# Patient Record
Sex: MALE | Race: WHITE | NOT HISPANIC OR LATINO | Employment: OTHER | URBAN - METROPOLITAN AREA
[De-identification: names, ages, dates, MRNs, and addresses within clinical notes are randomized per-mention and may not be internally consistent; named-entity substitution may affect disease eponyms.]

---

## 2017-03-29 ENCOUNTER — OFFICE VISIT (OUTPATIENT)
Dept: ENDOCRINOLOGY | Facility: MEDICAL CENTER | Age: 52
End: 2017-03-29
Payer: COMMERCIAL

## 2017-03-29 VITALS
HEIGHT: 68 IN | OXYGEN SATURATION: 98 % | HEART RATE: 74 BPM | DIASTOLIC BLOOD PRESSURE: 70 MMHG | SYSTOLIC BLOOD PRESSURE: 110 MMHG | BODY MASS INDEX: 35.28 KG/M2 | WEIGHT: 232.8 LBS

## 2017-03-29 DIAGNOSIS — E66.9 OBESITY (BMI 30-39.9): ICD-10-CM

## 2017-03-29 DIAGNOSIS — E11.65 TYPE 2 DIABETES MELLITUS WITH HYPERGLYCEMIA, WITHOUT LONG-TERM CURRENT USE OF INSULIN (HCC): ICD-10-CM

## 2017-03-29 DIAGNOSIS — I10 ESSENTIAL HYPERTENSION: ICD-10-CM

## 2017-03-29 LAB
HBA1C MFR BLD: 7.9 % (ref ?–5.8)
INT CON NEG: NORMAL
INT CON POS: NORMAL

## 2017-03-29 PROCEDURE — 83036 HEMOGLOBIN GLYCOSYLATED A1C: CPT | Performed by: INTERNAL MEDICINE

## 2017-03-29 PROCEDURE — 99214 OFFICE O/P EST MOD 30 MIN: CPT | Mod: 25 | Performed by: INTERNAL MEDICINE

## 2017-03-29 NOTE — MR AVS SNAPSHOT
"        Casper Rowley   3/29/2017 9:00 AM   Office Visit   MRN: [id number]    Department:  Endocrinology Med [facility]   Dept Phone:  [phone]    Description:  Male : 1965   Provider:  Khoa Nichole M.D.; ENDOCRINOLOGY DIABETES RN           Reason for Visit     Diabetes Mellitus           Allergies as of 3/29/2017     No Known Allergies      You were diagnosed with     Type 2 diabetes mellitus with hyperglycemia, without long-term current use of insulin (CMS-HCC)   [5586623]       Essential hypertension   [6454114]       Obesity (BMI 30-39.9)   [053890]         Vital Signs     Blood Pressure Pulse Height Weight Body Mass Index Oxygen Saturation    110/70 mmHg 74 1.715 m (5' 7.5\") 105.597 kg (232 lb 12.8 oz) 35.90 kg/m2 98%    Smoking Status                   Never Smoker            Basic Information     Date Of Birth Sex Race Ethnicity Preferred Language    1965 Male White Non- English      Your appointments     2017  9:00 AM   Diabetes Care Visit with Khoa Nichole M.D., ENDOCRINOLOGY DIABETES RN   [facility]    [address], [address]  [place] [address] [phone]           You will be receiving a confirmation call a few days before your appointment from our automated call confirmation system.              Problem List              ICD-10-CM Priority Class Noted - Resolved    DM (diabetes mellitus) (CMS-HCC) E11.9   2009 - Present    HTN (hypertension), benign (Chronic) I10   2009 - Present    Allergic rhinitis    2009 - Present    Dyslipidemia E78.5   2012 - Present    NDE (obstructive sleep apnea) G47.33   10/23/2012 - Present    BMI 35.0-35.9,adult Z68.35   10/28/2014 - Present    HTN (hypertension) I10   2015 - Present    Obesity E66.9   2015 - Present      Health Maintenance        Date Due Completion Dates    IMM HEP B VACCINE (1 of 3 - Primary Series) 1965 ---    RETINAL SCREENING " 1/12/2016 1/12/2015, 10/11/2013 (Done), 7/23/2011 (Done)    Override on 10/11/2013: Done (Han swartz)    Override on 7/23/2011: Done    A1C SCREENING 1/14/2016 7/14/2015, 4/15/2015, 4/14/2015, 1/23/2015, 1/14/2015, 10/24/2014, 2/22/2014, 6/1/2013, 5/4/2012, 1/20/2012, 8/28/2010, 8/11/2009, 2/5/2009    FASTING LIPID PROFILE 4/15/2016 4/15/2015, 10/24/2014, 6/1/2013, 1/20/2012, 5/13/2011, 8/28/2010, 8/11/2009, 2/5/2009    URINE ACR / MICROALBUMIN 4/15/2016 4/15/2015, 10/24/2014, 6/1/2013, 1/20/2012, 5/13/2011, 8/28/2010, 8/11/2009    SERUM CREATININE 4/15/2016 4/15/2015, 1/23/2015, 10/24/2014, 6/1/2013, 1/20/2012, 5/13/2011, 8/28/2010, 2/6/2009, 2/4/2009    IMM INFLUENZA (1) 9/1/2016 9/27/2014, 10/12/2012, 10/14/2011, 10/1/2010    COLONOSCOPY 9/15/2016 ---    DIABETES MONOFILAMENT / LE EXAM 12/14/2017 12/14/2016, 2/28/2014, 10/23/2012 (Done), 5/20/2011 (Done)    Override on 10/23/2012: Done    Override on 5/20/2011: Done    IMM DTaP/Tdap/Td Vaccine (2 - Td) 10/23/2022 10/23/2012            Results     POCT Hemoglobin A1C      Component    Glycohemoglobin    7.9    Comment:     lot [id number]  10/18    Internal Control Negative    Internal Control Positive                        Current Immunizations     Influenza TIV (IM) 9/27/2014, 10/12/2012, 10/14/2011, 10/1/2010    Pneumococcal polysaccharide vaccine (PPSV-23) 10/18/2013  3:33 PM    Tdap Vaccine 10/23/2012      Below and/or attached are the medications your provider expects you to take. Review all of your home medications and newly ordered medications with your provider and/or pharmacist. Follow medication instructions as directed by your provider and/or pharmacist. Please keep your medication list with you and share with your provider. Update the information when medications are discontinued, doses are changed, or new medications (including over-the-counter products) are added; and carry medication information at all times in the event of emergency situations      Allergies:  No Known Allergies          Medications  Valid as of: March 29, 2017 -  9:09 AM    Generic Name Brand Name Tablet Size Instructions for use    Albiglutide (Pen-injector) Albiglutide 50 MG Inject  as instructed.        Aspirin   Take  by mouth.        Blood Glucose Monitoring Suppl (Misc) Blood Glucose Monitoring Suppl SUPPLIES Test strips order: Test strips for One Touch Ultra 2 meter. Sig: use tid and prn ssx high or low sugar #100 RF x 3        Cholecalciferol (Cap) Vitamin D 2000 UNITS Take 6,000 Units by mouth every day.        Fluticasone Propionate (Suspension) FLONASE 50 MCG/ACT Spray 1 Spray in nose every day. Each Nostril        Gemfibrozil (Tab) LOPID 600 MG TAKE ONE TABLET BY MOUTH TWICE DAILY        Glucose Blood (Strip) ONE TOUCH ULTRA TEST  USE TO TEST TWICE DAILY        Insulin Pen Needle (Misc) B-D UF III MINI PEN NEEDLES 31G X 5 MM USE ONE PEN NEEDLE PER DAYS WITH VICTOZA        Lisinopril (Tab) PRINIVIL, ZESTRIL 40 MG TAKE ONE TABLET BY MOUTH ONCE A DAY        MetFORMIN HCl (TABLET SR 24 HR) GLUMETZA 1000 MG Take 1 Tab by mouth 2 times daily, before breakfast and dinner.        Metoprolol Succinate (TABLET SR 24 HR) TOPROL XL 25 MG Take 1 Tab by mouth every day.        .                 Medicines prescribed today were sent to:     [id number] - [place], [place] - [address] [address]    Phone: [phone] Fax: [phone]    Open 24 Hours?: No    [id number] - [place], [place] - [address] [address]    Phone: [phone] Fax: [phone]    Open 24 Hours?: No      Medication refill instructions:       If your prescription bottle indicates you have medication refills left, it is not necessary to call your provider’s office. Please contact your pharmacy and they will refill your medication.    If your prescription bottle indicates you do not have any refills left, you may request refills at any time through one  of the following ways: The online [employer] system (except Urgent Care), by calling your provider’s office, or by asking your pharmacy to contact your provider’s office with a refill request. Medication refills are processed only during regular business hours and may not be available until the next business day. Your provider may request additional information or to have a follow-up visit with you prior to refilling your medication.   *Please Note: Medication refills are assigned a new Rx number when refilled electronically. Your pharmacy may indicate that no refills were authorized even though a new prescription for the same medication is available at the pharmacy. Please request the medicine by name with the pharmacy before contacting your provider for a refill.           [employer] Access Code: Activation code not generated  Current [employer] Status: Active

## 2017-03-29 NOTE — PROGRESS NOTES
Patient with existing type diabetes:  Type 2 diabetes here for follow up     Patient's health status since last visit: no change.   Issues with diabetes since last visit none.   Current Diabetes Medications: Tanzeum 50 mg and Metformin 1000 mg bid.     HbA1c: @hba1c@  Lab Results   Component Value Date/Time    GLYCOHEMOGLOBIN 7.9 03/29/2017 08:58 AM      Diet: eating 3 meals per day, states healthy most of the time.  States he eats late at night and then goes to bed, due to work schedule.   States he plans on trying to eat more during the day or have a later snack so he doesn't eat as much late at night.     FSBS  Testing: on average checking blood sugars 1-2 times per day (scanned to media)    Hypoglycemia: none.   Exercise: using treadclimber for about 1 mile per day    Retinal Exam:past due, needs to schedule.  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     Routine Dental Exams: current.   Flu vaccine: current.   Pneumonia vaccine current.

## 2017-03-29 NOTE — PROGRESS NOTES
Endocrinology Clinic Progress Note  PCP: Mjogan Garcia M.D.    CC: Diabetes    HPI:  Casper Rowley is a 51 y.o. old patient who comes in today for routine follow up. He is currently on Tanzeum 50 mg weekly0 and metformin 1000 mg daily. He reports compliance with medications. He checks blood sugars 2-3 times a day. Fasting blood sugar in the morning is mostly close to 150-160. Pre-meal blood sugar readings are mostly below 180. No hypoglycemia. He denies issues with numbness and tingling in feet. He is due for repeat eye exam, he denies history of diabetic retinopathy.    ROS:  Constitutional: No unintentional weight loss  Endo: Denies excessive thirst or frequent urination    Past Medical History:  Patient Active Problem List    Diagnosis Date Noted   • HTN (hypertension) 01/14/2015   • Obesity 01/14/2015   • BMI 35.0-35.9,adult 10/28/2014   • NED (obstructive sleep apnea) 10/23/2012   • Dyslipidemia 01/23/2012   • Allergic rhinitis 09/29/2009   • HTN (hypertension), benign 08/14/2009   • DM (diabetes mellitus) (CMS-[facility]) 05/27/2009       Medications:    Current outpatient prescriptions:   •  metformin ER modified (GLUMETZA) 1000 MG TABLET SR 24 HR, Take 1 Tab by mouth 2 times daily, before breakfast and dinner., Disp: , Rfl:   •  lisinopril (PRINIVIL, ZESTRIL) 40 MG tablet, TAKE ONE TABLET BY MOUTH ONCE A DAY, Disp: 30 Tab, Rfl: 6  •  metoprolol SR (TOPROL XL) 25 MG TB24, Take 1 Tab by mouth every day., Disp: 30 Tab, Rfl: 5  •  Cholecalciferol (VITAMIN D) 2000 UNITS CAPS, Take 6,000 Units by mouth every day., Disp: , Rfl:   •  gemfibrozil (LOPID) 600 MG TABS, TAKE ONE TABLET BY MOUTH TWICE DAILY, Disp: 60 Tab, Rfl: 5  •  ASPIR-81 PO, Take  by mouth., Disp: , Rfl:   •  Albiglutide (TANZEUM) 50 MG Pen-injector, Inject  as instructed., Disp: , Rfl:   •  Blood Glucose Monitoring Suppl SUPPLIES MISC, Test strips order: Test strips for One Touch Ultra 2 meter. Sig: use tid and prn ssx high or low sugar #100 RF x  "3, Disp: 300 Each, Rfl: 3  •  B-D UF III MINI PEN NEEDLES 31G X 5 MM MISC, USE ONE PEN NEEDLE PER DAYS WITH VICTOZA, Disp: 100 Each, Rfl: 2  •  fluticasone (FLONASE) 50 MCG/ACT nasal spray, Spray 1 Spray in nose every day. Each Nostril, Disp: 16 g, Rfl: 11  •  ONE TOUCH ULTRA TEST strip, USE TO TEST TWICE DAILY, Disp: 100 Tab, Rfl: 11    Labs:   Hemoglobin A1c today in the clinic is 7.9%    Physical Examination:  Vital signs: /70 mmHg  Pulse 74  Ht 1.715 m (5' 7.5\")  Wt 105.597 kg (232 lb 12.8 oz)  BMI 35.90 kg/m2  SpO2 98%  General: No apparent distress, cooperative  Eyes: No scleral icterus, no discharge, normal eyelids  Neck: No abnormal masses on inspection   Resp: Normal effort, clear to auscultation bilaterally  CVS: Regular rate and rhythm, S1 S2 normal, no murmur  Extremities: No lower extremity edema  Musculoskeletal: Normal digits and nails  Skin: No rash on visible skin  Psych: Alert and oriented, normal mood and affect    Assessment and Plan:    Type 2 diabetes mellitus with hyperglycemia, without long-term current use of insulin (HCC)  · Hemoglobin A1c today in the clinic is 7.9%  · Goal hemoglobin A1c less than 7%  · Continue Tanzeum and metformin  · We discussed about diet and lifestyle modification  · Repeat labs:  -     COMP METABOLIC PANEL; Future  -     LIPID PROFILE; Future  -     MICROALBUMIN CREAT RATIO URINE; Future    Essential hypertension  · Blood pressure is well controlled  · Continue lisinopril, in addition to other antihypertensive agents    Obesity  · BMI 35.9  · We discussed about importance of weight loss    This was a combined MD/RN-CDE visit. RN-CDE notes reviewed and discussed. The total face to face time spent seeing the patient in consultation, and formulating an action plan for this visit was 25 minutes. Greater than 50% of this time was spent in counseling, discussing problems documented above, coordinating care and answering questions by the physician and " RN-certified diabetes educator.     Return in about 4 months (around 7/29/2017).    Thank you for allowing me to participate in the care of this patient.    Khoa Nichole M.D.    CC:   Mojgan Garcia M.D.    This note was created using voice recognition software (Dragon). The accuracy of the dictation is limited by the abilities of the software. I have reviewed the note prior to signing, however some errors in grammar and context are still possible. If you have any questions related to this note please do not hesitate to contact our office.

## 2019-02-08 DIAGNOSIS — Z01.810 PRE-OPERATIVE CARDIOVASCULAR EXAMINATION: ICD-10-CM

## 2019-02-08 DIAGNOSIS — Z01.812 PRE-OPERATIVE LABORATORY EXAMINATION: ICD-10-CM

## 2019-02-08 LAB
ANION GAP SERPL CALC-SCNC: 6 MMOL/L (ref 0–11.9)
BUN SERPL-MCNC: 16 MG/DL (ref 8–22)
CALCIUM SERPL-MCNC: 9.9 MG/DL (ref 8.5–10.5)
CHLORIDE SERPL-SCNC: 105 MMOL/L (ref 96–112)
CO2 SERPL-SCNC: 23 MMOL/L (ref 20–33)
CREAT SERPL-MCNC: 0.78 MG/DL (ref 0.5–1.4)
EKG IMPRESSION: NORMAL
EST. AVERAGE GLUCOSE BLD GHB EST-MCNC: 177 MG/DL
GLUCOSE SERPL-MCNC: 129 MG/DL (ref 65–99)
HBA1C MFR BLD: 7.8 % (ref 0–5.6)
POTASSIUM SERPL-SCNC: 4.4 MMOL/L (ref 3.6–5.5)
SODIUM SERPL-SCNC: 134 MMOL/L (ref 135–145)

## 2019-02-08 PROCEDURE — 36415 COLL VENOUS BLD VENIPUNCTURE: CPT

## 2019-02-08 PROCEDURE — 80048 BASIC METABOLIC PNL TOTAL CA: CPT

## 2019-02-08 PROCEDURE — 93010 ELECTROCARDIOGRAM REPORT: CPT | Performed by: INTERNAL MEDICINE

## 2019-02-08 PROCEDURE — 83036 HEMOGLOBIN GLYCOSYLATED A1C: CPT

## 2019-02-08 PROCEDURE — 93005 ELECTROCARDIOGRAM TRACING: CPT

## 2019-02-08 NOTE — OR NURSING
Pre admit apt: Pt. Instructed to continue regularly prescribed medications through day before surgery.  Instructed to take the following medications, the day of surgery, with a sip of water per anesthesia protocol:metoprolol

## 2019-02-11 ENCOUNTER — HOSPITAL ENCOUNTER (OUTPATIENT)
Facility: MEDICAL CENTER | Age: 54
End: 2019-02-11
Attending: ORTHOPAEDIC SURGERY | Admitting: ORTHOPAEDIC SURGERY
Payer: COMMERCIAL

## 2019-02-11 VITALS
TEMPERATURE: 97.2 F | RESPIRATION RATE: 16 BRPM | BODY MASS INDEX: 35.33 KG/M2 | OXYGEN SATURATION: 93 % | SYSTOLIC BLOOD PRESSURE: 114 MMHG | WEIGHT: 225.09 LBS | DIASTOLIC BLOOD PRESSURE: 92 MMHG | HEIGHT: 67 IN | HEART RATE: 71 BPM

## 2019-02-11 LAB — GLUCOSE BLD-MCNC: 106 MG/DL (ref 65–99)

## 2019-02-11 PROCEDURE — 82962 GLUCOSE BLOOD TEST: CPT

## 2019-02-11 PROCEDURE — 160039 HCHG SURGERY MINUTES - EA ADDL 1 MIN LEVEL 3: Performed by: ORTHOPAEDIC SURGERY

## 2019-02-11 PROCEDURE — 160025 RECOVERY II MINUTES (STATS): Performed by: ORTHOPAEDIC SURGERY

## 2019-02-11 PROCEDURE — 160046 HCHG PACU - 1ST 60 MINS PHASE II: Performed by: ORTHOPAEDIC SURGERY

## 2019-02-11 PROCEDURE — 160048 HCHG OR STATISTICAL LEVEL 1-5: Performed by: ORTHOPAEDIC SURGERY

## 2019-02-11 PROCEDURE — 160028 HCHG SURGERY MINUTES - 1ST 30 MINS LEVEL 3: Performed by: ORTHOPAEDIC SURGERY

## 2019-02-11 PROCEDURE — 160036 HCHG PACU - EA ADDL 30 MINS PHASE I: Performed by: ORTHOPAEDIC SURGERY

## 2019-02-11 PROCEDURE — 160002 HCHG RECOVERY MINUTES (STAT): Performed by: ORTHOPAEDIC SURGERY

## 2019-02-11 PROCEDURE — A9270 NON-COVERED ITEM OR SERVICE: HCPCS | Performed by: ANESTHESIOLOGY

## 2019-02-11 PROCEDURE — 501838 HCHG SUTURE GENERAL: Performed by: ORTHOPAEDIC SURGERY

## 2019-02-11 PROCEDURE — 500881 HCHG PACK, EXTREMITY: Performed by: ORTHOPAEDIC SURGERY

## 2019-02-11 PROCEDURE — 500423 HCHG DRESSING, ABD COMBINE: Performed by: ORTHOPAEDIC SURGERY

## 2019-02-11 PROCEDURE — 160035 HCHG PACU - 1ST 60 MINS PHASE I: Performed by: ORTHOPAEDIC SURGERY

## 2019-02-11 PROCEDURE — 700102 HCHG RX REV CODE 250 W/ 637 OVERRIDE(OP): Performed by: ANESTHESIOLOGY

## 2019-02-11 PROCEDURE — 700111 HCHG RX REV CODE 636 W/ 250 OVERRIDE (IP)

## 2019-02-11 PROCEDURE — 160009 HCHG ANES TIME/MIN: Performed by: ORTHOPAEDIC SURGERY

## 2019-02-11 PROCEDURE — 700101 HCHG RX REV CODE 250

## 2019-02-11 RX ORDER — ACETAMINOPHEN 500 MG
1000 TABLET ORAL ONCE
Status: DISCONTINUED | OUTPATIENT
Start: 2019-02-11 | End: 2019-02-11 | Stop reason: HOSPADM

## 2019-02-11 RX ORDER — HYDRALAZINE HYDROCHLORIDE 20 MG/ML
5 INJECTION INTRAMUSCULAR; INTRAVENOUS
Status: DISCONTINUED | OUTPATIENT
Start: 2019-02-11 | End: 2019-02-11 | Stop reason: HOSPADM

## 2019-02-11 RX ORDER — BUPIVACAINE HYDROCHLORIDE 2.5 MG/ML
INJECTION, SOLUTION EPIDURAL; INFILTRATION; INTRACAUDAL
Status: DISCONTINUED | OUTPATIENT
Start: 2019-02-11 | End: 2019-02-11 | Stop reason: HOSPADM

## 2019-02-11 RX ORDER — OXYCODONE HCL 5 MG/5 ML
5 SOLUTION, ORAL ORAL
Status: COMPLETED | OUTPATIENT
Start: 2019-02-11 | End: 2019-02-11

## 2019-02-11 RX ORDER — METOPROLOL TARTRATE 1 MG/ML
1 INJECTION, SOLUTION INTRAVENOUS
Status: DISCONTINUED | OUTPATIENT
Start: 2019-02-11 | End: 2019-02-11 | Stop reason: HOSPADM

## 2019-02-11 RX ORDER — CEFAZOLIN SODIUM 1 G/3ML
INJECTION, POWDER, FOR SOLUTION INTRAMUSCULAR; INTRAVENOUS
Status: DISCONTINUED | OUTPATIENT
Start: 2019-02-11 | End: 2019-02-11 | Stop reason: HOSPADM

## 2019-02-11 RX ORDER — MEPERIDINE HYDROCHLORIDE 25 MG/ML
12.5 INJECTION INTRAMUSCULAR; INTRAVENOUS; SUBCUTANEOUS
Status: DISCONTINUED | OUTPATIENT
Start: 2019-02-11 | End: 2019-02-11 | Stop reason: HOSPADM

## 2019-02-11 RX ORDER — SODIUM CHLORIDE, SODIUM LACTATE, POTASSIUM CHLORIDE, CALCIUM CHLORIDE 600; 310; 30; 20 MG/100ML; MG/100ML; MG/100ML; MG/100ML
INJECTION, SOLUTION INTRAVENOUS ONCE
Status: COMPLETED | OUTPATIENT
Start: 2019-02-11 | End: 2019-02-11

## 2019-02-11 RX ORDER — OXYCODONE HCL 5 MG/5 ML
10 SOLUTION, ORAL ORAL
Status: COMPLETED | OUTPATIENT
Start: 2019-02-11 | End: 2019-02-11

## 2019-02-11 RX ORDER — HYDROMORPHONE HYDROCHLORIDE 1 MG/ML
0.2 INJECTION, SOLUTION INTRAMUSCULAR; INTRAVENOUS; SUBCUTANEOUS
Status: DISCONTINUED | OUTPATIENT
Start: 2019-02-11 | End: 2019-02-11 | Stop reason: HOSPADM

## 2019-02-11 RX ORDER — HYDROMORPHONE HYDROCHLORIDE 1 MG/ML
0.4 INJECTION, SOLUTION INTRAMUSCULAR; INTRAVENOUS; SUBCUTANEOUS
Status: DISCONTINUED | OUTPATIENT
Start: 2019-02-11 | End: 2019-02-11 | Stop reason: HOSPADM

## 2019-02-11 RX ORDER — HALOPERIDOL 5 MG/ML
1 INJECTION INTRAMUSCULAR
Status: DISCONTINUED | OUTPATIENT
Start: 2019-02-11 | End: 2019-02-11 | Stop reason: HOSPADM

## 2019-02-11 RX ORDER — HYDROMORPHONE HYDROCHLORIDE 1 MG/ML
0.6 INJECTION, SOLUTION INTRAMUSCULAR; INTRAVENOUS; SUBCUTANEOUS
Status: DISCONTINUED | OUTPATIENT
Start: 2019-02-11 | End: 2019-02-11 | Stop reason: HOSPADM

## 2019-02-11 RX ORDER — CELECOXIB 200 MG/1
200 CAPSULE ORAL ONCE
Status: DISCONTINUED | OUTPATIENT
Start: 2019-02-11 | End: 2019-02-11 | Stop reason: HOSPADM

## 2019-02-11 RX ORDER — ONDANSETRON 2 MG/ML
4 INJECTION INTRAMUSCULAR; INTRAVENOUS
Status: DISCONTINUED | OUTPATIENT
Start: 2019-02-11 | End: 2019-02-11 | Stop reason: HOSPADM

## 2019-02-11 RX ORDER — DIPHENHYDRAMINE HYDROCHLORIDE 50 MG/ML
12.5 INJECTION INTRAMUSCULAR; INTRAVENOUS
Status: DISCONTINUED | OUTPATIENT
Start: 2019-02-11 | End: 2019-02-11 | Stop reason: HOSPADM

## 2019-02-11 RX ADMIN — SODIUM CHLORIDE, SODIUM LACTATE, POTASSIUM CHLORIDE, CALCIUM CHLORIDE: 600; 310; 30; 20 INJECTION, SOLUTION INTRAVENOUS at 10:48

## 2019-02-11 RX ADMIN — OXYCODONE HYDROCHLORIDE 5 MG: 5 SOLUTION ORAL at 13:54

## 2019-02-11 NOTE — OP REPORT
DATE OF SERVICE:  02/11/2019    PREOPERATIVE DIAGNOSIS:  Left carpal tunnel syndrome.    POSTOPERATIVE DIAGNOSIS:  Left carpal tunnel syndrome.    PROCEDURE PERFORMED:  Left carpal tunnel release.    SURGEON:  Piter Otero MD    ANESTHESIA:  General.    COMPLICATIONS:  None.    BLOOD LOSS:  Minimal.    TOURNIQUET:  Left arm 90 minutes at 225 mmHg.    INDICATION:  The patient is a 53-year-old gentleman with a history of left   carpal tunnel syndrome, refractory to conservative management and confirmed on   electrodiagnostic tests.  He is indicated for carpal tunnel release.    DESCRIPTION OF PROCEDURE:  Following induction of general anesthesia, time-out   was performed confirming patient, site, and procedure.  Two grams of Ancef IV   were ordered and administered.  The left arm tourniquet was applied and left   upper extremity was positioned on an arm board.  The extremity was prepped and   draped in sterile fashion.  The upper extremity was exsanguinated and the   tourniquet was inflated.  A 2 cm longitudinal incision just ulnar to the   thenar crease extending distal from the wrist crease was made.  Blunt   dissection was carried down through the subcutaneous tissues.  The palmar   fascia was incised in line with the skin to expose the underlying transverse   carpal ligament.  This was incised longitudinally in line with the ring ray.    The release was carried distally taking care to protect the palmar arch.  It   was also carried proximally into the distal aspect of the antebrachial fascia   to completely release the carpal tunnel room.  The wound was irrigated.  The   tourniquet was deflated.  Hemostasis was achieved with bipolar cautery.  Skin   was closed with 4-0 nylon mattress sutures.  Sterile dressing was applied   followed by a bulky soft volar splint.  The patient was awakened and extubated   in the operating room and transferred to recovery room in stable condition.        ____________________________________     MD DORIAN Hagan    DD:  02/11/2019 13:18:35  DT:  02/11/2019 13:39:45    D#:  [id number]  Job#:  [id number]

## 2019-02-11 NOTE — OR NURSING
Pt allergies and NPO status verified, home meds reconcilled. Belongings secured. Pt verbalizes understanding of the pain scale, expected course of stay, and plan of care.  Surgical site verified with pt.  IV access established.  FSBG 106.

## 2019-02-11 NOTE — OR SURGEON
Immediate Post OP Note    PreOp Diagnosis: L CTS    PostOp Diagnosis: L CTS    Procedure(s):  CARPAL TUNNEL RELEASE - Wound Class: Clean    Surgeon(s):  Piter Otero M.D.    Anesthesiologist/Type of Anesthesia:  Anesthesiologist: Bao Gaxiola M.D./General    Surgical Staff:  Circulator: Roge Alvarez R.N.  Scrub Person: Isidro Walls    Specimens removed if any:  * No specimens in log *    Estimated Blood Loss: min    Findings: above    Complications: none        2/11/2019 1:20 PM Piter Otero M.D.

## 2019-02-11 NOTE — OR NURSING
1440 PT DRESSED, TRANSFERRED TO RECLINER.  PT DRESSED, TRANSFERRED TO RECLINER.  1459 PT REPORT L HAND PAIN 1/10 AND TOLERABLE. VSS 1524 PT MEETS CRITERIA FOR D/C TO HOME.

## 2019-02-11 NOTE — DISCHARGE INSTRUCTIONS
ACTIVITY: Rest and take it easy for the first 24 hours.  A responsible adult is recommended to remain with you during that time.  It is normal to feel sleepy.  We encourage you to not do anything that requires balance, judgment or coordination.    MILD FLU-LIKE SYMPTOMS ARE NORMAL. YOU MAY EXPERIENCE GENERALIZED MUSCLE ACHES, THROAT IRRITATION, HEADACHE AND/OR SOME NAUSEA.    FOR 24 HOURS DO NOT:  Drive, operate machinery or run household appliances.  Drink beer or alcoholic beverages.   Make important decisions or sign legal documents.    SPECIAL INSTRUCTIONS: *Keep elevated over the level of your heart, may ambulate as needed. Ice 20 mins on/20 mins off** NON WEIGHT BEARING TO LEFT HAND.    DIET: To avoid nausea, slowly advance diet as tolerated, avoiding spicy or greasy foods for the first day.  Add more substantial food to your diet according to your physician's instructions.  Babies can be fed formula or breast milk as soon as they are hungry.  INCREASE FLUIDS AND FIBER TO AVOID CONSTIPATION.    SURGICAL DRESSING/BATHING: *keep clean, dry and intact. May shower when authorized by surgeon. **    FOLLOW-UP APPOINTMENT:  A follow-up appointment should be arranged with your doctor; call to schedule.    You should CALL YOUR PHYSICIAN if you develop:  Fever greater than 101 degrees F.  Pain not relieved by medication, or persistent nausea or vomiting.  Excessive bleeding (blood soaking through dressing) or unexpected drainage from the wound.  Extreme redness or swelling around the incision site, drainage of pus or foul smelling drainage.  Inability to urinate or empty your bladder within 8 hours.  Problems with breathing or chest pain.    You should call 911 if you develop problems with breathing or chest pain.  If you are unable to contact your doctor or surgical center, you should go to the nearest emergency room or urgent care center.      Physician's telephone #: *[phone]**    If any questions arise, call  your doctor.  If your doctor is not available, please feel free to call the Surgical Center at [phone].  The Center is open Monday through Friday from 7AM to 7PM.  You can also call the HEALTH HOTLINE open 24 hours/day, 7 days/week and speak to a nurse at [phone], or toll free at [phone].    A registered nurse may call you a few days after your surgery to see how you are doing after your procedure.    MEDICATIONS: Resume taking daily medication.  Take prescribed pain medication with food.  If no medication is prescribed, you may take non-aspirin pain medication if needed.  PAIN MEDICATION CAN BE VERY CONSTIPATING.  Take a stool softener or laxative such as senokot, pericolace, or milk of magnesia if needed.    Prescription given and filled prior to surgery.  Last pain medication given at  2pm**.    If your physician has prescribed pain medication that includes Acetaminophen (Tylenol), do not take additional Acetaminophen (Tylenol) while taking the prescribed medication.    Depression / Suicide Risk    As you are discharged from this [facility] facility, it is important to learn how to keep safe from harming yourself.    Recognize the warning signs:  · Abrupt changes in personality, positive or negative- including increase in energy   · Giving away possessions  · Change in eating patterns- significant weight changes-  positive or negative  · Change in sleeping patterns- unable to sleep or sleeping all the time   · Unwillingness or inability to communicate  · Depression  · Unusual sadness, discouragement and loneliness  · Talk of wanting to die  · Neglect of personal appearance   · Rebelliousness- reckless behavior  · Withdrawal from people/activities they love  · Confusion- inability to concentrate     If you or a loved one observes any of these behaviors or has concerns about self-harm, here's what you can do:  · Talk about it- your feelings and reasons for harming yourself  · Remove any means  that you might use to hurt yourself (examples: pills, rope, extension cords, firearm)  · Get professional help from the community (Mental Health, Substance Abuse, psychological counseling)  · Do not be alone:Call your Safe Contact- someone whom you trust who will be there for you.  · Call your local CRISIS HOTLINE 291-8965 or 570-010-7300  · Call your local Children's Mobile Crisis Response Team [place] (390) 549-9500 or www.FieldEZ  · Call the toll free National Suicide Prevention Hotlines   · National Suicide Prevention Lifeline 355-993-IACD (8785)  · National Hope Line Network 800-SUICIDE (024-6384)

## 2019-02-11 NOTE — OR NURSING
1319 Sedated to recovery with LMA in place. Good spontaneous respiratory effort. Lungs dim t/o. Left arm elevated on pillow, hand pink, warm and dry. 1330 Remains sedated, no changes. LMA dc'd. 1335 Pt reoriented to surroundings. Awake with calm and pleasant affect. 1345 S/o pain 3-4/10, Rx given. 1400 VSS, no assessment changes. 1420 Pt meets criteria for transfer and discharge to stage 2.

## 2019-05-15 ENCOUNTER — APPOINTMENT (RX ONLY)
Dept: URBAN - METROPOLITAN AREA CLINIC 35 | Facility: CLINIC | Age: 54
Setting detail: DERMATOLOGY
End: 2019-05-15

## 2019-05-15 DIAGNOSIS — L82.1 OTHER SEBORRHEIC KERATOSIS: ICD-10-CM

## 2019-05-15 DIAGNOSIS — Z71.89 OTHER SPECIFIED COUNSELING: ICD-10-CM

## 2019-05-15 DIAGNOSIS — D22 MELANOCYTIC NEVI: ICD-10-CM

## 2019-05-15 DIAGNOSIS — L81.4 OTHER MELANIN HYPERPIGMENTATION: ICD-10-CM

## 2019-05-15 DIAGNOSIS — Z85.828 PERSONAL HISTORY OF OTHER MALIGNANT NEOPLASM OF SKIN: ICD-10-CM

## 2019-05-15 PROBLEM — I10 ESSENTIAL (PRIMARY) HYPERTENSION: Status: ACTIVE | Noted: 2019-05-15

## 2019-05-15 PROBLEM — D22.4 MELANOCYTIC NEVI OF SCALP AND NECK: Status: ACTIVE | Noted: 2019-05-15

## 2019-05-15 PROCEDURE — ? COUNSELING

## 2019-05-15 PROCEDURE — 99213 OFFICE O/P EST LOW 20 MIN: CPT

## 2019-05-15 ASSESSMENT — LOCATION DETAILED DESCRIPTION DERM
LOCATION DETAILED: RIGHT INFERIOR LATERAL NECK
LOCATION DETAILED: RIGHT MID PREAURICULAR CHEEK
LOCATION DETAILED: RIGHT LATERAL TRAPEZIAL NECK
LOCATION DETAILED: RIGHT POSTERIOR SHOULDER

## 2019-05-15 ASSESSMENT — LOCATION ZONE DERM
LOCATION ZONE: NECK
LOCATION ZONE: ARM
LOCATION ZONE: FACE

## 2019-05-15 ASSESSMENT — LOCATION SIMPLE DESCRIPTION DERM
LOCATION SIMPLE: POSTERIOR NECK
LOCATION SIMPLE: RIGHT CHEEK
LOCATION SIMPLE: RIGHT SHOULDER

## 2020-12-16 ENCOUNTER — APPOINTMENT (RX ONLY)
Dept: URBAN - METROPOLITAN AREA CLINIC 35 | Facility: CLINIC | Age: 55
Setting detail: DERMATOLOGY
End: 2020-12-16

## 2020-12-16 DIAGNOSIS — D22 MELANOCYTIC NEVI: ICD-10-CM

## 2020-12-16 DIAGNOSIS — L81.4 OTHER MELANIN HYPERPIGMENTATION: ICD-10-CM

## 2020-12-16 DIAGNOSIS — Z85.828 PERSONAL HISTORY OF OTHER MALIGNANT NEOPLASM OF SKIN: ICD-10-CM

## 2020-12-16 DIAGNOSIS — L82.1 OTHER SEBORRHEIC KERATOSIS: ICD-10-CM

## 2020-12-16 DIAGNOSIS — D485 NEOPLASM OF UNCERTAIN BEHAVIOR OF SKIN: ICD-10-CM

## 2020-12-16 DIAGNOSIS — Z71.89 OTHER SPECIFIED COUNSELING: ICD-10-CM

## 2020-12-16 PROBLEM — D22.4 MELANOCYTIC NEVI OF SCALP AND NECK: Status: ACTIVE | Noted: 2020-12-16

## 2020-12-16 PROBLEM — D48.5 NEOPLASM OF UNCERTAIN BEHAVIOR OF SKIN: Status: ACTIVE | Noted: 2020-12-16

## 2020-12-16 PROCEDURE — ? BIOPSY BY PUNCH METHOD

## 2020-12-16 PROCEDURE — ? COUNSELING

## 2020-12-16 PROCEDURE — 99214 OFFICE O/P EST MOD 30 MIN: CPT | Mod: 25

## 2020-12-16 PROCEDURE — 11104 PUNCH BX SKIN SINGLE LESION: CPT

## 2020-12-16 ASSESSMENT — LOCATION ZONE DERM
LOCATION ZONE: NECK
LOCATION ZONE: ARM
LOCATION ZONE: FACE

## 2020-12-16 ASSESSMENT — LOCATION SIMPLE DESCRIPTION DERM
LOCATION SIMPLE: RIGHT SHOULDER
LOCATION SIMPLE: POSTERIOR NECK
LOCATION SIMPLE: RIGHT CHEEK

## 2020-12-16 ASSESSMENT — LOCATION DETAILED DESCRIPTION DERM
LOCATION DETAILED: RIGHT INFERIOR LATERAL NECK
LOCATION DETAILED: RIGHT POSTERIOR SHOULDER
LOCATION DETAILED: RIGHT MID PREAURICULAR CHEEK
LOCATION DETAILED: RIGHT LATERAL TRAPEZIAL NECK
LOCATION DETAILED: RIGHT POSTERIOR NECK

## 2020-12-16 NOTE — PROCEDURE: BIOPSY BY PUNCH METHOD
Detail Level: Detailed
Was A Bandage Applied: Yes
Punch Size In Mm: 4
Biopsy Type: H and E
Anesthesia Type: 0.5% lidocaine with 1:200,000 epinephrine and a 1:10 solution of 8.4% sodium bicarbonate
Anesthesia Volume In Cc: 0.6
Additional Anesthesia Volume In Cc (Will Not Render If 0): 0
Hemostasis: None
Epidermal Sutures: 4-0 Nylon
Wound Care: Petrolatum
Dressing: bandage
Suture Removal: 14 days
Patient Will Remove Sutures At Home?: No
Lab: [id number]
Lab Facility: 
Path Notes (To The Dermatopathologist): GA vs sarcoid vs dermal hypersensitivity vs other
Consent: Written consent was obtained and risks were reviewed including but not limited to scarring, infection, bleeding, scabbing, incomplete removal, nerve damage and allergy to anesthesia.
Post-Care Instructions: I reviewed with the patient in detail post-care instructions. Patient is to keep the biopsy site dry overnight, and then change the bandage and apply petrolatum once daily until sutures are removed.  Use a clean q-tip to apply the petrolatum and avoid touching the biopsy site with your hands.  Avoid immersing in water such as bathtub or swimming pools. Any concerns about a wound infection come into the office for a walk in visit Monday through Friday 8:30 am to 12 pm or 1 pm to 4:30 pm Signs of infection include increasing pain, redness, and drainage from biopsy site.  If we are not in the office, please call through the answering service.
Home Suture Removal Text: Patient will remove their sutures at home.  If they have any questions or difficulties they will call the office.
Notification Instructions: Patient will be notified of biopsy results. However, patient instructed to call the office if not contacted within 2 weeks.
Billing Type: Third-Party Bill
Information: Selecting Yes will display possible errors in your note based on the variables you have selected. This validation is only offered as a suggestion for you. PLEASE NOTE THAT THE VALIDATION TEXT WILL BE REMOVED WHEN YOU FINALIZE YOUR NOTE. IF YOU WANT TO FAX A PRELIMINARY NOTE YOU WILL NEED TO TOGGLE THIS TO 'NO' IF YOU DO NOT WANT IT IN YOUR FAXED NOTE.

## 2020-12-29 ENCOUNTER — APPOINTMENT (RX ONLY)
Dept: URBAN - METROPOLITAN AREA CLINIC 35 | Facility: CLINIC | Age: 55
Setting detail: DERMATOLOGY
End: 2020-12-29

## 2020-12-29 DIAGNOSIS — Z48.02 ENCOUNTER FOR REMOVAL OF SUTURES: ICD-10-CM

## 2020-12-29 PROCEDURE — ? SUTURE REMOVAL (GLOBAL PERIOD)

## 2020-12-29 ASSESSMENT — LOCATION SIMPLE DESCRIPTION DERM: LOCATION SIMPLE: POSTERIOR NECK

## 2020-12-29 ASSESSMENT — LOCATION ZONE DERM: LOCATION ZONE: NECK

## 2020-12-29 ASSESSMENT — LOCATION DETAILED DESCRIPTION DERM: LOCATION DETAILED: RIGHT POSTERIOR NECK

## 2020-12-29 NOTE — PROCEDURE: SUTURE REMOVAL (GLOBAL PERIOD)
Detail Level: Detailed
Add 51398 Cpt? (Important Note: In 2017 The Use Of 31357 Is Being Tracked By Cms To Determine Future Global Period Reimbursement For Global Periods): no

## 2021-04-06 ENCOUNTER — HOSPITAL ENCOUNTER (OUTPATIENT)
Dept: HOSPITAL 8 - CFH | Age: 56
Discharge: HOME | End: 2021-04-06
Attending: INTERNAL MEDICINE
Payer: COMMERCIAL

## 2021-04-06 DIAGNOSIS — E78.5: ICD-10-CM

## 2021-04-06 DIAGNOSIS — E11.9: ICD-10-CM

## 2021-04-06 DIAGNOSIS — I35.8: Primary | ICD-10-CM

## 2021-04-06 DIAGNOSIS — I10: ICD-10-CM

## 2021-04-06 PROCEDURE — 93306 TTE W/DOPPLER COMPLETE: CPT

## 2021-05-22 ENCOUNTER — HOSPITAL ENCOUNTER (OUTPATIENT)
Dept: LAB | Facility: MEDICAL CENTER | Age: 56
End: 2021-05-22
Attending: ORTHOPAEDIC SURGERY
Payer: COMMERCIAL

## 2021-05-22 LAB
BASOPHILS # BLD AUTO: 0.5 % (ref 0–1.8)
BASOPHILS # BLD: 0.04 K/UL (ref 0–0.12)
CRP SERPL HS-MCNC: <0.3 MG/DL (ref 0–0.75)
EOSINOPHIL # BLD AUTO: 0.12 K/UL (ref 0–0.51)
EOSINOPHIL NFR BLD: 1.5 % (ref 0–6.9)
ERYTHROCYTE [DISTWIDTH] IN BLOOD BY AUTOMATED COUNT: 46 FL (ref 35.9–50)
ERYTHROCYTE [SEDIMENTATION RATE] IN BLOOD BY WESTERGREN METHOD: 7 MM/HOUR (ref 0–20)
HCT VFR BLD AUTO: 48.3 % (ref 42–52)
HGB BLD-MCNC: 15.4 G/DL (ref 14–18)
IMM GRANULOCYTES # BLD AUTO: 0.04 K/UL (ref 0–0.11)
IMM GRANULOCYTES NFR BLD AUTO: 0.5 % (ref 0–0.9)
LYMPHOCYTES # BLD AUTO: 1.31 K/UL (ref 1–4.8)
LYMPHOCYTES NFR BLD: 16.9 % (ref 22–41)
MCH RBC QN AUTO: 29.4 PG (ref 27–33)
MCHC RBC AUTO-ENTMCNC: 31.9 G/DL (ref 33.7–35.3)
MCV RBC AUTO: 92.4 FL (ref 81.4–97.8)
MONOCYTES # BLD AUTO: 0.63 K/UL (ref 0–0.85)
MONOCYTES NFR BLD AUTO: 8.1 % (ref 0–13.4)
NEUTROPHILS # BLD AUTO: 5.62 K/UL (ref 1.82–7.42)
NEUTROPHILS NFR BLD: 72.5 % (ref 44–72)
NRBC # BLD AUTO: 0 K/UL
NRBC BLD-RTO: 0 /100 WBC
PLATELET # BLD AUTO: 348 K/UL (ref 164–446)
PMV BLD AUTO: 9.6 FL (ref 9–12.9)
RBC # BLD AUTO: 5.23 M/UL (ref 4.7–6.1)
WBC # BLD AUTO: 7.8 K/UL (ref 4.8–10.8)

## 2021-05-22 PROCEDURE — 84155 ASSAY OF PROTEIN SERUM: CPT

## 2021-05-22 PROCEDURE — 36415 COLL VENOUS BLD VENIPUNCTURE: CPT

## 2021-05-22 PROCEDURE — 85652 RBC SED RATE AUTOMATED: CPT

## 2021-05-22 PROCEDURE — 84165 PROTEIN E-PHORESIS SERUM: CPT

## 2021-05-22 PROCEDURE — 86140 C-REACTIVE PROTEIN: CPT

## 2021-05-22 PROCEDURE — 85025 COMPLETE CBC W/AUTO DIFF WBC: CPT

## 2021-05-26 LAB
ALBUMIN 24H MFR UR ELPH: 98.2 %
ALBUMIN SERPL ELPH-MCNC: 3.39 G/DL (ref 3.75–5.01)
ALPHA1 GLOB 24H MFR UR ELPH: 1.8 %
ALPHA1 GLOB SERPL ELPH-MCNC: 0.46 G/DL (ref 0.19–0.46)
ALPHA2 GLOB 24H MFR UR ELPH: 0 %
ALPHA2 GLOB SERPL ELPH-MCNC: 1.03 G/DL (ref 0.48–1.05)
B-GLOBULIN 24H MFR UR ELPH: 0 %
B-GLOBULIN SERPL ELPH-MCNC: 1.19 G/DL (ref 0.48–1.1)
COLLECT DURATION TIME SPEC: NORMAL HRS
EER MONOCLONAL PROTEIN STUDY, 24 HOUR U Q5964: NORMAL
EER PROT ELECT SER Q1092: ABNORMAL
GAMMA GLOB 24H MFR UR ELPH: 0 %
GAMMA GLOB SERPL ELPH-MCNC: 1.04 G/DL (ref 0.62–1.51)
INTERPRETATION SERPL IFE-IMP: ABNORMAL
INTERPRETATION UR IFE-IMP: NORMAL
M PROTEIN 24H MFR UR ELPH: 0 %
M PROTEIN 24H UR ELPH-MRATE: NORMAL MG/24 HRS
PROT 24H UR-MRATE: NORMAL MG/D (ref 40–150)
PROT SERPL-MCNC: 7.1 G/DL (ref 6.3–8.2)
PROT UR-MCNC: 4 MG/DL
SPECIMEN VOL ?TM UR: NORMAL ML

## 2021-05-29 ENCOUNTER — HOSPITAL ENCOUNTER (OUTPATIENT)
Dept: LAB | Facility: MEDICAL CENTER | Age: 56
End: 2021-05-29
Attending: ORTHOPAEDIC SURGERY
Payer: COMMERCIAL

## 2021-05-29 ENCOUNTER — HOSPITAL ENCOUNTER (OUTPATIENT)
Dept: LAB | Facility: MEDICAL CENTER | Age: 56
End: 2021-05-29
Attending: FAMILY MEDICINE
Payer: COMMERCIAL

## 2021-05-29 PROCEDURE — 84165 PROTEIN E-PHORESIS SERUM: CPT

## 2021-05-29 PROCEDURE — 83520 IMMUNOASSAY QUANT NOS NONAB: CPT | Mod: 91

## 2021-05-29 PROCEDURE — 84155 ASSAY OF PROTEIN SERUM: CPT

## 2021-05-29 PROCEDURE — 84156 ASSAY OF PROTEIN URINE: CPT

## 2021-05-29 PROCEDURE — 36415 COLL VENOUS BLD VENIPUNCTURE: CPT

## 2021-05-29 PROCEDURE — 86335 IMMUNFIX E-PHORSIS/URINE/CSF: CPT

## 2021-06-01 ENCOUNTER — HOSPITAL ENCOUNTER (OUTPATIENT)
Facility: MEDICAL CENTER | Age: 56
End: 2021-06-01
Attending: FAMILY MEDICINE
Payer: COMMERCIAL

## 2021-06-02 LAB
ALBUMIN SERPL ELPH-MCNC: 3.18 G/DL (ref 3.75–5.01)
ALPHA1 GLOB SERPL ELPH-MCNC: 0.48 G/DL (ref 0.19–0.46)
ALPHA2 GLOB SERPL ELPH-MCNC: 1.05 G/DL (ref 0.48–1.05)
B-GLOBULIN SERPL ELPH-MCNC: 1.14 G/DL (ref 0.48–1.1)
COLLECT DURATION TIME SPEC: NORMAL HRS
GAMMA GLOB SERPL ELPH-MCNC: 0.96 G/DL (ref 0.62–1.51)
INTERPRETATION SERPL IFE-IMP: ABNORMAL
INTERPRETATION UR IFE-IMP: NORMAL
KAPPA LC FREE 24H UR-MRATE: NORMAL MG/D
KAPPA LC FREE UR-MCNC: 3.16 MG/L (ref 0–32.9)
LAMBDA LC FREE 24H UR-MRATE: NORMAL MG/D
LAMBDA LC FREE UR-MCNC: <0.74 MG/L (ref 0–3.79)
MONOCLON BAND OBS SERPL: ABNORMAL
PATHOLOGY STUDY: ABNORMAL
PROT 24H UR-MRATE: NORMAL MG/D
PROT SERPL-MCNC: 6.8 G/DL (ref 6.3–8.2)
SPECIMEN VOL ?TM UR: NORMAL ML

## 2021-06-05 LAB
ALBUMIN 24H MFR UR ELPH: 92.2 %
ALPHA1 GLOB 24H MFR UR ELPH: 6.4 %
ALPHA2 GLOB 24H MFR UR ELPH: 1.4 %
B-GLOBULIN 24H MFR UR ELPH: 0 %
COLLECT DURATION TIME SPEC: 24 HRS
EER MONOCLONAL PROTEIN STUDY, 24 HOUR U Q5964: NORMAL
GAMMA GLOB 24H MFR UR ELPH: 0 %
INTERPRETATION UR IFE-IMP: NORMAL
M PROTEIN 24H MFR UR ELPH: 0 %
M PROTEIN 24H UR ELPH-MRATE: 0 MG/24 HRS
PROT 24H UR-MRATE: 112 MG/D (ref 40–150)
PROT UR-MCNC: 4 MG/DL
SPECIMEN VOL ?TM UR: 2800 ML

## 2021-07-17 ENCOUNTER — HOSPITAL ENCOUNTER (OUTPATIENT)
Dept: LAB | Facility: MEDICAL CENTER | Age: 56
End: 2021-07-17
Attending: INTERNAL MEDICINE
Payer: COMMERCIAL

## 2021-07-17 LAB
CANCER AG19-9 SERPL-ACNC: 10.5 U/ML (ref 0–35)
CEA SERPL-MCNC: 1.7 NG/ML (ref 0–3)
PSA SERPL-MCNC: 20.7 NG/ML (ref 0–4)

## 2021-07-17 PROCEDURE — 36415 COLL VENOUS BLD VENIPUNCTURE: CPT

## 2021-07-17 PROCEDURE — 84153 ASSAY OF PSA TOTAL: CPT

## 2021-07-17 PROCEDURE — 86301 IMMUNOASSAY TUMOR CA 19-9: CPT

## 2021-07-17 PROCEDURE — 82378 CARCINOEMBRYONIC ANTIGEN: CPT

## 2021-09-04 ENCOUNTER — HOSPITAL ENCOUNTER (OUTPATIENT)
Dept: LAB | Facility: MEDICAL CENTER | Age: 56
End: 2021-09-04
Attending: UROLOGY
Payer: COMMERCIAL

## 2021-09-04 LAB
ALBUMIN SERPL BCP-MCNC: 4.4 G/DL (ref 3.2–4.9)
ALBUMIN/GLOB SERPL: 1.5 G/DL
ALP SERPL-CCNC: 681 U/L (ref 30–99)
ALT SERPL-CCNC: 12 U/L (ref 2–50)
ANION GAP SERPL CALC-SCNC: 13 MMOL/L (ref 7–16)
AST SERPL-CCNC: 26 U/L (ref 12–45)
BILIRUB SERPL-MCNC: 0.4 MG/DL (ref 0.1–1.5)
BUN SERPL-MCNC: 11 MG/DL (ref 8–22)
CALCIUM SERPL-MCNC: 9.6 MG/DL (ref 8.5–10.5)
CHLORIDE SERPL-SCNC: 100 MMOL/L (ref 96–112)
CO2 SERPL-SCNC: 23 MMOL/L (ref 20–33)
CREAT SERPL-MCNC: 0.64 MG/DL (ref 0.5–1.4)
GLOBULIN SER CALC-MCNC: 2.9 G/DL (ref 1.9–3.5)
GLUCOSE SERPL-MCNC: 127 MG/DL (ref 65–99)
POTASSIUM SERPL-SCNC: 4.7 MMOL/L (ref 3.6–5.5)
PROT SERPL-MCNC: 7.3 G/DL (ref 6–8.2)
SODIUM SERPL-SCNC: 136 MMOL/L (ref 135–145)
TESTOST SERPL-MCNC: <10 NG/DL (ref 175–781)

## 2021-09-04 PROCEDURE — 84153 ASSAY OF PSA TOTAL: CPT

## 2021-09-04 PROCEDURE — 84154 ASSAY OF PSA FREE: CPT

## 2021-09-04 PROCEDURE — 36415 COLL VENOUS BLD VENIPUNCTURE: CPT

## 2021-09-04 PROCEDURE — 84403 ASSAY OF TOTAL TESTOSTERONE: CPT

## 2021-09-04 PROCEDURE — 80053 COMPREHEN METABOLIC PANEL: CPT

## 2021-09-07 LAB
PSA FREE MFR SERPL: 29 %
PSA FREE SERPL-MCNC: 0.4 NG/ML
PSA SERPL-MCNC: 1.4 NG/ML (ref 0–4)

## 2021-10-07 ENCOUNTER — HOSPITAL ENCOUNTER (OUTPATIENT)
Dept: LAB | Facility: MEDICAL CENTER | Age: 56
End: 2021-10-07
Attending: UROLOGY
Payer: COMMERCIAL

## 2021-10-07 LAB
ALBUMIN SERPL BCP-MCNC: 4.7 G/DL (ref 3.2–4.9)
ALBUMIN/GLOB SERPL: 2 G/DL
ALP SERPL-CCNC: 438 U/L (ref 30–99)
ALT SERPL-CCNC: 6 U/L (ref 2–50)
ANION GAP SERPL CALC-SCNC: 12 MMOL/L (ref 7–16)
AST SERPL-CCNC: 9 U/L (ref 12–45)
BILIRUB SERPL-MCNC: 0.4 MG/DL (ref 0.1–1.5)
BUN SERPL-MCNC: 12 MG/DL (ref 8–22)
CALCIUM SERPL-MCNC: 9.7 MG/DL (ref 8.5–10.5)
CHLORIDE SERPL-SCNC: 100 MMOL/L (ref 96–112)
CO2 SERPL-SCNC: 24 MMOL/L (ref 20–33)
CREAT SERPL-MCNC: 0.55 MG/DL (ref 0.5–1.4)
GLOBULIN SER CALC-MCNC: 2.4 G/DL (ref 1.9–3.5)
GLUCOSE SERPL-MCNC: 145 MG/DL (ref 65–99)
POTASSIUM SERPL-SCNC: 4.1 MMOL/L (ref 3.6–5.5)
PROT SERPL-MCNC: 7.1 G/DL (ref 6–8.2)
PSA SERPL-MCNC: 1.08 NG/ML (ref 0–4)
SODIUM SERPL-SCNC: 136 MMOL/L (ref 135–145)
TESTOST SERPL-MCNC: 15 NG/DL (ref 175–781)

## 2021-10-07 PROCEDURE — 80053 COMPREHEN METABOLIC PANEL: CPT

## 2021-10-07 PROCEDURE — 84153 ASSAY OF PSA TOTAL: CPT

## 2021-10-07 PROCEDURE — 84403 ASSAY OF TOTAL TESTOSTERONE: CPT

## 2021-10-07 PROCEDURE — 36415 COLL VENOUS BLD VENIPUNCTURE: CPT

## 2021-10-19 ENCOUNTER — PRE-ADMISSION TESTING (OUTPATIENT)
Dept: ADMISSIONS | Facility: MEDICAL CENTER | Age: 56
End: 2021-10-19
Attending: ORTHOPAEDIC SURGERY
Payer: COMMERCIAL

## 2021-10-19 RX ORDER — TAMSULOSIN HYDROCHLORIDE 0.4 MG/1
CAPSULE ORAL
COMMUNITY
Start: 2021-09-13 | End: 2023-09-19

## 2021-10-19 RX ORDER — HYDROCODONE BITARTRATE AND ACETAMINOPHEN 7.5; 325 MG/1; MG/1
TABLET ORAL
COMMUNITY
Start: 2021-09-21 | End: 2023-09-19

## 2021-10-19 RX ORDER — ENZALUTAMIDE 40 MG/1
4 CAPSULE ORAL DAILY
COMMUNITY
Start: 2021-09-27 | End: 2023-09-19

## 2021-10-19 RX ORDER — CANAGLIFLOZIN 100 MG/1
1 TABLET, FILM COATED ORAL DAILY
COMMUNITY
End: 2023-09-19

## 2021-10-19 RX ORDER — ROSUVASTATIN CALCIUM 10 MG/1
TABLET, COATED ORAL
COMMUNITY
Start: 2021-08-12 | End: 2023-09-19

## 2021-10-19 RX ORDER — ZOLPIDEM TARTRATE 10 MG/1
TABLET ORAL
COMMUNITY
Start: 2021-09-23 | End: 2023-09-19

## 2021-10-19 RX ORDER — INSULIN GLARGINE 100 [IU]/ML
INJECTION, SOLUTION SUBCUTANEOUS
COMMUNITY
End: 2021-10-19

## 2021-10-19 RX ORDER — METFORMIN HYDROCHLORIDE 500 MG/1
TABLET, EXTENDED RELEASE ORAL
COMMUNITY
End: 2021-10-19

## 2021-10-19 RX ORDER — OXYCODONE AND ACETAMINOPHEN 10; 325 MG/1; MG/1
TABLET ORAL
COMMUNITY
End: 2021-10-19

## 2021-10-19 RX ORDER — CYCLOBENZAPRINE HCL 10 MG
TABLET ORAL
COMMUNITY
Start: 2021-09-22 | End: 2023-09-19

## 2021-10-19 RX ORDER — FLUTICASONE PROPIONATE 50 MCG
SPRAY, SUSPENSION (ML) NASAL
COMMUNITY
End: 2023-09-19

## 2021-10-19 RX ORDER — DENOSUMAB 120 MG/1.7ML
120 INJECTION SUBCUTANEOUS
COMMUNITY

## 2021-10-19 RX ORDER — DULAGLUTIDE 0.75 MG/.5ML
INJECTION, SOLUTION SUBCUTANEOUS
COMMUNITY
Start: 2021-10-14 | End: 2023-09-19

## 2021-10-19 NOTE — OR NURSING
"Preadmit appointment: \" Preparing for your Procedure information\" sheet given to patient with verbal and written instructions. Patient instructed to continue prescribed medications through the day before surgery, instructed to take the following medications the day of surgery per anesthesia protocol:   FLONASE, NORCO, TOPROL XL, FLOMAX.         Verbal and written, and pre-admit video website instructions provided on covid symptoms to watch for given to patient, pt advised to notify MD if any symptoms develop. Verbal instructions given to follow the [place] mask mandate and encouraged to avoid crowds. Also verbally instructed to wear a mask when with person known not to have had the Covid vaccine.    STOP/BANG protocol initiated. Verbal and written instructions given to bring CPAP DOS.        "

## 2021-10-19 NOTE — OR NURSING
Edwin Rashid Casper is taking XTANDI 120mg daily and XGEVA 120mg injections on Sundays; please tell me if the XTANDI can be taken the DOS, and any different instructions for the XGEVA injection?    Thank you for your help with this,   Samantha BAL RN   Anesthesia Summary Report           Patient Name: Casper Rowley MRN: [id number] Admission Date: Patient not admitted

## 2021-10-28 ENCOUNTER — APPOINTMENT (OUTPATIENT)
Dept: ADMISSIONS | Facility: MEDICAL CENTER | Age: 56
End: 2021-10-28
Attending: ORTHOPAEDIC SURGERY
Payer: COMMERCIAL

## 2021-10-28 DIAGNOSIS — Z01.810 PRE-OPERATIVE CARDIOVASCULAR EXAMINATION: ICD-10-CM

## 2021-10-28 DIAGNOSIS — Z01.812 PRE-OPERATIVE LABORATORY EXAMINATION: ICD-10-CM

## 2021-10-28 LAB
EKG IMPRESSION: NORMAL
EST. AVERAGE GLUCOSE BLD GHB EST-MCNC: 140 MG/DL
HBA1C MFR BLD: 6.5 % (ref 4–5.6)

## 2021-10-28 PROCEDURE — 36415 COLL VENOUS BLD VENIPUNCTURE: CPT

## 2021-10-28 PROCEDURE — C9803 HOPD COVID-19 SPEC COLLECT: HCPCS

## 2021-10-28 PROCEDURE — 83036 HEMOGLOBIN GLYCOSYLATED A1C: CPT

## 2021-10-28 PROCEDURE — U0005 INFEC AGEN DETEC AMPLI PROBE: HCPCS

## 2021-10-28 PROCEDURE — U0003 INFECTIOUS AGENT DETECTION BY NUCLEIC ACID (DNA OR RNA); SEVERE ACUTE RESPIRATORY SYNDROME CORONAVIRUS 2 (SARS-COV-2) (CORONAVIRUS DISEASE [COVID-19]), AMPLIFIED PROBE TECHNIQUE, MAKING USE OF HIGH THROUGHPUT TECHNOLOGIES AS DESCRIBED BY CMS-2020-01-R: HCPCS

## 2021-10-28 PROCEDURE — 93010 ELECTROCARDIOGRAM REPORT: CPT | Performed by: INTERNAL MEDICINE

## 2021-10-28 PROCEDURE — 93005 ELECTROCARDIOGRAM TRACING: CPT

## 2021-10-28 NOTE — OR NURSING
Both can be taken without much concern.  Thank you.   Vanessa Rashid M.D.  [facility]      On Oct 28, 2021, at 13:09, SMPreadmit [email or web address] wrote:  ?   Casper Ravi is taking XTANDI 120mg daily and XGEVA 120mg injections on Sundays; please instruct me if the XTANDI can be taken the DOS and if there are any instructions re: the XGEVA injection.  Call placed to patient and notified of anesthesia notes regarding medication can be taken

## 2021-10-28 NOTE — OR NURSING
Casper Ravi is taking XTANDI 120mg daily and XGEVA 120mg injections on Sundays; please instruct me if the XTANDI can be taken the DOS and if there are any instructions re: the XGEVA injection.    Thank you for your help with this,  Samantha BAL RN      Anesthesia Summary Report           Patient Name: Casper Rowley MRN: [id number] Admission Date: Patient not admitted   Allergies: No Known Allergies

## 2021-10-29 LAB
SARS-COV-2 RNA RESP QL NAA+PROBE: NOTDETECTED
SPECIMEN SOURCE: NORMAL

## 2021-11-01 ENCOUNTER — ANESTHESIA (OUTPATIENT)
Dept: SURGERY | Facility: MEDICAL CENTER | Age: 56
End: 2021-11-01
Payer: COMMERCIAL

## 2021-11-01 ENCOUNTER — ANESTHESIA EVENT (OUTPATIENT)
Dept: SURGERY | Facility: MEDICAL CENTER | Age: 56
End: 2021-11-01
Payer: COMMERCIAL

## 2021-11-01 ENCOUNTER — HOSPITAL ENCOUNTER (OUTPATIENT)
Facility: MEDICAL CENTER | Age: 56
End: 2021-11-01
Attending: ORTHOPAEDIC SURGERY | Admitting: ORTHOPAEDIC SURGERY
Payer: COMMERCIAL

## 2021-11-01 VITALS
WEIGHT: 184.08 LBS | BODY MASS INDEX: 28.89 KG/M2 | DIASTOLIC BLOOD PRESSURE: 70 MMHG | HEIGHT: 67 IN | OXYGEN SATURATION: 96 % | HEART RATE: 77 BPM | TEMPERATURE: 96.8 F | RESPIRATION RATE: 16 BRPM | SYSTOLIC BLOOD PRESSURE: 107 MMHG

## 2021-11-01 LAB
GLUCOSE BLD-MCNC: 103 MG/DL (ref 65–99)
GLUCOSE BLD-MCNC: 130 MG/DL (ref 65–99)

## 2021-11-01 PROCEDURE — 700111 HCHG RX REV CODE 636 W/ 250 OVERRIDE (IP): Performed by: ORTHOPAEDIC SURGERY

## 2021-11-01 PROCEDURE — 160002 HCHG RECOVERY MINUTES (STAT): Performed by: ORTHOPAEDIC SURGERY

## 2021-11-01 PROCEDURE — 700101 HCHG RX REV CODE 250: Performed by: ANESTHESIOLOGY

## 2021-11-01 PROCEDURE — 64415 NJX AA&/STRD BRCH PLXS IMG: CPT | Performed by: ORTHOPAEDIC SURGERY

## 2021-11-01 PROCEDURE — 160009 HCHG ANES TIME/MIN: Performed by: ORTHOPAEDIC SURGERY

## 2021-11-01 PROCEDURE — 160041 HCHG SURGERY MINUTES - EA ADDL 1 MIN LEVEL 4: Performed by: ORTHOPAEDIC SURGERY

## 2021-11-01 PROCEDURE — 502000 HCHG MISC OR IMPLANTS RC 0278: Performed by: ORTHOPAEDIC SURGERY

## 2021-11-01 PROCEDURE — 160029 HCHG SURGERY MINUTES - 1ST 30 MINS LEVEL 4: Performed by: ORTHOPAEDIC SURGERY

## 2021-11-01 PROCEDURE — 160035 HCHG PACU - 1ST 60 MINS PHASE I: Performed by: ORTHOPAEDIC SURGERY

## 2021-11-01 PROCEDURE — 82962 GLUCOSE BLOOD TEST: CPT

## 2021-11-01 PROCEDURE — 160036 HCHG PACU - EA ADDL 30 MINS PHASE I: Performed by: ORTHOPAEDIC SURGERY

## 2021-11-01 PROCEDURE — 160025 RECOVERY II MINUTES (STATS): Performed by: ORTHOPAEDIC SURGERY

## 2021-11-01 PROCEDURE — 501162 HCHG PROBE, VULCAN: Performed by: ORTHOPAEDIC SURGERY

## 2021-11-01 PROCEDURE — 700111 HCHG RX REV CODE 636 W/ 250 OVERRIDE (IP): Performed by: ANESTHESIOLOGY

## 2021-11-01 PROCEDURE — C1713 ANCHOR/SCREW BN/BN,TIS/BN: HCPCS | Performed by: ORTHOPAEDIC SURGERY

## 2021-11-01 PROCEDURE — 700111 HCHG RX REV CODE 636 W/ 250 OVERRIDE (IP)

## 2021-11-01 PROCEDURE — 501838 HCHG SUTURE GENERAL: Performed by: ORTHOPAEDIC SURGERY

## 2021-11-01 PROCEDURE — 502581 HCHG PACK, SHOULDER ARTHROSCOPY: Performed by: ORTHOPAEDIC SURGERY

## 2021-11-01 PROCEDURE — 160046 HCHG PACU - 1ST 60 MINS PHASE II: Performed by: ORTHOPAEDIC SURGERY

## 2021-11-01 PROCEDURE — 700105 HCHG RX REV CODE 258: Performed by: ORTHOPAEDIC SURGERY

## 2021-11-01 PROCEDURE — 160048 HCHG OR STATISTICAL LEVEL 1-5: Performed by: ORTHOPAEDIC SURGERY

## 2021-11-01 DEVICE — DEVICE SUTURING RIGHT CURVED SLINGSHOT (1EA): Type: IMPLANTABLE DEVICE | Site: SHOULDER | Status: FUNCTIONAL

## 2021-11-01 DEVICE — ANCHOR KNOTLESS REELX STT 4.5MM (5EA/BX): Type: IMPLANTABLE DEVICE | Site: SHOULDER | Status: FUNCTIONAL

## 2021-11-01 RX ORDER — EPINEPHRINE 1 MG/ML(1)
AMPUL (ML) INJECTION
Status: DISCONTINUED | OUTPATIENT
Start: 2021-11-01 | End: 2021-11-01 | Stop reason: HOSPADM

## 2021-11-01 RX ORDER — ONDANSETRON 2 MG/ML
4 INJECTION INTRAMUSCULAR; INTRAVENOUS
Status: DISCONTINUED | OUTPATIENT
Start: 2021-11-01 | End: 2021-11-01 | Stop reason: HOSPADM

## 2021-11-01 RX ORDER — ONDANSETRON 2 MG/ML
INJECTION INTRAMUSCULAR; INTRAVENOUS PRN
Status: DISCONTINUED | OUTPATIENT
Start: 2021-11-01 | End: 2021-11-01 | Stop reason: SURG

## 2021-11-01 RX ORDER — SODIUM CHLORIDE, SODIUM LACTATE, POTASSIUM CHLORIDE, CALCIUM CHLORIDE 600; 310; 30; 20 MG/100ML; MG/100ML; MG/100ML; MG/100ML
INJECTION, SOLUTION INTRAVENOUS CONTINUOUS
Status: DISCONTINUED | OUTPATIENT
Start: 2021-11-01 | End: 2021-11-01 | Stop reason: HOSPADM

## 2021-11-01 RX ORDER — HALOPERIDOL 5 MG/ML
1 INJECTION INTRAMUSCULAR
Status: DISCONTINUED | OUTPATIENT
Start: 2021-11-01 | End: 2021-11-01 | Stop reason: HOSPADM

## 2021-11-01 RX ORDER — MEPERIDINE HYDROCHLORIDE 25 MG/ML
12.5 INJECTION INTRAMUSCULAR; INTRAVENOUS; SUBCUTANEOUS
Status: DISCONTINUED | OUTPATIENT
Start: 2021-11-01 | End: 2021-11-01 | Stop reason: HOSPADM

## 2021-11-01 RX ORDER — HYDROMORPHONE HYDROCHLORIDE 1 MG/ML
0.1 INJECTION, SOLUTION INTRAMUSCULAR; INTRAVENOUS; SUBCUTANEOUS
Status: DISCONTINUED | OUTPATIENT
Start: 2021-11-01 | End: 2021-11-01 | Stop reason: HOSPADM

## 2021-11-01 RX ORDER — HYDROMORPHONE HYDROCHLORIDE 1 MG/ML
0.2 INJECTION, SOLUTION INTRAMUSCULAR; INTRAVENOUS; SUBCUTANEOUS
Status: DISCONTINUED | OUTPATIENT
Start: 2021-11-01 | End: 2021-11-01 | Stop reason: HOSPADM

## 2021-11-01 RX ORDER — BUPIVACAINE HYDROCHLORIDE 2.5 MG/ML
INJECTION, SOLUTION EPIDURAL; INFILTRATION; INTRACAUDAL
Status: COMPLETED | OUTPATIENT
Start: 2021-11-01 | End: 2021-11-01

## 2021-11-01 RX ORDER — LIDOCAINE HYDROCHLORIDE 10 MG/ML
INJECTION, SOLUTION EPIDURAL; INFILTRATION; INTRACAUDAL; PERINEURAL
Status: COMPLETED
Start: 2021-11-01 | End: 2021-11-01

## 2021-11-01 RX ORDER — KETOROLAC TROMETHAMINE 30 MG/ML
INJECTION, SOLUTION INTRAMUSCULAR; INTRAVENOUS PRN
Status: DISCONTINUED | OUTPATIENT
Start: 2021-11-01 | End: 2021-11-01 | Stop reason: SURG

## 2021-11-01 RX ORDER — DIPHENHYDRAMINE HYDROCHLORIDE 50 MG/ML
12.5 INJECTION INTRAMUSCULAR; INTRAVENOUS
Status: DISCONTINUED | OUTPATIENT
Start: 2021-11-01 | End: 2021-11-01 | Stop reason: HOSPADM

## 2021-11-01 RX ORDER — OXYCODONE HCL 5 MG/5 ML
5 SOLUTION, ORAL ORAL
Status: DISCONTINUED | OUTPATIENT
Start: 2021-11-01 | End: 2021-11-01 | Stop reason: HOSPADM

## 2021-11-01 RX ORDER — MIDAZOLAM HYDROCHLORIDE 1 MG/ML
INJECTION INTRAMUSCULAR; INTRAVENOUS PRN
Status: DISCONTINUED | OUTPATIENT
Start: 2021-11-01 | End: 2021-11-01 | Stop reason: SURG

## 2021-11-01 RX ORDER — CEFAZOLIN SODIUM 1 G/3ML
INJECTION, POWDER, FOR SOLUTION INTRAMUSCULAR; INTRAVENOUS PRN
Status: DISCONTINUED | OUTPATIENT
Start: 2021-11-01 | End: 2021-11-01 | Stop reason: SURG

## 2021-11-01 RX ORDER — HYDROMORPHONE HYDROCHLORIDE 1 MG/ML
0.4 INJECTION, SOLUTION INTRAMUSCULAR; INTRAVENOUS; SUBCUTANEOUS
Status: DISCONTINUED | OUTPATIENT
Start: 2021-11-01 | End: 2021-11-01 | Stop reason: HOSPADM

## 2021-11-01 RX ORDER — OXYCODONE HCL 5 MG/5 ML
10 SOLUTION, ORAL ORAL
Status: DISCONTINUED | OUTPATIENT
Start: 2021-11-01 | End: 2021-11-01 | Stop reason: HOSPADM

## 2021-11-01 RX ORDER — DEXAMETHASONE SODIUM PHOSPHATE 4 MG/ML
INJECTION, SOLUTION INTRA-ARTICULAR; INTRALESIONAL; INTRAMUSCULAR; INTRAVENOUS; SOFT TISSUE PRN
Status: DISCONTINUED | OUTPATIENT
Start: 2021-11-01 | End: 2021-11-01 | Stop reason: SURG

## 2021-11-01 RX ORDER — LIDOCAINE HYDROCHLORIDE 20 MG/ML
INJECTION, SOLUTION EPIDURAL; INFILTRATION; INTRACAUDAL; PERINEURAL PRN
Status: DISCONTINUED | OUTPATIENT
Start: 2021-11-01 | End: 2021-11-01 | Stop reason: SURG

## 2021-11-01 RX ADMIN — DEXAMETHASONE SODIUM PHOSPHATE 2 MG: 4 INJECTION, SOLUTION INTRAMUSCULAR; INTRAVENOUS at 07:36

## 2021-11-01 RX ADMIN — CEFAZOLIN 2 G: 330 INJECTION, POWDER, FOR SOLUTION INTRAMUSCULAR; INTRAVENOUS at 07:38

## 2021-11-01 RX ADMIN — ONDANSETRON 4 MG: 2 INJECTION INTRAMUSCULAR; INTRAVENOUS at 07:36

## 2021-11-01 RX ADMIN — SODIUM CHLORIDE, POTASSIUM CHLORIDE, SODIUM LACTATE AND CALCIUM CHLORIDE: 600; 310; 30; 20 INJECTION, SOLUTION INTRAVENOUS at 06:51

## 2021-11-01 RX ADMIN — FENTANYL CITRATE 100 MCG: 50 INJECTION, SOLUTION INTRAMUSCULAR; INTRAVENOUS at 07:07

## 2021-11-01 RX ADMIN — ROCURONIUM BROMIDE 75 MG: 10 INJECTION, SOLUTION INTRAVENOUS at 07:36

## 2021-11-01 RX ADMIN — SUGAMMADEX 200 MG: 100 INJECTION, SOLUTION INTRAVENOUS at 08:38

## 2021-11-01 RX ADMIN — LIDOCAINE HYDROCHLORIDE 2 ML: 10 INJECTION, SOLUTION EPIDURAL; INFILTRATION; INTRACAUDAL; PERINEURAL at 06:50

## 2021-11-01 RX ADMIN — PROPOFOL 150 MG: 10 INJECTION, EMULSION INTRAVENOUS at 07:36

## 2021-11-01 RX ADMIN — KETOROLAC TROMETHAMINE 30 MG: 30 INJECTION, SOLUTION INTRAMUSCULAR at 07:36

## 2021-11-01 RX ADMIN — BUPIVACAINE HYDROCHLORIDE 25 ML: 2.5 INJECTION, SOLUTION EPIDURAL; INFILTRATION; INTRACAUDAL; PERINEURAL at 06:43

## 2021-11-01 RX ADMIN — LIDOCAINE HYDROCHLORIDE 100 MG: 20 INJECTION, SOLUTION EPIDURAL; INFILTRATION; INTRACAUDAL; PERINEURAL at 07:36

## 2021-11-01 RX ADMIN — MIDAZOLAM HYDROCHLORIDE 2 MG: 1 INJECTION, SOLUTION INTRAMUSCULAR; INTRAVENOUS at 07:07

## 2021-11-01 ASSESSMENT — PAIN SCALES - GENERAL: PAIN_LEVEL: 2

## 2021-11-01 ASSESSMENT — FIBROSIS 4 INDEX: FIB4 SCORE: 0.58

## 2021-11-01 NOTE — ANESTHESIA PROCEDURE NOTES
Airway    Date/Time: 11/1/2021 7:36 AM  Performed by: Servando Parikh M.D.  Authorized by: Servando Parikh M.D.     Location:  OR  Urgency:  Elective  Indications for Airway Management:  Anesthesia      Spontaneous Ventilation: absent    Sedation Level:  Deep  Preoxygenated: Yes    Patient Position:  Sniffing  Final Airway Type:  Endotracheal airway  Final Endotracheal Airway:  ETT  Cuffed: Yes    Technique Used for Successful ETT Placement:  Direct laryngoscopy    Insertion Site:  Oral  Blade Type:  Del  Laryngoscope Blade/Videolaryngoscope Blade Size:  3  ETT Size (mm):  7.5  Measured from:  Teeth  ETT to Teeth (cm):  21  Placement Verified by: auscultation and capnometry    Cormack-Lehane Classification:  Grade I - full view of glottis  Number of Attempts at Approach:  1

## 2021-11-01 NOTE — OR NURSING
0906: Assumed care of patient. STOP Bang with CPAP use in progress till 0942 per report from Andria DIAMOND. Patient denies pain/nausea. Left shoulder dressing CDI - ice pack and immobilizer in place.     0915: Cont stable, no changes.     0930: Cont stable, no changes to surgical site. Denies SOB, pain, and nausea. Danny sips of clears. Transitioned patient from CPAP to RA for RA trial.     0945: End STOP Bang per protocol - patient stable on RA - Sats cont in 90s. Wale pain/nausea/SOB. Dressing/surgical site CDI. Meets criteria to transfer to Stage II.

## 2021-11-01 NOTE — OR NURSING
0842 PT RECEIVED IN PACU, REPORT RECEIVED.  ,VSS, RESP SPONT, EVEN, NON LABORED.     0852 DR. HERRERA AWARE OF BS. CPAP COMMENCED TO HOME SETTING WITHOUT O2. PT AWAKE. DR. MINAYA AT BEDSIDE     0857 VSS,     0906 REPORT GIVEN TO SHADY DIAMOND TO ASSUME CARE OF THIS PT..

## 2021-11-01 NOTE — ANESTHESIA TIME REPORT
Anesthesia Start and Stop Event Times     Date Time Event    11/1/2021 0704 Ready for Procedure     0730 Anesthesia Start     0850 Anesthesia Stop        Responsible Staff  11/01/21    Name Role Begin End    Servando Parikh M.D. Anesth 0730 0850        Preop Diagnosis (Free Text):  Pre-op Diagnosis     TRAUMATIC RUPTURE OF ROTATOR CUFF        Preop Diagnosis (Codes):    Premium Reason  Non-Premium    Comments: block w usd

## 2021-11-01 NOTE — OR NURSING
Pt allergies and NPO status verified, home meds reconcilled. Belongings secured. Pt verbalizes understanding of the pain scale, expected course of stay, and plan of care.  Surgical site verified with pt.  IV access established.  Sequentials/ ben hose placed on legs.  FSBG 103

## 2021-11-01 NOTE — ANESTHESIA POSTPROCEDURE EVALUATION
Patient: Casper Rowley    Procedure Summary     Date: 11/01/21 Room / Location:  OR  / SURGERY [facility]    Anesthesia Start: 0730 Anesthesia Stop: 0850    Procedures:       ARTHROSCOPY,SHOULDER,WITH EXTENSIVE DEBRIDEMENT (Left Shoulder)      ARTHROSCOPY, SHOULDER, WITH BICEPS TENOTOMY - WITH SUB SCAPULARIS REPAIR (Left Shoulder) Diagnosis: (TRAUMATIC RUPTURE OF ROTATOR CUFF)    Surgeons: Piter Otero M.D. Responsible Provider: Servando Parikh M.D.    Anesthesia Type: general, peripheral nerve block ASA Status: 3          Final Anesthesia Type: general, peripheral nerve block  Last vitals  BP   Blood Pressure: 114/76    Temp   36.1 °C (97 °F)    Pulse   77   Resp   12    SpO2   96 %      Anesthesia Post Evaluation    Patient location during evaluation: PACU  Patient participation: complete - patient participated  Level of consciousness: awake and alert  Pain score: 2    Airway patency: patent  Anesthetic complications: no  Cardiovascular status: hemodynamically stable  Respiratory status: acceptable  Hydration status: euvolemic    PONV: none          No complications documented.     Nurse Pain Score: 0 (NPRS)

## 2021-11-01 NOTE — ANESTHESIA PROCEDURE NOTES
Peripheral Block    Date/Time: 11/1/2021 6:43 AM  Performed by: Servando Parikh M.D.  Authorized by: Servando Parikh M.D.     Start Time:  11/1/2021 6:43 AM  Reason for Block: at surgeon's request and post-op pain management ONLY    patient identified, IV checked, site marked, risks and benefits discussed, surgical consent, monitors and equipment checked, pre-op evaluation and timeout performed    Patient Position:  Supine  Prep: ChloraPrep    Monitoring:  Heart rate, continuous pulse ox and cardiac monitor  Block Region:  Upper Extremity  Upper Extremity - Block Type:  BRACHIAL PLEXUS block, Interscalene approach      Laterality:  Left  Procedures: ultrasound guided  Image captured, interpreted and electronically stored.  Local Infiltration:  Lidocaine  Strength:  1 %  Dose:  3 ml  Block Type:  Single-shot  Needle Length:  100mm  Needle Gauge:  21 G  Needle Localization:  Ultrasound guidance  Injection Assessment:  Negative aspiration for heme, no paresthesia on injection, incremental injection and local visualized surrounding nerve on ultrasound

## 2021-11-01 NOTE — DISCHARGE INSTRUCTIONS
ACTIVITY: Rest and take it easy for the first 24 hours.  A responsible adult is recommended to remain with you during that time.  It is normal to feel sleepy.  We encourage you to not do anything that requires balance, judgment or coordination.    MILD FLU-LIKE SYMPTOMS ARE NORMAL. YOU MAY EXPERIENCE GENERALIZED MUSCLE ACHES, THROAT IRRITATION, HEADACHE AND/OR SOME NAUSEA.    FOR 24 HOURS DO NOT:  Drive, operate machinery or run household appliances.  Drink beer or alcoholic beverages.   Make important decisions or sign legal documents.    SPECIAL INSTRUCTIONS: Left Shoulder Scope with Debridement    Non Weight Bearing to Left Arm/Shoulder until otherwise instructed by Dr Otero    DIET: To avoid nausea, slowly advance diet as tolerated, avoiding spicy or greasy foods for the first day.  Add more substantial food to your diet according to your physician's instructions.    INCREASE FLUIDS AND FIBER TO AVOID CONSTIPATION.    SURGICAL DRESSING/BATHING: Keep Clean and Dry as instructed by Dr Otero    FOLLOW-UP APPOINTMENT:  A follow-up appointment should be arranged with your doctor; call [phone] to schedule/confirm.    You should CALL YOUR PHYSICIAN if you develop:  Fever greater than 101 degrees F.  Pain not relieved by medication, or persistent nausea or vomiting.  Excessive bleeding (blood soaking through dressing) or unexpected drainage from the wound.  Extreme redness or swelling around the incision site, drainage of pus or foul smelling drainage.  Inability to urinate or empty your bladder within 8 hours.  Problems with breathing or chest pain.    You should call [phone] if you develop problems with breathing or chest pain.  If you are unable to contact your doctor or surgical center, you should go to the nearest emergency room or urgent care center.      Dr Otero's telephone #: [phone]    If any questions arise, call your doctor.  If your doctor is not available, please feel free to call the Surgical Center  at [phone]. The Contact Center is open Monday through Friday 7AM to 5PM and may speak to a nurse at [phone], or toll free at [phone].     A registered nurse may call you a few days after your surgery to see how you are doing after your procedure.    MEDICATIONS: Resume taking daily medication.  Take prescribed pain medication with food.  If no medication is prescribed, you may take non-aspirin pain medication if needed.  PAIN MEDICATION CAN BE VERY CONSTIPATING.  Take a stool softener or laxative such as senokot, pericolace, or milk of magnesia if needed.    Prescription already picked up and at home per patient.  Last pain medication given during surgery.    If your physician has prescribed pain medication that includes Acetaminophen (Tylenol), do not take additional Acetaminophen (Tylenol) while taking the prescribed medication.    Peripheral Nerve Block Discharge Instructions from Same Day Surgery and Inpatient :    What to Expect - Upper Extremity  · You may experience numbness and weakness in shoulder, arm and hand  on the same side as your surgery  · This is normal. For some people, this may be an unpleasant sensation. Be very careful with your numb limb  · Ask for help when you need it  Shoulder Surgery Side Effects  · In addition to numbness and weakness you may experience other symptoms  · Other nerves that are close to those nerves injected can also be affected by local anesthesia  · You may experience a hoarseness in your voice  · Your breathing may feel different  · You may also notice drooping of your eyelid, pupil constriction, and decreased sweating, on the side of your surgery  · All of these side effects are normal and will resolve when the local anesthetic wears off   Prevent Injury  · Protect the limb like a baby  · Beware of exposing your limb to extreme heat or cold or trauma  · The limb may be injured without you noticing because it is numb  · Keep the limb elevated whenever  "possible  · Do not sleep on the limb  · Change the position of the limb regularly  · Avoid putting pressure on your surgical limb  Pain Control  · The initial block on the day of surgery will make your extremity feel \"numb\"  · Any consecutive injection including prior to discharge from the hospital will make your extremity feel \"numb\"  · You may feel an aching or burning when the local anesthesia starts to wear off  · Take pain pills as prescribed by your surgeon  · Call your surgeon or anesthesiologist if you do not have adequate pain control    Discharge Education for patients on NED (Obstructive Sleep Apnea)    Prior to receiving sedation or anesthesia, we screen all patients for Obstructive Sleep Apnea.  During your screening, you were identified as having suspected, but not confirmed Obstructive Sleep Apnea(NED).    What is Obstructive Sleep Apnea?  Sleep apnea (AP-ne-ah) is a common disorder which involves breathing pauses that occur during sleep.  These can last from 10 seconds to a minute or longer.  Normal breathing resumes often with a loud snort or choking sound.    Sleep apnea occurs in all age groups and both genders but is more common in men and people over 40 years of age.  It has been estimated that as many as 18 million Americans have sleep apnea.  Most people who have sleep apnea don’t know they have it because it only occurs during sleep.  A family member and/or bed partner may first notice the signs of sleep apnea.  Sleep apnea is a chronic (ongoing) condition that disrupts the quality and quantity of your sleep repeatedly throughout the night.  This often results in excessive daytime sleepiness or fatigue during the day.  It may also contribute to high blood pressure, heart problems, and complications following medications used for surgery and procedures.    To establish a definitive diagnosis, further testing from a specialist would be needed.  We recommend that you follow up with your primary " care physician.    We recommend that you should be with an adult observer for at least 24 hours after your sedation/anesthesia.  If you have a CPAP machine, you should wear it during any sleep period (day or night) for the week following your procedure.  We encourage you to sleep on your side or in a sitting position, even with napping.  Lying flat on your back increases the risk of apnea and airway obstruction during your post procedure recovery period.    It is important to prevent over-sedation that could increase your risk for apnea.  Please take all pain medication as directed by your physician.  If you are not getting pain relief, please contact your physician to discuss possible approaches to relieving pain while minimizing medications that can affect your breathing and oxygen levels.          Depression / Suicide Risk    As you are discharged from this [facility] facility, it is important to learn how to keep safe from harming yourself.    Recognize the warning signs:  · Abrupt changes in personality, positive or negative- including increase in energy   · Giving away possessions  · Change in eating patterns- significant weight changes-  positive or negative  · Change in sleeping patterns- unable to sleep or sleeping all the time   · Unwillingness or inability to communicate  · Depression  · Unusual sadness, discouragement and loneliness  · Talk of wanting to die  · Neglect of personal appearance   · Rebelliousness- reckless behavior  · Withdrawal from people/activities they love  · Confusion- inability to concentrate     If you or a loved one observes any of these behaviors or has concerns about self-harm, here's what you can do:  · Talk about it- your feelings and reasons for harming yourself  · Remove any means that you might use to hurt yourself (examples: pills, rope, extension cords, firearm)  · Get professional help from the community (Mental Health, Substance Abuse, psychological counseling)  · Do  not be alone:Call your Safe Contact- someone whom you trust who will be there for you.  · Call your local CRISIS HOTLINE 131-5913 or 432-979-5259  · Call your local Children's Mobile Crisis Response Team [place] (190) 578-3529 or www.Movetis  · Call the toll free National Suicide Prevention Hotlines   · National Suicide Prevention Lifeline 048-914-ZKDG (1150)  · National [employer] Line Network 800-SUICIDE (037-4202)

## 2021-11-01 NOTE — ANESTHESIA PREPROCEDURE EVALUATION
Relevant Problems   ANESTHESIA   (positive) NED (obstructive sleep apnea)      CARDIAC   (positive) HTN (hypertension)   (positive) HTN (hypertension), benign       Physical Exam    Airway   Mallampati: II  TM distance: >3 FB  Neck ROM: full       Cardiovascular - normal exam  Rhythm: regular  Rate: normal  (-) murmur     Dental - normal exam           Pulmonary - normal exam  Breath sounds clear to auscultation     Abdominal    Neurological - normal exam                 Anesthesia Plan    ASA 3       Plan - general and peripheral nerve block     Peripheral nerve block will be post-op pain control  Airway plan will be ETT          Induction: intravenous    Postoperative Plan: Postoperative administration of opioids is intended.    Pertinent diagnostic labs and testing reviewed    Informed Consent:    Anesthetic plan and risks discussed with patient.    Use of blood products discussed with: patient whom consented to blood products.

## 2021-11-01 NOTE — OP REPORT
DATE OF SERVICE:  11/01/2021     PREOPERATIVE DIAGNOSIS:  Left shoulder subscapularis tear with biceps   dislocation.     POSTOPERATIVE DIAGNOSES:  1.  Left shoulder subscapularis tear with biceps subluxation.  2.  Left shoulder partial thickness supraspinatus tear.     PROCEDURES:  1.  Left shoulder arthroscopy with extensive debridement of anterior synovium,   posterior and superior labrum, supraspinatus, and bursa.  2.  Left shoulder arthroscopic subscapularis repair.  3.  Left shoulder arthroscopic biceps tenotomy.     SURGEON:  Piter Otero MD     ASSISTANT:  PARI Chandler     ANESTHESIA:  General with regional block.     ESTIMATED BLOOD LOSS:  Minimal.     TOURNIQUET:  None.     INDICATIONS:  The patient is a 56-year-old gentleman who sustained an   industrial injury 5 months ago and has MRI evidence of subscapularis tear with   biceps dislocation.  He is indicated for arthroscopic treatment.     FINDINGS:  Exam under anesthesia revealed full range of motion with no   evidence of instability.  Arthroscopic examination of the glenohumeral joint   revealed the articular surfaces to be intact.  There was fraying of the   superior and posterior labrum.  There was a tear of the superior subscapularis   with retraction of the comma tissue approximately 15 mm.  There was medial   dislocation of the biceps tendon and mild partial tearing of the biceps.    There was a partial thickness shallow anterior supraspinatus tear.    Examination of the subacromial space revealed extensive thickening of the   bursa.  The coracoacromial ligament was intact without any fraying and there   was minimal fraying of the bursal surface of the rotator cuff.     PROCEDURE:  Following induction of general anesthesia, time-out was performed   confirming patient, site, and procedure.  Two grams of Ancef IV were ordered   and administered.  The patient was positioned in the lateral decubitus   position with the left shoulder up.   The left shoulder and upper extremity   were prepped and draped in the usual fashion and suspended from 8 pounds   longitudinal traction.  Standard posterior, anterior and lateral portals were   established and diagnostic arthroscopy was performed with findings as above.    The shaver was used to debride the superior and posterior labrum.  Anterior   synovitis was debrided with the shaver.  The partial thickness supraspinatus   tear was debrided with the shaver.  The biceps was transected and the   remaining stump was resected with the shaver as it did not retract.  The   shaver was then used to freshen the footprint on the superior lesser   tuberosity.  Care was taken to confirm adequate mobility of the subscapularis   tendon.  A SlingShot was used to place a tape through the superior   subscapularis just medial to the comma tissue.  This was repaired anatomically   to the superior lesser tuberosity with a ReelX anchor, completing a good   solid repair of the subscapularis.     Next, the subacromial space was entered.  Bursa was debrided with the shaver.    The arthroscope was withdrawn.  The portals were closed with nylon sutures.    Sterile dressing was applied followed by an UltraSling.  The patient was   awakened and extubated in the operating room and transferred to recovery room   in stable condition.        ______________________________  MD DORIAN Hagan/SUJIT    DD:  11/01/2021 08:49  DT:  11/01/2021 09:13    Job#:  [id number]

## 2021-11-01 NOTE — OR NURSING
0947: To Stage 2 from PACU.  Pt states pain is tolerable and denies nausea at this time. Surgical dressing CDI. Immobilizer in place. Capillary refill to the affected extremity brisk less than 3 seconds, able to move fingers.  1032: Discharge instructions and medications gone over with Pt and ride. All questions answered. Pt meets discharge criteria. PIV DC'd. Pt transported to [facility] via wheelchair in stable condition.   Discharge to care of family post uneventful stay in PACU 2.

## 2021-11-11 ENCOUNTER — HOSPITAL ENCOUNTER (OUTPATIENT)
Dept: LAB | Facility: MEDICAL CENTER | Age: 56
End: 2021-11-11
Attending: UROLOGY
Payer: COMMERCIAL

## 2021-11-11 LAB
ALBUMIN SERPL BCP-MCNC: 4.5 G/DL (ref 3.2–4.9)
ALBUMIN/GLOB SERPL: 1.5 G/DL
ALP SERPL-CCNC: 188 U/L (ref 30–99)
ALT SERPL-CCNC: 11 U/L (ref 2–50)
ANION GAP SERPL CALC-SCNC: 11 MMOL/L (ref 7–16)
AST SERPL-CCNC: 10 U/L (ref 12–45)
BILIRUB SERPL-MCNC: 0.4 MG/DL (ref 0.1–1.5)
BUN SERPL-MCNC: 13 MG/DL (ref 8–22)
CALCIUM SERPL-MCNC: 9.6 MG/DL (ref 8.5–10.5)
CHLORIDE SERPL-SCNC: 100 MMOL/L (ref 96–112)
CO2 SERPL-SCNC: 22 MMOL/L (ref 20–33)
CREAT SERPL-MCNC: 0.53 MG/DL (ref 0.5–1.4)
FASTING STATUS PATIENT QL REPORTED: NORMAL
GLOBULIN SER CALC-MCNC: 3 G/DL (ref 1.9–3.5)
GLUCOSE SERPL-MCNC: 128 MG/DL (ref 65–99)
POTASSIUM SERPL-SCNC: 4.5 MMOL/L (ref 3.6–5.5)
PROT SERPL-MCNC: 7.5 G/DL (ref 6–8.2)
PSA SERPL-MCNC: 1.05 NG/ML (ref 0–4)
SODIUM SERPL-SCNC: 133 MMOL/L (ref 135–145)
TESTOST SERPL-MCNC: 28 NG/DL (ref 175–781)

## 2021-11-11 PROCEDURE — 84403 ASSAY OF TOTAL TESTOSTERONE: CPT

## 2021-11-11 PROCEDURE — 80053 COMPREHEN METABOLIC PANEL: CPT

## 2021-11-11 PROCEDURE — 36415 COLL VENOUS BLD VENIPUNCTURE: CPT

## 2021-11-11 PROCEDURE — 84153 ASSAY OF PSA TOTAL: CPT

## 2021-12-16 ENCOUNTER — HOSPITAL ENCOUNTER (OUTPATIENT)
Dept: LAB | Facility: MEDICAL CENTER | Age: 56
End: 2021-12-16
Attending: UROLOGY
Payer: COMMERCIAL

## 2021-12-16 LAB
ALBUMIN SERPL BCP-MCNC: 5 G/DL (ref 3.2–4.9)
ALBUMIN/GLOB SERPL: 1.6 G/DL
ALP SERPL-CCNC: 234 U/L (ref 30–99)
ALT SERPL-CCNC: 9 U/L (ref 2–50)
ANION GAP SERPL CALC-SCNC: 12 MMOL/L (ref 7–16)
AST SERPL-CCNC: 15 U/L (ref 12–45)
BILIRUB SERPL-MCNC: 0.6 MG/DL (ref 0.1–1.5)
BUN SERPL-MCNC: 16 MG/DL (ref 8–22)
CALCIUM SERPL-MCNC: 9.4 MG/DL (ref 8.5–10.5)
CHLORIDE SERPL-SCNC: 101 MMOL/L (ref 96–112)
CO2 SERPL-SCNC: 23 MMOL/L (ref 20–33)
CREAT SERPL-MCNC: 0.6 MG/DL (ref 0.5–1.4)
GLOBULIN SER CALC-MCNC: 3.1 G/DL (ref 1.9–3.5)
GLUCOSE SERPL-MCNC: 127 MG/DL (ref 65–99)
POTASSIUM SERPL-SCNC: 4.6 MMOL/L (ref 3.6–5.5)
PROT SERPL-MCNC: 8.1 G/DL (ref 6–8.2)
PSA SERPL-MCNC: 1.48 NG/ML (ref 0–4)
SODIUM SERPL-SCNC: 136 MMOL/L (ref 135–145)
TESTOST SERPL-MCNC: <12 NG/DL (ref 175–781)

## 2021-12-16 PROCEDURE — 80053 COMPREHEN METABOLIC PANEL: CPT

## 2021-12-16 PROCEDURE — 84153 ASSAY OF PSA TOTAL: CPT

## 2021-12-16 PROCEDURE — 36415 COLL VENOUS BLD VENIPUNCTURE: CPT

## 2021-12-16 PROCEDURE — 84403 ASSAY OF TOTAL TESTOSTERONE: CPT

## 2022-01-22 ENCOUNTER — HOSPITAL ENCOUNTER (OUTPATIENT)
Dept: LAB | Facility: MEDICAL CENTER | Age: 57
End: 2022-01-22
Attending: PHYSICIAN ASSISTANT
Payer: COMMERCIAL

## 2022-01-22 LAB
ALBUMIN SERPL BCP-MCNC: 4.6 G/DL (ref 3.2–4.9)
ALBUMIN/GLOB SERPL: 1.5 G/DL
ALP SERPL-CCNC: 447 U/L (ref 30–99)
ALT SERPL-CCNC: 16 U/L (ref 2–50)
ANION GAP SERPL CALC-SCNC: 14 MMOL/L (ref 7–16)
AST SERPL-CCNC: 37 U/L (ref 12–45)
BILIRUB SERPL-MCNC: 0.8 MG/DL (ref 0.1–1.5)
BUN SERPL-MCNC: 11 MG/DL (ref 8–22)
CALCIUM SERPL-MCNC: 9.2 MG/DL (ref 8.5–10.5)
CHLORIDE SERPL-SCNC: 99 MMOL/L (ref 96–112)
CO2 SERPL-SCNC: 21 MMOL/L (ref 20–33)
CREAT SERPL-MCNC: 0.62 MG/DL (ref 0.5–1.4)
GLOBULIN SER CALC-MCNC: 3.1 G/DL (ref 1.9–3.5)
GLUCOSE SERPL-MCNC: 138 MG/DL (ref 65–99)
POTASSIUM SERPL-SCNC: 4.2 MMOL/L (ref 3.6–5.5)
PROT SERPL-MCNC: 7.7 G/DL (ref 6–8.2)
SODIUM SERPL-SCNC: 134 MMOL/L (ref 135–145)
TESTOST SERPL-MCNC: <12 NG/DL (ref 175–781)

## 2022-01-22 PROCEDURE — 80053 COMPREHEN METABOLIC PANEL: CPT

## 2022-01-22 PROCEDURE — 84153 ASSAY OF PSA TOTAL: CPT

## 2022-01-22 PROCEDURE — 36415 COLL VENOUS BLD VENIPUNCTURE: CPT

## 2022-01-22 PROCEDURE — 84403 ASSAY OF TOTAL TESTOSTERONE: CPT

## 2022-01-22 PROCEDURE — 84154 ASSAY OF PSA FREE: CPT

## 2022-01-24 LAB
PSA FREE MFR SERPL: 14 %
PSA FREE SERPL-MCNC: 0.3 NG/ML
PSA SERPL-MCNC: 2.1 NG/ML (ref 0–4)

## 2022-01-27 ENCOUNTER — APPOINTMENT (RX ONLY)
Dept: URBAN - METROPOLITAN AREA CLINIC 35 | Facility: CLINIC | Age: 57
Setting detail: DERMATOLOGY
End: 2022-01-27

## 2022-01-27 DIAGNOSIS — Z71.89 OTHER SPECIFIED COUNSELING: ICD-10-CM

## 2022-01-27 DIAGNOSIS — D22 MELANOCYTIC NEVI: ICD-10-CM

## 2022-01-27 DIAGNOSIS — L82.1 OTHER SEBORRHEIC KERATOSIS: ICD-10-CM

## 2022-01-27 DIAGNOSIS — L81.4 OTHER MELANIN HYPERPIGMENTATION: ICD-10-CM

## 2022-01-27 DIAGNOSIS — Z85.828 PERSONAL HISTORY OF OTHER MALIGNANT NEOPLASM OF SKIN: ICD-10-CM

## 2022-01-27 PROBLEM — D22.4 MELANOCYTIC NEVI OF SCALP AND NECK: Status: ACTIVE | Noted: 2022-01-27

## 2022-01-27 PROCEDURE — 99213 OFFICE O/P EST LOW 20 MIN: CPT

## 2022-01-27 PROCEDURE — ? COUNSELING

## 2022-01-27 ASSESSMENT — LOCATION SIMPLE DESCRIPTION DERM
LOCATION SIMPLE: RIGHT CHEEK
LOCATION SIMPLE: RIGHT SHOULDER
LOCATION SIMPLE: POSTERIOR NECK

## 2022-01-27 ASSESSMENT — LOCATION DETAILED DESCRIPTION DERM
LOCATION DETAILED: RIGHT POSTERIOR SHOULDER
LOCATION DETAILED: RIGHT INFERIOR LATERAL NECK
LOCATION DETAILED: RIGHT LATERAL TRAPEZIAL NECK
LOCATION DETAILED: RIGHT MID PREAURICULAR CHEEK

## 2022-01-27 ASSESSMENT — LOCATION ZONE DERM
LOCATION ZONE: NECK
LOCATION ZONE: ARM
LOCATION ZONE: FACE

## 2022-02-12 ENCOUNTER — HOSPITAL ENCOUNTER (OUTPATIENT)
Dept: LAB | Facility: MEDICAL CENTER | Age: 57
End: 2022-02-12
Attending: RADIOLOGY
Payer: COMMERCIAL

## 2022-02-12 LAB
BASOPHILS # BLD AUTO: 0.4 % (ref 0–1.8)
BASOPHILS # BLD: 0.03 K/UL (ref 0–0.12)
EOSINOPHIL # BLD AUTO: 0.14 K/UL (ref 0–0.51)
EOSINOPHIL NFR BLD: 2 % (ref 0–6.9)
ERYTHROCYTE [DISTWIDTH] IN BLOOD BY AUTOMATED COUNT: 50.8 FL (ref 35.9–50)
HCT VFR BLD AUTO: 42 % (ref 42–52)
HGB BLD-MCNC: 14 G/DL (ref 14–18)
IMM GRANULOCYTES # BLD AUTO: 0.04 K/UL (ref 0–0.11)
IMM GRANULOCYTES NFR BLD AUTO: 0.6 % (ref 0–0.9)
LYMPHOCYTES # BLD AUTO: 1.71 K/UL (ref 1–4.8)
LYMPHOCYTES NFR BLD: 25 % (ref 22–41)
MCH RBC QN AUTO: 31.3 PG (ref 27–33)
MCHC RBC AUTO-ENTMCNC: 33.3 G/DL (ref 33.7–35.3)
MCV RBC AUTO: 94 FL (ref 81.4–97.8)
MONOCYTES # BLD AUTO: 0.55 K/UL (ref 0–0.85)
MONOCYTES NFR BLD AUTO: 8 % (ref 0–13.4)
NEUTROPHILS # BLD AUTO: 4.38 K/UL (ref 1.82–7.42)
NEUTROPHILS NFR BLD: 64 % (ref 44–72)
NRBC # BLD AUTO: 0 K/UL
NRBC BLD-RTO: 0 /100 WBC
PLATELET # BLD AUTO: 302 K/UL (ref 164–446)
PMV BLD AUTO: 9.8 FL (ref 9–12.9)
RBC # BLD AUTO: 4.47 M/UL (ref 4.7–6.1)
WBC # BLD AUTO: 6.9 K/UL (ref 4.8–10.8)

## 2022-02-12 PROCEDURE — 36415 COLL VENOUS BLD VENIPUNCTURE: CPT

## 2022-02-12 PROCEDURE — 85025 COMPLETE CBC W/AUTO DIFF WBC: CPT

## 2022-02-26 ENCOUNTER — HOSPITAL ENCOUNTER (OUTPATIENT)
Dept: LAB | Facility: MEDICAL CENTER | Age: 57
End: 2022-02-26
Attending: UROLOGY
Payer: COMMERCIAL

## 2022-02-26 LAB
ALBUMIN SERPL BCP-MCNC: 4.3 G/DL (ref 3.2–4.9)
ALBUMIN/GLOB SERPL: 1.7 G/DL
ALP SERPL-CCNC: 385 U/L (ref 30–99)
ALT SERPL-CCNC: 10 U/L (ref 2–50)
ANION GAP SERPL CALC-SCNC: 11 MMOL/L (ref 7–16)
AST SERPL-CCNC: 24 U/L (ref 12–45)
BILIRUB SERPL-MCNC: 0.5 MG/DL (ref 0.1–1.5)
BUN SERPL-MCNC: 12 MG/DL (ref 8–22)
CALCIUM SERPL-MCNC: 8.7 MG/DL (ref 8.5–10.5)
CHLORIDE SERPL-SCNC: 108 MMOL/L (ref 96–112)
CO2 SERPL-SCNC: 22 MMOL/L (ref 20–33)
CREAT SERPL-MCNC: 0.49 MG/DL (ref 0.5–1.4)
GLOBULIN SER CALC-MCNC: 2.5 G/DL (ref 1.9–3.5)
GLUCOSE SERPL-MCNC: 120 MG/DL (ref 65–99)
POTASSIUM SERPL-SCNC: 4.8 MMOL/L (ref 3.6–5.5)
PROT SERPL-MCNC: 6.8 G/DL (ref 6–8.2)
SODIUM SERPL-SCNC: 141 MMOL/L (ref 135–145)
TESTOST SERPL-MCNC: 27 NG/DL (ref 175–781)

## 2022-02-26 PROCEDURE — 84153 ASSAY OF PSA TOTAL: CPT

## 2022-02-26 PROCEDURE — 80053 COMPREHEN METABOLIC PANEL: CPT

## 2022-02-26 PROCEDURE — 36415 COLL VENOUS BLD VENIPUNCTURE: CPT

## 2022-02-26 PROCEDURE — 84403 ASSAY OF TOTAL TESTOSTERONE: CPT

## 2022-02-26 PROCEDURE — 84154 ASSAY OF PSA FREE: CPT

## 2022-03-12 ENCOUNTER — HOSPITAL ENCOUNTER (OUTPATIENT)
Dept: LAB | Facility: MEDICAL CENTER | Age: 57
End: 2022-03-12
Attending: FAMILY MEDICINE
Payer: COMMERCIAL

## 2022-03-12 ENCOUNTER — HOSPITAL ENCOUNTER (OUTPATIENT)
Dept: LAB | Facility: MEDICAL CENTER | Age: 57
End: 2022-03-12
Attending: PHYSICIAN ASSISTANT
Payer: COMMERCIAL

## 2022-03-12 LAB
ALBUMIN SERPL BCP-MCNC: 4.9 G/DL (ref 3.2–4.9)
ALBUMIN/GLOB SERPL: 2 G/DL
ALP SERPL-CCNC: 346 U/L (ref 30–99)
ALT SERPL-CCNC: 8 U/L (ref 2–50)
ANION GAP SERPL CALC-SCNC: 15 MMOL/L (ref 7–16)
AST SERPL-CCNC: 21 U/L (ref 12–45)
BASOPHILS # BLD AUTO: 0.6 % (ref 0–1.8)
BASOPHILS # BLD: 0.03 K/UL (ref 0–0.12)
BILIRUB SERPL-MCNC: 0.6 MG/DL (ref 0.1–1.5)
BUN SERPL-MCNC: 10 MG/DL (ref 8–22)
CALCIUM SERPL-MCNC: 9.3 MG/DL (ref 8.5–10.5)
CHLORIDE SERPL-SCNC: 101 MMOL/L (ref 96–112)
CHOLEST SERPL-MCNC: 224 MG/DL (ref 100–199)
CO2 SERPL-SCNC: 21 MMOL/L (ref 20–33)
CREAT SERPL-MCNC: 0.56 MG/DL (ref 0.5–1.4)
CREAT UR-MCNC: 63.21 MG/DL
EOSINOPHIL # BLD AUTO: 0.04 K/UL (ref 0–0.51)
EOSINOPHIL NFR BLD: 0.8 % (ref 0–6.9)
ERYTHROCYTE [DISTWIDTH] IN BLOOD BY AUTOMATED COUNT: 49.9 FL (ref 35.9–50)
EST. AVERAGE GLUCOSE BLD GHB EST-MCNC: 146 MG/DL
GLOBULIN SER CALC-MCNC: 2.4 G/DL (ref 1.9–3.5)
GLUCOSE SERPL-MCNC: 134 MG/DL (ref 65–99)
HBA1C MFR BLD: 6.7 % (ref 4–5.6)
HCT VFR BLD AUTO: 43.8 % (ref 42–52)
HDLC SERPL-MCNC: 54 MG/DL
HGB BLD-MCNC: 14.2 G/DL (ref 14–18)
IMM GRANULOCYTES # BLD AUTO: 0.06 K/UL (ref 0–0.11)
IMM GRANULOCYTES NFR BLD AUTO: 1.2 % (ref 0–0.9)
LDLC SERPL CALC-MCNC: 133 MG/DL
LYMPHOCYTES # BLD AUTO: 1.06 K/UL (ref 1–4.8)
LYMPHOCYTES NFR BLD: 20.9 % (ref 22–41)
MCH RBC QN AUTO: 30.8 PG (ref 27–33)
MCHC RBC AUTO-ENTMCNC: 32.4 G/DL (ref 33.7–35.3)
MCV RBC AUTO: 95 FL (ref 81.4–97.8)
MICROALBUMIN UR-MCNC: <1.2 MG/DL
MICROALBUMIN/CREAT UR: NORMAL MG/G (ref 0–30)
MONOCYTES # BLD AUTO: 0.63 K/UL (ref 0–0.85)
MONOCYTES NFR BLD AUTO: 12.4 % (ref 0–13.4)
NEUTROPHILS # BLD AUTO: 3.25 K/UL (ref 1.82–7.42)
NEUTROPHILS NFR BLD: 64.1 % (ref 44–72)
NRBC # BLD AUTO: 0 K/UL
NRBC BLD-RTO: 0 /100 WBC
PLATELET # BLD AUTO: 311 K/UL (ref 164–446)
PMV BLD AUTO: 9.3 FL (ref 9–12.9)
POTASSIUM SERPL-SCNC: 5.1 MMOL/L (ref 3.6–5.5)
PROT SERPL-MCNC: 7.3 G/DL (ref 6–8.2)
RBC # BLD AUTO: 4.61 M/UL (ref 4.7–6.1)
SODIUM SERPL-SCNC: 137 MMOL/L (ref 135–145)
TRIGL SERPL-MCNC: 184 MG/DL (ref 0–149)
TSH SERPL DL<=0.005 MIU/L-ACNC: 2.41 UIU/ML (ref 0.38–5.33)
WBC # BLD AUTO: 5.1 K/UL (ref 4.8–10.8)

## 2022-03-12 PROCEDURE — 36415 COLL VENOUS BLD VENIPUNCTURE: CPT

## 2022-03-12 PROCEDURE — 83036 HEMOGLOBIN GLYCOSYLATED A1C: CPT

## 2022-03-12 PROCEDURE — 84153 ASSAY OF PSA TOTAL: CPT

## 2022-03-12 PROCEDURE — 82043 UR ALBUMIN QUANTITATIVE: CPT

## 2022-03-12 PROCEDURE — 82570 ASSAY OF URINE CREATININE: CPT

## 2022-03-12 PROCEDURE — 85025 COMPLETE CBC W/AUTO DIFF WBC: CPT

## 2022-03-12 PROCEDURE — 80053 COMPREHEN METABOLIC PANEL: CPT

## 2022-03-12 PROCEDURE — 84443 ASSAY THYROID STIM HORMONE: CPT

## 2022-03-12 PROCEDURE — 80061 LIPID PANEL: CPT

## 2022-03-12 PROCEDURE — 84154 ASSAY OF PSA FREE: CPT

## 2022-03-15 LAB
PSA FREE MFR SERPL: 22 %
PSA FREE SERPL-MCNC: 0.4 NG/ML
PSA SERPL-MCNC: 1.8 NG/ML (ref 0–4)

## 2022-03-16 ENCOUNTER — HOSPITAL ENCOUNTER (OUTPATIENT)
Dept: LAB | Facility: MEDICAL CENTER | Age: 57
End: 2022-03-16
Attending: RADIOLOGY
Payer: COMMERCIAL

## 2022-03-16 LAB
BASOPHILS # BLD AUTO: 0.5 % (ref 0–1.8)
BASOPHILS # BLD: 0.03 K/UL (ref 0–0.12)
EOSINOPHIL # BLD AUTO: 0.09 K/UL (ref 0–0.51)
EOSINOPHIL NFR BLD: 1.4 % (ref 0–6.9)
ERYTHROCYTE [DISTWIDTH] IN BLOOD BY AUTOMATED COUNT: 49.5 FL (ref 35.9–50)
HCT VFR BLD AUTO: 43.5 % (ref 42–52)
HGB BLD-MCNC: 14.2 G/DL (ref 14–18)
IMM GRANULOCYTES # BLD AUTO: 0.08 K/UL (ref 0–0.11)
IMM GRANULOCYTES NFR BLD AUTO: 1.2 % (ref 0–0.9)
LYMPHOCYTES # BLD AUTO: 1.36 K/UL (ref 1–4.8)
LYMPHOCYTES NFR BLD: 20.7 % (ref 22–41)
MCH RBC QN AUTO: 30.7 PG (ref 27–33)
MCHC RBC AUTO-ENTMCNC: 32.6 G/DL (ref 33.7–35.3)
MCV RBC AUTO: 94.2 FL (ref 81.4–97.8)
MONOCYTES # BLD AUTO: 0.73 K/UL (ref 0–0.85)
MONOCYTES NFR BLD AUTO: 11.1 % (ref 0–13.4)
NEUTROPHILS # BLD AUTO: 4.29 K/UL (ref 1.82–7.42)
NEUTROPHILS NFR BLD: 65.1 % (ref 44–72)
NRBC # BLD AUTO: 0 K/UL
NRBC BLD-RTO: 0 /100 WBC
PLATELET # BLD AUTO: 329 K/UL (ref 164–446)
PMV BLD AUTO: 9.4 FL (ref 9–12.9)
RBC # BLD AUTO: 4.62 M/UL (ref 4.7–6.1)
WBC # BLD AUTO: 6.6 K/UL (ref 4.8–10.8)

## 2022-03-16 PROCEDURE — 36415 COLL VENOUS BLD VENIPUNCTURE: CPT

## 2022-03-16 PROCEDURE — 85025 COMPLETE CBC W/AUTO DIFF WBC: CPT

## 2022-03-18 LAB
PSA FREE MFR SERPL: 15 %
PSA FREE SERPL-MCNC: 0.4 NG/ML
PSA SERPL-MCNC: 2.6 NG/ML (ref 0–4)

## 2022-04-02 ENCOUNTER — HOSPITAL ENCOUNTER (OUTPATIENT)
Dept: LAB | Facility: MEDICAL CENTER | Age: 57
End: 2022-04-02
Attending: PHYSICIAN ASSISTANT
Payer: COMMERCIAL

## 2022-04-02 LAB
ALBUMIN SERPL BCP-MCNC: 4.1 G/DL (ref 3.2–4.9)
ALBUMIN/GLOB SERPL: 1.5 G/DL
ALP SERPL-CCNC: 347 U/L (ref 30–99)
ALT SERPL-CCNC: 9 U/L (ref 2–50)
ANION GAP SERPL CALC-SCNC: 10 MMOL/L (ref 7–16)
AST SERPL-CCNC: 29 U/L (ref 12–45)
BILIRUB SERPL-MCNC: 0.3 MG/DL (ref 0.1–1.5)
BUN SERPL-MCNC: 9 MG/DL (ref 8–22)
CALCIUM SERPL-MCNC: 8.8 MG/DL (ref 8.5–10.5)
CHLORIDE SERPL-SCNC: 104 MMOL/L (ref 96–112)
CO2 SERPL-SCNC: 23 MMOL/L (ref 20–33)
CREAT SERPL-MCNC: 0.63 MG/DL (ref 0.5–1.4)
GFR SERPLBLD CREATININE-BSD FMLA CKD-EPI: 112 ML/MIN/1.73 M 2
GLOBULIN SER CALC-MCNC: 2.8 G/DL (ref 1.9–3.5)
GLUCOSE SERPL-MCNC: 110 MG/DL (ref 65–99)
POTASSIUM SERPL-SCNC: 4.5 MMOL/L (ref 3.6–5.5)
PROT SERPL-MCNC: 6.9 G/DL (ref 6–8.2)
PSA SERPL-MCNC: 2.29 NG/ML (ref 0–4)
SODIUM SERPL-SCNC: 137 MMOL/L (ref 135–145)
TESTOST SERPL-MCNC: <12 NG/DL (ref 175–781)

## 2022-04-02 PROCEDURE — 84153 ASSAY OF PSA TOTAL: CPT

## 2022-04-02 PROCEDURE — 84403 ASSAY OF TOTAL TESTOSTERONE: CPT

## 2022-04-02 PROCEDURE — 36415 COLL VENOUS BLD VENIPUNCTURE: CPT

## 2022-04-02 PROCEDURE — 80053 COMPREHEN METABOLIC PANEL: CPT

## 2022-04-14 ENCOUNTER — HOSPITAL ENCOUNTER (OUTPATIENT)
Dept: RADIOLOGY | Facility: MEDICAL CENTER | Age: 57
End: 2022-04-14
Attending: RADIOLOGY
Payer: COMMERCIAL

## 2022-04-14 DIAGNOSIS — C79.51 PROSTATE CANCER METASTATIC TO BONE (HCC): ICD-10-CM

## 2022-04-14 DIAGNOSIS — C61 PROSTATE CANCER METASTATIC TO BONE (HCC): ICD-10-CM

## 2022-04-14 PROCEDURE — 79101 NUCLEAR RX IV ADMIN: CPT

## 2022-04-14 NOTE — PROGRESS NOTES
[facility] Consultation      Re: Casper Rowley     MRN: [id number]   : 1965    Casper Rowley is a 56 y.o. 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 Patient has been seen by Dr Valadez at [facility] and has received 1 dose of Xofigo. The 1st dose was given on 2022. His care was transfer to me at [facility] for continuing his Xofigo treatment      History of Present Illness:   has a history of prostate cancer diagnosed in . He had TRUS biopsy on 7/15/21 showing prostatic adenocarcinoma , Felix 5+5=10 disease. Biopsy of lumbar spine also showed metastatic carcinoma.        He is seen today for review of labs and imaging studies and continue [place] 223 therapy.    He reports no side effect from last Xofigo treatment. No nausea or diarrhea. No increased bone pain.     He will also receive palliative radiation to the lesions in the lumbar spine soon.    Past Medical History:   Diagnosis Date   • Arthritis 2019    osteo-hollie knees   • Bronchitis    • Cancer (HCC)     Basal cell - top of head   • Cancer (HCC)     Prostate   • Cold 2019    Cold two weeks ago, denies productive cough, SOB   • Diabetes     type 2   • High cholesterol    • HTN (hypertension), benign 2009   • Hypertension    • Infectious disease     Mumps age 6 yrs and Chickenpox age 41 yrs   • Obstructive sleep apnea 2019    Uses cpap     Past Surgical History:   Procedure Laterality Date   • [place] SHLDR ARTHROSCOP EXTEN DEBRIDE 3+ Left 2021    Procedure: ARTHROSCOPY,SHOULDER,WITH EXTENSIVE DEBRIDEMENT;  Surgeon: Piter Otero M.D.;  Location: [facility] [place];  Service: Orthopedics   • PB ARTHROSCOPY SHOULDER SURGICAL BICEPS TENODES* Left 2021    Procedure: ARTHROSCOPY, SHOULDER, WITH BICEPS TENOTOMY - WITH SUB SCAPULARIS REPAIR;  Surgeon: Piter Otero M.D.;  Location: [facility] [place];  Service:  Orthopedics   • CARPAL TUNNEL RELEASE Left 2/11/2019    Procedure: CARPAL TUNNEL RELEASE;  Surgeon: Piter Otero M.D.;  Location: [facility];  Service: Orthopedics   • KNEE ARTHROSCOPY Right 05/2014    ACL         Family History   Problem Relation Age of Onset   • Genetic Disorder Neg Hx        ROS    A comprehensive 14-point review of systems was negative except as described above.     Labs:   All recent, pertinent laboratory studies in the EMR were reviewed.    From Laboratory dated 3/16/2022  WBC 6.6  HGB 14.2  HCT 43.5    ANC 4.29 K/uL  CREATININE 0.6  ALK PHOS 347    PSA 2.6        Current Outpatient Medications   Medication Sig Dispense Refill   • XTANDI 40 MG Cap 4 Capsules every day.     • fluticasone (FLONASE) 50 MCG/ACT nasal spray fluticasone propionate 50 mcg/actuation nasal spray,suspension     • HYDROcodone-acetaminophen (NORCO) 7.5-325 MG per tablet      • rosuvastatin (CRESTOR) 10 MG Tab      • tamsulosin (FLOMAX) 0.4 MG capsule      • zolpidem (AMBIEN) 10 MG Tab      • Canagliflozin (INVOKANA) 100 MG Tab 1 Tablet every day.     • cyclobenzaprine (FLEXERIL) 10 mg Tab      • XGEVA 120 MG/1.7ML injection      • TRULICITY 0.75 MG/0.5ML Solution Pen-injector      • metformin (GLUCOPHAGE) 1000 MG tablet Take 1,000 mg by mouth 2 times a day, with meals.     • Dulaglutide (TRULICITY) 1.5 MG/0.5ML Solution Pen-injector Inject 1.5 mg as instructed every Saturday.     • lisinopril (PRINIVIL, ZESTRIL) 40 MG tablet TAKE ONE TABLET BY MOUTH ONCE A DAY (Patient taking differently: Take 40 mg by mouth every day.) 30 Tab 6   • metoprolol SR (TOPROL XL) 25 MG TB24 Take 1 Tab by mouth every day. 30 Tab 5   • Cholecalciferol (VITAMIN D) 2000 UNITS CAPS Take 4,000 Units by mouth every day.       No current facility-administered medications for this encounter.       No Known Allergies    Physical Exam  Constitutional:       Appearance: Normal appearance.   HENT:      Head: Normocephalic.    Cardiovascular:      Rate and Rhythm: Normal rate.      Pulses: Normal pulses.   Pulmonary:      Effort: Pulmonary effort is normal.   Abdominal:      Palpations: Abdomen is soft.   Musculoskeletal:         General: Normal range of motion.   Neurological:      Mental Status: He is alert.         ECOG Performance Status 0    Impression:   1. Metastatic prostate cancer to the bone without visceral metastases.    Plan:   I have reviewed 's history and imaging studies, examined the patient, and discussed ontinued treatment. Mr. Rowley is getting Dose #2 radium 223 therapy today. We explained that this procedure is palliative and not curative. We discussed the possibility of tumor progression and development of future tumors. We additionally discussed the risks, including bone marrow suppression and associated symptoms and bone fracture. There is a risk the procedure will not be effective in treating his disease. We explained that we will monitor his labs prior to ordering the injection and we would delay his injection for any note of side effects of the treatment. Additionally, we will stop the therapy if he develops visceral metastatic disease. We discussed alternatives of the procedure including surveillance with no intervention and continuing with medical management. The patient verbalizes understanding of risks and alternatives and elects to proceed. All questions were answered. He understands he will need a monthly CBC prior to the injection.    Nuclear Medicine Oncology  [facility]   [id number]  [place] [address]  [phone]

## 2022-05-07 ENCOUNTER — HOSPITAL ENCOUNTER (OUTPATIENT)
Dept: LAB | Facility: MEDICAL CENTER | Age: 57
End: 2022-05-07
Attending: UROLOGY
Payer: COMMERCIAL

## 2022-05-07 LAB
ALBUMIN SERPL BCP-MCNC: 3.6 G/DL (ref 3.2–4.9)
ALBUMIN SERPL BCP-MCNC: 3.7 G/DL (ref 3.2–4.9)
ALBUMIN/GLOB SERPL: 1.2 G/DL
ALBUMIN/GLOB SERPL: 1.3 G/DL
ALP SERPL-CCNC: 268 U/L (ref 30–99)
ALP SERPL-CCNC: 273 U/L (ref 30–99)
ALT SERPL-CCNC: <5 U/L (ref 2–50)
ALT SERPL-CCNC: <5 U/L (ref 2–50)
ANION GAP SERPL CALC-SCNC: 12 MMOL/L (ref 7–16)
ANION GAP SERPL CALC-SCNC: 12 MMOL/L (ref 7–16)
AST SERPL-CCNC: 15 U/L (ref 12–45)
AST SERPL-CCNC: 16 U/L (ref 12–45)
BILIRUB SERPL-MCNC: 0.4 MG/DL (ref 0.1–1.5)
BILIRUB SERPL-MCNC: 0.4 MG/DL (ref 0.1–1.5)
BUN SERPL-MCNC: 10 MG/DL (ref 8–22)
BUN SERPL-MCNC: 10 MG/DL (ref 8–22)
CALCIUM SERPL-MCNC: 8.2 MG/DL (ref 8.5–10.5)
CALCIUM SERPL-MCNC: 8.3 MG/DL (ref 8.5–10.5)
CHLORIDE SERPL-SCNC: 99 MMOL/L (ref 96–112)
CHLORIDE SERPL-SCNC: 99 MMOL/L (ref 96–112)
CO2 SERPL-SCNC: 21 MMOL/L (ref 20–33)
CO2 SERPL-SCNC: 21 MMOL/L (ref 20–33)
CREAT SERPL-MCNC: 0.37 MG/DL (ref 0.5–1.4)
CREAT SERPL-MCNC: 0.38 MG/DL (ref 0.5–1.4)
GFR SERPLBLD CREATININE-BSD FMLA CKD-EPI: 130 ML/MIN/1.73 M 2
GFR SERPLBLD CREATININE-BSD FMLA CKD-EPI: 131 ML/MIN/1.73 M 2
GLOBULIN SER CALC-MCNC: 2.9 G/DL (ref 1.9–3.5)
GLOBULIN SER CALC-MCNC: 3 G/DL (ref 1.9–3.5)
GLUCOSE SERPL-MCNC: 126 MG/DL (ref 65–99)
GLUCOSE SERPL-MCNC: 127 MG/DL (ref 65–99)
POTASSIUM SERPL-SCNC: 4.2 MMOL/L (ref 3.6–5.5)
POTASSIUM SERPL-SCNC: 4.2 MMOL/L (ref 3.6–5.5)
PROT SERPL-MCNC: 6.6 G/DL (ref 6–8.2)
PROT SERPL-MCNC: 6.6 G/DL (ref 6–8.2)
PSA SERPL-MCNC: 3.61 NG/ML (ref 0–4)
SODIUM SERPL-SCNC: 132 MMOL/L (ref 135–145)
SODIUM SERPL-SCNC: 132 MMOL/L (ref 135–145)
TESTOST SERPL-MCNC: 13 NG/DL (ref 175–781)

## 2022-05-07 PROCEDURE — 84154 ASSAY OF PSA FREE: CPT

## 2022-05-07 PROCEDURE — 84153 ASSAY OF PSA TOTAL: CPT

## 2022-05-07 PROCEDURE — 36415 COLL VENOUS BLD VENIPUNCTURE: CPT

## 2022-05-07 PROCEDURE — 84403 ASSAY OF TOTAL TESTOSTERONE: CPT

## 2022-05-07 PROCEDURE — 80053 COMPREHEN METABOLIC PANEL: CPT

## 2022-05-07 PROCEDURE — 80053 COMPREHEN METABOLIC PANEL: CPT | Mod: 91

## 2022-05-10 LAB
PSA FREE MFR SERPL: 16 %
PSA FREE SERPL-MCNC: 0.6 NG/ML
PSA SERPL-MCNC: 3.7 NG/ML (ref 0–4)

## 2022-05-19 ENCOUNTER — HOSPITAL ENCOUNTER (OUTPATIENT)
Dept: LAB | Facility: MEDICAL CENTER | Age: 57
End: 2022-05-19
Attending: NURSE PRACTITIONER
Payer: COMMERCIAL

## 2022-05-19 DIAGNOSIS — C61 PROSTATE CANCER METASTATIC TO BONE (HCC): ICD-10-CM

## 2022-05-19 DIAGNOSIS — C79.51 PROSTATE CANCER METASTATIC TO BONE (HCC): ICD-10-CM

## 2022-05-19 LAB
BASOPHILS # BLD AUTO: 0 % (ref 0–1.8)
BASOPHILS # BLD: 0 K/UL (ref 0–0.12)
EOSINOPHIL # BLD AUTO: 0.11 K/UL (ref 0–0.51)
EOSINOPHIL NFR BLD: 1.8 % (ref 0–6.9)
ERYTHROCYTE [DISTWIDTH] IN BLOOD BY AUTOMATED COUNT: 48.6 FL (ref 35.9–50)
HCT VFR BLD AUTO: 35.7 % (ref 42–52)
HGB BLD-MCNC: 11.6 G/DL (ref 14–18)
LYMPHOCYTES # BLD AUTO: 0.38 K/UL (ref 1–4.8)
LYMPHOCYTES NFR BLD: 6.1 % (ref 22–41)
MANUAL DIFF BLD: NORMAL
MCH RBC QN AUTO: 30.2 PG (ref 27–33)
MCHC RBC AUTO-ENTMCNC: 32.5 G/DL (ref 33.7–35.3)
MCV RBC AUTO: 93 FL (ref 81.4–97.8)
MONOCYTES # BLD AUTO: 0.43 K/UL (ref 0–0.85)
MONOCYTES NFR BLD AUTO: 7 % (ref 0–13.4)
MORPHOLOGY BLD-IMP: NORMAL
MYELOCYTES NFR BLD MANUAL: 2.6 %
NEUTROPHILS # BLD AUTO: 5 K/UL (ref 1.82–7.42)
NEUTROPHILS NFR BLD: 80.7 % (ref 44–72)
NRBC # BLD AUTO: 0.02 K/UL
NRBC BLD-RTO: 0.3 /100 WBC
PLATELET # BLD AUTO: 317 K/UL (ref 164–446)
PLATELET BLD QL SMEAR: NORMAL
PMV BLD AUTO: 8.6 FL (ref 9–12.9)
PROMYELOCYTES NFR BLD MANUAL: 1.8 %
RBC # BLD AUTO: 3.84 M/UL (ref 4.7–6.1)
RBC BLD AUTO: NORMAL
WBC # BLD AUTO: 6.2 K/UL (ref 4.8–10.8)

## 2022-05-19 PROCEDURE — 36415 COLL VENOUS BLD VENIPUNCTURE: CPT

## 2022-05-19 PROCEDURE — 85025 COMPLETE CBC W/AUTO DIFF WBC: CPT

## 2022-05-19 PROCEDURE — 85007 BL SMEAR W/DIFF WBC COUNT: CPT

## 2022-05-25 ENCOUNTER — HOSPITAL ENCOUNTER (OUTPATIENT)
Dept: RADIOLOGY | Facility: MEDICAL CENTER | Age: 57
End: 2022-05-25
Attending: RADIOLOGY
Payer: COMMERCIAL

## 2022-05-25 DIAGNOSIS — C61 PROSTATE CANCER METASTATIC TO BONE (HCC): ICD-10-CM

## 2022-05-25 DIAGNOSIS — C79.51 PROSTATE CANCER METASTATIC TO BONE (HCC): ICD-10-CM

## 2022-05-25 PROCEDURE — 79101 NUCLEAR RX IV ADMIN: CPT

## 2022-06-10 ENCOUNTER — HOSPITAL ENCOUNTER (OUTPATIENT)
Dept: LAB | Facility: MEDICAL CENTER | Age: 57
End: 2022-06-10
Attending: FAMILY MEDICINE
Payer: COMMERCIAL

## 2022-06-10 ENCOUNTER — HOSPITAL ENCOUNTER (OUTPATIENT)
Dept: LAB | Facility: MEDICAL CENTER | Age: 57
End: 2022-06-10
Attending: UROLOGY
Payer: COMMERCIAL

## 2022-06-10 LAB
ALBUMIN SERPL BCP-MCNC: 3.6 G/DL (ref 3.2–4.9)
ALBUMIN SERPL BCP-MCNC: 3.9 G/DL (ref 3.2–4.9)
ALBUMIN/GLOB SERPL: 1 G/DL
ALBUMIN/GLOB SERPL: 1.2 G/DL
ALP SERPL-CCNC: 253 U/L (ref 30–99)
ALP SERPL-CCNC: 254 U/L (ref 30–99)
ALT SERPL-CCNC: 5 U/L (ref 2–50)
ALT SERPL-CCNC: 5 U/L (ref 2–50)
ANION GAP SERPL CALC-SCNC: 11 MMOL/L (ref 7–16)
ANION GAP SERPL CALC-SCNC: 12 MMOL/L (ref 7–16)
AST SERPL-CCNC: 16 U/L (ref 12–45)
AST SERPL-CCNC: 18 U/L (ref 12–45)
BASOPHILS # BLD AUTO: 0.3 % (ref 0–1.8)
BASOPHILS # BLD: 0.01 K/UL (ref 0–0.12)
BILIRUB SERPL-MCNC: 0.5 MG/DL (ref 0.1–1.5)
BILIRUB SERPL-MCNC: 0.5 MG/DL (ref 0.1–1.5)
BUN SERPL-MCNC: 9 MG/DL (ref 8–22)
BUN SERPL-MCNC: 9 MG/DL (ref 8–22)
CALCIUM SERPL-MCNC: 8.5 MG/DL (ref 8.5–10.5)
CALCIUM SERPL-MCNC: 8.6 MG/DL (ref 8.5–10.5)
CHLORIDE SERPL-SCNC: 95 MMOL/L (ref 96–112)
CHLORIDE SERPL-SCNC: 96 MMOL/L (ref 96–112)
CO2 SERPL-SCNC: 21 MMOL/L (ref 20–33)
CO2 SERPL-SCNC: 21 MMOL/L (ref 20–33)
CREAT SERPL-MCNC: 0.41 MG/DL (ref 0.5–1.4)
CREAT SERPL-MCNC: 0.42 MG/DL (ref 0.5–1.4)
EOSINOPHIL # BLD AUTO: 0.09 K/UL (ref 0–0.51)
EOSINOPHIL NFR BLD: 3 % (ref 0–6.9)
ERYTHROCYTE [DISTWIDTH] IN BLOOD BY AUTOMATED COUNT: 52.8 FL (ref 35.9–50)
EST. AVERAGE GLUCOSE BLD GHB EST-MCNC: 171 MG/DL
GFR SERPLBLD CREATININE-BSD FMLA CKD-EPI: 126 ML/MIN/1.73 M 2
GFR SERPLBLD CREATININE-BSD FMLA CKD-EPI: 127 ML/MIN/1.73 M 2
GLOBULIN SER CALC-MCNC: 3.2 G/DL (ref 1.9–3.5)
GLOBULIN SER CALC-MCNC: 3.5 G/DL (ref 1.9–3.5)
GLUCOSE SERPL-MCNC: 163 MG/DL (ref 65–99)
GLUCOSE SERPL-MCNC: 166 MG/DL (ref 65–99)
HBA1C MFR BLD: 7.6 % (ref 4–5.6)
HCT VFR BLD AUTO: 29.6 % (ref 42–52)
HGB BLD-MCNC: 9.5 G/DL (ref 14–18)
IMM GRANULOCYTES # BLD AUTO: 0.06 K/UL (ref 0–0.11)
IMM GRANULOCYTES NFR BLD AUTO: 2 % (ref 0–0.9)
LYMPHOCYTES # BLD AUTO: 0.3 K/UL (ref 1–4.8)
LYMPHOCYTES NFR BLD: 10.1 % (ref 22–41)
MCH RBC QN AUTO: 29.8 PG (ref 27–33)
MCHC RBC AUTO-ENTMCNC: 32.1 G/DL (ref 33.7–35.3)
MCV RBC AUTO: 92.8 FL (ref 81.4–97.8)
MONOCYTES # BLD AUTO: 0.38 K/UL (ref 0–0.85)
MONOCYTES NFR BLD AUTO: 12.8 % (ref 0–13.4)
NEUTROPHILS # BLD AUTO: 2.14 K/UL (ref 1.82–7.42)
NEUTROPHILS NFR BLD: 71.8 % (ref 44–72)
NRBC # BLD AUTO: 0 K/UL
NRBC BLD-RTO: 0 /100 WBC
PLATELET # BLD AUTO: 337 K/UL (ref 164–446)
PMV BLD AUTO: 8.9 FL (ref 9–12.9)
POTASSIUM SERPL-SCNC: 4.6 MMOL/L (ref 3.6–5.5)
POTASSIUM SERPL-SCNC: 4.6 MMOL/L (ref 3.6–5.5)
PROT SERPL-MCNC: 7.1 G/DL (ref 6–8.2)
PROT SERPL-MCNC: 7.1 G/DL (ref 6–8.2)
PSA SERPL-MCNC: 4.07 NG/ML (ref 0–4)
RBC # BLD AUTO: 3.19 M/UL (ref 4.7–6.1)
SODIUM SERPL-SCNC: 127 MMOL/L (ref 135–145)
SODIUM SERPL-SCNC: 129 MMOL/L (ref 135–145)
TESTOST SERPL-MCNC: 28 NG/DL (ref 175–781)
WBC # BLD AUTO: 3 K/UL (ref 4.8–10.8)

## 2022-06-10 PROCEDURE — 84403 ASSAY OF TOTAL TESTOSTERONE: CPT

## 2022-06-10 PROCEDURE — 36415 COLL VENOUS BLD VENIPUNCTURE: CPT

## 2022-06-10 PROCEDURE — 84153 ASSAY OF PSA TOTAL: CPT

## 2022-06-10 PROCEDURE — 80053 COMPREHEN METABOLIC PANEL: CPT | Mod: 91

## 2022-06-10 PROCEDURE — 85025 COMPLETE CBC W/AUTO DIFF WBC: CPT

## 2022-06-10 PROCEDURE — 83036 HEMOGLOBIN GLYCOSYLATED A1C: CPT

## 2022-06-10 PROCEDURE — 80053 COMPREHEN METABOLIC PANEL: CPT

## 2022-06-22 ENCOUNTER — HOSPITAL ENCOUNTER (OUTPATIENT)
Dept: LAB | Facility: MEDICAL CENTER | Age: 57
End: 2022-06-22
Attending: NURSE PRACTITIONER
Payer: COMMERCIAL

## 2022-06-22 DIAGNOSIS — C79.51 PROSTATE CANCER METASTATIC TO BONE (HCC): ICD-10-CM

## 2022-06-22 DIAGNOSIS — C61 PROSTATE CANCER METASTATIC TO BONE (HCC): ICD-10-CM

## 2022-06-22 LAB
BASOPHILS # BLD AUTO: 0.3 % (ref 0–1.8)
BASOPHILS # BLD: 0.01 K/UL (ref 0–0.12)
EOSINOPHIL # BLD AUTO: 0.11 K/UL (ref 0–0.51)
EOSINOPHIL NFR BLD: 3.5 % (ref 0–6.9)
ERYTHROCYTE [DISTWIDTH] IN BLOOD BY AUTOMATED COUNT: 55.8 FL (ref 35.9–50)
HCT VFR BLD AUTO: 30.5 % (ref 42–52)
HGB BLD-MCNC: 9.5 G/DL (ref 14–18)
IMM GRANULOCYTES # BLD AUTO: 0.12 K/UL (ref 0–0.11)
IMM GRANULOCYTES NFR BLD AUTO: 3.8 % (ref 0–0.9)
LYMPHOCYTES # BLD AUTO: 0.41 K/UL (ref 1–4.8)
LYMPHOCYTES NFR BLD: 13.1 % (ref 22–41)
MCH RBC QN AUTO: 29.3 PG (ref 27–33)
MCHC RBC AUTO-ENTMCNC: 31.1 G/DL (ref 33.7–35.3)
MCV RBC AUTO: 94.1 FL (ref 81.4–97.8)
MONOCYTES # BLD AUTO: 0.34 K/UL (ref 0–0.85)
MONOCYTES NFR BLD AUTO: 10.8 % (ref 0–13.4)
NEUTROPHILS # BLD AUTO: 2.15 K/UL (ref 1.82–7.42)
NEUTROPHILS NFR BLD: 68.5 % (ref 44–72)
NRBC # BLD AUTO: 0.03 K/UL
NRBC BLD-RTO: 1 /100 WBC
PLATELET # BLD AUTO: 295 K/UL (ref 164–446)
PMV BLD AUTO: 8.8 FL (ref 9–12.9)
RBC # BLD AUTO: 3.24 M/UL (ref 4.7–6.1)
WBC # BLD AUTO: 3.1 K/UL (ref 4.8–10.8)

## 2022-06-22 PROCEDURE — 85025 COMPLETE CBC W/AUTO DIFF WBC: CPT

## 2022-06-22 PROCEDURE — 36415 COLL VENOUS BLD VENIPUNCTURE: CPT

## 2022-06-24 ENCOUNTER — HOSPITAL ENCOUNTER (OUTPATIENT)
Dept: LAB | Facility: MEDICAL CENTER | Age: 57
End: 2022-06-24
Attending: FAMILY MEDICINE
Payer: COMMERCIAL

## 2022-06-24 LAB
ALBUMIN SERPL BCP-MCNC: 4.2 G/DL (ref 3.2–4.9)
ALBUMIN/GLOB SERPL: 1.4 G/DL
ALP SERPL-CCNC: 233 U/L (ref 30–99)
ALT SERPL-CCNC: 8 U/L (ref 2–50)
ANION GAP SERPL CALC-SCNC: 12 MMOL/L (ref 7–16)
AST SERPL-CCNC: 19 U/L (ref 12–45)
BASOPHILS # BLD AUTO: 0.5 % (ref 0–1.8)
BASOPHILS # BLD: 0.02 K/UL (ref 0–0.12)
BILIRUB SERPL-MCNC: 0.4 MG/DL (ref 0.1–1.5)
BUN SERPL-MCNC: 9 MG/DL (ref 8–22)
CALCIUM SERPL-MCNC: 8.7 MG/DL (ref 8.5–10.5)
CHLORIDE SERPL-SCNC: 100 MMOL/L (ref 96–112)
CO2 SERPL-SCNC: 21 MMOL/L (ref 20–33)
CREAT SERPL-MCNC: 0.42 MG/DL (ref 0.5–1.4)
EOSINOPHIL # BLD AUTO: 0.42 K/UL (ref 0–0.51)
EOSINOPHIL NFR BLD: 10.5 % (ref 0–6.9)
ERYTHROCYTE [DISTWIDTH] IN BLOOD BY AUTOMATED COUNT: 55.8 FL (ref 35.9–50)
FERRITIN SERPL-MCNC: 1621 NG/ML (ref 22–322)
GFR SERPLBLD CREATININE-BSD FMLA CKD-EPI: 126 ML/MIN/1.73 M 2
GLOBULIN SER CALC-MCNC: 3.1 G/DL (ref 1.9–3.5)
GLUCOSE SERPL-MCNC: 116 MG/DL (ref 65–99)
HCT VFR BLD AUTO: 29.3 % (ref 42–52)
HGB BLD-MCNC: 9.4 G/DL (ref 14–18)
IMM GRANULOCYTES # BLD AUTO: 0.15 K/UL (ref 0–0.11)
IMM GRANULOCYTES NFR BLD AUTO: 3.8 % (ref 0–0.9)
LYMPHOCYTES # BLD AUTO: 0.51 K/UL (ref 1–4.8)
LYMPHOCYTES NFR BLD: 12.8 % (ref 22–41)
MCH RBC QN AUTO: 30 PG (ref 27–33)
MCHC RBC AUTO-ENTMCNC: 32.1 G/DL (ref 33.7–35.3)
MCV RBC AUTO: 93.6 FL (ref 81.4–97.8)
MONOCYTES # BLD AUTO: 0.45 K/UL (ref 0–0.85)
MONOCYTES NFR BLD AUTO: 11.3 % (ref 0–13.4)
NEUTROPHILS # BLD AUTO: 2.45 K/UL (ref 1.82–7.42)
NEUTROPHILS NFR BLD: 61.1 % (ref 44–72)
NRBC # BLD AUTO: 0.04 K/UL
NRBC BLD-RTO: 1 /100 WBC
PLATELET # BLD AUTO: 298 K/UL (ref 164–446)
PMV BLD AUTO: 8.6 FL (ref 9–12.9)
POTASSIUM SERPL-SCNC: 4.8 MMOL/L (ref 3.6–5.5)
PROT SERPL-MCNC: 7.3 G/DL (ref 6–8.2)
RBC # BLD AUTO: 3.13 M/UL (ref 4.7–6.1)
SODIUM SERPL-SCNC: 133 MMOL/L (ref 135–145)
VIT B12 SERPL-MCNC: 370 PG/ML (ref 211–911)
WBC # BLD AUTO: 4 K/UL (ref 4.8–10.8)

## 2022-06-24 PROCEDURE — 82607 VITAMIN B-12: CPT

## 2022-06-24 PROCEDURE — 82728 ASSAY OF FERRITIN: CPT

## 2022-06-24 PROCEDURE — 80053 COMPREHEN METABOLIC PANEL: CPT

## 2022-06-24 PROCEDURE — 36415 COLL VENOUS BLD VENIPUNCTURE: CPT

## 2022-06-24 PROCEDURE — 85025 COMPLETE CBC W/AUTO DIFF WBC: CPT

## 2022-06-29 ENCOUNTER — APPOINTMENT (OUTPATIENT)
Dept: RADIOLOGY | Facility: MEDICAL CENTER | Age: 57
End: 2022-06-29
Attending: RADIOLOGY
Payer: COMMERCIAL

## 2022-07-16 ENCOUNTER — HOSPITAL ENCOUNTER (OUTPATIENT)
Dept: LAB | Facility: MEDICAL CENTER | Age: 57
End: 2022-07-16
Attending: PHYSICIAN ASSISTANT
Payer: COMMERCIAL

## 2022-07-16 LAB
ALBUMIN SERPL BCP-MCNC: 4.1 G/DL (ref 3.2–4.9)
ALBUMIN/GLOB SERPL: 1.2 G/DL
ALP SERPL-CCNC: 159 U/L (ref 30–99)
ALT SERPL-CCNC: 12 U/L (ref 2–50)
ANION GAP SERPL CALC-SCNC: 11 MMOL/L (ref 7–16)
AST SERPL-CCNC: 19 U/L (ref 12–45)
BILIRUB SERPL-MCNC: 0.4 MG/DL (ref 0.1–1.5)
BUN SERPL-MCNC: 10 MG/DL (ref 8–22)
CALCIUM SERPL-MCNC: 8.7 MG/DL (ref 8.5–10.5)
CHLORIDE SERPL-SCNC: 99 MMOL/L (ref 96–112)
CO2 SERPL-SCNC: 21 MMOL/L (ref 20–33)
CREAT SERPL-MCNC: 0.44 MG/DL (ref 0.5–1.4)
GFR SERPLBLD CREATININE-BSD FMLA CKD-EPI: 125 ML/MIN/1.73 M 2
GLOBULIN SER CALC-MCNC: 3.5 G/DL (ref 1.9–3.5)
GLUCOSE SERPL-MCNC: 112 MG/DL (ref 65–99)
POTASSIUM SERPL-SCNC: 4.5 MMOL/L (ref 3.6–5.5)
PROT SERPL-MCNC: 7.6 G/DL (ref 6–8.2)
PSA SERPL-MCNC: 2.97 NG/ML (ref 0–4)
SODIUM SERPL-SCNC: 131 MMOL/L (ref 135–145)
TESTOST SERPL-MCNC: <12 NG/DL (ref 175–781)

## 2022-07-16 PROCEDURE — 80053 COMPREHEN METABOLIC PANEL: CPT

## 2022-07-16 PROCEDURE — 36415 COLL VENOUS BLD VENIPUNCTURE: CPT

## 2022-07-16 PROCEDURE — 84153 ASSAY OF PSA TOTAL: CPT

## 2022-07-16 PROCEDURE — 84403 ASSAY OF TOTAL TESTOSTERONE: CPT

## 2022-08-01 ENCOUNTER — HOSPITAL ENCOUNTER (OUTPATIENT)
Dept: LAB | Facility: MEDICAL CENTER | Age: 57
End: 2022-08-01
Attending: NURSE PRACTITIONER
Payer: COMMERCIAL

## 2022-08-01 DIAGNOSIS — C79.51 PROSTATE CANCER METASTATIC TO BONE (HCC): ICD-10-CM

## 2022-08-01 DIAGNOSIS — C61 PROSTATE CANCER METASTATIC TO BONE (HCC): ICD-10-CM

## 2022-08-01 LAB
ANISOCYTOSIS BLD QL SMEAR: ABNORMAL
BASOPHILS # BLD AUTO: 0.9 % (ref 0–1.8)
BASOPHILS # BLD: 0.04 K/UL (ref 0–0.12)
BURR CELLS BLD QL SMEAR: NORMAL
DACRYOCYTES BLD QL SMEAR: NORMAL
EOSINOPHIL # BLD AUTO: 0.65 K/UL (ref 0–0.51)
EOSINOPHIL NFR BLD: 16.7 % (ref 0–6.9)
ERYTHROCYTE [DISTWIDTH] IN BLOOD BY AUTOMATED COUNT: 71.3 FL (ref 35.9–50)
HCT VFR BLD AUTO: 29.8 % (ref 42–52)
HGB BLD-MCNC: 9 G/DL (ref 14–18)
LYMPHOCYTES # BLD AUTO: 0.58 K/UL (ref 1–4.8)
LYMPHOCYTES NFR BLD: 14.9 % (ref 22–41)
MACROCYTES BLD QL SMEAR: ABNORMAL
MANUAL DIFF BLD: NORMAL
MCH RBC QN AUTO: 30.1 PG (ref 27–33)
MCHC RBC AUTO-ENTMCNC: 30.2 G/DL (ref 33.7–35.3)
MCV RBC AUTO: 99.7 FL (ref 81.4–97.8)
MONOCYTES # BLD AUTO: 0.44 K/UL (ref 0–0.85)
MONOCYTES NFR BLD AUTO: 11.4 % (ref 0–13.4)
MORPHOLOGY BLD-IMP: NORMAL
MYELOCYTES NFR BLD MANUAL: 1.7 %
NEUTROPHILS # BLD AUTO: 2.12 K/UL (ref 1.82–7.42)
NEUTROPHILS NFR BLD: 53.5 % (ref 44–72)
NEUTS BAND NFR BLD MANUAL: 0.9 % (ref 0–10)
NRBC # BLD AUTO: 0.04 K/UL
NRBC BLD-RTO: 1 /100 WBC
OVALOCYTES BLD QL SMEAR: NORMAL
PLATELET # BLD AUTO: 334 K/UL (ref 164–446)
PLATELET BLD QL SMEAR: NORMAL
PMV BLD AUTO: 9.4 FL (ref 9–12.9)
POIKILOCYTOSIS BLD QL SMEAR: NORMAL
POLYCHROMASIA BLD QL SMEAR: NORMAL
RBC # BLD AUTO: 2.99 M/UL (ref 4.7–6.1)
RBC BLD AUTO: PRESENT
WBC # BLD AUTO: 3.9 K/UL (ref 4.8–10.8)

## 2022-08-01 PROCEDURE — 85025 COMPLETE CBC W/AUTO DIFF WBC: CPT

## 2022-08-01 PROCEDURE — 36415 COLL VENOUS BLD VENIPUNCTURE: CPT

## 2022-08-01 PROCEDURE — 85007 BL SMEAR W/DIFF WBC COUNT: CPT

## 2022-08-02 ENCOUNTER — HOSPITAL ENCOUNTER (OUTPATIENT)
Dept: RADIOLOGY | Facility: MEDICAL CENTER | Age: 57
End: 2022-08-02
Attending: INTERNAL MEDICINE
Payer: COMMERCIAL

## 2022-08-02 NOTE — PROGRESS NOTES
Pt contacted regarding persistent anemia on pre-Xofigo injection lab study. Discussed results, and while he is within the parameters for Xofigo his trend is worsening anemia. Will defer Xofigo planned for this week and repeat CBC in 1 month. Of note, pt states he is at [facility] getting a PET CT right now.

## 2022-08-17 ENCOUNTER — HOSPITAL ENCOUNTER (OUTPATIENT)
Facility: MEDICAL CENTER | Age: 57
End: 2022-08-17
Attending: NURSE PRACTITIONER
Payer: COMMERCIAL

## 2022-08-17 PROCEDURE — 86706 HEP B SURFACE ANTIBODY: CPT

## 2022-08-17 PROCEDURE — 86704 HEP B CORE ANTIBODY TOTAL: CPT

## 2022-08-17 PROCEDURE — 87340 HEPATITIS B SURFACE AG IA: CPT

## 2022-08-17 PROCEDURE — 86803 HEPATITIS C AB TEST: CPT

## 2022-08-18 LAB
HBV CORE AB SERPL QL IA: NONREACTIVE
HBV SURFACE AB SERPL IA-ACNC: <3.5 MIU/ML (ref 0–10)
HBV SURFACE AG SER QL: NORMAL
HCV AB SER QL: NORMAL

## 2022-08-19 ENCOUNTER — HOSPITAL ENCOUNTER (OUTPATIENT)
Dept: LAB | Facility: MEDICAL CENTER | Age: 57
End: 2022-08-19
Attending: UROLOGY
Payer: COMMERCIAL

## 2022-08-19 LAB
ALBUMIN SERPL BCP-MCNC: 4.5 G/DL (ref 3.2–4.9)
ALBUMIN/GLOB SERPL: 1.3 G/DL
ALP SERPL-CCNC: 132 U/L (ref 30–99)
ALT SERPL-CCNC: 7 U/L (ref 2–50)
ANION GAP SERPL CALC-SCNC: 13 MMOL/L (ref 7–16)
ANISOCYTOSIS BLD QL SMEAR: ABNORMAL
AST SERPL-CCNC: 12 U/L (ref 12–45)
BASOPHILS # BLD AUTO: 0 % (ref 0–1.8)
BASOPHILS # BLD: 0 K/UL (ref 0–0.12)
BILIRUB SERPL-MCNC: 0.4 MG/DL (ref 0.1–1.5)
BUN SERPL-MCNC: 12 MG/DL (ref 8–22)
CALCIUM SERPL-MCNC: 9.2 MG/DL (ref 8.5–10.5)
CHLORIDE SERPL-SCNC: 99 MMOL/L (ref 96–112)
CO2 SERPL-SCNC: 20 MMOL/L (ref 20–33)
CREAT SERPL-MCNC: 0.56 MG/DL (ref 0.5–1.4)
DACRYOCYTES BLD QL SMEAR: NORMAL
EOSINOPHIL # BLD AUTO: 0.58 K/UL (ref 0–0.51)
EOSINOPHIL NFR BLD: 8.7 % (ref 0–6.9)
ERYTHROCYTE [DISTWIDTH] IN BLOOD BY AUTOMATED COUNT: 67.7 FL (ref 35.9–50)
FERRITIN SERPL-MCNC: 629 NG/ML (ref 22–322)
GFR SERPLBLD CREATININE-BSD FMLA CKD-EPI: 116 ML/MIN/1.73 M 2
GLOBULIN SER CALC-MCNC: 3.6 G/DL (ref 1.9–3.5)
GLUCOSE SERPL-MCNC: 144 MG/DL (ref 65–99)
HCT VFR BLD AUTO: 33.1 % (ref 42–52)
HGB BLD-MCNC: 10.1 G/DL (ref 14–18)
LYMPHOCYTES # BLD AUTO: 0.23 K/UL (ref 1–4.8)
LYMPHOCYTES NFR BLD: 3.5 % (ref 22–41)
MACROCYTES BLD QL SMEAR: ABNORMAL
MANUAL DIFF BLD: NORMAL
MCH RBC QN AUTO: 29.9 PG (ref 27–33)
MCHC RBC AUTO-ENTMCNC: 30.5 G/DL (ref 33.7–35.3)
MCV RBC AUTO: 97.9 FL (ref 81.4–97.8)
MICROCYTES BLD QL SMEAR: ABNORMAL
MONOCYTES # BLD AUTO: 0.29 K/UL (ref 0–0.85)
MONOCYTES NFR BLD AUTO: 4.3 % (ref 0–13.4)
MORPHOLOGY BLD-IMP: NORMAL
NEUTROPHILS # BLD AUTO: 5.59 K/UL (ref 1.82–7.42)
NEUTROPHILS NFR BLD: 83.5 % (ref 44–72)
NRBC # BLD AUTO: 0 K/UL
NRBC BLD-RTO: 0 /100 WBC
OVALOCYTES BLD QL SMEAR: NORMAL
PLATELET # BLD AUTO: 299 K/UL (ref 164–446)
PLATELET BLD QL SMEAR: NORMAL
PMV BLD AUTO: 9.4 FL (ref 9–12.9)
POIKILOCYTOSIS BLD QL SMEAR: NORMAL
POLYCHROMASIA BLD QL SMEAR: NORMAL
POTASSIUM SERPL-SCNC: 4.4 MMOL/L (ref 3.6–5.5)
PROT SERPL-MCNC: 8.1 G/DL (ref 6–8.2)
PSA SERPL-MCNC: 1.73 NG/ML (ref 0–4)
RBC # BLD AUTO: 3.38 M/UL (ref 4.7–6.1)
RBC BLD AUTO: PRESENT
SODIUM SERPL-SCNC: 132 MMOL/L (ref 135–145)
TESTOST SERPL-MCNC: 29 NG/DL (ref 175–781)
WBC # BLD AUTO: 6.7 K/UL (ref 4.8–10.8)

## 2022-08-19 PROCEDURE — 80053 COMPREHEN METABOLIC PANEL: CPT

## 2022-08-19 PROCEDURE — 36415 COLL VENOUS BLD VENIPUNCTURE: CPT

## 2022-08-19 PROCEDURE — 84403 ASSAY OF TOTAL TESTOSTERONE: CPT

## 2022-08-19 PROCEDURE — 85025 COMPLETE CBC W/AUTO DIFF WBC: CPT

## 2022-08-19 PROCEDURE — 84153 ASSAY OF PSA TOTAL: CPT

## 2022-08-19 PROCEDURE — 82728 ASSAY OF FERRITIN: CPT

## 2022-08-19 PROCEDURE — 85007 BL SMEAR W/DIFF WBC COUNT: CPT

## 2022-09-23 ENCOUNTER — HOSPITAL ENCOUNTER (OUTPATIENT)
Dept: LAB | Facility: MEDICAL CENTER | Age: 57
End: 2022-09-23
Attending: UROLOGY
Payer: COMMERCIAL

## 2022-09-23 LAB
ALBUMIN SERPL BCP-MCNC: 4 G/DL (ref 3.2–4.9)
ALBUMIN/GLOB SERPL: 1.5 G/DL
ALP SERPL-CCNC: 111 U/L (ref 30–99)
ALT SERPL-CCNC: 11 U/L (ref 2–50)
ANION GAP SERPL CALC-SCNC: 11 MMOL/L (ref 7–16)
AST SERPL-CCNC: 11 U/L (ref 12–45)
BILIRUB SERPL-MCNC: 0.6 MG/DL (ref 0.1–1.5)
BUN SERPL-MCNC: 11 MG/DL (ref 8–22)
CALCIUM SERPL-MCNC: 9.2 MG/DL (ref 8.5–10.5)
CHLORIDE SERPL-SCNC: 97 MMOL/L (ref 96–112)
CO2 SERPL-SCNC: 22 MMOL/L (ref 20–33)
CREAT SERPL-MCNC: 0.41 MG/DL (ref 0.5–1.4)
GFR SERPLBLD CREATININE-BSD FMLA CKD-EPI: 127 ML/MIN/1.73 M 2
GLOBULIN SER CALC-MCNC: 2.6 G/DL (ref 1.9–3.5)
GLUCOSE SERPL-MCNC: 122 MG/DL (ref 65–99)
POTASSIUM SERPL-SCNC: 4.4 MMOL/L (ref 3.6–5.5)
PROT SERPL-MCNC: 6.6 G/DL (ref 6–8.2)
PSA SERPL-MCNC: 0.29 NG/ML (ref 0–4)
SODIUM SERPL-SCNC: 130 MMOL/L (ref 135–145)
TESTOST SERPL-MCNC: <12 NG/DL (ref 175–781)

## 2022-09-23 PROCEDURE — 84153 ASSAY OF PSA TOTAL: CPT

## 2022-09-23 PROCEDURE — 84403 ASSAY OF TOTAL TESTOSTERONE: CPT

## 2022-09-23 PROCEDURE — 80053 COMPREHEN METABOLIC PANEL: CPT

## 2022-09-23 PROCEDURE — 36415 COLL VENOUS BLD VENIPUNCTURE: CPT

## 2022-10-06 ENCOUNTER — HOSPITAL ENCOUNTER (OUTPATIENT)
Dept: LAB | Facility: MEDICAL CENTER | Age: 57
End: 2022-10-06
Attending: FAMILY MEDICINE
Payer: COMMERCIAL

## 2022-10-06 LAB
ALBUMIN SERPL BCP-MCNC: 4.1 G/DL (ref 3.2–4.9)
ALBUMIN/GLOB SERPL: 1.6 G/DL
ALP SERPL-CCNC: 76 U/L (ref 30–99)
ALT SERPL-CCNC: 10 U/L (ref 2–50)
ANION GAP SERPL CALC-SCNC: 10 MMOL/L (ref 7–16)
ANION GAP SERPL CALC-SCNC: 12 MMOL/L (ref 7–16)
ANISOCYTOSIS BLD QL SMEAR: ABNORMAL
ANISOCYTOSIS BLD QL SMEAR: ABNORMAL
AST SERPL-CCNC: 12 U/L (ref 12–45)
BASOPHILS # BLD AUTO: 0 % (ref 0–1.8)
BASOPHILS # BLD AUTO: 1.7 % (ref 0–1.8)
BASOPHILS # BLD: 0 K/UL (ref 0–0.12)
BASOPHILS # BLD: 0.09 K/UL (ref 0–0.12)
BILIRUB SERPL-MCNC: 0.4 MG/DL (ref 0.1–1.5)
BUN SERPL-MCNC: 12 MG/DL (ref 8–22)
BUN SERPL-MCNC: 12 MG/DL (ref 8–22)
CALCIUM SERPL-MCNC: 9.1 MG/DL (ref 8.5–10.5)
CALCIUM SERPL-MCNC: 9.2 MG/DL (ref 8.5–10.5)
CHLORIDE SERPL-SCNC: 100 MMOL/L (ref 96–112)
CHLORIDE SERPL-SCNC: 100 MMOL/L (ref 96–112)
CO2 SERPL-SCNC: 22 MMOL/L (ref 20–33)
CO2 SERPL-SCNC: 23 MMOL/L (ref 20–33)
CREAT SERPL-MCNC: 0.51 MG/DL (ref 0.5–1.4)
CREAT SERPL-MCNC: 0.51 MG/DL (ref 0.5–1.4)
DACRYOCYTES BLD QL SMEAR: NORMAL
DACRYOCYTES BLD QL SMEAR: NORMAL
EOSINOPHIL # BLD AUTO: 0 K/UL (ref 0–0.51)
EOSINOPHIL # BLD AUTO: 0 K/UL (ref 0–0.51)
EOSINOPHIL NFR BLD: 0 % (ref 0–6.9)
EOSINOPHIL NFR BLD: 0 % (ref 0–6.9)
ERYTHROCYTE [DISTWIDTH] IN BLOOD BY AUTOMATED COUNT: 66.8 FL (ref 35.9–50)
ERYTHROCYTE [DISTWIDTH] IN BLOOD BY AUTOMATED COUNT: 66.9 FL (ref 35.9–50)
GFR SERPLBLD CREATININE-BSD FMLA CKD-EPI: 119 ML/MIN/1.73 M 2
GFR SERPLBLD CREATININE-BSD FMLA CKD-EPI: 119 ML/MIN/1.73 M 2
GLOBULIN SER CALC-MCNC: 2.6 G/DL (ref 1.9–3.5)
GLUCOSE SERPL-MCNC: 117 MG/DL (ref 65–99)
GLUCOSE SERPL-MCNC: 117 MG/DL (ref 65–99)
HCT VFR BLD AUTO: 33.2 % (ref 42–52)
HCT VFR BLD AUTO: 34 % (ref 42–52)
HGB BLD-MCNC: 10.4 G/DL (ref 14–18)
HGB BLD-MCNC: 10.5 G/DL (ref 14–18)
LYMPHOCYTES # BLD AUTO: 0.44 K/UL (ref 1–4.8)
LYMPHOCYTES # BLD AUTO: 0.5 K/UL (ref 1–4.8)
LYMPHOCYTES NFR BLD: 8.7 % (ref 22–41)
LYMPHOCYTES NFR BLD: 9.6 % (ref 22–41)
MACROCYTES BLD QL SMEAR: ABNORMAL
MACROCYTES BLD QL SMEAR: ABNORMAL
MANUAL DIFF BLD: NORMAL
MANUAL DIFF BLD: NORMAL
MCH RBC QN AUTO: 29.9 PG (ref 27–33)
MCH RBC QN AUTO: 30.2 PG (ref 27–33)
MCHC RBC AUTO-ENTMCNC: 30.9 G/DL (ref 33.7–35.3)
MCHC RBC AUTO-ENTMCNC: 31.3 G/DL (ref 33.7–35.3)
MCV RBC AUTO: 96.5 FL (ref 81.4–97.8)
MCV RBC AUTO: 96.9 FL (ref 81.4–97.8)
MICROCYTES BLD QL SMEAR: ABNORMAL
MICROCYTES BLD QL SMEAR: ABNORMAL
MONOCYTES # BLD AUTO: 0.36 K/UL (ref 0–0.85)
MONOCYTES # BLD AUTO: 0.54 K/UL (ref 0–0.85)
MONOCYTES NFR BLD AUTO: 10.4 % (ref 0–13.4)
MONOCYTES NFR BLD AUTO: 7 % (ref 0–13.4)
MORPHOLOGY BLD-IMP: NORMAL
MORPHOLOGY BLD-IMP: NORMAL
NEUTROPHILS # BLD AUTO: 4.16 K/UL (ref 1.82–7.42)
NEUTROPHILS # BLD AUTO: 4.21 K/UL (ref 1.82–7.42)
NEUTROPHILS NFR BLD: 80 % (ref 44–72)
NEUTROPHILS NFR BLD: 82.6 % (ref 44–72)
NRBC # BLD AUTO: 0.02 K/UL
NRBC # BLD AUTO: 0.04 K/UL
NRBC BLD-RTO: 0.4 /100 WBC
NRBC BLD-RTO: 0.8 /100 WBC
PLATELET # BLD AUTO: 236 K/UL (ref 164–446)
PLATELET # BLD AUTO: 238 K/UL (ref 164–446)
PLATELET BLD QL SMEAR: NORMAL
PLATELET BLD QL SMEAR: NORMAL
PMV BLD AUTO: 10 FL (ref 9–12.9)
PMV BLD AUTO: 9.4 FL (ref 9–12.9)
POIKILOCYTOSIS BLD QL SMEAR: NORMAL
POIKILOCYTOSIS BLD QL SMEAR: NORMAL
POLYCHROMASIA BLD QL SMEAR: NORMAL
POLYCHROMASIA BLD QL SMEAR: NORMAL
POTASSIUM SERPL-SCNC: 4.3 MMOL/L (ref 3.6–5.5)
POTASSIUM SERPL-SCNC: 4.3 MMOL/L (ref 3.6–5.5)
PROT SERPL-MCNC: 6.7 G/DL (ref 6–8.2)
RBC # BLD AUTO: 3.44 M/UL (ref 4.7–6.1)
RBC # BLD AUTO: 3.51 M/UL (ref 4.7–6.1)
RBC BLD AUTO: PRESENT
RBC BLD AUTO: PRESENT
SODIUM SERPL-SCNC: 133 MMOL/L (ref 135–145)
SODIUM SERPL-SCNC: 134 MMOL/L (ref 135–145)
WBC # BLD AUTO: 5.1 K/UL (ref 4.8–10.8)
WBC # BLD AUTO: 5.2 K/UL (ref 4.8–10.8)

## 2022-10-06 PROCEDURE — 85025 COMPLETE CBC W/AUTO DIFF WBC: CPT | Mod: 91

## 2022-10-06 PROCEDURE — 85025 COMPLETE CBC W/AUTO DIFF WBC: CPT

## 2022-10-06 PROCEDURE — 80053 COMPREHEN METABOLIC PANEL: CPT

## 2022-10-06 PROCEDURE — 80048 BASIC METABOLIC PNL TOTAL CA: CPT

## 2022-10-06 PROCEDURE — 85007 BL SMEAR W/DIFF WBC COUNT: CPT

## 2022-10-06 PROCEDURE — 85007 BL SMEAR W/DIFF WBC COUNT: CPT | Mod: 91

## 2022-10-06 PROCEDURE — 36415 COLL VENOUS BLD VENIPUNCTURE: CPT

## 2022-10-27 ENCOUNTER — HOSPITAL ENCOUNTER (OUTPATIENT)
Dept: LAB | Facility: MEDICAL CENTER | Age: 57
End: 2022-10-27
Attending: INTERNAL MEDICINE
Payer: COMMERCIAL

## 2022-10-27 ENCOUNTER — HOSPITAL ENCOUNTER (OUTPATIENT)
Dept: LAB | Facility: MEDICAL CENTER | Age: 57
End: 2022-10-27
Attending: PHYSICIAN ASSISTANT
Payer: COMMERCIAL

## 2022-10-27 LAB
ALBUMIN SERPL BCP-MCNC: 4.3 G/DL (ref 3.2–4.9)
ALBUMIN SERPL BCP-MCNC: 4.3 G/DL (ref 3.2–4.9)
ALBUMIN/GLOB SERPL: 1.6 G/DL
ALBUMIN/GLOB SERPL: 1.7 G/DL
ALP SERPL-CCNC: 70 U/L (ref 30–99)
ALP SERPL-CCNC: 71 U/L (ref 30–99)
ALT SERPL-CCNC: 10 U/L (ref 2–50)
ALT SERPL-CCNC: 11 U/L (ref 2–50)
ANION GAP SERPL CALC-SCNC: 10 MMOL/L (ref 7–16)
ANION GAP SERPL CALC-SCNC: 9 MMOL/L (ref 7–16)
ANISOCYTOSIS BLD QL SMEAR: ABNORMAL
AST SERPL-CCNC: 16 U/L (ref 12–45)
AST SERPL-CCNC: 17 U/L (ref 12–45)
BASOPHILS # BLD AUTO: 0 % (ref 0–1.8)
BASOPHILS # BLD: 0 K/UL (ref 0–0.12)
BILIRUB SERPL-MCNC: 0.5 MG/DL (ref 0.1–1.5)
BILIRUB SERPL-MCNC: 0.5 MG/DL (ref 0.1–1.5)
BUN SERPL-MCNC: 11 MG/DL (ref 8–22)
BUN SERPL-MCNC: 11 MG/DL (ref 8–22)
CALCIUM SERPL-MCNC: 9.5 MG/DL (ref 8.5–10.5)
CALCIUM SERPL-MCNC: 9.6 MG/DL (ref 8.5–10.5)
CHLORIDE SERPL-SCNC: 107 MMOL/L (ref 96–112)
CHLORIDE SERPL-SCNC: 107 MMOL/L (ref 96–112)
CO2 SERPL-SCNC: 22 MMOL/L (ref 20–33)
CO2 SERPL-SCNC: 23 MMOL/L (ref 20–33)
CREAT SERPL-MCNC: 0.48 MG/DL (ref 0.5–1.4)
CREAT SERPL-MCNC: 0.48 MG/DL (ref 0.5–1.4)
DACRYOCYTES BLD QL SMEAR: NORMAL
EOSINOPHIL # BLD AUTO: 0.06 K/UL (ref 0–0.51)
EOSINOPHIL NFR BLD: 0.8 % (ref 0–6.9)
ERYTHROCYTE [DISTWIDTH] IN BLOOD BY AUTOMATED COUNT: 74.4 FL (ref 35.9–50)
GFR SERPLBLD CREATININE-BSD FMLA CKD-EPI: 121 ML/MIN/1.73 M 2
GFR SERPLBLD CREATININE-BSD FMLA CKD-EPI: 121 ML/MIN/1.73 M 2
GLOBULIN SER CALC-MCNC: 2.6 G/DL (ref 1.9–3.5)
GLOBULIN SER CALC-MCNC: 2.7 G/DL (ref 1.9–3.5)
GLUCOSE SERPL-MCNC: 111 MG/DL (ref 65–99)
GLUCOSE SERPL-MCNC: 113 MG/DL (ref 65–99)
HCT VFR BLD AUTO: 36.4 % (ref 42–52)
HGB BLD-MCNC: 11.3 G/DL (ref 14–18)
LYMPHOCYTES # BLD AUTO: 0.42 K/UL (ref 1–4.8)
LYMPHOCYTES NFR BLD: 5.2 % (ref 22–41)
MACROCYTES BLD QL SMEAR: ABNORMAL
MANUAL DIFF BLD: NORMAL
MCH RBC QN AUTO: 31.2 PG (ref 27–33)
MCHC RBC AUTO-ENTMCNC: 31 G/DL (ref 33.7–35.3)
MCV RBC AUTO: 100.6 FL (ref 81.4–97.8)
MONOCYTES # BLD AUTO: 0.21 K/UL (ref 0–0.85)
MONOCYTES NFR BLD AUTO: 2.6 % (ref 0–13.4)
MORPHOLOGY BLD-IMP: NORMAL
NEUTROPHILS # BLD AUTO: 7.31 K/UL (ref 1.82–7.42)
NEUTROPHILS NFR BLD: 91.4 % (ref 44–72)
NRBC # BLD AUTO: 0 K/UL
NRBC BLD-RTO: 0 /100 WBC
PLATELET # BLD AUTO: 210 K/UL (ref 164–446)
PLATELET BLD QL SMEAR: NORMAL
PMV BLD AUTO: 10.4 FL (ref 9–12.9)
POIKILOCYTOSIS BLD QL SMEAR: NORMAL
POLYCHROMASIA BLD QL SMEAR: NORMAL
POTASSIUM SERPL-SCNC: 4.5 MMOL/L (ref 3.6–5.5)
POTASSIUM SERPL-SCNC: 4.5 MMOL/L (ref 3.6–5.5)
PROT SERPL-MCNC: 6.9 G/DL (ref 6–8.2)
PROT SERPL-MCNC: 7 G/DL (ref 6–8.2)
PSA SERPL-MCNC: 0.07 NG/ML (ref 0–4)
RBC # BLD AUTO: 3.62 M/UL (ref 4.7–6.1)
RBC BLD AUTO: PRESENT
SODIUM SERPL-SCNC: 139 MMOL/L (ref 135–145)
SODIUM SERPL-SCNC: 139 MMOL/L (ref 135–145)
TESTOST SERPL-MCNC: 13 NG/DL (ref 175–781)
WBC # BLD AUTO: 8 K/UL (ref 4.8–10.8)

## 2022-10-27 PROCEDURE — 84153 ASSAY OF PSA TOTAL: CPT

## 2022-10-27 PROCEDURE — 85007 BL SMEAR W/DIFF WBC COUNT: CPT

## 2022-10-27 PROCEDURE — 85025 COMPLETE CBC W/AUTO DIFF WBC: CPT

## 2022-10-27 PROCEDURE — 80053 COMPREHEN METABOLIC PANEL: CPT | Mod: 91

## 2022-10-27 PROCEDURE — 80053 COMPREHEN METABOLIC PANEL: CPT

## 2022-10-27 PROCEDURE — 84403 ASSAY OF TOTAL TESTOSTERONE: CPT

## 2022-10-27 PROCEDURE — 36415 COLL VENOUS BLD VENIPUNCTURE: CPT

## 2022-11-17 ENCOUNTER — HOSPITAL ENCOUNTER (OUTPATIENT)
Dept: LAB | Facility: MEDICAL CENTER | Age: 57
End: 2022-11-17
Attending: INTERNAL MEDICINE
Payer: COMMERCIAL

## 2022-11-17 LAB
ALBUMIN SERPL BCP-MCNC: 3.7 G/DL (ref 3.2–4.9)
ALBUMIN/GLOB SERPL: 1.6 G/DL
ALP SERPL-CCNC: 64 U/L (ref 30–99)
ALT SERPL-CCNC: 14 U/L (ref 2–50)
ANION GAP SERPL CALC-SCNC: 10 MMOL/L (ref 7–16)
ANISOCYTOSIS BLD QL SMEAR: ABNORMAL
AST SERPL-CCNC: 16 U/L (ref 12–45)
BASOPHILS # BLD AUTO: 0.7 % (ref 0–1.8)
BASOPHILS # BLD: 0.04 K/UL (ref 0–0.12)
BILIRUB SERPL-MCNC: 0.3 MG/DL (ref 0.1–1.5)
BUN SERPL-MCNC: 13 MG/DL (ref 8–22)
BURR CELLS BLD QL SMEAR: NORMAL
CALCIUM SERPL-MCNC: 8.7 MG/DL (ref 8.5–10.5)
CHLORIDE SERPL-SCNC: 103 MMOL/L (ref 96–112)
CO2 SERPL-SCNC: 21 MMOL/L (ref 20–33)
COMMENT 1642: NORMAL
CREAT SERPL-MCNC: 0.37 MG/DL (ref 0.5–1.4)
EOSINOPHIL # BLD AUTO: 0.03 K/UL (ref 0–0.51)
EOSINOPHIL NFR BLD: 0.5 % (ref 0–6.9)
ERYTHROCYTE [DISTWIDTH] IN BLOOD BY AUTOMATED COUNT: 72.7 FL (ref 35.9–50)
GFR SERPLBLD CREATININE-BSD FMLA CKD-EPI: 131 ML/MIN/1.73 M 2
GLOBULIN SER CALC-MCNC: 2.3 G/DL (ref 1.9–3.5)
GLUCOSE SERPL-MCNC: 138 MG/DL (ref 65–99)
HCT VFR BLD AUTO: 32.3 % (ref 42–52)
HGB BLD-MCNC: 10.1 G/DL (ref 14–18)
IMM GRANULOCYTES # BLD AUTO: 0.07 K/UL (ref 0–0.11)
IMM GRANULOCYTES NFR BLD AUTO: 1.2 % (ref 0–0.9)
LYMPHOCYTES # BLD AUTO: 0.46 K/UL (ref 1–4.8)
LYMPHOCYTES NFR BLD: 8.2 % (ref 22–41)
MACROCYTES BLD QL SMEAR: ABNORMAL
MCH RBC QN AUTO: 30.9 PG (ref 27–33)
MCHC RBC AUTO-ENTMCNC: 31.3 G/DL (ref 33.7–35.3)
MCV RBC AUTO: 98.8 FL (ref 81.4–97.8)
MONOCYTES # BLD AUTO: 0.5 K/UL (ref 0–0.85)
MONOCYTES NFR BLD AUTO: 8.9 % (ref 0–13.4)
MORPHOLOGY BLD-IMP: NORMAL
NEUTROPHILS # BLD AUTO: 4.53 K/UL (ref 1.82–7.42)
NEUTROPHILS NFR BLD: 80.5 % (ref 44–72)
NRBC # BLD AUTO: 0 K/UL
NRBC BLD-RTO: 0 /100 WBC
PLATELET # BLD AUTO: 249 K/UL (ref 164–446)
PLATELET BLD QL SMEAR: NORMAL
PMV BLD AUTO: 9.6 FL (ref 9–12.9)
POIKILOCYTOSIS BLD QL SMEAR: NORMAL
POLYCHROMASIA BLD QL SMEAR: NORMAL
POTASSIUM SERPL-SCNC: 4.6 MMOL/L (ref 3.6–5.5)
PROT SERPL-MCNC: 6 G/DL (ref 6–8.2)
PSA SERPL-MCNC: 0.06 NG/ML (ref 0–4)
RBC # BLD AUTO: 3.27 M/UL (ref 4.7–6.1)
RBC BLD AUTO: PRESENT
SODIUM SERPL-SCNC: 134 MMOL/L (ref 135–145)
TOXIC GRANULES BLD QL SMEAR: SLIGHT
WBC # BLD AUTO: 5.6 K/UL (ref 4.8–10.8)

## 2022-11-17 PROCEDURE — 80053 COMPREHEN METABOLIC PANEL: CPT

## 2022-11-17 PROCEDURE — 84153 ASSAY OF PSA TOTAL: CPT

## 2022-11-17 PROCEDURE — 85025 COMPLETE CBC W/AUTO DIFF WBC: CPT

## 2022-12-01 ENCOUNTER — HOSPITAL ENCOUNTER (OUTPATIENT)
Dept: LAB | Facility: MEDICAL CENTER | Age: 57
End: 2022-12-01
Attending: PHYSICIAN ASSISTANT
Payer: COMMERCIAL

## 2022-12-01 LAB
ALBUMIN SERPL BCP-MCNC: 3.5 G/DL (ref 3.2–4.9)
ALBUMIN/GLOB SERPL: 1.8 G/DL
ALP SERPL-CCNC: 92 U/L (ref 30–99)
ALT SERPL-CCNC: 11 U/L (ref 2–50)
ANION GAP SERPL CALC-SCNC: 9 MMOL/L (ref 7–16)
AST SERPL-CCNC: 17 U/L (ref 12–45)
BILIRUB SERPL-MCNC: 0.3 MG/DL (ref 0.1–1.5)
BUN SERPL-MCNC: 12 MG/DL (ref 8–22)
CALCIUM SERPL-MCNC: 7.9 MG/DL (ref 8.5–10.5)
CHLORIDE SERPL-SCNC: 108 MMOL/L (ref 96–112)
CO2 SERPL-SCNC: 23 MMOL/L (ref 20–33)
CREAT SERPL-MCNC: 0.65 MG/DL (ref 0.5–1.4)
GFR SERPLBLD CREATININE-BSD FMLA CKD-EPI: 110 ML/MIN/1.73 M 2
GLOBULIN SER CALC-MCNC: 2 G/DL (ref 1.9–3.5)
GLUCOSE SERPL-MCNC: 133 MG/DL (ref 65–99)
POTASSIUM SERPL-SCNC: 4.1 MMOL/L (ref 3.6–5.5)
PROT SERPL-MCNC: 5.5 G/DL (ref 6–8.2)
PSA SERPL-MCNC: 0.06 NG/ML (ref 0–4)
SODIUM SERPL-SCNC: 140 MMOL/L (ref 135–145)
TESTOST SERPL-MCNC: <12 NG/DL (ref 175–781)

## 2022-12-01 PROCEDURE — 84153 ASSAY OF PSA TOTAL: CPT

## 2022-12-01 PROCEDURE — 80053 COMPREHEN METABOLIC PANEL: CPT

## 2022-12-01 PROCEDURE — 84403 ASSAY OF TOTAL TESTOSTERONE: CPT

## 2022-12-01 PROCEDURE — 36415 COLL VENOUS BLD VENIPUNCTURE: CPT

## 2022-12-02 ENCOUNTER — HOSPITAL ENCOUNTER (OUTPATIENT)
Dept: RADIOLOGY | Facility: MEDICAL CENTER | Age: 57
End: 2022-12-02
Attending: INTERNAL MEDICINE
Payer: COMMERCIAL

## 2022-12-02 DIAGNOSIS — C61 MALIGNANT NEOPLASM OF PROSTATE (HCC): ICD-10-CM

## 2022-12-02 PROCEDURE — A9503 TC99M MEDRONATE: HCPCS

## 2022-12-08 ENCOUNTER — HOSPITAL ENCOUNTER (OUTPATIENT)
Dept: LAB | Facility: MEDICAL CENTER | Age: 57
End: 2022-12-08
Attending: INTERNAL MEDICINE
Payer: COMMERCIAL

## 2022-12-08 LAB
ALBUMIN SERPL BCP-MCNC: 3.8 G/DL (ref 3.2–4.9)
ALBUMIN/GLOB SERPL: 1.6 G/DL
ALP SERPL-CCNC: 70 U/L (ref 30–99)
ALT SERPL-CCNC: 8 U/L (ref 2–50)
ANION GAP SERPL CALC-SCNC: 12 MMOL/L (ref 7–16)
ANISOCYTOSIS BLD QL SMEAR: ABNORMAL
AST SERPL-CCNC: 16 U/L (ref 12–45)
BASOPHILS # BLD AUTO: 0.5 % (ref 0–1.8)
BASOPHILS # BLD: 0.04 K/UL (ref 0–0.12)
BILIRUB SERPL-MCNC: 0.5 MG/DL (ref 0.1–1.5)
BUN SERPL-MCNC: 13 MG/DL (ref 8–22)
CALCIUM SERPL-MCNC: 8.8 MG/DL (ref 8.5–10.5)
CHLORIDE SERPL-SCNC: 103 MMOL/L (ref 96–112)
CO2 SERPL-SCNC: 21 MMOL/L (ref 20–33)
COMMENT 1642: NORMAL
CREAT SERPL-MCNC: 0.51 MG/DL (ref 0.5–1.4)
EOSINOPHIL # BLD AUTO: 0.03 K/UL (ref 0–0.51)
EOSINOPHIL NFR BLD: 0.4 % (ref 0–6.9)
ERYTHROCYTE [DISTWIDTH] IN BLOOD BY AUTOMATED COUNT: 75.4 FL (ref 35.9–50)
GFR SERPLBLD CREATININE-BSD FMLA CKD-EPI: 119 ML/MIN/1.73 M 2
GLOBULIN SER CALC-MCNC: 2.4 G/DL (ref 1.9–3.5)
GLUCOSE SERPL-MCNC: 122 MG/DL (ref 65–99)
HCT VFR BLD AUTO: 36.5 % (ref 42–52)
HGB BLD-MCNC: 11.4 G/DL (ref 14–18)
IMM GRANULOCYTES # BLD AUTO: 0.06 K/UL (ref 0–0.11)
IMM GRANULOCYTES NFR BLD AUTO: 0.8 % (ref 0–0.9)
LYMPHOCYTES # BLD AUTO: 0.56 K/UL (ref 1–4.8)
LYMPHOCYTES NFR BLD: 7 % (ref 22–41)
MACROCYTES BLD QL SMEAR: ABNORMAL
MCH RBC QN AUTO: 31.3 PG (ref 27–33)
MCHC RBC AUTO-ENTMCNC: 31.2 G/DL (ref 33.7–35.3)
MCV RBC AUTO: 100.3 FL (ref 81.4–97.8)
MICROCYTES BLD QL SMEAR: ABNORMAL
MONOCYTES # BLD AUTO: 0.66 K/UL (ref 0–0.85)
MONOCYTES NFR BLD AUTO: 8.3 % (ref 0–13.4)
MORPHOLOGY BLD-IMP: NORMAL
NEUTROPHILS # BLD AUTO: 6.63 K/UL (ref 1.82–7.42)
NEUTROPHILS NFR BLD: 83 % (ref 44–72)
NRBC # BLD AUTO: 0 K/UL
NRBC BLD-RTO: 0 /100 WBC
OVALOCYTES BLD QL SMEAR: NORMAL
PLATELET # BLD AUTO: 232 K/UL (ref 164–446)
PLATELET BLD QL SMEAR: NORMAL
PMV BLD AUTO: 10.5 FL (ref 9–12.9)
POIKILOCYTOSIS BLD QL SMEAR: NORMAL
POTASSIUM SERPL-SCNC: 4.1 MMOL/L (ref 3.6–5.5)
PROT SERPL-MCNC: 6.2 G/DL (ref 6–8.2)
PSA SERPL-MCNC: 0.07 NG/ML (ref 0–4)
RBC # BLD AUTO: 3.64 M/UL (ref 4.7–6.1)
RBC BLD AUTO: PRESENT
SODIUM SERPL-SCNC: 136 MMOL/L (ref 135–145)
WBC # BLD AUTO: 8 K/UL (ref 4.8–10.8)

## 2022-12-08 PROCEDURE — 80053 COMPREHEN METABOLIC PANEL: CPT

## 2022-12-08 PROCEDURE — 85025 COMPLETE CBC W/AUTO DIFF WBC: CPT

## 2022-12-08 PROCEDURE — 84153 ASSAY OF PSA TOTAL: CPT

## 2022-12-08 PROCEDURE — 36415 COLL VENOUS BLD VENIPUNCTURE: CPT

## 2022-12-12 ENCOUNTER — HOSPITAL ENCOUNTER (OUTPATIENT)
Dept: RADIOLOGY | Facility: MEDICAL CENTER | Age: 57
End: 2022-12-12
Payer: COMMERCIAL

## 2022-12-30 ENCOUNTER — HOSPITAL ENCOUNTER (OUTPATIENT)
Dept: LAB | Facility: MEDICAL CENTER | Age: 57
End: 2022-12-30
Attending: NURSE PRACTITIONER
Payer: COMMERCIAL

## 2022-12-30 LAB
ALBUMIN SERPL BCP-MCNC: 3.6 G/DL (ref 3.2–4.9)
ALBUMIN/GLOB SERPL: 1.6 G/DL
ALP SERPL-CCNC: 56 U/L (ref 30–99)
ALT SERPL-CCNC: 10 U/L (ref 2–50)
ANION GAP SERPL CALC-SCNC: 10 MMOL/L (ref 7–16)
ANISOCYTOSIS BLD QL SMEAR: ABNORMAL
AST SERPL-CCNC: 19 U/L (ref 12–45)
BASOPHILS # BLD AUTO: 0 % (ref 0–1.8)
BASOPHILS # BLD: 0 K/UL (ref 0–0.12)
BILIRUB SERPL-MCNC: 0.4 MG/DL (ref 0.1–1.5)
BUN SERPL-MCNC: 9 MG/DL (ref 8–22)
CALCIUM ALBUM COR SERPL-MCNC: 9 MG/DL (ref 8.5–10.5)
CALCIUM SERPL-MCNC: 8.7 MG/DL (ref 8.5–10.5)
CHLORIDE SERPL-SCNC: 107 MMOL/L (ref 96–112)
CO2 SERPL-SCNC: 22 MMOL/L (ref 20–33)
CREAT SERPL-MCNC: 0.47 MG/DL (ref 0.5–1.4)
EOSINOPHIL # BLD AUTO: 0 K/UL (ref 0–0.51)
EOSINOPHIL NFR BLD: 0 % (ref 0–6.9)
ERYTHROCYTE [DISTWIDTH] IN BLOOD BY AUTOMATED COUNT: 73 FL (ref 35.9–50)
GFR SERPLBLD CREATININE-BSD FMLA CKD-EPI: 122 ML/MIN/1.73 M 2
GLOBULIN SER CALC-MCNC: 2.3 G/DL (ref 1.9–3.5)
GLUCOSE SERPL-MCNC: 133 MG/DL (ref 65–99)
HCT VFR BLD AUTO: 35.4 % (ref 42–52)
HGB BLD-MCNC: 11.3 G/DL (ref 14–18)
LYMPHOCYTES # BLD AUTO: 0.58 K/UL (ref 1–4.8)
LYMPHOCYTES NFR BLD: 10.9 % (ref 22–41)
MACROCYTES BLD QL SMEAR: ABNORMAL
MANUAL DIFF BLD: NORMAL
MCH RBC QN AUTO: 32.1 PG (ref 27–33)
MCHC RBC AUTO-ENTMCNC: 31.9 G/DL (ref 33.7–35.3)
MCV RBC AUTO: 100.6 FL (ref 81.4–97.8)
MONOCYTES # BLD AUTO: 0.66 K/UL (ref 0–0.85)
MONOCYTES NFR BLD AUTO: 12.5 % (ref 0–13.4)
MORPHOLOGY BLD-IMP: NORMAL
NEUTROPHILS # BLD AUTO: 4.06 K/UL (ref 1.82–7.42)
NEUTROPHILS NFR BLD: 76.6 % (ref 44–72)
NRBC # BLD AUTO: 0 K/UL
NRBC BLD-RTO: 0 /100 WBC
PLATELET # BLD AUTO: 264 K/UL (ref 164–446)
PLATELET BLD QL SMEAR: NORMAL
PMV BLD AUTO: 10.1 FL (ref 9–12.9)
POLYCHROMASIA BLD QL SMEAR: NORMAL
POTASSIUM SERPL-SCNC: 4.5 MMOL/L (ref 3.6–5.5)
PROT SERPL-MCNC: 5.9 G/DL (ref 6–8.2)
RBC # BLD AUTO: 3.52 M/UL (ref 4.7–6.1)
RBC BLD AUTO: PRESENT
SODIUM SERPL-SCNC: 139 MMOL/L (ref 135–145)
WBC # BLD AUTO: 5.3 K/UL (ref 4.8–10.8)

## 2022-12-30 PROCEDURE — 80053 COMPREHEN METABOLIC PANEL: CPT

## 2022-12-30 PROCEDURE — 36415 COLL VENOUS BLD VENIPUNCTURE: CPT

## 2022-12-30 PROCEDURE — 85007 BL SMEAR W/DIFF WBC COUNT: CPT

## 2022-12-30 PROCEDURE — 85025 COMPLETE CBC W/AUTO DIFF WBC: CPT

## 2023-01-05 ENCOUNTER — HOSPITAL ENCOUNTER (OUTPATIENT)
Dept: LAB | Facility: MEDICAL CENTER | Age: 58
End: 2023-01-05
Attending: NURSE PRACTITIONER
Payer: COMMERCIAL

## 2023-01-05 LAB
ALBUMIN SERPL BCP-MCNC: 4 G/DL (ref 3.2–4.9)
ALBUMIN/GLOB SERPL: 1.9 G/DL
ALP SERPL-CCNC: 48 U/L (ref 30–99)
ALT SERPL-CCNC: 13 U/L (ref 2–50)
ANION GAP SERPL CALC-SCNC: 11 MMOL/L (ref 7–16)
ANISOCYTOSIS BLD QL SMEAR: ABNORMAL
AST SERPL-CCNC: 21 U/L (ref 12–45)
BASOPHILS # BLD AUTO: 0 % (ref 0–1.8)
BASOPHILS # BLD: 0 K/UL (ref 0–0.12)
BILIRUB SERPL-MCNC: 0.4 MG/DL (ref 0.1–1.5)
BUN SERPL-MCNC: 8 MG/DL (ref 8–22)
CALCIUM ALBUM COR SERPL-MCNC: 9.1 MG/DL (ref 8.5–10.5)
CALCIUM SERPL-MCNC: 9.1 MG/DL (ref 8.5–10.5)
CHLORIDE SERPL-SCNC: 101 MMOL/L (ref 96–112)
CO2 SERPL-SCNC: 23 MMOL/L (ref 20–33)
CREAT SERPL-MCNC: 0.51 MG/DL (ref 0.5–1.4)
EOSINOPHIL # BLD AUTO: 0.05 K/UL (ref 0–0.51)
EOSINOPHIL NFR BLD: 0.9 % (ref 0–6.9)
ERYTHROCYTE [DISTWIDTH] IN BLOOD BY AUTOMATED COUNT: 75.2 FL (ref 35.9–50)
GFR SERPLBLD CREATININE-BSD FMLA CKD-EPI: 119 ML/MIN/1.73 M 2
GLOBULIN SER CALC-MCNC: 2.1 G/DL (ref 1.9–3.5)
GLUCOSE SERPL-MCNC: 110 MG/DL (ref 65–99)
HCT VFR BLD AUTO: 38.5 % (ref 42–52)
HGB BLD-MCNC: 12 G/DL (ref 14–18)
LYMPHOCYTES # BLD AUTO: 0.51 K/UL (ref 1–4.8)
LYMPHOCYTES NFR BLD: 9.5 % (ref 22–41)
MACROCYTES BLD QL SMEAR: ABNORMAL
MANUAL DIFF BLD: NORMAL
MCH RBC QN AUTO: 31.9 PG (ref 27–33)
MCHC RBC AUTO-ENTMCNC: 31.2 G/DL (ref 33.7–35.3)
MCV RBC AUTO: 102.4 FL (ref 81.4–97.8)
MONOCYTES # BLD AUTO: 0.46 K/UL (ref 0–0.85)
MONOCYTES NFR BLD AUTO: 8.6 % (ref 0–13.4)
MORPHOLOGY BLD-IMP: NORMAL
NEUTROPHILS # BLD AUTO: 4.37 K/UL (ref 1.82–7.42)
NEUTROPHILS NFR BLD: 81 % (ref 44–72)
NRBC # BLD AUTO: 0 K/UL
NRBC BLD-RTO: 0 /100 WBC
PLATELET # BLD AUTO: 268 K/UL (ref 164–446)
PLATELET BLD QL SMEAR: NORMAL
PMV BLD AUTO: 10 FL (ref 9–12.9)
POTASSIUM SERPL-SCNC: 4.6 MMOL/L (ref 3.6–5.5)
PROT SERPL-MCNC: 6.1 G/DL (ref 6–8.2)
RBC # BLD AUTO: 3.76 M/UL (ref 4.7–6.1)
RBC BLD AUTO: PRESENT
SODIUM SERPL-SCNC: 135 MMOL/L (ref 135–145)
WBC # BLD AUTO: 5.4 K/UL (ref 4.8–10.8)

## 2023-01-05 PROCEDURE — 80053 COMPREHEN METABOLIC PANEL: CPT

## 2023-01-05 PROCEDURE — 85025 COMPLETE CBC W/AUTO DIFF WBC: CPT

## 2023-01-05 PROCEDURE — 36415 COLL VENOUS BLD VENIPUNCTURE: CPT

## 2023-01-05 PROCEDURE — 85007 BL SMEAR W/DIFF WBC COUNT: CPT

## 2023-01-06 ENCOUNTER — HOSPITAL ENCOUNTER (OUTPATIENT)
Dept: RADIOLOGY | Facility: MEDICAL CENTER | Age: 58
End: 2023-01-06
Attending: INTERNAL MEDICINE
Payer: COMMERCIAL

## 2023-01-06 DIAGNOSIS — C61 MALIGNANT NEOPLASM OF PROSTATE (HCC): ICD-10-CM

## 2023-01-06 PROCEDURE — 93970 EXTREMITY STUDY: CPT

## 2023-01-26 ENCOUNTER — APPOINTMENT (RX ONLY)
Dept: URBAN - METROPOLITAN AREA CLINIC 35 | Facility: CLINIC | Age: 58
Setting detail: DERMATOLOGY
End: 2023-01-26

## 2023-01-26 ENCOUNTER — HOSPITAL ENCOUNTER (OUTPATIENT)
Dept: LAB | Facility: MEDICAL CENTER | Age: 58
End: 2023-01-26
Attending: PAIN MEDICINE
Payer: COMMERCIAL

## 2023-01-26 DIAGNOSIS — D22 MELANOCYTIC NEVI: ICD-10-CM

## 2023-01-26 DIAGNOSIS — Z85.828 PERSONAL HISTORY OF OTHER MALIGNANT NEOPLASM OF SKIN: ICD-10-CM

## 2023-01-26 DIAGNOSIS — L81.4 OTHER MELANIN HYPERPIGMENTATION: ICD-10-CM

## 2023-01-26 DIAGNOSIS — L82.1 OTHER SEBORRHEIC KERATOSIS: ICD-10-CM

## 2023-01-26 DIAGNOSIS — Z71.89 OTHER SPECIFIED COUNSELING: ICD-10-CM

## 2023-01-26 DIAGNOSIS — L85.3 XEROSIS CUTIS: ICD-10-CM

## 2023-01-26 DIAGNOSIS — H00.01 HORDEOLUM EXTERNUM: ICD-10-CM

## 2023-01-26 PROBLEM — H00.015 HORDEOLUM EXTERNUM LEFT LOWER EYELID: Status: ACTIVE | Noted: 2023-01-26

## 2023-01-26 PROBLEM — D22.4 MELANOCYTIC NEVI OF SCALP AND NECK: Status: ACTIVE | Noted: 2023-01-26

## 2023-01-26 LAB
ANION GAP SERPL CALC-SCNC: 10 MMOL/L (ref 7–16)
BUN SERPL-MCNC: 22 MG/DL (ref 8–22)
CALCIUM SERPL-MCNC: 9.1 MG/DL (ref 8.5–10.5)
CHLORIDE SERPL-SCNC: 104 MMOL/L (ref 96–112)
CO2 SERPL-SCNC: 22 MMOL/L (ref 20–33)
CREAT SERPL-MCNC: 0.59 MG/DL (ref 0.5–1.4)
GFR SERPLBLD CREATININE-BSD FMLA CKD-EPI: 114 ML/MIN/1.73 M 2
GLUCOSE SERPL-MCNC: 128 MG/DL (ref 65–99)
POTASSIUM SERPL-SCNC: 4.5 MMOL/L (ref 3.6–5.5)
SODIUM SERPL-SCNC: 136 MMOL/L (ref 135–145)

## 2023-01-26 PROCEDURE — 99213 OFFICE O/P EST LOW 20 MIN: CPT

## 2023-01-26 PROCEDURE — 80048 BASIC METABOLIC PNL TOTAL CA: CPT

## 2023-01-26 PROCEDURE — 36415 COLL VENOUS BLD VENIPUNCTURE: CPT

## 2023-01-26 PROCEDURE — ? COUNSELING

## 2023-01-26 ASSESSMENT — LOCATION SIMPLE DESCRIPTION DERM
LOCATION SIMPLE: UPPER BACK
LOCATION SIMPLE: RIGHT SHOULDER
LOCATION SIMPLE: LEFT INFERIOR EYELID
LOCATION SIMPLE: RIGHT CHEEK
LOCATION SIMPLE: POSTERIOR NECK

## 2023-01-26 ASSESSMENT — LOCATION ZONE DERM
LOCATION ZONE: TRUNK
LOCATION ZONE: EYELID
LOCATION ZONE: ARM
LOCATION ZONE: FACE
LOCATION ZONE: NECK

## 2023-01-26 ASSESSMENT — LOCATION DETAILED DESCRIPTION DERM
LOCATION DETAILED: INFERIOR THORACIC SPINE
LOCATION DETAILED: RIGHT MID PREAURICULAR CHEEK
LOCATION DETAILED: RIGHT LATERAL TRAPEZIAL NECK
LOCATION DETAILED: RIGHT INFERIOR LATERAL NECK
LOCATION DETAILED: RIGHT POSTERIOR SHOULDER
LOCATION DETAILED: LEFT MEDIAL INFERIOR EYELID

## 2023-01-26 NOTE — PROCEDURE: COUNSELING
Detail Level: Generalized
Detail Level: Zone
Detail Level: Detailed
Patient Specific Counseling (Will Not Stick From Patient To Patient): See PCP or opthalmologist if this isn't improving.

## 2023-01-27 ENCOUNTER — APPOINTMENT (OUTPATIENT)
Dept: RADIOLOGY | Facility: MEDICAL CENTER | Age: 58
End: 2023-01-27
Attending: PAIN MEDICINE
Payer: COMMERCIAL

## 2023-01-27 DIAGNOSIS — M21.371 FOOT DROP, RIGHT: ICD-10-CM

## 2023-01-27 DIAGNOSIS — C61 MALIGNANT NEOPLASM OF PROSTATE (HCC): ICD-10-CM

## 2023-01-27 DIAGNOSIS — M21.372 FOOT DROP, LEFT: ICD-10-CM

## 2023-01-27 DIAGNOSIS — M62.81 MUSCLE WEAKNESS (GENERALIZED): ICD-10-CM

## 2023-01-27 PROCEDURE — 72158 MRI LUMBAR SPINE W/O & W/DYE: CPT

## 2023-01-27 PROCEDURE — A9579 GAD-BASE MR CONTRAST NOS,1ML: HCPCS | Performed by: PAIN MEDICINE

## 2023-01-27 PROCEDURE — 700117 HCHG RX CONTRAST REV CODE 255: Performed by: PAIN MEDICINE

## 2023-01-27 RX ADMIN — GADOTERIDOL 20 ML: 279.3 INJECTION, SOLUTION INTRAVENOUS at 09:22

## 2023-02-01 ENCOUNTER — HOSPITAL ENCOUNTER (OUTPATIENT)
Dept: LAB | Facility: MEDICAL CENTER | Age: 58
End: 2023-02-01
Attending: FAMILY MEDICINE
Payer: COMMERCIAL

## 2023-02-01 ENCOUNTER — HOSPITAL ENCOUNTER (OUTPATIENT)
Dept: LAB | Facility: MEDICAL CENTER | Age: 58
End: 2023-02-01
Attending: UROLOGY
Payer: COMMERCIAL

## 2023-02-01 LAB
25(OH)D3 SERPL-MCNC: 16 NG/ML (ref 30–100)
ALBUMIN SERPL BCP-MCNC: 4.2 G/DL (ref 3.2–4.9)
ALBUMIN/GLOB SERPL: 2.1 G/DL
ALP SERPL-CCNC: 58 U/L (ref 30–99)
ALT SERPL-CCNC: 20 U/L (ref 2–50)
ANION GAP SERPL CALC-SCNC: 11 MMOL/L (ref 7–16)
AST SERPL-CCNC: 15 U/L (ref 12–45)
BILIRUB SERPL-MCNC: 0.5 MG/DL (ref 0.1–1.5)
BUN SERPL-MCNC: 20 MG/DL (ref 8–22)
CALCIUM ALBUM COR SERPL-MCNC: 8.9 MG/DL (ref 8.5–10.5)
CALCIUM SERPL-MCNC: 9.1 MG/DL (ref 8.5–10.5)
CHLORIDE SERPL-SCNC: 102 MMOL/L (ref 96–112)
CO2 SERPL-SCNC: 22 MMOL/L (ref 20–33)
CREAT SERPL-MCNC: 0.49 MG/DL (ref 0.5–1.4)
GFR SERPLBLD CREATININE-BSD FMLA CKD-EPI: 120 ML/MIN/1.73 M 2
GLOBULIN SER CALC-MCNC: 2 G/DL (ref 1.9–3.5)
GLUCOSE SERPL-MCNC: 216 MG/DL (ref 65–99)
POTASSIUM SERPL-SCNC: 4 MMOL/L (ref 3.6–5.5)
PROT SERPL-MCNC: 6.2 G/DL (ref 6–8.2)
SODIUM SERPL-SCNC: 135 MMOL/L (ref 135–145)
TESTOST SERPL-MCNC: <12 NG/DL (ref 175–781)

## 2023-02-01 PROCEDURE — 84403 ASSAY OF TOTAL TESTOSTERONE: CPT

## 2023-02-01 PROCEDURE — 36415 COLL VENOUS BLD VENIPUNCTURE: CPT

## 2023-02-01 PROCEDURE — 82306 VITAMIN D 25 HYDROXY: CPT

## 2023-02-01 PROCEDURE — 80053 COMPREHEN METABOLIC PANEL: CPT

## 2023-02-01 PROCEDURE — 84153 ASSAY OF PSA TOTAL: CPT

## 2023-02-02 LAB — PSA SERPL-MCNC: 0.1 NG/ML (ref 0–4)

## 2023-02-13 ENCOUNTER — HOSPITAL ENCOUNTER (OUTPATIENT)
Dept: RADIOLOGY | Facility: MEDICAL CENTER | Age: 58
End: 2023-02-13
Attending: INTERNAL MEDICINE
Payer: COMMERCIAL

## 2023-02-13 DIAGNOSIS — C79.51 SECONDARY MALIGNANT NEOPLASM OF BONE AND BONE MARROW (HCC): ICD-10-CM

## 2023-02-13 DIAGNOSIS — R77.1 HYPERGLOBULINEMIA: ICD-10-CM

## 2023-02-13 DIAGNOSIS — D72.829 LEUKOCYTOSIS, UNSPECIFIED TYPE: ICD-10-CM

## 2023-02-13 DIAGNOSIS — D70.1 AGRANULOCYTOSIS SECONDARY TO CANCER CHEMOTHERAPY (HCC): ICD-10-CM

## 2023-02-13 DIAGNOSIS — T45.1X5A AGRANULOCYTOSIS SECONDARY TO CANCER CHEMOTHERAPY (HCC): ICD-10-CM

## 2023-02-13 DIAGNOSIS — C79.52 SECONDARY MALIGNANT NEOPLASM OF BONE AND BONE MARROW (HCC): ICD-10-CM

## 2023-02-13 DIAGNOSIS — C61 MALIGNANT NEOPLASM OF PROSTATE (HCC): ICD-10-CM

## 2023-02-13 DIAGNOSIS — Z79.899 OTHER LONG TERM (CURRENT) DRUG THERAPY: ICD-10-CM

## 2023-02-13 DIAGNOSIS — Z01.89 ENCOUNTER FOR SPECIAL NEEDS ASSESSMENT: ICD-10-CM

## 2023-02-13 DIAGNOSIS — D64.9 ANEMIA, UNSPECIFIED TYPE: ICD-10-CM

## 2023-02-13 PROCEDURE — A9503 TC99M MEDRONATE: HCPCS

## 2023-02-16 ENCOUNTER — HOSPITAL ENCOUNTER (OUTPATIENT)
Dept: RADIOLOGY | Facility: MEDICAL CENTER | Age: 58
End: 2023-02-16
Payer: COMMERCIAL

## 2023-03-10 ENCOUNTER — HOSPITAL ENCOUNTER (OUTPATIENT)
Dept: LAB | Facility: MEDICAL CENTER | Age: 58
End: 2023-03-10
Attending: PHYSICIAN ASSISTANT
Payer: COMMERCIAL

## 2023-03-10 ENCOUNTER — HOSPITAL ENCOUNTER (OUTPATIENT)
Dept: LAB | Facility: MEDICAL CENTER | Age: 58
End: 2023-03-10
Attending: UROLOGY
Payer: COMMERCIAL

## 2023-03-10 LAB
ALBUMIN SERPL BCP-MCNC: 4.1 G/DL (ref 3.2–4.9)
ALBUMIN/GLOB SERPL: 1.6 G/DL
ALP SERPL-CCNC: 53 U/L (ref 30–99)
ALT SERPL-CCNC: 9 U/L (ref 2–50)
ANION GAP SERPL CALC-SCNC: 10 MMOL/L (ref 7–16)
AST SERPL-CCNC: 15 U/L (ref 12–45)
BILIRUB SERPL-MCNC: 0.6 MG/DL (ref 0.1–1.5)
BUN SERPL-MCNC: 10 MG/DL (ref 8–22)
CALCIUM ALBUM COR SERPL-MCNC: 9.2 MG/DL (ref 8.5–10.5)
CALCIUM SERPL-MCNC: 9.3 MG/DL (ref 8.5–10.5)
CHLORIDE SERPL-SCNC: 101 MMOL/L (ref 96–112)
CO2 SERPL-SCNC: 22 MMOL/L (ref 20–33)
CREAT SERPL-MCNC: 0.45 MG/DL (ref 0.5–1.4)
GFR SERPLBLD CREATININE-BSD FMLA CKD-EPI: 123 ML/MIN/1.73 M 2
GLOBULIN SER CALC-MCNC: 2.6 G/DL (ref 1.9–3.5)
GLUCOSE SERPL-MCNC: 111 MG/DL (ref 65–99)
HCT VFR BLD AUTO: 41.8 % (ref 42–52)
HGB BLD-MCNC: 13.2 G/DL (ref 14–18)
POTASSIUM SERPL-SCNC: 4.3 MMOL/L (ref 3.6–5.5)
PROT SERPL-MCNC: 6.7 G/DL (ref 6–8.2)
SODIUM SERPL-SCNC: 133 MMOL/L (ref 135–145)
TESTOST SERPL-MCNC: <12 NG/DL (ref 175–781)

## 2023-03-10 PROCEDURE — 85018 HEMOGLOBIN: CPT

## 2023-03-10 PROCEDURE — 36415 COLL VENOUS BLD VENIPUNCTURE: CPT

## 2023-03-10 PROCEDURE — 84153 ASSAY OF PSA TOTAL: CPT

## 2023-03-10 PROCEDURE — 80053 COMPREHEN METABOLIC PANEL: CPT

## 2023-03-10 PROCEDURE — 84403 ASSAY OF TOTAL TESTOSTERONE: CPT

## 2023-03-10 PROCEDURE — 85014 HEMATOCRIT: CPT

## 2023-03-11 LAB — PSA SERPL-MCNC: 0.26 NG/ML (ref 0–4)

## 2023-04-05 ENCOUNTER — HOSPITAL ENCOUNTER (OUTPATIENT)
Facility: MEDICAL CENTER | Age: 58
End: 2023-04-05
Attending: INTERNAL MEDICINE
Payer: COMMERCIAL

## 2023-04-05 LAB
ALBUMIN SERPL BCP-MCNC: 3.6 G/DL (ref 3.2–4.9)
ALBUMIN/GLOB SERPL: 1.4 G/DL
ALP SERPL-CCNC: 70 U/L (ref 30–99)
ALT SERPL-CCNC: 5 U/L (ref 2–50)
ANION GAP SERPL CALC-SCNC: 12 MMOL/L (ref 7–16)
AST SERPL-CCNC: 9 U/L (ref 12–45)
BILIRUB SERPL-MCNC: 0.4 MG/DL (ref 0.1–1.5)
BUN SERPL-MCNC: 7 MG/DL (ref 8–22)
CALCIUM ALBUM COR SERPL-MCNC: 8.8 MG/DL (ref 8.5–10.5)
CALCIUM SERPL-MCNC: 8.5 MG/DL (ref 8.5–10.5)
CHLORIDE SERPL-SCNC: 104 MMOL/L (ref 96–112)
CO2 SERPL-SCNC: 22 MMOL/L (ref 20–33)
CREAT SERPL-MCNC: 0.4 MG/DL (ref 0.5–1.4)
GFR SERPLBLD CREATININE-BSD FMLA CKD-EPI: 128 ML/MIN/1.73 M 2
GLOBULIN SER CALC-MCNC: 2.6 G/DL (ref 1.9–3.5)
GLUCOSE SERPL-MCNC: 117 MG/DL (ref 65–99)
POTASSIUM SERPL-SCNC: 4.5 MMOL/L (ref 3.6–5.5)
PROT SERPL-MCNC: 6.2 G/DL (ref 6–8.2)
PSA SERPL-MCNC: 0.29 NG/ML (ref 0–4)
SODIUM SERPL-SCNC: 138 MMOL/L (ref 135–145)

## 2023-04-05 PROCEDURE — 80053 COMPREHEN METABOLIC PANEL: CPT

## 2023-04-05 PROCEDURE — 84153 ASSAY OF PSA TOTAL: CPT

## 2023-04-12 ENCOUNTER — HOSPITAL ENCOUNTER (OUTPATIENT)
Dept: RADIOLOGY | Facility: MEDICAL CENTER | Age: 58
End: 2023-04-12
Attending: INTERNAL MEDICINE

## 2023-04-12 ENCOUNTER — HOSPITAL ENCOUNTER (OUTPATIENT)
Dept: LAB | Facility: MEDICAL CENTER | Age: 58
End: 2023-04-12
Attending: PHYSICIAN ASSISTANT
Payer: COMMERCIAL

## 2023-04-12 LAB
ALBUMIN SERPL BCP-MCNC: 3.8 G/DL (ref 3.2–4.9)
ALBUMIN/GLOB SERPL: 1.2 G/DL
ALP SERPL-CCNC: 66 U/L (ref 30–99)
ALT SERPL-CCNC: 6 U/L (ref 2–50)
ANION GAP SERPL CALC-SCNC: 14 MMOL/L (ref 7–16)
AST SERPL-CCNC: 12 U/L (ref 12–45)
BILIRUB SERPL-MCNC: 0.5 MG/DL (ref 0.1–1.5)
BUN SERPL-MCNC: 6 MG/DL (ref 8–22)
CALCIUM ALBUM COR SERPL-MCNC: 9.4 MG/DL (ref 8.5–10.5)
CALCIUM SERPL-MCNC: 9.2 MG/DL (ref 8.5–10.5)
CHLORIDE SERPL-SCNC: 109 MMOL/L (ref 96–112)
CO2 SERPL-SCNC: 22 MMOL/L (ref 20–33)
CREAT SERPL-MCNC: 0.41 MG/DL (ref 0.5–1.4)
GFR SERPLBLD CREATININE-BSD FMLA CKD-EPI: 127 ML/MIN/1.73 M 2
GLOBULIN SER CALC-MCNC: 3.3 G/DL (ref 1.9–3.5)
GLUCOSE SERPL-MCNC: 130 MG/DL (ref 65–99)
HCT VFR BLD AUTO: 38.6 % (ref 42–52)
HGB BLD-MCNC: 12.1 G/DL (ref 14–18)
POTASSIUM SERPL-SCNC: 4.5 MMOL/L (ref 3.6–5.5)
PROT SERPL-MCNC: 7.1 G/DL (ref 6–8.2)
SODIUM SERPL-SCNC: 145 MMOL/L (ref 135–145)
TESTOST SERPL-MCNC: 14 NG/DL (ref 175–781)

## 2023-04-12 PROCEDURE — 85014 HEMATOCRIT: CPT

## 2023-04-12 PROCEDURE — 85018 HEMOGLOBIN: CPT

## 2023-04-12 PROCEDURE — 84403 ASSAY OF TOTAL TESTOSTERONE: CPT

## 2023-04-12 PROCEDURE — 80053 COMPREHEN METABOLIC PANEL: CPT

## 2023-04-12 PROCEDURE — 84153 ASSAY OF PSA TOTAL: CPT

## 2023-04-12 PROCEDURE — 36415 COLL VENOUS BLD VENIPUNCTURE: CPT

## 2023-04-13 LAB — PSA SERPL-MCNC: 0.26 NG/ML (ref 0–4)

## 2023-05-17 ENCOUNTER — HOSPITAL ENCOUNTER (OUTPATIENT)
Facility: MEDICAL CENTER | Age: 58
End: 2023-05-17
Attending: INTERNAL MEDICINE
Payer: COMMERCIAL

## 2023-05-17 LAB
ALBUMIN SERPL BCP-MCNC: 4.2 G/DL (ref 3.2–4.9)
ALBUMIN/GLOB SERPL: 1.6 G/DL
ALP SERPL-CCNC: 74 U/L (ref 30–99)
ALT SERPL-CCNC: <5 U/L (ref 2–50)
ANION GAP SERPL CALC-SCNC: 13 MMOL/L (ref 7–16)
AST SERPL-CCNC: 6 U/L (ref 12–45)
BILIRUB SERPL-MCNC: 0.4 MG/DL (ref 0.1–1.5)
BUN SERPL-MCNC: 10 MG/DL (ref 8–22)
CALCIUM ALBUM COR SERPL-MCNC: 8.9 MG/DL (ref 8.5–10.5)
CALCIUM SERPL-MCNC: 9.1 MG/DL (ref 8.5–10.5)
CHLORIDE SERPL-SCNC: 103 MMOL/L (ref 96–112)
CO2 SERPL-SCNC: 21 MMOL/L (ref 20–33)
CREAT SERPL-MCNC: 0.43 MG/DL (ref 0.5–1.4)
GFR SERPLBLD CREATININE-BSD FMLA CKD-EPI: 125 ML/MIN/1.73 M 2
GLOBULIN SER CALC-MCNC: 2.7 G/DL (ref 1.9–3.5)
GLUCOSE SERPL-MCNC: 130 MG/DL (ref 65–99)
POTASSIUM SERPL-SCNC: 4.6 MMOL/L (ref 3.6–5.5)
PROT SERPL-MCNC: 6.9 G/DL (ref 6–8.2)
PSA SERPL-MCNC: 0.38 NG/ML (ref 0–4)
SODIUM SERPL-SCNC: 137 MMOL/L (ref 135–145)
TESTOST SERPL-MCNC: 19 NG/DL (ref 175–781)

## 2023-05-17 PROCEDURE — 80053 COMPREHEN METABOLIC PANEL: CPT

## 2023-05-17 PROCEDURE — 84403 ASSAY OF TOTAL TESTOSTERONE: CPT

## 2023-05-17 PROCEDURE — 84153 ASSAY OF PSA TOTAL: CPT

## 2023-05-18 ENCOUNTER — HOSPITAL ENCOUNTER (OUTPATIENT)
Dept: LAB | Facility: MEDICAL CENTER | Age: 58
End: 2023-05-18
Attending: FAMILY MEDICINE
Payer: COMMERCIAL

## 2023-05-18 ENCOUNTER — HOSPITAL ENCOUNTER (OUTPATIENT)
Dept: LAB | Facility: MEDICAL CENTER | Age: 58
End: 2023-05-18
Attending: PHYSICIAN ASSISTANT
Payer: COMMERCIAL

## 2023-05-18 LAB
ALBUMIN SERPL BCP-MCNC: 4 G/DL (ref 3.2–4.9)
ALBUMIN SERPL BCP-MCNC: 4 G/DL (ref 3.2–4.9)
ALBUMIN/GLOB SERPL: 1.1 G/DL
ALBUMIN/GLOB SERPL: 1.1 G/DL
ALP SERPL-CCNC: 69 U/L (ref 30–99)
ALP SERPL-CCNC: 70 U/L (ref 30–99)
ALT SERPL-CCNC: 5 U/L (ref 2–50)
ALT SERPL-CCNC: 5 U/L (ref 2–50)
ANION GAP SERPL CALC-SCNC: 14 MMOL/L (ref 7–16)
ANION GAP SERPL CALC-SCNC: 15 MMOL/L (ref 7–16)
AST SERPL-CCNC: 10 U/L (ref 12–45)
AST SERPL-CCNC: 13 U/L (ref 12–45)
BASOPHILS # BLD AUTO: 0.4 % (ref 0–1.8)
BASOPHILS # BLD: 0.02 K/UL (ref 0–0.12)
BILIRUB SERPL-MCNC: 0.4 MG/DL (ref 0.1–1.5)
BILIRUB SERPL-MCNC: 0.4 MG/DL (ref 0.1–1.5)
BUN SERPL-MCNC: 12 MG/DL (ref 8–22)
BUN SERPL-MCNC: 12 MG/DL (ref 8–22)
CALCIUM ALBUM COR SERPL-MCNC: 9.1 MG/DL (ref 8.5–10.5)
CALCIUM ALBUM COR SERPL-MCNC: 9.1 MG/DL (ref 8.5–10.5)
CALCIUM SERPL-MCNC: 9.1 MG/DL (ref 8.5–10.5)
CALCIUM SERPL-MCNC: 9.1 MG/DL (ref 8.5–10.5)
CHLORIDE SERPL-SCNC: 102 MMOL/L (ref 96–112)
CHLORIDE SERPL-SCNC: 102 MMOL/L (ref 96–112)
CO2 SERPL-SCNC: 21 MMOL/L (ref 20–33)
CO2 SERPL-SCNC: 21 MMOL/L (ref 20–33)
CREAT SERPL-MCNC: 0.45 MG/DL (ref 0.5–1.4)
CREAT SERPL-MCNC: 0.49 MG/DL (ref 0.5–1.4)
CREAT UR-MCNC: 51.37 MG/DL
EOSINOPHIL # BLD AUTO: 0.19 K/UL (ref 0–0.51)
EOSINOPHIL NFR BLD: 3.8 % (ref 0–6.9)
ERYTHROCYTE [DISTWIDTH] IN BLOOD BY AUTOMATED COUNT: 57.7 FL (ref 35.9–50)
EST. AVERAGE GLUCOSE BLD GHB EST-MCNC: 140 MG/DL
GFR SERPLBLD CREATININE-BSD FMLA CKD-EPI: 120 ML/MIN/1.73 M 2
GFR SERPLBLD CREATININE-BSD FMLA CKD-EPI: 123 ML/MIN/1.73 M 2
GLOBULIN SER CALC-MCNC: 3.5 G/DL (ref 1.9–3.5)
GLOBULIN SER CALC-MCNC: 3.5 G/DL (ref 1.9–3.5)
GLUCOSE SERPL-MCNC: 126 MG/DL (ref 65–99)
GLUCOSE SERPL-MCNC: 126 MG/DL (ref 65–99)
HBA1C MFR BLD: 6.5 % (ref 4–5.6)
HCT VFR BLD AUTO: 42.2 % (ref 42–52)
HGB BLD-MCNC: 13 G/DL (ref 14–18)
IMM GRANULOCYTES # BLD AUTO: 0.02 K/UL (ref 0–0.11)
IMM GRANULOCYTES NFR BLD AUTO: 0.4 % (ref 0–0.9)
LYMPHOCYTES # BLD AUTO: 0.61 K/UL (ref 1–4.8)
LYMPHOCYTES NFR BLD: 12.1 % (ref 22–41)
MCH RBC QN AUTO: 28.8 PG (ref 27–33)
MCHC RBC AUTO-ENTMCNC: 30.8 G/DL (ref 33.7–35.3)
MCV RBC AUTO: 93.4 FL (ref 81.4–97.8)
MICROALBUMIN UR-MCNC: <1.2 MG/DL
MICROALBUMIN/CREAT UR: NORMAL MG/G (ref 0–30)
MONOCYTES # BLD AUTO: 0.35 K/UL (ref 0–0.85)
MONOCYTES NFR BLD AUTO: 6.9 % (ref 0–13.4)
NEUTROPHILS # BLD AUTO: 3.87 K/UL (ref 1.82–7.42)
NEUTROPHILS NFR BLD: 76.4 % (ref 44–72)
NRBC # BLD AUTO: 0 K/UL
NRBC BLD-RTO: 0 /100 WBC
PLATELET # BLD AUTO: 318 K/UL (ref 164–446)
PMV BLD AUTO: 9.7 FL (ref 9–12.9)
POTASSIUM SERPL-SCNC: 4.6 MMOL/L (ref 3.6–5.5)
POTASSIUM SERPL-SCNC: 4.7 MMOL/L (ref 3.6–5.5)
PROT SERPL-MCNC: 7.5 G/DL (ref 6–8.2)
PROT SERPL-MCNC: 7.5 G/DL (ref 6–8.2)
PSA SERPL-MCNC: 0.38 NG/ML (ref 0–4)
PSA SERPL-MCNC: 0.4 NG/ML (ref 0–4)
RBC # BLD AUTO: 4.52 M/UL (ref 4.7–6.1)
SODIUM SERPL-SCNC: 137 MMOL/L (ref 135–145)
SODIUM SERPL-SCNC: 138 MMOL/L (ref 135–145)
TESTOST SERPL-MCNC: 19 NG/DL (ref 175–781)
TSH SERPL DL<=0.005 MIU/L-ACNC: 2.14 UIU/ML (ref 0.38–5.33)
WBC # BLD AUTO: 5.1 K/UL (ref 4.8–10.8)

## 2023-05-18 PROCEDURE — 80053 COMPREHEN METABOLIC PANEL: CPT | Mod: 91

## 2023-05-18 PROCEDURE — 83036 HEMOGLOBIN GLYCOSYLATED A1C: CPT

## 2023-05-18 PROCEDURE — 80053 COMPREHEN METABOLIC PANEL: CPT

## 2023-05-18 PROCEDURE — 84443 ASSAY THYROID STIM HORMONE: CPT

## 2023-05-18 PROCEDURE — 84403 ASSAY OF TOTAL TESTOSTERONE: CPT

## 2023-05-18 PROCEDURE — 82043 UR ALBUMIN QUANTITATIVE: CPT

## 2023-05-18 PROCEDURE — 82570 ASSAY OF URINE CREATININE: CPT

## 2023-05-18 PROCEDURE — 36415 COLL VENOUS BLD VENIPUNCTURE: CPT

## 2023-05-18 PROCEDURE — 84153 ASSAY OF PSA TOTAL: CPT

## 2023-05-18 PROCEDURE — 84153 ASSAY OF PSA TOTAL: CPT | Mod: 91

## 2023-05-18 PROCEDURE — 85025 COMPLETE CBC W/AUTO DIFF WBC: CPT

## 2023-06-12 ENCOUNTER — HOSPITAL ENCOUNTER (OUTPATIENT)
Dept: RADIOLOGY | Facility: MEDICAL CENTER | Age: 58
End: 2023-06-12
Attending: INTERNAL MEDICINE
Payer: COMMERCIAL

## 2023-06-12 DIAGNOSIS — Z01.89 ENCOUNTER FOR OTHER SPECIFIED SPECIAL EXAMINATIONS: ICD-10-CM

## 2023-06-12 DIAGNOSIS — C61 MALIGNANT NEOPLASM OF PROSTATE (HCC): ICD-10-CM

## 2023-06-12 DIAGNOSIS — D64.9 ANEMIA, UNSPECIFIED TYPE: ICD-10-CM

## 2023-06-12 DIAGNOSIS — C79.51 SECONDARY MALIGNANT NEOPLASM OF BONE (HCC): ICD-10-CM

## 2023-06-12 DIAGNOSIS — Z11.59 ENCOUNTER FOR SCREENING FOR OTHER VIRAL DISEASES: ICD-10-CM

## 2023-06-12 DIAGNOSIS — D70.1 AGRANULOCYTOSIS SECONDARY TO CANCER CHEMOTHERAPY (HCC): ICD-10-CM

## 2023-06-12 DIAGNOSIS — R77.1 ABNORMALITY OF GLOBULIN: ICD-10-CM

## 2023-06-12 DIAGNOSIS — D72.829 LEUKOCYTOSIS, UNSPECIFIED TYPE: ICD-10-CM

## 2023-06-12 DIAGNOSIS — Z79.899 OTHER LONG TERM (CURRENT) DRUG THERAPY: ICD-10-CM

## 2023-06-12 DIAGNOSIS — T45.1X5A AGRANULOCYTOSIS SECONDARY TO CANCER CHEMOTHERAPY (HCC): ICD-10-CM

## 2023-06-12 PROCEDURE — A9503 TC99M MEDRONATE: HCPCS

## 2023-06-14 ENCOUNTER — HOSPITAL ENCOUNTER (OUTPATIENT)
Dept: LAB | Facility: MEDICAL CENTER | Age: 58
End: 2023-06-14
Attending: INTERNAL MEDICINE
Payer: COMMERCIAL

## 2023-06-14 LAB
ALBUMIN SERPL BCP-MCNC: 3.8 G/DL (ref 3.2–4.9)
ALBUMIN/GLOB SERPL: 1.5 G/DL
ALP SERPL-CCNC: 63 U/L (ref 30–99)
ALT SERPL-CCNC: 6 U/L (ref 2–50)
ANION GAP SERPL CALC-SCNC: 12 MMOL/L (ref 7–16)
AST SERPL-CCNC: 8 U/L (ref 12–45)
BASOPHILS # BLD AUTO: 0.8 % (ref 0–1.8)
BASOPHILS # BLD: 0.04 K/UL (ref 0–0.12)
BILIRUB SERPL-MCNC: 0.4 MG/DL (ref 0.1–1.5)
BUN SERPL-MCNC: 8 MG/DL (ref 8–22)
CALCIUM ALBUM COR SERPL-MCNC: 8.9 MG/DL (ref 8.5–10.5)
CALCIUM SERPL-MCNC: 8.7 MG/DL (ref 8.5–10.5)
CHLORIDE SERPL-SCNC: 105 MMOL/L (ref 96–112)
CO2 SERPL-SCNC: 23 MMOL/L (ref 20–33)
CREAT SERPL-MCNC: 0.39 MG/DL (ref 0.5–1.4)
EOSINOPHIL # BLD AUTO: 0.01 K/UL (ref 0–0.51)
EOSINOPHIL NFR BLD: 0.2 % (ref 0–6.9)
ERYTHROCYTE [DISTWIDTH] IN BLOOD BY AUTOMATED COUNT: 64.1 FL (ref 35.9–50)
GFR SERPLBLD CREATININE-BSD FMLA CKD-EPI: 128 ML/MIN/1.73 M 2
GLOBULIN SER CALC-MCNC: 2.5 G/DL (ref 1.9–3.5)
GLUCOSE SERPL-MCNC: 121 MG/DL (ref 65–99)
HCT VFR BLD AUTO: 36.8 % (ref 42–52)
HGB BLD-MCNC: 11.1 G/DL (ref 14–18)
IMM GRANULOCYTES # BLD AUTO: 0.04 K/UL (ref 0–0.11)
IMM GRANULOCYTES NFR BLD AUTO: 0.8 % (ref 0–0.9)
LYMPHOCYTES # BLD AUTO: 0.54 K/UL (ref 1–4.8)
LYMPHOCYTES NFR BLD: 11.1 % (ref 22–41)
MCH RBC QN AUTO: 28.4 PG (ref 27–33)
MCHC RBC AUTO-ENTMCNC: 30.2 G/DL (ref 32.3–36.5)
MCV RBC AUTO: 94.1 FL (ref 81.4–97.8)
MONOCYTES # BLD AUTO: 0.45 K/UL (ref 0–0.85)
MONOCYTES NFR BLD AUTO: 9.2 % (ref 0–13.4)
NEUTROPHILS # BLD AUTO: 3.8 K/UL (ref 1.82–7.42)
NEUTROPHILS NFR BLD: 77.9 % (ref 44–72)
NRBC # BLD AUTO: 0 K/UL
NRBC BLD-RTO: 0 /100 WBC (ref 0–0.2)
PLATELET # BLD AUTO: 239 K/UL (ref 164–446)
PMV BLD AUTO: 10.1 FL (ref 9–12.9)
POTASSIUM SERPL-SCNC: 4.3 MMOL/L (ref 3.6–5.5)
PROT SERPL-MCNC: 6.3 G/DL (ref 6–8.2)
PSA SERPL-MCNC: 0.41 NG/ML (ref 0–4)
RBC # BLD AUTO: 3.91 M/UL (ref 4.7–6.1)
SODIUM SERPL-SCNC: 140 MMOL/L (ref 135–145)
WBC # BLD AUTO: 4.9 K/UL (ref 4.8–10.8)

## 2023-06-14 PROCEDURE — 36415 COLL VENOUS BLD VENIPUNCTURE: CPT

## 2023-06-14 PROCEDURE — 80053 COMPREHEN METABOLIC PANEL: CPT

## 2023-06-14 PROCEDURE — 85025 COMPLETE CBC W/AUTO DIFF WBC: CPT

## 2023-06-14 PROCEDURE — 84153 ASSAY OF PSA TOTAL: CPT

## 2023-06-21 ENCOUNTER — HOSPITAL ENCOUNTER (OUTPATIENT)
Dept: LAB | Facility: MEDICAL CENTER | Age: 58
End: 2023-06-21
Attending: PHYSICIAN ASSISTANT
Payer: COMMERCIAL

## 2023-06-21 LAB
ALBUMIN SERPL BCP-MCNC: 4.5 G/DL (ref 3.2–4.9)
ALBUMIN/GLOB SERPL: 1.7 G/DL
ALP SERPL-CCNC: 63 U/L (ref 30–99)
ALT SERPL-CCNC: 6 U/L (ref 2–50)
ANION GAP SERPL CALC-SCNC: 11 MMOL/L (ref 7–16)
AST SERPL-CCNC: 14 U/L (ref 12–45)
BILIRUB SERPL-MCNC: 0.7 MG/DL (ref 0.1–1.5)
BUN SERPL-MCNC: 16 MG/DL (ref 8–22)
CALCIUM ALBUM COR SERPL-MCNC: 8.9 MG/DL (ref 8.5–10.5)
CALCIUM SERPL-MCNC: 9.3 MG/DL (ref 8.5–10.5)
CHLORIDE SERPL-SCNC: 103 MMOL/L (ref 96–112)
CO2 SERPL-SCNC: 24 MMOL/L (ref 20–33)
CREAT SERPL-MCNC: 0.53 MG/DL (ref 0.5–1.4)
GFR SERPLBLD CREATININE-BSD FMLA CKD-EPI: 117 ML/MIN/1.73 M 2
GLOBULIN SER CALC-MCNC: 2.6 G/DL (ref 1.9–3.5)
GLUCOSE SERPL-MCNC: 111 MG/DL (ref 65–99)
POTASSIUM SERPL-SCNC: 3.8 MMOL/L (ref 3.6–5.5)
PROT SERPL-MCNC: 7.1 G/DL (ref 6–8.2)
PSA SERPL-MCNC: 0.55 NG/ML (ref 0–4)
SODIUM SERPL-SCNC: 138 MMOL/L (ref 135–145)
TESTOST SERPL-MCNC: <12 NG/DL (ref 175–781)

## 2023-06-21 PROCEDURE — 80053 COMPREHEN METABOLIC PANEL: CPT

## 2023-06-21 PROCEDURE — 84153 ASSAY OF PSA TOTAL: CPT

## 2023-06-21 PROCEDURE — 36415 COLL VENOUS BLD VENIPUNCTURE: CPT

## 2023-06-21 PROCEDURE — 84403 ASSAY OF TOTAL TESTOSTERONE: CPT

## 2023-07-06 ENCOUNTER — APPOINTMENT (RX ONLY)
Dept: URBAN - METROPOLITAN AREA CLINIC 35 | Facility: CLINIC | Age: 58
Setting detail: DERMATOLOGY
End: 2023-07-06

## 2023-07-06 DIAGNOSIS — L30.9 DERMATITIS, UNSPECIFIED: ICD-10-CM

## 2023-07-06 DIAGNOSIS — L11.1 TRANSIENT ACANTHOLYTIC DERMATOSIS [GROVER]: ICD-10-CM

## 2023-07-06 PROCEDURE — ? COUNSELING

## 2023-07-06 PROCEDURE — ? ADDITIONAL NOTES

## 2023-07-06 PROCEDURE — ? TREATMENT REGIMEN

## 2023-07-06 PROCEDURE — 99213 OFFICE O/P EST LOW 20 MIN: CPT

## 2023-07-06 ASSESSMENT — LOCATION DETAILED DESCRIPTION DERM
LOCATION DETAILED: EPIGASTRIC SKIN
LOCATION DETAILED: LEFT DISTAL DORSAL FOREARM
LOCATION DETAILED: RIGHT MID-UPPER BACK

## 2023-07-06 ASSESSMENT — LOCATION SIMPLE DESCRIPTION DERM
LOCATION SIMPLE: LEFT FOREARM
LOCATION SIMPLE: RIGHT UPPER BACK
LOCATION SIMPLE: ABDOMEN

## 2023-07-06 ASSESSMENT — LOCATION ZONE DERM
LOCATION ZONE: ARM
LOCATION ZONE: TRUNK

## 2023-07-06 NOTE — PROCEDURE: ADDITIONAL NOTES
Additional Notes: On site of docetaxel chemotherapy Oncologist not concerned.  This occurred about a week after infusion and is consistent with fixed erythrodysaesthesia from the docetaxel.
Render Risk Assessment In Note?: no
Detail Level: Simple

## 2023-07-10 ENCOUNTER — RX ONLY (OUTPATIENT)
Age: 58
Setting detail: RX ONLY
End: 2023-07-10

## 2023-07-10 RX ORDER — TRIAMCINOLONE ACETONIDE 1 MG/G
THIN LAYER CREAM TOPICAL BID
Qty: 80 | Refills: 0 | Status: ERX | COMMUNITY
Start: 2023-07-10

## 2023-07-18 ENCOUNTER — HOSPITAL ENCOUNTER (OUTPATIENT)
Dept: LAB | Facility: MEDICAL CENTER | Age: 58
End: 2023-07-18
Attending: UROLOGY
Payer: COMMERCIAL

## 2023-07-18 LAB
ALBUMIN SERPL BCP-MCNC: 4.4 G/DL (ref 3.2–4.9)
ALBUMIN/GLOB SERPL: 1.6 G/DL
ALP SERPL-CCNC: 68 U/L (ref 30–99)
ALT SERPL-CCNC: 13 U/L (ref 2–50)
ANION GAP SERPL CALC-SCNC: 14 MMOL/L (ref 7–16)
AST SERPL-CCNC: 17 U/L (ref 12–45)
BILIRUB SERPL-MCNC: 0.4 MG/DL (ref 0.1–1.5)
BUN SERPL-MCNC: 16 MG/DL (ref 8–22)
CALCIUM ALBUM COR SERPL-MCNC: 9.1 MG/DL (ref 8.5–10.5)
CALCIUM SERPL-MCNC: 9.4 MG/DL (ref 8.5–10.5)
CHLORIDE SERPL-SCNC: 101 MMOL/L (ref 96–112)
CO2 SERPL-SCNC: 21 MMOL/L (ref 20–33)
CREAT SERPL-MCNC: 0.5 MG/DL (ref 0.5–1.4)
GFR SERPLBLD CREATININE-BSD FMLA CKD-EPI: 119 ML/MIN/1.73 M 2
GLOBULIN SER CALC-MCNC: 2.7 G/DL (ref 1.9–3.5)
GLUCOSE SERPL-MCNC: 166 MG/DL (ref 65–99)
POTASSIUM SERPL-SCNC: 4.3 MMOL/L (ref 3.6–5.5)
PROT SERPL-MCNC: 7.1 G/DL (ref 6–8.2)
PSA SERPL-MCNC: 0.7 NG/ML (ref 0–4)
SODIUM SERPL-SCNC: 136 MMOL/L (ref 135–145)
TESTOST SERPL-MCNC: <12 NG/DL (ref 175–781)

## 2023-07-18 PROCEDURE — 80053 COMPREHEN METABOLIC PANEL: CPT

## 2023-07-18 PROCEDURE — 84153 ASSAY OF PSA TOTAL: CPT

## 2023-07-18 PROCEDURE — 36415 COLL VENOUS BLD VENIPUNCTURE: CPT

## 2023-07-18 PROCEDURE — 84403 ASSAY OF TOTAL TESTOSTERONE: CPT

## 2023-08-23 ENCOUNTER — HOSPITAL ENCOUNTER (OUTPATIENT)
Dept: LAB | Facility: MEDICAL CENTER | Age: 58
End: 2023-08-23
Attending: UROLOGY
Payer: COMMERCIAL

## 2023-08-23 LAB
ALBUMIN SERPL BCP-MCNC: 3.9 G/DL (ref 3.2–4.9)
ALBUMIN/GLOB SERPL: 1.7 G/DL
ALP SERPL-CCNC: 102 U/L (ref 30–99)
ALT SERPL-CCNC: 10 U/L (ref 2–50)
ANION GAP SERPL CALC-SCNC: 12 MMOL/L (ref 7–16)
AST SERPL-CCNC: 15 U/L (ref 12–45)
BILIRUB SERPL-MCNC: 0.3 MG/DL (ref 0.1–1.5)
BUN SERPL-MCNC: 7 MG/DL (ref 8–22)
CALCIUM ALBUM COR SERPL-MCNC: 8.8 MG/DL (ref 8.5–10.5)
CALCIUM SERPL-MCNC: 8.7 MG/DL (ref 8.5–10.5)
CHLORIDE SERPL-SCNC: 106 MMOL/L (ref 96–112)
CO2 SERPL-SCNC: 23 MMOL/L (ref 20–33)
CREAT SERPL-MCNC: 0.46 MG/DL (ref 0.5–1.4)
FASTING STATUS PATIENT QL REPORTED: NORMAL
GFR SERPLBLD CREATININE-BSD FMLA CKD-EPI: 122 ML/MIN/1.73 M 2
GLOBULIN SER CALC-MCNC: 2.3 G/DL (ref 1.9–3.5)
GLUCOSE SERPL-MCNC: 133 MG/DL (ref 65–99)
POTASSIUM SERPL-SCNC: 4.4 MMOL/L (ref 3.6–5.5)
PROT SERPL-MCNC: 6.2 G/DL (ref 6–8.2)
PSA SERPL-MCNC: 0.73 NG/ML (ref 0–4)
SODIUM SERPL-SCNC: 141 MMOL/L (ref 135–145)
TESTOST SERPL-MCNC: 7 NG/DL (ref 175–781)

## 2023-08-23 PROCEDURE — 36415 COLL VENOUS BLD VENIPUNCTURE: CPT

## 2023-08-23 PROCEDURE — 84153 ASSAY OF PSA TOTAL: CPT

## 2023-08-23 PROCEDURE — 80053 COMPREHEN METABOLIC PANEL: CPT

## 2023-08-23 PROCEDURE — 84403 ASSAY OF TOTAL TESTOSTERONE: CPT

## 2023-09-19 ENCOUNTER — APPOINTMENT (OUTPATIENT)
Dept: RADIOLOGY | Facility: MEDICAL CENTER | Age: 58
DRG: 025 | End: 2023-09-19
Attending: EMERGENCY MEDICINE
Payer: COMMERCIAL

## 2023-09-19 ENCOUNTER — APPOINTMENT (OUTPATIENT)
Dept: RADIOLOGY | Facility: MEDICAL CENTER | Age: 58
DRG: 025 | End: 2023-09-19
Attending: NEUROLOGICAL SURGERY
Payer: COMMERCIAL

## 2023-09-19 ENCOUNTER — HOSPITAL ENCOUNTER (INPATIENT)
Facility: MEDICAL CENTER | Age: 58
LOS: 10 days | DRG: 025 | End: 2023-09-29
Attending: EMERGENCY MEDICINE | Admitting: INTERNAL MEDICINE
Payer: COMMERCIAL

## 2023-09-19 DIAGNOSIS — I10 PRIMARY HYPERTENSION: ICD-10-CM

## 2023-09-19 DIAGNOSIS — G93.89 BRAIN MASS: ICD-10-CM

## 2023-09-19 DIAGNOSIS — R33.9 URINARY RETENTION: ICD-10-CM

## 2023-09-19 DIAGNOSIS — C79.31 METASTASIS TO BRAIN (HCC): ICD-10-CM

## 2023-09-19 DIAGNOSIS — G93.89 CEREBELLAR MASS: ICD-10-CM

## 2023-09-19 DIAGNOSIS — C61 PROSTATE CANCER (HCC): ICD-10-CM

## 2023-09-19 DIAGNOSIS — R11.2 NAUSEA AND VOMITING, UNSPECIFIED VOMITING TYPE: ICD-10-CM

## 2023-09-19 LAB
ALBUMIN SERPL BCP-MCNC: 3.8 G/DL (ref 3.2–4.9)
ALBUMIN/GLOB SERPL: 1.5 G/DL
ALP SERPL-CCNC: 110 U/L (ref 30–99)
ALT SERPL-CCNC: <5 U/L (ref 2–50)
ANION GAP SERPL CALC-SCNC: 16 MMOL/L (ref 7–16)
APPEARANCE UR: CLEAR
AST SERPL-CCNC: 21 U/L (ref 12–45)
BACTERIA #/AREA URNS HPF: NEGATIVE /HPF
BASOPHILS # BLD AUTO: 0.1 % (ref 0–1.8)
BASOPHILS # BLD: 0.01 K/UL (ref 0–0.12)
BILIRUB SERPL-MCNC: 0.6 MG/DL (ref 0.1–1.5)
BILIRUB UR QL STRIP.AUTO: NEGATIVE
BUN SERPL-MCNC: 15 MG/DL (ref 8–22)
CALCIUM ALBUM COR SERPL-MCNC: 7.9 MG/DL (ref 8.5–10.5)
CALCIUM SERPL-MCNC: 7.7 MG/DL (ref 8.5–10.5)
CHLORIDE SERPL-SCNC: 101 MMOL/L (ref 96–112)
CO2 SERPL-SCNC: 14 MMOL/L (ref 20–33)
COLOR UR: YELLOW
CREAT SERPL-MCNC: 0.38 MG/DL (ref 0.5–1.4)
EKG IMPRESSION: NORMAL
EOSINOPHIL # BLD AUTO: 0 K/UL (ref 0–0.51)
EOSINOPHIL NFR BLD: 0 % (ref 0–6.9)
EPI CELLS #/AREA URNS HPF: ABNORMAL /HPF
ERYTHROCYTE [DISTWIDTH] IN BLOOD BY AUTOMATED COUNT: 58.7 FL (ref 35.9–50)
GFR SERPLBLD CREATININE-BSD FMLA CKD-EPI: 129 ML/MIN/1.73 M 2
GLOBULIN SER CALC-MCNC: 2.5 G/DL (ref 1.9–3.5)
GLUCOSE SERPL-MCNC: 190 MG/DL (ref 65–99)
GLUCOSE UR STRIP.AUTO-MCNC: >=1000 MG/DL
GRAN CASTS #/AREA URNS LPF: ABNORMAL /LPF
HCT VFR BLD AUTO: 36.2 % (ref 42–52)
HGB BLD-MCNC: 11.8 G/DL (ref 14–18)
HYALINE CASTS #/AREA URNS LPF: ABNORMAL /LPF
IMM GRANULOCYTES # BLD AUTO: 0.05 K/UL (ref 0–0.11)
IMM GRANULOCYTES NFR BLD AUTO: 0.5 % (ref 0–0.9)
KETONES UR STRIP.AUTO-MCNC: >=160 MG/DL
LACTATE SERPL-SCNC: 1.9 MMOL/L (ref 0.5–2)
LEUKOCYTE ESTERASE UR QL STRIP.AUTO: NEGATIVE
LYMPHOCYTES # BLD AUTO: 0.41 K/UL (ref 1–4.8)
LYMPHOCYTES NFR BLD: 4.1 % (ref 22–41)
MCH RBC QN AUTO: 30 PG (ref 27–33)
MCHC RBC AUTO-ENTMCNC: 32.6 G/DL (ref 32.3–36.5)
MCV RBC AUTO: 92.1 FL (ref 81.4–97.8)
MICRO URNS: ABNORMAL
MONOCYTES # BLD AUTO: 0.6 K/UL (ref 0–0.85)
MONOCYTES NFR BLD AUTO: 6 % (ref 0–13.4)
MUCOUS THREADS #/AREA URNS HPF: ABNORMAL /HPF
NEUTROPHILS # BLD AUTO: 9.01 K/UL (ref 1.82–7.42)
NEUTROPHILS NFR BLD: 89.3 % (ref 44–72)
NITRITE UR QL STRIP.AUTO: NEGATIVE
NRBC # BLD AUTO: 0 K/UL
NRBC BLD-RTO: 0 /100 WBC (ref 0–0.2)
PH UR STRIP.AUTO: 6 [PH] (ref 5–8)
PLATELET # BLD AUTO: 252 K/UL (ref 164–446)
PMV BLD AUTO: 9.3 FL (ref 9–12.9)
POTASSIUM SERPL-SCNC: 4 MMOL/L (ref 3.6–5.5)
PROT SERPL-MCNC: 6.3 G/DL (ref 6–8.2)
PROT UR QL STRIP: 30 MG/DL
RBC # BLD AUTO: 3.93 M/UL (ref 4.7–6.1)
RBC # URNS HPF: ABNORMAL /HPF
RBC UR QL AUTO: NEGATIVE
RENAL EPI CELLS #/AREA URNS HPF: ABNORMAL /HPF
SODIUM SERPL-SCNC: 131 MMOL/L (ref 135–145)
SP GR UR STRIP.AUTO: 1.04
UROBILINOGEN UR STRIP.AUTO-MCNC: 1 MG/DL
WBC # BLD AUTO: 10.1 K/UL (ref 4.8–10.8)
WBC #/AREA URNS HPF: ABNORMAL /HPF

## 2023-09-19 PROCEDURE — 93005 ELECTROCARDIOGRAM TRACING: CPT | Performed by: EMERGENCY MEDICINE

## 2023-09-19 PROCEDURE — 83605 ASSAY OF LACTIC ACID: CPT

## 2023-09-19 PROCEDURE — A9579 GAD-BASE MR CONTRAST NOS,1ML: HCPCS | Performed by: NEUROLOGICAL SURGERY

## 2023-09-19 PROCEDURE — 51702 INSERT TEMP BLADDER CATH: CPT

## 2023-09-19 PROCEDURE — 71045 X-RAY EXAM CHEST 1 VIEW: CPT

## 2023-09-19 PROCEDURE — 99292 CRITICAL CARE ADDL 30 MIN: CPT | Performed by: INTERNAL MEDICINE

## 2023-09-19 PROCEDURE — 99291 CRITICAL CARE FIRST HOUR: CPT

## 2023-09-19 PROCEDURE — 80053 COMPREHEN METABOLIC PANEL: CPT

## 2023-09-19 PROCEDURE — 700111 HCHG RX REV CODE 636 W/ 250 OVERRIDE (IP): Mod: JZ | Performed by: INTERNAL MEDICINE

## 2023-09-19 PROCEDURE — 81001 URINALYSIS AUTO W/SCOPE: CPT

## 2023-09-19 PROCEDURE — 303105 HCHG CATHETER EXTRA

## 2023-09-19 PROCEDURE — 85025 COMPLETE CBC W/AUTO DIFF WBC: CPT

## 2023-09-19 PROCEDURE — 36415 COLL VENOUS BLD VENIPUNCTURE: CPT

## 2023-09-19 PROCEDURE — 99291 CRITICAL CARE FIRST HOUR: CPT | Performed by: INTERNAL MEDICINE

## 2023-09-19 PROCEDURE — 700105 HCHG RX REV CODE 258: Performed by: INTERNAL MEDICINE

## 2023-09-19 PROCEDURE — 87086 URINE CULTURE/COLONY COUNT: CPT

## 2023-09-19 PROCEDURE — 70553 MRI BRAIN STEM W/O & W/DYE: CPT

## 2023-09-19 PROCEDURE — 70450 CT HEAD/BRAIN W/O DYE: CPT

## 2023-09-19 PROCEDURE — 700117 HCHG RX CONTRAST REV CODE 255: Performed by: EMERGENCY MEDICINE

## 2023-09-19 PROCEDURE — 770022 HCHG ROOM/CARE - ICU (200)

## 2023-09-19 PROCEDURE — 96365 THER/PROPH/DIAG IV INF INIT: CPT

## 2023-09-19 PROCEDURE — 87040 BLOOD CULTURE FOR BACTERIA: CPT

## 2023-09-19 PROCEDURE — 74177 CT ABD & PELVIS W/CONTRAST: CPT

## 2023-09-19 PROCEDURE — 700117 HCHG RX CONTRAST REV CODE 255: Performed by: NEUROLOGICAL SURGERY

## 2023-09-19 PROCEDURE — 96375 TX/PRO/DX INJ NEW DRUG ADDON: CPT

## 2023-09-19 PROCEDURE — 700111 HCHG RX REV CODE 636 W/ 250 OVERRIDE (IP): Mod: JZ | Performed by: EMERGENCY MEDICINE

## 2023-09-19 RX ORDER — LABETALOL HYDROCHLORIDE 5 MG/ML
10 INJECTION, SOLUTION INTRAVENOUS EVERY 4 HOURS PRN
Status: DISCONTINUED | OUTPATIENT
Start: 2023-09-19 | End: 2023-09-23

## 2023-09-19 RX ORDER — ONDANSETRON 2 MG/ML
4 INJECTION INTRAMUSCULAR; INTRAVENOUS ONCE
Status: COMPLETED | OUTPATIENT
Start: 2023-09-19 | End: 2023-09-19

## 2023-09-19 RX ORDER — DEXAMETHASONE SODIUM PHOSPHATE 4 MG/ML
4 INJECTION, SOLUTION INTRA-ARTICULAR; INTRALESIONAL; INTRAMUSCULAR; INTRAVENOUS; SOFT TISSUE ONCE
Status: SHIPPED | OUTPATIENT
Start: 2023-09-19 | End: 2023-09-22

## 2023-09-19 RX ORDER — POLYETHYLENE GLYCOL 3350 17 G/17G
1 POWDER, FOR SOLUTION ORAL
Status: DISCONTINUED | OUTPATIENT
Start: 2023-09-19 | End: 2023-09-20

## 2023-09-19 RX ORDER — AMOXICILLIN 250 MG
2 CAPSULE ORAL 2 TIMES DAILY
Status: DISCONTINUED | OUTPATIENT
Start: 2023-09-19 | End: 2023-09-20

## 2023-09-19 RX ORDER — 3% SODIUM CHLORIDE 3 G/100ML
INJECTION, SOLUTION INTRAVENOUS CONTINUOUS
Status: DISCONTINUED | OUTPATIENT
Start: 2023-09-19 | End: 2023-09-20

## 2023-09-19 RX ORDER — LISINOPRIL 40 MG/1
40 TABLET ORAL DAILY
Status: ON HOLD | COMMUNITY
End: 2023-09-29

## 2023-09-19 RX ORDER — CYCLOBENZAPRINE HCL 10 MG
10 TABLET ORAL
Status: ON HOLD | COMMUNITY
End: 2023-10-10

## 2023-09-19 RX ORDER — SODIUM CHLORIDE 9 MG/ML
INJECTION, SOLUTION INTRAVENOUS CONTINUOUS
Status: DISCONTINUED | OUTPATIENT
Start: 2023-09-19 | End: 2023-09-20

## 2023-09-19 RX ORDER — METOPROLOL SUCCINATE 25 MG/1
25 TABLET, EXTENDED RELEASE ORAL DAILY
Status: ON HOLD | COMMUNITY
End: 2023-10-10 | Stop reason: SDUPTHER

## 2023-09-19 RX ORDER — DEXAMETHASONE SODIUM PHOSPHATE 4 MG/ML
4 INJECTION, SOLUTION INTRA-ARTICULAR; INTRALESIONAL; INTRAMUSCULAR; INTRAVENOUS; SOFT TISSUE EVERY 6 HOURS
Status: DISCONTINUED | OUTPATIENT
Start: 2023-09-19 | End: 2023-09-25

## 2023-09-19 RX ORDER — CANAGLIFLOZIN 100 MG/1
100 TABLET, FILM COATED ORAL DAILY
COMMUNITY

## 2023-09-19 RX ORDER — BISACODYL 10 MG
10 SUPPOSITORY, RECTAL RECTAL
Status: DISCONTINUED | OUTPATIENT
Start: 2023-09-19 | End: 2023-09-20

## 2023-09-19 RX ORDER — ZOLPIDEM TARTRATE 10 MG/1
10 TABLET ORAL NIGHTLY PRN
Status: ON HOLD | COMMUNITY
End: 2023-10-10 | Stop reason: SDUPTHER

## 2023-09-19 RX ADMIN — SODIUM CHLORIDE: 3 INJECTION, SOLUTION INTRAVENOUS at 19:13

## 2023-09-19 RX ADMIN — IOHEXOL 100 ML: 350 INJECTION, SOLUTION INTRAVENOUS at 16:24

## 2023-09-19 RX ADMIN — SODIUM CHLORIDE: 9 INJECTION, SOLUTION INTRAVENOUS at 19:10

## 2023-09-19 RX ADMIN — GADOTERIDOL 15 ML: 279.3 INJECTION, SOLUTION INTRAVENOUS at 21:19

## 2023-09-19 RX ADMIN — ONDANSETRON 4 MG: 2 INJECTION INTRAMUSCULAR; INTRAVENOUS at 17:03

## 2023-09-19 RX ADMIN — DEXAMETHASONE SODIUM PHOSPHATE 4 MG: 4 INJECTION INTRA-ARTICULAR; INTRALESIONAL; INTRAMUSCULAR; INTRAVENOUS; SOFT TISSUE at 19:09

## 2023-09-19 RX ADMIN — DEXAMETHASONE SODIUM PHOSPHATE 4 MG: 4 INJECTION INTRA-ARTICULAR; INTRALESIONAL; INTRAMUSCULAR; INTRAVENOUS; SOFT TISSUE at 23:35

## 2023-09-19 ASSESSMENT — PATIENT HEALTH QUESTIONNAIRE - PHQ9
1. LITTLE INTEREST OR PLEASURE IN DOING THINGS: NOT AT ALL
2. FEELING DOWN, DEPRESSED, IRRITABLE, OR HOPELESS: NOT AT ALL
SUM OF ALL RESPONSES TO PHQ9 QUESTIONS 1 AND 2: 0

## 2023-09-19 ASSESSMENT — ENCOUNTER SYMPTOMS
FOCAL WEAKNESS: 1
BLURRED VISION: 0
MUSCULOSKELETAL NEGATIVE: 1
DOUBLE VISION: 0
DIZZINESS: 1
SHORTNESS OF BREATH: 0
NAUSEA: 1
COUGH: 0

## 2023-09-19 ASSESSMENT — FIBROSIS 4 INDEX
FIB4 SCORE: 1.13
FIB4 SCORE: 2.239171473757400494

## 2023-09-19 NOTE — ED TRIAGE NOTES
Chief Complaint   Patient presents with    Vomiting    Weakness     BIBA for above complaint. PER ems patient has been vomiting for the last 2 weeks. Patient was given 1600 cc of NS and 12.5 IM Phenergan by EMS.  BS was 281 by EMS  Patient report that he is still nauseous but is feeling better.   Patient has a history of Cancer that has metastasized.  Patient is not receiving treatment at this time.

## 2023-09-19 NOTE — ED PROVIDER NOTES
ED Provider Note    CHIEF COMPLAINT  Chief Complaint   Patient presents with    Vomiting    Weakness       EXTERNAL RECORDS REVIEWED  Reviewed based on labs, and previous imaging for comparison.    HPI/ROS  LIMITATION TO HISTORY   Select: : None  OUTSIDE HISTORIAN(S):  None    Casper Rowley is a 58 y.o. male who presents to the emergency department complaining of nausea and vomiting and weakness.  The patient has a history of metastatic prostate cancer, diabetes, and high blood pressure.  Patient states he been vomiting for the last 2 weeks.  Is been unable to tolerate food or fluids.  He now feels weak.  Denies any specific abdominal pain no diarrhea no constipation.  He denies any headache.  No focal numbness tingling or weakness.  Denies any other aggravating alleviating factors or associated complaints.    The patient was significantly tachycardic with ambulation earlier today for EMS he received a small mount of fluids and is brought in for further evaluation.    PAST MEDICAL HISTORY   has a past medical history of Arthritis (02/2019), Bronchitis (2019), Cancer (HCC), Cancer (HCC), Cold (02/08/2019), Diabetes, High cholesterol, HTN (hypertension), benign (8/14/2009), Hypertension, Infectious disease, and Obstructive sleep apnea (02/2019).    SURGICAL HISTORY   has a past surgical history that includes knee arthroscopy (Right, 05/2014); carpal tunnel release (Left, 2/11/2019); shldr arthroscop exten debride 3+ (Left, 11/1/2021); and arthroscopy shoulder surgical biceps tenodes* (Left, 11/1/2021).    FAMILY HISTORY  Family History   Problem Relation Age of Onset    Genetic Disorder Neg Hx        SOCIAL HISTORY  Social History     Tobacco Use    Smoking status: Never    Smokeless tobacco: Never   Substance and Sexual Activity    Alcohol use: Yes     Comment: 2 per week    Drug use: No    Sexual activity: Not on file       CURRENT MEDICATIONS  Home Medications    **Home medications have not yet been  "reviewed for this encounter**         ALLERGIES  No Known Allergies    PHYSICAL EXAM  VITAL SIGNS: BP (!) 142/68   Pulse 97   Temp 36.1 °C (97 °F) (Temporal)   Resp 18   Ht 1.702 m (5' 7\")   Wt 77.1 kg (170 lb)   SpO2 97%   BMI 26.63 kg/m²    Constitutional: Awake alert nontoxic no acute distress.  HENT: Normocephalic, Atraumatic  Eyes: PERRL, EOMI, Conjunctiva normal, No discharge.   Neck: Normal range of motion,  Cardiovascular: Normal heart rate, Normal rhythm, No murmurs, No rubs, No gallops.   Thorax & Lungs: Normal breath sounds, No respiratory distress,  Abdomen: Bowel sounds normal, Soft, No tenderness  Skin: Warm, Dry, No erythema, No rash.   Musculoskeletal: Good range of motion in all major joints.  No asymmetric edema.  Neurologic: Alert, No focal deficits noted.   Psychiatric: Affect normal      DIAGNOSTIC STUDIES / PROCEDURES  Results for orders placed or performed during the hospital encounter of 09/19/23   LACTIC ACID   Result Value Ref Range    Lactic Acid 1.9 0.5 - 2.0 mmol/L   CBC WITH DIFFERENTIAL   Result Value Ref Range    WBC 10.1 4.8 - 10.8 K/uL    RBC 3.93 (L) 4.70 - 6.10 M/uL    Hemoglobin 11.8 (L) 14.0 - 18.0 g/dL    Hematocrit 36.2 (L) 42.0 - 52.0 %    MCV 92.1 81.4 - 97.8 fL    MCH 30.0 27.0 - 33.0 pg    MCHC 32.6 32.3 - 36.5 g/dL    RDW 58.7 (H) 35.9 - 50.0 fL    Platelet Count 252 164 - 446 K/uL    MPV 9.3 9.0 - 12.9 fL    Neutrophils-Polys 89.30 (H) 44.00 - 72.00 %    Lymphocytes 4.10 (L) 22.00 - 41.00 %    Monocytes 6.00 0.00 - 13.40 %    Eosinophils 0.00 0.00 - 6.90 %    Basophils 0.10 0.00 - 1.80 %    Immature Granulocytes 0.50 0.00 - 0.90 %    Nucleated RBC 0.00 0.00 - 0.20 /100 WBC    Neutrophils (Absolute) 9.01 (H) 1.82 - 7.42 K/uL    Lymphs (Absolute) 0.41 (L) 1.00 - 4.80 K/uL    Monos (Absolute) 0.60 0.00 - 0.85 K/uL    Eos (Absolute) 0.00 0.00 - 0.51 K/uL    Baso (Absolute) 0.01 0.00 - 0.12 K/uL    Immature Granulocytes (abs) 0.05 0.00 - 0.11 K/uL    NRBC (Absolute) " 0.00 K/uL   COMP METABOLIC PANEL   Result Value Ref Range    Sodium 131 (L) 135 - 145 mmol/L    Potassium 4.0 3.6 - 5.5 mmol/L    Chloride 101 96 - 112 mmol/L    Co2 14 (L) 20 - 33 mmol/L    Anion Gap 16.0 7.0 - 16.0    Glucose 190 (H) 65 - 99 mg/dL    Bun 15 8 - 22 mg/dL    Creatinine 0.38 (L) 0.50 - 1.40 mg/dL    Calcium 7.7 (L) 8.5 - 10.5 mg/dL    Correct Calcium 7.9 (L) 8.5 - 10.5 mg/dL    AST(SGOT) 21 12 - 45 U/L    ALT(SGPT) <5 2 - 50 U/L    Alkaline Phosphatase 110 (H) 30 - 99 U/L    Total Bilirubin 0.6 0.1 - 1.5 mg/dL    Albumin 3.8 3.2 - 4.9 g/dL    Total Protein 6.3 6.0 - 8.2 g/dL    Globulin 2.5 1.9 - 3.5 g/dL    A-G Ratio 1.5 g/dL   URINALYSIS    Specimen: Urine   Result Value Ref Range    Color Yellow     Character Clear     Specific Gravity 1.038 <1.035    Ph 6.0 5.0 - 8.0    Glucose >=1000 (A) Negative mg/dL    Ketones >=160 Negative mg/dL    Protein 30 (A) Negative mg/dL    Bilirubin Negative Negative    Urobilinogen, Urine 1.0 Negative    Nitrite Negative Negative    Leukocyte Esterase Negative Negative    Occult Blood Negative Negative    Micro Urine Req Microscopic    ESTIMATED GFR   Result Value Ref Range    GFR (CKD-EPI) 129 >60 mL/min/1.73 m 2   URINE MICROSCOPIC (W/UA)   Result Value Ref Range    WBC 2-5 (A) /hpf    RBC 2-5 (A) /hpf    Bacteria Negative None /hpf    Epithelial Cells Few /hpf    Epithelial Cells Renal Few /hpf    Mucous Threads Moderate /hpf    Hyaline Cast 6-10 (A) /lpf    Granular Casts 3-5 (A) /lpf   EKG (NOW)   Result Value Ref Range    Report       [facility] Emergency Dept.    Test Date:  2023  Pt Name:    BRYAN BARRIENTOS               Department: ER  MRN:        [id number]                      Room:        15  Gender:     Male                         Technician: [id number]  :        1965                   Requested By:CURT Antoine #:    [id number]                    Reading MD: CURT FOSTER. AMD    Measurements  Intervals                                 Axis  Rate:       94                           P:          14  TX:         138                          QRS:        -26  QRSD:       93                           T:          5  QT:         375  QTc:        469    Interpretive Statements  Sinus rhythm  Borderline left axis deviation  Low voltage, precordial leads  Consider anterior infarct  Compared to ECG 10/28/2021 13:36:07  Low QRS voltage now present  Myocardial infarct finding now present  Electronically Signed On 09- 14:08:20 PDT by CURT FOSTER. AMD          RADIOLOGY  I have independently interpreted the diagnostic imaging associated with this visit and am waiting the final reading from the radiologist.   My preliminary interpretation is as follows: I reviewed the head CT and I see the mass identified by radiology and associated hydrocephalus  Radiologist interpretation:     CT-ABDOMEN-PELVIS WITH   Final Result      1.  Diffuse osseous metastatic disease. This vertebral augmentation of the L1 and L2 vertebral bodies      2. Bilateral low-attenuation adrenal masses which may represent lipophilic adenomas, however metastatic disease cannot be excluded.      3. 3.8 cm benign right renal cyst.         4. Marked bladder distention.   2.      CT-HEAD W/O   Final Result      1.  Vasogenic edema noted medially and inferiorly in the right hemicerebellum extending into the cerebellar vermis causing moderate effacement of the fourth ventricle and mild to moderate obstructive hydrocephalus. 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 MRI scan of the brain with gadolinium enhancement is recommended for further evaluation. Additionally, neurosurgical consultation is recommended.         DX-CHEST-PORTABLE (1 VIEW)   Final Result      1.  No acute cardiac or pulmonary abnormalities are identified.      2.  Diffuse bony metastatic disease.      MR-BRAIN-WITH & W/O    (Results Pending)         COURSE & MEDICAL  DECISION MAKING    ED Observation Status?  The patient does not meet observation criteria.      INITIAL ASSESSMENT, COURSE AND PLAN  Care Narrative:     The patient presents to the emergency department for nausea and vomiting and weakness.  The patient seen and examined his chart was reviewed.    The patient has a history of metastatic prostate cancer differential diagnosis includes but is not limited to gastroenteritis, dehydration, bowel obstruction, metastatic disease causing obstruction or renal failure or obstructive uropathy, will also consider brain mass causing increased intracranial pressure.    The patient's work-up as above to be diagnosed with labs and imaging.  Initially start with some basic labs which had some subtle abnormalities but nothing to explain his protracted nausea and vomiting.    His abdominal exam is benign without any tenderness or peritonitis.  Concerned about metastatic etiology or brain mass.    CTs were obtained that showed a cerebellar mass with effacement of the fourth ventricle and possible hydrocephalus.  I spoke with Dr. Be or on-call for neurosurgery.    She has reviewed the images recommends Decadron 4 every 6.  Recommends IMCU admission with neuro checks every 2 hours.  She will see the patient in consultation.  She also recommends a CT of the chest to complete his cancer staging.    The patient require hospitalization for further work-up and treatment.  I have a page out to the ICU physicians.            ADDITIONAL PROBLEM LIST  Metastatic prostate cancer  Urinary retention    DISPOSITION AND DISCUSSIONS  I have discussed management of the patient with the following physicians and PRESTON's: Neurosurgeon as above, ICU doctor as above, hospitalist.        FINAL DIAGNOSIS  1. Brain mass    2. Urinary retention    3. Nausea and vomiting, unspecified vomiting type    4.  Obstructive hydrocephalus       Electronically signed by: Edward Cesar M.D., 9/19/2023 2:05 PM

## 2023-09-20 ENCOUNTER — APPOINTMENT (OUTPATIENT)
Dept: RADIOLOGY | Facility: MEDICAL CENTER | Age: 58
DRG: 025 | End: 2023-09-20
Attending: INTERNAL MEDICINE
Payer: COMMERCIAL

## 2023-09-20 DIAGNOSIS — C79.31 METASTASIS TO BRAIN (HCC): ICD-10-CM

## 2023-09-20 PROBLEM — C61 PROSTATE CANCER (HCC): Status: ACTIVE | Noted: 2023-09-20

## 2023-09-20 LAB
ANION GAP SERPL CALC-SCNC: 14 MMOL/L (ref 7–16)
ANION GAP SERPL CALC-SCNC: 16 MMOL/L (ref 7–16)
ANION GAP SERPL CALC-SCNC: 16 MMOL/L (ref 7–16)
BASOPHILS # BLD AUTO: 0.1 % (ref 0–1.8)
BASOPHILS # BLD: 0.01 K/UL (ref 0–0.12)
BUN SERPL-MCNC: 13 MG/DL (ref 8–22)
BUN SERPL-MCNC: 14 MG/DL (ref 8–22)
BUN SERPL-MCNC: 17 MG/DL (ref 8–22)
CALCIUM SERPL-MCNC: 7.8 MG/DL (ref 8.5–10.5)
CALCIUM SERPL-MCNC: 7.9 MG/DL (ref 8.5–10.5)
CALCIUM SERPL-MCNC: 8 MG/DL (ref 8.5–10.5)
CHLORIDE SERPL-SCNC: 107 MMOL/L (ref 96–112)
CHLORIDE SERPL-SCNC: 108 MMOL/L (ref 96–112)
CHLORIDE SERPL-SCNC: 110 MMOL/L (ref 96–112)
CO2 SERPL-SCNC: 13 MMOL/L (ref 20–33)
CO2 SERPL-SCNC: 14 MMOL/L (ref 20–33)
CO2 SERPL-SCNC: 14 MMOL/L (ref 20–33)
CREAT SERPL-MCNC: 0.4 MG/DL (ref 0.5–1.4)
CREAT SERPL-MCNC: 0.4 MG/DL (ref 0.5–1.4)
CREAT SERPL-MCNC: 0.42 MG/DL (ref 0.5–1.4)
EOSINOPHIL # BLD AUTO: 0 K/UL (ref 0–0.51)
EOSINOPHIL NFR BLD: 0 % (ref 0–6.9)
ERYTHROCYTE [DISTWIDTH] IN BLOOD BY AUTOMATED COUNT: 58.4 FL (ref 35.9–50)
GFR SERPLBLD CREATININE-BSD FMLA CKD-EPI: 125 ML/MIN/1.73 M 2
GFR SERPLBLD CREATININE-BSD FMLA CKD-EPI: 127 ML/MIN/1.73 M 2
GFR SERPLBLD CREATININE-BSD FMLA CKD-EPI: 127 ML/MIN/1.73 M 2
GLUCOSE BLD STRIP.AUTO-MCNC: 165 MG/DL (ref 65–99)
GLUCOSE BLD STRIP.AUTO-MCNC: 166 MG/DL (ref 65–99)
GLUCOSE BLD STRIP.AUTO-MCNC: 233 MG/DL (ref 65–99)
GLUCOSE SERPL-MCNC: 188 MG/DL (ref 65–99)
GLUCOSE SERPL-MCNC: 195 MG/DL (ref 65–99)
GLUCOSE SERPL-MCNC: 243 MG/DL (ref 65–99)
HCT VFR BLD AUTO: 35.7 % (ref 42–52)
HGB BLD-MCNC: 11.6 G/DL (ref 14–18)
IMM GRANULOCYTES # BLD AUTO: 0.04 K/UL (ref 0–0.11)
IMM GRANULOCYTES NFR BLD AUTO: 0.5 % (ref 0–0.9)
LYMPHOCYTES # BLD AUTO: 0.25 K/UL (ref 1–4.8)
LYMPHOCYTES NFR BLD: 3.1 % (ref 22–41)
MAGNESIUM SERPL-MCNC: 2.2 MG/DL (ref 1.5–2.5)
MCH RBC QN AUTO: 29.6 PG (ref 27–33)
MCHC RBC AUTO-ENTMCNC: 32.5 G/DL (ref 32.3–36.5)
MCV RBC AUTO: 91.1 FL (ref 81.4–97.8)
MONOCYTES # BLD AUTO: 0.08 K/UL (ref 0–0.85)
MONOCYTES NFR BLD AUTO: 1 % (ref 0–13.4)
NEUTROPHILS # BLD AUTO: 7.62 K/UL (ref 1.82–7.42)
NEUTROPHILS NFR BLD: 95.3 % (ref 44–72)
NRBC # BLD AUTO: 0 K/UL
NRBC BLD-RTO: 0 /100 WBC (ref 0–0.2)
PLATELET # BLD AUTO: 250 K/UL (ref 164–446)
PMV BLD AUTO: 9.5 FL (ref 9–12.9)
POTASSIUM SERPL-SCNC: 3.4 MMOL/L (ref 3.6–5.5)
POTASSIUM SERPL-SCNC: 3.8 MMOL/L (ref 3.6–5.5)
POTASSIUM SERPL-SCNC: 3.9 MMOL/L (ref 3.6–5.5)
RBC # BLD AUTO: 3.92 M/UL (ref 4.7–6.1)
SODIUM SERPL-SCNC: 136 MMOL/L (ref 135–145)
SODIUM SERPL-SCNC: 138 MMOL/L (ref 135–145)
SODIUM SERPL-SCNC: 138 MMOL/L (ref 135–145)
WBC # BLD AUTO: 8 K/UL (ref 4.8–10.8)

## 2023-09-20 PROCEDURE — 72157 MRI CHEST SPINE W/O & W/DYE: CPT

## 2023-09-20 PROCEDURE — 700102 HCHG RX REV CODE 250 W/ 637 OVERRIDE(OP): Performed by: INTERNAL MEDICINE

## 2023-09-20 PROCEDURE — 72156 MRI NECK SPINE W/O & W/DYE: CPT

## 2023-09-20 PROCEDURE — A9579 GAD-BASE MR CONTRAST NOS,1ML: HCPCS | Performed by: INTERNAL MEDICINE

## 2023-09-20 PROCEDURE — 83735 ASSAY OF MAGNESIUM: CPT

## 2023-09-20 PROCEDURE — 80048 BASIC METABOLIC PNL TOTAL CA: CPT

## 2023-09-20 PROCEDURE — C1751 CATH, INF, PER/CENT/MIDLINE: HCPCS

## 2023-09-20 PROCEDURE — 700111 HCHG RX REV CODE 636 W/ 250 OVERRIDE (IP): Mod: JZ | Performed by: INTERNAL MEDICINE

## 2023-09-20 PROCEDURE — 72158 MRI LUMBAR SPINE W/O & W/DYE: CPT

## 2023-09-20 PROCEDURE — 82962 GLUCOSE BLOOD TEST: CPT

## 2023-09-20 PROCEDURE — 700105 HCHG RX REV CODE 258: Performed by: INTERNAL MEDICINE

## 2023-09-20 PROCEDURE — 85025 COMPLETE CBC W/AUTO DIFF WBC: CPT

## 2023-09-20 PROCEDURE — 700117 HCHG RX CONTRAST REV CODE 255: Performed by: INTERNAL MEDICINE

## 2023-09-20 PROCEDURE — 36556 INSERT NON-TUNNEL CV CATH: CPT

## 2023-09-20 PROCEDURE — 71045 X-RAY EXAM CHEST 1 VIEW: CPT

## 2023-09-20 PROCEDURE — 770022 HCHG ROOM/CARE - ICU (200)

## 2023-09-20 PROCEDURE — 99223 1ST HOSP IP/OBS HIGH 75: CPT | Performed by: RADIOLOGY

## 2023-09-20 PROCEDURE — 99291 CRITICAL CARE FIRST HOUR: CPT | Mod: 25 | Performed by: INTERNAL MEDICINE

## 2023-09-20 PROCEDURE — 36556 INSERT NON-TUNNEL CV CATH: CPT | Mod: RT | Performed by: INTERNAL MEDICINE

## 2023-09-20 PROCEDURE — 02HV33Z INSERTION OF INFUSION DEVICE INTO SUPERIOR VENA CAVA, PERCUTANEOUS APPROACH: ICD-10-PCS | Performed by: INTERNAL MEDICINE

## 2023-09-20 RX ORDER — PROMETHAZINE HYDROCHLORIDE 25 MG/1
12.5-25 TABLET ORAL EVERY 4 HOURS PRN
Status: DISCONTINUED | OUTPATIENT
Start: 2023-09-20 | End: 2023-09-29 | Stop reason: HOSPADM

## 2023-09-20 RX ORDER — SODIUM CHLORIDE 9 MG/ML
500 INJECTION, SOLUTION INTRAVENOUS ONCE
Status: COMPLETED | OUTPATIENT
Start: 2023-09-20 | End: 2023-09-20

## 2023-09-20 RX ORDER — POLYETHYLENE GLYCOL 3350 17 G/17G
1 POWDER, FOR SOLUTION ORAL
Status: DISCONTINUED | OUTPATIENT
Start: 2023-09-20 | End: 2023-09-29 | Stop reason: HOSPADM

## 2023-09-20 RX ORDER — ONDANSETRON 2 MG/ML
4 INJECTION INTRAMUSCULAR; INTRAVENOUS EVERY 4 HOURS PRN
Status: DISCONTINUED | OUTPATIENT
Start: 2023-09-20 | End: 2023-09-29 | Stop reason: HOSPADM

## 2023-09-20 RX ORDER — 3% SODIUM CHLORIDE 3 G/100ML
0-80 INJECTION, SOLUTION INTRAVENOUS CONTINUOUS
Status: DISCONTINUED | OUTPATIENT
Start: 2023-09-20 | End: 2023-09-21

## 2023-09-20 RX ORDER — BISACODYL 10 MG
10 SUPPOSITORY, RECTAL RECTAL
Status: DISCONTINUED | OUTPATIENT
Start: 2023-09-20 | End: 2023-09-29 | Stop reason: HOSPADM

## 2023-09-20 RX ORDER — AMOXICILLIN 250 MG
2 CAPSULE ORAL 2 TIMES DAILY
Status: DISCONTINUED | OUTPATIENT
Start: 2023-09-20 | End: 2023-09-29 | Stop reason: HOSPADM

## 2023-09-20 RX ORDER — PROMETHAZINE HYDROCHLORIDE 12.5 MG/1
12.5-25 SUPPOSITORY RECTAL EVERY 4 HOURS PRN
Status: DISCONTINUED | OUTPATIENT
Start: 2023-09-20 | End: 2023-09-29 | Stop reason: HOSPADM

## 2023-09-20 RX ORDER — PROCHLORPERAZINE EDISYLATE 5 MG/ML
5-10 INJECTION INTRAMUSCULAR; INTRAVENOUS EVERY 4 HOURS PRN
Status: DISCONTINUED | OUTPATIENT
Start: 2023-09-20 | End: 2023-09-29 | Stop reason: HOSPADM

## 2023-09-20 RX ORDER — ENOXAPARIN SODIUM 100 MG/ML
40 INJECTION SUBCUTANEOUS DAILY
Status: DISCONTINUED | OUTPATIENT
Start: 2023-09-20 | End: 2023-09-20

## 2023-09-20 RX ORDER — DEXTROSE MONOHYDRATE 25 G/50ML
25 INJECTION, SOLUTION INTRAVENOUS
Status: DISCONTINUED | OUTPATIENT
Start: 2023-09-20 | End: 2023-09-21

## 2023-09-20 RX ORDER — ONDANSETRON 4 MG/1
4 TABLET, ORALLY DISINTEGRATING ORAL EVERY 4 HOURS PRN
Status: DISCONTINUED | OUTPATIENT
Start: 2023-09-20 | End: 2023-09-29 | Stop reason: HOSPADM

## 2023-09-20 RX ADMIN — INSULIN HUMAN 3 UNITS: 100 INJECTION, SOLUTION PARENTERAL at 22:33

## 2023-09-20 RX ADMIN — DEXAMETHASONE SODIUM PHOSPHATE 4 MG: 4 INJECTION INTRA-ARTICULAR; INTRALESIONAL; INTRAMUSCULAR; INTRAVENOUS; SOFT TISSUE at 05:31

## 2023-09-20 RX ADMIN — SODIUM CHLORIDE 50 ML/HR: 3 INJECTION, SOLUTION INTRAVENOUS at 19:18

## 2023-09-20 RX ADMIN — GADOTERIDOL 15 ML: 279.3 INJECTION, SOLUTION INTRAVENOUS at 21:55

## 2023-09-20 RX ADMIN — SODIUM CHLORIDE 40 ML/HR: 3 INJECTION, SOLUTION INTRAVENOUS at 12:00

## 2023-09-20 RX ADMIN — INSULIN HUMAN 2 UNITS: 100 INJECTION, SOLUTION PARENTERAL at 17:57

## 2023-09-20 RX ADMIN — DEXAMETHASONE SODIUM PHOSPHATE 4 MG: 4 INJECTION INTRA-ARTICULAR; INTRALESIONAL; INTRAMUSCULAR; INTRAVENOUS; SOFT TISSUE at 11:38

## 2023-09-20 RX ADMIN — SODIUM CHLORIDE 500 ML: 9 INJECTION, SOLUTION INTRAVENOUS at 16:15

## 2023-09-20 RX ADMIN — INSULIN HUMAN 2 UNITS: 100 INJECTION, SOLUTION PARENTERAL at 11:55

## 2023-09-20 RX ADMIN — SODIUM CHLORIDE: 3 INJECTION, SOLUTION INTRAVENOUS at 07:01

## 2023-09-20 RX ADMIN — DEXAMETHASONE SODIUM PHOSPHATE 4 MG: 4 INJECTION INTRA-ARTICULAR; INTRALESIONAL; INTRAMUSCULAR; INTRAVENOUS; SOFT TISSUE at 17:52

## 2023-09-20 ASSESSMENT — ENCOUNTER SYMPTOMS
NERVOUS/ANXIOUS: 0
WEAKNESS: 0
ABDOMINAL PAIN: 0
SHORTNESS OF BREATH: 0
VOMITING: 0
HEADACHES: 0
NAUSEA: 0
BACK PAIN: 0
DIARRHEA: 0
FEVER: 0

## 2023-09-20 NOTE — ASSESSMENT & PLAN NOTE
Diagnosed about 2 years ago, seen by our local heme/onc.  On chemo/radiation therapy, last chemo 4 weeks ago  Recently seen by [facility] 4 weeks ago and was told to stop chemotherapy  Pt was planned to start on lutetium-177 therapy   He also c/o lower extremity weaknesses bilaterally in the past 2 weeks.  9/21 MRI cervical, thoracic and lumbar spine with extensive bone metastasis.   MedOnc and RadOnc are following

## 2023-09-20 NOTE — ASSESSMENT & PLAN NOTE
Cerebellar mass with mass effect, DDx concerning metastatic prostate CA vs others.   9/21 s/p retrosigmoid crani with cerebellar mass resection   9/21 Hypertonic saline was discontinued post op. No need for further osmotic therapy after surgery    Continue dexamethasone 4 mg IV every 6H post op  Neuro check Q4H  EVD is removed today   Follow up path   Radiation oncology and medOnc following  Aggressive mobility level 3-4. PT/OT following

## 2023-09-20 NOTE — CONSULTS
Neurosurgery Consult Note    Patient: Casper Rowley MRN: [id number]    Date of Consultation: 9/19/2023    Reason for Consultation: brain tumor    Referring Physician: Edward Cesar MD Emergency Medicine    Chief Complaint: nausea    History of Present Illness:  The patient is a 58 y.o. male presenting with 2 to 3 days of severe nausea and vomiting that started 2 weeks ago.  He presented to [facility] for evaluation, and was found to have severe urinary retention.  Neuroimaging revealed a new cerebellar mass.  Neurosurgery was consulted.    He has a known history of castrate resistant metastatic prostate cancer, diabetes, high blood pressure.  His PSA was never extremely elevated.  He had had next generation sequencing with no targetable mutations he progressed on Xtandi and Xofigo.  He more recently has been on docetaxel but has progressed.  [facility] He is under the care of Dr. Butts. His last Xgeva injection was on 8/30/2023.    He has diffuse osseous metastatic disease.  He had a lumbar metastasis status post decompression and fusion with Dr. Ferguson in March 2023. He says that before the operation, he was trying to have the leg weakness bilaterally, and postoperatively his right leg was strong, but his left leg was weak.  Over the last several months, he feels like his leg strength has been worsening despite working with physical therapy.  He says that he can barely walk on his own now.He does not have much in the way of back pain.  He does have numbness from his knees down.      Medications:  Current Facility-Administered Medications   Medication Dose Route Frequency Provider Last Rate Last Admin    dexamethasone (Decadron) injection 4 mg  4 mg Intravenous Once Edward Cesar M.D.         Current Outpatient Medications   Medication Sig Dispense Refill    XTANDI 40 MG Cap 4 Capsules every day.      fluticasone (FLONASE) 50 MCG/ACT nasal  spray fluticasone propionate 50 mcg/actuation nasal spray,suspension      HYDROcodone-acetaminophen (NORCO) 7.5-325 MG per tablet       rosuvastatin (CRESTOR) 10 MG Tab       tamsulosin (FLOMAX) 0.4 MG capsule       zolpidem (AMBIEN) 10 MG Tab       Canagliflozin (INVOKANA) 100 MG Tab 1 Tablet every day.      cyclobenzaprine (FLEXERIL) 10 mg Tab       XGEVA 120 MG/1.7ML injection       TRULICITY 0.75 MG/0.5ML Solution Pen-injector       metformin (GLUCOPHAGE) 1000 MG tablet Take 1,000 mg by mouth 2 times a day, with meals.      Dulaglutide (TRULICITY) 1.5 MG/0.5ML Solution Pen-injector Inject 1.5 mg as instructed every Saturday.      lisinopril (PRINIVIL, ZESTRIL) 40 MG tablet TAKE ONE TABLET BY MOUTH ONCE A DAY (Patient taking differently: Take 40 mg by mouth every day.) 30 Tab 6    metoprolol SR (TOPROL XL) 25 MG TB24 Take 1 Tab by mouth every day. 30 Tab 5    Cholecalciferol (VITAMIN D) 2000 UNITS CAPS Take 4,000 Units by mouth every day.         Allergies:  No Known Allergies    Past Medical History:  Past Medical History:   Diagnosis Date    Arthritis 02/2019    osteo-hollie knees    Bronchitis 2019    Cancer (HCC)     Basal cell - top of head    Cancer (HCC)     Prostate    Cold 02/08/2019    Cold two weeks ago, denies productive cough, SOB    Diabetes     type 2    High cholesterol     HTN (hypertension), benign 8/14/2009    Hypertension     Infectious disease     Mumps age 6 yrs and Chickenpox age 41 yrs    Obstructive sleep apnea 02/2019    Uses cpap       Past Surgical History:  Past Surgical History:   Procedure Laterality Date    SC SHLDR ARTHROSCOP EXTEN DEBRIDE 3+ Left 11/1/2021    Procedure: ARTHROSCOPY,SHOULDER,WITH EXTENSIVE DEBRIDEMENT;  Surgeon: Piter Otero M.D.;  Location: SURGERY [facility];  Service: Orthopedics    PB ARTHROSCOPY SHOULDER SURGICAL BICEPS TENODES* Left 11/1/2021    Procedure: ARTHROSCOPY, SHOULDER, WITH BICEPS TENOTOMY - WITH SUB SCAPULARIS REPAIR;  Surgeon: Piter Otero  M.D.;  Location: SURGERY [facility];  Service: Orthopedics    CARPAL TUNNEL RELEASE Left 2019    Procedure: CARPAL TUNNEL RELEASE;  Surgeon: Piter Otero M.D.;  Location: SURGERY [facility];  Service: Orthopedics    KNEE ARTHROSCOPY Right 2014    ACL       Family History:  Family History   Problem Relation Age of Onset    Genetic Disorder Neg Hx        Social History:  Social History     Socioeconomic History    Marital status: Single     Spouse name: Not on file    Number of children: Not on file    Years of education: Not on file    Highest education level: Not on file   Occupational History    Not on file   Tobacco Use    Smoking status: Never    Smokeless tobacco: Never   Substance and Sexual Activity    Alcohol use: Yes     Comment: 2 per week    Drug use: No    Sexual activity: Not on file   Other Topics Concern    Not on file   Social History Narrative    Not on file     Social Determinants of Health     Financial Resource Strain: Not on file   Food Insecurity: Not on file   Transportation Needs: Not on file   Physical Activity: Not on file   Stress: Not on file   Social Connections: Not on file   Intimate Partner Violence: Not on file   Housing Stability: Not on file       Physical Examination:  Vitals:    23 1603   BP: 134/76   Pulse: 95   Resp: 17   Temp:    SpO2:      Laying in bed, no acute distress    Neurological  Eye Openin Eyes open spontaneously Motor Response: 6 Follows commands Verbal Response: 5 Oriented to person, place, and time Total: 15  Awake, alert, oriented to person, place and time.  Pupils equally round and reactive to light, 4 to 3mm.  Extraocular movements full.  Facial sensation intact to light touch.  Face symmetric, HB 1.  Palate rises symmetrically.  Tongue is midline.  Shoulder shrug 5/5.  No pronator drift.  Finger-to-nose dysmetric on Right arm.  Sensation intact to light touch in all 4 extremities.  Nystagmus with leftward gaze  Intact fast finger  movements in both arms   Bilateral upper extremity 5/5 in all muscle groups tested    Bilateral lower extremities very weak.    Right IP 1-2/5, quadriceps 4 -/5, dorsiflexion 0/5, plantarflexion 4+/5, hamstrings 4/5  Left IP 3/5, quadriceps 4 -/5, dorsiflexion 0/5, plantarflexion 4+/5, hamstrings 4/5    Labs:  Recent Labs     09/19/23  1300   WBC 10.1   RBC 3.93*   HEMOGLOBIN 11.8*   HEMATOCRIT 36.2*   MCV 92.1   MCH 30.0   MCHC 32.6   RDW 58.7*   PLATELETCT 252   MPV 9.3     Recent Labs     09/19/23  1300   SODIUM 131*   POTASSIUM 4.0   CHLORIDE 101   CO2 14*   GLUCOSE 190*   BUN 15   CREATININE 0.38*   CALCIUM 7.7*               Intake/Output       None            Imaging:    CT head without contrast dated 9/19/2023 was independently reviewed in detail, and my interpretation is as follows. Vasogenic edema noted medially and inferiorly in the right hemicerebellum extending into the cerebellar vermis causing moderate effacement of the fourth ventricle and mild to moderate obstructive hydrocephalus. This is likely due to a metastatic deposit in the right hemicerebellum .    MRI brain with and without contrast dated 9/19/2023 was independently reviewed in detail,  and my interpretation is as follows. An enhancing right cerebellar mass likely represents metastasis, 31 x 36 x 36 mm. There is mass effect as evidenced by partial effacement of the fourth ventricle, low-lying cerebellar tonsils and moderate hydrocephalus, no transependymal flow. Multifocal osseous metastasis.    Assessment and Plan:    Casper Rowley is a 58 y.o. male presenting with known prostate cancer and multiple bony metastases, now found to have a new right cerebellar metastasis causing vasogenic edema, compression of brain, displacement of brain, moderate nonacute obstructive hydrocephalus.  This likely had some contribution to his symptoms of imbalance and nausea; however, after treating his urinary retention, his symptoms have significantly  subsided.  I am also worried about his progressive lower extremity weakness and flaccid tone that has progressed over the last several months despite operative decompression and fusion in March.  I recommend the following:  - Plan for right retrosigmoid craniotomy for tumor resection tomorrow. I had a long conversation with him about his neuroimaging findings, his symptoms, his recommended plan of care.  I discussed the steps of the craniotomy, the nature and location of the incision, the 1-2 night ICU stay, and the 2 to 5-day hospital stay, and the high likelihood of need for rehabilitation postoperatively. The indications, benefits, alternatives and risks to the procedure were discussed with the patient, which included but were not limited to bleeding, infection, stroke, injury to nearby nerves and vessels, cerebrospinal fluid leak, new temporary or permanent motor weakness or vision changes, facial weakness, hearing loss, difficulty swallowing, hoarseness, worsened imbalance or discoordination, cerebellar mutism, coma and rarely, even death.  The patient was in agreement and wished to proceed.  - Recommend cervical, thoracic, lumbar spine with and without contrast to evaluate burden of metastatic disease  - Recommend CT chest with and without contrast to evaluate for pulmonary metastatic disease  - Decadron 4 mg every 6 hours  - No need for Keppra at this time  - N.p.o. at midnight  - I consulted medical oncology and radiation oncology    Discussed with Dr. Matos and Dr. Alvarez and Dr. Butts and Dr. Baker.    Thank you for this consult. Please call with questions.    Archana Be M.D.  [facility]  [address] [place] [id number]  [phone]    A total of 75 minutes were spent in the evaluation, examination, coordination of care, review of labs and imaging of this patient. I spent >50% of time face-to-face on patient counseling.

## 2023-09-20 NOTE — PROGRESS NOTES
4 Eyes Skin Assessment Completed by ricardo, RN and angel RN.    Head WDL  Ears WDL  Nose WDL  Mouth WDL  Neck WDL  Breast/Chest WDL  Shoulder Blades WDL  Spine WDL  (R) Arm/Elbow/Hand WDL  (L) Arm/Elbow/Hand WDL  Abdomen WDL  Groin WDL  Scrotum/Coccyx/Buttocks Redness and Blanching  (R) Leg Scab  (L) Leg Scab  (R) Heel/Foot/Toe Redness and Blanching  (L) Heel/Foot/Toe Redness and Blanching          Devices In Places ECG, Blood Pressure Cuff, Pulse Ox, Mistry, and SCD's      Interventions In Place Sacral Mepilex, Pillows, Q2 Turns, and Low Air Loss Mattress    Possible Skin Injury No    Pictures Uploaded Into Epic N/A  Wound Consult Placed N/A  RN Wound Prevention Protocol Ordered No

## 2023-09-20 NOTE — ED NOTES
Bedside report from: DARA Padron    Pt on O2: []  Line traced: []  Minimum Fall Precautions in place: [x]  Fall sign: [x]  Bed alarm: []  Cardiac monitoring [x]    Side rails up, call bell within reach.  Hypertonic saline verified.

## 2023-09-20 NOTE — DISCHARGE PLANNING
Case Management Discharge Planning    Admission Date: 9/19/2023  GMLOS: 4.9  ALOS: 1    6-Clicks ADL Score:    6-Clicks Mobility Score:    Therapy orders not indicated at this time    Anticipated Discharge Dispo: Discharge Disposition: Discharged to home/self care (01)    Per chart review pt resides in [place]    Family support:  Basilia Meeks Significant other [phone]     Current Insurance on file: [facility] [employer]    DME Needed: pending hospital course    Action(s) Taken: chart reviewed    Escalations Completed: None    Medically Clear: No    Next Steps: f/u with pt and medical team to discuss dc needs and barriers.  Assessment to be completed to assess for HCM needs.    Barriers to Discharge: Medical clearance /Specialty Clearance    Is the patient up for discharge tomorrow: No    Care Transition Team Assessment    Information Source  Orientation Level: Oriented X4    Readmission Evaluation  Is this a readmission?: No    Elopement Risk  Legal Hold: No  Ambulatory or Self Mobile in Wheelchair: Yes  Disoriented: No  Psychiatric Symptoms: None  History of Wandering: No  Elopement this Admit: No  Vocalizing Wanting to Leave: No  Displays Behaviors, Body Language Wanting to Leave: No-Not at Risk for Elopement  Elopement Risk: Not at Risk for Elopement    Discharge Preparedness  What is your plan after discharge?: Uncertain - pending medical team collaboration  What are your discharge supports?: Spouse  Prior Functional Level: Independent with Medication Management, Independent with Activities of Daily Living    Functional Assesment  Prior Functional Level: Independent with Medication Management, Independent with Activities of Daily Living    Advance Directive  Advance Directive?: None    Domestic Abuse  Have you ever been the victim of abuse or violence?: No    Psychological Assessment  History of Substance Abuse: None  History of Psychiatric Problems: No    Anticipated Discharge  Information  Discharge Disposition: Discharged to home/self care (01)

## 2023-09-20 NOTE — CONSULTS
Critical Care Consultation    Date of consult: 9/19/2023    Referring Physician  Franki Alvarez M.D.    Reason for Consultation  Brain mass    History of Presenting Illness  58 y.o. male with hx of metastatic prostate CA , HTN, and DM who presented 9/19/2023 with 2 weeks of nausea, vomiting, and weakness. He was transferred to [facility] by EMS. In the ED a CT head revealed significant vasogenic edema throughout the right hemicerebellum causing effacement of the fourth ventricle and moderate obstructive hydrocephalus.  Dr. Cobb, neurosurgery was consulted.  She has recommended MRI brain for surgical planning.  Neuro critical care consultation was requested for further evaluation and management    Code Status  Full Code    Review of Systems  Review of Systems   Eyes:  Negative for blurred vision and double vision.   Respiratory:  Negative for cough and shortness of breath.    Cardiovascular:  Negative for chest pain.   Gastrointestinal:  Positive for nausea.   Genitourinary:  Positive for urgency.   Musculoskeletal: Negative.    Neurological:  Positive for dizziness and focal weakness.   All other systems reviewed and are negative.      Past Medical History   has a past medical history of Arthritis (02/2019), Bronchitis (2019), Cancer (HCC), Cancer (HCC), Cold (02/08/2019), Diabetes, High cholesterol, HTN (hypertension), benign (8/14/2009), Hypertension, Infectious disease, and Obstructive sleep apnea (02/2019).    Surgical History   has a past surgical history that includes knee arthroscopy (Right, 05/2014); carpal tunnel release (Left, 2/11/2019); pr shldr arthroscop exten debride 3+ (Left, 11/1/2021); and pr arthroscopy shoulder surgical biceps tenodes* (Left, 11/1/2021).    Family History  family history is not on file.    Social History   reports that he has never smoked. He has never used smokeless tobacco. He reports current alcohol use. He reports that he does not use drugs.    Medications  Home Medications        Reviewed by Evelina Mcgovern PhT (Pharmacy Tech) on 09/19/23 at 1747  Med List Status: Complete     Medication Last Dose Status   Canagliflozin (INVOKANA) 100 MG Tab 9/18/2023 Active   Cholecalciferol (VITAMIN D) 2000 UNITS CAPS 9/18/2023 Active   cyclobenzaprine (FLEXERIL) 10 mg Tab 9/18/2023 Active   Dulaglutide (TRULICITY) 1.5 MG/0.5ML Solution Pen-injector 9/9/2023 Active   lisinopril (PRINIVIL) 40 MG tablet 9/18/2023 Active   metoprolol SR (TOPROL XL) 25 MG TABLET SR 24 HR 9/18/2023 Active   XGEVA 120 MG/1.7ML injection 8/30/2023 Active   zolpidem (AMBIEN) 10 MG Tab 9/18/2023 Active                  Current Facility-Administered Medications   Medication Dose Route Frequency Provider Last Rate Last Admin    senna-docusate (Pericolace Or Senokot S) 8.6-50 MG per tablet 2 Tablet  2 Tablet Oral BID Franki Alvarez M.D.        And    polyethylene glycol/lytes (Miralax) PACKET 1 Packet  1 Packet Oral QDAY PRN Franki Alvarez M.D.        And    magnesium hydroxide (Milk Of Magnesia) suspension 30 mL  30 mL Oral QDAY PRN Franki Alvarez M.D.        And    bisacodyl (Dulcolax) suppository 10 mg  10 mg Rectal QDAY PRN Franki Alvarez M.D.        NS infusion   Intravenous Continuous Franki Alvarez M.D. 40 mL/hr at 09/19/23 1910 New Bag at 09/19/23 1910    labetalol (Normodyne/Trandate) injection 10 mg  10 mg Intravenous Q4HRS PRN Franki Alvarez M.D.        dexamethasone (Decadron) injection 4 mg  4 mg Intravenous Q6HRS Franki Alvarez M.D.   4 mg at 09/19/23 1909    3% sodium chloride (Hypertonic Saline) 500mL infusion   Intravenous Continuous Franki Alvarez M.D. 40 mL/hr at 09/19/23 1913 New Bag at 09/19/23 1913       Allergies  No Known Allergies    Vital Signs last 24 hours  Temp:  [36.1 °C (97 °F)-36.6 °C (97.8 °F)] 36.6 °C (97.8 °F)  Pulse:  [] 95  Resp:  [14-22] 15  BP: (121-146)/(65-97) 132/75  SpO2:  [96 %-98 %] 98 %      Physical Exam  Constitutional:       General: He is not in acute distress.  HENT:       Mouth/Throat:      Mouth: Mucous membranes are moist.   Eyes:      Extraocular Movements: Extraocular movements intact.      Pupils: Pupils are equal, round, and reactive to light.   Cardiovascular:      Rate and Rhythm: Normal rate and regular rhythm.      Heart sounds: No murmur heard.     No friction rub. No gallop.   Pulmonary:      Effort: Pulmonary effort is normal.      Breath sounds: Normal breath sounds.   Abdominal:      General: There is no distension.      Palpations: Abdomen is soft. There is no mass.      Tenderness: There is no abdominal tenderness.   Musculoskeletal:      Cervical back: Neck supple.      Right lower leg: No edema.      Left lower leg: No edema.   Skin:     General: Skin is warm and dry.   Neurological:      General: No focal deficit present.      Mental Status: He is alert and oriented to person, place, and time.         Fluids    Intake/Output Summary (Last 24 hours) at 9/19/2023 2305  Last data filed at 9/19/2023 2200  Gross per 24 hour   Intake 171.18 ml   Output 1600 ml   Net -1428.82 ml       Laboratory  Recent Results (from the past 48 hour(s))   CBC WITH DIFFERENTIAL    Collection Time: 09/19/23  1:00 PM   Result Value Ref Range    WBC 10.1 4.8 - 10.8 K/uL    RBC 3.93 (L) 4.70 - 6.10 M/uL    Hemoglobin 11.8 (L) 14.0 - 18.0 g/dL    Hematocrit 36.2 (L) 42.0 - 52.0 %    MCV 92.1 81.4 - 97.8 fL    MCH 30.0 27.0 - 33.0 pg    MCHC 32.6 32.3 - 36.5 g/dL    RDW 58.7 (H) 35.9 - 50.0 fL    Platelet Count 252 164 - 446 K/uL    MPV 9.3 9.0 - 12.9 fL    Neutrophils-Polys 89.30 (H) 44.00 - 72.00 %    Lymphocytes 4.10 (L) 22.00 - 41.00 %    Monocytes 6.00 0.00 - 13.40 %    Eosinophils 0.00 0.00 - 6.90 %    Basophils 0.10 0.00 - 1.80 %    Immature Granulocytes 0.50 0.00 - 0.90 %    Nucleated RBC 0.00 0.00 - 0.20 /100 WBC    Neutrophils (Absolute) 9.01 (H) 1.82 - 7.42 K/uL    Lymphs (Absolute) 0.41 (L) 1.00 - 4.80 K/uL    Monos (Absolute) 0.60 0.00 - 0.85 K/uL    Eos (Absolute) 0.00 0.00  - 0.51 K/uL    Baso (Absolute) 0.01 0.00 - 0.12 K/uL    Immature Granulocytes (abs) 0.05 0.00 - 0.11 K/uL    NRBC (Absolute) 0.00 K/uL   COMP METABOLIC PANEL    Collection Time: 23  1:00 PM   Result Value Ref Range    Sodium 131 (L) 135 - 145 mmol/L    Potassium 4.0 3.6 - 5.5 mmol/L    Chloride 101 96 - 112 mmol/L    Co2 14 (L) 20 - 33 mmol/L    Anion Gap 16.0 7.0 - 16.0    Glucose 190 (H) 65 - 99 mg/dL    Bun 15 8 - 22 mg/dL    Creatinine 0.38 (L) 0.50 - 1.40 mg/dL    Calcium 7.7 (L) 8.5 - 10.5 mg/dL    Correct Calcium 7.9 (L) 8.5 - 10.5 mg/dL    AST(SGOT) 21 12 - 45 U/L    ALT(SGPT) <5 2 - 50 U/L    Alkaline Phosphatase 110 (H) 30 - 99 U/L    Total Bilirubin 0.6 0.1 - 1.5 mg/dL    Albumin 3.8 3.2 - 4.9 g/dL    Total Protein 6.3 6.0 - 8.2 g/dL    Globulin 2.5 1.9 - 3.5 g/dL    A-G Ratio 1.5 g/dL   ESTIMATED GFR    Collection Time: 23  1:00 PM   Result Value Ref Range    GFR (CKD-EPI) 129 >60 mL/min/1.73 m 2   LACTIC ACID    Collection Time: 23  1:32 PM   Result Value Ref Range    Lactic Acid 1.9 0.5 - 2.0 mmol/L   EKG (NOW)    Collection Time: 23  1:55 PM   Result Value Ref Range    Report       [facility] Emergency Dept.    Test Date:  2023  Pt Name:    BRYAN BARRIENTOS               Department: ER  MRN:        [id number]                      Room:        15  Gender:     Male                         Technician: [id number]  :        1965                   Requested By:CURT FOSTER  Order #:    [id number]                    Reading MD: CURT FOSTER. AMD    Measurements  Intervals                                Axis  Rate:       94                           P:          14  MO:         138                          QRS:        -26  QRSD:       93                           T:          5  QT:         375  QTc:        469    Interpretive Statements  Sinus rhythm  Borderline left axis deviation  Low voltage, precordial leads  Consider anterior infarct  Compared to ECG  10/28/2021 13:36:07  Low QRS voltage now present  Myocardial infarct finding now present  Electronically Signed On 09- 14:08:20 PDT by CURT FOSTER. AMD     URINALYSIS    Collection Time: 09/19/23  2:14 PM    Specimen: Urine   Result Value Ref Range    Color Yellow     Character Clear     Specific Gravity 1.038 <1.035    Ph 6.0 5.0 - 8.0    Glucose >=1000 (A) Negative mg/dL    Ketones >=160 Negative mg/dL    Protein 30 (A) Negative mg/dL    Bilirubin Negative Negative    Urobilinogen, Urine 1.0 Negative    Nitrite Negative Negative    Leukocyte Esterase Negative Negative    Occult Blood Negative Negative    Micro Urine Req Microscopic    URINE MICROSCOPIC (W/UA)    Collection Time: 09/19/23  2:14 PM   Result Value Ref Range    WBC 2-5 (A) /hpf    RBC 2-5 (A) /hpf    Bacteria Negative None /hpf    Epithelial Cells Few /hpf    Epithelial Cells Renal Few /hpf    Mucous Threads Moderate /hpf    Hyaline Cast 6-10 (A) /lpf    Granular Casts 3-5 (A) /lpf       Imaging  CT-ABDOMEN-PELVIS WITH   Final Result      1.  Diffuse osseous metastatic disease. This vertebral augmentation of the L1 and L2 vertebral bodies      2. Bilateral low-attenuation adrenal masses which may represent lipophilic adenomas, however metastatic disease cannot be excluded.      3. 3.8 cm benign right renal cyst.         4. Marked bladder distention.   2.      CT-HEAD W/O   Final Result      1.  Vasogenic edema noted medially and inferiorly in the right hemicerebellum extending into the cerebellar vermis causing moderate effacement of the fourth ventricle and mild to moderate obstructive hydrocephalus. 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 MRI scan of the brain with gadolinium enhancement is recommended for further evaluation. Additionally, neurosurgical consultation is recommended.         DX-CHEST-PORTABLE (1 VIEW)   Final Result      1.  No acute cardiac or pulmonary abnormalities are identified.       2.  Diffuse bony metastatic disease.      MR-BRAIN-WITH & W/O    (Results Pending)       Assessment/Plan  * Brain mass- (present on admission)  Assessment & Plan  Brain mass, likely metastatic prostate CA  Osmotic therapy, target serum sodium 145-155  Dexamethasone 4 mg IV every 6 for vasogenic edema  MRI brain with/without/Stealth  Serial neurologic exams  May require emergent EVD for acute neurologic decline  Dr. Be consulting    NED (obstructive sleep apnea)- (present on admission)  Assessment & Plan  CPAP while sleeping    HTN (hypertension), benign- (present on admission)  Assessment & Plan  Allow permissive hypertension up to 180 mmHg for cerebral perfusion        Discussed patient condition and risk of morbidity and/or mortality with RN, Patient, neurosurgery, and ERP .      This patient remains at high risk for worsening central nervous system dysfunction, requiring frequent neurological assessment and strict blood pressure control.  I have assessed and reassessed the neurologic exam, blood pressure, and hemodynamics     Critical care time = 80 minutes in directly providing and coordinating critical care and extensive data review.  No time overlap and excludes procedures.

## 2023-09-20 NOTE — CONSULTS
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  Known to have extensive bone metastasis.  NexGen sequencing with no targetable mutations, he did progressed on Xtandi, on Xofigo.      History of presenting illness:     58-year-old gentleman who is following with my partner Dr. Butts for castrate resistant prostate cancer, stage IV. NexGen sequencing with no targetable mutations, he did progressed on Xtandi, on Xofigo.  And lately he did progress on docetaxel.  He has been following at [facility], consideration to start Pluvicto.  Most recent PSA 0.8.  He also had palliative radiation therapy with Dr. Juventino Valadez.    He is being admitted with 2 weeks of worsening nausea, vomiting and weakness, he was transferred by EMS, he did underwent for a CT head revealing significant vasogenic edema throughout the right hemisphere a balloon causing effacement of the fourth ventricle and moderate obstructive hydrocephalus.  Dr. Cobb from neurosurgery was consulted, MRI brain showing a 3.1 x 3.6 x 3.6 cm right cerebellar lesion, chronic hemorrhagic products noticed within the lesion, diffuse white matter edema, there is also mass effect and partial effacement of the fourth ventricle there are no other lesions in the brain, multifocal osseous metastasis and mild chronic microvascular ischemic changes.     he is scheduled to have surgery tomorrow, he is on high-dose steroids, nausea, vomiting control, there is no other significant neurologic deficits.        Past Medical History:    Past Medical History:   Diagnosis Date    Arthritis 02/2019    osteo-hollie knees    Bronchitis 2019    Cancer (HCC)     Basal cell - top of head    Cancer (HCC)     Prostate    Cold 02/08/2019    Cold two weeks ago, denies productive cough, SOB    Diabetes     type 2    High  cholesterol     HTN (hypertension), benign 8/14/2009    Hypertension     Infectious disease     Mumps age 6 yrs and Chickenpox age 41 yrs    Obstructive sleep apnea 02/2019    Uses cpap       Past surgical history:    Past Surgical History:   Procedure Laterality Date    HI SHLDR ARTHROSCOP EXTEN DEBRIDE 3+ Left 11/1/2021    Procedure: ARTHROSCOPY,SHOULDER,WITH EXTENSIVE DEBRIDEMENT;  Surgeon: Piter Otero M.D.;  Location: SURGERY [facility];  Service: Orthopedics    PB ARTHROSCOPY SHOULDER SURGICAL BICEPS TENODES* Left 11/1/2021    Procedure: ARTHROSCOPY, SHOULDER, WITH BICEPS TENOTOMY - WITH SUB SCAPULARIS REPAIR;  Surgeon: Piter Otero M.D.;  Location: SURGERY [facility];  Service: Orthopedics    CARPAL TUNNEL RELEASE Left 2/11/2019    Procedure: CARPAL TUNNEL RELEASE;  Surgeon: Piter Otero M.D.;  Location: [facility];  Service: Orthopedics    KNEE ARTHROSCOPY Right 05/2014    ACL       Allergies:  Patient has no known allergies.    Medications:    Current Facility-Administered Medications   Medication Dose Route Frequency Provider Last Rate Last Admin    senna-docusate (Pericolace Or Senokot S) 8.6-50 MG per tablet 2 Tablet  2 Tablet Oral BID LIBAN SánchezO.        And    polyethylene glycol/lytes (Miralax) PACKET 1 Packet  1 Packet Oral QDAY PRN MARCY Sánchez.O.        And    magnesium hydroxide (Milk Of Magnesia) suspension 30 mL  30 mL Oral QDAY PRN MARCY Sánchez.O.        And    bisacodyl (Dulcolax) suppository 10 mg  10 mg Rectal QDAY PRN MARCY Sánchez.O.        enoxaparin (Lovenox) inj 40 mg  40 mg Subcutaneous DAILY AT 1800 LIBAN SánchezO.        ondansetron (Zofran) syringe/vial injection 4 mg  4 mg Intravenous Q4HRS PRN MARCY Sánchez.O.        ondansetron (Zofran ODT) dispertab 4 mg  4 mg Oral Q4HRS PRN MARCY Sánchez.O.        promethazine (Phenergan) tablet 12.5-25 mg  12.5-25 mg Oral Q4HRS PRN MARCY Sánchez.O.        promethazine (Phenergan) suppository 12.5-25 mg   12.5-25 mg Rectal Q4HRS PRN Chuck Matos D.O.        prochlorperazine (Compazine) injection 5-10 mg  5-10 mg Intravenous Q4HRS PRN Chuck Matos D.O.        NS infusion   Intravenous Continuous Franki Alvarez M.D. 40 mL/hr at 09/19/23 1910 New Bag at 09/19/23 1910    labetalol (Normodyne/Trandate) injection 10 mg  10 mg Intravenous Q4HRS PRN Franki Alvarez M.D.        dexamethasone (Decadron) injection 4 mg  4 mg Intravenous Q6HRS Franki Alvarez M.D.   4 mg at 09/20/23 0531    3% sodium chloride (Hypertonic Saline) 500mL infusion   Intravenous Continuous Franki Alvarez M.D. 40 mL/hr at 09/20/23 0701 New Bag at 09/20/23 0701       Social History:     Social History     Socioeconomic History    Marital status: Single     Spouse name: Not on file    Number of children: Not on file    Years of education: Not on file    Highest education level: Not on file   Occupational History    Not on file   Tobacco Use    Smoking status: Never    Smokeless tobacco: Never   Substance and Sexual Activity    Alcohol use: Yes     Comment: 2 per week    Drug use: No    Sexual activity: Not on file   Other Topics Concern    Not on file   Social History Narrative    Not on file     Social Determinants of Health     Financial Resource Strain: Not on file   Food Insecurity: Not on file   Transportation Needs: Not on file   Physical Activity: Not on file   Stress: Not on file   Social Connections: Not on file   Intimate Partner Violence: Not on file   Housing Stability: Not on file       Family History:     Family History   Problem Relation Age of Onset    Genetic Disorder Neg Hx        Review of Systems:   All other review of systems are negative except what was mentioned above in the HPI.    Constitutional: No fever, chills, weight loss. He does have malaise/fatigue.    HEENT: No new auditory or visual complaints. No sore throat and neck pain.     Respiratory:No new cough, sputum production, shortness of breath and wheezing.   "  Cardiovascular: No new chest pain, palpitations, orthopnea and leg swelling.    Gastrointestinal: Resolution of nausea and vomiting genitourinary: Negative for dysuria, hematuria    Musculoskeletal: No new arthralgias or myalgias   Skin: Negative for rash and itching.    Neurological: General weakness and dizzines  Endo/Heme/Allergies: No abnormal bleed/bruise.    Psychiatric/Behavioral: No new depression/anxiety.    Physical Exam:   Vitals:   BP (!) 140/74   Pulse 94   Temp 36.4 °C (97.6 °F)   Resp 13   Ht 1.702 m (5' 7\")   Wt 73 kg (160 lb 15 oz)   SpO2 95%   BMI 25.21 kg/m²     General: Not in acute distress, alert and oriented x 3  HEENT: No pallor, icterus. Oropharynx clear.   Neck: Supple, no palpable masses.  CVS: regular rate and rhythm, no rubs or gallops  RESP: Clear to auscultate bilaterally, no wheezing or crackles.   ABD: Soft, non tender, non distended  EXT: No edema or cyanosis  CNS: Alert and oriented x3, No focal deficits.  Skin- No rash      Labs:   Recent Labs     09/19/23  1300 09/20/23  0530   RBC 3.93* 3.92*   HEMOGLOBIN 11.8* 11.6*   HEMATOCRIT 36.2* 35.7*   PLATELETCT 252 250     Lab Results   Component Value Date/Time    SODIUM 136 09/20/2023 05:30 AM    POTASSIUM 3.9 09/20/2023 05:30 AM    CHLORIDE 107 09/20/2023 05:30 AM    CO2 13 (L) 09/20/2023 05:30 AM    GLUCOSE 195 (H) 09/20/2023 05:30 AM    BUN 13 09/20/2023 05:30 AM    CREATININE 0.40 (L) 09/20/2023 05:30 AM    CREATININE 0.89 08/28/2010 10:05 AM    BUNCREATRAT 17 08/28/2010 10:05 AM    GLOMRATE >59 08/28/2010 10:05 AM        Assessment and Plan:  1.  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    2.  Right cerebellar mass    Plan  -New right cerebellar mass very concerning for disease transformation to small cell carcinoma, he is scheduled for surgery tomorrow, will await for biopsy results, if positive for small cell he will benefit from " chemotherapy with platinum based regimen.  -Continue on dexamethasone 4 mg IV every 6 hours  -Dr. Be consulting  -Osmotic therapy per ICU team  -We will continue to follow      He agreed and verbalized his agreement and understanding with the current plan.  I answered all questions and concerns he has at this time.              Thank you for allowing me to participate in his care.    Please note that this dictation was created using voice recognition software. I have made every reasonable attempt to correct obvious errors, but I expect that there are errors of grammar and possibly content that I did not discover before finalizing the note.      SIGNATURES:  Bill Falcon III, M.D.    CC:  Mojgan Garcia M.D.

## 2023-09-20 NOTE — CARE PLAN
Problem: Knowledge Deficit - Standard  Goal: Patient and family/care givers will demonstrate understanding of plan of care, disease process/condition, diagnostic tests and medications  Outcome: Progressing   The patient is Watcher - Medium risk of patient condition declining or worsening         Progress made toward(s) clinical / shift goals:  stable neuro, stable vs, no fevers, prog mob, no fevers    Patient is not progressing towards the following goals: n/a

## 2023-09-20 NOTE — PROCEDURES
Central Line Insertion    Date/Time: 9/20/2023 4:28 PM    Performed by: Chuck Matos D.O.  Authorized by: Chuck Matos D.O.    Consent:     Consent obtained:  Written    Consent given by:  Patient    Risks discussed:  Arterial puncture, incorrect placement, infection, bleeding, pneumothorax and nerve damage    Alternatives discussed:  Alternative treatment  Pre-procedure details:     Hand hygiene: Hand hygiene performed prior to insertion      Sterile barrier technique: All elements of maximal sterile technique followed      Skin preparation:  2% chlorhexidine    Skin preparation agent: Skin preparation agent completely dried prior to procedure    Sedation:     Sedation type:  None  Anesthesia:     Anesthesia method:  Local infiltration    Local anesthetic:  Lidocaine 1% w/o epi  Procedure details:     Location:  R internal jugular    Patient position:  Flat    Procedural supplies:  Triple lumen    Catheter size:  7 Fr    Landmarks identified: yes      Ultrasound guidance: yes      Sterile ultrasound techniques: Sterile gel and sterile probe covers were used      Number of attempts:  1    Successful placement: yes    Post-procedure details:     Post-procedure:  Dressing applied and line sutured    Guidewire: guidewire removal confirmed      Assessment:  Blood return through all ports and free fluid flow    Patient tolerance of procedure:  Tolerated well, no immediate complications

## 2023-09-20 NOTE — CT SIMULATION
PATIENT NAME Casper Rowley   PRIMARY PHYSICIAN Mojgan Garcia [id number]   REFERRING PHYSICIAN No ref. provider found 1966     No matching staging information was found for the patient.       Treatment Planning CT Simulation        Order Questions       Question Answer    Is this for a new course of treatment? Yes    Is this an Addendum? No    Implanted Device/Pacemaker No    Simulation Status Initial    Planned Start Date 10/23/2023    Treatment Site Brain    Laterality Right    Treatment Technique SRS    Treatment Pattern/Frequency Daily    Number of fractions: 3-5    Concurrent Chemotherapy No    CT Technique 3D    Slice Thickness 1mm    Scan Extent Brain    Bowel Preparation No    Treatment Device(s) SRS Mask    Patient Position Supine    Patient Orientation Head First    Arm Position Down    Chin Position Neutral    Treatment Machine TB1 - STx    Treatment Image Guidance CBCT    Image Guidance Match Bone    Treatment Planning Image Fusion CT/MR    Special Physics Consult Stereotactic    Other Orders Special Tx Procedure     Weekly Physics Check    Release to patient Immediate

## 2023-09-20 NOTE — PROGRESS NOTES
"Critical Care Progress Note    Date of admission  9/19/2023    Chief Complaint  \"58 y.o. male with hx of metastatic prostate CA , HTN, and DM who presented 9/19/2023 with 2 weeks of nausea, vomiting, and weakness. He was transferred to [facility] by EMS. In the ED a CT head revealed significant vasogenic edema throughout the right hemicerebellum causing effacement of the fourth ventricle and moderate obstructive hydrocephalus.  Dr. Cobb, neurosurgery was consulted.  She has recommended MRI brain for surgical planning.  Neuro critical care consultation was requested for further evaluation and management\" - Dr. Alvarez.     Hospital Course  9/19 admitted to ICU    Interval Problem Update  Reviewed last 24 hour events:  No acute changes overnight   On 3% hypertonic saline at 40cc/hr. Goal sodium   On NS at 40cc/hr      Review of Systems  Review of Systems   Constitutional:  Negative for fever.   Respiratory:  Negative for shortness of breath.    Cardiovascular:  Negative for chest pain and leg swelling.   Gastrointestinal:  Negative for abdominal pain, diarrhea, nausea and vomiting.   Musculoskeletal:  Negative for back pain.   Neurological:  Negative for weakness and headaches.   Psychiatric/Behavioral:  The patient is not nervous/anxious.    All other systems reviewed and are negative.       Vital Signs for last 24 hours   Temp:  [36.1 °C (97 °F)-36.6 °C (97.8 °F)] 36.4 °C (97.6 °F)  Pulse:  [] 94  Resp:  [12-22] 13  BP: (121-146)/(65-97) 140/74  SpO2:  [95 %-98 %] 95 %    Hemodynamic parameters for last 24 hours       Respiratory Information for the last 24 hours       Physical Exam   Physical Exam  Vitals and nursing note reviewed.   Constitutional:       General: He is not in acute distress.     Appearance: He is not ill-appearing, toxic-appearing or diaphoretic.   HENT:      Head: Normocephalic.      Mouth/Throat:      Mouth: Mucous membranes are moist.   Cardiovascular:      Rate and Rhythm: Normal rate and " regular rhythm.      Pulses: Normal pulses.      Heart sounds: Normal heart sounds. No murmur heard.  Pulmonary:      Effort: Pulmonary effort is normal. No respiratory distress.      Breath sounds: Normal breath sounds. No wheezing, rhonchi or rales.   Abdominal:      General: Bowel sounds are normal. There is no distension.      Palpations: Abdomen is soft.      Tenderness: There is no abdominal tenderness. There is no guarding.   Musculoskeletal:      Right lower leg: No edema.      Left lower leg: No edema.   Skin:     General: Skin is warm and dry.      Capillary Refill: Capillary refill takes less than 2 seconds.      Coloration: Skin is not jaundiced.   Neurological:      General: No focal deficit present.      Mental Status: He is alert and oriented to person, place, and time.      Cranial Nerves: No cranial nerve deficit.   Psychiatric:         Mood and Affect: Mood normal.         Behavior: Behavior normal.         Medications  Current Facility-Administered Medications   Medication Dose Route Frequency Provider Last Rate Last Admin    senna-docusate (Pericolace Or Senokot S) 8.6-50 MG per tablet 2 Tablet  2 Tablet Oral BID MARCY Sánchez.O.        And    polyethylene glycol/lytes (Miralax) PACKET 1 Packet  1 Packet Oral QDAY PRN MARCY Sánchez.O.        And    magnesium hydroxide (Milk Of Magnesia) suspension 30 mL  30 mL Oral QDAY PRN MARCY Sánchez.O.        And    bisacodyl (Dulcolax) suppository 10 mg  10 mg Rectal QDAY PRN MARCY Sánchez.O.        enoxaparin (Lovenox) inj 40 mg  40 mg Subcutaneous DAILY AT 1800 Chuck Matos D.O.        ondansetron (Zofran) syringe/vial injection 4 mg  4 mg Intravenous Q4HRS PRN MARCY Sánchez.O.        ondansetron (Zofran ODT) dispertab 4 mg  4 mg Oral Q4HRS PRN MARCY Sánchez.O.        promethazine (Phenergan) tablet 12.5-25 mg  12.5-25 mg Oral Q4HRS PRN MARCY Sánchez.O.        promethazine (Phenergan) suppository 12.5-25 mg  12.5-25 mg Rectal Q4HRS PRN MARCY Sánchez.O.         prochlorperazine (Compazine) injection 5-10 mg  5-10 mg Intravenous Q4HRS PRN Chuck Matos D.O.        senna-docusate (Pericolace Or Senokot S) 8.6-50 MG per tablet 2 Tablet  2 Tablet Oral BID Franki Alvarez M.D.        And    polyethylene glycol/lytes (Miralax) PACKET 1 Packet  1 Packet Oral QDAY PRN Franki Alvarez M.D.        And    magnesium hydroxide (Milk Of Magnesia) suspension 30 mL  30 mL Oral QDAY PRN Franki Alvarez M.D.        And    bisacodyl (Dulcolax) suppository 10 mg  10 mg Rectal QDAY PRN Franki Alvarez M.D.        NS infusion   Intravenous Continuous Franki Alvarez M.D. 40 mL/hr at 09/19/23 1910 New Bag at 09/19/23 1910    labetalol (Normodyne/Trandate) injection 10 mg  10 mg Intravenous Q4HRS PRN Franki Alvarez M.D.        dexamethasone (Decadron) injection 4 mg  4 mg Intravenous Q6HRS Franki Alvarez M.D.   4 mg at 09/20/23 0531    3% sodium chloride (Hypertonic Saline) 500mL infusion   Intravenous Continuous Franki Alvarez M.D. 40 mL/hr at 09/20/23 0701 New Bag at 09/20/23 0701       Fluids    Intake/Output Summary (Last 24 hours) at 9/20/2023 0723  Last data filed at 9/20/2023 0600  Gross per 24 hour   Intake 811.17 ml   Output 2350 ml   Net -1538.83 ml       Laboratory          Recent Labs     09/19/23  1300 09/20/23  0530   SODIUM 131* 136   POTASSIUM 4.0 3.9   CHLORIDE 101 107   CO2 14* 13*   BUN 15 13   CREATININE 0.38* 0.40*   CALCIUM 7.7* 8.0*     Recent Labs     09/19/23  1300 09/20/23  0530   ALTSGPT <5  --    ASTSGOT 21  --    ALKPHOSPHAT 110*  --    TBILIRUBIN 0.6  --    GLUCOSE 190* 195*     Recent Labs     09/19/23  1300 09/20/23  0530   WBC 10.1 8.0   NEUTSPOLYS 89.30* 95.30*   LYMPHOCYTES 4.10* 3.10*   MONOCYTES 6.00 1.00   EOSINOPHILS 0.00 0.00   BASOPHILS 0.10 0.10   ASTSGOT 21  --    ALTSGPT <5  --    ALKPHOSPHAT 110*  --    TBILIRUBIN 0.6  --      Recent Labs     09/19/23  1300 09/20/23  0530   RBC 3.93* 3.92*   HEMOGLOBIN 11.8* 11.6*   HEMATOCRIT 36.2* 35.7*    PLATELETCT 252 250       Imaging  X-Ray:  I have personally reviewed the images and compared with prior images.  CT:    Reviewed    Assessment/Plan  * Brain mass- (present on admission)  Assessment & Plan  Brain mass, likely metastatic prostate CA  Osmotic therapy, target serum sodium 145-155  3% hypertonic saline to achieve the sodium target  BMP Q6H  Dexamethasone 4 mg IV every 6 for vasogenic edema  Serial neurologic exams, neuro Q2H  If mental status changes/neurological decline, CTH non con stat, may need 23% or emergent EVD  Plan for OR tomorrow per PARISH Villegas      Prostate cancer (HCC)  Assessment & Plan  Diagnosed about 2 years ago, seen by our local heme/onc.  On chemo/radiation therapy, last chemo 4 weeks ago  Recently seen by [facility] 4 weeks ago and pt was planned to start on lutetium-177 therapy   He also c/o lower extremity weaknesses bilaterally in the past 2 weeks.  MRI cervical, thoracic and lumbar spine STAT before surgery tomorrow    NED (obstructive sleep apnea)- (present on admission)  Assessment & Plan  CPAP while sleeping    HTN (hypertension), benign- (present on admission)  Assessment & Plan  Allow permissive hypertension up to 180 mmHg for cerebral perfusion         VTE:  Lovenox Will hold for tonight before OR tomorrow  Ulcer: H2 Antagonist  Lines: None    I have performed a physical exam and reviewed and updated ROS and Plan today (9/20/2023). In review of yesterday's note (9/19/2023), there are no changes except as documented above.     Discussed patient condition and risk of morbidity and/or mortality with RN, RT, Pharmacy, Charge nurse / hot rounds, and Patient  The patient remains critically ill.  Critical care time = 60 minutes in directly providing and coordinating critical care and extensive data review.  No time overlap and excludes procedures.    D/w PARISH Johnson

## 2023-09-20 NOTE — ED NOTES
Med rec updated and complete . Allergies reviewed. Confirmed name and date of birth.   Pt denies antibiotic use in last  30 days.  Pt denies  anticoagulant and antiplatelet medication.  Pt denies infusions.    Last Xgeva injection 08/30/23.      [place]  [facility] [phone]

## 2023-09-20 NOTE — CONSULTS
RADIATION ONCOLOGY CONSULT    DATE OF SERVICE: 9/20/2023    IDENTIFICATION: A 58 y.o. male with metastatic prostate cancer now with a presumed metastatic lesion in the cerebellum.  He is here at the kind request of Dr. Archana Be.      HISTORY OF PRESENT ILLNESS:  patient's history dates back to approximately 2 years ago when he was diagnosed with widely metastatic castrate resistant prostate cancer.  He is PSA was never extremely elevated.  He had had next generation sequencing with no targetable mutations he progressed on Xtandi and Xofigo.  He more recently has been on docetaxel but has progressed.  He has been seen by doctors at [facility], [place] and may start Pluvicto.  Over the last 2 weeks he has had worsening nausea vomiting and weakness.    CT scan of the head yesterday showed vasogenic edema noted medially and inferiorly in the right hemicerebellum extending into the cerebellar vermis causing moderate effacement of the fourth ventricle and mild to moderate obstructive hydrocephalus likely due to metastatic deposit.  MRI scan was also done yesterday.   This showed a 31 x 36 x 36 mm enhancing intra-axial lesion involving the right cerebellar hemisphere.  There are chronic hemorrhagic products noted within the lesion.  There is diffuse white matter edema surrounding the lesion.  There is mass effect as evidenced by partial effacement of the fourth ventricle.  There is mild low-lying cerebellar tonsils.  There is moderate dilatation of the ventricles.  No other lesions were seen.  Patient has started on steroids and his symptoms have dramatically improved.  He has been seen by Dr. Cobb and the plan is to resect the lesion tomorrow.  He is seen in consultation today about  stereotactic radiotherapy to the tumor bed following resection.    PAST MEDICAL HISTORY:   Past Medical History:   Diagnosis Date    Arthritis 02/2019    osteo-hollie knees    Bronchitis 2019    Cancer (HCC)     Basal cell - top of head    Cancer  (HCC)     Prostate    Cold 02/08/2019    Cold two weeks ago, denies productive cough, SOB    Diabetes     type 2    High cholesterol     HTN (hypertension), benign 8/14/2009    Hypertension     Infectious disease     Mumps age 6 yrs and Chickenpox age 41 yrs    Obstructive sleep apnea 02/2019    Uses cpap       PAST SURGICAL HISTORY:  Past Surgical History:   Procedure Laterality Date    WA SHLDR ARTHROSCOP EXTEN DEBRIDE 3+ Left 11/1/2021    Procedure: ARTHROSCOPY,SHOULDER,WITH EXTENSIVE DEBRIDEMENT;  Surgeon: Piter Otero M.D.;  Location: SURGERY [facility];  Service: Orthopedics    PB ARTHROSCOPY SHOULDER SURGICAL BICEPS TENODES* Left 11/1/2021    Procedure: ARTHROSCOPY, SHOULDER, WITH BICEPS TENOTOMY - WITH SUB SCAPULARIS REPAIR;  Surgeon: Piter Otero M.D.;  Location: SURGERY [facility];  Service: Orthopedics    CARPAL TUNNEL RELEASE Left 2/11/2019    Procedure: CARPAL TUNNEL RELEASE;  Surgeon: Piter Otero M.D.;  Location[facility];  Service: Orthopedics    KNEE ARTHROSCOPY Right 05/2014    ACL         CURRENT MEDICATIONS:  Current Facility-Administered Medications   Medication Dose Route Frequency Provider Last Rate Last Admin    senna-docusate (Pericolace Or Senokot S) 8.6-50 MG per tablet 2 Tablet  2 Tablet Oral BID MARCY Sánchez.O.        And    polyethylene glycol/lytes (Miralax) PACKET 1 Packet  1 Packet Oral QDAY PRN MARCY Sánchez.O.        And    magnesium hydroxide (Milk Of Magnesia) suspension 30 mL  30 mL Oral QDAY PRN MARCY Sánchez.O.        And    bisacodyl (Dulcolax) suppository 10 mg  10 mg Rectal QDAY PRN MARCY Sánchez.O.        ondansetron (Zofran) syringe/vial injection 4 mg  4 mg Intravenous Q4HRS PRN MARCY Sánchez.O.        ondansetron (Zofran ODT) dispertab 4 mg  4 mg Oral Q4HRS PRN MARCY Sánchez.O.        promethazine (Phenergan) tablet 12.5-25 mg  12.5-25 mg Oral Q4HRS PRN MARCY Sánchez.O.        promethazine (Phenergan) suppository 12.5-25 mg  12.5-25 mg  Rectal Q4HRS PRN MARCY Sánchez.O.        prochlorperazine (Compazine) injection 5-10 mg  5-10 mg Intravenous Q4HRS PRN MARCY Sánchez.O.        insulin regular (HumuLIN R,NovoLIN R) injection  2-9 Units Subcutaneous 4X/DAY ACHS MARCY Sánchez.O.   2 Units at 09/20/23 1155    And    dextrose 50% (D50W) injection 25 g  25 g Intravenous Q15 MIN PRN Chuck Matos D.O.        3% sodium chloride (Hypertonic Saline) 500mL infusion  0-60 mL/hr Intravenous Continuous MARCY Sánchez.O. 50 mL/hr at 09/20/23 1316 50 mL/hr at 09/20/23 1316    labetalol (Normodyne/Trandate) injection 10 mg  10 mg Intravenous Q4HRS PRN Franki Alvarez M.D.        dexamethasone (Decadron) injection 4 mg  4 mg Intravenous Q6HRS Franki Alvarez M.D.   4 mg at 09/20/23 1138       ALLERGIES:    Patient has no known allergies.    FAMILY HISTORY:    Family History   Problem Relation Age of Onset    Genetic Disorder Neg Hx    [unfilled]        SOCIAL HISTORY:     reports that he has never smoked. He has never used smokeless tobacco. He reports current alcohol use. He reports that he does not use drugs.    He is a retired  for [employer]    Review of Systems:   Constitutional:  denies fever chills or sweats but may have had some weight loss because he had nausea and vomiting due to the brain tumor.  HENT: Denies neck pain, headache or dizziness, hearing loss  Eyes: Denies  vision changes  Respiratory: Denies  cough, congestion, SOB, hemoptysis  Cardiovascular: Denies  palpations, chest pain or easy fatiguability   Gastrointestinal:    Had recent nausea and vomiting but denies diarrhea abdominal pain or constipation or blood in stool.  Genitourinal: Denies   hematuria, dysuria, incontinence  Musculoskeletal: Denies   back pain or joint pain   Skin: Denies   itching or rash, or new lesions  Neurological:   Denies seizures but did have some dizziness but denies any numbness or tingling  Endocrine: Denies   thyroid problems or diabetes  Psyche: Denies    depression, anxiety, mood changes       PHYSICAL EXAM:    3 = Capable of only limited self care, confined to bed or chair more than 50% of waking hours  Vitals:    09/20/23 1100 09/20/23 1200 09/20/23 1300 09/20/23 1400   BP: 132/71 139/72 133/74 137/76   Pulse: 98 90 91 92   Resp: 18 16 18 (!) 21   Temp:       TempSrc:       SpO2: 98% 97% 98% 98%   Weight:       Height:                GENERAL:  alert and oriented x3 in no apparent distress lying comfortably in his bed  HEENT:  Pupils are equal, round, and reactive to light.  Extraocular muscles   are intact. Sclerae nonicteric.  Conjunctivae pink.  Oral cavity, tongue   protrudes midline.   NECK:   No peripheral adenopathy of the neck, supraclavicular fossa or axillae   bilaterally.  LUNGS:  Clear to ascultation   HEART:  Regular rate and rhythm.  No murmur appreciated  ABDOMEN:  Soft. No evidence of hepatosplenomegaly.    EXTREMITIES:  Without Edema.  NEUROLOGIC:  Cranial nerves II through XII were intact. Motor and sensory appear grossly within normal limits.        IMPRESSION:    A 58 y.o. with   metastatic prostate cancer now with a presumed metastatic lesion in the cerebellum.  Surgery scheduled for tomorrow.      RECOMMENDATIONS:     I had a long discussion with the patient, his significant other, and another friend.  I explained to him that this is most likely metastatic prostate cancer due to the aggressive nature of his type of cancer.  It is possible it could be some other type of cancer.  We will not know this until it is resected and the pathology is finalized.  I explained to him that most likely it is metastatic and after surgery we deliver 3-5 fractions of radiation therapy to the tumor bed to decrease the chance of local regional recurrence.  I've described the details of radiation along with the side effects both acute and chronic, including but not exclusive to fatigue, skin reaction, local soreness,  possible headache, hair loss within the  treatment field and damage to the brain within the region of radiation therapy. Ample time was allowed for questions, and patient understands.      He is tentatively scheduled for simulation in 3 weeks.    Thank you for the opportunity to participate in his care.  If any questions or comments, please do not hesitate in calling.    Please note that this dictation was created using voice recognition software. I have made every reasonable attempt to correct obvious errors, but I expect that there are errors of grammar and possibly content that I did not discover before finalizing the note.

## 2023-09-20 NOTE — PROGRESS NOTES
1030 MRI informed Maikol RN that patient is ineligible to get MRI until 2100 due to receiving contrast for MRI of head at 2118 last night.     1100 Confirmed with MRI that patient could not get MRI this morning. Dr Matos and Dr Be notified that STAT MRI could not be completed until tonight.     1440 Maikol RN spoke with Kasi who confirmed contrast time last night was 2118, patient will be scheduled for tonight at 2100 for MRI.

## 2023-09-20 NOTE — ED NOTES
Mistry placed and 1600 cc output. Patient updated on POC. Patient is feeling less nausea. Patient on monitor and in gown.

## 2023-09-21 ENCOUNTER — APPOINTMENT (OUTPATIENT)
Dept: RADIOLOGY | Facility: MEDICAL CENTER | Age: 58
DRG: 025 | End: 2023-09-21
Attending: NEUROLOGICAL SURGERY
Payer: COMMERCIAL

## 2023-09-21 ENCOUNTER — ANESTHESIA EVENT (OUTPATIENT)
Dept: SURGERY | Facility: MEDICAL CENTER | Age: 58
DRG: 025 | End: 2023-09-21
Payer: COMMERCIAL

## 2023-09-21 ENCOUNTER — ANESTHESIA (OUTPATIENT)
Dept: SURGERY | Facility: MEDICAL CENTER | Age: 58
DRG: 025 | End: 2023-09-21
Payer: COMMERCIAL

## 2023-09-21 LAB
ANION GAP SERPL CALC-SCNC: 10 MMOL/L (ref 7–16)
ANION GAP SERPL CALC-SCNC: 11 MMOL/L (ref 7–16)
ANION GAP SERPL CALC-SCNC: 13 MMOL/L (ref 7–16)
BACTERIA UR CULT: NORMAL
BASOPHILS # BLD AUTO: 0 % (ref 0–1.8)
BASOPHILS # BLD: 0 K/UL (ref 0–0.12)
BUN SERPL-MCNC: 14 MG/DL (ref 8–22)
BUN SERPL-MCNC: 15 MG/DL (ref 8–22)
BUN SERPL-MCNC: 15 MG/DL (ref 8–22)
CALCIUM SERPL-MCNC: 7.3 MG/DL (ref 8.5–10.5)
CALCIUM SERPL-MCNC: 7.4 MG/DL (ref 8.5–10.5)
CALCIUM SERPL-MCNC: 7.8 MG/DL (ref 8.5–10.5)
CFT BLD TEG: 6.4 MIN (ref 4.6–9.1)
CFT P HPASE BLD TEG: 5.7 MIN (ref 4.3–8.3)
CHLORIDE SERPL-SCNC: 109 MMOL/L (ref 96–112)
CHLORIDE SERPL-SCNC: 111 MMOL/L (ref 96–112)
CHLORIDE SERPL-SCNC: 116 MMOL/L (ref 96–112)
CLOT ANGLE BLD TEG: 77.5 DEGREES (ref 63–78)
CO2 SERPL-SCNC: 16 MMOL/L (ref 20–33)
CO2 SERPL-SCNC: 16 MMOL/L (ref 20–33)
CO2 SERPL-SCNC: 18 MMOL/L (ref 20–33)
CREAT SERPL-MCNC: 0.3 MG/DL (ref 0.5–1.4)
CREAT SERPL-MCNC: 0.31 MG/DL (ref 0.5–1.4)
CREAT SERPL-MCNC: 0.33 MG/DL (ref 0.5–1.4)
CT.EXTRINSIC BLD ROTEM: 0.9 MIN (ref 0.8–2.1)
EOSINOPHIL # BLD AUTO: 0 K/UL (ref 0–0.51)
EOSINOPHIL NFR BLD: 0 % (ref 0–6.9)
ERYTHROCYTE [DISTWIDTH] IN BLOOD BY AUTOMATED COUNT: 58.2 FL (ref 35.9–50)
GFR SERPLBLD CREATININE-BSD FMLA CKD-EPI: 135 ML/MIN/1.73 M 2
GFR SERPLBLD CREATININE-BSD FMLA CKD-EPI: 137 ML/MIN/1.73 M 2
GFR SERPLBLD CREATININE-BSD FMLA CKD-EPI: 139 ML/MIN/1.73 M 2
GLUCOSE BLD STRIP.AUTO-MCNC: 183 MG/DL (ref 65–99)
GLUCOSE BLD STRIP.AUTO-MCNC: 208 MG/DL (ref 65–99)
GLUCOSE SERPL-MCNC: 206 MG/DL (ref 65–99)
GLUCOSE SERPL-MCNC: 234 MG/DL (ref 65–99)
GLUCOSE SERPL-MCNC: 249 MG/DL (ref 65–99)
HCT VFR BLD AUTO: 29.8 % (ref 42–52)
HGB BLD-MCNC: 9.9 G/DL (ref 14–18)
IMM GRANULOCYTES # BLD AUTO: 0.05 K/UL (ref 0–0.11)
IMM GRANULOCYTES NFR BLD AUTO: 1.1 % (ref 0–0.9)
INR PPP: 1.48 (ref 0.87–1.13)
LYMPHOCYTES # BLD AUTO: 0.25 K/UL (ref 1–4.8)
LYMPHOCYTES NFR BLD: 5.4 % (ref 22–41)
MCF BLD TEG: 64.3 MM (ref 52–69)
MCF.PLATELET INHIB BLD ROTEM: 24.9 MM (ref 15–32)
MCH RBC QN AUTO: 30.6 PG (ref 27–33)
MCHC RBC AUTO-ENTMCNC: 33.2 G/DL (ref 32.3–36.5)
MCV RBC AUTO: 92 FL (ref 81.4–97.8)
MONOCYTES # BLD AUTO: 0.18 K/UL (ref 0–0.85)
MONOCYTES NFR BLD AUTO: 3.9 % (ref 0–13.4)
NEUTROPHILS # BLD AUTO: 4.19 K/UL (ref 1.82–7.42)
NEUTROPHILS NFR BLD: 89.6 % (ref 44–72)
NRBC # BLD AUTO: 0 K/UL
NRBC BLD-RTO: 0 /100 WBC (ref 0–0.2)
PA AA BLD-ACNC: 3.7 % (ref 0–11)
PA ADP BLD-ACNC: 0.7 % (ref 0–17)
PLATELET # BLD AUTO: 183 K/UL (ref 164–446)
PMV BLD AUTO: 9.7 FL (ref 9–12.9)
POTASSIUM SERPL-SCNC: 3.2 MMOL/L (ref 3.6–5.5)
POTASSIUM SERPL-SCNC: 3.3 MMOL/L (ref 3.6–5.5)
POTASSIUM SERPL-SCNC: 3.7 MMOL/L (ref 3.6–5.5)
PROTHROMBIN TIME: 18.1 SEC (ref 12–14.6)
RBC # BLD AUTO: 3.24 M/UL (ref 4.7–6.1)
SIGNIFICANT IND 70042: NORMAL
SITE SITE: NORMAL
SODIUM SERPL-SCNC: 136 MMOL/L (ref 135–145)
SODIUM SERPL-SCNC: 140 MMOL/L (ref 135–145)
SODIUM SERPL-SCNC: 144 MMOL/L (ref 135–145)
SOURCE SOURCE: NORMAL
TEG ALGORITHM TGALG: NORMAL
WBC # BLD AUTO: 4.7 K/UL (ref 4.8–10.8)

## 2023-09-21 PROCEDURE — 85610 PROTHROMBIN TIME: CPT

## 2023-09-21 PROCEDURE — 770022 HCHG ROOM/CARE - ICU (200)

## 2023-09-21 PROCEDURE — 700111 HCHG RX REV CODE 636 W/ 250 OVERRIDE (IP): Mod: JZ | Performed by: INTERNAL MEDICINE

## 2023-09-21 PROCEDURE — 700102 HCHG RX REV CODE 250 W/ 637 OVERRIDE(OP): Performed by: NEUROLOGICAL SURGERY

## 2023-09-21 PROCEDURE — 700111 HCHG RX REV CODE 636 W/ 250 OVERRIDE (IP): Performed by: INTERNAL MEDICINE

## 2023-09-21 PROCEDURE — 110454 HCHG SHELL REV 250: Performed by: NEUROLOGICAL SURGERY

## 2023-09-21 PROCEDURE — 70450 CT HEAD/BRAIN W/O DYE: CPT

## 2023-09-21 PROCEDURE — 700101 HCHG RX REV CODE 250: Performed by: STUDENT IN AN ORGANIZED HEALTH CARE EDUCATION/TRAINING PROGRAM

## 2023-09-21 PROCEDURE — 160042 HCHG SURGERY MINUTES - EA ADDL 1 MIN LEVEL 5: Performed by: NEUROLOGICAL SURGERY

## 2023-09-21 PROCEDURE — 700105 HCHG RX REV CODE 258: Performed by: STUDENT IN AN ORGANIZED HEALTH CARE EDUCATION/TRAINING PROGRAM

## 2023-09-21 PROCEDURE — 88307 TISSUE EXAM BY PATHOLOGIST: CPT

## 2023-09-21 PROCEDURE — 88342 IMHCHEM/IMCYTCHM 1ST ANTB: CPT

## 2023-09-21 PROCEDURE — A9270 NON-COVERED ITEM OR SERVICE: HCPCS | Performed by: NEUROLOGICAL SURGERY

## 2023-09-21 PROCEDURE — 110371 HCHG SHELL REV 272: Performed by: NEUROLOGICAL SURGERY

## 2023-09-21 PROCEDURE — 80048 BASIC METABOLIC PNL TOTAL CA: CPT | Mod: 91

## 2023-09-21 PROCEDURE — 009600Z DRAINAGE OF CEREBRAL VENTRICLE WITH DRAINAGE DEVICE, OPEN APPROACH: ICD-10-PCS | Performed by: NEUROLOGICAL SURGERY

## 2023-09-21 PROCEDURE — 88341 IMHCHEM/IMCYTCHM EA ADD ANTB: CPT

## 2023-09-21 PROCEDURE — 99291 CRITICAL CARE FIRST HOUR: CPT | Performed by: INTERNAL MEDICINE

## 2023-09-21 PROCEDURE — 00BC0ZZ EXCISION OF CEREBELLUM, OPEN APPROACH: ICD-10-PCS | Performed by: NEUROLOGICAL SURGERY

## 2023-09-21 PROCEDURE — 700101 HCHG RX REV CODE 250: Performed by: NEUROLOGICAL SURGERY

## 2023-09-21 PROCEDURE — 700111 HCHG RX REV CODE 636 W/ 250 OVERRIDE (IP): Performed by: STUDENT IN AN ORGANIZED HEALTH CARE EDUCATION/TRAINING PROGRAM

## 2023-09-21 PROCEDURE — 160009 HCHG ANES TIME/MIN: Performed by: NEUROLOGICAL SURGERY

## 2023-09-21 PROCEDURE — 85025 COMPLETE CBC W/AUTO DIFF WBC: CPT

## 2023-09-21 PROCEDURE — 85347 COAGULATION TIME ACTIVATED: CPT

## 2023-09-21 PROCEDURE — 85018 HEMOGLOBIN: CPT

## 2023-09-21 PROCEDURE — 82962 GLUCOSE BLOOD TEST: CPT

## 2023-09-21 PROCEDURE — 85384 FIBRINOGEN ACTIVITY: CPT

## 2023-09-21 PROCEDURE — 160031 HCHG SURGERY MINUTES - 1ST 30 MINS LEVEL 5: Performed by: NEUROLOGICAL SURGERY

## 2023-09-21 PROCEDURE — 160002 HCHG RECOVERY MINUTES (STAT): Performed by: NEUROLOGICAL SURGERY

## 2023-09-21 PROCEDURE — 700111 HCHG RX REV CODE 636 W/ 250 OVERRIDE (IP): Mod: JZ | Performed by: NEUROLOGICAL SURGERY

## 2023-09-21 PROCEDURE — 160035 HCHG PACU - 1ST 60 MINS PHASE I: Performed by: NEUROLOGICAL SURGERY

## 2023-09-21 PROCEDURE — C1768 GRAFT, VASCULAR: HCPCS | Performed by: NEUROLOGICAL SURGERY

## 2023-09-21 PROCEDURE — C1729 CATH, DRAINAGE: HCPCS | Performed by: NEUROLOGICAL SURGERY

## 2023-09-21 PROCEDURE — 85576 BLOOD PLATELET AGGREGATION: CPT | Mod: 91

## 2023-09-21 PROCEDURE — 160048 HCHG OR STATISTICAL LEVEL 1-5: Performed by: NEUROLOGICAL SURGERY

## 2023-09-21 PROCEDURE — 700105 HCHG RX REV CODE 258: Performed by: INTERNAL MEDICINE

## 2023-09-21 PROCEDURE — C1713 ANCHOR/SCREW BN/BN,TIS/BN: HCPCS | Performed by: NEUROLOGICAL SURGERY

## 2023-09-21 DEVICE — SEALANT DURAL AUTOSPRAY ADHERUS (5EA/PK): Type: IMPLANTABLE DEVICE | Site: CRANIAL | Status: FUNCTIONAL

## 2023-09-21 DEVICE — PLATE NC BURR HOLE COVER FOR SHUNT 20MM (6NCX4=24): Type: IMPLANTABLE DEVICE | Site: CRANIAL | Status: FUNCTIONAL

## 2023-09-21 DEVICE — SCREW STRYK NC 1.5X4MM (6NCX40=240) CONSIGNED QTY 240 PRE-LOAD 80/PK: Type: IMPLANTABLE DEVICE | Site: CRANIAL | Status: FUNCTIONAL

## 2023-09-21 DEVICE — PLATE NC DOGBONE 2-HOLE W/O TAB (6NCX4=24): Type: IMPLANTABLE DEVICE | Site: CRANIAL | Status: FUNCTIONAL

## 2023-09-21 RX ORDER — BUPIVACAINE HYDROCHLORIDE AND EPINEPHRINE 5; 5 MG/ML; UG/ML
INJECTION, SOLUTION EPIDURAL; INTRACAUDAL; PERINEURAL
Status: DISCONTINUED | OUTPATIENT
Start: 2023-09-21 | End: 2023-09-21 | Stop reason: HOSPADM

## 2023-09-21 RX ORDER — ESMOLOL HYDROCHLORIDE 10 MG/ML
INJECTION INTRAVENOUS PRN
Status: DISCONTINUED | OUTPATIENT
Start: 2023-09-21 | End: 2023-09-21 | Stop reason: SURG

## 2023-09-21 RX ORDER — MIDAZOLAM HYDROCHLORIDE 1 MG/ML
INJECTION INTRAMUSCULAR; INTRAVENOUS PRN
Status: DISCONTINUED | OUTPATIENT
Start: 2023-09-21 | End: 2023-09-21 | Stop reason: SURG

## 2023-09-21 RX ORDER — POTASSIUM CHLORIDE 7.45 MG/ML
10 INJECTION INTRAVENOUS
Status: COMPLETED | OUTPATIENT
Start: 2023-09-21 | End: 2023-09-21

## 2023-09-21 RX ORDER — BACITRACIN ZINC 500 [USP'U]/G
OINTMENT TOPICAL
Status: DISCONTINUED | OUTPATIENT
Start: 2023-09-21 | End: 2023-09-21 | Stop reason: HOSPADM

## 2023-09-21 RX ORDER — LIDOCAINE HYDROCHLORIDE 20 MG/ML
INJECTION, SOLUTION EPIDURAL; INFILTRATION; INTRACAUDAL; PERINEURAL PRN
Status: DISCONTINUED | OUTPATIENT
Start: 2023-09-21 | End: 2023-09-21 | Stop reason: SURG

## 2023-09-21 RX ORDER — EPHEDRINE SULFATE 50 MG/ML
5 INJECTION, SOLUTION INTRAVENOUS
Status: DISCONTINUED | OUTPATIENT
Start: 2023-09-21 | End: 2023-09-21 | Stop reason: HOSPADM

## 2023-09-21 RX ORDER — ONDANSETRON 2 MG/ML
4 INJECTION INTRAMUSCULAR; INTRAVENOUS
Status: DISCONTINUED | OUTPATIENT
Start: 2023-09-21 | End: 2023-09-21 | Stop reason: HOSPADM

## 2023-09-21 RX ORDER — OXYCODONE HCL 5 MG/5 ML
5 SOLUTION, ORAL ORAL
Status: DISCONTINUED | OUTPATIENT
Start: 2023-09-21 | End: 2023-09-21 | Stop reason: HOSPADM

## 2023-09-21 RX ORDER — ROCURONIUM BROMIDE 10 MG/ML
INJECTION, SOLUTION INTRAVENOUS PRN
Status: DISCONTINUED | OUTPATIENT
Start: 2023-09-21 | End: 2023-09-21 | Stop reason: SURG

## 2023-09-21 RX ORDER — SODIUM CHLORIDE, SODIUM LACTATE, POTASSIUM CHLORIDE, CALCIUM CHLORIDE 600; 310; 30; 20 MG/100ML; MG/100ML; MG/100ML; MG/100ML
INJECTION, SOLUTION INTRAVENOUS CONTINUOUS
Status: DISCONTINUED | OUTPATIENT
Start: 2023-09-21 | End: 2023-09-21 | Stop reason: HOSPADM

## 2023-09-21 RX ORDER — HYDROMORPHONE HYDROCHLORIDE 1 MG/ML
0.1 INJECTION, SOLUTION INTRAMUSCULAR; INTRAVENOUS; SUBCUTANEOUS
Status: DISCONTINUED | OUTPATIENT
Start: 2023-09-21 | End: 2023-09-21 | Stop reason: HOSPADM

## 2023-09-21 RX ORDER — HYDRALAZINE HYDROCHLORIDE 20 MG/ML
5 INJECTION INTRAMUSCULAR; INTRAVENOUS
Status: DISCONTINUED | OUTPATIENT
Start: 2023-09-21 | End: 2023-09-21 | Stop reason: HOSPADM

## 2023-09-21 RX ORDER — DEXAMETHASONE SODIUM PHOSPHATE 4 MG/ML
INJECTION, SOLUTION INTRA-ARTICULAR; INTRALESIONAL; INTRAMUSCULAR; INTRAVENOUS; SOFT TISSUE PRN
Status: DISCONTINUED | OUTPATIENT
Start: 2023-09-21 | End: 2023-09-21 | Stop reason: SURG

## 2023-09-21 RX ORDER — HALOPERIDOL 5 MG/ML
1 INJECTION INTRAMUSCULAR
Status: DISCONTINUED | OUTPATIENT
Start: 2023-09-21 | End: 2023-09-21 | Stop reason: HOSPADM

## 2023-09-21 RX ORDER — MEPERIDINE HYDROCHLORIDE 25 MG/ML
6.25 INJECTION INTRAMUSCULAR; INTRAVENOUS; SUBCUTANEOUS
Status: DISCONTINUED | OUTPATIENT
Start: 2023-09-21 | End: 2023-09-21 | Stop reason: HOSPADM

## 2023-09-21 RX ORDER — DIPHENHYDRAMINE HYDROCHLORIDE 50 MG/ML
12.5 INJECTION INTRAMUSCULAR; INTRAVENOUS
Status: DISCONTINUED | OUTPATIENT
Start: 2023-09-21 | End: 2023-09-21 | Stop reason: HOSPADM

## 2023-09-21 RX ORDER — CEFAZOLIN SODIUM 1 G/3ML
INJECTION, POWDER, FOR SOLUTION INTRAMUSCULAR; INTRAVENOUS
Status: DISCONTINUED | OUTPATIENT
Start: 2023-09-21 | End: 2023-09-21 | Stop reason: HOSPADM

## 2023-09-21 RX ORDER — OXYCODONE HCL 5 MG/5 ML
10 SOLUTION, ORAL ORAL
Status: DISCONTINUED | OUTPATIENT
Start: 2023-09-21 | End: 2023-09-21 | Stop reason: HOSPADM

## 2023-09-21 RX ORDER — CEFAZOLIN SODIUM 1 G/3ML
INJECTION, POWDER, FOR SOLUTION INTRAMUSCULAR; INTRAVENOUS PRN
Status: DISCONTINUED | OUTPATIENT
Start: 2023-09-21 | End: 2023-09-21 | Stop reason: SURG

## 2023-09-21 RX ORDER — ONDANSETRON 2 MG/ML
INJECTION INTRAMUSCULAR; INTRAVENOUS PRN
Status: DISCONTINUED | OUTPATIENT
Start: 2023-09-21 | End: 2023-09-21 | Stop reason: SURG

## 2023-09-21 RX ORDER — HYDROMORPHONE HYDROCHLORIDE 1 MG/ML
0.5 INJECTION, SOLUTION INTRAMUSCULAR; INTRAVENOUS; SUBCUTANEOUS
Status: DISCONTINUED | OUTPATIENT
Start: 2023-09-21 | End: 2023-09-21 | Stop reason: HOSPADM

## 2023-09-21 RX ORDER — DEXTROSE MONOHYDRATE 25 G/50ML
25 INJECTION, SOLUTION INTRAVENOUS
Status: DISCONTINUED | OUTPATIENT
Start: 2023-09-21 | End: 2023-09-28

## 2023-09-21 RX ORDER — LEVETIRACETAM 500 MG/5ML
INJECTION, SOLUTION, CONCENTRATE INTRAVENOUS PRN
Status: DISCONTINUED | OUTPATIENT
Start: 2023-09-21 | End: 2023-09-21 | Stop reason: SURG

## 2023-09-21 RX ORDER — HYDROMORPHONE HYDROCHLORIDE 2 MG/ML
INJECTION, SOLUTION INTRAMUSCULAR; INTRAVENOUS; SUBCUTANEOUS PRN
Status: DISCONTINUED | OUTPATIENT
Start: 2023-09-21 | End: 2023-09-21 | Stop reason: SURG

## 2023-09-21 RX ORDER — PHENYLEPHRINE HYDROCHLORIDE 10 MG/ML
INJECTION, SOLUTION INTRAMUSCULAR; INTRAVENOUS; SUBCUTANEOUS PRN
Status: DISCONTINUED | OUTPATIENT
Start: 2023-09-21 | End: 2023-09-21 | Stop reason: SURG

## 2023-09-21 RX ORDER — HYDROMORPHONE HYDROCHLORIDE 1 MG/ML
0.2 INJECTION, SOLUTION INTRAMUSCULAR; INTRAVENOUS; SUBCUTANEOUS
Status: DISCONTINUED | OUTPATIENT
Start: 2023-09-21 | End: 2023-09-21 | Stop reason: HOSPADM

## 2023-09-21 RX ORDER — SODIUM CHLORIDE, SODIUM LACTATE, POTASSIUM CHLORIDE, CALCIUM CHLORIDE 600; 310; 30; 20 MG/100ML; MG/100ML; MG/100ML; MG/100ML
INJECTION, SOLUTION INTRAVENOUS
Status: DISCONTINUED | OUTPATIENT
Start: 2023-09-21 | End: 2023-09-21 | Stop reason: SURG

## 2023-09-21 RX ADMIN — DEXAMETHASONE SODIUM PHOSPHATE 8 MG: 4 INJECTION INTRA-ARTICULAR; INTRALESIONAL; INTRAMUSCULAR; INTRAVENOUS; SOFT TISSUE at 19:21

## 2023-09-21 RX ADMIN — LIDOCAINE HYDROCHLORIDE 100 MG: 20 INJECTION, SOLUTION EPIDURAL; INFILTRATION; INTRACAUDAL at 18:58

## 2023-09-21 RX ADMIN — POTASSIUM CHLORIDE 10 MEQ: 7.46 INJECTION, SOLUTION INTRAVENOUS at 11:10

## 2023-09-21 RX ADMIN — PHENYLEPHRINE HYDROCHLORIDE 100 MCG: 10 INJECTION INTRAVENOUS at 21:14

## 2023-09-21 RX ADMIN — HYDROMORPHONE HYDROCHLORIDE 0.5 MG: 2 INJECTION INTRAMUSCULAR; INTRAVENOUS; SUBCUTANEOUS at 20:01

## 2023-09-21 RX ADMIN — PROPOFOL 150 MG: 10 INJECTION, EMULSION INTRAVENOUS at 18:58

## 2023-09-21 RX ADMIN — DEXAMETHASONE SODIUM PHOSPHATE 4 MG: 4 INJECTION INTRA-ARTICULAR; INTRALESIONAL; INTRAMUSCULAR; INTRAVENOUS; SOFT TISSUE at 12:20

## 2023-09-21 RX ADMIN — ROCURONIUM BROMIDE 50 MG: 50 INJECTION, SOLUTION INTRAVENOUS at 18:58

## 2023-09-21 RX ADMIN — POTASSIUM CHLORIDE 10 MEQ: 7.46 INJECTION, SOLUTION INTRAVENOUS at 10:08

## 2023-09-21 RX ADMIN — SODIUM CHLORIDE 60 ML/HR: 3 INJECTION, SOLUTION INTRAVENOUS at 06:08

## 2023-09-21 RX ADMIN — MIDAZOLAM 2 MG: 1 INJECTION, SOLUTION INTRAMUSCULAR; INTRAVENOUS at 18:51

## 2023-09-21 RX ADMIN — ESMOLOL HYDROCHLORIDE 50 MG: 100 INJECTION, SOLUTION INTRAVENOUS at 19:11

## 2023-09-21 RX ADMIN — POTASSIUM CHLORIDE 10 MEQ: 7.46 INJECTION, SOLUTION INTRAVENOUS at 13:18

## 2023-09-21 RX ADMIN — SUGAMMADEX 200 MG: 100 INJECTION, SOLUTION INTRAVENOUS at 22:11

## 2023-09-21 RX ADMIN — HYDROMORPHONE HYDROCHLORIDE 1 MG: 2 INJECTION INTRAMUSCULAR; INTRAVENOUS; SUBCUTANEOUS at 18:51

## 2023-09-21 RX ADMIN — HYDROMORPHONE HYDROCHLORIDE 0.5 MG: 2 INJECTION INTRAMUSCULAR; INTRAVENOUS; SUBCUTANEOUS at 19:48

## 2023-09-21 RX ADMIN — PHENYLEPHRINE HYDROCHLORIDE 200 MCG: 10 INJECTION INTRAVENOUS at 21:43

## 2023-09-21 RX ADMIN — PHENYLEPHRINE HYDROCHLORIDE 100 MCG: 10 INJECTION INTRAVENOUS at 21:31

## 2023-09-21 RX ADMIN — CEFAZOLIN 2 G: 1 INJECTION, POWDER, FOR SOLUTION INTRAMUSCULAR; INTRAVENOUS at 18:58

## 2023-09-21 RX ADMIN — INSULIN HUMAN 3 UNITS: 100 INJECTION, SOLUTION PARENTERAL at 06:49

## 2023-09-21 RX ADMIN — INSULIN HUMAN 3 UNITS: 100 INJECTION, SOLUTION PARENTERAL at 12:22

## 2023-09-21 RX ADMIN — POTASSIUM CHLORIDE 10 MEQ: 7.46 INJECTION, SOLUTION INTRAVENOUS at 12:12

## 2023-09-21 RX ADMIN — DEXAMETHASONE SODIUM PHOSPHATE 4 MG: 4 INJECTION INTRA-ARTICULAR; INTRALESIONAL; INTRAMUSCULAR; INTRAVENOUS; SOFT TISSUE at 00:34

## 2023-09-21 RX ADMIN — SODIUM CHLORIDE 70 ML/HR: 3 INJECTION, SOLUTION INTRAVENOUS at 10:41

## 2023-09-21 RX ADMIN — DEXAMETHASONE SODIUM PHOSPHATE 4 MG: 4 INJECTION INTRA-ARTICULAR; INTRALESIONAL; INTRAMUSCULAR; INTRAVENOUS; SOFT TISSUE at 06:10

## 2023-09-21 RX ADMIN — PROPOFOL 50 MG: 10 INJECTION, EMULSION INTRAVENOUS at 19:11

## 2023-09-21 RX ADMIN — PHENYLEPHRINE HYDROCHLORIDE 100 MCG: 10 INJECTION INTRAVENOUS at 20:59

## 2023-09-21 RX ADMIN — LEVETIRACETAM 500 MG: 100 INJECTION, SOLUTION, CONCENTRATE INTRAVENOUS at 19:21

## 2023-09-21 RX ADMIN — SODIUM CHLORIDE, POTASSIUM CHLORIDE, SODIUM LACTATE AND CALCIUM CHLORIDE: 600; 310; 30; 20 INJECTION, SOLUTION INTRAVENOUS at 18:50

## 2023-09-21 RX ADMIN — ONDANSETRON 4 MG: 2 INJECTION INTRAMUSCULAR; INTRAVENOUS at 22:11

## 2023-09-21 ASSESSMENT — COGNITIVE AND FUNCTIONAL STATUS - GENERAL
DRESSING REGULAR LOWER BODY CLOTHING: A LOT
TURNING FROM BACK TO SIDE WHILE IN FLAT BAD: A LITTLE
HELP NEEDED FOR BATHING: A LITTLE
MOBILITY SCORE: 14
TOILETING: A LITTLE
DAILY ACTIVITIY SCORE: 18
SUGGESTED CMS G CODE MODIFIER MOBILITY: CL
STANDING UP FROM CHAIR USING ARMS: A LOT
MOVING TO AND FROM BED TO CHAIR: A LITTLE
SUGGESTED CMS G CODE MODIFIER DAILY ACTIVITY: CK
PERSONAL GROOMING: A LITTLE
CLIMB 3 TO 5 STEPS WITH RAILING: A LOT
WALKING IN HOSPITAL ROOM: A LOT
DRESSING REGULAR UPPER BODY CLOTHING: A LITTLE
MOVING FROM LYING ON BACK TO SITTING ON SIDE OF FLAT BED: A LOT

## 2023-09-21 ASSESSMENT — ENCOUNTER SYMPTOMS
DIARRHEA: 0
WEAKNESS: 1
HEADACHES: 0
VOMITING: 0
SHORTNESS OF BREATH: 0
NAUSEA: 0
FEVER: 0
ABDOMINAL PAIN: 0
BACK PAIN: 0

## 2023-09-21 ASSESSMENT — LIFESTYLE VARIABLES
AVERAGE NUMBER OF DAYS PER WEEK YOU HAVE A DRINK CONTAINING ALCOHOL: 0
HAVE YOU EVER FELT YOU SHOULD CUT DOWN ON YOUR DRINKING: NO
TOTAL SCORE: 0
ON A TYPICAL DAY WHEN YOU DRINK ALCOHOL HOW MANY DRINKS DO YOU HAVE: 0
ALCOHOL_USE: NO
EVER HAD A DRINK FIRST THING IN THE MORNING TO STEADY YOUR NERVES TO GET RID OF A HANGOVER: NO
TOTAL SCORE: 0
HOW MANY TIMES IN THE PAST YEAR HAVE YOU HAD 5 OR MORE DRINKS IN A DAY: 0
HAVE PEOPLE ANNOYED YOU BY CRITICIZING YOUR DRINKING: NO
CONSUMPTION TOTAL: NEGATIVE
EVER FELT BAD OR GUILTY ABOUT YOUR DRINKING: NO
TOTAL SCORE: 0

## 2023-09-21 ASSESSMENT — PAIN SCALES - GENERAL: PAIN_LEVEL: 0

## 2023-09-21 ASSESSMENT — FIBROSIS 4 INDEX: FIB4 SCORE: 3.08

## 2023-09-21 NOTE — PROGRESS NOTES
Received in bed aaox4, denies any discomfort, BLE weakness and numbness, seen by Dr. Matos with orders to increase 3% form 60ml to 70 ml, POC discussed, NPO, surgery at 1600 as planned,betts to fawad with adeq UO, needs attended.

## 2023-09-21 NOTE — PROGRESS NOTES
Neurosurgery Progress Note    Subjective:  Doing well this am.  Discussed upcoming surgery with Dr. Be and family at bedside.    Exam:  A&O x4  PERRL  BUE good strength  BLE weakness unchanged    BP  Min: 104/70  Max: 145/82  Pulse  Av.9  Min: 61  Max: 105  Resp  Av.8  Min: 13  Max: 24  Temp  Av.2 °C (97.1 °F)  Min: 35.8 °C (96.5 °F)  Max: 36.4 °C (97.6 °F)  SpO2  Av.6 %  Min: 94 %  Max: 98 %    No data recorded    Recent Labs     23  1300 23  0530 23  0505   WBC 10.1 8.0 4.7*   RBC 3.93* 3.92* 3.24*   HEMOGLOBIN 11.8* 11.6* 9.9*   HEMATOCRIT 36.2* 35.7* 29.8*   MCV 92.1 91.1 92.0   MCH 30.0 29.6 30.6   MCHC 32.6 32.5 33.2   RDW 58.7* 58.4* 58.2*   PLATELETCT 252 250 183   MPV 9.3 9.5 9.7     Recent Labs     23  1840 23  0030 23  0505   SODIUM 138 136 140   POTASSIUM 3.4* 3.2* 3.3*   CHLORIDE 110 109 111   CO2 14* 16* 16*   GLUCOSE 243* 249* 234*   BUN 17 15 14   CREATININE 0.40* 0.31* 0.33*   CALCIUM 7.9* 7.8* 7.3*     Recent Labs     23  0505   INR 1.48*     Recent Labs     23  0815   REACTMIN 6.4   CLOTKINET 0.9   CLOTANGL 77.5   MAXCLOTS 64.3   PRCINADP 0.7   PRCINAA 3.7       Intake/Output                         23 07 - 23 0659 23 - 23 0659      Total  Total                 Intake    P.O.  240  240 480  --  -- --    P.O. 240 240 480 -- -- --    I.V.  1206.2  640.8 1847  --  -- --    Volume (mL) (NS infusion) 240 -- 240 -- -- --    Volume (mL) (3% sodium chloride (Hypertonic Saline) 500mL infusion) 179.3 -- 179.3 -- -- --    Volume (mL) (3% sodium chloride (Hypertonic Saline) 500mL infusion) 286.8 640.8 927.7 -- -- --    Volume (mL) (NS (Bolus) infusion 500 mL) 500 -- 500 -- -- --    Total Intake 1446.2 880.8 2327 -- -- --       Output    Urine  1100  850 1950  --  -- --    Output (mL) (Urethral Catheter 16 Fr.) 1946 216 6378 -- -- --    Stool  --  -- --  --  -- --     Number of Times Stooled -- 0 x 0 x -- -- --    Total Output 5303 982 5527 -- -- --       Net I/O     346.2 30.8 377 -- -- --              Intake/Output Summary (Last 24 hours) at 9/21/2023 0937  Last data filed at 9/21/2023 0600  Gross per 24 hour   Intake 2167.57 ml   Output 1700 ml   Net 467.57 ml             MD Alert...Adult ICU Electrolyte Replacement per Pharmacy   PHARMACY TO DOSE    senna-docusate  2 Tablet BID    And    polyethylene glycol/lytes  1 Packet QDAY PRN    And    magnesium hydroxide  30 mL QDAY PRN    And    bisacodyl  10 mg QDAY PRN    ondansetron  4 mg Q4HRS PRN    ondansetron  4 mg Q4HRS PRN    promethazine  12.5-25 mg Q4HRS PRN    promethazine  12.5-25 mg Q4HRS PRN    prochlorperazine  5-10 mg Q4HRS PRN    3% sodium chloride  0-80 mL/hr Continuous    labetalol  10 mg Q4HRS PRN    dexamethasone  4 mg Q6HRS       Assessment and Plan:  Hospital day #2  POD #n/a  Prophylactic anticoagulation: no         Start date/time: TBD - pending surgery    - To OR today for right retrosigmoid craniotomy for tumor resection, pt and family updated at bedside by Dr. Be.  - Keep NPO until surgery.  - Continue Decadron 4 mg every 6 hours    Please call with questions    SATNAM Law    My total time spent caring for the patient on the day of the encounter was 30 minutes.   This does not include time spent on separately billable procedures/tests.

## 2023-09-21 NOTE — CARE PLAN
Problem: Knowledge Deficit - Standard  Goal: Patient and family/care givers will demonstrate understanding of plan of care, disease process/condition, diagnostic tests and medications  Outcome: Progressing  Note: NPO, surgery at 1600, q6 NA level draws, neuro checks q2 hours     Problem: Hemodynamics  Goal: Patient's hemodynamics, fluid balance and neurologic status will be stable or improve  Outcome: Progressing  Note: 3% drip   The patient is Watcher - Medium risk of patient condition declining or worsening    Shift Goals  Clinical Goals: sbP<180, stable neuro  Patient Goals: rest  Family Goals: rosa    Progress made toward(s) clinical / shift goals:  stable neuro status and Na level    Patient is not progressing towards the following goals:

## 2023-09-21 NOTE — THERAPY
Speech Language Therapy Contact Note    Patient Name: Casper Rowley  Age:  58 y.o., Sex:  male  Medical Record #: [id number]  Today's Date: 9/21/2023 09/21/23 0917   Treatment Variance   Reason For Missed Therapy Medical - Other (Please Comment)   Initial Contact Note    Initial Contact Note  Order Received and Verified, Speech Therapy Evaluation in Progress with Full Report to Follow.   Interdisciplinary Plan of Care Collaboration   IDT Collaboration with  Nursing   Collaboration Comments Pt to have a resection and crani later today. SLP to hold cognitive-linguiatic evaluation until after procedure when medically appropriate.

## 2023-09-21 NOTE — CARE PLAN
The patient is Watcher - Medium risk of patient condition declining or worsening    Shift Goals  Clinical Goals: sbP<180, stable neuro  Patient Goals: rest  Family Goals: rosa    Progress made toward(s) clinical / shift goals:    Problem: Knowledge Deficit - Standard  Goal: Patient and family/care givers will demonstrate understanding of plan of care, disease process/condition, diagnostic tests and medications  Outcome: Progressing     Problem: Fall Risk  Goal: Patient will remain free from falls  Outcome: Progressing     Problem: Hemodynamics  Goal: Patient's hemodynamics, fluid balance and neurologic status will be stable or improve  Outcome: Progressing       Patient is not progressing towards the following goals:

## 2023-09-21 NOTE — PROGRESS NOTES
"Critical Care Progress Note    Date of admission  9/19/2023    Chief Complaint  \"58 y.o. male with hx of metastatic prostate CA , HTN, and DM who presented 9/19/2023 with 2 weeks of nausea, vomiting, and weakness. He was transferred to [facility] by EMS. In the ED a CT head revealed significant vasogenic edema throughout the right hemicerebellum causing effacement of the fourth ventricle and moderate obstructive hydrocephalus.  Dr. Cobb, neurosurgery was consulted.  She has recommended MRI brain for surgical planning.  Neuro critical care consultation was requested for further evaluation and management\" - Dr. Alvarez.     Hospital Course  9/19 admitted to ICU    Interval Problem Update  Reviewed last 24 hour events:  No acute changes overnight   On 3% hypertonic saline at 60cc/hr.   Sodium 140. Goal sodium 145-155  Pt alert, oriented. No neurological change overnight.   HR in 70-90s  SBP 120s-130s   Creatinine 0.33, HCO3 16. Good UOP  To OR today, scheduled at 4pm   Pt is NPO      Review of Systems  Review of Systems   Constitutional:  Negative for fever.   Respiratory:  Negative for shortness of breath.    Cardiovascular:  Negative for chest pain and leg swelling.   Gastrointestinal:  Negative for abdominal pain, diarrhea, nausea and vomiting.   Musculoskeletal:  Negative for back pain.        Weakness in lower extremitiy   Neurological:  Positive for weakness. Negative for headaches.   All other systems reviewed and are negative.       Vital Signs for last 24 hours   Temp:  [35.8 °C (96.5 °F)-36.4 °C (97.6 °F)] 35.8 °C (96.5 °F)  Pulse:  [] 61  Resp:  [13-24] 17  BP: (104-145)/(60-84) 126/60  SpO2:  [94 %-98 %] 94 %    Hemodynamic parameters for last 24 hours       Respiratory Information for the last 24 hours       Physical Exam   Physical Exam  Vitals and nursing note reviewed.   Constitutional:       General: He is not in acute distress.     Appearance: He is ill-appearing. He is not toxic-appearing or " diaphoretic.   HENT:      Head: Normocephalic.      Mouth/Throat:      Mouth: Mucous membranes are moist.   Cardiovascular:      Rate and Rhythm: Normal rate and regular rhythm.      Pulses: Normal pulses.      Heart sounds: Normal heart sounds. No murmur heard.  Pulmonary:      Effort: Pulmonary effort is normal. No respiratory distress.      Breath sounds: Normal breath sounds. No wheezing, rhonchi or rales.   Abdominal:      General: Bowel sounds are normal. There is no distension.      Palpations: Abdomen is soft.      Tenderness: There is no abdominal tenderness. There is no guarding.   Musculoskeletal:      Right lower leg: No edema.      Left lower leg: No edema.      Comments: Lower extremity weakness bilaterally   Motor strength 3/5  Sensation decreased bilaterally   Upper extremity 5/5    Skin:     General: Skin is warm and dry.      Capillary Refill: Capillary refill takes less than 2 seconds.      Coloration: Skin is not jaundiced.   Neurological:      General: No focal deficit present.      Mental Status: He is alert and oriented to person, place, and time.      Cranial Nerves: No cranial nerve deficit.   Psychiatric:         Mood and Affect: Mood normal.         Medications  Current Facility-Administered Medications   Medication Dose Route Frequency Provider Last Rate Last Admin    senna-docusate (Pericolace Or Senokot S) 8.6-50 MG per tablet 2 Tablet  2 Tablet Oral BID LIBAN SánchezO.        And    polyethylene glycol/lytes (Miralax) PACKET 1 Packet  1 Packet Oral QDAY PRN MARCY Sánchez.O.        And    magnesium hydroxide (Milk Of Magnesia) suspension 30 mL  30 mL Oral QDAY PRN MARCY Sánchez.O.        And    bisacodyl (Dulcolax) suppository 10 mg  10 mg Rectal QDAY PRN MARCY Sánchez.O.        ondansetron (Zofran) syringe/vial injection 4 mg  4 mg Intravenous Q4HRS PRN MARCY Sánchez.O.        ondansetron (Zofran ODT) dispertab 4 mg  4 mg Oral Q4HRS PRN MARCY Sánchez.O.        promethazine (Phenergan)  tablet 12.5-25 mg  12.5-25 mg Oral Q4HRS PRN MARCY Sánchez.O.        promethazine (Phenergan) suppository 12.5-25 mg  12.5-25 mg Rectal Q4HRS PRN MARCY Sánchez.O.        prochlorperazine (Compazine) injection 5-10 mg  5-10 mg Intravenous Q4HRS PRN LIBAN SánchezO.        insulin regular (HumuLIN R,NovoLIN R) injection  2-9 Units Subcutaneous 4X/DAY ACHS MARCY Sánchez.O.   3 Units at 09/20/23 2233    And    dextrose 50% (D50W) injection 25 g  25 g Intravenous Q15 MIN PRN Chuck Matos D.O.        3% sodium chloride (Hypertonic Saline) 500mL infusion  0-60 mL/hr Intravenous Continuous Chuck Matos D.O. 60 mL/hr at 09/21/23 0700 60 mL/hr at 09/21/23 0700    labetalol (Normodyne/Trandate) injection 10 mg  10 mg Intravenous Q4HRS PRN Franki Alvarez M.D.        dexamethasone (Decadron) injection 4 mg  4 mg Intravenous Q6HRS Franki Alvarez M.D.   4 mg at 09/21/23 0610       Fluids    Intake/Output Summary (Last 24 hours) at 9/21/2023 0644  Last data filed at 9/21/2023 0600  Gross per 24 hour   Intake 2326.99 ml   Output 1950 ml   Net 376.99 ml         Laboratory          Recent Labs     09/20/23  0530 09/20/23  1141 09/20/23  1840 09/21/23  0030 09/21/23  0505   SODIUM 136   < > 138 136 140   POTASSIUM 3.9   < > 3.4* 3.2* 3.3*   CHLORIDE 107   < > 110 109 111   CO2 13*   < > 14* 16* 16*   BUN 13   < > 17 15 14   CREATININE 0.40*   < > 0.40* 0.31* 0.33*   MAGNESIUM 2.2  --   --   --   --    CALCIUM 8.0*   < > 7.9* 7.8* 7.3*    < > = values in this interval not displayed.       Recent Labs     09/19/23  1300 09/20/23  0530 09/20/23  1840 09/21/23  0030 09/21/23  0505   ALTSGPT <5  --   --   --   --    ASTSGOT 21  --   --   --   --    ALKPHOSPHAT 110*  --   --   --   --    TBILIRUBIN 0.6  --   --   --   --    GLUCOSE 190*   < > 243* 249* 234*    < > = values in this interval not displayed.       Recent Labs     09/19/23  1300 09/20/23  0530 09/21/23  0505   WBC 10.1 8.0 4.7*   NEUTSPOLYS 89.30* 95.30* 89.60*   LYMPHOCYTES  4.10* 3.10* 5.40*   MONOCYTES 6.00 1.00 3.90   EOSINOPHILS 0.00 0.00 0.00   BASOPHILS 0.10 0.10 0.00   ASTSGOT 21  --   --    ALTSGPT <5  --   --    ALKPHOSPHAT 110*  --   --    TBILIRUBIN 0.6  --   --        Recent Labs     09/19/23  1300 09/20/23  0530 09/21/23  0505   RBC 3.93* 3.92* 3.24*   HEMOGLOBIN 11.8* 11.6* 9.9*   HEMATOCRIT 36.2* 35.7* 29.8*   PLATELETCT 252 250 183   PROTHROMBTM  --   --  18.1*   INR  --   --  1.48*         Imaging  X-Ray:  I have personally reviewed the images and compared with prior images.  CT:    Reviewed    Assessment/Plan  * Brain mass- (present on admission)  Assessment & Plan  Brain mass, likely metastatic prostate CA  Osmotic therapy, target serum sodium 145-155  3% hypertonic saline to achieve the sodium target. Will increase to max 80cc/hr. Central line in place  BMP Q6H  Dexamethasone 4 mg IV every 6 for vasogenic edema  Serial neurologic exams, neuro Q2H  If mental status changes/neurological decline, CTH non con stat, may need 23% or emergent EVD  Plan for OR tomorrow per Dr. Be, NSGY      Prostate cancer (HCC)  Assessment & Plan  Diagnosed about 2 years ago, seen by our local heme/onc.  On chemo/radiation therapy, last chemo 4 weeks ago  Recently seen by [facility] 4 weeks ago and pt was planned to start on lutetium-177 therapy   He also c/o lower extremity weaknesses bilaterally in the past 2 weeks.  MRI cervical, thoracic and lumbar spine with extensive bone metastasis.       NED (obstructive sleep apnea)- (present on admission)  Assessment & Plan  CPAP while sleeping    HTN (hypertension), benign- (present on admission)  Assessment & Plan  Allow permissive hypertension up to 180 mmHg for cerebral perfusion         VTE:  Lovenox Will hold for tonight before OR tomorrow  Ulcer: H2 Antagonist  Lines: None    I have performed a physical exam and reviewed and updated ROS and Plan today (9/21/2023). In review of yesterday's note (9/20/2023), there are no changes except as  documented above.     Discussed patient condition and risk of morbidity and/or mortality with RN, RT, Pharmacy, Charge nurse / hot rounds, and Patient  The patient remains critically ill.  Critical care time = 50 minutes in directly providing and coordinating critical care and extensive data review.  No time overlap and excludes procedures.    D/w , NSGY

## 2023-09-21 NOTE — CARE PLAN
Problem: Knowledge Deficit - Standard  Goal: Patient and family/care givers will demonstrate understanding of plan of care, disease process/condition, diagnostic tests and medications  Outcome: Progressing     Problem: Fall Risk  Goal: Patient will remain free from falls  Outcome: Progressing     Problem: Psychosocial  Goal: Patient's level of anxiety will decrease  Outcome: Progressing  Goal: Patient's ability to verbalize feelings about condition will improve  Outcome: Progressing  Goal: Patient and family will demonstrate ability to cope with life altering diagnosis and/or procedure  Outcome: Progressing     Problem: Hemodynamics  Goal: Patient's hemodynamics, fluid balance and neurologic status will be stable or improve  Outcome: Progressing     Problem: Respiratory  Goal: Patient will achieve/maintain optimum respiratory ventilation and gas exchange  Outcome: Progressing     Problem: Venous Thromboembolism (VTE) Prevention  Goal: The patient will remain free from venous thromboembolism (VTE)  Outcome: Progressing   The patient is Stable - Low risk of patient condition declining or worsening    Shift Goals  Clinical Goals: Stable neuro, BP <180  Patient Goals: Rest  Family Goals: LY    Progress made toward(s) clinical / shift goals:  Patient is now on the 3% protocol. 3% was titrated up to 50 around 1100. Na is still outside of goal. Central line was placed during this shift, pending CXR to verify placement.

## 2023-09-22 ENCOUNTER — APPOINTMENT (OUTPATIENT)
Dept: RADIOLOGY | Facility: MEDICAL CENTER | Age: 58
DRG: 025 | End: 2023-09-22
Attending: INTERNAL MEDICINE
Payer: COMMERCIAL

## 2023-09-22 LAB
ANION GAP SERPL CALC-SCNC: 10 MMOL/L (ref 7–16)
ANION GAP SERPL CALC-SCNC: 10 MMOL/L (ref 7–16)
BASOPHILS # BLD AUTO: 0.1 % (ref 0–1.8)
BASOPHILS # BLD: 0.01 K/UL (ref 0–0.12)
BUN SERPL-MCNC: 11 MG/DL (ref 8–22)
BUN SERPL-MCNC: 12 MG/DL (ref 8–22)
CA-I SERPL-SCNC: 1 MMOL/L (ref 1.1–1.3)
CALCIUM SERPL-MCNC: 6.8 MG/DL (ref 8.5–10.5)
CALCIUM SERPL-MCNC: 7.4 MG/DL (ref 8.5–10.5)
CHLORIDE SERPL-SCNC: 111 MMOL/L (ref 96–112)
CHLORIDE SERPL-SCNC: 114 MMOL/L (ref 96–112)
CO2 SERPL-SCNC: 18 MMOL/L (ref 20–33)
CO2 SERPL-SCNC: 19 MMOL/L (ref 20–33)
CREAT SERPL-MCNC: 0.23 MG/DL (ref 0.5–1.4)
CREAT SERPL-MCNC: 0.27 MG/DL (ref 0.5–1.4)
EOSINOPHIL # BLD AUTO: 0 K/UL (ref 0–0.51)
EOSINOPHIL NFR BLD: 0 % (ref 0–6.9)
ERYTHROCYTE [DISTWIDTH] IN BLOOD BY AUTOMATED COUNT: 58.1 FL (ref 35.9–50)
GFR SERPLBLD CREATININE-BSD FMLA CKD-EPI: 143 ML/MIN/1.73 M 2
GFR SERPLBLD CREATININE-BSD FMLA CKD-EPI: 150 ML/MIN/1.73 M 2
GLUCOSE BLD STRIP.AUTO-MCNC: 189 MG/DL (ref 65–99)
GLUCOSE BLD STRIP.AUTO-MCNC: 222 MG/DL (ref 65–99)
GLUCOSE BLD STRIP.AUTO-MCNC: 223 MG/DL (ref 65–99)
GLUCOSE BLD STRIP.AUTO-MCNC: 249 MG/DL (ref 65–99)
GLUCOSE SERPL-MCNC: 207 MG/DL (ref 65–99)
GLUCOSE SERPL-MCNC: 258 MG/DL (ref 65–99)
HCT VFR BLD AUTO: 29.4 % (ref 42–52)
HGB BLD-MCNC: 10 G/DL (ref 14–18)
HGB BLD-MCNC: 9.7 G/DL (ref 14–18)
IMM GRANULOCYTES # BLD AUTO: 0.35 K/UL (ref 0–0.11)
IMM GRANULOCYTES NFR BLD AUTO: 4 % (ref 0–0.9)
INR PPP: 1.33 (ref 0.87–1.13)
LYMPHOCYTES # BLD AUTO: 0.19 K/UL (ref 1–4.8)
LYMPHOCYTES NFR BLD: 2.2 % (ref 22–41)
MAGNESIUM SERPL-MCNC: 2.1 MG/DL (ref 1.5–2.5)
MAGNESIUM SERPL-MCNC: 2.1 MG/DL (ref 1.5–2.5)
MCH RBC QN AUTO: 30.4 PG (ref 27–33)
MCHC RBC AUTO-ENTMCNC: 33 G/DL (ref 32.3–36.5)
MCV RBC AUTO: 92.2 FL (ref 81.4–97.8)
MONOCYTES # BLD AUTO: 0.34 K/UL (ref 0–0.85)
MONOCYTES NFR BLD AUTO: 3.9 % (ref 0–13.4)
NEUTROPHILS # BLD AUTO: 7.91 K/UL (ref 1.82–7.42)
NEUTROPHILS NFR BLD: 89.8 % (ref 44–72)
NRBC # BLD AUTO: 0 K/UL
NRBC BLD-RTO: 0 /100 WBC (ref 0–0.2)
PATHOLOGY CONSULT NOTE: NORMAL
PHOSPHATE SERPL-MCNC: 1.5 MG/DL (ref 2.5–4.5)
PHOSPHATE SERPL-MCNC: 1.5 MG/DL (ref 2.5–4.5)
PLATELET # BLD AUTO: 169 K/UL (ref 164–446)
PMV BLD AUTO: 10.1 FL (ref 9–12.9)
POTASSIUM SERPL-SCNC: 3.4 MMOL/L (ref 3.6–5.5)
POTASSIUM SERPL-SCNC: 3.6 MMOL/L (ref 3.6–5.5)
PROTHROMBIN TIME: 16.7 SEC (ref 12–14.6)
RBC # BLD AUTO: 3.19 M/UL (ref 4.7–6.1)
SODIUM SERPL-SCNC: 140 MMOL/L (ref 135–145)
SODIUM SERPL-SCNC: 142 MMOL/L (ref 135–145)
WBC # BLD AUTO: 8.8 K/UL (ref 4.8–10.8)

## 2023-09-22 PROCEDURE — 82962 GLUCOSE BLOOD TEST: CPT

## 2023-09-22 PROCEDURE — 700111 HCHG RX REV CODE 636 W/ 250 OVERRIDE (IP): Mod: JZ | Performed by: INTERNAL MEDICINE

## 2023-09-22 PROCEDURE — 97167 OT EVAL HIGH COMPLEX 60 MIN: CPT

## 2023-09-22 PROCEDURE — 97163 PT EVAL HIGH COMPLEX 45 MIN: CPT

## 2023-09-22 PROCEDURE — 700111 HCHG RX REV CODE 636 W/ 250 OVERRIDE (IP): Performed by: INTERNAL MEDICINE

## 2023-09-22 PROCEDURE — 99291 CRITICAL CARE FIRST HOUR: CPT | Performed by: INTERNAL MEDICINE

## 2023-09-22 PROCEDURE — 85610 PROTHROMBIN TIME: CPT

## 2023-09-22 PROCEDURE — 82330 ASSAY OF CALCIUM: CPT

## 2023-09-22 PROCEDURE — 85025 COMPLETE CBC W/AUTO DIFF WBC: CPT

## 2023-09-22 PROCEDURE — 70553 MRI BRAIN STEM W/O & W/DYE: CPT

## 2023-09-22 PROCEDURE — 700111 HCHG RX REV CODE 636 W/ 250 OVERRIDE (IP): Mod: JZ | Performed by: NEUROLOGICAL SURGERY

## 2023-09-22 PROCEDURE — 83735 ASSAY OF MAGNESIUM: CPT

## 2023-09-22 PROCEDURE — 700105 HCHG RX REV CODE 258: Performed by: NEUROLOGICAL SURGERY

## 2023-09-22 PROCEDURE — 97535 SELF CARE MNGMENT TRAINING: CPT

## 2023-09-22 PROCEDURE — 700105 HCHG RX REV CODE 258: Performed by: INTERNAL MEDICINE

## 2023-09-22 PROCEDURE — 84100 ASSAY OF PHOSPHORUS: CPT

## 2023-09-22 PROCEDURE — 80048 BASIC METABOLIC PNL TOTAL CA: CPT

## 2023-09-22 PROCEDURE — 700117 HCHG RX CONTRAST REV CODE 255: Performed by: INTERNAL MEDICINE

## 2023-09-22 PROCEDURE — A9579 GAD-BASE MR CONTRAST NOS,1ML: HCPCS | Performed by: INTERNAL MEDICINE

## 2023-09-22 PROCEDURE — 700101 HCHG RX REV CODE 250: Performed by: INTERNAL MEDICINE

## 2023-09-22 PROCEDURE — 770022 HCHG ROOM/CARE - ICU (200)

## 2023-09-22 RX ORDER — CALCIUM GLUCONATE 20 MG/ML
2 INJECTION, SOLUTION INTRAVENOUS ONCE
Status: COMPLETED | OUTPATIENT
Start: 2023-09-22 | End: 2023-09-22

## 2023-09-22 RX ORDER — ACETAMINOPHEN 325 MG/1
650 TABLET ORAL EVERY 4 HOURS PRN
Status: DISCONTINUED | OUTPATIENT
Start: 2023-09-22 | End: 2023-09-29 | Stop reason: HOSPADM

## 2023-09-22 RX ADMIN — DEXAMETHASONE SODIUM PHOSPHATE 4 MG: 4 INJECTION INTRA-ARTICULAR; INTRALESIONAL; INTRAMUSCULAR; INTRAVENOUS; SOFT TISSUE at 12:37

## 2023-09-22 RX ADMIN — INSULIN HUMAN 3 UNITS: 100 INJECTION, SOLUTION PARENTERAL at 00:31

## 2023-09-22 RX ADMIN — SODIUM PHOSPHATE, MONOBASIC, MONOHYDRATE AND SODIUM PHOSPHATE, DIBASIC, ANHYDROUS 15 MMOL: 142; 276 INJECTION, SOLUTION INTRAVENOUS at 16:07

## 2023-09-22 RX ADMIN — INSULIN HUMAN 4 UNITS: 100 INJECTION, SOLUTION PARENTERAL at 12:42

## 2023-09-22 RX ADMIN — POTASSIUM PHOSPHATE, MONOBASIC AND POTASSIUM PHOSPHATE, DIBASIC 30 MMOL: 224; 236 INJECTION, SOLUTION, CONCENTRATE INTRAVENOUS at 10:35

## 2023-09-22 RX ADMIN — INSULIN HUMAN 4 UNITS: 100 INJECTION, SOLUTION PARENTERAL at 06:00

## 2023-09-22 RX ADMIN — GADOTERIDOL 15 ML: 279.3 INJECTION, SOLUTION INTRAVENOUS at 09:57

## 2023-09-22 RX ADMIN — CEFAZOLIN 1 G: 1 INJECTION, POWDER, FOR SOLUTION INTRAMUSCULAR; INTRAVENOUS at 18:13

## 2023-09-22 RX ADMIN — CALCIUM GLUCONATE 2 G: 20 INJECTION, SOLUTION INTRAVENOUS at 07:54

## 2023-09-22 RX ADMIN — DEXAMETHASONE SODIUM PHOSPHATE 4 MG: 4 INJECTION INTRA-ARTICULAR; INTRALESIONAL; INTRAMUSCULAR; INTRAVENOUS; SOFT TISSUE at 00:00

## 2023-09-22 RX ADMIN — DEXAMETHASONE SODIUM PHOSPHATE 4 MG: 4 INJECTION INTRA-ARTICULAR; INTRALESIONAL; INTRAMUSCULAR; INTRAVENOUS; SOFT TISSUE at 17:23

## 2023-09-22 RX ADMIN — INSULIN HUMAN 4 UNITS: 100 INJECTION, SOLUTION PARENTERAL at 17:45

## 2023-09-22 RX ADMIN — CEFAZOLIN 1 G: 1 INJECTION, POWDER, FOR SOLUTION INTRAMUSCULAR; INTRAVENOUS at 11:06

## 2023-09-22 RX ADMIN — DEXAMETHASONE SODIUM PHOSPHATE 4 MG: 4 INJECTION INTRA-ARTICULAR; INTRALESIONAL; INTRAMUSCULAR; INTRAVENOUS; SOFT TISSUE at 05:41

## 2023-09-22 RX ADMIN — CEFAZOLIN 1 G: 1 INJECTION, POWDER, FOR SOLUTION INTRAMUSCULAR; INTRAVENOUS at 04:03

## 2023-09-22 ASSESSMENT — COGNITIVE AND FUNCTIONAL STATUS - GENERAL
SUGGESTED CMS G CODE MODIFIER DAILY ACTIVITY: CK
DAILY ACTIVITIY SCORE: 16
WALKING IN HOSPITAL ROOM: TOTAL
TOILETING: A LOT
PERSONAL GROOMING: A LITTLE
MOBILITY SCORE: 8
DRESSING REGULAR LOWER BODY CLOTHING: A LOT
SUGGESTED CMS G CODE MODIFIER MOBILITY: CM
MOVING TO AND FROM BED TO CHAIR: UNABLE
CLIMB 3 TO 5 STEPS WITH RAILING: TOTAL
DRESSING REGULAR UPPER BODY CLOTHING: A LITTLE
MOVING FROM LYING ON BACK TO SITTING ON SIDE OF FLAT BED: UNABLE
STANDING UP FROM CHAIR USING ARMS: A LOT
TURNING FROM BACK TO SIDE WHILE IN FLAT BAD: A LOT
HELP NEEDED FOR BATHING: A LOT

## 2023-09-22 ASSESSMENT — PAIN DESCRIPTION - PAIN TYPE
TYPE: CHRONIC PAIN;ACUTE PAIN
TYPE: ACUTE PAIN;CHRONIC PAIN

## 2023-09-22 ASSESSMENT — ENCOUNTER SYMPTOMS
DIARRHEA: 0
ABDOMINAL PAIN: 0
BACK PAIN: 0
HEADACHES: 0
FEVER: 0
WEAKNESS: 1
VOMITING: 0
SHORTNESS OF BREATH: 0
NAUSEA: 0

## 2023-09-22 ASSESSMENT — GAIT ASSESSMENTS: GAIT LEVEL OF ASSIST: UNABLE TO PARTICIPATE

## 2023-09-22 ASSESSMENT — ACTIVITIES OF DAILY LIVING (ADL): TOILETING: INDEPENDENT

## 2023-09-22 ASSESSMENT — FIBROSIS 4 INDEX: FIB4 SCORE: 3.34

## 2023-09-22 NOTE — THERAPY
Physical Therapy   Initial Evaluation     Patient Name: Casper Rowley  Age:  58 y.o., Sex:  male  Medical Record #: [id number]  Today's Date: 9/22/2023     Precautions  Precautions: Fall Risk  Comments: EVD    Assessment  Patient is a 58 y.o. male with hx of metastatic prostate CA, HTN, and DM admitted with R cerebellar lesion, vasogenic edema, and obstructive hydrocephalus. Now s/p retrosigmoid craniotomy for tumor resection, EVD placement 9/21. PT eval complete, pt currently presents below his functional baseline due to impaired strength, balance, activity tolerance, ROM coordination, and mobility. Pt reports being independent at baseline, however he had a fall 7 weeks ago with increasing debility since. Pt is now at Mod A with limited standing tolerance due to poor balance and significant BLE weakness as described below. Pt with poor coordination as well, however he is motivated to return to his PLOF and has good support at home. Pt will benefit from post acute placement at this time, recommend post acute placement. Will follow while admitted for skilled PT intervention.     Plan    Physical Therapy Initial Treatment Plan   Treatment Plan : Bed Mobility, Gait Training, Neuro Re-Education / Balance, Self Care / Home Evaluation, Therapeutic Activities, Stair Training, Therapeutic Exercise  Treatment Frequency: 4 Times per Week  Duration: Until Therapy Goals Met    DC Equipment Recommendations: Unable to determine at this time  Discharge Recommendations: Recommend post-acute placement for additional physical therapy services prior to discharge home (IPR)         Vitals   O2 (LPM) 0   O2 Delivery Device None - Room Air   Vitals Comments BP within parameters   Pain 0 - 10 Group   Therapist Pain Assessment   (no c/o pain)   Prior Living Situation   Prior Services None   Housing / Facility 1 Story House   Equipment Owned Front-Wheel Walker;4-Wheel Walker   Lives with - Patient's Self Care Capacity Significant Other    Comments SO present and very supportive, reports she can take time off of work to provide 24/7 assist if needed.   Prior Level of Functional Mobility   Bed Mobility Independent   Transfer Status Independent   Ambulation Independent   Ambulation Distance community distances   Assistive Devices Used None   Stairs Independent   Comments reports increased debility since fall 7 wks ago, now using SPC and slowly needing increased assist for mobility   History of Falls   History of Falls Yes   Date of Last Fall   (7 wks ago. pt began having increased debility since)   Cognition    Cognition / Consciousness WDL   Level of Consciousness Alert   Comments very pleasant and participatory, receptive to education   Active ROM Lower Body    Active ROM Lower Body  X   Comments no AROM with B DF, no movment with seated hip flexion but able to complete slightly in standing   Strength Lower Body   Lower Body Strength  X   Rt Hip Flexion Strength 2- (P-)   Rt Knee Extension Strength 5 (N)   Rt Ankle Dorsiflexion Strength 1 (T)   Rt Ankle Plantar Flexion Strength 5 (N)   Lt Hip Flexion Strength 2- (P-)   Lt Knee Extension Strength 4 (G)   Lt Ankle Dorsiflexion Strength 1 (T)   Lt Ankle Plantar Flexion Strength 5 (N)   Sensation Lower Body   Lower Extremity Sensation   X   Comments reports decreased sensation in BLE below his kneeds   Coordination Lower Body    Coordination Lower Body  X   Comments very delayed   Balance Assessment   Sitting Balance (Static) Good   Sitting Balance (Dynamic) Fair +   Standing Balance (Static) Poor   Standing Balance (Dynamic) Poor -   Weight Shift Sitting Fair   Weight Shift Standing Poor   Comments HHA x2   Bed Mobility    Supine to Sit Minimal Assist   Scooting Minimal Assist   Gait Analysis   Gait Level Of Assist Unable to Participate   Level of Assist with Stairs Unable to Participate   Functional Mobility   Sit to Stand Moderate Assist   Bed, Chair, Wheelchair Transfer Moderate Assist   Transfer  Method Stand Pivot   Mobility eob>chair   Comments pt able to lift each foot up slightly, but not sufficiently for ambulation. has an easier time taking small side steps to chair   ICU Target Mobility Level   ICU Mobility - Targeted Level Level 3B   How much difficulty does the patient currently have...   Turning over in bed (including adjusting bedclothes, sheets and blankets)? 2   Sitting down on and standing up from a chair with arms (e.g., wheelchair, bedside commode, etc.) 1   Moving from lying on back to sitting on the side of the bed? 1   How much help from another person does the patient currently need...   Moving to and from a bed to a chair (including a wheelchair)? 2   Need to walk in a hospital room? 1   Climbing 3-5 steps with a railing? 1   6 clicks Mobility Score 8   Activity Tolerance   Sitting in Chair 10 min/post session   Sitting Edge of Bed 10 min   Standing 2 min   Comments limited by weakness and balance   Short Term Goals    Short Term Goal # 1 pt will move supine<>eob with spv in 6 tx for bed mobility.   Short Term Goal # 2 pt will complete spt with fww and spv in 6 tx for functional mobility.   Short Term Goal # 3 pt will ambulate 150 ft with fww and spv in 6 tx for household distances.   Short Term Goal # 4 pt will negotiate 1 stair with sba in 6 tx for home access.   Education Group   Education Provided Role of Physical Therapist   Role of Physical Therapist Patient Response Patient;Acceptance;Explanation;Verbal Demonstration   Additional Comments increased education on PT POC, safe home mobility strategies, discharge planning   Physical Therapy Initial Treatment Plan    Treatment Plan  Bed Mobility;Gait Training;Neuro Re-Education / Balance;Self Care / Home Evaluation;Therapeutic Activities;Stair Training;Therapeutic Exercise   Treatment Frequency 4 Times per Week   Duration Until Therapy Goals Met   Problem List    Problems Impaired Bed Mobility;Impaired Transfers;Impaired  Ambulation;Functional ROM Deficit;Functional Strength Deficit;Impaired Balance;Decreased Activity Tolerance;Impaired Coordination   Anticipated Discharge Equipment and Recommendations   DC Equipment Recommendations Unable to determine at this time   Discharge Recommendations Recommend post-acute placement for additional physical therapy services prior to discharge home  (IPR)   Interdisciplinary Plan of Care Collaboration   IDT Collaboration with  Family / Caregiver;Nursing;Occupational Therapist   Patient Position at End of Therapy Seated;Call Light within Reach;Tray Table within Reach;Phone within Reach   Collaboration Comments RN updated   Session Information   Date / Session Number  9/22- 1 (1/4, 9/28)

## 2023-09-22 NOTE — PROGRESS NOTES
[place] HAY rounding. Orders to resume 3% at 60ml/hr when patient arrives back from surgery. Post procedure labs ordered to be completed when patient arrives. No further orders.

## 2023-09-22 NOTE — ANESTHESIA PROCEDURE NOTES
Airway    Date/Time: 9/21/2023 7:00 PM    Performed by: Bhargav Montero M.D.  Authorized by: Bhargav Montero M.D.    Location:  OR  Urgency:  Elective  Indications for Airway Management:  Anesthesia      Spontaneous Ventilation: absent    Sedation Level:  Deep  Preoxygenated: Yes    Patient Position:  Sniffing  Mask Difficulty Assessment:  0 - not attempted  Final Airway Type:  Endotracheal airway  Final Endotracheal Airway:  ETT  Cuffed: Yes    Technique Used for Successful ETT Placement:  Direct laryngoscopy    Insertion Site:  Oral  Blade Type:  Del  Laryngoscope Blade/Videolaryngoscope Blade Size:  3  ETT Size (mm):  8.0  Measured from:  Teeth  ETT to Teeth (cm):  20  Placement Verified by: auscultation and capnometry    Cormack-Lehane Classification:  Grade IIa - partial view of glottis  Number of Attempts at Approach:  1

## 2023-09-22 NOTE — PROGRESS NOTES
4 Eyes Skin Assessment Completed by Carine, RN and Mark, RN.    Head Incision - puncture to left forehead from holding device, incision sites from crani and EVD  Ears WDL  Nose WDL  Mouth WDL  Neck WDL  Breast/Chest WDL  Shoulder Blades WDL  Spine WDL  (R) Arm/Elbow/Hand WDL  (L) Arm/Elbow/Hand WDL  Abdomen WDL  Groin WDL  Scrotum/Coccyx/Buttocks WDL  (R) Leg WDL  (L) Leg WDL  (R) Heel/Foot/Toe WDL  (L) Heel/Foot/Toe WDL          Devices In Places ECG, Blood Pressure Cuff, Pulse Ox, Mistry, Arterial Line, SCD's, and Nasal Cannula      Interventions In Place Gray Ear Foams, Heel Mepilex, Sacral Mepilex, TAP System, Pillows, Q2 Turns, Low Air Loss Mattress, and Heels Loaded W/Pillows    Possible Skin Injury Yes    Pictures Uploaded Into Epic Yes  Wound Consult Placed N/A  RN Wound Prevention Protocol Ordered Yes

## 2023-09-22 NOTE — PROGRESS NOTES
"Critical Care Progress Note    Date of admission  9/19/2023    Chief Complaint  \"58 y.o. male with hx of metastatic prostate CA , HTN, and DM who presented 9/19/2023 with 2 weeks of nausea, vomiting, and weakness. He was transferred to [facility] by EMS. In the ED a CT head revealed significant vasogenic edema throughout the right hemicerebellum causing effacement of the fourth ventricle and moderate obstructive hydrocephalus.  Dr. Cobb, neurosurgery was consulted.  She has recommended MRI brain for surgical planning.  Neuro critical care consultation was requested for further evaluation and management\" - Dr. Alvarez.     Hospital Course  9/19 admitted to ICU  9/20 s/p right retrosigmoid craniotomy for resection of cerebellar mass by Dr. Be     Interval Problem Update  Reviewed last 24 hour events:  S/p retrosigmoid crani for cerebellar mass resection   No acute neurological changes overnight   EVD is in place, currently clamped.   Pt alert, oriented.  HR in 70-90s  SBP 120s-130s. Goal -140  Hgb 9.7, plt 169  Creatinine 0.23, HCO3 19.   Repleting electrolytes   Adequate UOP  Good UOP 1.1L output.   INR 1.3  MRI brain today   -240s, on high sliding scale  Mobility: goal level 4    Review of Systems  Review of Systems   Constitutional:  Negative for fever.   Respiratory:  Negative for shortness of breath.    Cardiovascular:  Negative for chest pain and leg swelling.   Gastrointestinal:  Negative for abdominal pain, diarrhea, nausea and vomiting.   Musculoskeletal:  Negative for back pain.        Weakness in lower extremitiy   Neurological:  Positive for weakness. Negative for headaches.   All other systems reviewed and are negative.       Vital Signs for last 24 hours   Temp:  [35.8 °C (96.5 °F)-36.4 °C (97.5 °F)] 35.8 °C (96.5 °F)  Pulse:  [58-93] 81  Resp:  [9-17] 17  BP: (110-149)/(62-81) 110/63  SpO2:  [95 %-100 %] 95 %    Hemodynamic parameters for last 24 hours       Respiratory Information for the " last 24 hours       Physical Exam   Physical Exam  Vitals and nursing note reviewed.   Constitutional:       General: He is not in acute distress.     Appearance: He is ill-appearing. He is not toxic-appearing or diaphoretic.   HENT:      Head: Normocephalic.      Mouth/Throat:      Mouth: Mucous membranes are moist.   Cardiovascular:      Rate and Rhythm: Normal rate and regular rhythm.      Pulses: Normal pulses.      Heart sounds: Normal heart sounds. No murmur heard.  Pulmonary:      Effort: Pulmonary effort is normal. No respiratory distress.      Breath sounds: Normal breath sounds. No wheezing, rhonchi or rales.   Abdominal:      General: Bowel sounds are normal. There is no distension.      Palpations: Abdomen is soft.      Tenderness: There is no abdominal tenderness. There is no guarding.   Musculoskeletal:      Right lower leg: No edema.      Left lower leg: No edema.      Comments: Lower extremity weakness bilaterally   Motor strength 3/5  Sensation decreased bilaterally   Upper extremity 5/5    Skin:     General: Skin is warm and dry.      Capillary Refill: Capillary refill takes less than 2 seconds.      Coloration: Skin is not jaundiced.   Neurological:      General: No focal deficit present.      Mental Status: He is alert and oriented to person, place, and time.      Cranial Nerves: No cranial nerve deficit.   Psychiatric:         Mood and Affect: Mood normal.       Medications  Current Facility-Administered Medications   Medication Dose Route Frequency Provider Last Rate Last Admin    MD Alert...ICU Electrolyte Replacement per Pharmacy   Other PHARMACY TO DOSE Chuck Matos D.O.        insulin regular (HumuLIN R,NovoLIN R) injection  3-14 Units Subcutaneous Q6HRS LIBAN SánchezO.   4 Units at 09/22/23 0600    And    dextrose 50% (D50W) injection 25 g  25 g Intravenous Q15 MIN PRN Chuck Matos D.O.        ceFAZolin (Ancef) 1 g in  mL IVPB  1 g Intravenous Q8HRS Archana Be M.D.   Stopped at  09/22/23 0433    senna-docusate (Pericolace Or Senokot S) 8.6-50 MG per tablet 2 Tablet  2 Tablet Oral BID Chuck Matos D.O.        And    polyethylene glycol/lytes (Miralax) PACKET 1 Packet  1 Packet Oral QDAY PRN Chuck Matos D.O.        And    magnesium hydroxide (Milk Of Magnesia) suspension 30 mL  30 mL Oral QDAY PRN Chuck Matos D.O.        And    bisacodyl (Dulcolax) suppository 10 mg  10 mg Rectal QDAY PRN Chuck Matos D.O.        ondansetron (Zofran) syringe/vial injection 4 mg  4 mg Intravenous Q4HRS PRN MARCY Sánchez.O.        ondansetron (Zofran ODT) dispertab 4 mg  4 mg Oral Q4HRS PRN Chuck Matos D.O.        promethazine (Phenergan) tablet 12.5-25 mg  12.5-25 mg Oral Q4HRS PRN MARCY Sánchez.O.        promethazine (Phenergan) suppository 12.5-25 mg  12.5-25 mg Rectal Q4HRS PRN Chuck Matos D.O.        prochlorperazine (Compazine) injection 5-10 mg  5-10 mg Intravenous Q4HRS PRN Chuck Matos D.O.        labetalol (Normodyne/Trandate) injection 10 mg  10 mg Intravenous Q4HRS PRN Franki Alvarez M.D.        dexamethasone (Decadron) injection 4 mg  4 mg Intravenous Q6HRS Franki Alvarez M.D.   4 mg at 09/22/23 0541       Fluids    Intake/Output Summary (Last 24 hours) at 9/22/2023 0654  Last data filed at 9/22/2023 0400  Gross per 24 hour   Intake 2241.1 ml   Output 1771 ml   Net 470.1 ml         Laboratory          Recent Labs     09/20/23  0530 09/20/23  1141 09/21/23  0505 09/21/23  1220 09/22/23  0000   SODIUM 136   < > 140 144 142   POTASSIUM 3.9   < > 3.3* 3.7 3.6   CHLORIDE 107   < > 111 116* 114*   CO2 13*   < > 16* 18* 18*   BUN 13   < > 14 15 12   CREATININE 0.40*   < > 0.33* 0.30* 0.27*   MAGNESIUM 2.2  --   --   --  2.1   PHOSPHORUS  --   --   --   --  1.5*   CALCIUM 8.0*   < > 7.3* 7.4* 7.4*    < > = values in this interval not displayed.       Recent Labs     09/19/23  1300 09/20/23  0530 09/21/23  0505 09/21/23  1220 09/22/23  0000   ALTSGPT <5  --   --   --   --    ASTSGOT 21  --   --   --   --     ALKPHOSPHAT 110*  --   --   --   --    TBILIRUBIN 0.6  --   --   --   --    GLUCOSE 190*   < > 234* 206* 207*    < > = values in this interval not displayed.       Recent Labs     09/19/23  1300 09/20/23  0530 09/21/23  0505 09/22/23  0549   WBC 10.1 8.0 4.7* 8.8   NEUTSPOLYS 89.30* 95.30* 89.60* 89.80*   LYMPHOCYTES 4.10* 3.10* 5.40* 2.20*   MONOCYTES 6.00 1.00 3.90 3.90   EOSINOPHILS 0.00 0.00 0.00 0.00   BASOPHILS 0.10 0.10 0.00 0.10   ASTSGOT 21  --   --   --    ALTSGPT <5  --   --   --    ALKPHOSPHAT 110*  --   --   --    TBILIRUBIN 0.6  --   --   --        Recent Labs     09/20/23  0530 09/21/23  0000 09/21/23  0505 09/22/23  0549   RBC 3.92*  --  3.24* 3.19*   HEMOGLOBIN 11.6* 10.0* 9.9* 9.7*   HEMATOCRIT 35.7*  --  29.8* 29.4*   PLATELETCT 250  --  183 169   PROTHROMBTM  --   --  18.1*  --    INR  --   --  1.48*  --          Imaging  X-Ray:  I have personally reviewed the images and compared with prior images.  CT:    Reviewed    Assessment/Plan  * Brain mass- (present on admission)  Assessment & Plan  Cerebellar mass with mass effect, DDx concerning metastatic prostate CA vs others.   9/21 s/p retrosigmoid crani with cerebellar mass resection     Hypertonic saline was discontinued. No need for further osmotic therapy after surgery  Continue dexamethasone 4 mg IV every 6H post op  Serial neurologic exams, neuro Q2H  MRI brain wwo contrast today   Okay for mobility per NSGY    Prostate cancer (HCC)  Assessment & Plan  Diagnosed about 2 years ago, seen by our local heme/onc.  On chemo/radiation therapy, last chemo 4 weeks ago  Recently seen by [facility] 4 weeks ago and was told to stop chemotherapy  Pt was planned to start on lutetium-177 therapy   He also c/o lower extremity weaknesses bilaterally in the past 2 weeks.  9/21 MRI cervical, thoracic and lumbar spine with extensive bone metastasis.   MedOnc and RadOnc are following       NED (obstructive sleep apnea)- (present on admission)  Assessment & Plan  CPAP  while sleeping    HTN (hypertension), benign- (present on admission)  Assessment & Plan  Goal normotensive, 100-140         VTE:  Lovenox Will hold for tonight before OR tomorrow  Ulcer: H2 Antagonist  Lines: None    I have performed a physical exam and reviewed and updated ROS and Plan today (9/22/2023). In review of yesterday's note (9/21/2023), there are no changes except as documented above.     Discussed patient condition and risk of morbidity and/or mortality with RN, RT, Pharmacy, Charge nurse / hot rounds, and Patient  The patient remains critically ill.  Critical care time = 45 minutes in directly providing and coordinating critical care and extensive data review.  No time overlap and excludes procedures.  D/w , NSGY

## 2023-09-22 NOTE — THERAPY
"Occupational Therapy   Initial Evaluation     Patient Name: Casper Rowley  Age:  58 y.o., Sex:  male  Medical Record #: [id number]  Today's Date: 9/22/2023     Precautions  Precautions: Fall Risk  Comments: EVD    Assessment  Patient is 58 y.o. male who presents to acute for nausea, vomiting and weakness found to have cerebellar mask now s/p right craniotomy for resection of cerebellar lesion, right parietal sameera hole w/ EVD drain. RN present, drain clamped. Pt appears to primarily  have B LE deficits (see PT note for further details). Pt demo'd LB dressing w/ max A and SPT txf w/ mod A (x2 people for safety)Pt demo'd impaired balance, functional mobility, and activity tolerance impacting functional independence. Will continue to follow while in house.    Plan    Occupational Therapy Initial Treatment Plan   Treatment Interventions: (P) Self Care / Activities of Daily Living, Neuro Re-Education / Balance, Therapeutic Exercises, Therapeutic Activity, Adaptive Equipment  Treatment Frequency: (P) 3 Times per Week  Duration: (P) Until Therapy Goals Met    DC Equipment Recommendations: (P) Unable to determine at this time  Discharge Recommendations: (P) Recommend post-acute placement for additional occupational therapy services prior to discharge home (PM&R consult)     Subjective    \"I did all of the training the PT gave prior to my back surgery\"     Objective     09/22/23 9713   Charge Group   OT Evaluation OT Evaluation High   Total Time Spent   OT Evaluation (Minutes) 30   OT Total Time Spent (Calculated) 30   Initial Contact Note    Initial Contact Note Order Received and Verified, Occupational Therapy Evaluation in Progress with Full Report to Follow.   Prior Living Situation   Prior Services None   Housing / Facility 1 Story House   Bathroom Set up Bathtub / Shower Combination;Tub Transfer Bench   Equipment Owned Front-Wheel Walker;4-Wheel Walker;Tub Transfer Bench   Lives with - Patient's Self Care Capacity " Significant Other   Comments SO present and very supportive, reports she can take time off of work to provide 24/7 assist if needed.   Prior Level of ADL Function   Self Feeding Independent   Grooming / Hygiene Independent   Bathing Independent   Dressing Independent   Toileting Independent   Prior Level of IADL Function   Medication Management Independent   Finances Independent   Home Management Independent   Shopping Independent   Prior Level Of Mobility Independent With Device in Home;Independent With Device in Community   Precautions   Precautions Fall Risk   Comments EVD   Vitals   O2 (LPM) 2   O2 Delivery Device None - Room Air   Pain 0 - 10 Group   Therapist Pain Assessment Post Activity Pain Same as Prior to Activity;Nurse Notified  (no c/o pain during session)   Cognition    Cognition / Consciousness WDL   Level of Consciousness Alert   Comments very pleasent, cooperative, and motivated   Active ROM Upper Body   Active ROM Upper Body  WDL   Strength Upper Body   Upper Body Strength  WDL   Coordination Upper Body   Coordination WDL   Balance Assessment   Sitting Balance (Static) Good   Sitting Balance (Dynamic) Fair +   Standing Balance (Static) Poor   Standing Balance (Dynamic) Poor -   Weight Shift Sitting Fair   Weight Shift Standing Poor   Comments w/ B UE support   Bed Mobility    Supine to Sit Minimal Assist   Sit to Supine   (NT in chair post)   Scooting Minimal Assist   ADL Assessment   Grooming Supervision;Seated   Upper Body Dressing Minimal Assist   Lower Body Dressing Maximal Assist   Toileting   (betts in place)   How much help from another person does the patient currently need...   Putting on and taking off regular lower body clothing? 2   Bathing (including washing, rinsing, and drying)? 2   Toileting, which includes using a toilet, bedpan, or urinal? 2   Putting on and taking off regular upper body clothing? 3   Taking care of personal grooming such as brushing teeth? 3   Eating meals? 4   6  Clicks Daily Activity Score 16   Functional Mobility   Sit to Stand Moderate Assist   Bed, Chair, Wheelchair Transfer Moderate Assist   Transfer Method Stand Pivot   Mobility SPT from EOB > Chair   Comments RN present, EVD clamped prior to mobility   Activity Tolerance   Sitting in Chair 10+ min (up post)   Sitting Edge of Bed 10 min   Standing 2 min total   Patient / Family Goals   Patient / Family Goal #1 To go home   Short Term Goals   Short Term Goal # 1 Pt will demo standing grooming w/ min A   Short Term Goal # 2 Pt will demo ADL txf w/ min A   Short Term Goal # 3 Pt will demo LB dressing w/ min A   Education Group   Role of Occupational Therapist Patient Response Patient;Acceptance;Explanation;Demonstration;Verbal Demonstration;Action Demonstration   Occupational Therapy Initial Treatment Plan    Treatment Interventions Self Care / Activities of Daily Living;Neuro Re-Education / Balance;Therapeutic Exercises;Therapeutic Activity;Adaptive Equipment   Treatment Frequency 3 Times per Week   Duration Until Therapy Goals Met   Problem List   Problem List Decreased Active Daily Living Skills;Decreased Homemaking Skills;Decreased Functional Mobility;Decreased Activity Tolerance;Safety Awareness Deficits / Cognition;Impaired Postural Control / Balance   Anticipated Discharge Equipment and Recommendations   DC Equipment Recommendations Unable to determine at this time   Discharge Recommendations Recommend post-acute placement for additional occupational therapy services prior to discharge home  (PM&R consult)   Interdisciplinary Plan of Care Collaboration   IDT Collaboration with  Nursing;Physical Therapist   Patient Position at End of Therapy Call Light within Reach;Seated;Tray Table within Reach;Phone within Reach;Family / Friend in Room   Collaboration Comments report given   Session Information   Date / Session Number  9/22, 1 (1/4, 9/28)

## 2023-09-22 NOTE — ANESTHESIA TIME REPORT
Anesthesia Start and Stop Event Times     Date Time Event    9/21/2023 1838 Ready for Procedure     1850 Anesthesia Start     2226 Anesthesia Stop        Responsible Staff  09/21/23    Name Role Begin End    Bhargav Montero M.D. Anesth 1850 2226        Overtime Reason:  no overtime (within assigned shift)    Comments:

## 2023-09-22 NOTE — DISCHARGE PLANNING
Care Transition Team Discharge Planning    Anticipated Discharge Information  Discharge Disposition: Discharged to home/self care (01)    Pt discussed during ICU rounds    PT/OT/SLP evaluations pending  MRI pending

## 2023-09-22 NOTE — OP REPORT
Neurosurgery Operative Note    Patient Name: Casper Rowley MRN: [id number] YOB: 1965  Date of Surgery: 9/21/2023     SURGEON: Archana Be M.D.    ASSISTANT: SATNAM Lee    PRE-OPERATIVE DIAGNOSIS:    Right cerebellar lesion  Vasogenic edema, compression of brain  Obstructive hydrocephalus  History of metastatic prostate cancer           POST-OPERATIVE DIAGNOSIS:    Right cerebellar lesion  Vasogenic edema, compression of brain  Obstructive hydrocephalus  History of metastatic prostate cancer           PROCEDURE:   right retrosigmoid craniotomy for resection of cerebellar lesion.  Right parietal bur hole craniotomy for placement of external ventricular drain  Use of intraoperative intradural stereotactic neuronavigation  Use of intraoperative microscope           ANESTHESIA: GETA           ESTIMATED BLOOD LOSS: 200 cc           DRAINS: none    FINDINGS: Right cerebellar mass           SPECIMENS: none          IMPLANTS: [place] titanium mini plates and screws           COMPLICATIONS: None apparent.    Operative Indications: The patient is a 58-year-old man with a known history of metastatic prostate cancer who presented to the hospital with intractable nausea and vomiting.  He was found to have severe urinary retention and also a new right cerebellar mass concerning for metastatic lesion.  It was associated with vasogenic edema, compression of brain, obstructive hydrocephalus.  It was a solitary lesion.  Due to the symptomatic nature of this lesion, the size of greater than 3 cm, and progressive disease, operative resection was indicated.  The indications, benefits, alternatives and risks to the procedure were discussed with the patient, which included but were not limited to bleeding, infection, stroke, injury to nearby nerves and vessels, cerebrospinal fluid leak, new weakness or sensory changes, incomplete resection pain recurrence, new or worse imbalance, dizziness, difficulty  swallowing, hoarseness, coma and rarely, even death.  The patient was in agreement and wished to proceed.     Operative details:  The patient was identified in the pre-operative holding area by perioperative staff by two forms of identification. The patient was brought to the operating room, induced under general anesthesia and intubated without complication. Antibiotics, Keppra, Decadron were administered. IV access and arterial line were placed by the Anesthesia team and nursing staff. The patient was positioned in the left lateral decubitus to position on a beanbag on the operating room table with the right arm draped over an armboard. All pressure points were padded. SCDs were on and functioning.  The head was secured in a [place] headholder to 60lb of pressure, turned to the left with the right side up, and secured to the bed attachment with the asterion at the highest point in the surgical field. A C-shaped incision was planned approximately 6 cm posterior to the pinna.  The [employer] neuronavigation was used to coregistered the patient's craniofacial features with his preoperative MRI.  The transverse sigmoid junction was identified with [employer] neuro navigation, and a right parietal entry point for the planned external ventricular drain was marked.  The right scalp was clipped of hair, prepped with isopropyl alcohol 4x4 sponges and Chloroprep. The patient was draped in the usual sterile fashion. A time-out indicated the correct patient, procedure and side.      Marcaine 0.5% with epinephrine 1:200,000 was injected subcutaneously along the incision.  First, the right parietal external ventricular drain incision was made with a 10 blade, and monopolar cautery was used to dissect in the subperiosteal plane.  Self-retaining retractor was used to hold open the skin edges.  High-speed drill with an acorn bit was used to perform a bur hole Craney ostomy.  The dura was coagulated with bipolar cautery, opened in a  cruciate fashion with a 15 blade, and the leaflets were cauterized.  The magaly was opened with bipolar cautery.  The [employer] neuronavigation was used to create the trajectory to the ventricle, and the neuro navigation probe was used to align the external ventricular drain with stylette in the correct preplanned trajectory.  The ventriculostomy was passed through the cerebral cortex into the ventricle, which was entered at about 5 cm.  There is spontaneous flow of CSF.  The ventricular drain was soft passed to 9 cm at the outer table, tunneled subcutaneously, and secured to the scalp with a 3-0 nylon suture.  Attention relief loop was made with staples.  Spontaneous flow CSF was again confirmed, and about 30 cc of CSF was allowed to egress freely.  The incision was copiously irrigated with Ancef irrigation, and closed with interrupted inverted 2-0 Vicryl suture and staples.      Next attention was turned to the right retroauricular area.  Incision was made with a 10-blade. Monopolar cautery was used to dissect through the subcutaneous tissue in the suprafascial plane to raise a cutaneous flap laterally over the ear.  The ear was padded with Ray-Donald's, and a wet Ray-Donald was placed over the scalp flap and retracted with fishhooks.  The suboccipital musculature was divided superior along the nuchal line, posteriorly along the edge of the incision, and laterally along the mastoid groove, then retracted inferiorly and dissected in subperiosteal plane.  This was secured with fishhooks.  The [employer] neuronavigation was again used to identify the transverse sigmoid junction, and to saurabh the lateral limit of the tumor within the cerebellum.  A bur hole was created just inferior and posterior to this junction using a high-speed drill with an acorn drill bit.  The epidural plane was then dissected with a curette the bur hole was expanded until the edges of the transverse and sigmoid sinuses were reached.  A B1 with footplate was  used to turn a craniotomy.  The cranial flap was removed and soaked in saline on the back table.  The dura was opened sharply in a C-shaped fashion with the base parallel to the sigmoid sinus using ted knives.  The dura was retracted medially, and tacked up with 4-0 Nurolon suture.  The underlying cerebellum appeared full and edematous, and a cottonoid jacqueline was used to dissect along the subdural space to the cerebellar medullary cistern to allow egress of CSF.  At this point, the cerebellum was much more relaxed.  A soft, purple and red appearing lesion within the more ventral aspect of the cerebellum was identified.  This was biopsied for specimen.  A very thin layer of cerebellum was coagulated with bipolar cautery, and suctioned away to reveal underlying tumor.  It was noted to have poor planes in several areas adjacent to brain, and competent capsule and others.  He had a soft and hemorrhagic consistency.  The tumor was then systematically dissected in a circumferential fashion using a combination of bipolar cautery, suction to identify the plane, fibrillar spheres to maintain the plane, and cottonoid patties to assist with dissection.  This was dissected in a circumferential fashion superiorly, laterally, inferiorly.  Once the more medial tumor was encountered, the microscope was then draped and brought into the field.  Under bright magnification, using microsurgical technique tumor dissection continued medially to establish the plane with the cerebellum.  Once the tumor was dissected circumferentially, it was amputated and delivered en bloc.  The field was copiously irrigated with Ancef irrigation, and further inspected to ensure that there were no additional areas of tumor left behind.  Hemostasis was achieved with bipolar cautery and thrombin-soaked Gelfoam.  The cavity was lined with fibrillar.    The dura was then reapproximated with a running 5-0 Prolene suture in a watertight fashion.  Adherus glue  was applied to the epidural space.  Dry Gelfoam was placed as an onlay.  The bone flap was secured to the skull with titanium mini-plates and screws.      The incision was closed in layers, interrupted 2-0 Vicryl for muscle and fascia, interrupted inverted 2-0 Vicryl for galea and staples for the skin. The skin was cleansed and dressed with bacitracin.  The EVD exit site was dressed with a Biopatch and Tegaderm.  The [place] headholder was removed and the patient was extubated, and taken to the recovery room in a stable condition.     All counts were correct x2.      JAZLYN Aguilar was present for all parts of the surgery, including the incision, exposure, critical portions of the procedure and closure.    A first assistant was required for assistance with exposure, tissue retraction, suctioning and irrigating the field during tumor resection and closure.

## 2023-09-22 NOTE — CARE PLAN
Problem: Knowledge Deficit - Standard  Goal: Patient and family/care givers will demonstrate understanding of plan of care, disease process/condition, diagnostic tests and medications  Outcome: Progressing     Problem: Fall Risk  Goal: Patient will remain free from falls  Outcome: Progressing     Problem: Psychosocial  Goal: Patient's level of anxiety will decrease  Outcome: Progressing  Goal: Patient's ability to verbalize feelings about condition will improve  Outcome: Progressing  Goal: Patient's ability to re-evaluate and adapt role responsibilities will improve  Outcome: Progressing  Goal: Patient and family will demonstrate ability to cope with life altering diagnosis and/or procedure  Outcome: Progressing  Goal: Spiritual and cultural needs incorporated into hospitalization  Outcome: Progressing     Problem: Hemodynamics  Goal: Patient's hemodynamics, fluid balance and neurologic status will be stable or improve  Outcome: Progressing     Problem: Respiratory  Goal: Patient will achieve/maintain optimum respiratory ventilation and gas exchange  Outcome: Progressing     Problem: Mechanical Ventilation  Goal: Safe management of artificial airway and ventilation  Outcome: Progressing  Goal: Successful weaning off mechanical ventilator, spontaneously maintains adequate gas exchange  Outcome: Progressing  Goal: Patient will be able to express needs and understand communication  Outcome: Progressing     Problem: Risk for Aspiration  Goal: Patient's risk for aspiration will be absent or decrease  Outcome: Progressing     Problem: Urinary - Renal Perfusion  Goal: Ability to achieve and maintain adequate renal perfusion and functioning will improve  Outcome: Progressing     Problem: Venous Thromboembolism (VTE) Prevention  Goal: The patient will remain free from venous thromboembolism (VTE)  Outcome: Progressing     Problem: Nutrition  Goal: Patient's nutritional and fluid intake will be adequate or improve  Outcome:  Progressing     Problem: Urinary Elimination  Goal: Establish and maintain regular urinary output  Outcome: Progressing     Problem: Bowel Elimination  Goal: Establish and maintain regular bowel function  Outcome: Progressing     Problem: Craniotomy Surgery  Goal: Post-Operative Craniotomy Surgery: Patient will achieve optimal post-surgical outcomes  Outcome: Progressing     Problem: Neuro Status  Goal: Neuro status will remain stable or improve  Outcome: Progressing     Problem: Knowledge Deficit - Neuro Surgical  Goal: Patient's understanding of spinal precautions, appropriate body mechanics and incision care will improve  Outcome: Progressing     Problem: Neurovascular Monitoring  Goal: Patient's neurovascular status will be maintained or improve  Outcome: Progressing     Problem: Skin Integrity  Goal: Skin integrity is maintained or improved  Outcome: Progressing   The patient is Watcher - Medium risk of patient condition declining or worsening    Shift Goals  Clinical Goals: Stable neuro and ICP level  Patient Goals: Rest  Family Goals: LY    Progress made toward(s) clinical / shift goals:      Patient is not progressing towards the following goals:

## 2023-09-22 NOTE — CARE PLAN
The patient is Stable - Low risk of patient condition declining or worsening    Shift Goals  Clinical Goals: Stable Neuro exams, MRI, mobilize  Patient Goals: Rest  Family Goals: LY    Progress made toward(s) clinical / shift goals:  MRI done, Q2 neuro exams stable throughout shift, pt mobilized to chair and back to bed.     Problem: Knowledge Deficit - Standard  Goal: Patient and family/care givers will demonstrate understanding of plan of care, disease process/condition, diagnostic tests and medications  Outcome: Progressing       Problem: Craniotomy Surgery  Goal: Post-Operative Craniotomy Surgery: Patient will achieve optimal post-surgical outcomes  Outcome: Progressing

## 2023-09-22 NOTE — ANESTHESIA POSTPROCEDURE EVALUATION
Patient: Casper Rowley    Procedure Summary     Date: 09/21/23 Room / Location: [facility] 05 / SURGERY [facility]    Anesthesia Start: 1850 Anesthesia Stop: 2226    Procedure: CRANIOTOMY, USING FRAMELESS STEREOTAXY - RIGHT RETROSIGMOID (Right: Skull) Diagnosis: (Right Cerebellar Tumor)    Surgeons: Archana Be M.D. Responsible Provider: Bhargav Montero M.D.    Anesthesia Type: general ASA Status: 2          Final Anesthesia Type: general  Last vitals  BP   144/81   Temp   36.4 °C (97.5 °F)    Pulse   93   Resp   16    SpO2   100     Anesthesia Post Evaluation    Patient location during evaluation: PACU  Patient participation: complete - patient participated  Level of consciousness: awake and alert  Pain score: 0    Airway patency: patent  Anesthetic complications: no  Cardiovascular status: hemodynamically stable  Respiratory status: acceptable  Hydration status: euvolemic    PONV: none          No notable events documented.     Nurse Pain Score: 0 (NPRS)

## 2023-09-22 NOTE — ANESTHESIA PREPROCEDURE EVALUATION
Case: [id number] Date/Time: 09/21/23 1545    Procedure: CRANIOTOMY, USING FRAMELESS STEREOTAXY - RIGHT RETROSIGMOID (Right: Skull)    Anesthesia type: General    Location: [facility] OR  / SURGERY [facility]    Surgeons: Archana Be M.D.      Past history of NED, but has lost significant amount of weight since then, and no longer is affected.  pmh of DM2    Relevant Problems   ANESTHESIA   (positive) NED (obstructive sleep apnea)      CARDIAC   (positive) HTN (hypertension)   (positive) HTN (hypertension), benign       Physical Exam    Airway   Mallampati: II  TM distance: >3 FB  Neck ROM: full       Cardiovascular - normal exam  Rhythm: regular  Rate: normal  (-) murmur     Dental - normal exam           Pulmonary - normal exam  Breath sounds clear to auscultation     Abdominal    Neurological - normal exam                 Anesthesia Plan    ASA 2       Plan - general       Airway plan will be ETT          Induction: intravenous    Postoperative Plan: Postoperative administration of opioids is intended.    Pertinent diagnostic labs and testing reviewed    Informed Consent:    Anesthetic plan and risks discussed with patient.    Use of blood products discussed with: patient whom consented to blood products.

## 2023-09-22 NOTE — ANESTHESIA PROCEDURE NOTES
Arterial Line    Performed by: Bhargav Montero M.D.  Authorized by: Bhargav Montero M.D.    Start Time:  9/21/2023 7:14 PM  End Time:  9/21/2023 7:16 PM  Localization: surface landmarks    Patient Location:  OR  Indication: continuous blood pressure monitoring        Catheter Size:  20 G  Seldinger Technique?: Yes    Laterality:  Left  Site:  Radial artery  Line Secured:  Antimicrobial disc, tape and transparent dressing  Events: patient tolerated procedure well with no complications

## 2023-09-23 LAB
ANION GAP SERPL CALC-SCNC: 10 MMOL/L (ref 7–16)
BASOPHILS # BLD AUTO: 0.1 % (ref 0–1.8)
BASOPHILS # BLD: 0.01 K/UL (ref 0–0.12)
BUN SERPL-MCNC: 11 MG/DL (ref 8–22)
CA-I SERPL-SCNC: 1 MMOL/L (ref 1.1–1.3)
CALCIUM SERPL-MCNC: 7.2 MG/DL (ref 8.5–10.5)
CHLORIDE SERPL-SCNC: 104 MMOL/L (ref 96–112)
CO2 SERPL-SCNC: 23 MMOL/L (ref 20–33)
CREAT SERPL-MCNC: 0.3 MG/DL (ref 0.5–1.4)
EOSINOPHIL # BLD AUTO: 0 K/UL (ref 0–0.51)
EOSINOPHIL NFR BLD: 0 % (ref 0–6.9)
ERYTHROCYTE [DISTWIDTH] IN BLOOD BY AUTOMATED COUNT: 56.6 FL (ref 35.9–50)
GFR SERPLBLD CREATININE-BSD FMLA CKD-EPI: 139 ML/MIN/1.73 M 2
GLUCOSE BLD STRIP.AUTO-MCNC: 231 MG/DL (ref 65–99)
GLUCOSE BLD STRIP.AUTO-MCNC: 239 MG/DL (ref 65–99)
GLUCOSE BLD STRIP.AUTO-MCNC: 241 MG/DL (ref 65–99)
GLUCOSE BLD STRIP.AUTO-MCNC: 253 MG/DL (ref 65–99)
GLUCOSE BLD STRIP.AUTO-MCNC: 254 MG/DL (ref 65–99)
GLUCOSE SERPL-MCNC: 245 MG/DL (ref 65–99)
HCT VFR BLD AUTO: 29.5 % (ref 42–52)
HGB BLD-MCNC: 9.7 G/DL (ref 14–18)
IMM GRANULOCYTES # BLD AUTO: 0.12 K/UL (ref 0–0.11)
IMM GRANULOCYTES NFR BLD AUTO: 1.8 % (ref 0–0.9)
INR PPP: 1.28 (ref 0.87–1.13)
LYMPHOCYTES # BLD AUTO: 0.26 K/UL (ref 1–4.8)
LYMPHOCYTES NFR BLD: 3.8 % (ref 22–41)
MCH RBC QN AUTO: 29.8 PG (ref 27–33)
MCHC RBC AUTO-ENTMCNC: 32.9 G/DL (ref 32.3–36.5)
MCV RBC AUTO: 90.8 FL (ref 81.4–97.8)
MONOCYTES # BLD AUTO: 0.33 K/UL (ref 0–0.85)
MONOCYTES NFR BLD AUTO: 4.9 % (ref 0–13.4)
NEUTROPHILS # BLD AUTO: 6.04 K/UL (ref 1.82–7.42)
NEUTROPHILS NFR BLD: 89.4 % (ref 44–72)
NRBC # BLD AUTO: 0 K/UL
NRBC BLD-RTO: 0 /100 WBC (ref 0–0.2)
PHOSPHATE SERPL-MCNC: 2.4 MG/DL (ref 2.5–4.5)
PLATELET # BLD AUTO: 154 K/UL (ref 164–446)
PMV BLD AUTO: 10 FL (ref 9–12.9)
POTASSIUM SERPL-SCNC: 3.6 MMOL/L (ref 3.6–5.5)
PROTHROMBIN TIME: 16.2 SEC (ref 12–14.6)
RBC # BLD AUTO: 3.25 M/UL (ref 4.7–6.1)
SODIUM SERPL-SCNC: 137 MMOL/L (ref 135–145)
WBC # BLD AUTO: 6.8 K/UL (ref 4.8–10.8)

## 2023-09-23 PROCEDURE — 700101 HCHG RX REV CODE 250: Performed by: NURSE PRACTITIONER

## 2023-09-23 PROCEDURE — 770022 HCHG ROOM/CARE - ICU (200)

## 2023-09-23 PROCEDURE — 700111 HCHG RX REV CODE 636 W/ 250 OVERRIDE (IP): Mod: JZ | Performed by: INTERNAL MEDICINE

## 2023-09-23 PROCEDURE — 84100 ASSAY OF PHOSPHORUS: CPT

## 2023-09-23 PROCEDURE — 80048 BASIC METABOLIC PNL TOTAL CA: CPT

## 2023-09-23 PROCEDURE — 85610 PROTHROMBIN TIME: CPT

## 2023-09-23 PROCEDURE — 99233 SBSQ HOSP IP/OBS HIGH 50: CPT | Performed by: INTERNAL MEDICINE

## 2023-09-23 PROCEDURE — 85025 COMPLETE CBC W/AUTO DIFF WBC: CPT

## 2023-09-23 PROCEDURE — 700102 HCHG RX REV CODE 250 W/ 637 OVERRIDE(OP): Performed by: INTERNAL MEDICINE

## 2023-09-23 PROCEDURE — 82330 ASSAY OF CALCIUM: CPT

## 2023-09-23 PROCEDURE — 700102 HCHG RX REV CODE 250 W/ 637 OVERRIDE(OP): Performed by: NURSE PRACTITIONER

## 2023-09-23 PROCEDURE — 700105 HCHG RX REV CODE 258: Performed by: NURSE PRACTITIONER

## 2023-09-23 PROCEDURE — 700111 HCHG RX REV CODE 636 W/ 250 OVERRIDE (IP): Mod: JZ | Performed by: NURSE PRACTITIONER

## 2023-09-23 PROCEDURE — 82962 GLUCOSE BLOOD TEST: CPT | Mod: 91

## 2023-09-23 PROCEDURE — A9270 NON-COVERED ITEM OR SERVICE: HCPCS | Performed by: INTERNAL MEDICINE

## 2023-09-23 RX ORDER — LABETALOL HYDROCHLORIDE 5 MG/ML
10 INJECTION, SOLUTION INTRAVENOUS EVERY 4 HOURS PRN
Status: DISCONTINUED | OUTPATIENT
Start: 2023-09-23 | End: 2023-09-29 | Stop reason: HOSPADM

## 2023-09-23 RX ORDER — MAGNESIUM SULFATE 1 G/100ML
1 INJECTION INTRAVENOUS ONCE
Status: DISCONTINUED | OUTPATIENT
Start: 2023-09-23 | End: 2023-09-23

## 2023-09-23 RX ADMIN — DEXAMETHASONE SODIUM PHOSPHATE 4 MG: 4 INJECTION INTRA-ARTICULAR; INTRALESIONAL; INTRAMUSCULAR; INTRAVENOUS; SOFT TISSUE at 17:05

## 2023-09-23 RX ADMIN — INSULIN HUMAN 7 UNITS: 100 INJECTION, SOLUTION PARENTERAL at 17:11

## 2023-09-23 RX ADMIN — CALCIUM CHLORIDE 1000 MG: 100 INJECTION, SOLUTION INTRAVENOUS at 06:06

## 2023-09-23 RX ADMIN — INSULIN GLARGINE-YFGN 10 UNITS: 100 INJECTION, SOLUTION SUBCUTANEOUS at 22:30

## 2023-09-23 RX ADMIN — POTASSIUM PHOSPHATE, MONOBASIC AND POTASSIUM PHOSPHATE, DIBASIC 15 MMOL: 224; 236 INJECTION, SOLUTION, CONCENTRATE INTRAVENOUS at 06:06

## 2023-09-23 RX ADMIN — INSULIN HUMAN 4 UNITS: 100 INJECTION, SOLUTION PARENTERAL at 06:11

## 2023-09-23 RX ADMIN — SENNOSIDES AND DOCUSATE SODIUM 2 TABLET: 50; 8.6 TABLET ORAL at 06:07

## 2023-09-23 RX ADMIN — ACETAMINOPHEN 650 MG: 325 TABLET, FILM COATED ORAL at 02:13

## 2023-09-23 RX ADMIN — INSULIN HUMAN 4 UNITS: 100 INJECTION, SOLUTION PARENTERAL at 00:04

## 2023-09-23 RX ADMIN — DEXAMETHASONE SODIUM PHOSPHATE 4 MG: 4 INJECTION INTRA-ARTICULAR; INTRALESIONAL; INTRAMUSCULAR; INTRAVENOUS; SOFT TISSUE at 06:06

## 2023-09-23 RX ADMIN — INSULIN HUMAN 4 UNITS: 100 INJECTION, SOLUTION PARENTERAL at 12:12

## 2023-09-23 RX ADMIN — DEXAMETHASONE SODIUM PHOSPHATE 4 MG: 4 INJECTION INTRA-ARTICULAR; INTRALESIONAL; INTRAMUSCULAR; INTRAVENOUS; SOFT TISSUE at 12:12

## 2023-09-23 RX ADMIN — DEXAMETHASONE SODIUM PHOSPHATE 4 MG: 4 INJECTION INTRA-ARTICULAR; INTRALESIONAL; INTRAMUSCULAR; INTRAVENOUS; SOFT TISSUE at 00:00

## 2023-09-23 ASSESSMENT — PAIN DESCRIPTION - PAIN TYPE
TYPE: ACUTE PAIN;CHRONIC PAIN
TYPE: ACUTE PAIN;SURGICAL PAIN
TYPE: ACUTE PAIN
TYPE: ACUTE PAIN;SURGICAL PAIN
TYPE: ACUTE PAIN;SURGICAL PAIN

## 2023-09-23 ASSESSMENT — ENCOUNTER SYMPTOMS
VOMITING: 0
SHORTNESS OF BREATH: 0
DIARRHEA: 0
NAUSEA: 0
FEVER: 0
HEADACHES: 0
WEAKNESS: 1
ABDOMINAL PAIN: 0
BACK PAIN: 0

## 2023-09-23 NOTE — PROGRESS NOTES
Neurosurgery Progress Note    Subjective:  Doing well this am. A bit disoriented, he admits, but getting more clear.  No headache, some incision pain.    Exam:  A&O x4  PERRL  BUE good strength  BLE weakness unchanged    Cranial incision c/d/i   EVD at 65zyY4O, low output, low ICPs    BP  Min: 100/58  Max: 149/65  Pulse  Av  Min: 63  Max: 103  Resp  Av.1  Min: 9  Max: 18  Temp  Av °C (96.8 °F)  Min: 35.8 °C (96.5 °F)  Max: 36.2 °C (97.1 °F)  SpO2  Av.6 %  Min: 94 %  Max: 100 %    ICP  Av.9 MM HG  Min: 0 MM HG  Max: 6 MM HG    Recent Labs     23  0530 23  0000 23  0505 23  0549   WBC 8.0  --  4.7* 8.8   RBC 3.92*  --  3.24* 3.19*   HEMOGLOBIN 11.6* 10.0* 9.9* 9.7*   HEMATOCRIT 35.7*  --  29.8* 29.4*   MCV 91.1  --  92.0 92.2   MCH 29.6  --  30.6 30.4   MCHC 32.5  --  33.2 33.0   RDW 58.4*  --  58.2* 58.1*   PLATELETCT 250  --  183 169   MPV 9.5  --  9.7 10.1       Recent Labs     23  1220 23  0000 23  0549   SODIUM 144 142 140   POTASSIUM 3.7 3.6 3.4*   CHLORIDE 116* 114* 111   CO2 18* 18* 19*   GLUCOSE 206* 207* 258*   BUN 15 12 11   CREATININE 0.30* 0.27* 0.23*   CALCIUM 7.4* 7.4* 6.8*       Recent Labs     23  0505 23  0549   INR 1.48* 1.33*       Recent Labs     23  0815   REACTMIN 6.4   CLOTKINET 0.9   CLOTANGL 77.5   MAXCLOTS 64.3   PRCINADP 0.7   PRCINAA 3.7         Intake/Output                         09700 - 23 - 23 Total  Total                 Intake    P.O.  --  100 100  400  -- 400    P.O. -- 100 100 400 -- 400    I.V.  --  1804.6 1804.6  0  -- 0    Volume (mL) (3% sodium chloride (Hypertonic Saline) 500mL infusion) -- 804.6 804.6 0 -- 0    Volume (mL) (Lactated Ringers) -- 1000 1000 -- -- --    Volume (mL) (lactated ringers infusion) -- -- -- 0 -- 0    IV Piggyback  --  336.5 336.5  913.5  -- 913.5    Volume (mL) (potassium chloride  (Kcl) ivpb 10 mEq) -- 336.5 336.5 0 -- 0    Volume (mL) (ceFAZolin (Ancef) 1 g in  mL IVPB) -- -- -- 198.5 -- 198.5    Volume (mL) (calcium GLUConate 2 g in NaCl IVPB premix) -- -- -- 98.7 -- 98.7    Volume (mL) (potassium phosphate 30 mmol in  mL ivpb) -- -- -- 499.1 -- 499.1    Volume (mL) (sodium phosphate 15 mmol in  mL ivpb) -- -- -- 117.2 -- 117.2    Total Intake -- 2241.1 2241.1 1313.5 -- 1313.5       Output    Urine  600  1120 1720  705  -- 705    Urine -- 860 860 -- -- --    Output (mL) (Urethral Catheter 16 Fr.) 600 260 860 705 -- 705    Drains  --  4 4  0  -- 0    Output (mL) (ICP/Ventriculostomy Right Parietal region) -- 4 4 0 -- 0    Stool  --  -- --  --  -- --    Number of Times Stooled -- 0 x 0 x 0 x -- 0 x    Blood  --  50 50  --  -- --    Est. Blood Loss -- 50 50 -- -- --    Total Output 600 1174 1774 705 -- 705       Net I/O     -600 1067.1 467.1 608.5 -- 608.5              Intake/Output Summary (Last 24 hours) at 9/22/2023 1814  Last data filed at 9/22/2023 1800  Gross per 24 hour   Intake 3554.61 ml   Output 1879 ml   Net 1675.61 ml               sodium phosphate 15 mmol in  mL ivpb  15 mmol Once    acetaminophen  650 mg Q4HRS PRN    MD Alert...Adult ICU Electrolyte Replacement per Pharmacy   PHARMACY TO DOSE    insulin regular  3-14 Units Q6HRS    And    dextrose bolus  25 g Q15 MIN PRN    ceFAZolin  1 g Q8HRS    senna-docusate  2 Tablet BID    And    polyethylene glycol/lytes  1 Packet QDAY PRN    And    magnesium hydroxide  30 mL QDAY PRN    And    bisacodyl  10 mg QDAY PRN    ondansetron  4 mg Q4HRS PRN    ondansetron  4 mg Q4HRS PRN    promethazine  12.5-25 mg Q4HRS PRN    promethazine  12.5-25 mg Q4HRS PRN    prochlorperazine  5-10 mg Q4HRS PRN    labetalol  10 mg Q4HRS PRN    dexamethasone  4 mg Q6HRS       Assessment and Plan:  Hospital day #3  POD #1 s/p right retrosigmoid craniotomy for resection of cerebellar tumor and placement of EVD    Prophylactic  anticoagulation: no         Start date/time: TBD - pending drain removal    - Continue Decadron 4 mg every 6 hours  - EVD at [id number]. If output low and ICPs low, will clamp tomorrow morning.  - MRI brain today  - OK to mobilize w/ PT/OT, OK to clamp EVD for 30min intervals     Please call with questions    Archana Be M.D.

## 2023-09-23 NOTE — CARE PLAN
The patient is Stable - Low risk of patient condition declining or worsening    Shift Goals  Clinical Goals: stable neuro checks, -140, Q1hr ICPs stable  Patient Goals: rest  Family Goals: updates PRN    Progress made toward(s) clinical / shift goals:      Patient updated regarding plan of care, and his condition. Q2 neuro checks implemented into plan of care, patient's neuro status remains stable. Pain monitored Q2/PRN, patient pain remains managed. Patient's SBP maintained within parameters without intervention.     Problem: Knowledge Deficit - Standard  Goal: Patient and family/care givers will demonstrate understanding of plan of care, disease process/condition, diagnostic tests and medications  Outcome: Progressing     Problem: Hemodynamics  Goal: Patient's hemodynamics, fluid balance and neurologic status will be stable or improve  Outcome: Progressing     Problem: Neuro Status  Goal: Neuro status will remain stable or improve  Outcome: Progressing     Problem: Pain - Standard  Goal: Alleviation of pain or a reduction in pain to the patient’s comfort goal  Outcome: Progressing

## 2023-09-23 NOTE — PROGRESS NOTES
Neurosurgery Progress Note    Subjective:  Doing well this am.   Exam:  A&O x4  PERRL  BUE good strength  BLE weakness unchanged    Cranial incision c/d/i   EVD at 86ypU9M, low output, low ICPs    BP  Min: 102/67  Max: 151/74  Pulse  Av.3  Min: 61  Max: 103  Resp  Avg: 15.8  Min: 12  Max: 49  Temp  Av.9 °C (96.7 °F)  Min: 35.7 °C (96.3 °F)  Max: 36.2 °C (97.1 °F)  SpO2  Av.8 %  Min: 91 %  Max: 97 %    ICP  Avg: 3.1 MM HG  Min: 0 MM HG  Max: 7 MM HG    Recent Labs     23  0505 23  0549 23  0205   WBC 4.7* 8.8 6.8   RBC 3.24* 3.19* 3.25*   HEMOGLOBIN 9.9* 9.7* 9.7*   HEMATOCRIT 29.8* 29.4* 29.5*   MCV 92.0 92.2 90.8   MCH 30.6 30.4 29.8   MCHC 33.2 33.0 32.9   RDW 58.2* 58.1* 56.6*   PLATELETCT 183 169 154*   MPV 9.7 10.1 10.0       Recent Labs     23  0000 23  0549 23  0205   SODIUM 142 140 137   POTASSIUM 3.6 3.4* 3.6   CHLORIDE 114* 111 104   CO2 18* 19* 23   GLUCOSE 207* 258* 245*   BUN 12 11 11   CREATININE 0.27* 0.23* 0.30*   CALCIUM 7.4* 6.8* 7.2*       Recent Labs     23  0505 23  0549 23  0205   INR 1.48* 1.33* 1.28*       Recent Labs     23  0815   REACTMIN 6.4   CLOTKINET 0.9   CLOTANGL 77.5   MAXCLOTS 64.3   PRCINADP 0.7   PRCINAA 3.7         Intake/Output                         23 0700 - 23 0659 23 0700 - 23 0659     1629-7608 1946-0051 Total [id number] Total                 Intake    P.O.  400  350 750  --  -- --    P.O. 400 350 750 -- -- --    I.V.  0  -- 0  --  -- --    Volume (mL) (3% sodium chloride (Hypertonic Saline) 500mL infusion) 0 -- 0 -- -- --    Volume (mL) (lactated ringers infusion) 0 -- 0 -- -- --    Other  --  100 100  --  -- --    Medications (PO/Enteral Liquids) -- 100 100 -- -- --    IV Piggyback  913.5  232.3 1145.8  334.3  -- 334.3    Volume (mL) (potassium chloride (Kcl) ivpb 10 mEq) 0 -- 0 -- -- --    Volume (mL) (ceFAZolin (Ancef) 1 g in  mL IVPB) 198.5 100 298.5 --  -- --    Volume (mL) (calcium GLUConate 2 g in NaCl IVPB premix) 98.7 -- 98.7 -- -- --    Volume (mL) (potassium phosphate 30 mmol in  mL ivpb) 499.1 -- 499.1 -- -- --    Volume (mL) (sodium phosphate 15 mmol in  mL ivpb) 117.2 132.3 249.5 -- -- --    Volume (mL) (calcium CHLORIDE 10 % 1,000 mg in dextrose 5% 100 mL IVPB) -- -- -- 97 -- 97    Volume (mL) (potassium phosphate 15 mmol in  mL ivpb) -- -- -- 237.3 -- 237.3    Total Intake 1313.5 682.3 1995.8 334.3 -- 334.3       Output    Urine  705  1305 2010  140  -- 140    Output (mL) (Urethral Catheter 16 Fr.) 705 1305 2010 140 -- 140    Drains  0  0 0  0  -- 0    Output (mL) (ICP/Ventriculostomy Right Parietal region) 0 0 0 0 -- 0    Stool  --  -- --  --  -- --    Number of Times Stooled 0 x 0 x 0 x -- -- --    Total Output 705 1305 2010 140 -- 140       Net I/O     608.5 -622.7 -14.2 194.3 -- 194.3              Intake/Output Summary (Last 24 hours) at 9/23/2023 1031  Last data filed at 9/23/2023 0800  Gross per 24 hour   Intake 1732.32 ml   Output 1975 ml   Net -242.68 ml               labetalol  10 mg Q4HRS PRN    acetaminophen  650 mg Q4HRS PRN    MD Alert...Adult ICU Electrolyte Replacement per Pharmacy   PHARMACY TO DOSE    insulin regular  3-14 Units Q6HRS    And    dextrose bolus  25 g Q15 MIN PRN    senna-docusate  2 Tablet BID    And    polyethylene glycol/lytes  1 Packet QDAY PRN    And    magnesium hydroxide  30 mL QDAY PRN    And    bisacodyl  10 mg QDAY PRN    ondansetron  4 mg Q4HRS PRN    ondansetron  4 mg Q4HRS PRN    promethazine  12.5-25 mg Q4HRS PRN    promethazine  12.5-25 mg Q4HRS PRN    prochlorperazine  5-10 mg Q4HRS PRN    dexamethasone  4 mg Q6HRS       Assessment and Plan:  Hospital day #4  POD #2 s/p right retrosigmoid craniotomy for resection of cerebellar tumor and placement of EVD    Prophylactic anticoagulation: no         Start date/time: TBD - pending drain removal    - Continue Decadron 4 mg every 6 hours  - EVD  clamped Friday will remove Sunday per signout   - MRI brain no issues   - OK to mobilize w/ PT/OT, OK to clamp EVD for 30min intervals     30 min in care for EVD  Please call with questions    Roger Ellis M.D.

## 2023-09-23 NOTE — PROGRESS NOTES
Assumed care of patient, report received from DARA Polo.  Patient is A&Ox4, states pain is 3/10 which is comfortable for him.  Verified lines and drips, EVD leveled and zeroed. Safety precautions in place, no needs at this time.

## 2023-09-23 NOTE — PROGRESS NOTES
"Critical Care Progress Note    Date of admission  9/19/2023    Chief Complaint  \"58 y.o. male with hx of metastatic prostate CA , HTN, and DM who presented 9/19/2023 with 2 weeks of nausea, vomiting, and weakness. He was transferred to [facility] by EMS. In the ED a CT head revealed significant vasogenic edema throughout the right hemicerebellum causing effacement of the fourth ventricle and moderate obstructive hydrocephalus.  Dr. Cobb, neurosurgery was consulted.  She has recommended MRI brain for surgical planning.  Neuro critical care consultation was requested for further evaluation and management\" - Dr. Alvarez.     Hospital Course  9/19 admitted to ICU  9/21 s/p right retrosigmoid craniotomy for resection of cerebellar mass by Dr. Be     Interval Problem Update  Reviewed last 24 hour events:  No acute neurological changes overnight. RASS 0  Moving all extremities, weak LE bilat.   Hemodynamically stable.   Sitting in chair this am  EVD is in place, currently clamped. ICP 1-7  Pt alert, oriented.   Tolerating oral intake  HR in 70-90s NSR  SBP 110s-130s. Goal -140  Hgb 9.7, plt 169  Creatinine 0.3, HCO3 19.   Adequate UOP. 700cc overnight.   -240s, on high sliding scale    On dexamethasone 4 Q6H    Mobility: goal level 4    Review of Systems  Review of Systems   Constitutional:  Negative for fever.   Respiratory:  Negative for shortness of breath.    Cardiovascular:  Negative for chest pain and leg swelling.   Gastrointestinal:  Negative for abdominal pain, diarrhea, nausea and vomiting.   Musculoskeletal:  Negative for back pain.        Weakness in lower extremitiy   Neurological:  Positive for weakness. Negative for headaches.   All other systems reviewed and are negative.       Vital Signs for last 24 hours   Temp:  [35.7 °C (96.3 °F)-36.2 °C (97.1 °F)] 36.1 °C (96.9 °F)  Pulse:  [] 65  Resp:  [12-18] 14  BP: (102-133)/(55-76) 123/66  SpO2:  [91 %-97 %] 95 %    Hemodynamic parameters for " last 24 hours       Respiratory Information for the last 24 hours       Physical Exam   Physical Exam  Vitals and nursing note reviewed.   Constitutional:       General: He is not in acute distress.     Appearance: He is ill-appearing. He is not toxic-appearing or diaphoretic.   HENT:      Head: Normocephalic.      Mouth/Throat:      Mouth: Mucous membranes are moist.   Cardiovascular:      Rate and Rhythm: Normal rate and regular rhythm.      Pulses: Normal pulses.      Heart sounds: Normal heart sounds. No murmur heard.  Pulmonary:      Effort: Pulmonary effort is normal. No respiratory distress.      Breath sounds: Normal breath sounds. No wheezing, rhonchi or rales.   Abdominal:      General: Bowel sounds are normal. There is no distension.      Palpations: Abdomen is soft.      Tenderness: There is no abdominal tenderness. There is no guarding.   Musculoskeletal:      Right lower leg: No edema.      Left lower leg: No edema.      Comments: Lower extremity weakness bilaterally   Motor strength 3/5  Sensation decreased bilaterally   Upper extremity 5/5    Skin:     General: Skin is warm and dry.      Capillary Refill: Capillary refill takes less than 2 seconds.      Coloration: Skin is not jaundiced.   Neurological:      General: No focal deficit present.      Mental Status: He is alert and oriented to person, place, and time.      Cranial Nerves: No cranial nerve deficit.   Psychiatric:         Mood and Affect: Mood normal.         Medications  Current Facility-Administered Medications   Medication Dose Route Frequency Provider Last Rate Last Admin    potassium phosphate 15 mmol in  mL ivpb  15 mmol Intravenous Once [place] THAO Latona 125 mL/hr at 09/23/23 0606 15 mmol at 09/23/23 0606    acetaminophen (Tylenol) tablet 650 mg  650 mg Oral Q4HRS PRN Chuck Matos D.O.   650 mg at 09/23/23 0213    MD Alert...ICU Electrolyte Replacement per Pharmacy   Other PHARMACY TO DOSE Chuck Matos D.O.        insulin regular  (HumuLIN R,NovoLIN R) injection  3-14 Units Subcutaneous Q6HRS MARCY Sánchez.O.   4 Units at 09/23/23 0611    And    dextrose 50% (D50W) injection 25 g  25 g Intravenous Q15 MIN PRN LIBAN SánchezO.        senna-docusate (Pericolace Or Senokot S) 8.6-50 MG per tablet 2 Tablet  2 Tablet Oral BID LIBAN SánchezO.   2 Tablet at 09/23/23 0607    And    polyethylene glycol/lytes (Miralax) PACKET 1 Packet  1 Packet Oral QDAY PRN LIBAN SánchezO.        And    magnesium hydroxide (Milk Of Magnesia) suspension 30 mL  30 mL Oral QDAY PRN LIBAN SánchezO.        And    bisacodyl (Dulcolax) suppository 10 mg  10 mg Rectal QDAY PRN LIBAN SánchezO.        ondansetron (Zofran) syringe/vial injection 4 mg  4 mg Intravenous Q4HRS PRN LIBAN SánchezO.        ondansetron (Zofran ODT) dispertab 4 mg  4 mg Oral Q4HRS PRN LIBAN SánchezO.        promethazine (Phenergan) tablet 12.5-25 mg  12.5-25 mg Oral Q4HRS PRN MRACY Sánchez.O.        promethazine (Phenergan) suppository 12.5-25 mg  12.5-25 mg Rectal Q4HRS PRN LIBAN SánchezO.        prochlorperazine (Compazine) injection 5-10 mg  5-10 mg Intravenous Q4HRS PRN MARCY Sánchez.O.        labetalol (Normodyne/Trandate) injection 10 mg  10 mg Intravenous Q4HRS PRN Franki Alvarez M.D.        dexamethasone (Decadron) injection 4 mg  4 mg Intravenous Q6HRS Franki Alvarez M.D.   4 mg at 09/23/23 0606       Fluids    Intake/Output Summary (Last 24 hours) at 9/23/2023 0644  Last data filed at 9/23/2023 0600  Gross per 24 hour   Intake 1995.82 ml   Output 2010 ml   Net -14.18 ml         Laboratory          Recent Labs     09/22/23  0000 09/22/23  0549 09/23/23  0205   SODIUM 142 140 137   POTASSIUM 3.6 3.4* 3.6   CHLORIDE 114* 111 104   CO2 18* 19* 23   BUN 12 11 11   CREATININE 0.27* 0.23* 0.30*   MAGNESIUM 2.1 2.1  --    PHOSPHORUS 1.5* 1.5* 2.4*   CALCIUM 7.4* 6.8* 7.2*       Recent Labs     09/22/23  0000 09/22/23  0549 09/23/23  0205   GLUCOSE 207* 258* 245*       Recent Labs      09/21/23  0505 09/22/23  0549 09/23/23  0205   WBC 4.7* 8.8 6.8   NEUTSPOLYS 89.60* 89.80* 89.40*   LYMPHOCYTES 5.40* 2.20* 3.80*   MONOCYTES 3.90 3.90 4.90   EOSINOPHILS 0.00 0.00 0.00   BASOPHILS 0.00 0.10 0.10       Recent Labs     09/21/23  0505 09/22/23  0549 09/23/23  0205   RBC 3.24* 3.19* 3.25*   HEMOGLOBIN 9.9* 9.7* 9.7*   HEMATOCRIT 29.8* 29.4* 29.5*   PLATELETCT 183 169 154*   PROTHROMBTM 18.1* 16.7* 16.2*   INR 1.48* 1.33* 1.28*         Imaging  X-Ray:  I have personally reviewed the images and compared with prior images.  CT:    Reviewed    Assessment/Plan  * Brain mass- (present on admission)  Assessment & Plan  Cerebellar mass with mass effect, DDx concerning metastatic prostate CA vs others.   9/21 s/p retrosigmoid crani with cerebellar mass resection   9/21 Hypertonic saline was discontinued post op. No need for further osmotic therapy after surgery    Continue dexamethasone 4 mg IV every 6H post op  Serial neurologic exams, change to neuro Q4H  NSGY plans to keep EVD today. Keep clamped  Mobililty. PT/OT  Follow up path   Radiation oncology and medOnc following    Prostate cancer (HCC)  Assessment & Plan  Diagnosed about 2 years ago, seen by our local heme/onc.  On chemo/radiation therapy, last chemo 4 weeks ago  Recently seen by [facility] 4 weeks ago and was told to stop chemotherapy  Pt was planned to start on lutetium-177 therapy   He also c/o lower extremity weaknesses bilaterally in the past 2 weeks.  9/21 MRI cervical, thoracic and lumbar spine with extensive bone metastasis.   MedOnc and RadOnc are following       NED (obstructive sleep apnea)- (present on admission)  Assessment & Plan  CPAP while sleeping    HTN (hypertension), benign- (present on admission)  Assessment & Plan  Goal normotensive, 100-140         VTE:  Lovenox Will hold for tonight before OR tomorrow  Ulcer: H2 Antagonist  Lines: None    I have performed a physical exam and reviewed and updated ROS and Plan today (9/23/2023). In  review of yesterday's note (9/22/2023), there are no changes except as documented above.     Discussed patient condition and risk of morbidity and/or mortality with RN, RT, Pharmacy, Charge nurse / hot rounds, and Patient    Keep pt in ICU due to EVD

## 2023-09-24 PROBLEM — G93.89 BRAIN MASS: Status: RESOLVED | Noted: 2023-09-19 | Resolved: 2023-09-24

## 2023-09-24 PROBLEM — G93.89 CEREBELLAR MASS: Status: ACTIVE | Noted: 2023-09-24

## 2023-09-24 LAB
ANION GAP SERPL CALC-SCNC: 13 MMOL/L (ref 7–16)
BACTERIA BLD CULT: NORMAL
BACTERIA BLD CULT: NORMAL
BASOPHILS # BLD AUTO: 0 % (ref 0–1.8)
BASOPHILS # BLD: 0 K/UL (ref 0–0.12)
BUN SERPL-MCNC: 11 MG/DL (ref 8–22)
CALCIUM SERPL-MCNC: 8.1 MG/DL (ref 8.5–10.5)
CHLORIDE SERPL-SCNC: 101 MMOL/L (ref 96–112)
CO2 SERPL-SCNC: 20 MMOL/L (ref 20–33)
CREAT SERPL-MCNC: 0.32 MG/DL (ref 0.5–1.4)
EOSINOPHIL # BLD AUTO: 0 K/UL (ref 0–0.51)
EOSINOPHIL NFR BLD: 0 % (ref 0–6.9)
ERYTHROCYTE [DISTWIDTH] IN BLOOD BY AUTOMATED COUNT: 55.7 FL (ref 35.9–50)
GFR SERPLBLD CREATININE-BSD FMLA CKD-EPI: 136 ML/MIN/1.73 M 2
GLUCOSE BLD STRIP.AUTO-MCNC: 224 MG/DL (ref 65–99)
GLUCOSE BLD STRIP.AUTO-MCNC: 232 MG/DL (ref 65–99)
GLUCOSE BLD STRIP.AUTO-MCNC: 259 MG/DL (ref 65–99)
GLUCOSE BLD STRIP.AUTO-MCNC: 261 MG/DL (ref 65–99)
GLUCOSE SERPL-MCNC: 285 MG/DL (ref 65–99)
HCT VFR BLD AUTO: 31.4 % (ref 42–52)
HGB BLD-MCNC: 10.5 G/DL (ref 14–18)
IMM GRANULOCYTES # BLD AUTO: 0.09 K/UL (ref 0–0.11)
IMM GRANULOCYTES NFR BLD AUTO: 1.5 % (ref 0–0.9)
INR PPP: 1.22 (ref 0.87–1.13)
LYMPHOCYTES # BLD AUTO: 0.35 K/UL (ref 1–4.8)
LYMPHOCYTES NFR BLD: 5.7 % (ref 22–41)
MCH RBC QN AUTO: 30.6 PG (ref 27–33)
MCHC RBC AUTO-ENTMCNC: 33.4 G/DL (ref 32.3–36.5)
MCV RBC AUTO: 91.5 FL (ref 81.4–97.8)
MONOCYTES # BLD AUTO: 0.28 K/UL (ref 0–0.85)
MONOCYTES NFR BLD AUTO: 4.6 % (ref 0–13.4)
NEUTROPHILS # BLD AUTO: 5.41 K/UL (ref 1.82–7.42)
NEUTROPHILS NFR BLD: 88.2 % (ref 44–72)
NRBC # BLD AUTO: 0 K/UL
NRBC BLD-RTO: 0 /100 WBC (ref 0–0.2)
PHOSPHATE SERPL-MCNC: 2.2 MG/DL (ref 2.5–4.5)
PLATELET # BLD AUTO: 144 K/UL (ref 164–446)
PMV BLD AUTO: 10.6 FL (ref 9–12.9)
POTASSIUM SERPL-SCNC: 4 MMOL/L (ref 3.6–5.5)
PROTHROMBIN TIME: 15.5 SEC (ref 12–14.6)
RBC # BLD AUTO: 3.43 M/UL (ref 4.7–6.1)
SIGNIFICANT IND 70042: NORMAL
SIGNIFICANT IND 70042: NORMAL
SITE SITE: NORMAL
SITE SITE: NORMAL
SODIUM SERPL-SCNC: 134 MMOL/L (ref 135–145)
SOURCE SOURCE: NORMAL
SOURCE SOURCE: NORMAL
WBC # BLD AUTO: 6.1 K/UL (ref 4.8–10.8)

## 2023-09-24 PROCEDURE — 700111 HCHG RX REV CODE 636 W/ 250 OVERRIDE (IP): Mod: JZ | Performed by: STUDENT IN AN ORGANIZED HEALTH CARE EDUCATION/TRAINING PROGRAM

## 2023-09-24 PROCEDURE — A9270 NON-COVERED ITEM OR SERVICE: HCPCS | Performed by: INTERNAL MEDICINE

## 2023-09-24 PROCEDURE — 85610 PROTHROMBIN TIME: CPT

## 2023-09-24 PROCEDURE — 80048 BASIC METABOLIC PNL TOTAL CA: CPT

## 2023-09-24 PROCEDURE — 700101 HCHG RX REV CODE 250: Performed by: NURSE PRACTITIONER

## 2023-09-24 PROCEDURE — 85025 COMPLETE CBC W/AUTO DIFF WBC: CPT

## 2023-09-24 PROCEDURE — 82962 GLUCOSE BLOOD TEST: CPT

## 2023-09-24 PROCEDURE — 99222 1ST HOSP IP/OBS MODERATE 55: CPT | Performed by: STUDENT IN AN ORGANIZED HEALTH CARE EDUCATION/TRAINING PROGRAM

## 2023-09-24 PROCEDURE — 700105 HCHG RX REV CODE 258: Performed by: NURSE PRACTITIONER

## 2023-09-24 PROCEDURE — 84100 ASSAY OF PHOSPHORUS: CPT

## 2023-09-24 PROCEDURE — 700102 HCHG RX REV CODE 250 W/ 637 OVERRIDE(OP): Performed by: STUDENT IN AN ORGANIZED HEALTH CARE EDUCATION/TRAINING PROGRAM

## 2023-09-24 PROCEDURE — 99233 SBSQ HOSP IP/OBS HIGH 50: CPT | Performed by: INTERNAL MEDICINE

## 2023-09-24 PROCEDURE — 770001 HCHG ROOM/CARE - MED/SURG/GYN PRIV*

## 2023-09-24 PROCEDURE — 700102 HCHG RX REV CODE 250 W/ 637 OVERRIDE(OP): Performed by: INTERNAL MEDICINE

## 2023-09-24 PROCEDURE — A9270 NON-COVERED ITEM OR SERVICE: HCPCS | Performed by: STUDENT IN AN ORGANIZED HEALTH CARE EDUCATION/TRAINING PROGRAM

## 2023-09-24 PROCEDURE — 700111 HCHG RX REV CODE 636 W/ 250 OVERRIDE (IP): Mod: JZ | Performed by: INTERNAL MEDICINE

## 2023-09-24 RX ORDER — ENOXAPARIN SODIUM 100 MG/ML
40 INJECTION SUBCUTANEOUS DAILY
Status: DISCONTINUED | OUTPATIENT
Start: 2023-09-24 | End: 2023-09-29 | Stop reason: HOSPADM

## 2023-09-24 RX ORDER — LISINOPRIL 20 MG/1
20 TABLET ORAL DAILY
Status: DISCONTINUED | OUTPATIENT
Start: 2023-09-24 | End: 2023-09-29 | Stop reason: HOSPADM

## 2023-09-24 RX ADMIN — INSULIN HUMAN 7 UNITS: 100 INJECTION, SOLUTION PARENTERAL at 05:14

## 2023-09-24 RX ADMIN — ACETAMINOPHEN 650 MG: 325 TABLET, FILM COATED ORAL at 23:39

## 2023-09-24 RX ADMIN — DEXAMETHASONE SODIUM PHOSPHATE 4 MG: 4 INJECTION INTRA-ARTICULAR; INTRALESIONAL; INTRAMUSCULAR; INTRAVENOUS; SOFT TISSUE at 23:40

## 2023-09-24 RX ADMIN — INSULIN HUMAN 4 UNITS: 100 INJECTION, SOLUTION PARENTERAL at 17:28

## 2023-09-24 RX ADMIN — ACETAMINOPHEN 650 MG: 325 TABLET, FILM COATED ORAL at 00:20

## 2023-09-24 RX ADMIN — INSULIN HUMAN 4 UNITS: 100 INJECTION, SOLUTION PARENTERAL at 00:09

## 2023-09-24 RX ADMIN — DEXAMETHASONE SODIUM PHOSPHATE 4 MG: 4 INJECTION INTRA-ARTICULAR; INTRALESIONAL; INTRAMUSCULAR; INTRAVENOUS; SOFT TISSUE at 05:10

## 2023-09-24 RX ADMIN — ENOXAPARIN SODIUM 40 MG: 100 INJECTION SUBCUTANEOUS at 17:18

## 2023-09-24 RX ADMIN — LISINOPRIL 20 MG: 20 TABLET ORAL at 17:18

## 2023-09-24 RX ADMIN — INSULIN HUMAN 7 UNITS: 100 INJECTION, SOLUTION PARENTERAL at 23:52

## 2023-09-24 RX ADMIN — SODIUM PHOSPHATE, MONOBASIC, MONOHYDRATE AND SODIUM PHOSPHATE, DIBASIC, ANHYDROUS 15 MMOL: 276; 142 INJECTION, SOLUTION INTRAVENOUS at 06:21

## 2023-09-24 RX ADMIN — INSULIN HUMAN 7 UNITS: 100 INJECTION, SOLUTION PARENTERAL at 12:19

## 2023-09-24 RX ADMIN — DEXAMETHASONE SODIUM PHOSPHATE 4 MG: 4 INJECTION INTRA-ARTICULAR; INTRALESIONAL; INTRAMUSCULAR; INTRAVENOUS; SOFT TISSUE at 00:09

## 2023-09-24 RX ADMIN — DEXAMETHASONE SODIUM PHOSPHATE 4 MG: 4 INJECTION INTRA-ARTICULAR; INTRALESIONAL; INTRAMUSCULAR; INTRAVENOUS; SOFT TISSUE at 12:22

## 2023-09-24 RX ADMIN — DEXAMETHASONE SODIUM PHOSPHATE 4 MG: 4 INJECTION INTRA-ARTICULAR; INTRALESIONAL; INTRAMUSCULAR; INTRAVENOUS; SOFT TISSUE at 17:18

## 2023-09-24 ASSESSMENT — PAIN DESCRIPTION - PAIN TYPE
TYPE: ACUTE PAIN
TYPE: ACUTE PAIN;SURGICAL PAIN
TYPE: ACUTE PAIN

## 2023-09-24 ASSESSMENT — ENCOUNTER SYMPTOMS
SHORTNESS OF BREATH: 0
RESPIRATORY NEGATIVE: 1
BACK PAIN: 0
GASTROINTESTINAL NEGATIVE: 1
WEAKNESS: 1
SPEECH CHANGE: 0
TINGLING: 0
TREMORS: 0
HEADACHES: 0
FEVER: 0
BLURRED VISION: 0
VOMITING: 0
CHILLS: 0
MYALGIAS: 0
CARDIOVASCULAR NEGATIVE: 1
MUSCULOSKELETAL NEGATIVE: 1
DIZZINESS: 0
NAUSEA: 0
SENSORY CHANGE: 0
CONSTIPATION: 0
FOCAL WEAKNESS: 1
EYES NEGATIVE: 1
DIARRHEA: 0
ABDOMINAL PAIN: 0

## 2023-09-24 NOTE — ASSESSMENT & PLAN NOTE
Dr. Butts. His last Xgeva injection was on 8/30/2023, has had progression on Xtandi, Xofigo and docetaxel  -Await bx results to determine further treatment  -Heme/Onc following, will appreciate recs

## 2023-09-24 NOTE — PROGRESS NOTES
Late Entry     At approximately 0100 another RN notified this RN that the patient informed her that he had ordered cookies to the hospital. Education was provided by other RN to the patient regarding the management of his DM and blood sugar. However, patient continued to complete the order. APRN notified. Patient's order arrived to the unit at approximately 0145. Education regarding management of the patient's DM, and importance of blood sugar management to his plan of care reinforced by this RN. Patient shows understanding of information provided, but patient continues to want a cookie. Patient ultimately agreed to limit his intake to a single cookie at this time.

## 2023-09-24 NOTE — PROGRESS NOTES
Oncology/Hematology Progress Note               Author: Bill Falcon III, M.D. 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  Otherwise in stable clinical condition, remains in ICU.    PMHx, PSurgHx, SocHx, FAMHx: Reviewed and unchanged    Review of Systems:  Review of Systems   Constitutional:  Positive for malaise/fatigue.   Eyes: Negative.    Respiratory: Negative.     Cardiovascular: Negative.    Gastrointestinal: Negative.    Genitourinary: Negative.    Musculoskeletal: Negative.    Skin: Negative.    Neurological:  Negative for dizziness, tingling, tremors, speech change and headaches.       Physical Exam:  Physical Exam  Constitutional:       Appearance: He is ill-appearing.   HENT:      Head: Atraumatic.      Nose: Nose normal.   Eyes:      Extraocular Movements: Extraocular movements intact.      Pupils: Pupils are equal, round, and reactive to light.   Neurological:      General: No focal deficit present.      Mental Status: He is alert and oriented to person, place, and time.         Labs:          Recent Labs     09/22/23  0000 09/22/23  0549 09/23/23  0205 09/24/23  0340   SODIUM 142 140 137 134*   POTASSIUM 3.6 3.4* 3.6 4.0   CHLORIDE 114* 111 104 101   CO2 18* 19* 23 20   BUN 12 11 11 11   CREATININE 0.27* 0.23* 0.30* 0.32*   MAGNESIUM 2.1 2.1  --   --    PHOSPHORUS 1.5* 1.5* 2.4* 2.2*   CALCIUM 7.4* 6.8* 7.2* 8.1*     Recent Labs     09/22/23  0549 09/23/23  0205 09/24/23  0340   GLUCOSE 258* 245* 285*     Recent Labs     09/22/23  0549 09/23/23  0205 09/24/23  0340   RBC 3.19* 3.25* 3.43*   HEMOGLOBIN 9.7* 9.7* 10.5*   HEMATOCRIT 29.4* 29.5* 31.4*   PLATELETCT 169 154* 144*   PROTHROMBTM 16.7* 16.2* 15.5*   INR 1.33* 1.28* 1.22*     Recent Labs     09/22/23  0549 09/23/23  0205 09/24/23  0340   WBC 8.8  6.8 6.1   NEUTSPOLYS 89.80* 89.40* 88.20*   LYMPHOCYTES 2.20* 3.80* 5.70*   MONOCYTES 3.90 4.90 4.60   EOSINOPHILS 0.00 0.00 0.00   BASOPHILS 0.10 0.10 0.00     Recent Labs     23  0549 23  0205 23  0340   SODIUM 140 137 134*   POTASSIUM 3.4* 3.6 4.0   CHLORIDE 111 104 101   CO2 19* 23 20   GLUCOSE 258* 245* 285*   BUN 11 11 11   CREATININE 0.23* 0.30* 0.32*   CALCIUM 6.8* 7.2* 8.1*     Hemodynamics:  Temp (24hrs), Av.1 °C (97 °F), Min:35.7 °C (96.3 °F), Max:36.4 °C (97.5 °F)  Temperature: (!) 35.7 °C (96.3 °F)  Pulse  Av.2  Min: 57  Max: 105   Blood Pressure: 136/85     Respiratory:    Respiration: (!) 21, Pulse Oximetry: 96 %           Fluids:    Intake/Output Summary (Last 24 hours) at 2023 0912  Last data filed at 2023 0600  Gross per 24 hour   Intake 1064.05 ml   Output 1690 ml   Net -625.95 ml        GI/Nutrition:  Orders Placed This Encounter   Procedures    Diet Order Diet: Consistent CHO (Diabetic)     Standing Status:   Standing     Number of Occurrences:   1     Order Specific Question:   Diet:     Answer:   Consistent CHO (Diabetic) [4]     Medical Decision Making, by Problem:  Active Hospital Problems    Diagnosis     *Brain mass [G93.89]     Prostate cancer (HCC) [C61]     NED (obstructive sleep apnea) [G47.33]     HTN (hypertension), benign [I10]        Plan:  1.  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     2.  Right cerebellar mass  - underwent for craniotomy and resection of cerebellar lesion with Dr Be  - follow-up MRI brain with vasogenic edema remaining and postoperative changes in the right cerebellar region    Plan  -Awaiting for recent biopsy results from craniotomy.  He will benefit from radiation once staples are removed, subsequently he will follow with Dr. Butts for palliative systemic therapy options, if confusion small cell carcinoma transformation  he will benefit from platinum based therapy.    High complexity    I will sign off, please call back if questions or concerns        Quality-Core Measures

## 2023-09-24 NOTE — PROGRESS NOTES
Neurosurgery Progress Note    Subjective:  Doing well this am.   Exam:  A&O x4  PERRL  BUE good strength  BLE weakness unchanged    Cranial incision c/d/i   EVD at 65nyO3O, low output, low ICPs    BP  Min: 109/69  Max: 154/77  Pulse  Av.2  Min: 54  Max: 80  Resp  Av.1  Min: 12  Max: 31  Temp  Av.1 °C (97 °F)  Min: 35.7 °C (96.3 °F)  Max: 36.4 °C (97.5 °F)  SpO2  Av.6 %  Min: 94 %  Max: 97 %    ICP  Av.2 MM HG  Min: 1 MM HG  Max: 8 MM HG  CPP   Av.4  Min: 86  Max: 102    Recent Labs     23  0549 23  0205 23  0340   WBC 8.8 6.8 6.1   RBC 3.19* 3.25* 3.43*   HEMOGLOBIN 9.7* 9.7* 10.5*   HEMATOCRIT 29.4* 29.5* 31.4*   MCV 92.2 90.8 91.5   MCH 30.4 29.8 30.6   MCHC 33.0 32.9 33.4   RDW 58.1* 56.6* 55.7*   PLATELETCT 169 154* 144*   MPV 10.1 10.0 10.6       Recent Labs     23  0549 23  0205 23  0340   SODIUM 140 137 134*   POTASSIUM 3.4* 3.6 4.0   CHLORIDE 111 104 101   CO2 19* 23 20   GLUCOSE 258* 245* 285*   BUN 11 11 11   CREATININE 0.23* 0.30* 0.32*   CALCIUM 6.8* 7.2* 8.1*       Recent Labs     23  0549 23  0205 23  0340   INR 1.33* 1.28* 1.22*               Intake/Output                         23 07 - 23 0659 23 07 - 23 0659     [id number] Total  Total                 Intake    P.O.  360  350 710  --  -- --    P.O. 360 350 710 -- -- --    Other  --  50 50  --  -- --    Medications (PO/Enteral Liquids) -- 50 50 -- -- --    IV Piggyback  638.4  -- 638.4  456.3  -- 456.3    Volume (mL) (calcium CHLORIDE 10 % 1,000 mg in dextrose 5% 100 mL IVPB) 97 -- 97 -- -- --    Volume (mL) (potassium phosphate 15 mmol in  mL ivpb) 541.4 -- 541.4 -- -- --    Volume (mL) (sodium phosphate 15 mmol in  mL ivpb) -- -- -- 456.3 -- 456.3    Total Intake 998.4 400 1398.4 456.3 -- 456.3       Output    Urine  430  1400 1830  850  -- 850    Number of Times Voided -- 2 x 2 x 1 x -- 1 x     Urine Void (mL) -- 1400 1400 850 -- 850    Output (mL) ([REMOVED] Urethral Catheter 16 Fr. 09/23/23 1300) 430 -- 430 -- -- --    Drains  0  0 0  0  -- 0    Output (mL) ([REMOVED] ICP/Ventriculostomy Right Parietal region 09/24/23 1325) 0 0 0 0 -- 0    Stool  --  -- --  --  -- --    Number of Times Stooled -- 0 x 0 x 1 x -- 1 x    Total Output 430 1400 1830 850 -- 850       Net I/O     568.4 -1000 -431.6 -393.8 -- -393.8              Intake/Output Summary (Last 24 hours) at 9/24/2023 1337  Last data filed at 9/24/2023 1100  Gross per 24 hour   Intake 856.25 ml   Output 2250 ml   Net -1393.75 ml               labetalol  10 mg Q4HRS PRN    insulin GLARGINE  10 Units Q EVENING    acetaminophen  650 mg Q4HRS PRN    MD Alert...Adult ICU Electrolyte Replacement per Pharmacy   PHARMACY TO DOSE    insulin regular  3-14 Units Q6HRS    And    dextrose bolus  25 g Q15 MIN PRN    senna-docusate  2 Tablet BID    And    polyethylene glycol/lytes  1 Packet QDAY PRN    And    magnesium hydroxide  30 mL QDAY PRN    And    bisacodyl  10 mg QDAY PRN    ondansetron  4 mg Q4HRS PRN    ondansetron  4 mg Q4HRS PRN    promethazine  12.5-25 mg Q4HRS PRN    promethazine  12.5-25 mg Q4HRS PRN    prochlorperazine  5-10 mg Q4HRS PRN    dexamethasone  4 mg Q6HRS       Assessment and Plan:  Hospital day #5  POD #3 s/p right retrosigmoid craniotomy for resection of cerebellar tumor and placement of EVD    Prophylactic anticoagulation: yes         Start date/time: night 9/24    - Continue Decadron 4 mg every 6 hours  - EVD removed 9/24  - MRI brain no issues   - OK to mobilize w/ PT/OT, OK to clamp EVD for 30min intervals   Ok for q4 and TTF    30 min in care for EVD  Please call with questions    Roger Ellis M.D.

## 2023-09-24 NOTE — CONSULTS
Hospital Medicine Consultation    Date of Service  9/24/2023    Referring Physician  Monico Duarte M.D.    Consulting Physician  Juan Moreno D.O.    Reason for Consultation  Transfer out of ICU    History of Presenting Illness  The patient is a 58 y.o. male w/ PMH of prostate CA w/ mets to spine s/p decompression/fusion in March, HTN, NIDDM2 that presented with severe nausea and vomiting for 2 weeks. CT head revealed a new cerebellar mass with vasogenic edema which was confirmed on MRI. Bladder scan revealed urinary retention s/p Mistry. Decadron was started as was hypertonic saline. Neurosurgery was consulted and patient underwent craniotomy on 9/21 with EVD placement. Repeat MRI post-op showed improvement. Patient improved symptomatically and EVD was pulled on 9/24. He continues to have leg weakness, this began early this year and was partly the reason for his spinal surgery but has continued to progress, PT/OT recommend IPR. Due to his improvements patient was transferred out of the ICU for which hospital medicine was consulted.     Review of Systems  Review of Systems   Constitutional:  Negative for chills and fever.   HENT:  Negative for congestion.    Eyes:  Negative for blurred vision.   Respiratory:  Negative for shortness of breath.    Cardiovascular:  Negative for chest pain and leg swelling.   Gastrointestinal:  Negative for abdominal pain, constipation, diarrhea, nausea and vomiting.   Genitourinary:  Negative for dysuria.   Musculoskeletal:  Negative for myalgias.   Skin:  Negative for rash.   Neurological:  Positive for focal weakness and weakness. Negative for sensory change.       Past Medical History   has a past medical history of Arthritis (02/2019), Bronchitis (2019), Cancer (HCC), Cancer (HCC), Cold (02/08/2019), Diabetes, High cholesterol, HTN (hypertension), benign (8/14/2009), Hypertension, Infectious disease, and Obstructive sleep apnea (02/2019).    Surgical History   has a past  surgical history that includes knee arthroscopy (Right, 2014); carpal tunnel release (Left, 2019); pr shldr arthroscop exten debride 3+ (Left, 2021); pr arthroscopy shoulder surgical biceps tenodes* (Left, 2021); and craniotomy stealth (Right, 2023).    Family History  family history is not on file.    Social History   reports that he has never smoked. He has never used smokeless tobacco. He reports current alcohol use. He reports that he does not use drugs.    Medications  Prior to Admission Medications   Prescriptions Last Dose Informant Patient Reported? Taking?   Canagliflozin (INVOKANA) 100 MG Tab 2023 at 0900 Patient Yes Yes   Sig: Take 100 mg by mouth every day.   Cholecalciferol (VITAMIN D) 2000 UNITS CAPS 2023 at 0900 Patient Yes No   Sig: Take 6,000 Units by mouth every day. 2000 units x 3 tablets = 6000 mcg   Dulaglutide (TRULICITY) 1.5 MG/0.5ML Solution Pen-injector 2023 at 1000 Patient Yes No   Sig: Inject 1.5 mg as instructed every Saturday.   XGEVA 120 MG/1.7ML injection 2023 at 1000 Patient Yes No   Si mg Q30 DAYS.   cyclobenzaprine (FLEXERIL) 10 mg Tab 2023 at 2200 Patient Yes Yes   Sig: Take 10 mg by mouth at bedtime.   lisinopril (PRINIVIL) 40 MG tablet 2023 at 0900 Patient Yes Yes   Sig: Take 40 mg by mouth every day.   metoprolol SR (TOPROL XL) 25 MG TABLET SR 24 HR 2023 at 0900 Patient Yes Yes   Sig: Take 25 mg by mouth every day.   zolpidem (AMBIEN) 10 MG Tab 2023 at 2200 Patient Yes Yes   Sig: Take 10 mg by mouth at bedtime as needed for Sleep.      Facility-Administered Medications: None       Allergies  No Known Allergies    Physical Exam  Temp:  [35.7 °C (96.3 °F)-36.4 °C (97.6 °F)] 35.9 °C (96.6 °F)  Pulse:  [54-76] 71  Resp:  [12-31] 19  BP: (109-157)/(69-86) 157/80  SpO2:  [94 %-97 %] 96 %    Physical Exam  Constitutional:       Appearance: Normal appearance.   HENT:      Head: Normocephalic and atraumatic.      Nose:  Nose normal.      Mouth/Throat:      Mouth: Mucous membranes are moist.      Pharynx: Oropharynx is clear.   Eyes:      Conjunctiva/sclera: Conjunctivae normal.      Pupils: Pupils are equal, round, and reactive to light.   Cardiovascular:      Rate and Rhythm: Normal rate and regular rhythm.      Heart sounds: No murmur heard.  Pulmonary:      Effort: Pulmonary effort is normal.      Breath sounds: No wheezing, rhonchi or rales.   Abdominal:      General: There is no distension.      Palpations: Abdomen is soft.      Tenderness: There is no abdominal tenderness. There is no guarding or rebound.   Musculoskeletal:         General: No swelling or tenderness.      Cervical back: Normal range of motion and neck supple.   Skin:     General: Skin is warm and dry.   Neurological:      Mental Status: He is alert and oriented to person, place, and time.      GCS: GCS eye subscore is 4. GCS verbal subscore is 5. GCS motor subscore is 6.      Cranial Nerves: No facial asymmetry.      Motor: Weakness present.      Comments: Strength 3/5 bilateral LE   Psychiatric:         Mood and Affect: Mood normal.         Behavior: Behavior normal.         Fluids  Date 09/24/23 0700 - 09/25/23 0659   Shift 3693-7099 6315-3916 2036-3908 24 Hour Total   INTAKE   P.O. 360   360   IV Piggyback 500   500   Shift Total 860   860   OUTPUT   Urine 850   850   Drains 0   0   Shift Total 850   850   Weight (kg) 75.3 75.3 75.3 75.3       Laboratory  Recent Labs     09/22/23  0549 09/23/23  0205 09/24/23  0340   WBC 8.8 6.8 6.1   RBC 3.19* 3.25* 3.43*   HEMOGLOBIN 9.7* 9.7* 10.5*   HEMATOCRIT 29.4* 29.5* 31.4*   MCV 92.2 90.8 91.5   MCH 30.4 29.8 30.6   MCHC 33.0 32.9 33.4   RDW 58.1* 56.6* 55.7*   PLATELETCT 169 154* 144*   MPV 10.1 10.0 10.6     Recent Labs     09/22/23  0549 09/23/23  0205 09/24/23  0340   SODIUM 140 137 134*   POTASSIUM 3.4* 3.6 4.0   CHLORIDE 111 104 101   CO2 19* 23 20   GLUCOSE 258* 245* 285*   BUN 11 11 11   CREATININE 0.23*  0.30* 0.32*   CALCIUM 6.8* 7.2* 8.1*     Recent Labs     09/22/23  0549 09/23/23  0205 09/24/23  0340   INR 1.33* 1.28* 1.22*                 Imaging  MR-BRAIN-WITH & W/O   Final Result         Postoperative changes are noted in the right cerebellum with resection of the previously seen right cerebellar metastasis.      Small fluid collection in the occipital soft tissues overlying the craniotomy site, probably a seroma.      Vasogenic edema in the right cerebellum with mass effect upon the fourth ventricle and mild lateral ventricular hydrocephalus remains stable. EVD catheter is stable in position.      No definite abnormal enhancement is identified at the surgical site, however evaluation is limited by T1 shortening from the presence of subacute blood products. Recommend follow-up examination.      CT-HEAD W/O   Final Result      1.  Interval resection of RIGHT cerebellar mass with pneumocephalus and some blood in the surgical resection bed but overall decreased mass effect. Residual tumor not excluded by this exam.   2.  Interval placement of RIGHT transparietal ventriculostomy catheter with slightly decreased caliber of lateral and third ventricles   3.  LEFT temporal metastasis without significant mass effect   4.  Atrophy   5.  White matter changes            MR-LUMBAR SPINE-WITH & W/O   Final Result      *  Diffuse osseous metastasis.   *  Mild vertebral height loss at T12, L1, L2 and L3   *  There is no epidural tumor spread in the lumbar spine.   *  Postsurgical and degenerative changes as described above.      MR-THORACIC SPINE-WITH & W/O   Final Result      1.  Diffuse osseous metastasis.   2.  Epidural tumor spread at the levels of T9, T10 and T11 causing effacement of the thecal sac. There is moderate central canal stenosis at T10 and T10-11.   3.  There is no spinal cord lesion.   4.  Bilateral adrenal masses.   5.  Mild thoracic intervertebral joint degeneration.      MR-CERVICAL SPINE-WITH & W/O    Final Result      *  Diffuse osseous metastasis without epidural spread.   *  Right cerebellar enhancing mass concerning for metastasis.   *  Degenerative changes as described above.   *  Moderate central canal stenosis at C6-7.      DX-CHEST-PORTABLE (1 VIEW)   Final Result      1.  No acute cardiopulmonary abnormality.   2.  Multiple sclerotic osseous metastases.         MR-BRAIN-WITH & W/O   Final Result      1.  An enhancing right cerebellar mass likely represents metastasis.   2.  There is mass effect as evidenced by partial effacement of the fourth ventricle, low-lying cerebellar tonsils and moderate hydrocephalus.   3.  Multifocal osseous metastasis.   4.  Mild chronic microvascular ischemic disease.      CT-ABDOMEN-PELVIS WITH   Final Result      1.  Diffuse osseous metastatic disease. This vertebral augmentation of the L1 and L2 vertebral bodies      2. Bilateral low-attenuation adrenal masses which may represent lipophilic adenomas, however metastatic disease cannot be excluded.      3. 3.8 cm benign right renal cyst.         4. Marked bladder distention.   2.      CT-HEAD W/O   Final Result      1.  Vasogenic edema noted medially and inferiorly in the right hemicerebellum extending into the cerebellar vermis causing moderate effacement of the fourth ventricle and mild to moderate obstructive hydrocephalus. 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 MRI scan of the brain with gadolinium enhancement is recommended for further evaluation. Additionally, neurosurgical consultation is recommended.         DX-CHEST-PORTABLE (1 VIEW)   Final Result      1.  No acute cardiac or pulmonary abnormalities are identified.      2.  Diffuse bony metastatic disease.          Assessment/Plan  * Cerebellar mass- (present on admission)  Assessment & Plan  S/p surgical excision on 9/21 with EVD pulled on 9/24, s/p hypertonic saline, possible prostate CA metastasis vs primary  malignancy  -Await bx results  -Decadron  -Q4H Neurochecks  -PT/OT  -Starting DVT ppx based on NS recs  -Neurosurg following, will appreciate recs      Prostate cancer (HCC)  Assessment & Plan  Dr. Butts. His last Xgeva injection was on 8/30/2023, has had progression on Xtandi, Xofigo and docetaxel  -Await bx results to determine further treatment  -Heme/Onc following, will appreciate recs    NED (obstructive sleep apnea)- (present on admission)  Assessment & Plan  Nocturnal CPAP    HTN (hypertension), benign- (present on admission)  Assessment & Plan  Restart home lisinopril at reduced dose  Holding home metoprolol     DM (diabetes mellitus) (HCC)- (present on admission)  Assessment & Plan  Controlled as outpt last HA1c 6.5, currently hyperglycemic from decadron  -Increase Lantus, continue SSI  -Holding home oral agents  -Diabetic diet, hypoglycemia protocol, scheduled POC BG checks

## 2023-09-24 NOTE — PROGRESS NOTES
"Critical Care Progress Note    Date of admission  9/19/2023    Chief Complaint  \"58 y.o. male with hx of metastatic prostate CA , HTN, and DM who presented 9/19/2023 with 2 weeks of nausea, vomiting, and weakness. He was transferred to [facility] by EMS. In the ED a CT head revealed significant vasogenic edema throughout the right hemicerebellum causing effacement of the fourth ventricle and moderate obstructive hydrocephalus.  Dr. Cobb, neurosurgery was consulted.  She has recommended MRI brain for surgical planning.  Neuro critical care consultation was requested for further evaluation and management\" - Dr. Alvarez.     Hospital Course  9/19 admitted to ICU  9/21 s/p right retrosigmoid craniotomy for resection of cerebellar mass by Dr. Be     Interval Problem Update  Reviewed last 24 hour events:  No acute neurological changes overnight. RASS 0  Moving all extremities, weak LE bilat.   Hemodynamically stable.   Sitting in chair this am  EVD is in place, currently clamped. ICP 1-7  Pt alert, oriented.   Tolerating oral intake  HR in 70-90s NSR  SBP 110s-130s. Goal -140  Hgb 9.7, plt 169  Creatinine 0.3, HCO3 19.   Adequate UOP. 700cc overnight.   -240s, on high sliding scale    On dexamethasone 4 Q6H    Mobility: goal level 4    Review of Systems  Review of Systems   Constitutional:  Negative for fever.   Respiratory:  Negative for shortness of breath.    Cardiovascular:  Negative for chest pain and leg swelling.   Gastrointestinal:  Negative for abdominal pain, diarrhea, nausea and vomiting.   Musculoskeletal:  Negative for back pain.        Weakness in lower extremitiy   Neurological:  Positive for weakness. Negative for headaches.   All other systems reviewed and are negative.       Vital Signs for last 24 hours   Temp:  [35.7 °C (96.3 °F)-36.4 °C (97.5 °F)] 35.7 °C (96.3 °F)  Pulse:  [] 71  Resp:  [12-27] 21  BP: (109-136)/(69-86) 136/85  SpO2:  [94 %-98 %] 96 %    Hemodynamic parameters for " last 24 hours       Respiratory Information for the last 24 hours       Physical Exam   Physical Exam  Vitals and nursing note reviewed.   Constitutional:       General: He is not in acute distress.     Appearance: He is ill-appearing. He is not toxic-appearing or diaphoretic.   HENT:      Head: Normocephalic.      Mouth/Throat:      Mouth: Mucous membranes are moist.   Cardiovascular:      Rate and Rhythm: Normal rate and regular rhythm.      Pulses: Normal pulses.      Heart sounds: Normal heart sounds. No murmur heard.  Pulmonary:      Effort: Pulmonary effort is normal. No respiratory distress.      Breath sounds: Normal breath sounds. No wheezing, rhonchi or rales.   Abdominal:      General: Bowel sounds are normal. There is no distension.      Palpations: Abdomen is soft.      Tenderness: There is no abdominal tenderness. There is no guarding.   Musculoskeletal:      Right lower leg: No edema.      Left lower leg: No edema.      Comments: Lower extremity weakness bilaterally   Motor strength 3/5  Sensation decreased bilaterally   Upper extremity 5/5    Skin:     General: Skin is warm and dry.      Capillary Refill: Capillary refill takes less than 2 seconds.      Coloration: Skin is not jaundiced.   Neurological:      General: No focal deficit present.      Mental Status: He is alert and oriented to person, place, and time.      Cranial Nerves: No cranial nerve deficit.   Psychiatric:         Mood and Affect: Mood normal.         Medications  Current Facility-Administered Medications   Medication Dose Route Frequency Provider Last Rate Last Admin    sodium phosphate 15 mmol in  mL ivpb  15 mmol Intravenous Once India L. Latona   Stopped at 09/24/23 1021    labetalol (Normodyne/Trandate) injection 10 mg  10 mg Intravenous Q4HRS PRN Chuck Matos D.O.        insulin GLARGINE (Lantus,Semglee) injection  10 Units Subcutaneous Q EVENING India L. Latona   10 Units at 09/23/23 2230    acetaminophen (Tylenol)  tablet 650 mg  650 mg Oral Q4HRS PRN LIBAN SánchezO.   650 mg at 09/24/23 0020    MD Alert...ICU Electrolyte Replacement per Pharmacy   Other PHARMACY TO DOSE Chuck Matos D.O.        insulin regular (HumuLIN R,NovoLIN R) injection  3-14 Units Subcutaneous Q6HRS LIBAN SánchezO.   7 Units at 09/24/23 0514    And    dextrose 50% (D50W) injection 25 g  25 g Intravenous Q15 MIN PRN LIBAN SánchezO.        senna-docusate (Pericolace Or Senokot S) 8.6-50 MG per tablet 2 Tablet  2 Tablet Oral BID LIBAN SánchezO.   2 Tablet at 09/23/23 0607    And    polyethylene glycol/lytes (Miralax) PACKET 1 Packet  1 Packet Oral QDAY PRN Chuck Matos D.O.        And    magnesium hydroxide (Milk Of Magnesia) suspension 30 mL  30 mL Oral QDAY PRN Chuck Matos D.O.        And    bisacodyl (Dulcolax) suppository 10 mg  10 mg Rectal QDAY PRN LIBAN SánchezO.        ondansetron (Zofran) syringe/vial injection 4 mg  4 mg Intravenous Q4HRS PRN Chuck Matos D.O.        ondansetron (Zofran ODT) dispertab 4 mg  4 mg Oral Q4HRS PRN LIBAN SánchezO.        promethazine (Phenergan) tablet 12.5-25 mg  12.5-25 mg Oral Q4HRS PRN LIBAN SánchezO.        promethazine (Phenergan) suppository 12.5-25 mg  12.5-25 mg Rectal Q4HRS PRN LIBAN SánchezO.        prochlorperazine (Compazine) injection 5-10 mg  5-10 mg Intravenous Q4HRS PRN LIBAN SánchezO.        dexamethasone (Decadron) injection 4 mg  4 mg Intravenous Q6HRS Franki Alvarez M.D.   4 mg at 09/24/23 0510       Fluids    Intake/Output Summary (Last 24 hours) at 9/24/2023 0929  Last data filed at 9/24/2023 0600  Gross per 24 hour   Intake 1064.05 ml   Output 1690 ml   Net -625.95 ml         Laboratory          Recent Labs     09/22/23  0000 09/22/23  0549 09/23/23  0205 09/24/23  0340   SODIUM 142 140 137 134*   POTASSIUM 3.6 3.4* 3.6 4.0   CHLORIDE 114* 111 104 101   CO2 18* 19* 23 20   BUN 12 11 11 11   CREATININE 0.27* 0.23* 0.30* 0.32*   MAGNESIUM 2.1 2.1  --   --    PHOSPHORUS 1.5* 1.5* 2.4* 2.2*    CALCIUM 7.4* 6.8* 7.2* 8.1*       Recent Labs     09/22/23  0549 09/23/23  0205 09/24/23  0340   GLUCOSE 258* 245* 285*       Recent Labs     09/22/23  0549 09/23/23  0205 09/24/23  0340   WBC 8.8 6.8 6.1   NEUTSPOLYS 89.80* 89.40* 88.20*   LYMPHOCYTES 2.20* 3.80* 5.70*   MONOCYTES 3.90 4.90 4.60   EOSINOPHILS 0.00 0.00 0.00   BASOPHILS 0.10 0.10 0.00       Recent Labs     09/22/23  0549 09/23/23  0205 09/24/23  0340   RBC 3.19* 3.25* 3.43*   HEMOGLOBIN 9.7* 9.7* 10.5*   HEMATOCRIT 29.4* 29.5* 31.4*   PLATELETCT 169 154* 144*   PROTHROMBTM 16.7* 16.2* 15.5*   INR 1.33* 1.28* 1.22*         Imaging  X-Ray:  I have personally reviewed the images and compared with prior images.  CT:    Reviewed    Assessment/Plan  * Brain mass- (present on admission)  Assessment & Plan  Cerebellar mass with mass effect, DDx concerning metastatic prostate CA vs others.   9/21 s/p retrosigmoid crani with cerebellar mass resection   9/21 Hypertonic saline was discontinued post op. No need for further osmotic therapy after surgery    Continue dexamethasone 4 mg IV every 6H post op  Serial neurologic exams, change to neuro Q4H  NSGY plans to keep EVD today. Keep clamped  Mobililty. PT/OT  Follow up path   Radiation oncology and medOnc following    Prostate cancer (HCC)  Assessment & Plan  Diagnosed about 2 years ago, seen by our local heme/onc.  On chemo/radiation therapy, last chemo 4 weeks ago  Recently seen by [facility] 4 weeks ago and was told to stop chemotherapy  Pt was planned to start on lutetium-177 therapy   He also c/o lower extremity weaknesses bilaterally in the past 2 weeks.  9/21 MRI cervical, thoracic and lumbar spine with extensive bone metastasis.   MedOnc and RadOnc are following       NED (obstructive sleep apnea)- (present on admission)  Assessment & Plan  CPAP while sleeping    HTN (hypertension), benign- (present on admission)  Assessment & Plan  Goal normotensive, 100-140         VTE:  Lovenox Will hold for tonight  before OR tomorrow  Ulcer: H2 Antagonist  Lines: None    I have performed a physical exam and reviewed and updated ROS and Plan today (9/24/2023). In review of yesterday's note (9/23/2023), there are no changes except as documented above.     Discussed patient condition and risk of morbidity and/or mortality with RN, RT, Pharmacy, Charge nurse / hot rounds, and Patient    Can be transferred to neurosurgery floor  D/w Hospital Medicine  Will sign off, please call with questions

## 2023-09-24 NOTE — ASSESSMENT & PLAN NOTE
Pt states that she didn't receive her medication and she really needs it. Pt would like for it to be E-scripted, but if it has to be paper copy she would noé someone else to pick it up for her   Name: Malika Winters  Please leave a message on pt VM, pt states she is at work and can't answer the phone   S/p surgical excision on 9/21 with EVD pulled on 9/24,   s/p hypertonic saline  possible prostate CA metastasis vs new primary malignancy: path pending  Decadron taper  Q4H Neurochecks  PT/OT: DC to home vs acute Rehab  Physiatry consulted

## 2023-09-24 NOTE — ASSESSMENT & PLAN NOTE
A1c 6.5  As outpt on dulaglutide and canagliflozin  Running higher as on decadron: mid 200s  Continue glargine, will need to taper it down as he comes off of decadron  -Holding home oral agents  -Diabetic diet, hypoglycemia protocol, scheduled POC BG checks

## 2023-09-24 NOTE — CARE PLAN
The patient is Stable - Low risk of patient condition declining or worsening    Shift Goals  Clinical Goals: stable neuro checks, stable ICPs  Patient Goals: rest  Family Goals: LY    Progress made toward(s) clinical / shift goals:      Patient updated regarding his plan of care, and his condition. Updates provided to significant other at the bedside. Patient's neuro status remains stable at this time. SBP maintained within parameters without intervention at this time. Patient's pain monitored Q2/PRN, and patient's pain remains managed with PRN pain medication per MAR and re-positioning. Patient able to void post-removal of catheter without intervention. Patient tolerated well. Patient's ICPs remain stable at this time.    Problem: Knowledge Deficit - Standard  Goal: Patient and family/care givers will demonstrate understanding of plan of care, disease process/condition, diagnostic tests and medications  Outcome: Progressing     Problem: Hemodynamics  Goal: Patient's hemodynamics, fluid balance and neurologic status will be stable or improve  Outcome: Progressing     Problem: Urinary Elimination  Goal: Establish and maintain regular urinary output  Outcome: Progressing     Problem: Neuro Status  Goal: Neuro status will remain stable or improve  Outcome: Progressing     Problem: Pain - Standard  Goal: Alleviation of pain or a reduction in pain to the patient’s comfort goal  Outcome: Progressing

## 2023-09-25 LAB
ANION GAP SERPL CALC-SCNC: 12 MMOL/L (ref 7–16)
BASOPHILS # BLD AUTO: 0.2 % (ref 0–1.8)
BASOPHILS # BLD: 0.01 K/UL (ref 0–0.12)
BUN SERPL-MCNC: 11 MG/DL (ref 8–22)
CALCIUM SERPL-MCNC: 8.3 MG/DL (ref 8.5–10.5)
CHLORIDE SERPL-SCNC: 100 MMOL/L (ref 96–112)
CO2 SERPL-SCNC: 22 MMOL/L (ref 20–33)
CREAT SERPL-MCNC: 0.29 MG/DL (ref 0.5–1.4)
EOSINOPHIL # BLD AUTO: 0 K/UL (ref 0–0.51)
EOSINOPHIL NFR BLD: 0 % (ref 0–6.9)
ERYTHROCYTE [DISTWIDTH] IN BLOOD BY AUTOMATED COUNT: 56 FL (ref 35.9–50)
GFR SERPLBLD CREATININE-BSD FMLA CKD-EPI: 140 ML/MIN/1.73 M 2
GLUCOSE BLD STRIP.AUTO-MCNC: 255 MG/DL (ref 65–99)
GLUCOSE BLD STRIP.AUTO-MCNC: 256 MG/DL (ref 65–99)
GLUCOSE BLD STRIP.AUTO-MCNC: 283 MG/DL (ref 65–99)
GLUCOSE BLD STRIP.AUTO-MCNC: 298 MG/DL (ref 65–99)
GLUCOSE SERPL-MCNC: 279 MG/DL (ref 65–99)
HCT VFR BLD AUTO: 30.9 % (ref 42–52)
HGB BLD-MCNC: 10.3 G/DL (ref 14–18)
IMM GRANULOCYTES # BLD AUTO: 0.07 K/UL (ref 0–0.11)
IMM GRANULOCYTES NFR BLD AUTO: 1.3 % (ref 0–0.9)
LYMPHOCYTES # BLD AUTO: 0.28 K/UL (ref 1–4.8)
LYMPHOCYTES NFR BLD: 5.1 % (ref 22–41)
MCH RBC QN AUTO: 30.6 PG (ref 27–33)
MCHC RBC AUTO-ENTMCNC: 33.3 G/DL (ref 32.3–36.5)
MCV RBC AUTO: 91.7 FL (ref 81.4–97.8)
MONOCYTES # BLD AUTO: 0.24 K/UL (ref 0–0.85)
MONOCYTES NFR BLD AUTO: 4.3 % (ref 0–13.4)
NEUTROPHILS # BLD AUTO: 4.92 K/UL (ref 1.82–7.42)
NEUTROPHILS NFR BLD: 89.1 % (ref 44–72)
NRBC # BLD AUTO: 0 K/UL
NRBC BLD-RTO: 0 /100 WBC (ref 0–0.2)
PLATELET # BLD AUTO: 142 K/UL (ref 164–446)
PMV BLD AUTO: 10 FL (ref 9–12.9)
POTASSIUM SERPL-SCNC: 4 MMOL/L (ref 3.6–5.5)
RBC # BLD AUTO: 3.37 M/UL (ref 4.7–6.1)
SODIUM SERPL-SCNC: 134 MMOL/L (ref 135–145)
WBC # BLD AUTO: 5.5 K/UL (ref 4.8–10.8)

## 2023-09-25 PROCEDURE — A9270 NON-COVERED ITEM OR SERVICE: HCPCS | Performed by: INTERNAL MEDICINE

## 2023-09-25 PROCEDURE — A9270 NON-COVERED ITEM OR SERVICE: HCPCS | Performed by: STUDENT IN AN ORGANIZED HEALTH CARE EDUCATION/TRAINING PROGRAM

## 2023-09-25 PROCEDURE — 700111 HCHG RX REV CODE 636 W/ 250 OVERRIDE (IP): Mod: JZ | Performed by: STUDENT IN AN ORGANIZED HEALTH CARE EDUCATION/TRAINING PROGRAM

## 2023-09-25 PROCEDURE — 99223 1ST HOSP IP/OBS HIGH 75: CPT | Performed by: PHYSICAL MEDICINE & REHABILITATION

## 2023-09-25 PROCEDURE — A9270 NON-COVERED ITEM OR SERVICE: HCPCS | Performed by: HOSPITALIST

## 2023-09-25 PROCEDURE — 99232 SBSQ HOSP IP/OBS MODERATE 35: CPT | Performed by: HOSPITALIST

## 2023-09-25 PROCEDURE — 770001 HCHG ROOM/CARE - MED/SURG/GYN PRIV*

## 2023-09-25 PROCEDURE — 85025 COMPLETE CBC W/AUTO DIFF WBC: CPT

## 2023-09-25 PROCEDURE — 82962 GLUCOSE BLOOD TEST: CPT

## 2023-09-25 PROCEDURE — 700102 HCHG RX REV CODE 250 W/ 637 OVERRIDE(OP): Performed by: HOSPITALIST

## 2023-09-25 PROCEDURE — 80048 BASIC METABOLIC PNL TOTAL CA: CPT

## 2023-09-25 PROCEDURE — 92523 SPEECH SOUND LANG COMPREHEN: CPT

## 2023-09-25 PROCEDURE — 700102 HCHG RX REV CODE 250 W/ 637 OVERRIDE(OP): Performed by: STUDENT IN AN ORGANIZED HEALTH CARE EDUCATION/TRAINING PROGRAM

## 2023-09-25 PROCEDURE — 700111 HCHG RX REV CODE 636 W/ 250 OVERRIDE (IP): Mod: JZ | Performed by: INTERNAL MEDICINE

## 2023-09-25 PROCEDURE — 700102 HCHG RX REV CODE 250 W/ 637 OVERRIDE(OP): Performed by: INTERNAL MEDICINE

## 2023-09-25 RX ORDER — DEXAMETHASONE SODIUM PHOSPHATE 4 MG/ML
4 INJECTION, SOLUTION INTRA-ARTICULAR; INTRALESIONAL; INTRAMUSCULAR; INTRAVENOUS; SOFT TISSUE EVERY 12 HOURS
Status: DISCONTINUED | OUTPATIENT
Start: 2023-09-26 | End: 2023-09-25

## 2023-09-25 RX ORDER — DEXAMETHASONE 4 MG/1
4 TABLET ORAL EVERY 12 HOURS
Status: DISCONTINUED | OUTPATIENT
Start: 2023-09-25 | End: 2023-09-27

## 2023-09-25 RX ADMIN — DEXAMETHASONE SODIUM PHOSPHATE 4 MG: 4 INJECTION INTRA-ARTICULAR; INTRALESIONAL; INTRAMUSCULAR; INTRAVENOUS; SOFT TISSUE at 12:32

## 2023-09-25 RX ADMIN — SENNOSIDES AND DOCUSATE SODIUM 2 TABLET: 50; 8.6 TABLET ORAL at 17:27

## 2023-09-25 RX ADMIN — ENOXAPARIN SODIUM 40 MG: 100 INJECTION SUBCUTANEOUS at 17:26

## 2023-09-25 RX ADMIN — INSULIN HUMAN 7 UNITS: 100 INJECTION, SOLUTION PARENTERAL at 05:17

## 2023-09-25 RX ADMIN — DEXAMETHASONE 4 MG: 4 TABLET ORAL at 17:26

## 2023-09-25 RX ADMIN — DEXAMETHASONE SODIUM PHOSPHATE 4 MG: 4 INJECTION INTRA-ARTICULAR; INTRALESIONAL; INTRAMUSCULAR; INTRAVENOUS; SOFT TISSUE at 05:11

## 2023-09-25 RX ADMIN — INSULIN GLARGINE-YFGN 25 UNITS: 100 INJECTION, SOLUTION SUBCUTANEOUS at 17:21

## 2023-09-25 RX ADMIN — INSULIN HUMAN 7 UNITS: 100 INJECTION, SOLUTION PARENTERAL at 17:21

## 2023-09-25 RX ADMIN — LISINOPRIL 20 MG: 20 TABLET ORAL at 17:27

## 2023-09-25 RX ADMIN — INSULIN HUMAN 7 UNITS: 100 INJECTION, SOLUTION PARENTERAL at 12:32

## 2023-09-25 ASSESSMENT — PAIN DESCRIPTION - PAIN TYPE
TYPE: ACUTE PAIN
TYPE: ACUTE PAIN
TYPE: ACUTE PAIN;SURGICAL PAIN
TYPE: ACUTE PAIN
TYPE: ACUTE PAIN;SURGICAL PAIN

## 2023-09-25 ASSESSMENT — ENCOUNTER SYMPTOMS
CHILLS: 0
COUGH: 0
FEVER: 0
SORE THROAT: 0
VOMITING: 0
NAUSEA: 0
LOSS OF CONSCIOUSNESS: 0
DOUBLE VISION: 0
SHORTNESS OF BREATH: 0
DIARRHEA: 0
HEADACHES: 0
DIZZINESS: 1
ABDOMINAL PAIN: 0
BLURRED VISION: 0
PALPITATIONS: 0
BACK PAIN: 0
WEAKNESS: 1

## 2023-09-25 NOTE — THERAPY
"Speech Language Pathology   Communication Evaluation     Patient Name: Casper Rowley  AGE:  58 y.o., SEX:  male  Medical Record #: [id number]  Date of Service: 9/25/2023      History of Present Illness    57 y/o admitted on 9/19 for N&V.     MRI of brain on 9/19: \" An enhancing right cerebellar mass likely represents metastasis. There is mass effect as evidenced by partial effacement of the fourth ventricle, low-lying cerebellar tonsils and moderate hydrocephalus. Multifocal osseous metastasis.\"      PMHx:  metastic prostate CA, diabetes, high blood pressure    General Information  Vitals  O2 (LPM): 0  O2 Delivery Device: None - Room Air  Level of Consciousness: Alert  Orientation: Oriented x 4  Follows Directives: Yes      Prior Living Situation & Level of Function  Lives with - Patient's Self Care Capacity: Significant Other  Comments: SO present and very supportive, reports she can take time off of work to provide 24/7 assist if needed.  Communication: WFL  Swallowing: WFL       Laryngeal Function Exam  Secretion Management: Adequate  Voice Quality: WFL          Assessment    Portions of the COGNISTAT (Neurobehavioral Cognitive Status Assessment) were administered in conjunction with non-standardized assessments.     Patient scored the following on the Cognistat:  Orientation: WNL  Attention: WNL  Repetition (Language):WNL  Memory:Mild  Calculations:WNL  Similarities (Reasoning):WNL  Judgment (Reasoning):WNL    Non-standardized measures were used to assess other cognitive domains and following 2 and 3-step directives, answering moderate and complex yes/no questions, and medication management were found to be WFL. Pt with good recall of medications he takes at home and events which occurred during course of stay. Pt's significant other at bedside able to help as needed. Pt and SO endorse that pt is at cognitive baseline.      Of note, cognitive-linguistic evaluations assess performance on various " cognitive-linguistic domains such as expressive and receptive language, attention, memory, executive function, problem solving, etc. It is not within the scope of Speech Language Pathologists to determine capacity, please defer to medical team or psych for capacity evaluation. Thank you.         Clinical Impressions  Mild deficit found in memory, otherwise, all other domains found to be WFL. Pt and SO endorsed that pt is at cognitive baseline therefore no further acute SLP needs at this time. Please reconsult with any change in cognition.      Recommendations  Supervision Needs Upons Discharge: None  IADLs: N/A         SLP Treatment Plan  Treatment Plan: None Indicated  SLP Frequency: N/A - Evaluation Only  Estimated Duration: N/A - Evaluation Only      Anticipated Discharge Needs  Discharge Recommendations: Anticipate that the patient will have no further speech therapy needs after discharge from the hospital  Therapy Recommendations Upon DC: Not Indicated                Anabelle Mcdonald, SLP

## 2023-09-25 NOTE — CONSULTS
Physical Medicine and Rehabilitation Consultation          Date of initial consultation: 9/25/2023  Consulting provider: Juan Moreno D.O.  Reason for consultation: assess for acute inpatient rehab appropriateness  LOS: 6 Day(s)    Chief complaint: Weakness    HPI: The patient is a 58 y.o. right hand dominant male with a past medical history of prostate cancer with mets to spine status post decompression/fusion in March, type 2 diabetes, hyperlipidemia, hypertension;  who presented on 9/19/2023 12:50 PM with intractable nausea vomiting.  Work-up with CT head found new cerebellar mass with vasogenic edema which was confirmed on MRI.  Patient also had urinary retention requiring Mistry placement.  Patient was started on Decadron, hypertonic saline, and seen by neurosurgery who performed craniotomy on 9/21 with EVD placement by Dr. Archana Be MD.  EVD removed 9/24.  Surgical pathology remains pending, but it is suspected to be metastatic prostate cancer.  Patient is set up for radiation mapping in mid October.  Patient was seen by PT and OT found to have deficits with mobility and ADLs, therefore PMR was consulted to assess for rehab appropriateness.    The patient currently reports overall doing well, he essentially does not have headaches but does report intermittent headache related to his recent surgery.  Patient reports his nausea has resolved.  He endorses ongoing lower extremity weakness.  Spouse is at bedside and able to provide support on discharge.    ROS  Pertinent positives are mentioned in the HPI, all others reviewed and are negative.    Social Hx:  1 SH  0 DALY  With: Significant other    THERAPY:  Restrictions: Fall risk  PT: Functional mobility   9/22: Unable to participate in gait, mod assist sit to stand and transfers    OT: ADLs  9/22: Max assist lower body dressing    SLP:   None    IMAGING:  MR brain 9/19/2023    1.  An enhancing right cerebellar mass likely represents metastasis.  2.   There is mass effect as evidenced by partial effacement of the fourth ventricle, low-lying cerebellar tonsils and moderate hydrocephalus.  3.  Multifocal osseous metastasis.  4.  Mild chronic microvascular ischemic disease.    PROCEDURES:  Archana Be MD 9/21/2023  right retrosigmoid craniotomy for resection of cerebellar lesion.  Right parietal bur hole craniotomy for placement of external ventricular drain  Use of intraoperative intradural stereotactic neuronavigation  Use of intraoperative microscope    PMH:  Past Medical History:   Diagnosis Date    Arthritis 02/2019    osteo-hollie knees    Bronchitis 2019    Cancer (HCC)     Basal cell - top of head    Cancer (HCC)     Prostate    Cold 02/08/2019    Cold two weeks ago, denies productive cough, SOB    Diabetes     type 2    High cholesterol     HTN (hypertension), benign 8/14/2009    Hypertension     Infectious disease     Mumps age 6 yrs and Chickenpox age 41 yrs    Obstructive sleep apnea 02/2019    Uses cpap       PSH:  Past Surgical History:   Procedure Laterality Date    CRANIOTOMY STEALTH Right 9/21/2023    Procedure: CRANIOTOMY, USING FRAMELESS STEREOTAXY - RIGHT RETROSIGMOID FOR RESECTION OF CEREBELLAR MASS, USE OF INTRAOPERATIVE NEURONAVIGATION, EXTERNAL VENTRICULAR DRAIN;  Surgeon: Archana Be M.D.;  Location: SURGERY [facility];  Service: Neurosurgery    MI SHLDR ARTHROSCOP EXTEN DEBRIDE 3+ Left 11/1/2021    Procedure: ARTHROSCOPY,SHOULDER,WITH EXTENSIVE DEBRIDEMENT;  Surgeon: Piter Otero M.D.;  Location: [facility];  Service: Orthopedics    PB ARTHROSCOPY SHOULDER SURGICAL BICEPS TENODES* Left 11/1/2021    Procedure: ARTHROSCOPY, SHOULDER, WITH BICEPS TENOTOMY - WITH SUB SCAPULARIS REPAIR;  Surgeon: Piter Otero M.D.;  Location: [facility];  Service: Orthopedics    CARPAL TUNNEL RELEASE Left 2/11/2019    Procedure: CARPAL TUNNEL RELEASE;  Surgeon: Piter Otero M.D.;  Location: [facility];  Service:  "Orthopedics    KNEE ARTHROSCOPY Right 05/2014    ACL       FHX:  Family History   Problem Relation Age of Onset    Genetic Disorder Neg Hx        Medications:  Current Facility-Administered Medications   Medication Dose    lisinopril (Prinivil) tablet 20 mg  20 mg    insulin GLARGINE (Lantus,Semglee) injection  15 Units    enoxaparin (Lovenox) inj 40 mg  40 mg    labetalol (Normodyne/Trandate) injection 10 mg  10 mg    acetaminophen (Tylenol) tablet 650 mg  650 mg    MD Alert...ICU Electrolyte Replacement per Pharmacy      insulin regular (HumuLIN R,NovoLIN R) injection  3-14 Units    And    dextrose 50% (D50W) injection 25 g  25 g    senna-docusate (Pericolace Or Senokot S) 8.6-50 MG per tablet 2 Tablet  2 Tablet    And    polyethylene glycol/lytes (Miralax) PACKET 1 Packet  1 Packet    And    magnesium hydroxide (Milk Of Magnesia) suspension 30 mL  30 mL    And    bisacodyl (Dulcolax) suppository 10 mg  10 mg    ondansetron (Zofran) syringe/vial injection 4 mg  4 mg    ondansetron (Zofran ODT) dispertab 4 mg  4 mg    promethazine (Phenergan) tablet 12.5-25 mg  12.5-25 mg    promethazine (Phenergan) suppository 12.5-25 mg  12.5-25 mg    prochlorperazine (Compazine) injection 5-10 mg  5-10 mg    dexamethasone (Decadron) injection 4 mg  4 mg       Allergies:  No Known Allergies      Physical Exam:  Vitals: BP (!) 151/80   Pulse 65   Temp 36.1 °C (97 °F) (Temporal)   Resp 15   Ht 1.702 m (5' 7\")   Wt 75.3 kg (166 lb 0.1 oz)   SpO2 96%   Gen: NAD  Head: Surgical incision right posterior head closed with staples, open to air, nondraining  Eyes/ Nose/ Mouth: PERRLA, moist mucous membranes  Cardio: RRR, good distal perfusion, warm extremities  Pulm: normal respiratory effort, no cyanosis   Abd: Soft NTND, negative borborygmi   Ext: No peripheral edema. No calf tenderness. No clubbing.    Mental status: answers questions appropriately follows commands  Speech: fluent, no aphasia or dysarthria    Motor:      Upper " Extremity  Myotome R L   Shoulder flexion C5 5 5   Elbow flexion C5 5 5   Wrist extension C6 5 5   Elbow extension C7 5 5   Finger flexion C8 5 5   Finger abduction T1 5 5     Lower Extremity Myotome R L   Hip flexion L2 1/5 1/5   Knee extension L3 4/5 4/5   Ankle dorsiflexion L4 0/5 0/5   Toe extension L5 0/5 0/5   Ankle plantarflexion S1 4/5 4/5     Sensory:   intact to light touch through out    Coordination:   intact finger mega bilaterally      Labs: Reviewed and significant for   Recent Labs     09/23/23 0205 09/24/23 0340 09/25/23  0320   RBC 3.25* 3.43* 3.37*   HEMOGLOBIN 9.7* 10.5* 10.3*   HEMATOCRIT 29.5* 31.4* 30.9*   PLATELETCT 154* 144* 142*   PROTHROMBTM 16.2* 15.5*  --    INR 1.28* 1.22*  --      Recent Labs     09/23/23 0205 09/24/23 0340 09/25/23  0320   SODIUM 137 134* 134*   POTASSIUM 3.6 4.0 4.0   CHLORIDE 104 101 100   CO2 23 20 22   GLUCOSE 245* 285* 279*   BUN 11 11 11   CREATININE 0.30* 0.32* 0.29*   CALCIUM 7.2* 8.1* 8.3*     Recent Results (from the past 24 hour(s))   POCT glucose device results    Collection Time: 09/24/23 12:17 PM   Result Value Ref Range    POC Glucose, Blood 261 (H) 65 - 99 mg/dL   POCT glucose device results    Collection Time: 09/24/23  5:16 PM   Result Value Ref Range    POC Glucose, Blood 224 (H) 65 - 99 mg/dL   POCT glucose device results    Collection Time: 09/24/23 11:51 PM   Result Value Ref Range    POC Glucose, Blood 298 (H) 65 - 99 mg/dL   Basic Metabolic Panel (BMP)    Collection Time: 09/25/23  3:20 AM   Result Value Ref Range    Sodium 134 (L) 135 - 145 mmol/L    Potassium 4.0 3.6 - 5.5 mmol/L    Chloride 100 96 - 112 mmol/L    Co2 22 20 - 33 mmol/L    Glucose 279 (H) 65 - 99 mg/dL    Bun 11 8 - 22 mg/dL    Creatinine 0.29 (L) 0.50 - 1.40 mg/dL    Calcium 8.3 (L) 8.5 - 10.5 mg/dL    Anion Gap 12.0 7.0 - 16.0   CBC with Differential    Collection Time: 09/25/23  3:20 AM   Result Value Ref Range    WBC 5.5 4.8 - 10.8 K/uL    RBC 3.37 (L) 4.70 - 6.10  M/uL    Hemoglobin 10.3 (L) 14.0 - 18.0 g/dL    Hematocrit 30.9 (L) 42.0 - 52.0 %    MCV 91.7 81.4 - 97.8 fL    MCH 30.6 27.0 - 33.0 pg    MCHC 33.3 32.3 - 36.5 g/dL    RDW 56.0 (H) 35.9 - 50.0 fL    Platelet Count 142 (L) 164 - 446 K/uL    MPV 10.0 9.0 - 12.9 fL    Neutrophils-Polys 89.10 (H) 44.00 - 72.00 %    Lymphocytes 5.10 (L) 22.00 - 41.00 %    Monocytes 4.30 0.00 - 13.40 %    Eosinophils 0.00 0.00 - 6.90 %    Basophils 0.20 0.00 - 1.80 %    Immature Granulocytes 1.30 (H) 0.00 - 0.90 %    Nucleated RBC 0.00 0.00 - 0.20 /100 WBC    Neutrophils (Absolute) 4.92 1.82 - 7.42 K/uL    Lymphs (Absolute) 0.28 (L) 1.00 - 4.80 K/uL    Monos (Absolute) 0.24 0.00 - 0.85 K/uL    Eos (Absolute) 0.00 0.00 - 0.51 K/uL    Baso (Absolute) 0.01 0.00 - 0.12 K/uL    Immature Granulocytes (abs) 0.07 0.00 - 0.11 K/uL    NRBC (Absolute) 0.00 K/uL   ESTIMATED GFR    Collection Time: 09/25/23  3:20 AM   Result Value Ref Range    GFR (CKD-EPI) 140 >60 mL/min/1.73 m 2   POCT glucose device results    Collection Time: 09/25/23  5:16 AM   Result Value Ref Range    POC Glucose, Blood 283 (H) 65 - 99 mg/dL         ASSESSMENT:  Patient is a 58 y.o. male admitted with right cerebellar mass now s/p resection    [facility] Code / Diagnosis to Support: 0002.1 - Brain Dysfunction: Non-Traumatic    Rehabilitation: Impaired ADLs and mobility  Patient is a good candidate for inpatient rehab based on needs for PT, OT.  Patient will also benefit from family training.  Patient has a good discharge situation which will be home with spouse.     Barriers to transfer include: Insurance authorization, TCCs to verify disposition, medical clearance and bed availability     All cases are subject to administrative review and recommendations may change    Disposition recommendations:  -Good candidate for IPR.  Patient has deficits with mobility and ADLs secondary to his brain tumor, status post removal.  Patient has good family support from spouse and a stable discharge  location which is his single-story home with no stairs.  -Continue PT OT while in-house  -TCC to submit to [facility] insurance for authorization  -PMR to follow in the periphery for rehab appropriateness, please reach out with questions or request for medical management    Medical Complexity:    Right cerebellar mass  -Status post resection by Dr. Archana Be MD on 9/21/2023  -Surprisingly, patient has no coordination deficits.  Patient does have bilateral lower extremity weakness which does appear neurologic in origin  -Repeat PT and OT to assess for progress with therapies  -Patient need to follow-up with neurosurgery as an outpatient  -Pathology returned metastatic prostatic adenocarcinoma  -Patient is being followed by radiation oncologist Dr. Baker, radiation simulation scheduled 2 and half weeks  -Patient is being followed by Dr. Falcon with heme-onc, awaiting update on plan  -Decadron 4 mg p.o. twice daily    Prostate cancer  -Patient follows with Dr. MIORA Darnell  -His last Xgeva injection was on 8/30/2023, has had progression on Xtandi, Xofigo and docetaxel    Diabetes mellitus  -A1c 6.5%  - On dulaglutide and canagliflozin as an outpatient  -Currently on glargine 25 units nightly, sliding scale insulin 3 to 14 units    Acute anemia  -Hemoglobin 10.3  -Monitoring with serial labs    Hypertension  -Lisinopril 20 mg daily    DVT PPX: Lovenox      Thank you for allowing us to participate in the care of this patient.     Patient was seen for >80 minutes on unit/floor of which > 50% of time was spent on counseling and coordination of care regarding the above, including prognosis, risk reduction, benefits of treatment, and options for next stage of care.    Vidal Wills, DO   Physical Medicine and Rehabilitation     Please note that this dictation was created using voice recognition software. I have made every reasonable attempt to correct obvious errors, but there may be errors of grammar and possibly content that I  did not discover before finalizing the note.

## 2023-09-25 NOTE — PROGRESS NOTES
Hospital Medicine Daily Progress Note    Date of Service  9/25/2023    Chief Complaint  Casper Rowley is a 58 y.o. male admitted 9/19/2023 with dizziness    Hospital Course  59yo admitted 9/19 with Hx of metastatic prostate CA, HTN, DM presenting with N/V.  Head CT showing R cerebellar mass and edema with efacement of 4th ventricle.  Went to the OR on 9/21 with Dr Be for craniotomy and resection with placement of EVD which was then removed on 9/24    Interval Problem Update  Pt c/o mild HA; tooks some APAP and pain is now gone  Still dizzy when he tries to stand.  Legs remain weak but unchanged from admission  No other complaints    Long DW pt's wife at bedside RE current treatment issues, DC plans, F/U etc    I have discussed this patient's plan of care and discharge plan at IDT rounds today with Case Management, Nursing, Nursing leadership, and other members of the IDT team.    Consultants/Specialty  neurosurgery    Code Status  Full Code    Disposition  The patient is not medically cleared for discharge to home or a post-acute facility.      I have placed the appropriate orders for post-discharge needs.    Review of Systems  Review of Systems   Constitutional:  Negative for chills and fever.   HENT:  Negative for nosebleeds and sore throat.    Eyes:  Negative for blurred vision and double vision.   Respiratory:  Negative for cough and shortness of breath.    Cardiovascular:  Negative for chest pain, palpitations and leg swelling.   Gastrointestinal:  Negative for abdominal pain, diarrhea, nausea and vomiting.   Genitourinary:  Negative for dysuria and urgency.   Musculoskeletal:  Negative for back pain.   Skin:  Negative for rash.   Neurological:  Positive for dizziness and weakness. Negative for loss of consciousness and headaches.        Physical Exam  Temp:  [35.8 °C (96.4 °F)-36.4 °C (97.6 °F)] 35.8 °C (96.4 °F)  Pulse:  [52-82] 52  Resp:  [12-31] 12  BP: (115-157)/(70-85) 135/80  SpO2:  [93 %-97  %] 93 %    Physical Exam  Constitutional:       General: He is not in acute distress.     Appearance: He is well-developed. He is not diaphoretic.   HENT:      Head: Normocephalic and atraumatic.   Eyes:      Conjunctiva/sclera: Conjunctivae normal.   Neck:      Vascular: No JVD.   Cardiovascular:      Rate and Rhythm: Normal rate.      Heart sounds: No murmur heard.     No gallop.   Pulmonary:      Effort: Pulmonary effort is normal. No respiratory distress.      Breath sounds: No stridor. No wheezing or rales.   Abdominal:      Palpations: Abdomen is soft.      Tenderness: There is no abdominal tenderness. There is no guarding or rebound.   Musculoskeletal:         General: No tenderness.      Right lower leg: No edema.      Left lower leg: No edema.   Skin:     General: Skin is warm and dry.      Findings: No rash.   Neurological:      Mental Status: He is alert and oriented to person, place, and time.      Comments: Strength 5/5  FTN delayed but able  Face symmetric  Tongue midline  EOMI  Speech clear   Psychiatric:         Thought Content: Thought content normal.         Fluids    Intake/Output Summary (Last 24 hours) at 9/25/2023 0739  Last data filed at 9/25/2023 0600  Gross per 24 hour   Intake 1240 ml   Output 3950 ml   Net -2710 ml       Laboratory  Recent Labs     09/23/23 0205 09/24/23  0340 09/25/23  0320   WBC 6.8 6.1 5.5   RBC 3.25* 3.43* 3.37*   HEMOGLOBIN 9.7* 10.5* 10.3*   HEMATOCRIT 29.5* 31.4* 30.9*   MCV 90.8 91.5 91.7   MCH 29.8 30.6 30.6   MCHC 32.9 33.4 33.3   RDW 56.6* 55.7* 56.0*   PLATELETCT 154* 144* 142*   MPV 10.0 10.6 10.0     Recent Labs     09/23/23  0205 09/24/23  0340 09/25/23  0320   SODIUM 137 134* 134*   POTASSIUM 3.6 4.0 4.0   CHLORIDE 104 101 100   CO2 23 20 22   GLUCOSE 245* 285* 279*   BUN 11 11 11   CREATININE 0.30* 0.32* 0.29*   CALCIUM 7.2* 8.1* 8.3*     Recent Labs     09/23/23  0205 09/24/23  0340   INR 1.28* 1.22*               Imaging  MR-BRAIN-WITH & W/O   Final  Result         Postoperative changes are noted in the right cerebellum with resection of the previously seen right cerebellar metastasis.      Small fluid collection in the occipital soft tissues overlying the craniotomy site, probably a seroma.      Vasogenic edema in the right cerebellum with mass effect upon the fourth ventricle and mild lateral ventricular hydrocephalus remains stable. EVD catheter is stable in position.      No definite abnormal enhancement is identified at the surgical site, however evaluation is limited by T1 shortening from the presence of subacute blood products. Recommend follow-up examination.      CT-HEAD W/O   Final Result      1.  Interval resection of RIGHT cerebellar mass with pneumocephalus and some blood in the surgical resection bed but overall decreased mass effect. Residual tumor not excluded by this exam.   2.  Interval placement of RIGHT transparietal ventriculostomy catheter with slightly decreased caliber of lateral and third ventricles   3.  LEFT temporal metastasis without significant mass effect   4.  Atrophy   5.  White matter changes            MR-LUMBAR SPINE-WITH & W/O   Final Result      *  Diffuse osseous metastasis.   *  Mild vertebral height loss at T12, L1, L2 and L3   *  There is no epidural tumor spread in the lumbar spine.   *  Postsurgical and degenerative changes as described above.      MR-THORACIC SPINE-WITH & W/O   Final Result      1.  Diffuse osseous metastasis.   2.  Epidural tumor spread at the levels of T9, T10 and T11 causing effacement of the thecal sac. There is moderate central canal stenosis at T10 and T10-11.   3.  There is no spinal cord lesion.   4.  Bilateral adrenal masses.   5.  Mild thoracic intervertebral joint degeneration.      MR-CERVICAL SPINE-WITH & W/O   Final Result      *  Diffuse osseous metastasis without epidural spread.   *  Right cerebellar enhancing mass concerning for metastasis.   *  Degenerative changes as described  above.   *  Moderate central canal stenosis at C6-7.      DX-CHEST-PORTABLE (1 VIEW)   Final Result      1.  No acute cardiopulmonary abnormality.   2.  Multiple sclerotic osseous metastases.         MR-BRAIN-WITH & W/O   Final Result      1.  An enhancing right cerebellar mass likely represents metastasis.   2.  There is mass effect as evidenced by partial effacement of the fourth ventricle, low-lying cerebellar tonsils and moderate hydrocephalus.   3.  Multifocal osseous metastasis.   4.  Mild chronic microvascular ischemic disease.      CT-ABDOMEN-PELVIS WITH   Final Result      1.  Diffuse osseous metastatic disease. This vertebral augmentation of the L1 and L2 vertebral bodies      2. Bilateral low-attenuation adrenal masses which may represent lipophilic adenomas, however metastatic disease cannot be excluded.      3. 3.8 cm benign right renal cyst.         4. Marked bladder distention.   2.      CT-HEAD W/O   Final Result      1.  Vasogenic edema noted medially and inferiorly in the right hemicerebellum extending into the cerebellar vermis causing moderate effacement of the fourth ventricle and mild to moderate obstructive hydrocephalus. 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 MRI scan of the brain with gadolinium enhancement is recommended for further evaluation. Additionally, neurosurgical consultation is recommended.         DX-CHEST-PORTABLE (1 VIEW)   Final Result      1.  No acute cardiac or pulmonary abnormalities are identified.      2.  Diffuse bony metastatic disease.           Assessment/Plan  * Cerebellar mass- (present on admission)  Assessment & Plan  S/p surgical excision on 9/21 with EVD pulled on 9/24,   s/p hypertonic saline  possible prostate CA metastasis vs new primary malignancy: path pending  Decadron  Q4H Neurochecks  PT/OT: DC to home vs acute Rehab  Physiatry consulted        Prostate cancer (HCC)  Assessment & Plan  Dr. Butts. His last Xgeva  injection was on 8/30/2023, has had progression on Xtandi, Xofigo and docetaxel  -Await bx results to determine further treatment  -Heme/Onc following, will appreciate recs    NED (obstructive sleep apnea)- (present on admission)  Assessment & Plan  Nocturnal CPAP    HTN (hypertension), benign- (present on admission)  Assessment & Plan  Restart home lisinopril at reduced dose  Holding home metoprolol     DM (diabetes mellitus) (HCC)- (present on admission)  Assessment & Plan  A1c 6.5  As outpt on dulaglutide and canagliflozin  Running higher as on decadron: mid 200s  Increase glargine to 25 units; will need to taper it down as he comes off of decadron  -Holding home oral agents  -Diabetic diet, hypoglycemia protocol, scheduled POC BG checks             VTE prophylaxis:   SCDs/TEDs      I have performed a physical exam and reviewed and updated ROS and Plan today (9/25/2023). In review of yesterday's note (9/24/2023), there are no changes except as documented above.

## 2023-09-25 NOTE — DISCHARGE PLANNING
[facility] Transitional Care Coordination    Referral from: Dr. Moreno    Insurance Provider on Facesheet: [facility]    Potential Rehab Diagnosis: [facility]    Chart review indicates patient may have on going medical management and may have therapy needs to possibly meet inpatient rehab facility criteria with the goal of returning to community.    D/C support will need to be verified: S.O.    Physiatry consultation forwarded per protocol.      Thank you for the referral.

## 2023-09-25 NOTE — CARE PLAN
The patient is Stable - Low risk of patient condition declining or worsening      Shift Goals  Clinical Goals: stable neuro checks, transfer to next level of care when bed ready  Patient Goals: get some sleep  Family Goals: updates PRN      Problem: Knowledge Deficit - Standard  Goal: Patient and family/care givers will demonstrate understanding of plan of care, disease process/condition, diagnostic tests and medications  Outcome: Progressing     Problem: Fall Risk  Goal: Patient will remain free from falls  Outcome: Progressing     Problem: Psychosocial  Goal: Patient's level of anxiety will decrease  Outcome: Progressing     Problem: Hemodynamics  Goal: Patient's hemodynamics, fluid balance and neurologic status will be stable or improve  Outcome: Progressing     Problem: Craniotomy Surgery  Goal: Post-Operative Craniotomy Surgery: Patient will achieve optimal post-surgical outcomes  Outcome: Progressing     Problem: Neuro Status  Goal: Neuro status will remain stable or improve  Outcome: Progressing     Problem: Knowledge Deficit - Neuro Surgical  Goal: Patient's understanding of spinal precautions, appropriate body mechanics and incision care will improve  Outcome: Progressing     Problem: Pain - Standard  Goal: Alleviation of pain or a reduction in pain to the patient’s comfort goal  Outcome: Progressing

## 2023-09-25 NOTE — PROGRESS NOTES
Neurosurgery Progress Note    Subjective:  Doing well this am.  Didn't get out of bed yesterday as the nursing staff were busy.  Some soreness, no headache.    Exam:  A&O x4  PERRL  BUE good strength  BLE weakness unchanged    Cranial incision c/d/I, EVD removed    BP  Min: 115/75  Max: 157/80  Pulse  Av.2  Min: 52  Max: 82  Resp  Av  Min: 12  Max: 20  Temp  Av.1 °C (96.9 °F)  Min: 35.8 °C (96.4 °F)  Max: 36.3 °C (97.4 °F)  SpO2  Av.7 %  Min: 93 %  Max: 97 %    ICP  Av MM HG  Min: 7 MM HG  Max: 7 MM HG  CPP   Av  Min: 101  Max: 101    Recent Labs     23  02023  0340 23  0320   WBC 6.8 6.1 5.5   RBC 3.25* 3.43* 3.37*   HEMOGLOBIN 9.7* 10.5* 10.3*   HEMATOCRIT 29.5* 31.4* 30.9*   MCV 90.8 91.5 91.7   MCH 29.8 30.6 30.6   MCHC 32.9 33.4 33.3   RDW 56.6* 55.7* 56.0*   PLATELETCT 154* 144* 142*   MPV 10.0 10.6 10.0       Recent Labs     23  02023  0340 23  0320   SODIUM 137 134* 134*   POTASSIUM 3.6 4.0 4.0   CHLORIDE 104 101 100   CO2 23 20 22   GLUCOSE 245* 285* 279*   BUN 11 11 11   CREATININE 0.30* 0.32* 0.29*   CALCIUM 7.2* 8.1* 8.3*       Recent Labs     23  0205 23  0340   INR 1.28* 1.22*               Intake/Output                         23 - 23 0659 23 07 - 23 0659     [id number] 1846-0132 Total  Total                 Intake    P.O.  480  200 680  360  -- 360    P.O. 480 200 680 360 -- 360    Other  60  0 60  --  -- --    Medications (PO/Enteral Liquids) 60 0 60 -- -- --    IV Piggyback  500  -- 500  --  -- --    Volume (mL) (sodium phosphate 15 mmol in  mL ivpb) 500 -- 500 -- -- --    Total Intake 1801 410 1555 360 -- 360       Output    Urine  1600  2350 3950  --  -- --    Number of Times Voided 1 x 3 x 4 x -- -- --    Urine Void (mL) 1600 2350 3950 -- -- --    Drains  0  -- 0  --  -- --    Output (mL) ([REMOVED] ICP/Ventriculostomy Right Parietal region 23 1325) 0 --  0 -- -- --    Stool  --  -- --  --  -- --    Number of Times Stooled 1 x 0 x 1 x -- -- --    Total Output 1600 2350 3950 -- -- --       Net I/O     -560 -2270 -8495 360 -- 360              Intake/Output Summary (Last 24 hours) at 9/25/2023 1235  Last data filed at 9/25/2023 0900  Gross per 24 hour   Intake 740 ml   Output 3100 ml   Net -2360 ml               lisinopril  20 mg DAILY    insulin GLARGINE  15 Units Q EVENING    enoxaparin (LOVENOX) injection  40 mg DAILY AT 1800    labetalol  10 mg Q4HRS PRN    acetaminophen  650 mg Q4HRS PRN    MD Alert...Adult ICU Electrolyte Replacement per Pharmacy   PHARMACY TO DOSE    insulin regular  3-14 Units Q6HRS    And    dextrose bolus  25 g Q15 MIN PRN    senna-docusate  2 Tablet BID    And    polyethylene glycol/lytes  1 Packet QDAY PRN    And    magnesium hydroxide  30 mL QDAY PRN    And    bisacodyl  10 mg QDAY PRN    ondansetron  4 mg Q4HRS PRN    ondansetron  4 mg Q4HRS PRN    promethazine  12.5-25 mg Q4HRS PRN    promethazine  12.5-25 mg Q4HRS PRN    prochlorperazine  5-10 mg Q4HRS PRN    dexamethasone  4 mg Q6HRS       Assessment and Plan:  Hospital day #6  POD #4 s/p right retrosigmoid craniotomy for resection of cerebellar tumor and placement of EVD    Prophylactic anticoagulation: yes         Start date/time: night 9/24    - Continue Decadron 4 mg every 6 hours, can start to wean to OFF over 1 week.  - EVD removed 9/24  - MRI brain no issues, good resection of cerebellar mass  - OK to mobilize w/ PT/OT  - Ok for q4 and TTF  - Will arrange outpatient follow-up in 2 weeks for staple removal  - The incision is closed with staples. Wash incision with soap and water at least every other day. Pat to dry. Leave open to air. Apply Bacitracin ointment daily only for 1 week after surgery.      Please call with questions    Archana Be M.D.

## 2023-09-25 NOTE — CARE PLAN
The patient is Stable - Low risk of patient condition declining or worsening    Shift Goals  Clinical Goals: stable neuro checks, transfer to next level of care when bed ready  Patient Goals: get some sleep  Family Goals: updates PRN    Progress made toward(s) clinical / shift goals:      Patient updated regarding his plan of care, and his current condition. Patient updated in regards to his pending transfer, and that process. Significant other updated regarding patient's transfer at the bedside. Patient's neuro status remains stable at this time. Patient's SBP maintained within parameters without intervention. Patient's pain monitored, and patient's pain managed with PRN pain medication per MAR.     Problem: Knowledge Deficit - Standard  Goal: Patient and family/care givers will demonstrate understanding of plan of care, disease process/condition, diagnostic tests and medications  Outcome: Progressing    Problem: Hemodynamics  Goal: Patient's hemodynamics, fluid balance and neurologic status will be stable or improve  Outcome: Progressing     Problem: Neuro Status  Goal: Neuro status will remain stable or improve  Outcome: Progressing     Problem: Pain - Standard  Goal: Alleviation of pain or a reduction in pain to the patient’s comfort goal  Outcome: Progressing

## 2023-09-25 NOTE — DISCHARGE PLANNING
Care Transition Team Discharge Planning    Anticipated Discharge Information  Discharge Disposition: Disch to IP rehab facility or distinct part unit (62)    Pt discussed during ICU rounds    Pt has floor orders for today

## 2023-09-26 LAB
GLUCOSE BLD STRIP.AUTO-MCNC: 189 MG/DL (ref 65–99)
GLUCOSE BLD STRIP.AUTO-MCNC: 255 MG/DL (ref 65–99)
GLUCOSE BLD STRIP.AUTO-MCNC: 266 MG/DL (ref 65–99)
GLUCOSE BLD STRIP.AUTO-MCNC: 275 MG/DL (ref 65–99)
GLUCOSE BLD STRIP.AUTO-MCNC: 313 MG/DL (ref 65–99)

## 2023-09-26 PROCEDURE — A9270 NON-COVERED ITEM OR SERVICE: HCPCS | Performed by: HOSPITALIST

## 2023-09-26 PROCEDURE — 770001 HCHG ROOM/CARE - MED/SURG/GYN PRIV*

## 2023-09-26 PROCEDURE — 700102 HCHG RX REV CODE 250 W/ 637 OVERRIDE(OP): Performed by: INTERNAL MEDICINE

## 2023-09-26 PROCEDURE — 700111 HCHG RX REV CODE 636 W/ 250 OVERRIDE (IP): Mod: JZ | Performed by: STUDENT IN AN ORGANIZED HEALTH CARE EDUCATION/TRAINING PROGRAM

## 2023-09-26 PROCEDURE — 99232 SBSQ HOSP IP/OBS MODERATE 35: CPT | Performed by: STUDENT IN AN ORGANIZED HEALTH CARE EDUCATION/TRAINING PROGRAM

## 2023-09-26 PROCEDURE — 700102 HCHG RX REV CODE 250 W/ 637 OVERRIDE(OP): Performed by: STUDENT IN AN ORGANIZED HEALTH CARE EDUCATION/TRAINING PROGRAM

## 2023-09-26 PROCEDURE — 700102 HCHG RX REV CODE 250 W/ 637 OVERRIDE(OP)

## 2023-09-26 PROCEDURE — A9270 NON-COVERED ITEM OR SERVICE: HCPCS

## 2023-09-26 PROCEDURE — 700102 HCHG RX REV CODE 250 W/ 637 OVERRIDE(OP): Performed by: HOSPITALIST

## 2023-09-26 PROCEDURE — A9270 NON-COVERED ITEM OR SERVICE: HCPCS | Performed by: INTERNAL MEDICINE

## 2023-09-26 PROCEDURE — A9270 NON-COVERED ITEM OR SERVICE: HCPCS | Performed by: STUDENT IN AN ORGANIZED HEALTH CARE EDUCATION/TRAINING PROGRAM

## 2023-09-26 PROCEDURE — 82962 GLUCOSE BLOOD TEST: CPT | Mod: 91

## 2023-09-26 RX ORDER — DIPHENHYDRAMINE HCL 25 MG
25 TABLET ORAL ONCE
Status: COMPLETED | OUTPATIENT
Start: 2023-09-26 | End: 2023-09-26

## 2023-09-26 RX ADMIN — INSULIN HUMAN 7 UNITS: 100 INJECTION, SOLUTION PARENTERAL at 17:10

## 2023-09-26 RX ADMIN — DIPHENHYDRAMINE HYDROCHLORIDE 25 MG: 25 TABLET ORAL at 23:25

## 2023-09-26 RX ADMIN — INSULIN HUMAN 3 UNITS: 100 INJECTION, SOLUTION PARENTERAL at 07:45

## 2023-09-26 RX ADMIN — INSULIN HUMAN 10 UNITS: 100 INJECTION, SOLUTION PARENTERAL at 23:29

## 2023-09-26 RX ADMIN — LISINOPRIL 20 MG: 20 TABLET ORAL at 17:17

## 2023-09-26 RX ADMIN — INSULIN HUMAN 7 UNITS: 100 INJECTION, SOLUTION PARENTERAL at 13:13

## 2023-09-26 RX ADMIN — DEXAMETHASONE 4 MG: 4 TABLET ORAL at 07:45

## 2023-09-26 RX ADMIN — ENOXAPARIN SODIUM 40 MG: 100 INJECTION SUBCUTANEOUS at 17:17

## 2023-09-26 RX ADMIN — ACETAMINOPHEN 650 MG: 325 TABLET, FILM COATED ORAL at 13:15

## 2023-09-26 RX ADMIN — INSULIN GLARGINE-YFGN 25 UNITS: 100 INJECTION, SOLUTION SUBCUTANEOUS at 17:09

## 2023-09-26 RX ADMIN — INSULIN HUMAN 7 UNITS: 100 INJECTION, SOLUTION PARENTERAL at 00:23

## 2023-09-26 RX ADMIN — DEXAMETHASONE 4 MG: 4 TABLET ORAL at 17:17

## 2023-09-26 ASSESSMENT — ENCOUNTER SYMPTOMS
CHILLS: 0
DIZZINESS: 1
SHORTNESS OF BREATH: 0
FEVER: 0
COUGH: 0
ABDOMINAL PAIN: 0
DIARRHEA: 0
BLURRED VISION: 0
DOUBLE VISION: 0
SORE THROAT: 0
HEADACHES: 0
NAUSEA: 0
WEAKNESS: 1
LOSS OF CONSCIOUSNESS: 0
VOMITING: 0
PALPITATIONS: 0
BACK PAIN: 0

## 2023-09-26 ASSESSMENT — PAIN DESCRIPTION - PAIN TYPE: TYPE: ACUTE PAIN

## 2023-09-26 NOTE — PROGRESS NOTES
Patient arrive to the unit accompanied by the transportation team. Patient displays no signs of distress or discomfort.     Patient assisted to bed and assessments continued. VSS. Patient oriented to new room.

## 2023-09-26 NOTE — PROGRESS NOTES
Report called to Dallin DIAMOND, all questions answered. Patient Aox4, taken w/transport and all belongings to [facility].

## 2023-09-26 NOTE — DISCHARGE PLANNING
Would appreciate updated [place] evals once appropriate.     1431-Spoke with Casper regarding [facility] & D/C resources/support.  He is agreeable with an admission.  He will return to his [address] home with no steps to enter.  S.O. has time off of work to provide 24/7.  Anticipate submitting to insurance once updated [place] evals are present.

## 2023-09-26 NOTE — PROGRESS NOTES
4 Eyes Skin Assessment Completed by DARA Swan and DARA Damian.    Head Swelling, Redness, and Incision  Ears WDL  Nose WDL  Mouth WDL  Neck Redness and Blanching  Breast/Chest WDL  Shoulder Blades WDL  Spine WDL  (R) Arm/Elbow/Hand WDL  (L) Arm/Elbow/Hand WDL  Abdomen WDL  Groin WDL  Scrotum/Coccyx/Buttocks WDL  (R) Leg Redness, Blanching, Scar, and Scab  (L) Leg Redness, Blanching, Scar, and Scab  (R) Heel/Foot/Toe WDL  (L) Heel/Foot/Toe WDL          Devices In Places Blood Pressure Cuff, Pulse Ox, and SCD's      Interventions In Place Heels Loaded W/Pillows and Pressure Redistribution Mattress    Possible Skin Injury Yes    Pictures Uploaded Into Epic No, needs to be completed  Wound Consult Placed Yes  RN Wound Prevention Protocol Ordered Yes

## 2023-09-27 LAB
GLUCOSE BLD STRIP.AUTO-MCNC: 220 MG/DL (ref 65–99)
GLUCOSE BLD STRIP.AUTO-MCNC: 229 MG/DL (ref 65–99)
GLUCOSE BLD STRIP.AUTO-MCNC: 306 MG/DL (ref 65–99)
GLUCOSE BLD STRIP.AUTO-MCNC: 424 MG/DL (ref 65–99)

## 2023-09-27 PROCEDURE — 700102 HCHG RX REV CODE 250 W/ 637 OVERRIDE(OP): Performed by: INTERNAL MEDICINE

## 2023-09-27 PROCEDURE — A9270 NON-COVERED ITEM OR SERVICE: HCPCS | Performed by: HOSPITALIST

## 2023-09-27 PROCEDURE — 770001 HCHG ROOM/CARE - MED/SURG/GYN PRIV*

## 2023-09-27 PROCEDURE — 700111 HCHG RX REV CODE 636 W/ 250 OVERRIDE (IP): Mod: JZ | Performed by: STUDENT IN AN ORGANIZED HEALTH CARE EDUCATION/TRAINING PROGRAM

## 2023-09-27 PROCEDURE — 700102 HCHG RX REV CODE 250 W/ 637 OVERRIDE(OP)

## 2023-09-27 PROCEDURE — 82962 GLUCOSE BLOOD TEST: CPT | Mod: 91

## 2023-09-27 PROCEDURE — A9270 NON-COVERED ITEM OR SERVICE: HCPCS | Performed by: INTERNAL MEDICINE

## 2023-09-27 PROCEDURE — 700102 HCHG RX REV CODE 250 W/ 637 OVERRIDE(OP): Performed by: STUDENT IN AN ORGANIZED HEALTH CARE EDUCATION/TRAINING PROGRAM

## 2023-09-27 PROCEDURE — A9270 NON-COVERED ITEM OR SERVICE: HCPCS

## 2023-09-27 PROCEDURE — 700102 HCHG RX REV CODE 250 W/ 637 OVERRIDE(OP): Performed by: HOSPITALIST

## 2023-09-27 PROCEDURE — 99231 SBSQ HOSP IP/OBS SF/LOW 25: CPT | Performed by: STUDENT IN AN ORGANIZED HEALTH CARE EDUCATION/TRAINING PROGRAM

## 2023-09-27 PROCEDURE — A9270 NON-COVERED ITEM OR SERVICE: HCPCS | Performed by: STUDENT IN AN ORGANIZED HEALTH CARE EDUCATION/TRAINING PROGRAM

## 2023-09-27 PROCEDURE — 97530 THERAPEUTIC ACTIVITIES: CPT

## 2023-09-27 PROCEDURE — 97116 GAIT TRAINING THERAPY: CPT

## 2023-09-27 PROCEDURE — 97535 SELF CARE MNGMENT TRAINING: CPT

## 2023-09-27 RX ORDER — DEXAMETHASONE 4 MG/1
2 TABLET ORAL DAILY
Status: DISCONTINUED | OUTPATIENT
Start: 2023-09-30 | End: 2023-09-29 | Stop reason: HOSPADM

## 2023-09-27 RX ORDER — DIPHENHYDRAMINE HCL 25 MG
25 TABLET ORAL ONCE
Status: COMPLETED | OUTPATIENT
Start: 2023-09-27 | End: 2023-09-27

## 2023-09-27 RX ORDER — DEXAMETHASONE 4 MG/1
2 TABLET ORAL EVERY 12 HOURS
Status: DISCONTINUED | OUTPATIENT
Start: 2023-09-27 | End: 2023-09-29 | Stop reason: HOSPADM

## 2023-09-27 RX ADMIN — INSULIN HUMAN 4 UNITS: 100 INJECTION, SOLUTION PARENTERAL at 12:02

## 2023-09-27 RX ADMIN — DEXAMETHASONE 2 MG: 4 TABLET ORAL at 17:37

## 2023-09-27 RX ADMIN — ENOXAPARIN SODIUM 40 MG: 100 INJECTION SUBCUTANEOUS at 17:38

## 2023-09-27 RX ADMIN — ACETAMINOPHEN 650 MG: 325 TABLET, FILM COATED ORAL at 17:47

## 2023-09-27 RX ADMIN — SENNOSIDES AND DOCUSATE SODIUM 2 TABLET: 50; 8.6 TABLET ORAL at 17:37

## 2023-09-27 RX ADMIN — INSULIN HUMAN 10 UNITS: 100 INJECTION, SOLUTION PARENTERAL at 17:34

## 2023-09-27 RX ADMIN — DIPHENHYDRAMINE HYDROCHLORIDE 25 MG: 25 TABLET ORAL at 23:22

## 2023-09-27 RX ADMIN — INSULIN HUMAN 14 UNITS: 100 INJECTION, SOLUTION PARENTERAL at 23:27

## 2023-09-27 RX ADMIN — INSULIN GLARGINE-YFGN 25 UNITS: 100 INJECTION, SOLUTION SUBCUTANEOUS at 17:35

## 2023-09-27 RX ADMIN — ACETAMINOPHEN 650 MG: 325 TABLET, FILM COATED ORAL at 08:58

## 2023-09-27 RX ADMIN — INSULIN HUMAN 4 UNITS: 100 INJECTION, SOLUTION PARENTERAL at 06:42

## 2023-09-27 RX ADMIN — LISINOPRIL 20 MG: 20 TABLET ORAL at 17:37

## 2023-09-27 RX ADMIN — DEXAMETHASONE 4 MG: 4 TABLET ORAL at 06:25

## 2023-09-27 ASSESSMENT — ENCOUNTER SYMPTOMS
LOSS OF CONSCIOUSNESS: 0
DIZZINESS: 1
BACK PAIN: 0
SHORTNESS OF BREATH: 0
CHILLS: 0
HEADACHES: 0
WEAKNESS: 1
PALPITATIONS: 0
SORE THROAT: 0
FEVER: 0
VOMITING: 0
NAUSEA: 0
ABDOMINAL PAIN: 0
BLURRED VISION: 0
DIARRHEA: 0
COUGH: 0
DOUBLE VISION: 0

## 2023-09-27 ASSESSMENT — COGNITIVE AND FUNCTIONAL STATUS - GENERAL
STANDING UP FROM CHAIR USING ARMS: A LITTLE
DRESSING REGULAR LOWER BODY CLOTHING: A LOT
HELP NEEDED FOR BATHING: A LITTLE
TURNING FROM BACK TO SIDE WHILE IN FLAT BAD: A LITTLE
MOVING TO AND FROM BED TO CHAIR: A LITTLE
MOBILITY SCORE: 16
WALKING IN HOSPITAL ROOM: A LITTLE
DRESSING REGULAR UPPER BODY CLOTHING: A LITTLE
PERSONAL GROOMING: A LITTLE
SUGGESTED CMS G CODE MODIFIER DAILY ACTIVITY: CK
CLIMB 3 TO 5 STEPS WITH RAILING: A LOT
TOILETING: A LITTLE
DAILY ACTIVITIY SCORE: 18
MOVING FROM LYING ON BACK TO SITTING ON SIDE OF FLAT BED: A LOT
SUGGESTED CMS G CODE MODIFIER MOBILITY: CK

## 2023-09-27 ASSESSMENT — GAIT ASSESSMENTS
DEVIATION: STEP TO;DECREASED BASE OF SUPPORT;BRADYKINETIC;OTHER (COMMENT)
ASSISTIVE DEVICE: FRONT WHEEL WALKER
GAIT LEVEL OF ASSIST: CONTACT GUARD ASSIST
DISTANCE (FEET): 30

## 2023-09-27 NOTE — CARE PLAN
The patient is Stable - Low risk of patient condition declining or worsening    Shift Goals  Clinical Goals: stable neuro checks, transfer to next level of care when bed ready  Patient Goals: get some sleep  Family Goals: updates PRN    Progress made toward(s) clinical / shift goals:  Patient remains alert and oriented X 4. Neuro checks continued with no new complications observed. Patient assisted with ambulation for ADLs. No signs of distress.     Problem: Knowledge Deficit - Standard  Goal: Patient and family/care givers will demonstrate understanding of plan of care, disease process/condition, diagnostic tests and medications  9/27/2023 0800 by Dallin Velasquez R.N.  Outcome: Progressing  9/27/2023 0800 by Dallin Velasquez R.N.  Outcome: Progressing  9/27/2023 0750 by CODY BullardN.  Outcome: Progressing     Problem: Fall Risk  Goal: Patient will remain free from falls  9/27/2023 0800 by Dallin Velasquez R.N.  Outcome: Progressing  9/27/2023 0800 by Dallin Velasquez R.N.  Outcome: Progressing  9/27/2023 0750 by CODY BullardN.  Outcome: Progressing     Problem: Psychosocial  Goal: Patient's ability to verbalize feelings about condition will improve  9/27/2023 0800 by Dallin Velasquez R.N.  Outcome: Progressing  9/27/2023 0800 by CODY BullardN.  Outcome: Progressing  9/27/2023 0750 by FERN Bullard.N.  Outcome: Progressing       Problem: Hemodynamics  Goal: Patient's hemodynamics, fluid balance and neurologic status will be stable or improve  9/27/2023 0800 by CODY BullardN.  Outcome: Progressing  9/27/2023 0800 by Dallin Velasquez R.N.  Outcome: Progressing  9/27/2023 0750 by FERN Bullard.N.  Outcome: Progressing     Problem: Respiratory  Goal: Patient will achieve/maintain optimum respiratory ventilation and gas exchange  9/27/2023 0800 by Dallin Velasquez R.N.  Outcome: Progressing  9/27/2023 0800 by Dallin Velasquez R.N.  Outcome:  Progressing  9/27/2023 0750 by Dallin Velasquez R.N.  Outcome: Progressing     Problem: Risk for Aspiration  Goal: Patient's risk for aspiration will be absent or decrease  9/27/2023 0800 by Dallin Velasquez R.N.  Outcome: Progressing  9/27/2023 0800 by CODY BullardN.  Outcome: Progressing  9/27/2023 0750 by Dallin Velasquez R.N.  Outcome: Progressing     Problem: Urinary - Renal Perfusion  Goal: Ability to achieve and maintain adequate renal perfusion and functioning will improve  9/27/2023 0800 by Dallin Velasquez R.N.  Outcome: Progressing  9/27/2023 0800 by Dallin Velasquez R.N.  Outcome: Progressing  9/27/2023 0750 by Dallin Velasquez R.N.  Outcome: Progressing     Problem: Venous Thromboembolism (VTE) Prevention  Goal: The patient will remain free from venous thromboembolism (VTE)  9/27/2023 0800 by Dallin Velasquez R.N.  Outcome: Progressing  9/27/2023 0800 by Dallin Velasquez R.N.  Outcome: Progressing  9/27/2023 0750 by Dallin Velasquez R.N.  Outcome: Progressing     Problem: Nutrition  Goal: Patient's nutritional and fluid intake will be adequate or improve  9/27/2023 0800 by Dallin Velasquez R.N.  Outcome: Progressing  9/27/2023 0800 by Dallin Velasquez R.N.  Outcome: Progressing  9/27/2023 0750 by CODY BullardN.  Outcome: Progressing  Goal: Enteral nutrition will be maintained or improve  9/27/2023 0800 by CODY BullardN.  Outcome: Progressing  9/27/2023 0800 by CODY BullardN.  Outcome: Progressing  9/27/2023 0750 by CODY BullardN.  Outcome: Progressing  Goal: Enteral nutrition will be maintained or improve  9/27/2023 0800 by CODY BullardN.  Outcome: Progressing  9/27/2023 0800 by Dallin Velasquez R.N.  Outcome: Progressing  9/27/2023 0750 by Dallin Velasquez R.N.  Outcome: Progressing     Problem: Urinary Elimination  Goal: Establish and maintain regular urinary output  9/27/2023 0800 by Dallin Velasquez,  CODYN.  Outcome: Progressing  9/27/2023 0800 by Dallin Velasquez R.N.  Outcome: Progressing  9/27/2023 0750 by Dallin Velasquez R.N.  Outcome: Progressing     Problem: Bowel Elimination  Goal: Establish and maintain regular bowel function  9/27/2023 0800 by Dallin Velasquez R.N.  Outcome: Progressing  9/27/2023 0800 by Dallin Velasquez R.N.  Outcome: Progressing  9/27/2023 0750 by Dallin Velasquez R.N.  Outcome: Progressing     Problem: Knowledge Deficit - Neuro Surgical  Goal: Patient's understanding of spinal precautions, appropriate body mechanics and incision care will improve  9/27/2023 0800 by Dallin Velasquez R.N.  Outcome: Progressing  9/27/2023 0800 by Dallin Velasquez R.N.  Outcome: Progressing  9/27/2023 0750 by Dallin Velasquez R.N.  Outcome: Progressing     Problem: Neuro Status  Goal: Neuro status will remain stable or improve  9/27/2023 0800 by Dallin Velasquez R.N.  Outcome: Progressing  9/27/2023 0800 by Dallin Velasquez R.N.  Outcome: Progressing  9/27/2023 0750 by CODY BullardN.  Outcome: Progressing     Problem: Skin Integrity  Goal: Skin integrity is maintained or improved  9/27/2023 0800 by Dallin Velasquez R.N.  Outcome: Progressing  9/27/2023 0800 by Dallin Velasquez R.N.  Outcome: Progressing  9/27/2023 0750 by Dallin Velasquez R.N.  Outcome: Progressing     Problem: Pain - Standard  Goal: Alleviation of pain or a reduction in pain to the patient’s comfort goal  9/27/2023 0800 by Dallin Velasquez R.N.  Outcome: Progressing  9/27/2023 0800 by Dallin Velasquez R.N.  Outcome: Progressing  9/27/2023 0750 by Dallin Velasquez R.N.  Outcome: Progressing

## 2023-09-27 NOTE — PROGRESS NOTES
Hospital Medicine Daily Progress Note    Date of Service  9/26/2023    Chief Complaint  Casper Rowley is a 58 y.o. male admitted 9/19/2023 with dizziness    Hospital Course  57yo admitted 9/19 with Hx of metastatic prostate CA, HTN, DM presenting with N/V.  Head CT showing R cerebellar mass and edema with efacement of 4th ventricle.  Went to the OR on 9/21 with Dr Be for craniotomy and resection with placement of EVD which was then removed on 9/24    Interval Problem Update  Feels well, still with leg weakness, plan to DC to [facility]PR when approved      I have discussed this patient's plan of care and discharge plan at IDT rounds today with Case Management, Nursing, Nursing leadership, and other members of the IDT team.    Consultants/Specialty  neurosurgery    Code Status  Full Code    Disposition  The patient is medically cleared for discharge to home or a post-acute facility.  Anticipate discharge to: an inpatient rehabilitation hospital    I have placed the appropriate orders for post-discharge needs.    Review of Systems  Review of Systems   Constitutional:  Negative for chills and fever.   HENT:  Negative for nosebleeds and sore throat.    Eyes:  Negative for blurred vision and double vision.   Respiratory:  Negative for cough and shortness of breath.    Cardiovascular:  Negative for chest pain, palpitations and leg swelling.   Gastrointestinal:  Negative for abdominal pain, diarrhea, nausea and vomiting.   Genitourinary:  Negative for dysuria and urgency.   Musculoskeletal:  Negative for back pain.   Skin:  Negative for rash.   Neurological:  Positive for dizziness and weakness. Negative for loss of consciousness and headaches.        Physical Exam  Temp:  [36.3 °C (97.3 °F)-36.9 °C (98.4 °F)] 36.6 °C (97.9 °F)  Pulse:  [66-93] 93  Resp:  [14-20] 18  BP: (121-136)/(70-87) 136/87  SpO2:  [94 %-97 %] 95 %    Physical Exam  Constitutional:       Appearance: Normal appearance.   HENT:      Head:  Normocephalic and atraumatic.      Nose: Nose normal.      Mouth/Throat:      Mouth: Mucous membranes are moist.      Pharynx: Oropharynx is clear.   Eyes:      Conjunctiva/sclera: Conjunctivae normal.      Pupils: Pupils are equal, round, and reactive to light.   Cardiovascular:      Rate and Rhythm: Normal rate and regular rhythm.      Heart sounds: No murmur heard.  Pulmonary:      Effort: Pulmonary effort is normal.      Breath sounds: No wheezing, rhonchi or rales.   Abdominal:      General: There is no distension.      Palpations: Abdomen is soft.      Tenderness: There is no abdominal tenderness. There is no guarding or rebound.   Musculoskeletal:         General: No swelling or tenderness.      Cervical back: Normal range of motion and neck supple.   Skin:     General: Skin is warm and dry.   Neurological:      Mental Status: He is alert and oriented to person, place, and time.      GCS: GCS eye subscore is 4. GCS verbal subscore is 5. GCS motor subscore is 6.      Cranial Nerves: No facial asymmetry.      Motor: Weakness present.      Comments: Legs weak b/l   Psychiatric:         Mood and Affect: Mood normal.         Behavior: Behavior normal.         Fluids    Intake/Output Summary (Last 24 hours) at 9/26/2023 1817  Last data filed at 9/26/2023 1230  Gross per 24 hour   Intake 478 ml   Output 0 ml   Net 478 ml         Laboratory  Recent Labs     09/24/23  0340 09/25/23  0320   WBC 6.1 5.5   RBC 3.43* 3.37*   HEMOGLOBIN 10.5* 10.3*   HEMATOCRIT 31.4* 30.9*   MCV 91.5 91.7   MCH 30.6 30.6   MCHC 33.4 33.3   RDW 55.7* 56.0*   PLATELETCT 144* 142*   MPV 10.6 10.0       Recent Labs     09/24/23  0340 09/25/23  0320   SODIUM 134* 134*   POTASSIUM 4.0 4.0   CHLORIDE 101 100   CO2 20 22   GLUCOSE 285* 279*   BUN 11 11   CREATININE 0.32* 0.29*   CALCIUM 8.1* 8.3*       Recent Labs     09/24/23  0340   INR 1.22*                 Imaging  MR-BRAIN-WITH & W/O   Final Result         Postoperative changes are noted in  the right cerebellum with resection of the previously seen right cerebellar metastasis.      Small fluid collection in the occipital soft tissues overlying the craniotomy site, probably a seroma.      Vasogenic edema in the right cerebellum with mass effect upon the fourth ventricle and mild lateral ventricular hydrocephalus remains stable. EVD catheter is stable in position.      No definite abnormal enhancement is identified at the surgical site, however evaluation is limited by T1 shortening from the presence of subacute blood products. Recommend follow-up examination.      CT-HEAD W/O   Final Result      1.  Interval resection of RIGHT cerebellar mass with pneumocephalus and some blood in the surgical resection bed but overall decreased mass effect. Residual tumor not excluded by this exam.   2.  Interval placement of RIGHT transparietal ventriculostomy catheter with slightly decreased caliber of lateral and third ventricles   3.  LEFT temporal metastasis without significant mass effect   4.  Atrophy   5.  White matter changes            MR-LUMBAR SPINE-WITH & W/O   Final Result      *  Diffuse osseous metastasis.   *  Mild vertebral height loss at T12, L1, L2 and L3   *  There is no epidural tumor spread in the lumbar spine.   *  Postsurgical and degenerative changes as described above.      MR-THORACIC SPINE-WITH & W/O   Final Result      1.  Diffuse osseous metastasis.   2.  Epidural tumor spread at the levels of T9, T10 and T11 causing effacement of the thecal sac. There is moderate central canal stenosis at T10 and T10-11.   3.  There is no spinal cord lesion.   4.  Bilateral adrenal masses.   5.  Mild thoracic intervertebral joint degeneration.      MR-CERVICAL SPINE-WITH & W/O   Final Result      *  Diffuse osseous metastasis without epidural spread.   *  Right cerebellar enhancing mass concerning for metastasis.   *  Degenerative changes as described above.   *  Moderate central canal stenosis at C6-7.       DX-CHEST-PORTABLE (1 VIEW)   Final Result      1.  No acute cardiopulmonary abnormality.   2.  Multiple sclerotic osseous metastases.         MR-BRAIN-WITH & W/O   Final Result      1.  An enhancing right cerebellar mass likely represents metastasis.   2.  There is mass effect as evidenced by partial effacement of the fourth ventricle, low-lying cerebellar tonsils and moderate hydrocephalus.   3.  Multifocal osseous metastasis.   4.  Mild chronic microvascular ischemic disease.      CT-ABDOMEN-PELVIS WITH   Final Result      1.  Diffuse osseous metastatic disease. This vertebral augmentation of the L1 and L2 vertebral bodies      2. Bilateral low-attenuation adrenal masses which may represent lipophilic adenomas, however metastatic disease cannot be excluded.      3. 3.8 cm benign right renal cyst.         4. Marked bladder distention.   2.      CT-HEAD W/O   Final Result      1.  Vasogenic edema noted medially and inferiorly in the right hemicerebellum extending into the cerebellar vermis causing moderate effacement of the fourth ventricle and mild to moderate obstructive hydrocephalus. 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 MRI scan of the brain with gadolinium enhancement is recommended for further evaluation. Additionally, neurosurgical consultation is recommended.         DX-CHEST-PORTABLE (1 VIEW)   Final Result      1.  No acute cardiac or pulmonary abnormalities are identified.      2.  Diffuse bony metastatic disease.             Assessment/Plan  * Cerebellar mass- (present on admission)  Assessment & Plan  S/p surgical excision on 9/21 with EVD pulled on 9/24,   s/p hypertonic saline  possible prostate CA metastasis vs new primary malignancy: path pending  Decadron  Q4H Neurochecks  PT/OT: DC to home vs acute Rehab  Physiatry consulted        Prostate cancer (HCC)  Assessment & Plan  Dr. Butts. His last Xgeva injection was on 8/30/2023, has had progression on  Xtandi, Xofigo and docetaxel  -Await bx results to determine further treatment  -Heme/Onc following, will appreciate recs    NED (obstructive sleep apnea)- (present on admission)  Assessment & Plan  Nocturnal CPAP    HTN (hypertension), benign- (present on admission)  Assessment & Plan  Restart home lisinopril at reduced dose  Holding home metoprolol     DM (diabetes mellitus) (HCC)- (present on admission)  Assessment & Plan  A1c 6.5  As outpt on dulaglutide and canagliflozin  Running higher as on decadron: mid 200s  Increase glargine to 25 units; will need to taper it down as he comes off of decadron  -Holding home oral agents  -Diabetic diet, hypoglycemia protocol, scheduled POC BG checks             VTE prophylaxis:    enoxaparin ppx      I have performed a physical exam and reviewed and updated ROS and Plan today (9/26/2023). In review of yesterday's note (9/25/2023), there are no changes except as documented above.

## 2023-09-27 NOTE — PREADMISSION SCREENING NOTE
Updated Pre-Screen Assessment     Name: Casper Rowley  MRN: [id number]  : 1965    Medical Status/ Changes:     Dr. Moreno progress note:  Date of Service  2023     Chief Complaint  Casper Rowley is a 58 y.o. male admitted 2023 with dizziness     Hospital Course  57yo admitted  with Hx of metastatic prostate CA, HTN, DM presenting with N/V.  Head CT showing R cerebellar mass and edema with efacement of 4th ventricle.  Went to the OR on  with Dr Be for craniotomy and resection with placement of EVD which was then removed on      Interval Problem Update  Leg weakness improving per pt, tapering decadron, plan to DC to IPR when approved, medically cleared     I have discussed this patient's plan of care and discharge plan at IDT rounds today with Case Management, Nursing, Nursing leadership, and other members of the IDT team.     Consultants/Specialty  neurosurgery     Code Status  Full Code     Disposition  The patient is medically cleared for discharge to home or a post-acute facility.  Anticipate discharge to: an inpatient rehabilitation hospital     I have placed the appropriate orders for post-discharge needs.     Review of Systems  Review of Systems   Constitutional:  Negative for chills and fever.   HENT:  Negative for nosebleeds and sore throat.    Eyes:  Negative for blurred vision and double vision.   Respiratory:  Negative for cough and shortness of breath.    Cardiovascular:  Negative for chest pain, palpitations and leg swelling.   Gastrointestinal:  Negative for abdominal pain, diarrhea, nausea and vomiting.   Genitourinary:  Negative for dysuria and urgency.   Musculoskeletal:  Negative for back pain.   Skin:  Negative for rash.   Neurological:  Positive for weakness. Negative for dizziness, loss of consciousness and headaches.         Physical Exam  Temp:  [36.4 °C (97.5 °F)-36.7 °C (98.1 °F)] 36.5 °C (97.7 °F)  Pulse:  [90-98] 98  Resp:  [18] 18  BP:  (121-125)/(78-96) 121/96  SpO2:  [96 %-99 %] 99 %     Physical Exam  Constitutional:       Appearance: Normal appearance.   HENT:      Head: Normocephalic and atraumatic.      Nose: Nose normal.      Mouth/Throat:      Mouth: Mucous membranes are moist.      Pharynx: Oropharynx is clear.   Eyes:      Conjunctiva/sclera: Conjunctivae normal.      Pupils: Pupils are equal, round, and reactive to light.   Cardiovascular:      Rate and Rhythm: Normal rate and regular rhythm.      Heart sounds: No murmur heard.  Pulmonary:      Effort: Pulmonary effort is normal.      Breath sounds: No wheezing, rhonchi or rales.   Abdominal:      General: There is no distension.      Palpations: Abdomen is soft.      Tenderness: There is no abdominal tenderness. There is no guarding or rebound.   Musculoskeletal:         General: No swelling or tenderness.      Cervical back: Normal range of motion and neck supple.   Skin:     General: Skin is warm and dry.   Neurological:      Mental Status: He is alert and oriented to person, place, and time.      GCS: GCS eye subscore is 4. GCS verbal subscore is 5. GCS motor subscore is 6.      Cranial Nerves: No facial asymmetry.      Motor: Weakness present.      Comments: Legs weak b/l   Psychiatric:         Mood and Affect: Mood normal.         Behavior: Behavior normal.      Fluids     Intake/Output Summary (Last 24 hours) at 9/28/2023 1655  Last data filed at 9/28/2023 0400  Gross per 24 hour  Intake 300 ml  Output --  Net 300 ml     Laboratory      Imaging  MR-BRAIN-WITH & W/O  Final Result     Postoperative changes are noted in the right cerebellum with resection of the previously seen right cerebellar metastasis.     Small fluid collection in the occipital soft tissues overlying the craniotomy site, probably a seroma.     Vasogenic edema in the right cerebellum with mass effect upon the fourth ventricle and mild lateral ventricular hydrocephalus remains stable. EVD catheter is stable in  position.     No definite abnormal enhancement is identified at the surgical site, however evaluation is limited by T1 shortening from the presence of subacute blood products. Recommend follow-up examination.     CT-HEAD W/O  Final Result     1.  Interval resection of RIGHT cerebellar mass with pneumocephalus and some blood in the surgical resection bed but overall decreased mass effect. Residual tumor not excluded by this exam.  2.  Interval placement of RIGHT transparietal ventriculostomy catheter with slightly decreased caliber of lateral and third ventricles  3.  LEFT temporal metastasis without significant mass effect  4.  Atrophy  5.  White matter changes     MR-LUMBAR SPINE-WITH & W/O  Final Result     *  Diffuse osseous metastasis.  *  Mild vertebral height loss at T12, L1, L2 and L3  *  There is no epidural tumor spread in the lumbar spine.  *  Postsurgical and degenerative changes as described above.     MR-THORACIC SPINE-WITH & W/O  Final Result     1.  Diffuse osseous metastasis.  2.  Epidural tumor spread at the levels of T9, T10 and T11 causing effacement of the thecal sac. There is moderate central canal stenosis at T10 and T10-11.  3.  There is no spinal cord lesion.  4.  Bilateral adrenal masses.  5.  Mild thoracic intervertebral joint degeneration.     MR-CERVICAL SPINE-WITH & W/O  Final Result     *  Diffuse osseous metastasis without epidural spread.  *  Right cerebellar enhancing mass concerning for metastasis.  *  Degenerative changes as described above.  *  Moderate central canal stenosis at C6-7.     DX-CHEST-PORTABLE (1 VIEW)  Final Result     1.  No acute cardiopulmonary abnormality.  2.  Multiple sclerotic osseous metastases.     MR-BRAIN-WITH & W/O  Final Result     1.  An enhancing right cerebellar mass likely represents metastasis.  2.  There is mass effect as evidenced by partial effacement of the fourth ventricle, low-lying cerebellar tonsils and moderate hydrocephalus.  3.   Multifocal osseous metastasis.  4.  Mild chronic microvascular ischemic disease.     CT-ABDOMEN-PELVIS WITH  Final Result     1.  Diffuse osseous metastatic disease. This vertebral augmentation of the L1 and L2 vertebral bodies     2. Bilateral low-attenuation adrenal masses which may represent lipophilic adenomas, however metastatic disease cannot be excluded.     3. 3.8 cm benign right renal cyst.        4. Marked bladder distention.  2.     CT-HEAD W/O  Final Result     1.  Vasogenic edema noted medially and inferiorly in the right hemicerebellum extending into the cerebellar vermis causing moderate effacement of the fourth ventricle and mild to moderate obstructive hydrocephalus. This is likely due to a metastatic   deposit in the right hemicerebellum . MRI scan of the brain with gadolinium enhancement is recommended for further evaluation. Additionally, neurosurgical consultation is recommended.     DX-CHEST-PORTABLE (1 VIEW)  Final Result     1.  No acute cardiac or pulmonary abnormalities are identified.     2.  Diffuse bony metastatic disease.     Assessment/Plan  * Cerebellar mass- (present on admission)  Assessment & Plan  S/p surgical excision on 9/21 with EVD pulled on 9/24,   s/p hypertonic saline  possible prostate CA metastasis vs new primary malignancy: path pending  Decadron taper  Q4H Neurochecks  PT/OT: DC to home vs acute Rehab  Physiatry consulted     Prostate cancer (HCC)  Assessment & Plan  Dr. Butts. His last Xgeva injection was on 8/30/2023, has had progression on Xtandi, Xofigo and docetaxel  -Await bx results to determine further treatment  -Heme/Onc following, will appreciate recs     NED (obstructive sleep apnea)- (present on admission)  Assessment & Plan  Nocturnal CPAP     HTN (hypertension), benign- (present on admission)  Assessment & Plan  Restart home lisinopril at reduced dose and home metoprolol     DM (diabetes mellitus) (HCC)- (present on admission)  Assessment & Plan  A1c  "6.5  As outpt on dulaglutide and canagliflozin  Running higher as on decadron: mid 200s  Continue glargine, will need to taper it down as he comes off of decadron  -Holding home oral agents  -Diabetic diet, hypoglycemia protocol, scheduled POC BG checks     VTE prophylaxis:    enoxaparin ppx  Functional Status/ Changes:     Assessment     Pt seen for OT treatment. Pt stood to wash hands/brush teeth with CGA, performed toilet hygiene w/ min A, and performed toilet transfer w/ CGA-min A. Pt initially had raiser at full height over toilet. Therapist lowered seat ~1 inch and pt required min A for stand. Pt current functional performance limited by impaired activity tolerance, impaired balance, and weakness. Pt will continue to benefit from skilled OT while admitted to acute care.      Plan     Treatment Plan Status: Continue Current Treatment Plan  Type of Treatment: Self Care / Activities of Daily Living, Neuro Re-Education / Balance, Therapeutic Exercises, Therapeutic Activity, Adaptive Equipment  Treatment Frequency: 3 Times per Week  Treatment Duration: Until Therapy Goals Met     DC Equipment Recommendations: Unable to determine at this time  Discharge Recommendations: Recommend post-acute placement for additional occupational therapy services prior to discharge home     Subjective     \"I am worried about low toilets.\"     Objective       09/27/23 1033  Pain  Pain Scales 0 to 10 Scale   Pain 0 - 10 Group  Therapist Pain Assessment Post Activity Pain Same as Prior to Activity;Nurse Notified  (not rated, agreeable to eval)  Non Verbal Descriptors  Non Verbal Scale  Calm  Cognition   Cognition / Consciousness WDL  Level of Consciousness Alert  Comments Pleasant, cooperative, receptive to education, highly verbose  Balance  Sitting Balance (Static) Good  Sitting Balance (Dynamic) Fair +  Standing Balance (Static) Fair  Standing Balance (Dynamic) Fair -  Weight Shift Sitting Fair  Weight Shift Standing Poor  Skilled " Intervention Verbal Cuing  Comments w/ FWW  Bed Mobility   Comments up to chair pre/post  Activities of Daily Living  Grooming Contact Guard Assist;Standing  (washed hands and brushed teeth standing at sink)  Toileting Minimal Assist  Skilled Intervention Verbal Cuing;Facilitation  Functional Mobility  Sit to Stand Contact Guard Assist  Bed, Chair, Wheelchair Transfer Contact Guard Assist  Toilet Transfers Minimal Assist  (initially had raiser at full height over toilet and pt completed it w/ CGA, reported his toilet at home is lower, so raiser lowered ~1 inch and pt required min A)  Transfer Method Stand Step  Mobility chair>bathroom>sink>chair  Comments w/ FWW  Activity Tolerance  Sitting in Chair up pre/post  Sitting Edge of Bed NT  Standing ~10 min  Comments limited by weakness  Patient / Family Goals  Patient / Family Goal #1 To go home  Goal #1 Outcome Progressing as expected  Short Term Goals  Short Term Goal # 1 Pt will demo standing grooming w/ min A  Goal Outcome # 1 Goal met, new goal added  Short Term Goal # 1 B  Pt will complete standing g/h w/ supv  Short Term Goal # 2 Pt will demo ADL txf w/ min A  Goal Outcome # 2 Progressing as expected  Short Term Goal # 3 Pt will demo LB dressing w/ min A  Goal Outcome # 3 Goal not met  Education Group  Education Provided Role of Occupational Therapist;Activities of Daily Living  Role of Occupational Therapist Patient Response Patient;Acceptance;Explanation;Verbal Demonstration  ADL Patient Response Patient;Acceptance;Explanation;Demonstration;Verbal Demonstration;Action Demonstration  Occupational Therapy Treatment Plan   O.T. Treatment Plan Continue Current Treatment Plan  Treatment Interventions Self Care / Activities of Daily Living;Neuro Re-Education / Balance;Therapeutic Exercises;Therapeutic Activity;Adaptive Equipment  Treatment Frequency 3 Times per Week  Duration Until Therapy Goals Met     Assessment     Patient seen for PT treatment. Patient agreeable  to participate. Patient continues to have weakness in his LE, impaired balance & mobility as detailed below. Patient does appear to have greatly improved as compared to the previous session. He was able to ambulate in the room and little into the hallway with FWW & CGA. Patient will continue to benefit from PT services and recommend post acute placement to help return to prior level of function. Patient is very motivated.      Plan     Treatment Plan Status: Continue Current Treatment Plan  Type of Treatment: Bed Mobility, Gait Training, Neuro Re-Education / Balance, Stair Training, Therapeutic Activities, Therapeutic Exercise  Treatment Frequency: 4 Times per Week  Treatment Duration: Until Therapy Goals Met     DC Equipment Recommendations: Unable to determine at this time  Discharge Recommendations: Recommend post-acute placement for additional physical therapy services prior to discharge home     Objective       09/27/23 0918  Charge Group  Charges  Yes  PT Gait Training (Units) 1  PT Therapeutic Activities (Units) 1  Total Time Spent  PT Total Time Yes  PT Gait Training Time Spent (Mins) 13  PT Therapeutic Activities Time Spent (Mins) 13  PT Total Time Spent (Calculated) 26  Precautions  Precautions Fall Risk;Swallow Precautions;Other (See Comments)  Comments Seizure precautions  Vitals  Pulse 92  Patient BP Position Sitting  (Post activity)  Blood Pressure (!) 143/85  Pulse Oximetry 98 %  O2 Delivery Device None - Room Air  Pain  Pain Scales 0 to 10 Scale   Intervention Medication (see MAR);Rest;Repositioned  Pain 0 - 10 Group  Location Back;Hip  Location Orientation Posterior;Right;Left  Therapist Pain Assessment Prior to Activity;During Activity;Post Activity  (Patient reporting chronic pain, RN pre-medicated at beginning of the session)  Cognition   Level of Consciousness Alert  Comments Pleasant, co-operative  Passive ROM Lower Body  Passive ROM Lower Body WDL  Active ROM Lower Body   Active ROM Lower Body   X  Comments B/L ankle DF-absent; B/L hip flexion while seated-absent; mild deficit with L knee flexion/extension  Strength Lower Body  Comments Grossly L hip Fx 2-/5, L knee Fx/Ex 3/5, L ankle PF 4/5, R ankle DF 1/5; Grossly R hip Fx 2-/5, R knee Fx/Ex 4/5, R ankle PF 4/5, R ankle DF 1/5  Lower Body Muscle Tone  Lower Body Muscle Tone  WDL  Sitting Lower Body Exercises  Sitting Lower Body Exercises Yes  Ankle Pumps 1 set of 10;Bilateral  Hip Flexion 1 set of 10;Bilateral  Long Arc Quad 1 set of 10;Bilateral  Sit to Stand Other Resistance (See Comments)  (6 reps from the chair, use of UE on hand rails)  Comments Patient was assisted with Hip Flexion and ankle DF; Patient was educated to use a sheet to perform active assisted hip flexion and ankle DF in sitting; educated to perform the above ex's 10 reps each, 3-4x/day  Standing Lower Body Exercises  Standing Lower Body Exercises Yes  Other Treatments  Other Treatments Provided Patient was assisted to the chair & breakfast tray set up in the front  Balance  Sitting Balance (Static) Good  Sitting Balance (Dynamic) Fair +  Standing Balance (Static) Fair  Standing Balance (Dynamic) Fair -  Skilled Intervention Verbal Cuing  Comments With FWW in standing  Bed Mobility   Supine to Sit Contact Guard Assist  (HOB raised)  Scooting Standby Assist  Rolling Standby Assist  (Roll to L)  Skilled Intervention Verbal Cuing;Sequencing  Comments Patient demonstrated the use of his cane to help get his LE OOB  Gait Analysis  Gait Level Of Assist Contact Guard Assist  Assistive Device Front Wheel Walker  Distance (Feet) 30  # of Times Distance was Traveled 2  Deviation Step To;Decreased Base Of Support;Bradykinetic;Other (Comment)  (Patient performing high steppage gait to clear his B/L feet off the floor, asymmetrica step & stride length)  Skilled Intervention Postural Facilitation;Sequencing;Verbal Cuing  Comments Cues for appropriate use of AD, directions, sequencing  Functional  Mobility  Sit to Stand Contact Guard Assist  Bed, Chair, Wheelchair Transfer Contact Guard Assist  Transfer Method Stand Step  Mobility Bed-chair  Comments With FWW; cues for hand placement, LE placement, controlled descent to the chair  How much difficulty does the patient currently have...  Turning over in bed (including adjusting bedclothes, sheets and blankets)? 3  Sitting down on and standing up from a chair with arms (e.g., wheelchair, bedside commode, etc.) 2  Moving from lying on back to sitting on the side of the bed? 3  How much help from another person does the patient currently need...  Moving to and from a bed to a chair (including a wheelchair)? 3  Need to walk in a hospital room? 3  Climbing 3-5 steps with a railing? 2  6 clicks Mobility Score 16  Activity Tolerance  Sitting in Chair Post session  Patient / Family Goals   Patient / Family Goal #1 To be able to return back to prior level of function  Short Term Goals   Short Term Goal # 1 pt will move supine<>eob with spv in 6 tx for bed mobility.  Goal Outcome # 1 Progressing as expected  Short Term Goal # 2 pt will complete spt with fww and spv in 6 tx for functional mobility.  Goal Outcome # 2 Goal met, new goal added  Short Term Goal # 2 B  Patient will perform sit-stand and chair transfer with FWW with supervision in 6 visits  Goal Outcome # 2 B Progressing as expected  Short Term Goal # 3 pt will ambulate 150 ft with fww and spv in 6 tx for household distances.  Goal Outcome # 3 Progressing as expected  Short Term Goal # 4 pt will negotiate 1 stair with sba in 6 tx for home access.  Goal Outcome # 4 Goal not met  Physical Therapy Treatment Plan  Physical Therapy Treatment Plan Continue Current Treatment Plan  Treatment Plan  Bed Mobility;Gait Training;Neuro Re-Education / Balance;Stair Training;Therapeutic Activities;Therapeutic Exercise  Treatment Frequency 4 Times per Week  Duration Until Therapy Goals Met  Anticipated Discharge Equipment and  Recommendations  DC Equipment Recommendations Unable to determine at this time  Discharge Recommendations Recommend post-acute placement for additional physical therapy services prior to discharge home  Interdisciplinary Plan of Care Collaboration  IDT Collaboration with  Nursing;  Patient Position at End of Therapy Seated;Chair Alarm On;Call Light within Reach;Tray Table within Reach;Phone within Reach  Session Information  Date / Session Number  -2(, )    Reviewer: Enmanuel Ulrich L.P.N.  Date: 2023  Time: 9:36 AM  Pre-Admission Screening Form    Patient Information:   Name: Casper Rowley     MRN: [id number]       : 1965      Age: 58 y.o.   Gender: male      Race: White [7]       Marital Status: Single [1]  Family Contact: Basilia Meeks Alexandra Perkins, Har[place]        Relationship: Significant other [13]  Other [10]  Father [3]  Home Phone:                Cell Phone: [phone]  Advanced Directives: None  Code Status:  FULL  Current Attending Provider: Juan Moreno D.O.  Referring Physician: Dr. Moreno  Physiatrist Consult: Dr. Wills   Referral Date: 23  Primary Payor Source:  [facility]  Secondary Payor Source:      Medical Information:   Date of Admission to Acute Care [place]2023  Room Number: S194/02  Rehabilitation Diagnosis: 0002.1 - Brain Dysfunction: Non-Traumatic  Immunization History   Administered Date(s) Administered    INFLUENZA TIV (IM) 10/01/2010, 10/14/2011, 10/12/2012, 2014    MODERNA SARS-COV-2 VACCINE (12+) 2021, 2021, 2022    Pneumococcal polysaccharide vaccine (PPSV-23) 10/18/2013    Tdap Vaccine 10/23/2012     No Known Allergies  Past Medical History:   Diagnosis Date    Arthritis 2019    osteo-hollie knees    Bronchitis 2019    Cancer (HCC)     Basal cell - top of head    Cancer (HCC)     Prostate    Cold 2019    Cold two weeks ago, denies productive  cough, SOB    Diabetes     type 2    High cholesterol     HTN (hypertension), benign 8/14/2009    Hypertension     Infectious disease     Mumps age 6 yrs and Chickenpox age 41 yrs    Obstructive sleep apnea 02/2019    Uses cpap     Past Surgical History:   Procedure Laterality Date    CRANIOTOMY STEALTH Right 9/21/2023    Procedure: CRANIOTOMY, USING FRAMELESS STEREOTAXY - RIGHT RETROSIGMOID FOR RESECTION OF CEREBELLAR MASS, USE OF INTRAOPERATIVE NEURONAVIGATION, EXTERNAL VENTRICULAR DRAIN;  Surgeon: Archana Be M.D.;  Location: SURGERY [facility];  Service: Neurosurgery    NV SHLDR ARTHROSCOP EXTEN DEBRIDE 3+ Left 11/1/2021    Procedure: ARTHROSCOPY,SHOULDER,WITH EXTENSIVE DEBRIDEMENT;  Surgeon: Piter Otero M.D.;  Location: SURGERY [facility];  Service: Orthopedics    PB ARTHROSCOPY SHOULDER SURGICAL BICEPS TENODES* Left 11/1/2021    Procedure: ARTHROSCOPY, SHOULDER, WITH BICEPS TENOTOMY - WITH SUB SCAPULARIS REPAIR;  Surgeon: Piter Otero M.D.;  Location: SURGERY [facility];  Service: Orthopedics    CARPAL TUNNEL RELEASE Left 2/11/2019    Procedure: CARPAL TUNNEL RELEASE;  Surgeon: Piter Otero M.D.;  Location: SURGERY [facility];  Service: Orthopedics    KNEE ARTHROSCOPY Right 05/2014    ACL       History Leading to Admission, Conditions that Caused the Need for Rehab (CMS):     Dr. Alvarez H&P:  Reason for Consultation  Brain mass     History of Presenting Illness  58 y.o. male with hx of metastatic prostate CA , HTN, and DM who presented 9/19/2023 with 2 weeks of nausea, vomiting, and weakness. He was transferred to [facility] by EMS. In the ED a CT head revealed significant vasogenic edema throughout the right hemicerebellum causing effacement of the fourth ventricle and moderate obstructive hydrocephalus.  Dr. Cobb, neurosurgery was consulted.  She has recommended MRI brain for surgical planning.  Neuro critical care consultation was requested for further evaluation and  management    Assessment/Plan  * Brain mass- (present on admission)  Assessment & Plan  Brain mass, likely metastatic prostate CA  Osmotic therapy, target serum sodium 145-155  Dexamethasone 4 mg IV every 6 for vasogenic edema  MRI brain with/without/Stealth  Serial neurologic exams  May require emergent EVD for acute neurologic decline  Dr. Be consulting     NED (obstructive sleep apnea)- (present on admission)  Assessment & Plan  CPAP while sleeping     HTN (hypertension), benign- (present on admission)  Assessment & Plan  Allow permissive hypertension up to 180 mmHg for cerebral perfusion    Dr. Be (Surgery Neurosurgery) recommendations:  Assessment and Plan:     Casper Rowley is a 58 y.o. male presenting with known prostate cancer and multiple bony metastases, now found to have a new right cerebellar metastasis causing vasogenic edema, compression of brain, displacement of brain, moderate nonacute obstructive hydrocephalus.  This likely had some contribution to his symptoms of imbalance and nausea; however, after treating his urinary retention, his symptoms have significantly subsided.  I am also worried about his progressive lower extremity weakness and flaccid tone that has progressed over the last several months despite operative decompression and fusion in March.  I recommend the following:  - Plan for right retrosigmoid craniotomy for tumor resection tomorrow. I had a long conversation with him about his neuroimaging findings, his symptoms, his recommended plan of care.  I discussed the steps of the craniotomy, the nature and location of the incision, the 1-2 night ICU stay, and the 2 to 5-day hospital stay, and the high likelihood of need for rehabilitation postoperatively. The indications, benefits, alternatives and risks to the procedure were discussed with the patient, which included but were not limited to bleeding, infection, stroke, injury to nearby nerves and vessels, cerebrospinal  fluid leak, new temporary or permanent motor weakness or vision changes, facial weakness, hearing loss, difficulty swallowing, hoarseness, worsened imbalance or discoordination, cerebellar mutism, coma and rarely, even death.  The patient was in agreement and wished to proceed.  - Recommend cervical, thoracic, lumbar spine with and without contrast to evaluate burden of metastatic disease  - Recommend CT chest with and without contrast to evaluate for pulmonary metastatic disease  - Decadron 4 mg every 6 hours  - No need for Keppra at this time  - N.p.o. at midnight  - I consulted medical oncology and radiation oncology    Dr. Falcon III (Hematology & Oncology) recommendations:  Assessment and Plan:  1.  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     2.  Right cerebellar mass     Plan  -New right cerebellar mass very concerning for disease transformation to small cell carcinoma, he is scheduled for surgery tomorrow, will await for biopsy results, if positive for small cell he will benefit from chemotherapy with platinum based regimen.  -Continue on dexamethasone 4 mg IV every 6 hours  -Dr. Be consulting  -Osmotic therapy per ICU team  -We will continue to follow    Dr. Baker (Radiation & Oncology) recommendations:  IMPRESSION:    A 58 y.o. with   metastatic prostate cancer now with a presumed metastatic lesion in the cerebellum.  Surgery scheduled for tomorrow.        RECOMMENDATIONS:     I had a long discussion with the patient, his significant other, and another friend.  I explained to him that this is most likely metastatic prostate cancer due to the aggressive nature of his type of cancer.  It is possible it could be some other type of cancer.  We will not know this until it is resected and the pathology is finalized.  I explained to him that most likely it is metastatic and after surgery we deliver 3-5 fractions of  radiation therapy to the tumor bed to decrease the chance of local regional recurrence.  I've described the details of radiation along with the side effects both acute and chronic, including but not exclusive to fatigue, skin reaction, local soreness,  possible headache, hair loss within the treatment field and damage to the brain within the region of radiation therapy. Ample time was allowed for questions, and patient understands.       He is tentatively scheduled for simulation in 3 weeks    Dr. Wills (Physiatry) recommendations:  ASSESSMENT:  Patient is a 58 y.o. male admitted with right cerebellar mass now s/p resection     [facility] Code / Diagnosis to Support: 0002.1 - Brain Dysfunction: Non-Traumatic     Rehabilitation: Impaired ADLs and mobility  Patient is a good candidate for inpatient rehab based on needs for PT, OT.  Patient will also benefit from family training.  Patient has a good discharge situation which will be home with spouse.      Barriers to transfer include: Insurance authorization, TCCs to verify disposition, medical clearance and bed availability      All cases are subject to administrative review and recommendations may change     Disposition recommendations:  -Good candidate for IPR.  Patient has deficits with mobility and ADLs secondary to his brain tumor, status post removal.  Patient has good family support from spouse and a stable discharge location which is his single-story home with no stairs.  -Continue PT OT while in-house  -TCC to submit to [facility] insurance for authorization  -PMR to follow in the periphery for rehab appropriateness, please reach out with questions or request for medical management     Medical Complexity:     Right cerebellar mass  -Status post resection by Dr. Archana Be MD on 9/21/2023  -Surprisingly, patient has no coordination deficits.  Patient does have bilateral lower extremity weakness which does appear neurologic in origin  -Repeat PT and OT to assess for  progress with therapies  -Patient need to follow-up with neurosurgery as an outpatient  -Pathology returned metastatic prostatic adenocarcinoma  -Patient is being followed by radiation oncologist Dr. Baker, radiation simulation scheduled 2 and half weeks  -Patient is being followed by Dr. Falcon with heme-onc, awaiting update on plan  -Decadron 4 mg p.o. twice daily     Prostate cancer  -Patient follows with Dr. MOIRA Darnell  -His last Xgeva injection was on 8/30/2023, has had progression on Xtandi, Xofigo and docetaxel     Diabetes mellitus  -A1c 6.5%  - On dulaglutide and canagliflozin as an outpatient  -Currently on glargine 25 units nightly, sliding scale insulin 3 to 14 units     Acute anemia  -Hemoglobin 10.3  -Monitoring with serial labs     Hypertension  -Lisinopril 20 mg daily     DVT PPX: Lovenox    Date of Surgery: 9/21/2023      SURGEON: Archana Be M.D.     ASSISTANT: SATNAM Lee     PRE-OPERATIVE DIAGNOSIS:   1.  Right cerebellar lesion  2. Vasogenic edema, compression of brain  3. Obstructive hydrocephalus  4. History of metastatic prostate cancer           POST-OPERATIVE DIAGNOSIS:   5.  Right cerebellar lesion  6. Vasogenic edema, compression of brain  7. Obstructive hydrocephalus  8. History of metastatic prostate cancer           PROCEDURE:   1. right retrosigmoid craniotomy for resection of cerebellar lesion.  2. Right parietal bur hole craniotomy for placement of external ventricular drain  3. Use of intraoperative intradural stereotactic neuronavigation  4. Use of intraoperative microscope  5.          ANESTHESIA: GETA           ESTIMATED BLOOD LOSS: 200 cc           DRAINS: none     FINDINGS: Right cerebellar mass           SPECIMENS: none          IMPLANTS: Aubrey titanium mini plates and screws           COMPLICATIONS: None apparent.     Operative Indications: The patient is a 58-year-old man with a known history of metastatic prostate cancer who presented to the hospital with  intractable nausea and vomiting.  He was found to have severe urinary retention and also a new right cerebellar mass concerning for metastatic lesion.  It was associated with vasogenic edema, compression of brain, obstructive hydrocephalus.  It was a solitary lesion.  Due to the symptomatic nature of this lesion, the size of greater than 3 cm, and progressive disease, operative resection was indicated.  The indications, benefits, alternatives and risks to the procedure were discussed with the patient, which included but were not limited to bleeding, infection, stroke, injury to nearby nerves and vessels, cerebrospinal fluid leak, new weakness or sensory changes, incomplete resection pain recurrence, new or worse imbalance, dizziness, difficulty swallowing, hoarseness, coma and rarely, even death.  The patient was in agreement and wished to proceed.     Brain MRI 09-19-23:  IMPRESSION:     1.  An enhancing right cerebellar mass likely represents metastasis.  2.  There is mass effect as evidenced by partial effacement of the fourth ventricle, low-lying cerebellar tonsils and moderate hydrocephalus.  3.  Multifocal osseous metastasis.  4.  Mild chronic microvascular ischemic disease.    Co-morbidities:  See PMH  Potential Risk - Complications: Aphasia, Cognitive Impairment, Contractures, Deep Vein Thrombosis, Dysphagia, Incontinence, Malnutrition, Pain, Perceptual Impairment, Pneumonia, Pressure Ulcer, Seizures, and Urinary Tract Infection  Level of Risk: High    Ongoing Medical Management Needed (Medical/Nursing Needs):   Patient Active Problem List    Diagnosis Date Noted    Cerebellar mass 09/24/2023    Metastasis to brain (HCC) 09/20/2023    Prostate cancer (HCC) 09/20/2023    HTN (hypertension) 01/14/2015    Obesity 01/14/2015    BMI 35.0-35.9,adult 10/28/2014    NED (obstructive sleep apnea) 10/23/2012    Dyslipidemia 01/23/2012    Allergic rhinitis 09/29/2009    HTN (hypertension), benign 08/14/2009    DM  "(diabetes mellitus) ([facility]) 2009     A & O    Current Vital Signs:   Temperature: 36.2 °C (97.2 °F) Pulse: 85 Respiration: 18 Blood Pressure: 136/84  Weight: 75.3 kg (166 lb 0.1 oz) Height: 170.2 cm (5' 7\")  Pulse Oximetry: 98 % O2 (LPM): 0      Completed Laboratory Reports:  Recent Labs     23  0320   WBC 5.5   HEMOGLOBIN 10.3*   HEMATOCRIT 30.9*   PLATELETCT 142*   SODIUM 134*   POTASSIUM 4.0   BUN 11   CREATININE 0.29*   GLUCOSE 279*     Additional Labs: Not Applicable    Prior Living Situation:   Housing / Facility: 1 [facility] with - Patient's Self Care Capacity: Significant Other  Equipment Owned: Front-Wheel Walker, 4-Wheel Walker, Tub Transfer Bench    Prior Level of Function / Living Situation:   Physical Therapy: Prior Services: None  Housing / Facility: 1 [facility]  Bathroom Set up: Bathtub / Shower Combination, Tub Transfer Bench  Equipment Owned: Front-Wheel Walker, 4-Wheel Walker, Tub Transfer Bench  Lives with - Patient's Self Care Capacity: Significant Other  Bed Mobility: Independent  Transfer Status: Independent  Ambulation: Independent  Assistive Devices Used: None  Stairs: Independent  Current Level of Function:   Gait Level Of Assist: Unable to Participate  Level of Assist with Stairs: Unable to Participate  Supine to Sit: Minimal Assist  Sit to Supine:  (NT in chair post)  Scooting: Minimal Assist  Sit to Stand: Moderate Assist  Bed, Chair, Wheelchair Transfer: Moderate Assist  Transfer Method: Stand Pivot  Sitting in Chair: 10+ min (up post)  Sitting Edge of Bed: 10 min  Standin min total  Occupational Therapy:   Self Feeding: Independent  Grooming / Hygiene: Independent  Bathing: Independent  Dressing: Independent  Toileting: Independent  Medication Management: Independent  Finances: Independent  Home Management: Independent  Shopping: Independent  Prior Level Of Mobility: Independent With Device in Home, Independent With Device in Community  Prior Services: " None  Housing / Facility: 1 [facility]  Current Level of Function:   Upper Body Dressing: Minimal Assist  Lower Body Dressing: Maximal Assist  Toileting:  (betts in place)  Speech Language Pathology:      Rehabilitation Prognosis/Potential: Good  Estimated Length of Stay: 10-12 days    Nursing:      Incontinent    Scope/Intensity of Services Recommended:  Physical Therapy: 1.5 hr / day  5 days / week. Therapeutic Interventions Required: Maximize Endurance, Mobility, Strength, and Safety  Occupational Therapy: 1.5 hr / day 5 days / week. Therapeutic Interventions Required: Maximize Self Care, ADLs, IADLs, and Energy Conservation  Rehabilitation Nursin/7. Therapeutic Interventions Required: Monitor Pain, Skin, Wound(s), Vital Signs, Intake and Output, Labs, Safety, and Family Training  Rehabilitation Physician: 3 - 5 days / week. Therapeutic Interventions Required: Medical Management    He requires 24-hour rehabilitation nursing to manage bowel and bladder function, skin care, surgical incision, nutrition and fluid intake, pain control, safety, medication management, and patient/family goals. In addition, rehabilitation nursing will reiterate and reinforce therapy skills and equipment use, including ADLs, as well as provide education to the patient and family. Casper Rowley is willing to participate in and is able to tolerate the proposed plan of care.    Rehabilitation Goals and Plan (Expected frequency & duration of treatment in the IRF):   Return to the Community, Modified Independent Level of Care, and Family Able to Provide 24/7 Assistance  Anticipated Date of Rehabilitation Admission: 23  Patient/Family oriented IRF level of care/facility/plan: Yes  Patient/Family willing to participate in IRF care/facility/plan: Yes  Patient able to tolerate IRF level of care proposed: Yes  Patient has potential to benefit IRF level of care proposed: Yes  Comments: Not Applicable    Special Needs or  Precautions - Medical Necessity:  Safety Concerns/Precautions:  Fall Risk / High Risk for Falls, Balance, and Bed / Chair Alarm  Complex Wound Care: Surgical  Precautions: Fall Risk  Pain Management  IV Site: Peripheral  Current Medications:    Current Facility-Administered Medications Ordered in Epic   Medication Dose Route Frequency Provider Last Rate Last Admin    insulin GLARGINE (Lantus,Semglee) injection  25 Units Subcutaneous Q EVENING MARCY Low.OTeddy   25 Units at 09/26/23 1709    dexamethasone (Decadron) tablet 4 mg  4 mg Oral Q12HRS MARCY Low.O.   4 mg at 09/27/23 0625    lisinopril (Prinivil) tablet 20 mg  20 mg Oral DAILY LIBAN VasquesOTeddy   20 mg at 09/26/23 1717    enoxaparin (Lovenox) inj 40 mg  40 mg Subcutaneous DAILY AT 1800 LIBAN VasquesOTeddy   40 mg at 09/26/23 1717    labetalol (Normodyne/Trandate) injection 10 mg  10 mg Intravenous Q4HRS PRN Chuck Matos D.O.        acetaminophen (Tylenol) tablet 650 mg  650 mg Oral Q4HRS PRN LIBAN SánchezOTeddy   650 mg at 09/26/23 1315    insulin regular (HumuLIN R,NovoLIN R) injection  3-14 Units Subcutaneous Q6HRS MARCY Sánchez.O.   4 Units at 09/27/23 0642    And    dextrose 50% (D50W) injection 25 g  25 g Intravenous Q15 MIN PRN Chuck Matos D.O.        senna-docusate (Pericolace Or Senokot S) 8.6-50 MG per tablet 2 Tablet  2 Tablet Oral BID LIBAN SánchezO.   2 Tablet at 09/25/23 1727    And    polyethylene glycol/lytes (Miralax) PACKET 1 Packet  1 Packet Oral QDAY PRN Chuck Matos D.O.        And    magnesium hydroxide (Milk Of Magnesia) suspension 30 mL  30 mL Oral QDAY PRN Chuck Matos D.O.        And    bisacodyl (Dulcolax) suppository 10 mg  10 mg Rectal QDAY PRN LIBAN SánchezOTeddy        ondansetron (Zofran) syringe/vial injection 4 mg  4 mg Intravenous Q4HRS PRN LIBAN SánchezO.        ondansetron (Zofran ODT) dispertab 4 mg  4 mg Oral Q4HRS PRN LIBAN SánchezO.        promethazine (Phenergan) tablet 12.5-25 mg   12.5-25 mg Oral Q4HRS PRN MARCY Sánchez.O.        promethazine (Phenergan) suppository 12.5-25 mg  12.5-25 mg Rectal Q4HRS PRN MARCY Sánchez.O.        prochlorperazine (Compazine) injection 5-10 mg  5-10 mg Intravenous Q4HRS PRN Chuck Matos D.O.         No current [facility]-ordered outpatient medications on file.     Diet:   DIET ORDERS (From admission to next 24h)       Start     Ordered    09/22/23 0743  Diet Order Diet: Consistent CHO (Diabetic)  ALL MEALS        Question:  Diet:  Answer:  Consistent CHO (Diabetic)    09/22/23 0743                    Anticipated Discharge Destination / Patient/Family Goal:  Destination: Home with Assistance Support System:  S.O.  Anticipated home health services: OT and PT  Previously used  service/ provider: Not Applicable  Anticipated DME Needs: Wheelchair and [employer]  Outpatient Services: OT and PT  Alternative resources to address additional identified needs:   Future Appointments   Date Time Provider Department Center   10/11/2023  9:00 AM JAZLYN Culp None      Pre-Screen Completed: 9/27/2023 8:02 AM Enmanuel Ulrich L.P.N.

## 2023-09-27 NOTE — PROGRESS NOTES
Patient verbalizes complaints of difficulty sleeping. Provider on call notified and new medication orders have been received. Will continue to monitor.

## 2023-09-27 NOTE — CARE PLAN
The patient is Stable - Low risk of patient condition declining or worsening    Shift Goals  Clinical Goals: stable neuro checks, transfer to next level of care when bed ready  Patient Goals: get some sleep  Family Goals: updates PRN    Progress made toward(s) clinical / shift goals:  Patient remains alert and oriented X 4. Neuro checks continued with no new complications observed. Patient assisted with ambulation for ADLs. No signs of distress.       Patient is not progressing towards the following goals:

## 2023-09-27 NOTE — DISCHARGE PLANNING
Would appreciate updated [place] evals once appropriate.  Submitted to insurance.  Msg placed to Dr. Baker to to see if the simulation can be scheduled after an approx 10-14 day stay @ Rehab.    0840-Simulation is scheduled for 10-11.  Will review this case with Administration this am.

## 2023-09-27 NOTE — THERAPY
Physical Therapy   Daily Treatment     Patient Name: Casper Rowley  Age:  58 y.o., Sex:  male  Medical Record #: [id number]  Today's Date: 9/27/2023     Precautions  Precautions: Fall Risk;Swallow Precautions;Other (See Comments)  Comments: Seizure precautions    Assessment    Patient seen for PT treatment. Patient agreeable to participate. Patient continues to have weakness in his LE, impaired balance & mobility as detailed below. Patient does appear to have greatly improved as compared to the previous session. He was able to ambulate in the room and little into the hallway with FWW & CGA. Patient will continue to benefit from PT services and recommend post acute placement to help return to prior level of function. Patient is very motivated.     Plan    Treatment Plan Status: Continue Current Treatment Plan  Type of Treatment: Bed Mobility, Gait Training, Neuro Re-Education / Balance, Stair Training, Therapeutic Activities, Therapeutic Exercise  Treatment Frequency: 4 Times per Week  Treatment Duration: Until Therapy Goals Met    DC Equipment Recommendations: Unable to determine at this time  Discharge Recommendations: Recommend post-acute placement for additional physical therapy services prior to discharge home     Objective       09/27/23 0918   Charge Group   Charges  Yes   PT Gait Training (Units) 1   PT Therapeutic Activities (Units) 1   Total Time Spent   PT Total Time Yes   PT Gait Training Time Spent (Mins) 13   PT Therapeutic Activities Time Spent (Mins) 13   PT Total Time Spent (Calculated) 26   Precautions   Precautions Fall Risk;Swallow Precautions;Other (See Comments)   Comments Seizure precautions   Vitals   Pulse 92   Patient BP Position Sitting  (Post activity)   Blood Pressure (!) 143/85   Pulse Oximetry 98 %   O2 Delivery Device None - Room Air   Pain   Pain Scales 0 to 10 Scale    Intervention Medication (see MAR);Rest;Repositioned   Pain 0 - 10 Group   Location Back;Hip   Location  Orientation Posterior;Right;Left   Therapist Pain Assessment Prior to Activity;During Activity;Post Activity  (Patient reporting chronic pain, RN pre-medicated at beginning of the session)   Cognition    Level of Consciousness Alert   Comments Pleasant, co-operative   Passive ROM Lower Body   Passive ROM Lower Body WDL   Active ROM Lower Body    Active ROM Lower Body  X   Comments B/L ankle DF-absent; B/L hip flexion while seated-absent; mild deficit with L knee flexion/extension   Strength Lower Body   Comments Grossly L hip Fx 2-/5, L knee Fx/Ex 3/5, L ankle PF 4/5, R ankle DF 1/5; Grossly R hip Fx 2-/5, R knee Fx/Ex 4/5, R ankle PF 4/5, R ankle DF 1/5   Lower Body Muscle Tone   Lower Body Muscle Tone  WDL   Sitting Lower Body Exercises   Sitting Lower Body Exercises Yes   Ankle Pumps 1 set of 10;Bilateral   Hip Flexion 1 set of 10;Bilateral   Long Arc Quad 1 set of 10;Bilateral   Sit to Stand Other Resistance (See Comments)  (6 reps from the chair, use of UE on hand rails)   Comments Patient was assisted with Hip Flexion and ankle DF; Patient was educated to use a sheet to perform active assisted hip flexion and ankle DF in sitting; educated to perform the above ex's 10 reps each, 3-4x/day   Standing Lower Body Exercises   Standing Lower Body Exercises Yes   Other Treatments   Other Treatments Provided Patient was assisted to the chair & breakfast tray set up in the front   Balance   Sitting Balance (Static) Good   Sitting Balance (Dynamic) Fair +   Standing Balance (Static) Fair   Standing Balance (Dynamic) Fair -   Skilled Intervention Verbal Cuing   Comments With FWW in standing   Bed Mobility    Supine to Sit Contact Guard Assist  (HOB raised)   Scooting Standby Assist   Rolling Standby Assist  (Roll to L)   Skilled Intervention Verbal Cuing;Sequencing   Comments Patient demonstrated the use of his cane to help get his LE OOB   Gait Analysis   Gait Level Of Assist Contact Guard Assist   Assistive Device Front  Wheel Walker   Distance (Feet) 30   # of Times Distance was Traveled 2   Deviation Step To;Decreased Base Of Support;Bradykinetic;Other (Comment)  (Patient performing high steppage gait to clear his B/L feet off the floor, asymmetrica step & stride length)   Skilled Intervention Postural Facilitation;Sequencing;Verbal Cuing   Comments Cues for appropriate use of AD, directions, sequencing   Functional Mobility   Sit to Stand Contact Guard Assist   Bed, Chair, Wheelchair Transfer Contact Guard Assist   Transfer Method Stand Step   Mobility Bed-chair   Comments With FWW; cues for hand placement, LE placement, controlled descent to the chair   How much difficulty does the patient currently have...   Turning over in bed (including adjusting bedclothes, sheets and blankets)? 3   Sitting down on and standing up from a chair with arms (e.g., wheelchair, bedside commode, etc.) 2   Moving from lying on back to sitting on the side of the bed? 3   How much help from another person does the patient currently need...   Moving to and from a bed to a chair (including a wheelchair)? 3   Need to walk in a hospital room? 3   Climbing 3-5 steps with a railing? 2   6 clicks Mobility Score 16   Activity Tolerance   Sitting in Chair Post session   Patient / Family Goals    Patient / Family Goal #1 To be able to return back to prior level of function   Short Term Goals    Short Term Goal # 1 pt will move supine<>eob with spv in 6 tx for bed mobility.   Goal Outcome # 1 Progressing as expected   Short Term Goal # 2 pt will complete spt with fww and spv in 6 tx for functional mobility.   Goal Outcome # 2 Goal met, new goal added   Short Term Goal # 2 B  Patient will perform sit-stand and chair transfer with FWW with supervision in 6 visits   Goal Outcome # 2 B Progressing as expected   Short Term Goal # 3 pt will ambulate 150 ft with fww and spv in 6 tx for household distances.   Goal Outcome # 3 Progressing as expected   Short Term Goal #  4 pt will negotiate 1 stair with sba in 6 tx for home access.   Goal Outcome # 4 Goal not met   Physical Therapy Treatment Plan   Physical Therapy Treatment Plan Continue Current Treatment Plan   Treatment Plan  Bed Mobility;Gait Training;Neuro Re-Education / Balance;Stair Training;Therapeutic Activities;Therapeutic Exercise   Treatment Frequency 4 Times per Week   Duration Until Therapy Goals Met   Anticipated Discharge Equipment and Recommendations   DC Equipment Recommendations Unable to determine at this time   Discharge Recommendations Recommend post-acute placement for additional physical therapy services prior to discharge home   Interdisciplinary Plan of Care Collaboration   IDT Collaboration with  Nursing;   Patient Position at End of Therapy Seated;Chair Alarm On;Call Light within Reach;Tray Table within Reach;Phone within Reach   Session Information   Date / Session Number  9/27-2(2/4, 9/28)

## 2023-09-27 NOTE — THERAPY
"Occupational Therapy  Daily Treatment     Patient Name: Casper Rowley  Age:  58 y.o., Sex:  male  Medical Record #: [id number]  Today's Date: 9/27/2023     Precautions  Precautions: Fall Risk, Swallow Precautions  Comments: Seizure precautions    Assessment    Pt seen for OT treatment. Pt stood to wash hands/brush teeth with CGA, performed toilet hygiene w/ min A, and performed toilet transfer w/ CGA-min A. Pt initially had raiser at full height over toilet. Therapist lowered seat ~1 inch and pt required min A for stand. Pt current functional performance limited by impaired activity tolerance, impaired balance, and weakness. Pt will continue to benefit from skilled OT while admitted to acute care.     Plan    Treatment Plan Status: Continue Current Treatment Plan  Type of Treatment: Self Care / Activities of Daily Living, Neuro Re-Education / Balance, Therapeutic Exercises, Therapeutic Activity, Adaptive Equipment  Treatment Frequency: 3 Times per Week  Treatment Duration: Until Therapy Goals Met    DC Equipment Recommendations: Unable to determine at this time  Discharge Recommendations: Recommend post-acute placement for additional occupational therapy services prior to discharge home    Subjective    \"I am worried about low toilets.\"     Objective     09/27/23 1033   Pain   Pain Scales 0 to 10 Scale    Pain 0 - 10 Group   Therapist Pain Assessment Post Activity Pain Same as Prior to Activity;Nurse Notified  (not rated, agreeable to eval)   Non Verbal Descriptors   Non Verbal Scale  Calm   Cognition    Cognition / Consciousness WDL   Level of Consciousness Alert   Comments Pleasant, cooperative, receptive to education, highly verbose   Balance   Sitting Balance (Static) Good   Sitting Balance (Dynamic) Fair +   Standing Balance (Static) Fair   Standing Balance (Dynamic) Fair -   Weight Shift Sitting Fair   Weight Shift Standing Poor   Skilled Intervention Verbal Cuing   Comments w/ FWW   Bed Mobility  "   Comments up to chair pre/post   Activities of Daily Living   Grooming Contact Guard Assist;Standing  (washed hands and brushed teeth standing at sink)   Toileting Minimal Assist   Skilled Intervention Verbal Cuing;Facilitation   Functional Mobility   Sit to Stand Contact Guard Assist   Bed, Chair, Wheelchair Transfer Contact Guard Assist   Toilet Transfers Minimal Assist  (initially had raiser at full height over toilet and pt completed it w/ CGA, reported his toilet at home is lower, so raiser lowered ~1 inch and pt required min A)   Transfer Method Stand Step   Mobility chair>bathroom>sink>chair   Comments w/ FWW   Activity Tolerance   Sitting in Chair up pre/post   Sitting Edge of Bed NT   Standing ~10 min   Comments limited by weakness   Patient / Family Goals   Patient / Family Goal #1 To go home   Goal #1 Outcome Progressing as expected   Short Term Goals   Short Term Goal # 1 Pt will demo standing grooming w/ min A   Goal Outcome # 1 Goal met, new goal added   Short Term Goal # 1 B  Pt will complete standing g/h w/ supv   Short Term Goal # 2 Pt will demo ADL txf w/ min A   Goal Outcome # 2 Progressing as expected   Short Term Goal # 3 Pt will demo LB dressing w/ min A   Goal Outcome # 3 Goal not met   Education Group   Education Provided Role of Occupational Therapist;Activities of Daily Living   Role of Occupational Therapist Patient Response Patient;Acceptance;Explanation;Verbal Demonstration   ADL Patient Response Patient;Acceptance;Explanation;Demonstration;Verbal Demonstration;Action Demonstration   Occupational Therapy Treatment Plan    O.T. Treatment Plan Continue Current Treatment Plan   Treatment Interventions Self Care / Activities of Daily Living;Neuro Re-Education / Balance;Therapeutic Exercises;Therapeutic Activity;Adaptive Equipment   Treatment Frequency 3 Times per Week   Duration Until Therapy Goals Met

## 2023-09-28 LAB
GLUCOSE BLD STRIP.AUTO-MCNC: 261 MG/DL (ref 65–99)
GLUCOSE BLD STRIP.AUTO-MCNC: 279 MG/DL (ref 65–99)
GLUCOSE BLD STRIP.AUTO-MCNC: 322 MG/DL (ref 65–99)
GLUCOSE BLD STRIP.AUTO-MCNC: 354 MG/DL (ref 65–99)

## 2023-09-28 PROCEDURE — A9270 NON-COVERED ITEM OR SERVICE: HCPCS | Performed by: STUDENT IN AN ORGANIZED HEALTH CARE EDUCATION/TRAINING PROGRAM

## 2023-09-28 PROCEDURE — 700102 HCHG RX REV CODE 250 W/ 637 OVERRIDE(OP): Performed by: INTERNAL MEDICINE

## 2023-09-28 PROCEDURE — 99231 SBSQ HOSP IP/OBS SF/LOW 25: CPT | Performed by: STUDENT IN AN ORGANIZED HEALTH CARE EDUCATION/TRAINING PROGRAM

## 2023-09-28 PROCEDURE — A9270 NON-COVERED ITEM OR SERVICE: HCPCS | Performed by: INTERNAL MEDICINE

## 2023-09-28 PROCEDURE — 700102 HCHG RX REV CODE 250 W/ 637 OVERRIDE(OP): Performed by: STUDENT IN AN ORGANIZED HEALTH CARE EDUCATION/TRAINING PROGRAM

## 2023-09-28 PROCEDURE — 700111 HCHG RX REV CODE 636 W/ 250 OVERRIDE (IP): Mod: JZ | Performed by: STUDENT IN AN ORGANIZED HEALTH CARE EDUCATION/TRAINING PROGRAM

## 2023-09-28 PROCEDURE — 770001 HCHG ROOM/CARE - MED/SURG/GYN PRIV*

## 2023-09-28 PROCEDURE — 82962 GLUCOSE BLOOD TEST: CPT

## 2023-09-28 RX ORDER — DEXTROSE MONOHYDRATE 25 G/50ML
25 INJECTION, SOLUTION INTRAVENOUS
Status: DISCONTINUED | OUTPATIENT
Start: 2023-09-28 | End: 2023-09-29 | Stop reason: HOSPADM

## 2023-09-28 RX ORDER — METOPROLOL SUCCINATE 25 MG/1
25 TABLET, EXTENDED RELEASE ORAL DAILY
Status: DISCONTINUED | OUTPATIENT
Start: 2023-09-28 | End: 2023-09-29 | Stop reason: HOSPADM

## 2023-09-28 RX ADMIN — LISINOPRIL 20 MG: 20 TABLET ORAL at 17:21

## 2023-09-28 RX ADMIN — INSULIN HUMAN 7 UNITS: 100 INJECTION, SOLUTION PARENTERAL at 11:41

## 2023-09-28 RX ADMIN — DEXAMETHASONE 2 MG: 4 TABLET ORAL at 05:52

## 2023-09-28 RX ADMIN — INSULIN HUMAN 7 UNITS: 100 INJECTION, SOLUTION PARENTERAL at 17:17

## 2023-09-28 RX ADMIN — METOPROLOL SUCCINATE 25 MG: 25 TABLET, EXTENDED RELEASE ORAL at 17:20

## 2023-09-28 RX ADMIN — DEXAMETHASONE 2 MG: 4 TABLET ORAL at 17:21

## 2023-09-28 RX ADMIN — SENNOSIDES AND DOCUSATE SODIUM 2 TABLET: 50; 8.6 TABLET ORAL at 17:20

## 2023-09-28 RX ADMIN — ACETAMINOPHEN 650 MG: 325 TABLET, FILM COATED ORAL at 08:46

## 2023-09-28 RX ADMIN — ENOXAPARIN SODIUM 40 MG: 100 INJECTION SUBCUTANEOUS at 17:21

## 2023-09-28 RX ADMIN — INSULIN HUMAN 10 UNITS: 100 INJECTION, SOLUTION PARENTERAL at 06:00

## 2023-09-28 ASSESSMENT — ENCOUNTER SYMPTOMS
COUGH: 0
DIARRHEA: 0
SHORTNESS OF BREATH: 0
BACK PAIN: 0
HEADACHES: 0
WEAKNESS: 1
LOSS OF CONSCIOUSNESS: 0
ABDOMINAL PAIN: 0
DOUBLE VISION: 0
PALPITATIONS: 0
VOMITING: 0
NAUSEA: 0
DIZZINESS: 0
BLURRED VISION: 0
CHILLS: 0
SORE THROAT: 0
FEVER: 0

## 2023-09-28 NOTE — DISCHARGE PLANNING
Insurance remains pending.  Msg placed to Dr. Moreno-verifying medical clearance.     0800-Dr. Moreno has medically cleared.

## 2023-09-28 NOTE — PROGRESS NOTES
Hospital Medicine Daily Progress Note    Date of Service  9/27/2023    Chief Complaint  Casper Rowley is a 58 y.o. male admitted 9/19/2023 with dizziness    Hospital Course  57yo admitted 9/19 with Hx of metastatic prostate CA, HTN, DM presenting with N/V.  Head CT showing R cerebellar mass and edema with efacement of 4th ventricle.  Went to the OR on 9/21 with Dr Be for craniotomy and resection with placement of EVD which was then removed on 9/24    Interval Problem Update  Leg weakness stable denies other complaints, plan to DC to IPR when approved       I have discussed this patient's plan of care and discharge plan at IDT rounds today with Case Management, Nursing, Nursing leadership, and other members of the IDT team.    Consultants/Specialty  neurosurgery    Code Status  Full Code    Disposition  The patient is medically cleared for discharge to home or a post-acute facility.  Anticipate discharge to: an inpatient rehabilitation hospital    I have placed the appropriate orders for post-discharge needs.    Review of Systems  Review of Systems   Constitutional:  Negative for chills and fever.   HENT:  Negative for nosebleeds and sore throat.    Eyes:  Negative for blurred vision and double vision.   Respiratory:  Negative for cough and shortness of breath.    Cardiovascular:  Negative for chest pain, palpitations and leg swelling.   Gastrointestinal:  Negative for abdominal pain, diarrhea, nausea and vomiting.   Genitourinary:  Negative for dysuria and urgency.   Musculoskeletal:  Negative for back pain.   Skin:  Negative for rash.   Neurological:  Positive for dizziness and weakness. Negative for loss of consciousness and headaches.        Physical Exam  Temp:  [36.2 °C (97.2 °F)-36.6 °C (97.9 °F)] 36.6 °C (97.9 °F)  Pulse:  [81-92] 81  Resp:  [17-18] 17  BP: (102-143)/(80-85) 102/80  SpO2:  [96 %-98 %] 98 %    Physical Exam  Constitutional:       Appearance: Normal appearance.   HENT:      Head:  Normocephalic and atraumatic.      Nose: Nose normal.      Mouth/Throat:      Mouth: Mucous membranes are moist.      Pharynx: Oropharynx is clear.   Eyes:      Conjunctiva/sclera: Conjunctivae normal.      Pupils: Pupils are equal, round, and reactive to light.   Cardiovascular:      Rate and Rhythm: Normal rate and regular rhythm.      Heart sounds: No murmur heard.  Pulmonary:      Effort: Pulmonary effort is normal.      Breath sounds: No wheezing, rhonchi or rales.   Abdominal:      General: There is no distension.      Palpations: Abdomen is soft.      Tenderness: There is no abdominal tenderness. There is no guarding or rebound.   Musculoskeletal:         General: No swelling or tenderness.      Cervical back: Normal range of motion and neck supple.   Skin:     General: Skin is warm and dry.   Neurological:      Mental Status: He is alert and oriented to person, place, and time.      GCS: GCS eye subscore is 4. GCS verbal subscore is 5. GCS motor subscore is 6.      Cranial Nerves: No facial asymmetry.      Motor: Weakness present.      Comments: Legs weak b/l   Psychiatric:         Mood and Affect: Mood normal.         Behavior: Behavior normal.         Fluids    Intake/Output Summary (Last 24 hours) at 9/27/2023 1740  Last data filed at 9/27/2023 1034  Gross per 24 hour   Intake 360 ml   Output --   Net 360 ml         Laboratory  Recent Labs     09/25/23  0320   WBC 5.5   RBC 3.37*   HEMOGLOBIN 10.3*   HEMATOCRIT 30.9*   MCV 91.7   MCH 30.6   MCHC 33.3   RDW 56.0*   PLATELETCT 142*   MPV 10.0       Recent Labs     09/25/23  0320   SODIUM 134*   POTASSIUM 4.0   CHLORIDE 100   CO2 22   GLUCOSE 279*   BUN 11   CREATININE 0.29*   CALCIUM 8.3*                       Imaging  MR-BRAIN-WITH & W/O   Final Result         Postoperative changes are noted in the right cerebellum with resection of the previously seen right cerebellar metastasis.      Small fluid collection in the occipital soft tissues overlying the  craniotomy site, probably a seroma.      Vasogenic edema in the right cerebellum with mass effect upon the fourth ventricle and mild lateral ventricular hydrocephalus remains stable. EVD catheter is stable in position.      No definite abnormal enhancement is identified at the surgical site, however evaluation is limited by T1 shortening from the presence of subacute blood products. Recommend follow-up examination.      CT-HEAD W/O   Final Result      1.  Interval resection of RIGHT cerebellar mass with pneumocephalus and some blood in the surgical resection bed but overall decreased mass effect. Residual tumor not excluded by this exam.   2.  Interval placement of RIGHT transparietal ventriculostomy catheter with slightly decreased caliber of lateral and third ventricles   3.  LEFT temporal metastasis without significant mass effect   4.  Atrophy   5.  White matter changes            MR-LUMBAR SPINE-WITH & W/O   Final Result      *  Diffuse osseous metastasis.   *  Mild vertebral height loss at T12, L1, L2 and L3   *  There is no epidural tumor spread in the lumbar spine.   *  Postsurgical and degenerative changes as described above.      MR-THORACIC SPINE-WITH & W/O   Final Result      1.  Diffuse osseous metastasis.   2.  Epidural tumor spread at the levels of T9, T10 and T11 causing effacement of the thecal sac. There is moderate central canal stenosis at T10 and T10-11.   3.  There is no spinal cord lesion.   4.  Bilateral adrenal masses.   5.  Mild thoracic intervertebral joint degeneration.      MR-CERVICAL SPINE-WITH & W/O   Final Result      *  Diffuse osseous metastasis without epidural spread.   *  Right cerebellar enhancing mass concerning for metastasis.   *  Degenerative changes as described above.   *  Moderate central canal stenosis at C6-7.      DX-CHEST-PORTABLE (1 VIEW)   Final Result      1.  No acute cardiopulmonary abnormality.   2.  Multiple sclerotic osseous metastases.         MR-BRAIN-WITH  & W/O   Final Result      1.  An enhancing right cerebellar mass likely represents metastasis.   2.  There is mass effect as evidenced by partial effacement of the fourth ventricle, low-lying cerebellar tonsils and moderate hydrocephalus.   3.  Multifocal osseous metastasis.   4.  Mild chronic microvascular ischemic disease.      CT-ABDOMEN-PELVIS WITH   Final Result      1.  Diffuse osseous metastatic disease. This vertebral augmentation of the L1 and L2 vertebral bodies      2. Bilateral low-attenuation adrenal masses which may represent lipophilic adenomas, however metastatic disease cannot be excluded.      3. 3.8 cm benign right renal cyst.         4. Marked bladder distention.   2.      CT-HEAD W/O   Final Result      1.  Vasogenic edema noted medially and inferiorly in the right hemicerebellum extending into the cerebellar vermis causing moderate effacement of the fourth ventricle and mild to moderate obstructive hydrocephalus. 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 MRI scan of the brain with gadolinium enhancement is recommended for further evaluation. Additionally, neurosurgical consultation is recommended.         DX-CHEST-PORTABLE (1 VIEW)   Final Result      1.  No acute cardiac or pulmonary abnormalities are identified.      2.  Diffuse bony metastatic disease.             Assessment/Plan  * Cerebellar mass- (present on admission)  Assessment & Plan  S/p surgical excision on 9/21 with EVD pulled on 9/24,   s/p hypertonic saline  possible prostate CA metastasis vs new primary malignancy: path pending  Decadron taper  Q4H Neurochecks  PT/OT: DC to home vs acute Rehab  Physiatry consulted        Prostate cancer (HCC)  Assessment & Plan  Dr. Butts. His last Xgeva injection was on 8/30/2023, has had progression on Xtandi, Xofigo and docetaxel  -Await bx results to determine further treatment  -Heme/Onc following, will appreciate recs    NED (obstructive sleep apnea)-  (present on admission)  Assessment & Plan  Nocturnal CPAP    HTN (hypertension), benign- (present on admission)  Assessment & Plan  Restart home lisinopril at reduced dose  Holding home metoprolol     DM (diabetes mellitus) (HCC)- (present on admission)  Assessment & Plan  A1c 6.5  As outpt on dulaglutide and canagliflozin  Running higher as on decadron: mid 200s  Increase glargine to 25 units; will need to taper it down as he comes off of decadron  -Holding home oral agents  -Diabetic diet, hypoglycemia protocol, scheduled POC BG checks             VTE prophylaxis:    enoxaparin ppx      I have performed a physical exam and reviewed and updated ROS and Plan today (9/27/2023). In review of yesterday's note (9/26/2023), there are no changes except as documented above.

## 2023-09-28 NOTE — PROGRESS NOTES
Hospital Medicine Daily Progress Note    Date of Service  9/28/2023    Chief Complaint  Casper Rowley is a 58 y.o. male admitted 9/19/2023 with dizziness    Hospital Course  59yo admitted 9/19 with Hx of metastatic prostate CA, HTN, DM presenting with N/V.  Head CT showing R cerebellar mass and edema with efacement of 4th ventricle.  Went to the OR on 9/21 with Dr Be for craniotomy and resection with placement of EVD which was then removed on 9/24    Interval Problem Update  Leg weakness improving per pt, tapering decadron, plan to DC to IPR when approved, medically cleared      I have discussed this patient's plan of care and discharge plan at IDT rounds today with Case Management, Nursing, Nursing leadership, and other members of the IDT team.    Consultants/Specialty  neurosurgery    Code Status  Full Code    Disposition  The patient is medically cleared for discharge to home or a post-acute facility.  Anticipate discharge to: an inpatient rehabilitation hospital    I have placed the appropriate orders for post-discharge needs.    Review of Systems  Review of Systems   Constitutional:  Negative for chills and fever.   HENT:  Negative for nosebleeds and sore throat.    Eyes:  Negative for blurred vision and double vision.   Respiratory:  Negative for cough and shortness of breath.    Cardiovascular:  Negative for chest pain, palpitations and leg swelling.   Gastrointestinal:  Negative for abdominal pain, diarrhea, nausea and vomiting.   Genitourinary:  Negative for dysuria and urgency.   Musculoskeletal:  Negative for back pain.   Skin:  Negative for rash.   Neurological:  Positive for weakness. Negative for dizziness, loss of consciousness and headaches.        Physical Exam  Temp:  [36.4 °C (97.5 °F)-36.7 °C (98.1 °F)] 36.5 °C (97.7 °F)  Pulse:  [90-98] 98  Resp:  [18] 18  BP: (121-125)/(78-96) 121/96  SpO2:  [96 %-99 %] 99 %    Physical Exam  Constitutional:       Appearance: Normal appearance.    HENT:      Head: Normocephalic and atraumatic.      Nose: Nose normal.      Mouth/Throat:      Mouth: Mucous membranes are moist.      Pharynx: Oropharynx is clear.   Eyes:      Conjunctiva/sclera: Conjunctivae normal.      Pupils: Pupils are equal, round, and reactive to light.   Cardiovascular:      Rate and Rhythm: Normal rate and regular rhythm.      Heart sounds: No murmur heard.  Pulmonary:      Effort: Pulmonary effort is normal.      Breath sounds: No wheezing, rhonchi or rales.   Abdominal:      General: There is no distension.      Palpations: Abdomen is soft.      Tenderness: There is no abdominal tenderness. There is no guarding or rebound.   Musculoskeletal:         General: No swelling or tenderness.      Cervical back: Normal range of motion and neck supple.   Skin:     General: Skin is warm and dry.   Neurological:      Mental Status: He is alert and oriented to person, place, and time.      GCS: GCS eye subscore is 4. GCS verbal subscore is 5. GCS motor subscore is 6.      Cranial Nerves: No facial asymmetry.      Motor: Weakness present.      Comments: Legs weak b/l   Psychiatric:         Mood and Affect: Mood normal.         Behavior: Behavior normal.         Fluids    Intake/Output Summary (Last 24 hours) at 9/28/2023 1655  Last data filed at 9/28/2023 0400  Gross per 24 hour   Intake 300 ml   Output --   Net 300 ml         Laboratory                              Imaging  MR-BRAIN-WITH & W/O   Final Result         Postoperative changes are noted in the right cerebellum with resection of the previously seen right cerebellar metastasis.      Small fluid collection in the occipital soft tissues overlying the craniotomy site, probably a seroma.      Vasogenic edema in the right cerebellum with mass effect upon the fourth ventricle and mild lateral ventricular hydrocephalus remains stable. EVD catheter is stable in position.      No definite abnormal enhancement is identified at the surgical site,  however evaluation is limited by T1 shortening from the presence of subacute blood products. Recommend follow-up examination.      CT-HEAD W/O   Final Result      1.  Interval resection of RIGHT cerebellar mass with pneumocephalus and some blood in the surgical resection bed but overall decreased mass effect. Residual tumor not excluded by this exam.   2.  Interval placement of RIGHT transparietal ventriculostomy catheter with slightly decreased caliber of lateral and third ventricles   3.  LEFT temporal metastasis without significant mass effect   4.  Atrophy   5.  White matter changes            MR-LUMBAR SPINE-WITH & W/O   Final Result      *  Diffuse osseous metastasis.   *  Mild vertebral height loss at T12, L1, L2 and L3   *  There is no epidural tumor spread in the lumbar spine.   *  Postsurgical and degenerative changes as described above.      MR-THORACIC SPINE-WITH & W/O   Final Result      1.  Diffuse osseous metastasis.   2.  Epidural tumor spread at the levels of T9, T10 and T11 causing effacement of the thecal sac. There is moderate central canal stenosis at T10 and T10-11.   3.  There is no spinal cord lesion.   4.  Bilateral adrenal masses.   5.  Mild thoracic intervertebral joint degeneration.      MR-CERVICAL SPINE-WITH & W/O   Final Result      *  Diffuse osseous metastasis without epidural spread.   *  Right cerebellar enhancing mass concerning for metastasis.   *  Degenerative changes as described above.   *  Moderate central canal stenosis at C6-7.      DX-CHEST-PORTABLE (1 VIEW)   Final Result      1.  No acute cardiopulmonary abnormality.   2.  Multiple sclerotic osseous metastases.         MR-BRAIN-WITH & W/O   Final Result      1.  An enhancing right cerebellar mass likely represents metastasis.   2.  There is mass effect as evidenced by partial effacement of the fourth ventricle, low-lying cerebellar tonsils and moderate hydrocephalus.   3.  Multifocal osseous metastasis.   4.  Mild  chronic microvascular ischemic disease.      CT-ABDOMEN-PELVIS WITH   Final Result      1.  Diffuse osseous metastatic disease. This vertebral augmentation of the L1 and L2 vertebral bodies      2. Bilateral low-attenuation adrenal masses which may represent lipophilic adenomas, however metastatic disease cannot be excluded.      3. 3.8 cm benign right renal cyst.         4. Marked bladder distention.   2.      CT-HEAD W/O   Final Result      1.  Vasogenic edema noted medially and inferiorly in the right hemicerebellum extending into the cerebellar vermis causing moderate effacement of the fourth ventricle and mild to moderate obstructive hydrocephalus. 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 MRI scan of the brain with gadolinium enhancement is recommended for further evaluation. Additionally, neurosurgical consultation is recommended.         DX-CHEST-PORTABLE (1 VIEW)   Final Result      1.  No acute cardiac or pulmonary abnormalities are identified.      2.  Diffuse bony metastatic disease.             Assessment/Plan  * Cerebellar mass- (present on admission)  Assessment & Plan  S/p surgical excision on 9/21 with EVD pulled on 9/24,   s/p hypertonic saline  possible prostate CA metastasis vs new primary malignancy: path pending  Decadron taper  Q4H Neurochecks  PT/OT: DC to home vs acute Rehab  Physiatry consulted        Prostate cancer (HCC)  Assessment & Plan  Dr. Butts. His last Xgeva injection was on 8/30/2023, has had progression on Xtandi, Xofigo and docetaxel  -Await bx results to determine further treatment  -Heme/Onc following, will appreciate recs    NED (obstructive sleep apnea)- (present on admission)  Assessment & Plan  Nocturnal CPAP    HTN (hypertension), benign- (present on admission)  Assessment & Plan  Restart home lisinopril at reduced dose and home metoprolol      DM (diabetes mellitus) (HCC)- (present on admission)  Assessment & Plan  A1c 6.5  As outpt on  dulaglutide and canagliflozin  Running higher as on decadron: mid 200s  Continue glargine, will need to taper it down as he comes off of decadron  -Holding home oral agents  -Diabetic diet, hypoglycemia protocol, scheduled POC BG checks             VTE prophylaxis:    enoxaparin ppx      I have performed a physical exam and reviewed and updated ROS and Plan today (9/28/2023). In review of yesterday's note (9/27/2023), there are no changes except as documented above.

## 2023-09-28 NOTE — CARE PLAN
The patient is Stable - Low risk of patient condition declining or worsening    Shift Goals  Clinical Goals: No complictions related to neuro status; Safety/mobility;  Patient Goals: Continue exercises; increased mobility  Family Goals: updates PRN    Progress made toward(s) clinical / shift goals:  Patient remains alert and oriented X 4. Neuro checks continued with no new complications observed. Patient continues to actively participate in own care. Patient ambulating with assistance from staff and assistive device. Blood glucose monitored and maintained WDL with insulin, as ordered.     Patient is not progressing towards the following goals:      Problem: Psychosocial  Goal: Patient's level of anxiety will decrease  9/28/2023 0249 by Dallin Velasquez RTeddyN.  Outcome: Not Met  9/28/2023 0246 by Dallin Velasquez R.N.  Outcome: Progressing  Goal: Patient's ability to verbalize feelings about condition will improve  9/28/2023 0249 by Dallin Velasquez R.N.  Outcome: Not Met  9/28/2023 0246 by Dallin Velasquez R.N.  Outcome: Progressing  Goal: Patient's ability to re-evaluate and adapt role responsibilities will improve  9/28/2023 0249 by Dallin Velasquez R.N.  Outcome: Not Met  9/28/2023 0246 by Dallin Velasquez R.N.  Outcome: Progressing  Goal: Patient and family will demonstrate ability to cope with life altering diagnosis and/or procedure  9/28/2023 0249 by Dallin Velasquez R.N.  Outcome: Not Met  9/28/2023 0246 by Dallin Velasquez R.N.  Outcome: Progressing  Goal: Spiritual and cultural needs incorporated into hospitalization  9/28/2023 0249 by Dallin Velasquez R.N.  Outcome: Not Met  9/28/2023 0246 by Dallin Velasquez R.N.  Outcome: Progressing     Problem: Hemodynamics  Goal: Patient's hemodynamics, fluid balance and neurologic status will be stable or improve  9/28/2023 0249 by Dallin Velasquez R.N.  Outcome: Not Met  9/28/2023 0246 by Dallin Velasquez R.N.  Outcome:  Progressing     Problem: Respiratory  Goal: Patient will achieve/maintain optimum respiratory ventilation and gas exchange  9/28/2023 0249 by Dallin Velasquez R.N.  Outcome: Not Met  9/28/2023 0246 by Dallin Velasquez R.N.  Outcome: Progressing     Problem: Mechanical Ventilation  Goal: Safe management of artificial airway and ventilation  9/28/2023 0249 by Dallin Velasquez R.N.  Outcome: Not Met  9/28/2023 0246 by Dallin Velasquez R.N.  Outcome: Progressing  Goal: Successful weaning off mechanical ventilator, spontaneously maintains adequate gas exchange  9/28/2023 0249 by Dallin Velasquez R.N.  Outcome: Not Met  9/28/2023 0246 by Dallin Velasquez R.N.  Outcome: Progressing  Goal: Patient will be able to express needs and understand communication  9/28/2023 0249 by Dallin Velasquez R.N.  Outcome: Not Met  9/28/2023 0246 by Dallin Velasquez R.N.  Outcome: Progressing     Problem: Risk for Aspiration  Goal: Patient's risk for aspiration will be absent or decrease  9/28/2023 0249 by Dallin Velasquez R.N.  Outcome: Not Met  9/28/2023 0246 by Dallin Velasquez, R.N.  Outcome: Progressing     Problem: Urinary - Renal Perfusion  Goal: Ability to achieve and maintain adequate renal perfusion and functioning will improve  9/28/2023 0249 by Dallin Velasquez R.N.  Outcome: Not Met  9/28/2023 0246 by Dallin Velasquez, R.N.  Outcome: Progressing     Problem: Venous Thromboembolism (VTE) Prevention  Goal: The patient will remain free from venous thromboembolism (VTE)  9/28/2023 0249 by Dallin Velasquez R.N.  Outcome: Not Met  9/28/2023 0246 by Dallin Velasquez, R.N.  Outcome: Progressing     Problem: Nutrition  Goal: Patient's nutritional and fluid intake will be adequate or improve  9/28/2023 0249 by Dallin Velasquez R.N.  Outcome: Not Met  9/28/2023 0246 by Dallin Velasquez R.N.  Outcome: Progressing  Goal: Enteral nutrition will be maintained or improve  9/28/2023 0249 by Dallin ANTHONY  Ron R.N.  Outcome: Not Met  9/28/2023 0246 by Dallin Velasquez R.N.  Outcome: Progressing  Goal: Enteral nutrition will be maintained or improve  9/28/2023 0249 by Dallin Velasquez R.N.  Outcome: Not Met  9/28/2023 0246 by Dallin Velasquez R.N.  Outcome: Progressing     Problem: Urinary Elimination  Goal: Establish and maintain regular urinary output  9/28/2023 0249 by Dallin Velasquez R.N.  Outcome: Not Met  9/28/2023 0246 by Dallin Velasquez R.N.  Outcome: Progressing     Problem: Bowel Elimination  Goal: Establish and maintain regular bowel function  9/28/2023 0249 by Dallin Velasquez R.N.  Outcome: Not Met  9/28/2023 0246 by Dallin Velasquez R.N.  Outcome: Progressing     Problem: Craniotomy Surgery  Goal: Post-Operative Craniotomy Surgery: Patient will achieve optimal post-surgical outcomes  9/28/2023 0249 by Dallin Velasquez R.N.  Outcome: Not Met  9/28/2023 0246 by Dallin Velasquez R.N.  Outcome: Progressing     Problem: Neuro Status  Goal: Neuro status will remain stable or improve  9/28/2023 0249 by Dallin Velasquez R.N.  Outcome: Not Met  9/28/2023 0246 by Dallin Velasquez R.N.  Outcome: Progressing     Problem: Knowledge Deficit - Neuro Surgical  Goal: Patient's understanding of spinal precautions, appropriate body mechanics and incision care will improve  9/28/2023 0249 by Dallin Velasquez R.N.  Outcome: Not Met  9/28/2023 0246 by Dallin Velasquez R.N.  Outcome: Progressing     Problem: Neurovascular Monitoring  Goal: Patient's neurovascular status will be maintained or improve  9/28/2023 0249 by Dallin Velasquez R.N.  Outcome: Not Met  9/28/2023 0246 by Dallin Velasquez R.N.  Outcome: Progressing     Problem: Skin Integrity  Goal: Skin integrity is maintained or improved  9/28/2023 0249 by CODY BullardN.  Outcome: Not Met  9/28/2023 0246 by Dallin Velasquez R.N.  Outcome: Progressing     Problem: Pain - Standard  Goal: Alleviation of pain  or a reduction in pain to the patient’s comfort goal  9/28/2023 0249 by Dallin Velasquez R.N.  Outcome: Not Met  9/28/2023 0246 by Dallin Velasquez R.N.  Outcome: Progressing

## 2023-09-29 ENCOUNTER — HOSPITAL ENCOUNTER (INPATIENT)
Facility: REHABILITATION | Age: 58
LOS: 12 days | DRG: 949 | End: 2023-10-11
Attending: PHYSICAL MEDICINE & REHABILITATION | Admitting: PHYSICAL MEDICINE & REHABILITATION
Payer: COMMERCIAL

## 2023-09-29 ENCOUNTER — PATIENT OUTREACH (OUTPATIENT)
Dept: SCHEDULING | Facility: IMAGING CENTER | Age: 58
End: 2023-09-29
Payer: COMMERCIAL

## 2023-09-29 VITALS
WEIGHT: 166.01 LBS | SYSTOLIC BLOOD PRESSURE: 125 MMHG | TEMPERATURE: 97.7 F | RESPIRATION RATE: 18 BRPM | DIASTOLIC BLOOD PRESSURE: 75 MMHG | BODY MASS INDEX: 26.06 KG/M2 | HEIGHT: 67 IN | OXYGEN SATURATION: 98 % | HEART RATE: 78 BPM

## 2023-09-29 DIAGNOSIS — G93.89 CEREBELLAR MASS: ICD-10-CM

## 2023-09-29 DIAGNOSIS — I10 PRIMARY HYPERTENSION: ICD-10-CM

## 2023-09-29 DIAGNOSIS — F51.01 PRIMARY INSOMNIA: ICD-10-CM

## 2023-09-29 LAB
GLUCOSE BLD STRIP.AUTO-MCNC: 195 MG/DL (ref 65–99)
GLUCOSE BLD STRIP.AUTO-MCNC: 202 MG/DL (ref 65–99)
GLUCOSE BLD STRIP.AUTO-MCNC: 237 MG/DL (ref 65–99)
GLUCOSE BLD STRIP.AUTO-MCNC: 323 MG/DL (ref 65–99)

## 2023-09-29 PROCEDURE — 700102 HCHG RX REV CODE 250 W/ 637 OVERRIDE(OP): Performed by: STUDENT IN AN ORGANIZED HEALTH CARE EDUCATION/TRAINING PROGRAM

## 2023-09-29 PROCEDURE — 700111 HCHG RX REV CODE 636 W/ 250 OVERRIDE (IP): Mod: JZ | Performed by: PHYSICAL MEDICINE & REHABILITATION

## 2023-09-29 PROCEDURE — 99223 1ST HOSP IP/OBS HIGH 75: CPT | Performed by: PHYSICAL MEDICINE & REHABILITATION

## 2023-09-29 PROCEDURE — 700102 HCHG RX REV CODE 250 W/ 637 OVERRIDE(OP): Performed by: PHYSICAL MEDICINE & REHABILITATION

## 2023-09-29 PROCEDURE — 770010 HCHG ROOM/CARE - REHAB SEMI PRIVAT*

## 2023-09-29 PROCEDURE — A9270 NON-COVERED ITEM OR SERVICE: HCPCS | Performed by: PHYSICAL MEDICINE & REHABILITATION

## 2023-09-29 PROCEDURE — 99239 HOSP IP/OBS DSCHRG MGMT >30: CPT | Performed by: STUDENT IN AN ORGANIZED HEALTH CARE EDUCATION/TRAINING PROGRAM

## 2023-09-29 PROCEDURE — 82962 GLUCOSE BLOOD TEST: CPT

## 2023-09-29 PROCEDURE — A9270 NON-COVERED ITEM OR SERVICE: HCPCS | Performed by: STUDENT IN AN ORGANIZED HEALTH CARE EDUCATION/TRAINING PROGRAM

## 2023-09-29 PROCEDURE — 94760 N-INVAS EAR/PLS OXIMETRY 1: CPT

## 2023-09-29 PROCEDURE — 82962 GLUCOSE BLOOD TEST: CPT | Mod: 91

## 2023-09-29 RX ORDER — TRAZODONE HYDROCHLORIDE 50 MG/1
50 TABLET ORAL
Status: DISCONTINUED | OUTPATIENT
Start: 2023-09-29 | End: 2023-10-11 | Stop reason: HOSPADM

## 2023-09-29 RX ORDER — MECLIZINE HYDROCHLORIDE 25 MG/1
25 TABLET ORAL 3 TIMES DAILY PRN
Status: DISCONTINUED | OUTPATIENT
Start: 2023-09-29 | End: 2023-10-11 | Stop reason: HOSPADM

## 2023-09-29 RX ORDER — METOPROLOL SUCCINATE 25 MG/1
25 TABLET, EXTENDED RELEASE ORAL DAILY
Status: DISCONTINUED | OUTPATIENT
Start: 2023-09-30 | End: 2023-10-06

## 2023-09-29 RX ORDER — OMEPRAZOLE 20 MG/1
20 CAPSULE, DELAYED RELEASE ORAL DAILY
Status: DISCONTINUED | OUTPATIENT
Start: 2023-09-29 | End: 2023-10-11 | Stop reason: HOSPADM

## 2023-09-29 RX ORDER — ENOXAPARIN SODIUM 100 MG/ML
40 INJECTION SUBCUTANEOUS DAILY
Status: DISCONTINUED | OUTPATIENT
Start: 2023-09-29 | End: 2023-10-10

## 2023-09-29 RX ORDER — DEXAMETHASONE 2 MG/1
2 TABLET ORAL DAILY
Qty: 2 TABLET | Refills: 0 | Status: ON HOLD
Start: 2023-09-30 | End: 2023-10-10

## 2023-09-29 RX ORDER — DEXAMETHASONE 1 MG
2 TABLET ORAL DAILY
Status: COMPLETED | OUTPATIENT
Start: 2023-09-30 | End: 2023-10-01

## 2023-09-29 RX ORDER — PROMETHAZINE HYDROCHLORIDE 25 MG/1
25 TABLET ORAL EVERY 6 HOURS PRN
Status: DISCONTINUED | OUTPATIENT
Start: 2023-09-29 | End: 2023-10-11 | Stop reason: HOSPADM

## 2023-09-29 RX ORDER — ECHINACEA PURPUREA EXTRACT 125 MG
2 TABLET ORAL PRN
Status: DISCONTINUED | OUTPATIENT
Start: 2023-09-29 | End: 2023-10-11 | Stop reason: HOSPADM

## 2023-09-29 RX ORDER — ALUMINA, MAGNESIA, AND SIMETHICONE 2400; 2400; 240 MG/30ML; MG/30ML; MG/30ML
20 SUSPENSION ORAL
Status: DISCONTINUED | OUTPATIENT
Start: 2023-09-29 | End: 2023-10-11 | Stop reason: HOSPADM

## 2023-09-29 RX ORDER — DEXAMETHASONE 1 MG
2 TABLET ORAL EVERY 12 HOURS
Status: COMPLETED | OUTPATIENT
Start: 2023-09-29 | End: 2023-09-29

## 2023-09-29 RX ORDER — ZOLPIDEM TARTRATE 5 MG/1
10 TABLET ORAL
Status: DISCONTINUED | OUTPATIENT
Start: 2023-09-29 | End: 2023-10-11 | Stop reason: HOSPADM

## 2023-09-29 RX ORDER — AMOXICILLIN 250 MG
2 CAPSULE ORAL 2 TIMES DAILY
Status: DISCONTINUED | OUTPATIENT
Start: 2023-09-29 | End: 2023-10-11 | Stop reason: HOSPADM

## 2023-09-29 RX ORDER — HYDRALAZINE HYDROCHLORIDE 25 MG/1
25 TABLET, FILM COATED ORAL EVERY 8 HOURS PRN
Status: DISCONTINUED | OUTPATIENT
Start: 2023-09-29 | End: 2023-10-11 | Stop reason: HOSPADM

## 2023-09-29 RX ORDER — POLYETHYLENE GLYCOL 3350 17 G/17G
1 POWDER, FOR SOLUTION ORAL
Status: DISCONTINUED | OUTPATIENT
Start: 2023-09-29 | End: 2023-10-11 | Stop reason: HOSPADM

## 2023-09-29 RX ORDER — LISINOPRIL 20 MG/1
20 TABLET ORAL DAILY
Status: CANCELLED | OUTPATIENT
Start: 2023-09-29

## 2023-09-29 RX ORDER — BISACODYL 10 MG
10 SUPPOSITORY, RECTAL RECTAL
Status: DISCONTINUED | OUTPATIENT
Start: 2023-09-29 | End: 2023-10-11 | Stop reason: HOSPADM

## 2023-09-29 RX ORDER — OXYCODONE HYDROCHLORIDE 10 MG/1
10 TABLET ORAL
Status: DISCONTINUED | OUTPATIENT
Start: 2023-09-29 | End: 2023-10-11 | Stop reason: HOSPADM

## 2023-09-29 RX ORDER — DEXTROSE MONOHYDRATE 25 G/50ML
25 INJECTION, SOLUTION INTRAVENOUS
Status: CANCELLED | OUTPATIENT
Start: 2023-09-29

## 2023-09-29 RX ORDER — DEXAMETHASONE 4 MG/1
2 TABLET ORAL EVERY 12 HOURS
Status: CANCELLED | OUTPATIENT
Start: 2023-09-29 | End: 2023-09-30

## 2023-09-29 RX ORDER — ONDANSETRON 2 MG/ML
4 INJECTION INTRAMUSCULAR; INTRAVENOUS 4 TIMES DAILY PRN
Status: DISCONTINUED | OUTPATIENT
Start: 2023-09-29 | End: 2023-10-11 | Stop reason: HOSPADM

## 2023-09-29 RX ORDER — METOPROLOL SUCCINATE 25 MG/1
25 TABLET, EXTENDED RELEASE ORAL DAILY
Status: CANCELLED | OUTPATIENT
Start: 2023-09-30

## 2023-09-29 RX ORDER — LISINOPRIL 20 MG/1
20 TABLET ORAL DAILY
Status: DISCONTINUED | OUTPATIENT
Start: 2023-09-29 | End: 2023-10-05

## 2023-09-29 RX ORDER — LISINOPRIL 20 MG/1
20 TABLET ORAL DAILY
Qty: 30 TABLET | Refills: 0 | Status: ON HOLD | OUTPATIENT
Start: 2023-09-29 | End: 2023-10-10 | Stop reason: SDUPTHER

## 2023-09-29 RX ORDER — DEXTROSE MONOHYDRATE 25 G/50ML
25 INJECTION, SOLUTION INTRAVENOUS
Status: DISCONTINUED | OUTPATIENT
Start: 2023-09-29 | End: 2023-10-11 | Stop reason: HOSPADM

## 2023-09-29 RX ORDER — OXYCODONE HYDROCHLORIDE 5 MG/1
5 TABLET ORAL
Status: DISCONTINUED | OUTPATIENT
Start: 2023-09-29 | End: 2023-10-11 | Stop reason: HOSPADM

## 2023-09-29 RX ORDER — DEXAMETHASONE 4 MG/1
2 TABLET ORAL DAILY
Status: CANCELLED | OUTPATIENT
Start: 2023-09-30 | End: 2023-10-02

## 2023-09-29 RX ORDER — MIDAZOLAM HYDROCHLORIDE 5 MG/ML
5 INJECTION INTRAMUSCULAR; INTRAVENOUS PRN
Status: DISCONTINUED | OUTPATIENT
Start: 2023-09-29 | End: 2023-10-11 | Stop reason: HOSPADM

## 2023-09-29 RX ORDER — ACETAMINOPHEN 325 MG/1
650 TABLET ORAL EVERY 4 HOURS PRN
Status: DISCONTINUED | OUTPATIENT
Start: 2023-09-29 | End: 2023-10-11 | Stop reason: HOSPADM

## 2023-09-29 RX ORDER — ENOXAPARIN SODIUM 100 MG/ML
40 INJECTION SUBCUTANEOUS DAILY
Status: CANCELLED | OUTPATIENT
Start: 2023-09-29

## 2023-09-29 RX ORDER — ONDANSETRON 4 MG/1
4 TABLET, ORALLY DISINTEGRATING ORAL 4 TIMES DAILY PRN
Status: DISCONTINUED | OUTPATIENT
Start: 2023-09-29 | End: 2023-10-11 | Stop reason: HOSPADM

## 2023-09-29 RX ADMIN — METOPROLOL SUCCINATE 25 MG: 25 TABLET, EXTENDED RELEASE ORAL at 04:32

## 2023-09-29 RX ADMIN — INSULIN HUMAN 3 UNITS: 100 INJECTION, SOLUTION PARENTERAL at 12:11

## 2023-09-29 RX ADMIN — ENOXAPARIN SODIUM 40 MG: 100 INJECTION SUBCUTANEOUS at 18:48

## 2023-09-29 RX ADMIN — ACETAMINOPHEN 650 MG: 325 TABLET ORAL at 21:30

## 2023-09-29 RX ADMIN — LISINOPRIL 20 MG: 20 TABLET ORAL at 18:49

## 2023-09-29 RX ADMIN — INSULIN HUMAN 10 UNITS: 100 INJECTION, SOLUTION PARENTERAL at 21:31

## 2023-09-29 RX ADMIN — INSULIN GLARGINE-YFGN 35 UNITS: 100 INJECTION, SOLUTION SUBCUTANEOUS at 21:31

## 2023-09-29 RX ADMIN — ZOLPIDEM TARTRATE 10 MG: 5 TABLET ORAL at 21:30

## 2023-09-29 RX ADMIN — OMEPRAZOLE 20 MG: 20 CAPSULE, DELAYED RELEASE ORAL at 18:48

## 2023-09-29 RX ADMIN — DEXAMETHASONE 2 MG: 4 TABLET ORAL at 04:30

## 2023-09-29 RX ADMIN — DEXAMETHASONE 2 MG: 1 TABLET ORAL at 12:13

## 2023-09-29 RX ADMIN — INSULIN HUMAN 4 UNITS: 100 INJECTION, SOLUTION PARENTERAL at 17:29

## 2023-09-29 ASSESSMENT — LIFESTYLE VARIABLES
HAVE PEOPLE ANNOYED YOU BY CRITICIZING YOUR DRINKING: NO
AVERAGE NUMBER OF DAYS PER WEEK YOU HAVE A DRINK CONTAINING ALCOHOL: 1
ALCOHOL_USE: YES
TOTAL SCORE: 0
HAVE YOU EVER FELT YOU SHOULD CUT DOWN ON YOUR DRINKING: NO
TOTAL SCORE: 0
CONSUMPTION TOTAL: NEGATIVE
EVER_SMOKED: NEVER
TOTAL SCORE: 0
ON A TYPICAL DAY WHEN YOU DRINK ALCOHOL HOW MANY DRINKS DO YOU HAVE: 2
EVER HAD A DRINK FIRST THING IN THE MORNING TO STEADY YOUR NERVES TO GET RID OF A HANGOVER: NO
HOW MANY TIMES IN THE PAST YEAR HAVE YOU HAD 5 OR MORE DRINKS IN A DAY: 0
EVER FELT BAD OR GUILTY ABOUT YOUR DRINKING: NO

## 2023-09-29 ASSESSMENT — PAIN DESCRIPTION - PAIN TYPE: TYPE: ACUTE PAIN

## 2023-09-29 ASSESSMENT — PATIENT HEALTH QUESTIONNAIRE - PHQ9
2. FEELING DOWN, DEPRESSED, IRRITABLE, OR HOPELESS: NOT AT ALL
SUM OF ALL RESPONSES TO PHQ9 QUESTIONS 1 AND 2: 0
1. LITTLE INTEREST OR PLEASURE IN DOING THINGS: NOT AT ALL

## 2023-09-29 ASSESSMENT — FIBROSIS 4 INDEX: FIB4 SCORE: 3.97

## 2023-09-29 NOTE — DISCHARGE SUMMARY
Discharge Summary    CHIEF COMPLAINT ON ADMISSION  Chief Complaint   Patient presents with    Vomiting    Weakness       Reason for Admission  EMS     Admission Date  9/19/2023    CODE STATUS  Full Code    HPI & HOSPITAL COURSE  The patient is a 58 y.o. male w/ PMH of prostate CA w/ mets to spine s/p decompression/fusion in March, HTN, NIDDM2 that presented with severe nausea and vomiting for 2 weeks. CT head revealed a new cerebellar mass with vasogenic edema which was confirmed on MRI. Bladder scan revealed urinary retention s/p Mistry. Decadron was started as was hypertonic saline. Neurosurgery was consulted and patient underwent craniotomy on 9/21 with EVD placement. Repeat MRI post-op showed improvement. Patient improved symptomatically and EVD was pulled on 9/24. He continues to have leg weakness, this began early this year and was partly the reason for his spinal surgery but has continued to progress, PT/OT recommend IPR. Due to his improvements patient was transferred out of the ICU, his decadron dose was progressively tapered for which he still has 2 doses. Pathology results from his brain mass resulted showing metastatic prostatic adenocarcinoma. His home anti-hypertensives were restarted but his home lisinopril dose was halved to 20 mg. His sugars were elevated due to decadron, this was managed with insulin that was progressively increased to address this, his home oral agents were held with plan to restart on discharge given his last HA1c outpatient was at goal. He improved symptomatically and inpatient rehab was recommended.     Therefore, he is discharged in fair and stable condition to an inpatient rehabilitation hospital.    The patient met 2-midnight criteria for an inpatient stay at the time of discharge.    Discharge Date  09/29/23    FOLLOW UP ITEMS POST DISCHARGE  Follow up with oncology as outpatient for further management of his prostate cancer and planned treatment for biopsy  results    DISCHARGE DIAGNOSES  Principal Problem (Resolved):    Cerebellar mass (POA: Yes)  Active Problems:    DM (diabetes mellitus) (HCC) (POA: Yes)    HTN (hypertension), benign (Chronic) (POA: Yes)    NED (obstructive sleep apnea) (POA: Yes)    Prostate cancer (HCC) (POA: Unknown)  Resolved Problems:    Brain mass (POA: Yes)      FOLLOW UP  Future Appointments   Date Time Provider Department Center   10/2/2023 10:15 AM RADIATION THERAPY [facility] None   10/11/2023  9:00 AM Sri Baker M.D. [facility] None     No follow-up provider specified.    MEDICATIONS ON DISCHARGE     Medication List        START taking these medications        Instructions   dexamethasone 2 MG tablet  Start taking on: September 30, 2023  Commonly known as: Decadron   Take 1 Tablet by mouth every day.  Dose: 2 mg            CHANGE how you take these medications        Instructions   lisinopril 20 MG Tabs  What changed:   medication strength  how much to take  Commonly known as: Prinivil   Take 1 Tablet by mouth every day.  Dose: 20 mg            CONTINUE taking these medications        Instructions   Ambien 10 MG Tabs  Generic drug: zolpidem   Take 10 mg by mouth at bedtime as needed for Sleep.  Dose: 10 mg     cyclobenzaprine 10 mg Tabs  Commonly known as: Flexeril   Take 10 mg by mouth at bedtime.  Dose: 10 mg     Invokana 100 MG Tabs  Generic drug: Canagliflozin   Take 100 mg by mouth every day.  Dose: 100 mg     metoprolol SR 25 MG Tb24  Commonly known as: Toprol XL   Take 25 mg by mouth every day.  Dose: 25 mg     Trulicity 1.5 MG/0.5ML Sopn  Generic drug: Dulaglutide   Inject 1.5 mg as instructed every Saturday.  Dose: 1.5 mg     Vitamin D 50 MCG (2000 UT) Caps   Take 6,000 Units by mouth every day. 2000 units x 3 tablets = 6000 mcg  Dose: 6,000 Units     Xgeva 120 MG/1.7ML injection  Generic drug: denosumab   120 mg Q30 DAYS.  Dose: 120 mg              Allergies  No Known Allergies    DIET  Orders Placed This Encounter   Procedures     Diet Order Diet: Consistent CHO (Diabetic)     Standing Status:   Standing     Number of Occurrences:   1     Order Specific Question:   Diet:     Answer:   Consistent CHO (Diabetic) [4]       ACTIVITY  As tolerated and directed by rehab.  Weight bearing as tolerated    CONSULTATIONS  Neuro surgery  Heme/Onc  Radiation Onc  Critical Care  Physiatry     PROCEDURES  Craniotomy with cerebellar mass extraction on 9/21    LABORATORY  Lab Results   Component Value Date    SODIUM 134 (L) 09/25/2023    POTASSIUM 4.0 09/25/2023    CHLORIDE 100 09/25/2023    CO2 22 09/25/2023    GLUCOSE 279 (H) 09/25/2023    BUN 11 09/25/2023    CREATININE 0.29 (L) 09/25/2023    CREATININE 0.89 08/28/2010    GLOMRATE >59 08/28/2010        Lab Results   Component Value Date    WBC 5.5 09/25/2023    HEMOGLOBIN 10.3 (L) 09/25/2023    HEMATOCRIT 30.9 (L) 09/25/2023    PLATELETCT 142 (L) 09/25/2023        Total time of the discharge process exceeds 40 minutes.

## 2023-09-29 NOTE — H&P
Physical Medicine & Rehabilitation  History and Physical (H&P)  &     Post Admission Physician Evaluation (ELMER)       Date of Admission: 9/29/2023  Date of Service: 9/29/2023   Casper Rowley  [id number]    [facility] Code to Support Admission: 0002.1 - Brain Dysfunction: Non-Traumatic  Etiologic Diagnosis: Cerebellar mass    _______________________________________________    Chief Complaint: decreased balance, weakness    History of Present Illness:  Adapted from the PM&R Consult by Dr. Wills:   The patient is a 58 y.o. right hand dominant male with a past medical history of prostate cancer with mets to spine status post decompression/fusion in March, type 2 diabetes, hyperlipidemia, hypertension;  who presented on 9/19/2023 12:50 PM with intractable nausea vomiting.  Work-up with CT head found new cerebellar mass with vasogenic edema which was confirmed on MRI.  Patient also had urinary retention requiring Mistry placement.  Patient was started on Decadron, hypertonic saline, and seen by neurosurgery who performed craniotomy on 9/21 with EVD placement by Dr. Archana Be MD.  EVD removed 9/24.  Surgical pathology remains pending, but it is suspected to be metastatic prostate cancer.  Patient is set up for radiation mapping in mid October.  Patient was seen by PT and OT found to have deficits with mobility and ADLs, therefore PMR was consulted to assess for rehab appropriateness.     Patient tolerated transfer to [facility]. He reports his symptoms have been improving. He reports they have been working on his weakness for months so he has been in PT for months at this point. He reports not vertigo or nausea at this time but there was prior to the surgery. He reports for his tumor burden in his back he has minimal pain after having lumbar surgery. He reports baseline leg weakness with legs buckling if he bends at all. He denies SOB. Denies vision changes.     Review of Systems:     Comprehensive 14 point ROS was reviewed  and all were negative except as noted elsewhere in this document.     Past Medical History:  Past Medical History:   Diagnosis Date    Arthritis 02/2019    osteo-hollie knees    Bronchitis 2019    Cancer (HCC)     Basal cell - top of head    Cancer (HCC)     Prostate    Cold 02/08/2019    Cold two weeks ago, denies productive cough, SOB    Diabetes     type 2    High cholesterol     HTN (hypertension), benign 8/14/2009    Hypertension     Infectious disease     Mumps age 6 yrs and Chickenpox age 41 yrs    Obstructive sleep apnea 02/2019    Uses cpap       Past Surgical History:  Past Surgical History:   Procedure Laterality Date    CRANIOTOMY STEALTH Right 9/21/2023    Procedure: CRANIOTOMY, USING FRAMELESS STEREOTAXY - RIGHT RETROSIGMOID FOR RESECTION OF CEREBELLAR MASS, USE OF INTRAOPERATIVE NEURONAVIGATION, EXTERNAL VENTRICULAR DRAIN;  Surgeon: Archana Be M.D.;  Location: SURGERY [facility];  Service: Neurosurgery    IA SHLDR ARTHROSCOP EXTEN DEBRIDE 3+ Left 11/1/2021    Procedure: ARTHROSCOPY,SHOULDER,WITH EXTENSIVE DEBRIDEMENT;  Surgeon: Piter Otero M.D.;  Location: SURGERY [facility];  Service: Orthopedics    PB ARTHROSCOPY SHOULDER SURGICAL BICEPS TENODES* Left 11/1/2021    Procedure: ARTHROSCOPY, SHOULDER, WITH BICEPS TENOTOMY - WITH SUB SCAPULARIS REPAIR;  Surgeon: Piter Otero M.D.;  Location: [facility];  Service: Orthopedics    CARPAL TUNNEL RELEASE Left 2/11/2019    Procedure: CARPAL TUNNEL RELEASE;  Surgeon: Piter Otero M.D.;  Location: [facility];  Service: Orthopedics    KNEE ARTHROSCOPY Right 05/2014    ACL       Family History:  Family History   Problem Relation Age of Onset    Genetic Disorder Neg Hx        Medications:  Current Facility-Administered Medications   Medication Dose    Respiratory Therapy Consult      Pharmacy Consult Request ...Pain Management Review 1 Each  1 Each    hydrALAZINE (Apresoline) tablet 25 mg  25 mg    acetaminophen (Tylenol)  tablet 650 mg  650 mg    senna-docusate (Pericolace Or Senokot S) 8.6-50 MG per tablet 2 Tablet  2 Tablet    And    polyethylene glycol/lytes (Miralax) PACKET 1 Packet  1 Packet    And    magnesium hydroxide (Milk Of Magnesia) suspension 30 mL  30 mL    And    bisacodyl (Dulcolax) suppository 10 mg  10 mg    omeprazole (PriLOSEC) capsule 20 mg  20 mg    mag hydrox-al hydrox-simeth (Maalox Plus Es Or Mylanta Ds) suspension 20 mL  20 mL    ondansetron (Zofran ODT) dispertab 4 mg  4 mg    Or    ondansetron (Zofran) syringe/vial injection 4 mg  4 mg    traZODone (Desyrel) tablet 50 mg  50 mg    sodium chloride (Ocean) 0.65 % nasal spray 2 Spray  2 Spray    oxyCODONE immediate-release (Roxicodone) tablet 5 mg  5 mg    Or    oxyCODONE immediate release (Roxicodone) tablet 10 mg  10 mg    midazolam (VERSED) 5 mg/mL (1 mL vial)  5 mg    [START ON 9/30/2023] dexamethasone (Decadron) tablet 2 mg  2 mg    enoxaparin (Lovenox) inj 40 mg  40 mg    insulin GLARGINE (Lantus,Semglee) injection  35 Units    insulin regular (HumuLIN R,NovoLIN R) injection  3-14 Units    And    dextrose 50% (D50W) injection 25 g  25 g    lisinopril (Prinivil) tablet 20 mg  20 mg    [START ON 9/30/2023] metoprolol SR (Toprol XL) tablet 25 mg  25 mg       Allergies:  Patient has no known allergies.    Psychosocial History:  Prior Living Situation:   Housing / Facility: [address] [facility]  Lives with - Patient's Self Care Capacity: Significant Other  Equipment Owned: Front-Wheel Walker, 4-Wheel Walker, Tub Transfer Bench     Prior Level of Function / Living Situation:   Physical Therapy: Prior Services: None  Housing / Facility: 1 [facility]  Bathroom Set up: Bathtub / Shower Combination, Tub Transfer Bench  Equipment Owned: Front-Wheel Walker, 4-Wheel Walker, Tub Transfer Bench  Lives with - Patient's Self Care Capacity: Significant Other  Bed Mobility: Independent  Transfer Status: Independent  Ambulation: Independent  Assistive Devices Used: None  Stairs:  "Independent  Current Level of Function:   Gait Level Of Assist: Unable to Participate  Level of Assist with Stairs: Unable to Participate  Supine to Sit: Minimal Assist  Sit to Supine:  (NT in chair post)  Scooting: Minimal Assist  Sit to Stand: Moderate Assist  Bed, Chair, Wheelchair Transfer: Moderate Assist  Transfer Method: Stand Pivot  Sitting in Chair: 10+ min (up post)  Sitting Edge of Bed: 10 min  Standin min total  Occupational Therapy:   Self Feeding: Independent  Grooming / Hygiene: Independent  Bathing: Independent  Dressing: Independent  Toileting: Independent  Medication Management: Independent  Finances: Independent  Home Management: Independent  Shopping: Independent  Prior Level Of Mobility: Independent With Device in Home, Independent With Device in Community  Prior Services: None  Housing / Facility: 1 [facility]  Current Level of Function:   Upper Body Dressing: Minimal Assist  Lower Body Dressing: Maximal Assist  Toileting:  (betts in place)    CURRENT LEVEL OF FUNCTION:   Same as level of function prior to admission to [facility]    Physical Examination:     VITAL SIGNS:   height is 1.702 m (5' 7\") and weight is 80.5 kg (177 lb 8 oz). His oral temperature is 36.3 °C (97.4 °F). His blood pressure is 123/79 and his pulse is 75. His respiration is 17 and oxygen saturation is 98%.   GENERAL: No apparent distress  HEENT: Normocephalic/atraumatic and EOMI; posterior incision  CARDIAC: Regular rate and rhythm, normal S1, S2   LUNGS: Clear to auscultation   ABDOMINAL: bowel sounds present, soft, and nontender    EXTREMITIES: no contractures, spasticity. 1+ Edema BLE  NEURO:  Mental status: answers questions appropriately follows commands  Speech: fluent, no aphasia or dysarthria  CRANIAL NERVES: No facial droop, no nystagmus   Motor:    5/5 BUE  2/5 B HF  4/5 B KE  0/5 B ADF  3/5 B APF  Sensory: Decreased to bilateral feet    Radiology:    Results for orders placed during the " hospital encounter of 09/19/23    MR-BRAIN-WITH & W/O    Impression  Postoperative changes are noted in the right cerebellum with resection of the previously seen right cerebellar metastasis.    Small fluid collection in the occipital soft tissues overlying the craniotomy site, probably a seroma.    Vasogenic edema in the right cerebellum with mass effect upon the fourth ventricle and mild lateral ventricular hydrocephalus remains stable. EVD catheter is stable in position.    No definite abnormal enhancement is identified at the surgical site, however evaluation is limited by T1 shortening from the presence of subacute blood products. Recommend follow-up examination.                 Results for orders placed during the hospital encounter of 09/19/23    MR-CERVICAL SPINE-WITH & W/O    Impression  *  Diffuse osseous metastasis without epidural spread.  *  Right cerebellar enhancing mass concerning for metastasis.  *  Degenerative changes as described above.  *  Moderate central canal stenosis at C6-7.      Results for orders placed during the hospital encounter of 09/19/23    MR-LUMBAR SPINE-WITH & W/O    Impression  *  Diffuse osseous metastasis.  *  Mild vertebral height loss at T12, L1, L2 and L3  *  There is no epidural tumor spread in the lumbar spine.  *  Postsurgical and degenerative changes as described above.       Results for orders placed during the hospital encounter of 09/19/23    MR-THORACIC SPINE-WITH & W/O    Impression  1.  Diffuse osseous metastasis.  2.  Epidural tumor spread at the levels of T9, T10 and T11 causing effacement of the thecal sac. There is moderate central canal stenosis at T10 and T10-11.  3.  There is no spinal cord lesion.  4.  Bilateral adrenal masses.  5.  Mild thoracic intervertebral joint degeneration.                                    Laboratory Values:         Pend  Ing admission labs        Primary Rehabilitation Diagnosis:    This patient is a 58 y.o. male admitted for  acute inpatient rehabilitation with Cerebellar mass.    Impairments:   Cognitive  ADLs/IADLs  Mobility    Secondary Diagnosis/Medical Co-morbidities Affecting Function  Metastatic prostate cancer  HTN  DM2 with hyperglycemia  NED    Relevant Changes Since Preadmission Evaluation:    Status unchanged    The patient's rehabilitation potential is Good  The patient's medical prognosis is good    Rehabilitation Plan:   Discussion and Recommendations:   1. The patient requires an acute inpatient rehabilitation program with a coordinated program of care at an intensity and frequency not available at a lower level of care. This recommendation is substantiated by the patient's medical physicians who recommend that the patient's intervention and assessment of medical issues needs to be done at an acute level of care for patient's safety and maximum outcome.   2. A coordinated program of care will be supplied by an interdisciplinary team of physical therapy, occupational therapy, rehab physician, rehab nursing, and, if needed, speech therapy and rehab psychology. Rehab team presents a patient-specific rehabilitation and education program concentrating on prevention of future problems related to accessibility, mobility, skin, bowel, bladder, sexuality, and psychosocial and medical/surgical problems.   3. Need for Rehabilitation Physician: The rehab physician will be evaluating the patient on a multi-weekly basis to help coordinate the program of care. The rehab physician communicates between medical physicians, therapists, and nurses to maximize the patient's potential outcome. Specific areas in which the rehab physician will be providing daily assessment include the following:   A. Assessing the patient's heart rate and blood pressure response (vitals monitoring) to activity and making adjustments in medications or conservative measures as needed.   B. The rehab physician will be assessing the frequency at which the program  can be increased to allow the patient to reach optimal functional outcome.   C. The rehab physician will also provide assessments in daily skin care, especially in light of patient's impairments in mobility.   D. The rehab physician will provide special expertise in understanding how to work with functional impairment and recommend appropriate interventions, compensatory techniques, and education that will facilitate the patient's outcome.   4. Rehab R.N.   The rehab RN will be working with patient to carry over in room mobility and activities of daily living when the patient is not in 3 hours of skilled therapy. Rehab nursing will be working in conjunction with rehab physician to address all the medical issues above and continue to assess laboratory work and discuss abnormalities with the treating physicians, assess vitals, and response to activity, and discuss and report abnormalities with the rehab physician. Rehab RN will also continue daily skin care, supervise bladder/bowel program, instruct in medication administration, and ensure patient safety.   5. Rehab Therapy: Therapies to treat at intensity and frequency of (may change after completion of evaluation by all therapeutic disciplines):       PT:  Physical therapy to address mobility, transfer, gait training and evaluation for adaptive equipment needs 1 hour/day at least 5 days/week for the duration of the ELOS (see below)       OT:  Occupational therapy to address ADLs, self-care, home management training, functional mobility/transfers and assistive device evaluation, and community re-integration 1  hour/day at least 5 days/week for the duration of the ELOS (see below).        ST/Dysphagia:  Speech therapy to address speech, language, and cognitive deficits as well as swallowing difficulties with retraining/dysphagia management and community re-integration with comprehension, expression, cognitive training 1  hour/day at least 5 days/week for the duration  of the ELOS (see below).     Medical management / Rehabilitation Issues/ Adverse Potential as part of rehabilitation plan     Rehabilitation Issues/Adverse Potential  1.  Cerebellar mass - Patient with 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 Be. Patient demonstrates functional deficits in strength, balance, coordination, and ADL's. Patient is admitted to [facility] for comprehensive rehabilitation therapy as described below.   Rehabilitation nursing monitors bowel and bladder control, educates on medication administration, co-morbidities and monitors patient safety    2.  Neurostimulants: None at this time but continue to assess daily for need to initiate should status change.    3.  DVT prophylaxis:  Patient is on Lovenox for anticoagulation upon transfer. Encourage OOB. Monitor daily for signs and symptoms of DVT including but not limited to swelling and pain to prevent the development of DVT that may interfere with therapies.    4.  GI prophylaxis:  On prilosec to prevent gastritis/dyspepsia which may interfere with therapies.    5.  Pain: No issues with pain currently / Controlled with APAP/Oxycodone    6.  Nutrition/Dysphagia: Dietician monitors nutrient intake, recommend supplements prn and provide nutrition education to pt/family to promote optimal nutrition for wound healing/recovery.     7.  Bladder/bowel:  Start bowel and bladder program as described below, to prevent constipation, urinary retention (which may lead to UTI), and urinary incontinence (which will impact upon pt's functional independence).   - Post void bladder scans, I&O cath for PVRs >400  - up to commode after meal     8.  Skin/dermal ulcer prophylaxis: Monitor for new skin conditions with q.2 h. turns as required to prevent the development of skin breakdown.     9.  Cognition/Behavior: As needed psychologist provides  adjustment counseling to illness and psychosocial barriers that may be potential barriers to rehabilitation.     10. Respiratory therapy: RT performs O2 management prn, breathing retraining, pulmonary hygiene and bronchospasm management prn to optimize participation in therapies.     Medical Co-Morbidities/Adverse Potential Affecting Function:   Cerebellar mass - Patient with 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 Be.  -PT and OT for mobility and ADLs. Per guidelines, 15 hours per week between PT, OT and/or SLP.  -Follow-up NSG    Metastatic prostate cancer - Patient with multiple lesions s/p radiation previously. On Decadron 2 mg daily. Follow-up Oncology    HTN - Patient on Lisinopril 20 mg and Metoprolol 25 mg daily    DM2 with hyperglycemia - Patient on Lantus and SSI on transfer.      NED - RT to consult    Pain - Patient on PRN Oxycodone and Tylenol    Skin - Patient at risk for skin breakdown due to debility in areas including sacrum, achilles, elbows and head in addition to other sites. Nursing to assess skin daily.     GI Ppx - Patient on Prilosec for GERD prophylaxis. Patient on Senna-docusate for constipation prophylaxis.   Last BM: 09/29/23    DVT Ppx - Patient Lovenox on transfer.    I personally performed a complete drug regimen review and no potential clinically significant medication issues were identified.     Goals/Expected Level of Function Based on Current Medical and Functional Status:  (may change based on patient's medical status and rate of impairment recovery)  Transfers:   Modified Independent  Mobility/Gait:   Modified Independent  ADL's:   Minimal Assistance  Cognition:  ind    DISPOSITION: Discharge to pre-morbid independent living setting with the supportive care of patient's family.    ELOS: 14-17 days  ____________________________________    T. Erick Lin MD/PhD  [facility] -  Physical Medicine & Rehabilitation   [facility] - Brain Injury Medicine   ____________________________________    Pt was seen today for 75 min, and entire time spent in face-to-face contact was >50% in counseling and coordination of care as detailed in A/P above.

## 2023-09-29 NOTE — PROGRESS NOTES
4 Eyes Skin Assessment Completed by DARA Mcdonald and DARA Bond.    Head Incision staples  Ears WDL  Nose WDL  Mouth WDL  Neck Bruising  Breast/Chest WDL  Shoulder Blades WDL  Spine WDL  (R) Arm/Elbow/Hand WDL  (L) Arm/Elbow/Hand WDL  Abdomen WDL  Groin WDL  Scrotum/Coccyx/Buttocks Redness and Blanching  (R) Leg Scab  (L) Leg Scab  (R) Heel/Foot/Toe Scab to toe  (L) Heel/Foot/Toe WDL          Devices In Places none      Interventions In Place Waffle Overlay and Pillows        Pictures Uploaded Into Epic Yes  Wound Consult Placed N/A  RN Wound Prevention Protocol Ordered Yes

## 2023-09-29 NOTE — DISCHARGE PLANNING
Patient medically cleared for DC and [facility] with [facility] transport scheduled for 7874-3251 Hrs .  CM updated patient & Significant Other/Basilia on DCP; They are both agreeable to transfer.      IRF choice form faxed to [facility] & transfer form handed to bedside nurse.

## 2023-09-29 NOTE — PROGRESS NOTES
Patient admitted to facility at 1120 via [facility]; accompanied by hospital transport.  Patient assisted to room and positioned in bed for comfort and safety; call light within reach.  Patient assisted with stowing belongings and oriented to room and facility.  Admission assessment performed and documented in computer.  Admission paperwork completed; signed copies placed in chart.  Will continue to monitor.

## 2023-09-29 NOTE — DISCHARGE PLANNING
Insurance remains pending.  Msg placed to Dr. Moreno-verifying medical clearance.    0737-Dr. Moreno has medically cleared.     0932-Insurance has authorized.      0938-Case is under review by Dr. Lin.     0955-Dr. Lin has accepted.  Transport has been arranged via [facility] between 1456-2662.  Dr. Moreno is aware.  Msg placed to Bianca CABRALES.

## 2023-09-29 NOTE — FLOWSHEET NOTE
09/29/23 1132   Events/Summary/Plan   Events/Summary/Plan RT Consult   Vital Signs   Pulse 78   Respiration 18   Pulse Oximetry 98 %   $ Pulse Oximetry (Spot Check) Yes   Respiratory Assessment   Level of Consciousness Alert   Chest Exam   Work Of Breathing / Effort Within Normal Limits   Breath Sounds   RUL Breath Sounds Clear   RML Breath Sounds Clear   RLL Breath Sounds Diminished   BRIAN Breath Sounds Clear   LLL Breath Sounds Diminished   Oxygen   O2 Delivery Device Room air w/o2 available   Smoking History   Have you ever smoked Never

## 2023-09-29 NOTE — CARE PLAN
The patient is Stable - Low risk of patient condition declining or worsening    Shift Goals  Clinical Goals: fall/seizure/aspiration precautions; monitor for neuro changes; assist with mobility needs  Patient Goals: rehab  Family Goals: LY    Progress made toward(s) clinical / shift goals:    Problem: Knowledge Deficit - Standard  Goal: Patient and family/care givers will demonstrate understanding of plan of care, disease process/condition, diagnostic tests and medications  Description: Target End Date:  1-3 days or as soon as patient condition allows    Document in Patient Education    1.  Patient and family/caregiver oriented to unit, equipment, visitation policy and means for communicating concern  2.  Complete/review Learning Assessment  3.  Assess knowledge level of disease process/condition, treatment plan, diagnostic tests and medications  4.  Explain disease process/condition, treatment plan, diagnostic tests and medications  Outcome: Progressing       Patient is not progressing towards the following goals:

## 2023-09-29 NOTE — PROGRESS NOTES
Texted Scarlett Bui to see if blood sugar checks can be changed from Q 6 hours to AC/HS since pt is no longer NPO. Order placed for AC/HS sugar checks.

## 2023-09-29 NOTE — CARE PLAN
"  Problem: Knowledge Deficit - Standard  Goal: Patient and family/care givers will demonstrate understanding of plan of care, disease process/condition, diagnostic tests and medications  Outcome: Progressing   Patient verbalized understanding admit safety education.           Problem: Fall Risk - Rehab  Goal: Patient will remain free from falls  Outcome: Progressing   Lizzy Mar Fall risk Assessment Score: 14      Moderate fall risk Interventions  - Bed and strip alarm   - Yellow sign by the door   - Yellow wrist band \"Fall risk\"  - Room near to the nurse station  - Do not leave patient unattended in the bathroom  - Fall risk education provided        "

## 2023-09-30 ENCOUNTER — APPOINTMENT (OUTPATIENT)
Dept: OCCUPATIONAL THERAPY | Facility: REHABILITATION | Age: 58
DRG: 949 | End: 2023-09-30
Attending: PHYSICAL MEDICINE & REHABILITATION
Payer: COMMERCIAL

## 2023-09-30 ENCOUNTER — APPOINTMENT (OUTPATIENT)
Dept: PHYSICAL THERAPY | Facility: REHABILITATION | Age: 58
DRG: 949 | End: 2023-09-30
Attending: PHYSICAL MEDICINE & REHABILITATION
Payer: COMMERCIAL

## 2023-09-30 ENCOUNTER — APPOINTMENT (OUTPATIENT)
Dept: SPEECH THERAPY | Facility: REHABILITATION | Age: 58
DRG: 949 | End: 2023-09-30
Attending: PHYSICAL MEDICINE & REHABILITATION
Payer: COMMERCIAL

## 2023-09-30 LAB
25(OH)D3 SERPL-MCNC: 11 NG/ML (ref 30–100)
ALBUMIN SERPL BCP-MCNC: 3.1 G/DL (ref 3.2–4.9)
ALBUMIN/GLOB SERPL: 1.3 G/DL
ALP SERPL-CCNC: 152 U/L (ref 30–99)
ALT SERPL-CCNC: 19 U/L (ref 2–50)
ANION GAP SERPL CALC-SCNC: 15 MMOL/L (ref 7–16)
AST SERPL-CCNC: 14 U/L (ref 12–45)
BASOPHILS # BLD AUTO: 0.1 % (ref 0–1.8)
BASOPHILS # BLD: 0.01 K/UL (ref 0–0.12)
BILIRUB SERPL-MCNC: 0.2 MG/DL (ref 0.1–1.5)
BUN SERPL-MCNC: 11 MG/DL (ref 8–22)
CALCIUM ALBUM COR SERPL-MCNC: 8.9 MG/DL (ref 8.5–10.5)
CALCIUM SERPL-MCNC: 8.2 MG/DL (ref 8.5–10.5)
CHLORIDE SERPL-SCNC: 100 MMOL/L (ref 96–112)
CO2 SERPL-SCNC: 21 MMOL/L (ref 20–33)
CREAT SERPL-MCNC: 0.48 MG/DL (ref 0.5–1.4)
EOSINOPHIL # BLD AUTO: 0.01 K/UL (ref 0–0.51)
EOSINOPHIL NFR BLD: 0.1 % (ref 0–6.9)
ERYTHROCYTE [DISTWIDTH] IN BLOOD BY AUTOMATED COUNT: 62.5 FL (ref 35.9–50)
EST. AVERAGE GLUCOSE BLD GHB EST-MCNC: 157 MG/DL
GFR SERPLBLD CREATININE-BSD FMLA CKD-EPI: 120 ML/MIN/1.73 M 2
GLOBULIN SER CALC-MCNC: 2.4 G/DL (ref 1.9–3.5)
GLUCOSE BLD STRIP.AUTO-MCNC: 245 MG/DL (ref 65–99)
GLUCOSE BLD STRIP.AUTO-MCNC: 295 MG/DL (ref 65–99)
GLUCOSE BLD STRIP.AUTO-MCNC: 309 MG/DL (ref 65–99)
GLUCOSE BLD STRIP.AUTO-MCNC: 335 MG/DL (ref 65–99)
GLUCOSE SERPL-MCNC: 333 MG/DL (ref 65–99)
HBA1C MFR BLD: 7.1 % (ref 4–5.6)
HCT VFR BLD AUTO: 31.3 % (ref 42–52)
HGB BLD-MCNC: 10.2 G/DL (ref 14–18)
IMM GRANULOCYTES # BLD AUTO: 0.05 K/UL (ref 0–0.11)
IMM GRANULOCYTES NFR BLD AUTO: 0.5 % (ref 0–0.9)
LYMPHOCYTES # BLD AUTO: 0.59 K/UL (ref 1–4.8)
LYMPHOCYTES NFR BLD: 6.5 % (ref 22–41)
MCH RBC QN AUTO: 31.1 PG (ref 27–33)
MCHC RBC AUTO-ENTMCNC: 32.6 G/DL (ref 32.3–36.5)
MCV RBC AUTO: 95.4 FL (ref 81.4–97.8)
MONOCYTES # BLD AUTO: 0.57 K/UL (ref 0–0.85)
MONOCYTES NFR BLD AUTO: 6.3 % (ref 0–13.4)
NEUTROPHILS # BLD AUTO: 7.87 K/UL (ref 1.82–7.42)
NEUTROPHILS NFR BLD: 86.5 % (ref 44–72)
NRBC # BLD AUTO: 0 K/UL
NRBC BLD-RTO: 0 /100 WBC (ref 0–0.2)
PLATELET # BLD AUTO: 173 K/UL (ref 164–446)
PMV BLD AUTO: 10.4 FL (ref 9–12.9)
POTASSIUM SERPL-SCNC: 3.5 MMOL/L (ref 3.6–5.5)
PROT SERPL-MCNC: 5.5 G/DL (ref 6–8.2)
RBC # BLD AUTO: 3.28 M/UL (ref 4.7–6.1)
SODIUM SERPL-SCNC: 136 MMOL/L (ref 135–145)
TSH SERPL DL<=0.005 MIU/L-ACNC: 0.4 UIU/ML (ref 0.38–5.33)
WBC # BLD AUTO: 9.1 K/UL (ref 4.8–10.8)

## 2023-09-30 PROCEDURE — 85025 COMPLETE CBC W/AUTO DIFF WBC: CPT

## 2023-09-30 PROCEDURE — 36415 COLL VENOUS BLD VENIPUNCTURE: CPT

## 2023-09-30 PROCEDURE — A9270 NON-COVERED ITEM OR SERVICE: HCPCS | Performed by: PHYSICAL MEDICINE & REHABILITATION

## 2023-09-30 PROCEDURE — 82962 GLUCOSE BLOOD TEST: CPT | Mod: 91

## 2023-09-30 PROCEDURE — 97162 PT EVAL MOD COMPLEX 30 MIN: CPT

## 2023-09-30 PROCEDURE — 83036 HEMOGLOBIN GLYCOSYLATED A1C: CPT

## 2023-09-30 PROCEDURE — 92610 EVALUATE SWALLOWING FUNCTION: CPT

## 2023-09-30 PROCEDURE — 97110 THERAPEUTIC EXERCISES: CPT

## 2023-09-30 PROCEDURE — 97535 SELF CARE MNGMENT TRAINING: CPT

## 2023-09-30 PROCEDURE — 770010 HCHG ROOM/CARE - REHAB SEMI PRIVAT*

## 2023-09-30 PROCEDURE — 700111 HCHG RX REV CODE 636 W/ 250 OVERRIDE (IP): Mod: JZ | Performed by: PHYSICAL MEDICINE & REHABILITATION

## 2023-09-30 PROCEDURE — 92523 SPEECH SOUND LANG COMPREHEN: CPT

## 2023-09-30 PROCEDURE — 97167 OT EVAL HIGH COMPLEX 60 MIN: CPT

## 2023-09-30 PROCEDURE — 700102 HCHG RX REV CODE 250 W/ 637 OVERRIDE(OP): Performed by: PHYSICAL MEDICINE & REHABILITATION

## 2023-09-30 PROCEDURE — 99232 SBSQ HOSP IP/OBS MODERATE 35: CPT | Performed by: PHYSICAL MEDICINE & REHABILITATION

## 2023-09-30 PROCEDURE — 80053 COMPREHEN METABOLIC PANEL: CPT

## 2023-09-30 PROCEDURE — 82306 VITAMIN D 25 HYDROXY: CPT

## 2023-09-30 PROCEDURE — 84443 ASSAY THYROID STIM HORMONE: CPT

## 2023-09-30 RX ADMIN — SENNOSIDES AND DOCUSATE SODIUM 2 TABLET: 50; 8.6 TABLET ORAL at 08:50

## 2023-09-30 RX ADMIN — LISINOPRIL 20 MG: 20 TABLET ORAL at 17:43

## 2023-09-30 RX ADMIN — OMEPRAZOLE 20 MG: 20 CAPSULE, DELAYED RELEASE ORAL at 08:50

## 2023-09-30 RX ADMIN — ENOXAPARIN SODIUM 40 MG: 100 INJECTION SUBCUTANEOUS at 17:43

## 2023-09-30 RX ADMIN — INSULIN HUMAN 7 UNITS: 100 INJECTION, SOLUTION PARENTERAL at 08:12

## 2023-09-30 RX ADMIN — INSULIN GLARGINE-YFGN 35 UNITS: 100 INJECTION, SOLUTION SUBCUTANEOUS at 21:48

## 2023-09-30 RX ADMIN — INSULIN HUMAN 10 UNITS: 100 INJECTION, SOLUTION PARENTERAL at 21:47

## 2023-09-30 RX ADMIN — DEXAMETHASONE 2 MG: 1 TABLET ORAL at 08:51

## 2023-09-30 RX ADMIN — INSULIN HUMAN 10 UNITS: 100 INJECTION, SOLUTION PARENTERAL at 17:38

## 2023-09-30 RX ADMIN — ACETAMINOPHEN 650 MG: 325 TABLET ORAL at 19:10

## 2023-09-30 RX ADMIN — METOPROLOL SUCCINATE 25 MG: 25 TABLET, EXTENDED RELEASE ORAL at 08:51

## 2023-09-30 RX ADMIN — ZOLPIDEM TARTRATE 10 MG: 5 TABLET ORAL at 21:44

## 2023-09-30 RX ADMIN — INSULIN HUMAN 4 UNITS: 100 INJECTION, SOLUTION PARENTERAL at 11:42

## 2023-09-30 ASSESSMENT — ACTIVITIES OF DAILY LIVING (ADL)
TOILET_TRANSFER_DESCRIPTION: GRAB BAR;SET-UP OF EQUIPMENT;SUPERVISION FOR SAFETY;VERBAL CUEING;INCREASED TIME
BED_CHAIR_WHEELCHAIR_TRANSFER_DESCRIPTION: INCREASED TIME;SUPERVISION FOR SAFETY;VERBAL CUEING;INITIAL PREPARATION FOR TASK
TOILETING: INDEPENDENT
BED_CHAIR_WHEELCHAIR_TRANSFER_DESCRIPTION: ADAPTIVE EQUIPMENT;INCREASED TIME;REQUIRES LIFT

## 2023-09-30 ASSESSMENT — BRIEF INTERVIEW FOR MENTAL STATUS (BIMS)
ASKED TO RECALL BLUE: NO, COULD NOT RECALL
WHAT YEAR IS IT: CORRECT
ASKED TO RECALL BED: NO, COULD NOT RECALL
WHAT DAY OF THE WEEK IS IT: CORRECT
INITIAL REPETITION OF BED BLUE SOCK - FIRST ATTEMPT: 3
WHAT YEAR IS IT: CORRECT
ASKED TO RECALL SOCK: NO, COULD NOT RECALL
ASKED TO RECALL BED: NO, COULD NOT RECALL
INITIAL REPETITION OF BED BLUE SOCK - FIRST ATTEMPT: 3
WHAT MONTH IS IT: ACCURATE WITHIN 5 DAYS
BIMS SUMMARY SCORE: 9
BIMS SUMMARY SCORE: 10
ASKED TO RECALL SOCK: YES, AFTER CUEING (SOMETHING TO WEAR")"
WHAT MONTH IS IT: ACCURATE WITHIN 5 DAYS
WHAT DAY OF THE WEEK IS IT: CORRECT
ASKED TO RECALL BLUE: NO, COULD NOT RECALL

## 2023-09-30 ASSESSMENT — PAIN DESCRIPTION - PAIN TYPE: TYPE: ACUTE PAIN

## 2023-09-30 ASSESSMENT — GAIT ASSESSMENTS
DEVIATION: DECREASED BASE OF SUPPORT;DECREASED HEEL STRIKE;DECREASED TOE OFF
ASSISTIVE DEVICE: FRONT WHEEL WALKER
GAIT LEVEL OF ASSIST: MINIMAL ASSIST
DISTANCE (FEET): 75

## 2023-09-30 NOTE — THERAPY
"Speech Language Pathology   Initial Assessment     Patient Name: Casper Rowley  AGE:  58 y.o., SEX:  male  Medical Record #: [id number]  Today's Date: 9/30/2023     Subjective    Per H&P, \"The patient is a 58 y.o. right hand dominant male with a past medical history of prostate cancer with mets to spine status post decompression/fusion in March, type 2 diabetes, hyperlipidemia, hypertension;  who presented on 9/19/2023 12:50 PM with intractable nausea vomiting.  Work-up with CT head found new cerebellar mass with vasogenic edema which was confirmed on MRI.  Patient also had urinary retention requiring Mistry placement.  Patient was started on Decadron, hypertonic saline, and seen by neurosurgery who performed craniotomy on 9/21 with EVD placement by Dr. Archana Be MD.  EVD removed 9/24.\"    Pt received already eating in dining room. Pt pleasant and agreeable to ST eval, but stated \"I need a pen and paper if you are going to ask me to remember words\" as \"I could not remember the words they told me to remember at the hospital.\" Pt reported some minor memory deficits at baseline and used notes to help him remember.      Objective       09/30/23 0801   Evaluation Charges   SLP Speech Language Evaluation Speech Sound Language Comprehension   SLP Oral Pharyngeal Evaluation Oral Pharyngeal Evaluation   SLP Total Time Spent   SLP Individual Total Time Spent (Mins) 90   Prior Level Of Function   Swallow Within Functional Limits   Dentition Other (See Comments)  (2 upper teeth missing)   Patient's Primary Language English   Occupation (Pre-Hospital Vocational) Retired Due To Disability  (from: )   Brief Interview for Mental Status (BIMS)   Repetition of Three Words (First Attempt) 3   Temporal Orientation: Year Correct   Temporal Orientation: Month Accurate within 5 days   Temporal Orientation: Day Correct   Recall: \"Sock\" No, could not recall   Recall: \"Blue\" No, could not recall   Recall: \"Bed\" No, could " not recall   BIMS Summary Score 9   Social / Pragmatic Communication   Eye Contact Within Functional Limits (6-7)   Turn Taking Moderate (3)   Topic Maintenance Moderate (3)  (tangential)   Attention to Social Cues Moderate (3)   Tracheostomy   Tracheostomy No   Labial Function   Labial Structure At Rest Within Functional Limits   Labial Vowel Production / I /, / U / Within Functional Limits   Labial Sequence / I /, / U / Within Functional Limits   Lingual Function   Lingual Function (WDL) WDL   Lingual Structure At Rest Within Functional Limits   Lingual Protrude Within Functional Limits   Lateralization No Impairment Right;No Impairment Left   Lick Lips (Circular) Within Functional Limits   Jaw Function   Jaw Structure At Rest Within Functional Limits   Jaw Open / Resist Within Functional Limits   Jaw Close / Resist Within Functional Limits   Velar Function   Velar Structure At Rest Within Functional Limits   / A / Prolonged Within Functional Limits   / A /  5X's Within Functional Limits   Gag / Palatal Reflex Moderate  (Absent bilaterally)   Laryngeal Function   Voice Quality Within Functional Limits   Volutional Cough Within Functional Limits   Excursion Upon Swallow Complete   Swallowing   Thin Liquid Within Functional Limits   Masticated Foods Within Functional Limits   Dysphagia Strategies / Recommendations   Strategies / Interventions Recommended (Yes / No) No   Swallowing/Nutritional Status   Swallowing/Nutritional Status Regular food   Functional Level of Assist   Eating Independent   Comprehension Independent   Expression Independent   Social Interaction Minimal Assist   Social Interaction Description Other (comment)  (tangetial and lacks turn taking.)   Problem Solving Minimal Assist   Problem Solving Description Adaptive equipment;Increased time;Medication;Therapy schedule   Memory Moderate Assist   Memory Description Adaptive equipment;Increased time;Medication;Therapy schedule   SHAYY (Perez Assessment  of Swallowing Ability)   Alertness 10   Cooperation 10   Auditory Comprehension 10   Respiration 10   Respiratory Rate for Swallow 5   Dysphasia 5   Dyspraxia 5   Dysarthria 5   Saliva 5   Lip Seal 5   Tongue Movement 10   Tongue Strength 10   Tongue Coordination 10   Oral Preparation 10   Gag 1   Palate 10   Bolus Clearance 10   Oral Transit 10   Cough Reflex 5   Voluntary Cough 10   Voice 10   Trache 10   Pharygneal Phase 10   Pharyngeal Response 10   Diet Recommendations Regular   Fluid Recommendations Regular   Swallow Integrity Unlikely dysphagia/aspiration   Total Score 196   Dysphagia Severity No abnormality detected   Aspiration Severity No abnormality detected   SCCAN (Scales of Cognitive and Communicative Ability for Neurorehabilitation)   Oral Expression - Raw Score 17   Oral Expression - Scale Performance Score 89   Orientation - Raw Score 12   Orientation - Scale Performance Score 100   Memory - Raw Score 11   Memory - Scale Performance Score 58   Speech Comprehension - Raw Score 12   Speech Comprehension - Scale Performance Score 92   Reading Comprehension - Raw Score 11   Reading Comprehension - Scale Performance Score 92   Writing - Raw Score 7   Writing - Scale Performance Score 100   Attention - Raw Score 13   Attention - Scale Performance Score 81   Problem Solving - Raw Score 18   Problem Solving - Scale Performance Score 78   SCCAN Total Raw Score 80   SCCAN Degree of Severity Mild Impairment   Problem List   Problem List Cognitive-Linguistic Deficits;Communication Deficits   Current Discharge Plan   Current Discharge Plan Return to Prior Living Situation   Benefit   Therapy Benefit Patient would benefit from Inpatient Rehab Speech-Language Pathology to address above identified deficits.   Interdisciplinary Plan of Care Collaboration   IDT Collaboration with  Nursing   Patient Position at End of Therapy In Bed;Bed Alarm On;Call Light within Reach;Tray Table within Reach   Collaboration Comments  Nursing present during breakfast and requested kitchen provide him eggs for extra protien during this meal. Nursing joined pt in SLP office during eval to administer medications   Strengths & Barriers   Strengths Able to follow instructions;Motivated for self care and independence;Pleasant and cooperative;Willingly participates in therapeutic activities   Barriers Impaired insight/denial of deficits;Impaired functional cognition;Impulsive   Speech Language Pathologist Assigned   Assigned SLP / Treatment Time / Comments EA 30 cog       Assessment    Swallow: Pt currently on regular textures with thin liquids.     Pt completed oral [facility] exam. He was able to follow all verbal directives with no difficulty. No deficit observed with labial, lingual coordination or strength. Patient with strong functional volitional cough. Velum symmetrical and stimuable to movement during vocal tasks; however, gag reflex absent bilaterally.     Pt trialed regular textures including sausages, pancakes, eggs, and peaches. Pt with variable bite size and pacing. Quick, but functional mastication of solids. Pt with slight impulsivity noted as he attempted to talking while eating, which largely affected his pacing of self feeding. Pt trialed sips of thin liquids from the side of the cup. Single sips and sequential sips observed without difficulty. Pt with no overt s/sx of aspiration across all consistencies trialed. Dysphagia therapy not indicated at this time.     Cognition: Pt does not believe his cognition has been impacted since the removal of the cerebellar mass. Pt noted to be tangential throughout the evaluation and to consistently jump topics. Unsure if pt was tangential at baseline.     Patient received Scales of Cognitive and Communicative Ability for Neurorehabilitation (SCCAN) to objectively assess pt current cognitive abilities. Pt achieved an overall score of 80 indicating pt with mild impairment. He achieved the following scores  for the corresponding subtest's: 89% oral expression (Characterized as typical functioning), 100% orientation (characterized as typical functioning), 58% memory (characterized as moderate impairment), 92% speech comprehension (characterized as typical functioning), 92% reading comprehension (characterized as typical functioning), 100% writing (characterized as typical functioning), 81% attention (characterized as mild impairment), and 78% problem solving (characterized by mild impairment). Pt with difficulty with including all relevant details when verbally describing a picture, verbal and written sequencing of carrying out multi step activities, recalling which medications need to be taken following delayed response. Pt with difficulty filling in a clock in which he tried twice without success, even though he reported he felt they were accurate. Pt reported that he is agreeable to receiving speech therapy, but would like to focus mainly on physical movement.     Plan  Recommend Speech Therapy 30-60 minutes per day 5-6 days per week for 2 weeks for the following treatments:  SLP Cognitive Skill Development.  Recommend 30 minutes of ST tx.     Passport items to be completed:  Express basic needs, understand food/liquid recommendations, consistently follow swallow precautions, manage finances, manage medications, arrive to therapy appointments on time, complete daily memory log entries, solve problems related to safety situations, review education related to hospitalization, complete caregiver training     Goals:  Long term and short term goals have been discussed with patient and they are in agreement.    Speech Therapy Problems (Active)       Problem: Memory STGs       Dates: Start:  09/30/23         Goal: STG-Within one week, patient will recall daily events and safety strategies given mod A and external aids.       Dates: Start:  09/30/23            Goal: STG-Within one week, patient will engage in alternating  attention tasks with 75% given mod A       Dates: Start:  09/30/23               Problem: Problem Solving STGs       Dates: Start:  09/30/23         Goal: STG-Within one week, patient will engage in problem solving tasks (medication management, sequencing) with 75% given mod A.       Dates: Start:  09/30/23               Problem: Speech/Swallowing LTGs       Dates: Start:  09/30/23         Goal: LTG-By discharge, patient will solve complex problems with min A for safe d/c home       Dates: Start:  09/30/23

## 2023-09-30 NOTE — CARE PLAN
"  Problem: Fall Risk - Rehab  Goal: Patient will remain free from falls  Note: Lizzy Mar Fall risk Assessment Score: 14      Moderate fall risk Interventions  - Bed and strip alarm   - Yellow sign by the door   - Yellow wrist band \"Fall risk\"  - Room near to the nurse station  - Do not leave patient unattended in the bathroom  - Fall risk education provided      Problem: Diabetes Management  Goal: Patient's ability to maintain appropriate glucose levels will be maintained or improve  Note: HS [id number], Insulin given as ordered. Patient is on Decadron. Patient bought chewable candy via door dash, c/o dry mouth. Education given to need to eat sugar free to be compliant of Diabetic diet. CN notified.     Problem: Pain - Standard  Goal: Alleviation of pain or a reduction in pain to the patient’s comfort goal  Note: Educate patient of non-pharmacological comfort measures: repositioning, relaxation/breathing technique, cold compress and activities.           The patient is Stable - Low risk of patient condition declining or worsening    Shift Goals  Clinical Goals: Safety  Patient Goals: Rest      "

## 2023-09-30 NOTE — PROGRESS NOTES
NURSING DAILY NOTE    Name: Casper Rowley   Date of Admission: 9/29/2023   Admitting Diagnosis: Cerebellar mass  Attending Physician: Kasie Lin M.d.  Allergies: Patient has no known allergies.    Safety  Patient Assist  mx 2  Patient Precautions     Precaution Comments     Bed Transfer Status     Toilet Transfer Status      Assistive Devices  Gait Belt, Rails, Wheelchair  Oxygen  None - Room Air  Diet/Therapeutic Dining  Current Diet Order   Procedures    Diet Order Diet: Consistent CHO (Diabetic)     Pill Administration  whole  Agitated Behavioral Scale     ABS Level of Severity       Fall Risk  Has the patient had a fall this admission?   No  Lizzy Mar Fall Risk Scoring  14, MODERATE RISK  Fall Risk Safety Measures  bed alarm and chair alarm    Vitals  Temperature: 37.2 °C (99 °F)  Temp src: Oral  Pulse: 84  Respiration: 18  Blood Pressure: 116/68  Blood Pressure MAP (Calculated): 84 MM HG  BP Location: Right, Upper Arm  Patient BP Position: Supine     Oxygen  Pulse Oximetry: 95 %  O2 Delivery Device: None - Room Air    Bowel and Bladder  Last Bowel Movement  09/29/23  Stool Type  Type 4: Like a sausage or snake, smooth and soft  Bowel Device  Bathroom  Continent  Bladder: Continent void   Bowel: Continent movement  Bladder Function  Urine Void (mL): 500 ml  Number of Times Voided: 1  Urine Color: Unable To Evaluate  Number of Times Incontinent of Urine: 0  Genitourinary Assessment   Bladder Assessment (WDL):  Within Defined Limits  Urine Color: Unable To Evaluate  Number of Bladder Accidents: 0  Total Number of Bladder of Accidents in Last 7 Days: 0  Number of Times Incontinent of Urine: 0  Bladder Device: Bathroom  Time Void: Yes  Bladder Scan: Post Void  $ Bladder Scan Results (mL): 1    Skin  Ravin Score   18  Sensory Interventions   Bed Types: Standard/Trauma Mattress  Skin Preventative Measures: Waffle Overlay, Pillows in Use  for Support / Positioning  Moisture Interventions         Pain  Pain Rating Scale  0 - No Pain (sleeping)  Pain Location  Head  Pain Location Orientation  Right  Pain Interventions   Medication (see MAR)    ADLs    Bathing      Linen Change      Personal Hygiene     Chlorhexidine Bath      Oral Care     Teeth/Dentures     Shave     Nutrition Percentage Eaten  Lunch, Between % Consumed  Environmental Precautions     Patient Turns/Positioning  Patient Turns Self from Side to Side  Patient Turns Assistance/Tolerance     Bed Positions     Head of Bed Elevated         Psychosocial/Neurologic Assessment  Psychosocial Assessment  Psychosocial (WDL):  Within Defined Limits  Neurologic Assessment  Neuro (WDL): Exceptions to WDL (Craniotomy.)  Level of Consciousness: Alert  Orientation Level: Oriented X4  Cognition: Appropriate judgement, Appropriate safety awareness, Appropriate attention/concentration  Speech: Clear  Pupil Assesment: Yes  R Pupil Size (mm): 3  R Pupil Shape / Description: Round  R Pupil Reaction: Brisk  L Pupil Size (mm): 3  L Pupil Shape / Description: Round  L Pupil Reaction: Brisk       Cardio/Pulmonary Assessment  Edema      Respiratory Breath Sounds  RUL Breath Sounds: Clear  RML Breath Sounds: Clear  RLL Breath Sounds: Diminished  BRIAN Breath Sounds: Clear  LLL Breath Sounds: Diminished  Cardiac Assessment   Cardiac (WDL):  WDL Except

## 2023-09-30 NOTE — PROGRESS NOTES
"  Physical Medicine & Rehabilitation Progress Note  Encounter Date: 9/30/2023    Chief Complaint:   Weakness    Interval Events (Subjective):  Hyperglycemia.  Asymptomatic.  No acute events overnight.  Patient is excited to work with therapy to get stronger      Objective:  VITAL SIGNS: VITAL SIGNS:   Vitals:    09/29/23 1132 09/29/23 1200 09/29/23 1903 09/30/23 0700   BP:  123/79 116/68 124/78   Pulse: 78 75 84 90   Resp: 18 17 18 14   Temp:  36.3 °C (97.4 °F) 37.2 °C (99 °F) 36.1 °C (97 °F)   TempSrc:  Oral Oral Temporal   SpO2: 98% 98% 95% 99%   Weight:  80.5 kg (177 lb 8 oz)     Height:  1.702 m (5' 7\")           Gen: NAD  Psych: Mood and affect appropriate  CV: RRR,  edema  Resp: CTAB, no upper airway sounds  Abd: NTND  Neuro: Alert and oriented x3    Laboratory Values:  Recent Results (from the past 72 hour(s))   POCT glucose device results    Collection Time: 09/27/23  5:29 PM   Result Value Ref Range    POC Glucose, Blood 306 (H) 65 - 99 mg/dL   POCT glucose device results    Collection Time: 09/27/23 11:25 PM   Result Value Ref Range    POC Glucose, Blood 424 (HH) 65 - 99 mg/dL   POCT glucose device results    Collection Time: 09/28/23  5:57 AM   Result Value Ref Range    POC Glucose, Blood 322 (H) 65 - 99 mg/dL   POCT glucose device results    Collection Time: 09/28/23 11:37 AM   Result Value Ref Range    POC Glucose, Blood 261 (H) 65 - 99 mg/dL   POCT glucose device results    Collection Time: 09/28/23  5:02 PM   Result Value Ref Range    POC Glucose, Blood 279 (H) 65 - 99 mg/dL   POCT glucose device results    Collection Time: 09/28/23  9:24 PM   Result Value Ref Range    POC Glucose, Blood 354 (H) 65 - 99 mg/dL   POCT glucose device results    Collection Time: 09/29/23  7:37 AM   Result Value Ref Range    POC Glucose, Blood 202 (H) 65 - 99 mg/dL   POCT glucose device results    Collection Time: 09/29/23 11:54 AM   Result Value Ref Range    POC Glucose, Blood 195 (H) 65 - 99 mg/dL   POCT glucose device " results    Collection Time: 09/29/23  5:28 PM   Result Value Ref Range    POC Glucose, Blood 237 (H) 65 - 99 mg/dL   POCT glucose device results    Collection Time: 09/29/23  9:22 PM   Result Value Ref Range    POC Glucose, Blood 323 (H) 65 - 99 mg/dL   CBC with Differential    Collection Time: 09/30/23  5:42 AM   Result Value Ref Range    WBC 9.1 4.8 - 10.8 K/uL    RBC 3.28 (L) 4.70 - 6.10 M/uL    Hemoglobin 10.2 (L) 14.0 - 18.0 g/dL    Hematocrit 31.3 (L) 42.0 - 52.0 %    MCV 95.4 81.4 - 97.8 fL    MCH 31.1 27.0 - 33.0 pg    MCHC 32.6 32.3 - 36.5 g/dL    RDW 62.5 (H) 35.9 - 50.0 fL    Platelet Count 173 164 - 446 K/uL    MPV 10.4 9.0 - 12.9 fL    Neutrophils-Polys 86.50 (H) 44.00 - 72.00 %    Lymphocytes 6.50 (L) 22.00 - 41.00 %    Monocytes 6.30 0.00 - 13.40 %    Eosinophils 0.10 0.00 - 6.90 %    Basophils 0.10 0.00 - 1.80 %    Immature Granulocytes 0.50 0.00 - 0.90 %    Nucleated RBC 0.00 0.00 - 0.20 /100 WBC    Neutrophils (Absolute) 7.87 (H) 1.82 - 7.42 K/uL    Lymphs (Absolute) 0.59 (L) 1.00 - 4.80 K/uL    Monos (Absolute) 0.57 0.00 - 0.85 K/uL    Eos (Absolute) 0.01 0.00 - 0.51 K/uL    Baso (Absolute) 0.01 0.00 - 0.12 K/uL    Immature Granulocytes (abs) 0.05 0.00 - 0.11 K/uL    NRBC (Absolute) 0.00 K/uL   Comp Metabolic Panel (CMP)    Collection Time: 09/30/23  5:42 AM   Result Value Ref Range    Sodium 136 135 - 145 mmol/L    Potassium 3.5 (L) 3.6 - 5.5 mmol/L    Chloride 100 96 - 112 mmol/L    Co2 21 20 - 33 mmol/L    Anion Gap 15.0 7.0 - 16.0    Glucose 333 (H) 65 - 99 mg/dL    Bun 11 8 - 22 mg/dL    Creatinine 0.48 (L) 0.50 - 1.40 mg/dL    Calcium 8.2 (L) 8.5 - 10.5 mg/dL    Correct Calcium 8.9 8.5 - 10.5 mg/dL    AST(SGOT) 14 12 - 45 U/L    ALT(SGPT) 19 2 - 50 U/L    Alkaline Phosphatase 152 (H) 30 - 99 U/L    Total Bilirubin 0.2 0.1 - 1.5 mg/dL    Albumin 3.1 (L) 3.2 - 4.9 g/dL    Total Protein 5.5 (L) 6.0 - 8.2 g/dL    Globulin 2.4 1.9 - 3.5 g/dL    A-G Ratio 1.3 g/dL   HEMOGLOBIN A1C    Collection  Time: 09/30/23  5:42 AM   Result Value Ref Range    Glycohemoglobin 7.1 (H) 4.0 - 5.6 %    Est Avg Glucose 157 mg/dL   TSH with Reflex to FT4    Collection Time: 09/30/23  5:42 AM   Result Value Ref Range    TSH 0.400 0.380 - 5.330 uIU/mL   Vitamin D, 25-hydroxy (blood)    Collection Time: 09/30/23  5:42 AM   Result Value Ref Range    25-Hydroxy   Vitamin D 25 11 (L) 30 - 100 ng/mL   ESTIMATED GFR    Collection Time: 09/30/23  5:42 AM   Result Value Ref Range    GFR (CKD-EPI) 120 >60 mL/min/1.73 m 2   POCT glucose device results    Collection Time: 09/30/23  7:53 AM   Result Value Ref Range    POC Glucose, Blood 295 (H) 65 - 99 mg/dL   POCT glucose device results    Collection Time: 09/30/23 11:39 AM   Result Value Ref Range    POC Glucose, Blood 245 (H) 65 - 99 mg/dL       Medications:  Scheduled Medications   Medication Dose Frequency    Pharmacy Consult Request  1 Each PHARMACY TO DOSE    senna-docusate  2 Tablet BID    omeprazole  20 mg DAILY    dexamethasone  2 mg DAILY    enoxaparin (LOVENOX) injection  40 mg DAILY AT 1800    insulin GLARGINE  35 Units Q EVENING    insulin regular  3-14 Units 4X/DAY ACHS    lisinopril  20 mg DAILY    metoprolol SR  25 mg DAILY     PRN medications: Respiratory Therapy Consult, hydrALAZINE, acetaminophen, senna-docusate **AND** polyethylene glycol/lytes **AND** magnesium hydroxide **AND** bisacodyl, mag hydrox-al hydrox-simeth, ondansetron **OR** ondansetron, traZODone, sodium chloride, oxyCODONE immediate-release **OR** oxyCODONE immediate-release, midazolam, insulin regular **AND** POC blood glucose manual result **AND** NOTIFY MD and PharmD **AND** Administer 20 grams of glucose (approximately 8 ounces of fruit juice) every 15 minutes PRN FSBG less than 70 mg/dL **AND** dextrose bolus, meclizine, promethazine, zolpidem    Bowel:  Last Documented BM Date:Last BM: 09/29/23  Last Documented BM Description: Stool Description: Medium    Bladder:  Last Documented Urine Void (mL):  500 ml  Last Documented Bladder Scan: Post Void  Last Documented Bladder Scan Results (mL): 1    Physical Therapy:  Bed Mobility    Transfers        Mobility     Stairs    Barriers to Discharge Home:      Occupational Therapy:  Grooming     Bathing     UB Dressing     LB Dressing     Toileting     Shower & Transfer     Barriers to Discharge Home:    Diet:  Current Diet Order   Procedures    Diet Order Diet: Consistent CHO (Diabetic)     Medical Decision Making and Plan:  Cerebellar mass with metastatic cancer status post resection 9/21/2023 by Dr. Be  Comprehensive inpatient rehabilitation with physical therapy and Occupational Therapy  Follow-up with neurosurgery and oncology postdischarge    DVT prophylaxis on Lovenox    Pain controlled . continue APAP oxycodone    Neurogenic bladder/bowel:  bowel and bladder program as described below, to prevent constipation, urinary retention (which may lead to UTI), and urinary incontinence (which will impact upon pt's functional independence).   - Post void bladder scans, I&O cath for PVRs >400  - up to commode after meal     Diabetes mellitus type 2 with hyperglycemia likely complicated by steroids.  Asymptomatic.  Glucose mainly in the 200s.  -Continue Lantus and SSI  -Hospitalist consulted      _____________________________________    Keven Amaral MD  [facility] - Physical Medicine & Rehabilitation     _____________________________________

## 2023-09-30 NOTE — THERAPY
Physical Therapy   Initial Evaluation     Patient Name: Casper Rowley  Age:  58 y.o., Sex:  male  Medical Record #: [id number]  Today's Date: 9/30/2023     Subjective    Pt up in wc, ready for PT     Objective       09/30/23 1231   PT Charge Group   Charges Yes   PT Therapeutic Exercise (Units) 1   PT Evaluation PT Evaluation Mod   PT Total Time Spent   PT Individual Total Time Spent (Mins) 60   Prior Living Situation   Housing / Facility 1 Story Apartment / Condo   Steps Into Home 0  (threshold)   Steps In Home 0   Equipment Owned Front-Wheel Walker;4-Wheel Walker;Single Point Cane;Tub Transfer Bench   Lives with - Patient's Self Care Capacity Significant Other   Prior Level of Functional Mobility   Bed Mobility Independent   Transfer Status Independent   Ambulation Independent   Distance Ambulation (Feet)   (household and limited community)   Assistive Devices Used 4-Wheel Walker   Stairs Required Assist   Prior Functioning: Everyday Activities   Self Care Independent   Indoor Mobility (Ambulation) Independent   Stairs Not applicable   Functional Cognition Independent   Prior Device Use Walker   Passive ROM Lower Body   Passive ROM Lower Body X   Lt Ankle Dorsiflexion Degrees 0   Rt Ankle Dorsiflexion Degrees 0   Active ROM Lower Body    Active ROM Lower Body  X   Gross Active ROM Gross Active Range of Motion Impaired,  but Appears Adequate for Functional Mobility.   Comments limited by weakness   Strength Lower Body   Lower Body Strength  X   Rt Hip Flexion Strength 2+ (P+)   Rt Hip Abduction Strength 3+ (F+)   Rt Hip Adduction Strength 2+ (P+)   Rt Knee Flexion Strength 3+ (F+)   Rt Knee Extension Strength 3+ (F+)   Rt Ankle Dorsiflexion Strength 1 (T)   Rt Ankle Plantar Flexion Strength 3- (F-)   Lt Hip Flexion Strength 2+ (P+)   Lt Hip Abduction Strength 3+ (F+)   Lt Hip Adduction Strength 2+ (P+)   Lt Knee Flexion Strength 3+ (F+)   Lt Knee Extension Strength 3+ (F+)   Lt Ankle Dorsiflexion Strength 1  (T)   Lt Ankle Plantar Flexion Strength 3- (F-)   Balance Assessment   Sitting Balance (Static) Fair   Sitting Balance (Dynamic) Fair   Standing Balance (Static) Poor +   Standing Balance (Dynamic) Poor +   Weight Shift Sitting Good   Weight Shift Standing Fair   Comments UE support for standing   Bed Mobility    Supine to Sit Minimal Assist   Sit to Supine Minimal Assist   Sit to Stand Minimal Assist   Scooting Minimal Assist   Rolling Contact Guard Assist   Neurological Concerns   Neurological Concerns Yes   Footdrop Present   Comments B ankle weakness/ foot drop   Coordination Lower Body    Coordination Lower Body  X   Comments limited by weakness   Roll Left and Right   Assistance Needed Incidental touching   CARE Score - Roll Left and Right 4   Roll Left and Right Discharge Goal   Discharge Goal 6   Sit to Lying   Assistance Needed Physical assistance   Physical Assistance Level 25% or less   CARE Score - Sit to Lying 3   Sit to Lying Discharge Goal   Discharge Goal 6   Lying to Sitting on Side of Bed   Assistance Needed Physical assistance   Physical Assistance Level 25% or less   CARE Score - Lying to Sitting on Side of Bed 3   Lying to Sitting on Side of Bed Discharge Goal   Discharge Goal 6   Sit to Stand   Assistance Needed Adaptive equipment;Physical assistance   Physical Assistance Level 26%-50%   CARE Score - Sit to Stand 3   Sit to Stand Discharge Goal   Discharge Goal 6   Chair/Bed-to-Chair Transfer   Assistance Needed Adaptive equipment;Physical assistance   Physical Assistance Level 26%-50%   CARE Score - Chair/Bed-to-Chair Transfer 3   Chair/Bed-to-Chair Transfer Discharge Goal   Discharge Goal 6   Car Transfer   Reason if not Attempted Safety concerns   CARE Score - Car Transfer 88   Car Transfer Discharge Goal   Discharge Goal 4   Walk 10 Feet   Assistance Needed Adaptive equipment;Physical assistance   Physical Assistance Level 26%-50%   CARE Score - Walk 10 Feet 3   Walk 10 Feet Discharge Goal    Discharge Goal 6   Walk 50 Feet with Two Turns   Assistance Needed Adaptive equipment;Physical assistance   Physical Assistance Level 26%-50%   CARE Score - Walk 50 Feet with Two Turns 3   Walk 50 Feet with Two Turns Discharge Goal   Discharge Goal 6   Walk 150 Feet   Reason if not Attempted Safety concerns   CARE Score - Walk 150 Feet 88   Walk 150 Feet Discharge Goal   Discharge Goal 4   Walking 10 Feet on Uneven Surfaces   Reason if not Attempted Safety concerns   CARE Score - Walking 10 Feet on Uneven Surfaces 88   Walking 10 Feet on Uneven Surfaces Discharge Goal   Discharge Goal 4   1 Step (Curb)   Reason if not Attempted Safety concerns   CARE Score - 1 Step (Curb) 88   1 Step (Curb) Discharge Goal   Discharge Goal 3   4 Steps   Reason if not Attempted Safety concerns   CARE Score - 4 Steps 88   4 Steps Discharge Goal   Discharge Goal 88   12 Steps   Reason if not Attempted Safety concerns   CARE Score - 12 Steps 88   12 Steps Discharge Goal   Discharge Goal 88   Picking Up Object   Reason if not Attempted Safety concerns   CARE Score - Picking Up Object 88   Picking Up Object Discharge Goal   Discharge Goal 4   Wheel 50 Feet with Two Turns   Assistance Needed Supervision   CARE Score - Wheel 50 Feet with Two Turns 4   Type of Wheelchair/Scooter Manual   Wheel 50 Feet with Two Turns Discharge Goal   Discharge Goal 6   Wheel 150 Feet   Assistance Needed Physical assistance   Physical Assistance Level 51%-75%   CARE Score - Wheel 150 Feet 2   Type of Wheelchair/Scooter Manual   Wheel 150 Feet Discharge Goal   Discharge Goal 6   Gait Functional Level of Assist    Gait Level Of Assist Minimal Assist   Assistive Device Front Wheel Walker  (toe up strap for foot drop B)   Distance (Feet) 75  (in addition to 10 ft x 3)   # of Times Distance was Traveled 2   Deviation Decreased Base Of Support;Decreased Heel Strike;Decreased Toe Off   Wheelchair Functional Level of Assist   Wheelchair Assist Stand by Assist  "  Distance Wheelchair (Feet or Distance) 50   Wheelchair Description Extra time;Limited by fatigue   Stairs Functional Level of Assist   Level of Assist with Stairs Unable to Participate   Transfer Functional Level of Assist   Bed, Chair, Wheelchair Transfer Moderate Assist   Bed Chair Wheelchair Transfer Description Adaptive equipment;Increased time;Requires lift   Problem List    Problems Impaired Bed Mobility;Impaired Transfers;Impaired Ambulation;Functional ROM Deficit;Functional Strength Deficit;Impaired Balance;Decreased Activity Tolerance   Precautions   Precautions Fall Risk   Current Discharge Plan   Current Discharge Plan Return to Prior Living Situation   Benefit   Therapy Benefit Patient Would Benefit from Inpatient Rehabilitation Physical Therapy to Maximize Functional [place] with ADLs, IADLs and Mobility.   Strengths & Barriers   Strengths Able to follow instructions;Effective communication skills;Good insight into deficits/needs;Independent prior level of function;Motivated for self care and independence;Pleasant and cooperative;Supportive family;Willingly participates in therapeutic activities   Barriers Decreased endurance;Fatigue;Generalized weakness;Impaired balance;Limited mobility  (metastatic disease / LE swelling)     Pt oriented to rehab/ PT and discussed goals.  Pt completed ambulation with FWW and hospital non-skid socks 10 ft x 3, trial use of shoes with toe-up foot straps B and FWW 75 ft x 2 as noted, improved toe clearance with toe up strap but pt reports shoes being \"heavy\".  Pt completed supine exercises x 10 for heel slides/ sidelying clam shells with AAROM.    Assessment  Patient is 58 y.o. 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  Additional factors influencing patient status / progress  include PMH for prostate cancer, mets to spine post decompression/fusion in March, DM, HTN and HLD.  Pt reports being modified independent with SPC vs " 4WW for household and limited community mobility and reports driving self to out pt PT prior to admit. Pt presents to rehab with significant LE weakness, B foot drop, limited ankle ROM, LE swelling, limited activity tolerance, requires min/mod A for bed mobility, sit<>stand/transfers with FWW and limited distance ambulation with min A and FWW, is unable to assess stairs due to LE weakness and reports of progressive weakness recently and fear of falling. Pt reports supportive SO and friends, is motivated for improving strength/ endurance and independence for safe d/c home with anticipated HHPT.     Plan  Recommend Physical Therapy  minutes per day 5-7 days per week for 2 weeks for the following treatments:  PT Group Therapy, PT Gait Training, PT Self Care/Home Eval, PT Therapeutic Exercises, PT Neuro Re-Ed/Balance, PT Aquatic Therapy, PT Therapeutic Activity, PT Manual Therapy, and PT Evaluation.    Passport items to be completed:  Get in/out of bed safely, in/out of a vehicle, safely use mobility device, walk or wheel around home/community, navigate up and down stairs, show how to get up/down from the ground, ensure home is accessible, demonstrate HEP, complete caregiver training    Goals:  Long term and short term goals have been discussed with patient and they are in agreement.    Physical Therapy Problems (Active)       Problem: Balance       Dates: Start:  09/30/23         Goal: STG-Within one week, patient will maintain dynamic standing to complete 10 MWT with FWW and SBA       Dates: Start:  09/30/23               Problem: Mobility       Dates: Start:  09/30/23         Goal: STG-Within one week, patient will ambulate community distances with FWW and CGA x 150 ft       Dates: Start:  09/30/23               Problem: Mobility Transfers       Dates: Start:  09/30/23         Goal: STG-Within one week, patient will perform bed mobility SPV       Dates: Start:  09/30/23            Goal: STG-Within one week,  patient will transfer bed to chair SPV after set-up with FWW       Dates: Start:  09/30/23               Problem: PT-Long Term Goals       Dates: Start:  09/30/23         Goal: LTG-By discharge, patient will propel wheelchair modified independent x 150 ft level / indoors       Dates: Start:  09/30/23            Goal: LTG-By discharge, patient will ambulate modified independent with FWW x 150 ft       Dates: Start:  09/30/23            Goal: LTG-By discharge, patient will transfer one surface to another modified independent with FWW       Dates: Start:  09/30/23            Goal: LTG-By discharge, patient will transfer in/out of a car SPV / modified independent after set-up with FWW       Dates: Start:  09/30/23

## 2023-09-30 NOTE — PROGRESS NOTES
NURSING DAILY NOTE    Name: Casper Rowley   Date of Admission: 9/29/2023   Admitting Diagnosis: Cerebellar mass  Attending Physician: Kasie Lin M.d.  Allergies: Patient has no known allergies.    Safety  Patient Assist  mx 2  Patient Precautions  Fall Risk  Precaution Comments     Bed Transfer Status  Moderate Assist  Toilet Transfer Status   Minimal Assist  Assistive Devices  Gait Belt, Rails, Wheelchair  Oxygen  None - Room Air  Diet/Therapeutic Dining  Current Diet Order   Procedures    Diet Order Diet: Consistent CHO (Diabetic)     Pill Administration  whole  Agitated Behavioral Scale     ABS Level of Severity       Fall Risk  Has the patient had a fall this admission?   No  Lizzy Mar Fall Risk Scoring  14, MODERATE RISK  Fall Risk Safety Measures  bed alarm, chair alarm, and poor balance    Vitals  Temperature: 36.1 °C (97 °F)  Temp src: Temporal  Pulse: 90  Respiration: 14  Blood Pressure: 124/78  Blood Pressure MAP (Calculated): 93 MM HG  BP Location: Right, Upper Arm  Patient BP Position: Supine     Oxygen  Pulse Oximetry: 99 %  O2 Delivery Device: None - Room Air    Bowel and Bladder  Last Bowel Movement  09/29/23  Stool Type  Type 4: Like a sausage or snake, smooth and soft  Bowel Device  Bathroom  Continent  Bladder: Continent void   Bowel: Continent movement  Bladder Function  Urine Void (mL): 500 ml  Number of Times Voided: 1  Urine Color: Unable To Evaluate  Number of Times Incontinent of Urine: 0  Genitourinary Assessment   Bladder Assessment (WDL):  Within Defined Limits  Urine Color: Unable To Evaluate  Number of Bladder Accidents: 0  Total Number of Bladder of Accidents in Last 7 Days: 0  Number of Times Incontinent of Urine: 0  Bladder Device: Bathroom  Time Void: Yes  Bladder Scan: Post Void  $ Bladder Scan Results (mL): 1    Skin  Ravin Score   18  Sensory Interventions   Bed Types: Standard/Trauma Mattress  Skin  Preventative Measures: Waffle Overlay, Pillows in Use for Support / Positioning  Moisture Interventions         Pain  Pain Rating Scale  0 - No Pain  Pain Location  Head  Pain Location Orientation  Right  Pain Interventions   Medication (see MAR)    ADLs    Bathing      Linen Change      Personal Hygiene     Chlorhexidine Bath      Oral Care     Teeth/Dentures     Shave     Nutrition Percentage Eaten  *  * Meal *  *, Lunch, Between % Consumed  Environmental Precautions     Patient Turns/Positioning  Patient Turns Self from Side to Side  Patient Turns Assistance/Tolerance     Bed Positions     Head of Bed Elevated         Psychosocial/Neurologic Assessment  Psychosocial Assessment  Psychosocial (WDL):  Within Defined Limits  Neurologic Assessment  Neuro (WDL): Exceptions to WDL (Craniotomy.)  Level of Consciousness: Alert  Orientation Level: Oriented X4  Cognition: Appropriate judgement, Appropriate safety awareness, Appropriate attention/concentration  Speech: Clear  Pupil Assesment: Yes  R Pupil Size (mm): 3  R Pupil Shape / Description: Round  R Pupil Reaction: Brisk  L Pupil Size (mm): 3  L Pupil Shape / Description: Round  L Pupil Reaction: Brisk       Cardio/Pulmonary Assessment  Edema      Respiratory Breath Sounds  RUL Breath Sounds: Clear  RML Breath Sounds: Clear  RLL Breath Sounds: Diminished  BRIAN Breath Sounds: Clear  LLL Breath Sounds: Diminished  Cardiac Assessment   Cardiac (WDL):  WDL Except

## 2023-09-30 NOTE — THERAPY
Occupational Therapy   Initial Evaluation     Patient Name: Casper Rowley  Age:  58 y.o., Sex:  male  Medical Record #: [id number]  Today's Date: 9/30/2023     Subjective    Pt was awake and reported that he had no pain. Pt agreeable for an OT initial eval.     Objective     09/30/23 0701   OT Charge Group   OT Self Care / ADL (Units) 1   OT Evaluation OT Evaluation High   OT Total Time Spent   OT Individual Total Time Spent (Mins) 60   Prior Living Situation   Housing / Facility 1 Story Apartment / Condo   Steps Into Home 0   Steps In Home 0   Bathroom Set up Tub Transfer Bench;Bathtub / Shower Combination   Equipment Owned 4-Wheel Walker;Single Point Cane;Front-Wheel Walker   Lives with - Patient's Self Care Capacity Significant Other   Prior Level of ADL Function   Self Feeding Independent   Grooming / Hygiene Independent   Bathing Requires Assist   Dressing Independent   Toileting Independent   Comments Pt reports that he needed some assist with bathing due to poor sitting balance on bench prior to hospitalization.   Prior Level of IADL Function   Medication Management Requires Assist   Laundry Independent   Kitchen Mobility Independent   Finances Independent   Home Management Independent   Shopping Independent   Prior Level Of Mobility Supervision With Device in Community   Driving / Transportation Relatives / Others Provide Transportation   Occupation (Pre-Hospital Vocational) Retired Due To Disability   Leisure Interests Family   Prior Functioning: Everyday Activities   Self Care Independent   Indoor Mobility (Ambulation) Independent   Stairs Not applicable   Functional Cognition Independent  (Needed some assist with household finances.)   Prior Device Use Walker;Orthotics/Prosthetics   Pain   Pain Scales 0 to 10 Scale    Pain 0 - 10 Group   Pain Rating Scale (NPRS) 0   Cognition    Level of Consciousness Alert   Cognitive Pattern Assessment   Cognitive Pattern Assessment Used BIMS   Brief Interview for  "Mental Status (BIMS)   Repetition of Three Words (First Attempt) 3   Temporal Orientation: Year Correct   Temporal Orientation: Month Accurate within 5 days   Temporal Orientation: Day Correct   Recall: \"Sock\" Yes, after cueing (\"something to wear\")   Recall: \"Blue\" No, could not recall   Recall: \"Bed\" No, could not recall   BIMS Summary Score 10   Confusion Assessment Method (CAM)   Is there evidence of an acute change in mental status from the patient's baseline? No   Inattention Behavior not present   Disorganized thinking Behavior not present   Altered level of consciousness Behavior not present   Vision Screen   Vision Not tested   Active ROM Upper Body   Active ROM Upper Body  WDL   Dominant Hand Right   Strength Upper Body   Upper Body Strength  X   Lt Shoulder Flexion Strength 3+ (F+)   Comments Pt reported having previous L shoulder sx 2 years ago.   Sensation Upper Body   Upper Extremity Sensation  WDL   Upper Body Muscle Tone   Upper Body Muscle Tone  WDL   Bed Mobility    Supine to Sit Minimal Assist   Sit to Supine Minimal Assist   Scooting Minimal Assist   Rolling Contact Guard Assist   Coordination Upper Body   Coordination WDL   Eating   Assistance Needed Independent   Physical Assistance Level No physical assistance   CARE Score - Eating 6   Eating Discharge Goal   Discharge Goal 6   Oral Hygiene   Assistance Needed Set-up / clean-up   Physical Assistance Level No physical assistance   CARE Score - Oral Hygiene 5   Oral Hygiene Discharge Goal   Discharge Goal 6   Shower/Bathe Self   Assistance Needed Physical assistance   Physical Assistance Level 26%-50%   CARE Score - Shower/Bathe Self 3   Shower/Bathe Self Discharge Goal   Discharge Goal 6   Upper Body Dressing   Assistance Needed Supervision   Physical Assistance Level No physical assistance   CARE Score - Upper Body Dressing 4   Upper Body Dressing Discharge Goal   Discharge Goal 6   Lower Body Dressing   Assistance Needed Physical " assistance;Adaptive equipment;Set-up / clean-up   Physical Assistance Level 26%-50%   CARE Score - Lower Body Dressing 3   Lower Body Dressing Discharge Goal   Discharge Goal 6   Putting On/Taking Off Footwear   Assistance Needed Physical assistance;Set-up / clean-up;Supervision;Adaptive equipment   Physical Assistance Level 26%-50%   Comment Pt needed assist to doff socks, used sockaid to chip socks.   CARE Score - Putting On/Taking Off Footwear 3   Putting On/Taking Off Footwear Discharge Goal   Discharge Goal 6   Toileting Hygiene   Assistance Needed Physical assistance   Physical Assistance Level 25% or less   Comment Assist with clothing mgmt.  (Assist needed with clothing mgmt.)   CARE Score - Toileting Hygiene 3   Toileting Hygiene Discharge Goal   Discharge Goal 6   Toilet Transfer   Assistance Needed Physical assistance   Physical Assistance Level 25% or less   CARE Score - Toilet Transfer 3   Toilet Transfer Discharge Goal   Discharge Goal 6   Functional Level of Assist   Eating Independent   Grooming Supervision   Grooming Description Seated in wheelchair at sink;Supervision for safety;Increased time   Bathing Moderate Assist   Bathing Description Grab bar;Hand held shower;Tub bench;Assit wtih lower extremities;Increased time;Set-up of equipment;Supervision for safety;Verbal cueing   Upper Body Dressing Supervision   Upper Body Dressing Description Set-up of equipment;Supervision for safety   Lower Body Dressing Moderate Assist   Lower Body Dressing Description Sock aid;Grab bar;Assist with threading into pant leg;Set-up of equipment;Supervision for safety;Verbal cueing;Increased time   Toileting Minimal Assist   Toileting Description Grab bar;Increased time;Set-up of equipment;Assist to pull pants up;Supervision for safety;Verbal cueing   Bed, Chair, Wheelchair Transfer Moderate Assist   Bed Chair Wheelchair Transfer Description Increased time;Supervision for safety;Verbal cueing;Initial preparation for  task   Toilet Transfers Minimal Assist   Toilet Transfer Description Grab bar;Set-up of equipment;Supervision for safety;Verbal cueing;Increased time   Tub / Shower Transfers Moderate Assist   Tub Shower Transfer Description Shower bench;Grab bar;Increased time;Initial preparation for task;Set-up of equipment;Supervision for safety;Verbal cueing   Problem List   Problem List Decreased Activity Tolerance;Decreased Active Daily Living Skills   Precautions   Precautions Fall Risk  (Craniotomy)   Interdisciplinary Plan of Care Collaboration   Patient Position at End of Therapy Chair Alarm On;Self Releasing Lap Belt Applied;Call Light within Reach;Seated   Collaboration Comments Nursing present in room with pt.   Strengths & Barriers   Strengths Able to follow instructions;Alert and oriented;Good insight into deficits/needs;Independent prior level of function;Motivated for self care and independence;Pleasant and cooperative;Supportive family;Willingly participates in therapeutic activities   Barriers Decreased endurance;Fatigue;Impaired activity tolerance;Impaired balance;Impulsive   Occupational Therapist Assigned   Assigned OT / Treatment Time / Comments SHIRELY,30/60/90     Patient is 58 y.o. male with a diagnosis of Craniotomy with EVD placement s/p cerebellar mass.  Additional factors influencing patient status / progress (ie: cognitive factors, co-morbidities, social support, etc): .    Overall pt tolerated OT initial eval well, was pleasant and cooperative.  Plan  Recommend Occupational Therapy  minutes per day 5-7 days per week for 2-3 weeks for the following treatments:  OT Self Care/ADL, OT Therapeutic Activity, OT Evaluation, and OT Therapeutic Exercise.    Passport items to be completed:      Goals:  Long term and short term goals have been discussed with patient and they are in agreement.    Occupational Therapy Goals (Active)       Problem: Bathing       Dates: Start:  09/30/23         Goal: STG-Within one  week, patient will bathe Min A.       Dates: Start:  09/30/23       Description: From seated level on shower bench. Using grabbars as needed appropriately.            Problem: Dressing       Dates: Start:  09/30/23         Goal: STG-Within one week, patient will dress UB with set up.       Dates: Start:  09/30/23            Goal: STG-Within one week, patient will dress LB with Min A.       Dates: Start:  09/30/23       Description: Using adaptive equipment as needed.            Problem: Functional Transfers       Dates: Start:  09/30/23         Goal: STG-Within one week, patient will transfer to toilet with CGA.       Dates: Start:  09/30/23       Description: Using 3:1 commode and grabbar.         Goal: STG-Within one week, patient will transfer to step in shower with Min A.       Dates: Start:  09/30/23       Description: Using grabbar and tub/shower bench.            Problem: OT Long Term Goals       Dates: Start:  09/30/23         Goal: LTG-By discharge, patient will complete basic self care tasks at Mod Independent level.       Dates: Start:  09/30/23       Description: With adaptive equipment as needed.         Goal: LTG-By discharge, patient will perform bathroom transfers at Mod Independent level with DME as needed.       Dates: Start:  09/30/23

## 2023-10-01 LAB
GLUCOSE BLD STRIP.AUTO-MCNC: 248 MG/DL (ref 65–99)
GLUCOSE BLD STRIP.AUTO-MCNC: 273 MG/DL (ref 65–99)
GLUCOSE BLD STRIP.AUTO-MCNC: 294 MG/DL (ref 65–99)
GLUCOSE BLD STRIP.AUTO-MCNC: 347 MG/DL (ref 65–99)

## 2023-10-01 PROCEDURE — 700102 HCHG RX REV CODE 250 W/ 637 OVERRIDE(OP): Performed by: PHYSICAL MEDICINE & REHABILITATION

## 2023-10-01 PROCEDURE — 99231 SBSQ HOSP IP/OBS SF/LOW 25: CPT | Performed by: PHYSICAL MEDICINE & REHABILITATION

## 2023-10-01 PROCEDURE — 770010 HCHG ROOM/CARE - REHAB SEMI PRIVAT*

## 2023-10-01 PROCEDURE — 700111 HCHG RX REV CODE 636 W/ 250 OVERRIDE (IP): Mod: JZ | Performed by: PHYSICAL MEDICINE & REHABILITATION

## 2023-10-01 PROCEDURE — A9270 NON-COVERED ITEM OR SERVICE: HCPCS | Performed by: PHYSICAL MEDICINE & REHABILITATION

## 2023-10-01 PROCEDURE — 82962 GLUCOSE BLOOD TEST: CPT | Mod: 91

## 2023-10-01 RX ADMIN — ZOLPIDEM TARTRATE 10 MG: 5 TABLET ORAL at 21:45

## 2023-10-01 RX ADMIN — DEXAMETHASONE 2 MG: 1 TABLET ORAL at 08:22

## 2023-10-01 RX ADMIN — INSULIN GLARGINE-YFGN 35 UNITS: 100 INJECTION, SOLUTION SUBCUTANEOUS at 21:51

## 2023-10-01 RX ADMIN — METOPROLOL SUCCINATE 25 MG: 25 TABLET, EXTENDED RELEASE ORAL at 08:22

## 2023-10-01 RX ADMIN — INSULIN HUMAN 7 UNITS: 100 INJECTION, SOLUTION PARENTERAL at 07:15

## 2023-10-01 RX ADMIN — INSULIN HUMAN 10 UNITS: 100 INJECTION, SOLUTION PARENTERAL at 17:13

## 2023-10-01 RX ADMIN — ACETAMINOPHEN 650 MG: 325 TABLET ORAL at 21:48

## 2023-10-01 RX ADMIN — INSULIN HUMAN 7 UNITS: 100 INJECTION, SOLUTION PARENTERAL at 21:51

## 2023-10-01 RX ADMIN — ENOXAPARIN SODIUM 40 MG: 100 INJECTION SUBCUTANEOUS at 17:15

## 2023-10-01 RX ADMIN — OMEPRAZOLE 20 MG: 20 CAPSULE, DELAYED RELEASE ORAL at 08:22

## 2023-10-01 RX ADMIN — LISINOPRIL 20 MG: 20 TABLET ORAL at 17:16

## 2023-10-01 RX ADMIN — INSULIN HUMAN 4 UNITS: 100 INJECTION, SOLUTION PARENTERAL at 11:13

## 2023-10-01 ASSESSMENT — FIBROSIS 4 INDEX: FIB4 SCORE: 1.06

## 2023-10-01 ASSESSMENT — PATIENT HEALTH QUESTIONNAIRE - PHQ9
SUM OF ALL RESPONSES TO PHQ9 QUESTIONS 1 AND 2: 0
2. FEELING DOWN, DEPRESSED, IRRITABLE, OR HOPELESS: NOT AT ALL
1. LITTLE INTEREST OR PLEASURE IN DOING THINGS: NOT AT ALL

## 2023-10-01 ASSESSMENT — PAIN DESCRIPTION - PAIN TYPE
TYPE: ACUTE PAIN
TYPE: ACUTE PAIN

## 2023-10-01 NOTE — PROGRESS NOTES
NURSING DAILY NOTE    Name: Casper Rowley   Date of Admission: 9/29/2023   Admitting Diagnosis: Cerebellar mass  Attending Physician: Kasie Lin M.d.  Allergies: Patient has no known allergies.    Safety  Patient Assist  mod assist  Patient Precautions  Fall Risk  Precaution Comments     Bed Transfer Status  Moderate Assist  Toilet Transfer Status   Minimal Assist  Assistive Devices  Rails, Wheelchair  Oxygen  None - Room Air  Diet/Therapeutic Dining  Current Diet Order   Procedures    Diet Order Diet: Consistent CHO (Diabetic)     Pill Administration  whole  Agitated Behavioral Scale     ABS Level of Severity       Fall Risk  Has the patient had a fall this admission?   No  Lizzy Mar Fall Risk Scoring  14, MODERATE RISK  Fall Risk Safety Measures  bed alarm, chair alarm, and poor balance    Vitals  Temperature: 36.7 °C (98.1 °F)  Temp src: Oral  Pulse: 98  Respiration: 18  Blood Pressure: 108/70  Blood Pressure MAP (Calculated): 83 MM HG  BP Location: Left, Upper Arm  Patient BP Position: Sitting     Oxygen  Pulse Oximetry: 96 %  O2 Delivery Device: None - Room Air    Bowel and Bladder  Last Bowel Movement  10/01/23  Stool Type  Type 6: Fluffy pieces with ragged edges, a mushy stool  Bowel Device  Bathroom  Continent  Bladder: Continent void   Bowel: Continent movement  Bladder Function  Urine Void (mL):  (moderate)  Number of Times Voided: 1  Urine Color: Yellow  Number of Times Incontinent of Urine: 0  Genitourinary Assessment   Bladder Assessment (WDL):  Within Defined Limits  Urine Color: Yellow  Number of Bladder Accidents: 0  Total Number of Bladder of Accidents in Last 7 Days: 0  Number of Times Incontinent of Urine: 0  Bladder Device: Bathroom  Time Void: Yes  Bladder Scan: Post Void  $ Bladder Scan Results (mL): 1    Skin  Ravin Score   18  Sensory Interventions   Bed Types: Standard/Trauma Mattress  Skin Preventative  Measures: Pillows in Use for Support / Positioning  Moisture Interventions         Pain  Pain Rating Scale  0 - No Pain (resting)  Pain Location  Generalized  Pain Location Orientation  Right, Left  Pain Interventions   Medication (see MAR)    ADLs    Bathing      Linen Change      Personal Hygiene     Chlorhexidine Bath      Oral Care     Teeth/Dentures     Shave     Nutrition Percentage Eaten  *  * Meal *  *, Lunch, Between % Consumed  Environmental Precautions  Bed in Low Position, Treaded Slipper Socks on Patient  Patient Turns/Positioning  Sitting Up in Wheelchair  Patient Turns Assistance/Tolerance     Bed Positions  Bed Controls On  Head of Bed Elevated  Self regulated      Psychosocial/Neurologic Assessment  Psychosocial Assessment  Psychosocial (WDL):  Within Defined Limits  Neurologic Assessment  Neuro (WDL): Exceptions to WDL (Craniotomy.)  Level of Consciousness: Alert  Orientation Level: Oriented X4  Cognition: Appropriate judgement, Appropriate safety awareness, Appropriate attention/concentration  Speech: Clear  Pupil Assesment: Yes  R Pupil Size (mm): 3  R Pupil Shape / Description: Round  R Pupil Reaction: Brisk  L Pupil Size (mm): 3  L Pupil Shape / Description: Round  L Pupil Reaction: Brisk  EENT (WDL):  Within Defined Limits    Cardio/Pulmonary Assessment  Edema      Respiratory Breath Sounds  RUL Breath Sounds: Clear  RML Breath Sounds: Clear  RLL Breath Sounds: Diminished  BRIAN Breath Sounds: Clear  LLL Breath Sounds: Diminished  Cardiac Assessment   Cardiac (WDL):  WDL Except

## 2023-10-01 NOTE — PROGRESS NOTES
Doctor Danielle came to see patient but patient declined to be evaluated by Hospitalist. Stated that he wants to handle his Diabetes

## 2023-10-01 NOTE — CARE PLAN
"  Problem: Fall Risk - Rehab  Goal: Patient will remain free from falls  Note: Lizzy Mar Fall risk Assessment Score: 14      Moderate fall risk Interventions  - Bed and strip alarm   - Yellow sign by the door   - Yellow wrist band \"Fall risk\"  - Room near to the nurse station  - Do not leave patient unattended in the bathroom  - Fall risk education provided    Impulsive x1 at the beginning of night shift, redirection to use call light for assistance. Patient understand and compliant.  Problem: Diabetes Management  Goal: Patient's ability to maintain appropriate glucose levels will be maintained or improve  Note: HS FS-335, Insulin given as ordered. Will monitor.     Problem: Pain - Standard  Goal: Alleviation of pain or a reduction in pain to the patient’s comfort goal  Note: Educate patient of non-pharmacological comfort measures: repositioning, relaxation/breathing technique, cold compress and activities.           The patient is Watcher - Medium risk of patient condition declining or worsening    Shift Goals  Clinical Goals: Safety  Patient Goals: Rest      "

## 2023-10-02 ENCOUNTER — APPOINTMENT (OUTPATIENT)
Dept: OCCUPATIONAL THERAPY | Facility: REHABILITATION | Age: 58
DRG: 949 | End: 2023-10-02
Attending: PHYSICAL MEDICINE & REHABILITATION
Payer: COMMERCIAL

## 2023-10-02 ENCOUNTER — APPOINTMENT (OUTPATIENT)
Dept: PHYSICAL THERAPY | Facility: REHABILITATION | Age: 58
DRG: 949 | End: 2023-10-02
Attending: PHYSICAL MEDICINE & REHABILITATION
Payer: COMMERCIAL

## 2023-10-02 ENCOUNTER — APPOINTMENT (OUTPATIENT)
Dept: SPEECH THERAPY | Facility: REHABILITATION | Age: 58
DRG: 949 | End: 2023-10-02
Attending: HOSPITALIST
Payer: COMMERCIAL

## 2023-10-02 ENCOUNTER — HOSPITAL ENCOUNTER (OUTPATIENT)
Dept: RADIATION ONCOLOGY | Facility: MEDICAL CENTER | Age: 58
End: 2023-10-31
Attending: RADIOLOGY
Payer: COMMERCIAL

## 2023-10-02 DIAGNOSIS — C79.31 METASTASIS TO BRAIN (HCC): ICD-10-CM

## 2023-10-02 LAB
GLUCOSE BLD STRIP.AUTO-MCNC: 151 MG/DL (ref 65–99)
GLUCOSE BLD STRIP.AUTO-MCNC: 218 MG/DL (ref 65–99)
GLUCOSE BLD STRIP.AUTO-MCNC: 232 MG/DL (ref 65–99)
GLUCOSE BLD STRIP.AUTO-MCNC: 296 MG/DL (ref 65–99)

## 2023-10-02 PROCEDURE — 97530 THERAPEUTIC ACTIVITIES: CPT

## 2023-10-02 PROCEDURE — 770010 HCHG ROOM/CARE - REHAB SEMI PRIVAT*

## 2023-10-02 PROCEDURE — 99232 SBSQ HOSP IP/OBS MODERATE 35: CPT | Performed by: PHYSICAL MEDICINE & REHABILITATION

## 2023-10-02 PROCEDURE — 700111 HCHG RX REV CODE 636 W/ 250 OVERRIDE (IP): Mod: JZ | Performed by: PHYSICAL MEDICINE & REHABILITATION

## 2023-10-02 PROCEDURE — 97129 THER IVNTJ 1ST 15 MIN: CPT

## 2023-10-02 PROCEDURE — 97535 SELF CARE MNGMENT TRAINING: CPT

## 2023-10-02 PROCEDURE — 97116 GAIT TRAINING THERAPY: CPT

## 2023-10-02 PROCEDURE — 82962 GLUCOSE BLOOD TEST: CPT | Mod: 91

## 2023-10-02 PROCEDURE — A9270 NON-COVERED ITEM OR SERVICE: HCPCS | Performed by: PHYSICAL MEDICINE & REHABILITATION

## 2023-10-02 PROCEDURE — 700102 HCHG RX REV CODE 250 W/ 637 OVERRIDE(OP): Performed by: PHYSICAL MEDICINE & REHABILITATION

## 2023-10-02 PROCEDURE — 97130 THER IVNTJ EA ADDL 15 MIN: CPT

## 2023-10-02 PROCEDURE — 97110 THERAPEUTIC EXERCISES: CPT

## 2023-10-02 RX ADMIN — INSULIN HUMAN 4 UNITS: 100 INJECTION, SOLUTION PARENTERAL at 17:44

## 2023-10-02 RX ADMIN — ACETAMINOPHEN 650 MG: 325 TABLET ORAL at 21:30

## 2023-10-02 RX ADMIN — ENOXAPARIN SODIUM 40 MG: 100 INJECTION SUBCUTANEOUS at 17:44

## 2023-10-02 RX ADMIN — OMEPRAZOLE 20 MG: 20 CAPSULE, DELAYED RELEASE ORAL at 08:33

## 2023-10-02 RX ADMIN — ZOLPIDEM TARTRATE 10 MG: 5 TABLET ORAL at 21:28

## 2023-10-02 RX ADMIN — INSULIN HUMAN 4 UNITS: 100 INJECTION, SOLUTION PARENTERAL at 08:26

## 2023-10-02 RX ADMIN — METOPROLOL SUCCINATE 25 MG: 25 TABLET, EXTENDED RELEASE ORAL at 08:32

## 2023-10-02 RX ADMIN — LISINOPRIL 20 MG: 20 TABLET ORAL at 17:44

## 2023-10-02 RX ADMIN — INSULIN GLARGINE-YFGN 35 UNITS: 100 INJECTION, SOLUTION SUBCUTANEOUS at 21:35

## 2023-10-02 RX ADMIN — INSULIN HUMAN 3 UNITS: 100 INJECTION, SOLUTION PARENTERAL at 11:35

## 2023-10-02 RX ADMIN — INSULIN HUMAN 7 UNITS: 100 INJECTION, SOLUTION PARENTERAL at 21:36

## 2023-10-02 SDOH — ECONOMIC STABILITY: TRANSPORTATION INSECURITY
IN THE PAST 12 MONTHS, HAS THE LACK OF TRANSPORTATION KEPT YOU FROM MEDICAL APPOINTMENTS OR FROM GETTING MEDICATIONS?: NO

## 2023-10-02 SDOH — ECONOMIC STABILITY: TRANSPORTATION INSECURITY
IN THE PAST 12 MONTHS, HAS LACK OF RELIABLE TRANSPORTATION KEPT YOU FROM MEDICAL APPOINTMENTS, MEETINGS, WORK OR FROM GETTING THINGS NEEDED FOR DAILY LIVING?: NO

## 2023-10-02 ASSESSMENT — ACTIVITIES OF DAILY LIVING (ADL)
BED_CHAIR_WHEELCHAIR_TRANSFER_DESCRIPTION: ADAPTIVE EQUIPMENT;INCREASED TIME;SET-UP OF EQUIPMENT;VERBAL CUEING
TUB_SHOWER_TRANSFER_DESCRIPTION: GRAB BAR;SHOWER BENCH;SET-UP OF EQUIPMENT;SUPERVISION FOR SAFETY;VERBAL CUEING

## 2023-10-02 ASSESSMENT — GAIT ASSESSMENTS
DISTANCE (FEET): 80
DEVIATION: DECREASED BASE OF SUPPORT;BRADYKINETIC;DECREASED HEEL STRIKE;DECREASED TOE OFF
ASSISTIVE DEVICE: FRONT WHEEL WALKER
GAIT LEVEL OF ASSIST: MINIMAL ASSIST

## 2023-10-02 NOTE — DISCHARGE PLANNING
Met with patient in room. Patient has fww, 4ww, tub transfer bench, shower chair. Lives with S.O. and they is able to assist at discharge. Patient goal is to get home asap.

## 2023-10-02 NOTE — THERAPY
Speech Language Pathology  Daily Treatment     Patient Name: Casper Rowley  Age:  58 y.o., Sex:  male  Medical Record #: [id number]  Today's Date: 10/2/2023     Precautions  Precautions: Fall Risk    Subjective    Assumed care from CNA in room, pleasant and agreeable to therapy.      Objective       10/02/23 1304   Treatment Charges   Charges Yes   SLP Cognitive Skill Development First 15 Minutes 1   SLP Cognitive Skill Development Additional 15 Minutes 1   SLP Total Time Spent   SLP Individual Total Time Spent (Mins) 30   Interdisciplinary Plan of Care Collaboration   IDT Collaboration with  Occupational Therapist   Patient Position at End of Therapy Seated;Chair Alarm On   Collaboration Comments transfer of care to OT         Assessment    Review of current strategies/aids for memory, pt reports writing notes, inputting info into phone as prior strategies. Encouraged ongoing use and increased consistency with novel information while at [facility] and medical care thereafter.   Medication list created with pt cross referencing with med list from home. Pt able to recall purpose of current meds IND. Reports not using a pill organizer at home currently, however, pt was agreeable to identifying alternative solutions to ensure meds are taken, I.e. turning bottles over once taken as a visual reminder.     Strengths: Able to follow instructions, Motivated for self care and independence, Pleasant and cooperative, Willingly participates in therapeutic activities  Barriers: Impaired insight/denial of deficits, Impaired functional cognition, Impulsive    Plan    Continue to reinforce memory strategies.    Passport items to be completed:  Express basic needs, understand food/liquid recommendations, consistently follow swallow precautions, manage finances, manage medications, arrive to therapy appointments on time, complete daily memory log entries, solve problems related to safety situations, review education related to  hospitalization, complete caregiver training     Speech Therapy Problems (Active)       Problem: Memory STGs       Dates: Start:  09/30/23         Goal: STG-Within one week, patient will recall daily events and safety strategies given mod A and external aids.       Dates: Start:  09/30/23            Goal: STG-Within one week, patient will engage in alternating attention tasks with 75% given mod A       Dates: Start:  09/30/23               Problem: Problem Solving STGs       Dates: Start:  09/30/23         Goal: STG-Within one week, patient will engage in problem solving tasks (medication management, sequencing) with 75% given mod A.       Dates: Start:  09/30/23               Problem: Speech/Swallowing LTGs       Dates: Start:  09/30/23         Goal: LTG-By discharge, patient will solve complex problems with min A for safe d/c home       Dates: Start:  09/30/23

## 2023-10-02 NOTE — THERAPY
"Recreational Therapy   Initial Evaluation     Patient Name: Casper Rowley  Age:  58 y.o., Sex:  male  Medical Record #: [id number]  Today's Date: 10/2/2023     Subjective    Sharing that he needed to use the restroom. An attempt was made. \"False alarm.\"    Objective       10/02/23 0931   Procedural Tracking   Procedural Tracking Community Re-Integration;Leisure Skills Awareness   Treatment Time   Total Time Spent (mins) 30   Leisure History   Leisure Interests Family;Exercise   Leisure Comments motivated for ambulation   Pre-Morbid Leisure Lifestyle Individual   Prior Living Arrangements   Lives with - Patient's Self Care Capacity Significant Other   Steps Into Home 0  (threshold)   Steps In Home 0   Ambulation Independent   Assistive Devices Used 4-Wheel Walker   Driving / Transportation Relatives / Others Provide Transportation   Functional Ability Status - Physical   Endurance Low   Right  Strong   Left  Strong   Right Arm Strong   Left Arm Strong   Right Leg Weak   Left Leg Weak   Upper Extremity Gross Motor Uses Both Arms / Hands   Lower Extremity Gross Motor Uses Both Legs  (BLE weakness)   Fine Motor Manipulates Small Objects   Functional Ability Status - Cognitive   Attention Span Remains on Task   Comprehension Follows Three Step Commands   Judgment Able to Make Independent Decisions   Functional Ability Status - Emotional    Affect Appropriate;Bright   Mood Appropriate   Behavior Appropriate   Leisure Competence Measure   Leisure Awareness Independent   Leisure Attitude Independent   Leisure Skills Moderate Assist   Cultural / Social Behaviors Independent   Interpersonal Skills Independent   Community Integration Skills Moderate Assist   Social Contact Independent   Community Participation Moderate Assist   Clinical Impression   Clinical Impression Impaired Gross Motor Leisure Functioning;Impaired Endurance;Impaired Community Skills;Impaired Relaxation and Coping Skills;Impaired Leisure Skills "   Current Discharge Plan   Current Discharge Plan Return to Prior Living Situation   Benefit    Benefit Patient would Benefit from Inpatient Recreational Therapy to Maximize Independent Leisure Functioning    Interdisciplinary Plan of Care Collaboration   Patient Position at End of Therapy Seated;Tray Table within Reach;Call Light within Reach   Strengths & Barriers   Strengths Able to follow instructions;Alert and oriented;Pleasant and cooperative;Motivated for self care and independence;Supportive family;Willingly participates in therapeutic activities   Barriers Fatigue;Impaired activity tolerance;Impaired balance;Limited mobility         Assessment  Patient is 58 y.o. male with a diagnosis of Cerebellar mass.  Additional factors influencing patient status / progress (ie: cognitive factors, co-morbidities, social support, etc): past medical history of prostate cancer with mets to spine status post decompression/fusion in March, type 2 diabetes, hyperlipidemia, hypertension;  who presented on 9/19/2023 12:50 PM with intractable nausea vomiting.  Work-up with CT head found new cerebellar mass with vasogenic edema which was confirmed on MRI.  Patient also had urinary retention requiring Mistry placement.  Patient was started on Decadron, hypertonic saline, and seen by neurosurgery who performed craniotomy on 9/21 with EVD placement by Dr. Archana Be MD.  EVD removed 9/24.  Surgical pathology remains pending, but it is suspected to be metastatic prostate cancer.  Patient is set up for radiation mapping in mid October.  Patient was seen by PT and OT found to have deficits with mobility and ADLs, therefore PMR was consulted to assess for rehab appropriateness.     Patient tolerated transfer to [facility]. He reports his symptoms have been improving. He reports they have been working on his weakness for months so he has been in PT for months at this point. He reports not vertigo or nausea at this time but there was prior to  the surgery. He reports for his tumor burden in his back he has minimal pain after having lumbar surgery. He reports baseline leg weakness with legs buckling if he bends at all. He denies SOB. Denies vision changes.      Stood for 3 minutes x2 CGA to SBA while putting together a puzzle. No LOB.     Plan  Recommend Recreational Therapy  30  minutes per day  2-3  days per week for 2 weeks for the following treatments:  Community Re-Integration, Community Skills Development, Leisure Skills Awareness, and Leisure Skills Development    Passport items to be completed:  Verbalize two positive leisure activities, discuss returning to work, hobbies, community groups or volunteer activities, explore community resources     Goals:  Long term and short term goals have been discussed with patient and they are in agreement.    Recreation Therapy Problems (Active)       Problem: Recreation Therapy       Dates: Start:  10/02/23         Goal: STG-Within one week, patient will demonstrate leisure problem solving by sharing on two positive lesiure activities they would like to participate in during session and any perceived barriers to their participation.         Dates: Start:  10/02/23            Goal: STG-Within one week, patient will demonstrate a standing tolerance during a leisure activity with weight shift for three minutes x3 CGA and no LOB.        Dates: Start:  10/02/23            Goal: LTG-By discharge, patient will demonstrate leisure problem solving by sharing on two positive lesiure activities they would like to participate in post dc and any perceived barriers to their participation.         Dates: Start:  10/02/23            Goal: LTG-By discharge, patient will demonstrate a standing tolerance during a leisure activity with weight shift for 5 minutes x3 CGA and no LOB.        Dates: Start:  10/02/23

## 2023-10-02 NOTE — PROGRESS NOTES
Physical Medicine & Rehabilitation Progress Note  Encounter Date: 10/1/2023    Chief Complaint:   Weakness    Interval Events (Subjective):  Hypoglycemia.  Patient previously on steroids.  Otherwise asymptomatic.  No acute events overnight.        Objective:  VITAL SIGNS: VITAL SIGNS:   Vitals:    09/30/23 1922 10/01/23 0615 10/01/23 1041 10/01/23 1300   BP: 108/70 109/73  118/86   Pulse: 98 (!) 101  80   Resp: 18 16  16   Temp: 36.7 °C (98.1 °F) 37 °C (98.6 °F)  37.1 °C (98.8 °F)   TempSrc: Oral Oral  Oral   SpO2: 96% 93%  97%   Weight:   81 kg (178 lb 9 oz)    Height:             Gen: NAD  Psych: Mood and affect appropriate  CV: RRR,  edema  Resp: CTAB, no upper airway sounds  Abd: NTND  Neuro: Alert and oriented x3    Laboratory Values:  Recent Results (from the past 72 hour(s))   POCT glucose device results    Collection Time: 09/28/23  9:24 PM   Result Value Ref Range    POC Glucose, Blood 354 (H) 65 - 99 mg/dL   POCT glucose device results    Collection Time: 09/29/23  7:37 AM   Result Value Ref Range    POC Glucose, Blood 202 (H) 65 - 99 mg/dL   POCT glucose device results    Collection Time: 09/29/23 11:54 AM   Result Value Ref Range    POC Glucose, Blood 195 (H) 65 - 99 mg/dL   POCT glucose device results    Collection Time: 09/29/23  5:28 PM   Result Value Ref Range    POC Glucose, Blood 237 (H) 65 - 99 mg/dL   POCT glucose device results    Collection Time: 09/29/23  9:22 PM   Result Value Ref Range    POC Glucose, Blood 323 (H) 65 - 99 mg/dL   CBC with Differential    Collection Time: 09/30/23  5:42 AM   Result Value Ref Range    WBC 9.1 4.8 - 10.8 K/uL    RBC 3.28 (L) 4.70 - 6.10 M/uL    Hemoglobin 10.2 (L) 14.0 - 18.0 g/dL    Hematocrit 31.3 (L) 42.0 - 52.0 %    MCV 95.4 81.4 - 97.8 fL    MCH 31.1 27.0 - 33.0 pg    MCHC 32.6 32.3 - 36.5 g/dL    RDW 62.5 (H) 35.9 - 50.0 fL    Platelet Count 173 164 - 446 K/uL    MPV 10.4 9.0 - 12.9 fL    Neutrophils-Polys 86.50 (H) 44.00 - 72.00 %    Lymphocytes 6.50  (L) 22.00 - 41.00 %    Monocytes 6.30 0.00 - 13.40 %    Eosinophils 0.10 0.00 - 6.90 %    Basophils 0.10 0.00 - 1.80 %    Immature Granulocytes 0.50 0.00 - 0.90 %    Nucleated RBC 0.00 0.00 - 0.20 /100 WBC    Neutrophils (Absolute) 7.87 (H) 1.82 - 7.42 K/uL    Lymphs (Absolute) 0.59 (L) 1.00 - 4.80 K/uL    Monos (Absolute) 0.57 0.00 - 0.85 K/uL    Eos (Absolute) 0.01 0.00 - 0.51 K/uL    Baso (Absolute) 0.01 0.00 - 0.12 K/uL    Immature Granulocytes (abs) 0.05 0.00 - 0.11 K/uL    NRBC (Absolute) 0.00 K/uL   Comp Metabolic Panel (CMP)    Collection Time: 09/30/23  5:42 AM   Result Value Ref Range    Sodium 136 135 - 145 mmol/L    Potassium 3.5 (L) 3.6 - 5.5 mmol/L    Chloride 100 96 - 112 mmol/L    Co2 21 20 - 33 mmol/L    Anion Gap 15.0 7.0 - 16.0    Glucose 333 (H) 65 - 99 mg/dL    Bun 11 8 - 22 mg/dL    Creatinine 0.48 (L) 0.50 - 1.40 mg/dL    Calcium 8.2 (L) 8.5 - 10.5 mg/dL    Correct Calcium 8.9 8.5 - 10.5 mg/dL    AST(SGOT) 14 12 - 45 U/L    ALT(SGPT) 19 2 - 50 U/L    Alkaline Phosphatase 152 (H) 30 - 99 U/L    Total Bilirubin 0.2 0.1 - 1.5 mg/dL    Albumin 3.1 (L) 3.2 - 4.9 g/dL    Total Protein 5.5 (L) 6.0 - 8.2 g/dL    Globulin 2.4 1.9 - 3.5 g/dL    A-G Ratio 1.3 g/dL   HEMOGLOBIN A1C    Collection Time: 09/30/23  5:42 AM   Result Value Ref Range    Glycohemoglobin 7.1 (H) 4.0 - 5.6 %    Est Avg Glucose 157 mg/dL   TSH with Reflex to FT4    Collection Time: 09/30/23  5:42 AM   Result Value Ref Range    TSH 0.400 0.380 - 5.330 uIU/mL   Vitamin D, 25-hydroxy (blood)    Collection Time: 09/30/23  5:42 AM   Result Value Ref Range    25-Hydroxy   Vitamin D 25 11 (L) 30 - 100 ng/mL   ESTIMATED GFR    Collection Time: 09/30/23  5:42 AM   Result Value Ref Range    GFR (CKD-EPI) 120 >60 mL/min/1.73 m 2   POCT glucose device results    Collection Time: 09/30/23  7:53 AM   Result Value Ref Range    POC Glucose, Blood 295 (H) 65 - 99 mg/dL   POCT glucose device results    Collection Time: 09/30/23 11:39 AM   Result  Value Ref Range    POC Glucose, Blood 245 (H) 65 - 99 mg/dL   POCT glucose device results    Collection Time: 09/30/23  5:32 PM   Result Value Ref Range    POC Glucose, Blood 309 (H) 65 - 99 mg/dL   POCT glucose device results    Collection Time: 09/30/23  9:43 PM   Result Value Ref Range    POC Glucose, Blood 335 (H) 65 - 99 mg/dL   POCT glucose device results    Collection Time: 10/01/23  7:12 AM   Result Value Ref Range    POC Glucose, Blood 273 (H) 65 - 99 mg/dL   POCT glucose device results    Collection Time: 10/01/23 11:10 AM   Result Value Ref Range    POC Glucose, Blood 248 (H) 65 - 99 mg/dL   POCT glucose device results    Collection Time: 10/01/23  5:11 PM   Result Value Ref Range    POC Glucose, Blood 347 (H) 65 - 99 mg/dL       Medications:  Scheduled Medications   Medication Dose Frequency    Pharmacy Consult Request  1 Each PHARMACY TO DOSE    senna-docusate  2 Tablet BID    omeprazole  20 mg DAILY    enoxaparin (LOVENOX) injection  40 mg DAILY AT 1800    insulin GLARGINE  35 Units Q EVENING    insulin regular  3-14 Units 4X/DAY ACHS    lisinopril  20 mg DAILY    metoprolol SR  25 mg DAILY     PRN medications: Respiratory Therapy Consult, hydrALAZINE, acetaminophen, senna-docusate **AND** polyethylene glycol/lytes **AND** magnesium hydroxide **AND** bisacodyl, mag hydrox-al hydrox-simeth, ondansetron **OR** ondansetron, traZODone, sodium chloride, oxyCODONE immediate-release **OR** oxyCODONE immediate-release, midazolam, insulin regular **AND** POC blood glucose manual result **AND** NOTIFY MD and PharmD **AND** Administer 20 grams of glucose (approximately 8 ounces of fruit juice) every 15 minutes PRN FSBG less than 70 mg/dL **AND** dextrose bolus, meclizine, promethazine, zolpidem    Bowel:  Last Documented BM Date:Last BM: 10/01/23  Last Documented BM Description: Stool Description: Small;Brown;Soft    Bladder:  Last Documented Urine Void (mL):  (moderate)  Last Documented Bladder Scan:    Last  Documented Bladder Scan Results (mL):      Physical Therapy:  Bed Mobility    Transfers        Mobility     Stairs    Barriers to Discharge Home:      Occupational Therapy:  Grooming     Bathing     UB Dressing     LB Dressing     Toileting     Shower & Transfer     Barriers to Discharge Home:    Diet:  Current Diet Order   Procedures    Diet Order Diet: Consistent CHO (Diabetic)     Medical Decision Making and Plan:    Cerebellar mass with metastatic cancer status post resection 9/21/2023 by Dr. Be  Comprehensive inpatient rehabilitation with physical therapy and Occupational Therapy  Follow-up with neurosurgery and oncology postdischarge    DVT prophylaxis on Lovenox    Pain controlled . continue APAP oxycodone    Neurogenic bladder/bowel:  bowel and bladder program as described below, to prevent constipation, urinary retention (which may lead to UTI), and urinary incontinence (which will impact upon pt's functional independence).   - Post void bladder scans, I&O cath for PVRs >400  - up to commode after meal     Diabetes mellitus type 2 with hyperglycemia likely complicated by steroids.  Otherwise Asymptomatic.  Labs reviewed.  Vitals reviewed.  -Continue Lantus and SSI  -Hospitalist consulted 9/30      _____________________________________    Keven Amaral MD  [facility] - Physical Medicine & Rehabilitation     _____________________________________

## 2023-10-02 NOTE — CARE PLAN
"  Problem: Fall Risk - Rehab  Goal: Patient will remain free from falls  Note: Lizzy Mar Fall risk Assessment Score: 14  Moderate fall risk Interventions  - Bed and strip alarm   - Yellow sign by the door   - Yellow wrist band \"Fall risk\"  - Room near to the nurse station  - Do not leave patient unattended in the bathroom  - Fall risk education provided      Problem: Diabetes Management  Goal: Patient's ability to maintain appropriate glucose levels will be maintained or improve  Note: HS FS-294, Insulin given as ordered. Will monitor.          The patient is Watcher - Medium risk of patient condition declining or worsening    Shift Goals  Clinical Goals: Pain management  Patient Goals: Rest    "

## 2023-10-02 NOTE — THERAPY
Occupational Therapy  Daily Treatment     Patient Name: Casper Rowley  Age:  58 y.o., Sex:  male  Medical Record #: [id number]  Today's Date: 10/2/2023     Precautions  Precautions: Fall Risk    Safety   ADL Safety : Requires Supervision for Safety  Bathroom Safety: Requires Supervision for Safety    Subjective    Pt seated in SLP office for pass off for OT tx.      Objective       10/02/23 1331   OT Charge Group   OT Therapy Activity (Units) 2   OT Total Time Spent   OT Individual Total Time Spent (Mins) 30   Cognition    Level of Consciousness Alert   IADL Treatments   IADL Treatments Home management   Home Management Pt stood to retreive clean dry clothing out of dryer from previous session using reacher at SPV level. He did require a seated rest break due to BLE fatigue and weakness. Completed folding back in room on bed in seated.   Balance   Comments Pt completed functional mobility using FWW at SBA-SPV level from room to ADL laundry space. He required 1x seated RB and 2x standing RB due to BLE fatigue and weakness. Pt leaving LLE behind 2x during mobility, extending FWW unsafely, but no LOB and able to recover.   Interdisciplinary Plan of Care Collaboration   IDT Collaboration with  Occupational Therapist;Physician;Nursing   Patient Position at End of Therapy Seated;Chair Alarm On;Self Releasing Lap Belt Applied;Tray Table within Reach;Call Light within Reach   Collaboration Comments re: CLOF, POC     Pt completed self-propel from ADL laundry space to room at Mod I level, maintaining BLE hover most of the way. By the last several feet, LLE became fatigued and dropped.     Assessment    Pt tolerated OT tx well with focus on standing balance and endurance during functional mobility and IADL of laundry. He did demonstrate some instability and L>RLE weakness in function. Verbose, but able to redirect.  Strengths: Able to follow instructions, Alert and oriented, Good insight into deficits/needs, Independent  prior level of function, Motivated for self care and independence, Pleasant and cooperative, Supportive family, Willingly participates in therapeutic activities  Barriers: Decreased endurance, Fatigue, Impaired activity tolerance, Impaired balance, Impulsive    Plan    ADLs, IADLs, functional mobility, strength/endurance, standing tolerance/balance    Occupational Therapy Goals (Active)       Problem: Bathing       Dates: Start:  09/30/23         Goal: STG-Within one week, patient will bathe Min A.       Dates: Start:  09/30/23       Description: From seated level on shower bench. Using grabbars as needed appropriately.            Problem: Dressing       Dates: Start:  09/30/23         Goal: STG-Within one week, patient will dress UB with set up.       Dates: Start:  09/30/23            Goal: STG-Within one week, patient will dress LB with Min A.       Dates: Start:  09/30/23       Description: Using adaptive equipment as needed.            Problem: Functional Transfers       Dates: Start:  09/30/23         Goal: STG-Within one week, patient will transfer to toilet with CGA.       Dates: Start:  09/30/23       Description: Using 3:1 commode and grabbar.         Goal: STG-Within one week, patient will transfer to step in shower with Min A.       Dates: Start:  09/30/23       Description: Using grabbar and tub/shower bench.            Problem: OT Long Term Goals       Dates: Start:  09/30/23         Goal: LTG-By discharge, patient will complete basic self care tasks at Mod Independent level.       Dates: Start:  09/30/23       Description: With adaptive equipment as needed.         Goal: LTG-By discharge, patient will perform bathroom transfers at Mod Independent level with DME as needed.       Dates: Start:  09/30/23

## 2023-10-02 NOTE — PROGRESS NOTES
NURSING DAILY NOTE    Name: Casper Rowley   Date of Admission: 9/29/2023   Admitting Diagnosis: Cerebellar mass  Attending Physician: Kasie Lin M.d.  Allergies: Patient has no known allergies.    Safety  Patient Assist  SBA  Patient Precautions  Fall Risk  Precaution Comments     Bed Transfer Status  Moderate Assist  Toilet Transfer Status   Minimal Assist  Assistive Devices  Rails, Wheelchair  Oxygen  None - Room Air  Diet/Therapeutic Dining  Current Diet Order   Procedures    Diet Order Diet: Consistent CHO (Diabetic)     Pill Administration  whole  Agitated Behavioral Scale     ABS Level of Severity       Fall Risk  Has the patient had a fall this admission?   No  Lizzy Mar Fall Risk Scoring  14, MODERATE RISK  Fall Risk Safety Measures  bed alarm and chair alarm    Vitals  Temperature: 37.1 °C (98.8 °F)  Temp src: Oral  Pulse: 80  Respiration: 16  Blood Pressure: 118/86  Blood Pressure MAP (Calculated): 97 MM HG  BP Location: Left, Upper Arm  Patient BP Position: Supine     Oxygen  Pulse Oximetry: 97 %  O2 Delivery Device: None - Room Air    Bowel and Bladder  Last Bowel Movement  10/01/23  Stool Type  Type 6: Fluffy pieces with ragged edges, a mushy stool  Bowel Device  Bathroom  Continent  Bladder: Continent void   Bowel: Continent movement  Bladder Function  Urine Void (mL):  (moderate)  Number of Times Voided: 1  Urine Color: Unable To Evaluate  Number of Times Incontinent of Urine: 0  Genitourinary Assessment   Bladder Assessment (WDL):  Within Defined Limits  Urine Color: Unable To Evaluate  Number of Bladder Accidents: 0  Total Number of Bladder of Accidents in Last 7 Days: 0  Number of Times Incontinent of Urine: 0  Bladder Device: Bathroom  Time Void: Yes  Bladder Scan: Post Void  $ Bladder Scan Results (mL): 1    Skin  Ravin Score   18  Sensory Interventions   Bed Types: Standard/Trauma Mattress  Skin Preventative  Measures: Pillows in Use for Support / Positioning  Moisture Interventions         Pain  Pain Rating Scale  0 - No Pain  Pain Location  Generalized  Pain Location Orientation  Right, Left  Pain Interventions   Declines    ADLs    Bathing      Linen Change   Complete  Personal Hygiene     Chlorhexidine Bath      Oral Care  Brushed Teeth (self)  Teeth/Dentures     Shave  Self  Nutrition Percentage Eaten  *  * Meal *  *, Dinner, Between % Consumed  Environmental Precautions  Treaded Slipper Socks on Patient, Bed in Low Position  Patient Turns/Positioning  Patient Turns Self from Side to Side  Patient Turns Assistance/Tolerance     Bed Positions  Bed Controls On  Head of Bed Elevated  Self regulated      Psychosocial/Neurologic Assessment  Psychosocial Assessment  Psychosocial (WDL):  Within Defined Limits  Neurologic Assessment  Neuro (WDL): Exceptions to WDL (Craniotomy.)  Level of Consciousness: Alert  Orientation Level: Oriented X4  Cognition: Appropriate judgement, Appropriate safety awareness, Appropriate attention/concentration  Speech: Clear  Pupil Assesment: No  R Pupil Size (mm): 3  R Pupil Shape / Description: Round  R Pupil Reaction: Brisk  L Pupil Size (mm): 3  L Pupil Shape / Description: Round  L Pupil Reaction: Brisk  EENT (WDL):  Within Defined Limits    Cardio/Pulmonary Assessment  Edema      Respiratory Breath Sounds  RUL Breath Sounds: Clear  RML Breath Sounds: Clear  RLL Breath Sounds: Diminished  BRIAN Breath Sounds: Clear  LLL Breath Sounds: Diminished  Cardiac Assessment   Cardiac (WDL):  WDL Except

## 2023-10-02 NOTE — THERAPY
Occupational Therapy  Daily Treatment     Patient Name: Casper Rowley  Age:  58 y.o., Sex:  male  Medical Record #: [id number]  Today's Date: 10/2/2023     Precautions  Precautions: (P) Fall Risk    Safety   ADL Safety : Requires Supervision for Safety  Bathroom Safety: Requires Supervision for Safety    Subjective    Patient was seated in his w/c in the room and was upset about just finding out he will need radiation to his brain.     Objective       10/02/23 1031   OT Charge Group   OT Self Care / ADL (Units) 3   OT Therapy Activity (Units) 1   OT Total Time Spent   OT Individual Total Time Spent (Mins) 60   Precautions   Precautions Fall Risk   Functional Level of Assist   Grooming Modified Independent;Seated   Bathing Standby Assist   Bathing Description Grab bar;Hand held shower;Tub bench;Set-up of equipment;Supervision for safety   Upper Body Dressing Modified Independent   Upper Body Dressing Description Assist device equipment  (w/c to retrieve and don/doff)   Lower Body Dressing Standby Assist   Lower Body Dressing Description Reacher;Sock aid;Grab bar;Increased time;Set-up of equipment;Supervision for safety;Verbal cueing  (SBA to don/doff socks, underwear, pants with AE, gb)   Tub / Shower Transfers Contact Guard Assist   Tub Shower Transfer Description Grab bar;Shower bench;Set-up of equipment;Supervision for safety;Verbal cueing   IADL Treatments   IADL Treatments Home management   Kitchen Mobility Education Patient stood at washing machine with SBA to load clothes, soap and start machine.   Interdisciplinary Plan of Care Collaboration   IDT Collaboration with  Physician   Patient Position at End of Therapy Seated;Chair Alarm On;Self Releasing Lap Belt Applied  (in dining room for lunch)   Collaboration Comments Dr Lin rounded with patient     Functional mobility with w/c in room and from room to adl kitchen/laundry room and kitchen with mod I.      Assessment    Patient demonstrated  improvements in several adl skills today versus Friday when evaluated.  He presents with impaired standing balance and LE weakness.    Strengths: Able to follow instructions, Alert and oriented, Good insight into deficits/needs, Independent prior level of function, Motivated for self care and independence, Pleasant and cooperative, Supportive family, Willingly participates in therapeutic activities  Barriers: Decreased endurance, Fatigue, Impaired activity tolerance, Impaired balance, Impulsive    Plan    ADLs, IADLs, functional mobility, strength/endurance, standing tolerance/balance    Occupational Therapy Goals (Active)       Problem: Bathing       Dates: Start:  09/30/23         Goal: STG-Within one week, patient will bathe Min A.       Dates: Start:  09/30/23       Description: From seated level on shower bench. Using grabbars as needed appropriately.            Problem: Dressing       Dates: Start:  09/30/23         Goal: STG-Within one week, patient will dress UB with set up.       Dates: Start:  09/30/23            Goal: STG-Within one week, patient will dress LB with Min A.       Dates: Start:  09/30/23       Description: Using adaptive equipment as needed.            Problem: Functional Transfers       Dates: Start:  09/30/23         Goal: STG-Within one week, patient will transfer to toilet with CGA.       Dates: Start:  09/30/23       Description: Using 3:1 commode and grabbar.         Goal: STG-Within one week, patient will transfer to step in shower with Min A.       Dates: Start:  09/30/23       Description: Using grabbar and tub/shower bench.            Problem: OT Long Term Goals       Dates: Start:  09/30/23         Goal: LTG-By discharge, patient will complete basic self care tasks at Mod Independent level.       Dates: Start:  09/30/23       Description: With adaptive equipment as needed.         Goal: LTG-By discharge, patient will perform bathroom transfers at Mod Independent level with DME as  needed.       Dates: Start:  09/30/23

## 2023-10-02 NOTE — DISCHARGE PLANNING
CASE MANAGEMENT INITIAL ASSESSMENT    Admit Date:  9/29/2023     Case Management has reviewed the medical chart and will meet with patient to discuss role of case management / discharge planning / team conference.   Patient is a  58 y.o. male transferred from [facility] [place] [facility].    Attending physician: Dr. Lin  PCP: Mojgan Garcia MD  Neurosurgeon: Dr. Archana Be     Diagnosis: Cerebellar mass [G93.89]    Co-morbidities:   Patient Active Problem List    Diagnosis Date Noted    Cerebellar mass 09/29/2023    Metastasis to brain (HCC) 09/20/2023    Prostate cancer (HCC) 09/20/2023    HTN (hypertension) 01/14/2015    Obesity 01/14/2015    BMI 35.0-35.9,adult 10/28/2014    NED (obstructive sleep apnea) 10/23/2012    Dyslipidemia 01/23/2012    Allergic rhinitis 09/29/2009    HTN (hypertension), benign 08/14/2009    DM (diabetes mellitus) ([facility]) 05/27/2009     Prior Living Situation:  Housing / Facility: 1 Story Apartment / Condo  Lives with - Patient's Self Care Capacity: Significant Other    Prior Level of Function:  Medication Management: Requires Assist  Finances: Independent  Home Management: Independent  Shopping: Independent  Prior Level Of Mobility: Supervision With Device in Community  Driving / Transportation: Relatives / Others Provide Transportation    Support Systems:  Primary : Basilia Meeks (S.O.)    Previous Services Utilized:   Equipment Owned: Front-Wheel Walker, 4-Wheel Walker, Single Point Cane, Tub Transfer Bench    Other Information:  Occupation (Pre-Hospital Vocational): Retired Due To Disability (from: )  Primary Payor Source: Other (Comments) ([facility])  Primary Care Practitioner : Mojgan Garcia MD  Other MDs: Dr. Archana Be    Patient / Family Goal:  Patient / Family Goal:  (cm to confirm)    Plan:  1. Continue to follow patient through hospitalization and provide discharge planning in collaboration with patient, family, physicians and ancillary services.     2. Utilize community resources to  ensure a safe discharge.

## 2023-10-02 NOTE — THERAPY
Physical Therapy   Daily Treatment     Patient Name: Casper Rowley  Age:  58 y.o., Sex:  male  Medical Record #: [id number]  Today's Date: 10/2/2023     Precautions  Precautions: (P) Fall Risk    Subjective    Pt finishing up in restroom, ready fpr PT     Objective       10/02/23 0831   PT Charge Group   PT Gait Training (Units) 2   PT Therapeutic Exercise (Units) 2   PT Total Time Spent   PT Individual Total Time Spent (Mins) 60   Gait Functional Level of Assist    Gait Level Of Assist Minimal Assist   Assistive Device Front Wheel Walker   Distance (Feet) 80   # of Times Distance was Traveled 6   Deviation Decreased Base Of Support;Bradykinetic;Decreased Heel Strike;Decreased Toe Off   Transfer Functional Level of Assist   Bed, Chair, Wheelchair Transfer Minimal Assist   Bed Chair Wheelchair Transfer Description Adaptive equipment;Increased time;Set-up of equipment;Verbal cueing   Sitting Lower Body Exercises   Sitting Lower Body Exercises Yes   Ankle Pumps 2 sets of 10   Hip Flexion 2 sets of 10   Hip Abduction 2 sets of 10   Hip Adduction 2 sets of 10   Long Arc Quad 2 sets of 10   Hamstring Curl 2 sets of 10   Nustep Resistance Level 3  (4 minutes x 4 extrmities/ 1 minutes with LE's only to complete 10 minutes total)         Assessment    Pt requires AAROM for LE strength training due to generalized LE weakness and limited ROM B ankles due to foot drop. Pt improving overall activity tolerance since admission.    Strengths: Able to follow instructions, Effective communication skills, Good insight into deficits/needs, Independent prior level of function, Motivated for self care and independence, Pleasant and cooperative, Supportive family, Willingly participates in therapeutic activities  Barriers: Decreased endurance, Fatigue, Generalized weakness, Impaired balance, Limited mobility (metastatic disease / LE swelling)    Plan    Gait endurance with FWW, LE strength training, mobility training, standing  balance/ activity tolerance.    Passport items to be completed:  Get in/out of bed safely, in/out of a vehicle, safely use mobility device, walk or wheel around home/community, navigate up and down stairs, show how to get up/down from the ground, ensure home is accessible, demonstrate HEP, complete caregiver training    Physical Therapy Problems (Active)       Problem: Balance       Dates: Start:  09/30/23         Goal: STG-Within one week, patient will maintain dynamic standing to complete 10 MWT with FWW and SBA       Dates: Start:  09/30/23               Problem: Mobility       Dates: Start:  09/30/23         Goal: STG-Within one week, patient will ambulate community distances with FWW and CGA x 150 ft       Dates: Start:  09/30/23               Problem: Mobility Transfers       Dates: Start:  09/30/23         Goal: STG-Within one week, patient will perform bed mobility SPV       Dates: Start:  09/30/23            Goal: STG-Within one week, patient will transfer bed to chair SPV after set-up with FWW       Dates: Start:  09/30/23               Problem: PT-Long Term Goals       Dates: Start:  09/30/23         Goal: LTG-By discharge, patient will propel wheelchair modified independent x 150 ft level / indoors       Dates: Start:  09/30/23            Goal: LTG-By discharge, patient will ambulate modified independent with FWW x 150 ft       Dates: Start:  09/30/23            Goal: LTG-By discharge, patient will transfer one surface to another modified independent with FWW       Dates: Start:  09/30/23            Goal: LTG-By discharge, patient will transfer in/out of a car SPV / modified independent after set-up with FWW       Dates: Start:  09/30/23

## 2023-10-02 NOTE — PROGRESS NOTES
"  Physical Medicine & Rehabilitation Progress Note    Encounter Date: 10/2/2023    Chief Complaint: Decreased mobility    Interval Events (Subjective):  Patient sitting up in room. He reports no return of vertigo. He reports the last time it happened was at [facility] before the surgery. He denies pain. He would prefer to manage his DM rather than hospitalist.     Objective:  VITAL SIGNS: /80   Pulse 96   Temp 36.5 °C (97.7 °F) (Oral)   Resp 18   Ht 1.702 m (5' 7\")   Wt 81 kg (178 lb 9 oz)   SpO2 98%   BMI 27.97 kg/m²   Gen: NAD  Psych: Mood and affect appropriate  CV: RRR, 0 edema  Resp: CTAB, no upper airway sounds  Abd: NTND  Neuro: AOx4, following commands    Laboratory Values:  Recent Results (from the past 72 hour(s))   POCT glucose device results    Collection Time: 09/29/23  5:28 PM   Result Value Ref Range    POC Glucose, Blood 237 (H) 65 - 99 mg/dL   POCT glucose device results    Collection Time: 09/29/23  9:22 PM   Result Value Ref Range    POC Glucose, Blood 323 (H) 65 - 99 mg/dL   CBC with Differential    Collection Time: 09/30/23  5:42 AM   Result Value Ref Range    WBC 9.1 4.8 - 10.8 K/uL    RBC 3.28 (L) 4.70 - 6.10 M/uL    Hemoglobin 10.2 (L) 14.0 - 18.0 g/dL    Hematocrit 31.3 (L) 42.0 - 52.0 %    MCV 95.4 81.4 - 97.8 fL    MCH 31.1 27.0 - 33.0 pg    MCHC 32.6 32.3 - 36.5 g/dL    RDW 62.5 (H) 35.9 - 50.0 fL    Platelet Count 173 164 - 446 K/uL    MPV 10.4 9.0 - 12.9 fL    Neutrophils-Polys 86.50 (H) 44.00 - 72.00 %    Lymphocytes 6.50 (L) 22.00 - 41.00 %    Monocytes 6.30 0.00 - 13.40 %    Eosinophils 0.10 0.00 - 6.90 %    Basophils 0.10 0.00 - 1.80 %    Immature Granulocytes 0.50 0.00 - 0.90 %    Nucleated RBC 0.00 0.00 - 0.20 /100 WBC    Neutrophils (Absolute) 7.87 (H) 1.82 - 7.42 K/uL    Lymphs (Absolute) 0.59 (L) 1.00 - 4.80 K/uL    Monos (Absolute) 0.57 0.00 - 0.85 K/uL    Eos (Absolute) 0.01 0.00 - 0.51 K/uL    Baso (Absolute) 0.01 0.00 - 0.12 K/uL    Immature Granulocytes (abs) 0.05 " 0.00 - 0.11 K/uL    NRBC (Absolute) 0.00 K/uL   Comp Metabolic Panel (CMP)    Collection Time: 09/30/23  5:42 AM   Result Value Ref Range    Sodium 136 135 - 145 mmol/L    Potassium 3.5 (L) 3.6 - 5.5 mmol/L    Chloride 100 96 - 112 mmol/L    Co2 21 20 - 33 mmol/L    Anion Gap 15.0 7.0 - 16.0    Glucose 333 (H) 65 - 99 mg/dL    Bun 11 8 - 22 mg/dL    Creatinine 0.48 (L) 0.50 - 1.40 mg/dL    Calcium 8.2 (L) 8.5 - 10.5 mg/dL    Correct Calcium 8.9 8.5 - 10.5 mg/dL    AST(SGOT) 14 12 - 45 U/L    ALT(SGPT) 19 2 - 50 U/L    Alkaline Phosphatase 152 (H) 30 - 99 U/L    Total Bilirubin 0.2 0.1 - 1.5 mg/dL    Albumin 3.1 (L) 3.2 - 4.9 g/dL    Total Protein 5.5 (L) 6.0 - 8.2 g/dL    Globulin 2.4 1.9 - 3.5 g/dL    A-G Ratio 1.3 g/dL   HEMOGLOBIN A1C    Collection Time: 09/30/23  5:42 AM   Result Value Ref Range    Glycohemoglobin 7.1 (H) 4.0 - 5.6 %    Est Avg Glucose 157 mg/dL   TSH with Reflex to FT4    Collection Time: 09/30/23  5:42 AM   Result Value Ref Range    TSH 0.400 0.380 - 5.330 uIU/mL   Vitamin D, 25-hydroxy (blood)    Collection Time: 09/30/23  5:42 AM   Result Value Ref Range    25-Hydroxy   Vitamin D 25 11 (L) 30 - 100 ng/mL   ESTIMATED GFR    Collection Time: 09/30/23  5:42 AM   Result Value Ref Range    GFR (CKD-EPI) 120 >60 mL/min/1.73 m 2   POCT glucose device results    Collection Time: 09/30/23  7:53 AM   Result Value Ref Range    POC Glucose, Blood 295 (H) 65 - 99 mg/dL   POCT glucose device results    Collection Time: 09/30/23 11:39 AM   Result Value Ref Range    POC Glucose, Blood 245 (H) 65 - 99 mg/dL   POCT glucose device results    Collection Time: 09/30/23  5:32 PM   Result Value Ref Range    POC Glucose, Blood 309 (H) 65 - 99 mg/dL   POCT glucose device results    Collection Time: 09/30/23  9:43 PM   Result Value Ref Range    POC Glucose, Blood 335 (H) 65 - 99 mg/dL   POCT glucose device results    Collection Time: 10/01/23  7:12 AM   Result Value Ref Range    POC Glucose, Blood 273 (H) 65 - 99  mg/dL   POCT glucose device results    Collection Time: 10/01/23 11:10 AM   Result Value Ref Range    POC Glucose, Blood 248 (H) 65 - 99 mg/dL   POCT glucose device results    Collection Time: 10/01/23  5:11 PM   Result Value Ref Range    POC Glucose, Blood 347 (H) 65 - 99 mg/dL   POCT glucose device results    Collection Time: 10/01/23  9:44 PM   Result Value Ref Range    POC Glucose, Blood 294 (H) 65 - 99 mg/dL   POCT glucose device results    Collection Time: 10/02/23  8:11 AM   Result Value Ref Range    POC Glucose, Blood 232 (H) 65 - 99 mg/dL   POCT glucose device results    Collection Time: 10/02/23 11:34 AM   Result Value Ref Range    POC Glucose, Blood 151 (H) 65 - 99 mg/dL       Medications:  Scheduled Medications   Medication Dose Frequency    Pharmacy Consult Request  1 Each PHARMACY TO DOSE    senna-docusate  2 Tablet BID    omeprazole  20 mg DAILY    enoxaparin (LOVENOX) injection  40 mg DAILY AT 1800    insulin GLARGINE  35 Units Q EVENING    insulin regular  3-14 Units 4X/DAY ACHS    lisinopril  20 mg DAILY    metoprolol SR  25 mg DAILY     PRN medications: Respiratory Therapy Consult, hydrALAZINE, acetaminophen, senna-docusate **AND** polyethylene glycol/lytes **AND** magnesium hydroxide **AND** bisacodyl, mag hydrox-al hydrox-simeth, ondansetron **OR** ondansetron, traZODone, sodium chloride, oxyCODONE immediate-release **OR** oxyCODONE immediate-release, midazolam, insulin regular **AND** POC blood glucose manual result **AND** NOTIFY MD and PharmD **AND** Administer 20 grams of glucose (approximately 8 ounces of fruit juice) every 15 minutes PRN FSBG less than 70 mg/dL **AND** dextrose bolus, meclizine, promethazine, zolpidem    Diet:  Current Diet Order   Procedures    Diet Order Diet: Consistent CHO (Diabetic)       Medical Decision Making and Plan:   Cerebellar mass - Patient with diffuse metastatic prostate cancer with new lesion to cerebellum thought to be most likely metastatic now s/p  resection on 9/21/23 with Dr. Be.  -PT and OT for mobility and ADLs. Per guidelines, 15 hours per week between PT, OT and/or SLP.  -Follow-up NSG     Metastatic prostate cancer - Patient with multiple lesions s/p radiation previously. On Decadron 2 mg daily. Follow-up Oncology     HTN - Patient on Lisinopril 20 mg and Metoprolol 25 mg daily. Continue Lisinopril and Metoprolol      DM2 with hyperglycemia - Patient on Lantus and SSI on transfer.      NED - RT to consult     Pain - Patient on PRN Oxycodone and Tylenol     Skin - Patient at risk for skin breakdown due to debility in areas including sacrum, achilles, elbows and head in addition to other sites. Nursing to assess skin daily.      GI Ppx - Patient on Prilosec for GERD prophylaxis. Patient on Senna-docusate for constipation prophylaxis.      DVT Ppx - Patient Lovenox on transfer. Continue Lovenox  ____________________________________    T. Erick Lin MD/PhD  [facility] - Physical Medicine & Rehabilitation   [facility] - Brain Injury Medicine   ____________________________________

## 2023-10-02 NOTE — PROGRESS NOTES
NURSING DAILY NOTE    Name: Casper Rowley   Date of Admission: 9/29/2023   Admitting Diagnosis: Cerebellar mass  Attending Physician: Kasie Lin M.d.  Allergies: Patient has no known allergies.    Safety  Patient Assist  sba  Patient Precautions  Fall Risk  Precaution Comments     Bed Transfer Status  Moderate Assist  Toilet Transfer Status   Minimal Assist  Assistive Devices  Rails  Oxygen  None - Room Air  Diet/Therapeutic Dining  Current Diet Order   Procedures    Diet Order Diet: Consistent CHO (Diabetic)     Pill Administration  whole  Agitated Behavioral Scale     ABS Level of Severity       Fall Risk  Has the patient had a fall this admission?   No  Lizzy Mar Fall Risk Scoring  14, MODERATE RISK  Fall Risk Safety Measures  bed alarm and chair alarm    Vitals  Temperature: 36.8 °C (98.3 °F)  Temp src: Oral  Pulse: (!) 103  Respiration: 18  Blood Pressure: 119/78  Blood Pressure MAP (Calculated): 92 MM HG  BP Location: Right, Upper Arm  Patient BP Position: Sitting     Oxygen  Pulse Oximetry: 100 %  O2 Delivery Device: None - Room Air    Bowel and Bladder  Last Bowel Movement  10/01/23  Stool Type  Type 6: Fluffy pieces with ragged edges, a mushy stool  Bowel Device  Bathroom  Continent  Bladder: Continent void   Bowel: Continent movement  Bladder Function  Urine Void (mL): 400 ml (urinals)  Number of Times Voided: 1  Urine Color: Yellow  Number of Times Incontinent of Urine: 0  Genitourinary Assessment   Bladder Assessment (WDL):  Within Defined Limits  Urine Color: Yellow  Number of Bladder Accidents: 0  Total Number of Bladder of Accidents in Last 7 Days: 0  Number of Times Incontinent of Urine: 0  Bladder Device: Bathroom  Time Void: Yes  Bladder Scan: Post Void  $ Bladder Scan Results (mL): 1    Skin  Ravin Score   18  Sensory Interventions   Bed Types: Standard/Trauma Mattress  Skin Preventative Measures: Pillows in Use for  Support / Positioning  Moisture Interventions         Pain  Pain Rating Scale  0 - No Pain (resting)  Pain Location  Generalized  Pain Location Orientation  Right, Left  Pain Interventions   Medication (see MAR)    ADLs    Bathing      Linen Change   Complete  Personal Hygiene     Chlorhexidine Bath      Oral Care  Brushed Teeth (self)  Teeth/Dentures     Shave  Self  Nutrition Percentage Eaten  *  * Meal *  *, Dinner, Between % Consumed  Environmental Precautions  Bed in Low Position, Treaded Slipper Socks on Patient  Patient Turns/Positioning  Patient Turns Self from Side to Side  Patient Turns Assistance/Tolerance     Bed Positions  Bed Controls On  Head of Bed Elevated  Self regulated      Psychosocial/Neurologic Assessment  Psychosocial Assessment  Psychosocial (WDL):  Within Defined Limits  Neurologic Assessment  Neuro (WDL): Exceptions to WDL (Craniotomy.)  Level of Consciousness: Alert  Orientation Level: Oriented X4  Cognition: Appropriate judgement, Appropriate safety awareness, Appropriate attention/concentration  Speech: Clear  Pupil Assesment: No  R Pupil Size (mm): 3  R Pupil Shape / Description: Round  R Pupil Reaction: Brisk  L Pupil Size (mm): 3  L Pupil Shape / Description: Round  L Pupil Reaction: Brisk  EENT (WDL):  Within Defined Limits    Cardio/Pulmonary Assessment  Edema      Respiratory Breath Sounds  RUL Breath Sounds: Clear  RML Breath Sounds: Clear  RLL Breath Sounds: Diminished  BRIAN Breath Sounds: Clear  LLL Breath Sounds: Diminished  Cardiac Assessment   Cardiac (WDL):  WDL Except

## 2023-10-03 ENCOUNTER — APPOINTMENT (OUTPATIENT)
Dept: OCCUPATIONAL THERAPY | Facility: REHABILITATION | Age: 58
DRG: 949 | End: 2023-10-03
Attending: PHYSICAL MEDICINE & REHABILITATION
Payer: COMMERCIAL

## 2023-10-03 ENCOUNTER — APPOINTMENT (OUTPATIENT)
Dept: SPEECH THERAPY | Facility: REHABILITATION | Age: 58
DRG: 949 | End: 2023-10-03
Attending: HOSPITALIST
Payer: COMMERCIAL

## 2023-10-03 ENCOUNTER — APPOINTMENT (OUTPATIENT)
Dept: PHYSICAL THERAPY | Facility: REHABILITATION | Age: 58
DRG: 949 | End: 2023-10-03
Attending: PHYSICAL MEDICINE & REHABILITATION
Payer: COMMERCIAL

## 2023-10-03 LAB
GLUCOSE BLD STRIP.AUTO-MCNC: 158 MG/DL (ref 65–99)
GLUCOSE BLD STRIP.AUTO-MCNC: 176 MG/DL (ref 65–99)
GLUCOSE BLD STRIP.AUTO-MCNC: 276 MG/DL (ref 65–99)

## 2023-10-03 PROCEDURE — 700102 HCHG RX REV CODE 250 W/ 637 OVERRIDE(OP): Performed by: PHYSICAL MEDICINE & REHABILITATION

## 2023-10-03 PROCEDURE — 97110 THERAPEUTIC EXERCISES: CPT | Mod: CO

## 2023-10-03 PROCEDURE — 97535 SELF CARE MNGMENT TRAINING: CPT | Mod: CO

## 2023-10-03 PROCEDURE — 97110 THERAPEUTIC EXERCISES: CPT

## 2023-10-03 PROCEDURE — 700111 HCHG RX REV CODE 636 W/ 250 OVERRIDE (IP): Mod: JZ | Performed by: PHYSICAL MEDICINE & REHABILITATION

## 2023-10-03 PROCEDURE — A9270 NON-COVERED ITEM OR SERVICE: HCPCS | Performed by: PHYSICAL MEDICINE & REHABILITATION

## 2023-10-03 PROCEDURE — 97130 THER IVNTJ EA ADDL 15 MIN: CPT

## 2023-10-03 PROCEDURE — 97129 THER IVNTJ 1ST 15 MIN: CPT

## 2023-10-03 PROCEDURE — 97116 GAIT TRAINING THERAPY: CPT

## 2023-10-03 PROCEDURE — 97530 THERAPEUTIC ACTIVITIES: CPT | Mod: CO

## 2023-10-03 PROCEDURE — 770010 HCHG ROOM/CARE - REHAB SEMI PRIVAT*

## 2023-10-03 PROCEDURE — 99233 SBSQ HOSP IP/OBS HIGH 50: CPT | Performed by: PHYSICAL MEDICINE & REHABILITATION

## 2023-10-03 PROCEDURE — 82962 GLUCOSE BLOOD TEST: CPT | Mod: 91

## 2023-10-03 RX ADMIN — METOPROLOL SUCCINATE 25 MG: 25 TABLET, EXTENDED RELEASE ORAL at 08:08

## 2023-10-03 RX ADMIN — OXYCODONE HYDROCHLORIDE 5 MG: 5 TABLET ORAL at 19:45

## 2023-10-03 RX ADMIN — OMEPRAZOLE 20 MG: 20 CAPSULE, DELAYED RELEASE ORAL at 08:08

## 2023-10-03 RX ADMIN — ZOLPIDEM TARTRATE 10 MG: 5 TABLET ORAL at 21:44

## 2023-10-03 RX ADMIN — INSULIN HUMAN 3 UNITS: 100 INJECTION, SOLUTION PARENTERAL at 11:18

## 2023-10-03 RX ADMIN — LISINOPRIL 20 MG: 20 TABLET ORAL at 17:32

## 2023-10-03 RX ADMIN — INSULIN HUMAN 3 UNITS: 100 INJECTION, SOLUTION PARENTERAL at 07:45

## 2023-10-03 RX ADMIN — ENOXAPARIN SODIUM 40 MG: 100 INJECTION SUBCUTANEOUS at 17:32

## 2023-10-03 ASSESSMENT — GAIT ASSESSMENTS
DISTANCE (FEET): 200
GAIT LEVEL OF ASSIST: CONTACT GUARD ASSIST
DEVIATION: BRADYKINETIC;DECREASED HEEL STRIKE;DECREASED TOE OFF
ASSISTIVE DEVICE: FRONT WHEEL WALKER

## 2023-10-03 ASSESSMENT — ACTIVITIES OF DAILY LIVING (ADL)
BED_CHAIR_WHEELCHAIR_TRANSFER_DESCRIPTION: ADAPTIVE EQUIPMENT
TOILETING_LEVEL_OF_ASSIST_DESCRIPTION: GRAB BAR
TOILET_TRANSFER_DESCRIPTION: GRAB BAR;INCREASED TIME
BED_CHAIR_WHEELCHAIR_TRANSFER_DESCRIPTION: ADAPTIVE EQUIPMENT;INCREASED TIME;SET-UP OF EQUIPMENT

## 2023-10-03 ASSESSMENT — PAIN DESCRIPTION - PAIN TYPE: TYPE: ACUTE PAIN

## 2023-10-03 NOTE — DISCHARGE PLANNING
Case Management/IDT follow up.   IDT continues to recommend IRF level of care as patient continue to make progress with all therapies.   Projected dc date set for: 10/11.      DC needs: W/C  Recommendations made for home health for PT/OT/RN  Follow up with:  pcp, kym Be.     Met with pt / family providing update from IDT and discussed plan of care.    Plan:  Continue to follow

## 2023-10-03 NOTE — THERAPY
"Speech Language Pathology  Daily Treatment     Patient Name: Casper Rowley  Age:  58 y.o., Sex:  male  Medical Record #: [id number]  Today's Date: 10/3/2023     Precautions  Precautions: Fall Risk    Subjective    Pt reports leg weakness following therapy earlier, assistance with hygiene following BM this date.      Objective       10/03/23 1034   Treatment Charges   SLP Cognitive Skill Development First 15 Minutes 1   SLP Cognitive Skill Development Additional 15 Minutes 1   SLP Total Time Spent   SLP Individual Total Time Spent (Mins) 30   Cognition   Functional Math / Financial Management Minimal (4)   Interdisciplinary Plan of Care Collaboration   IDT Collaboration with  Nursing   Patient Position at End of Therapy Seated   Collaboration Comments nursing discussing DM meds         Assessment    Complex attention, who has more (bills/coins): 79% IND, unable to correct errors due to time constraints. Will do so in next therapy session. Pt rated the activity as \"fairly easy\" reports independence with finances prior.     Strengths: Able to follow instructions, Alert and oriented, Effective communication skills, Making steady progress towards goals, Supportive family, Pleasant and cooperative, Independent prior level of function, Motivated for self care and independence, Willingly participates in therapeutic activities  Barriers:  (STM deficits)    Plan    Correct who has more, continue to target use of memory aids.     Passport items to be completed:  Express basic needs, understand food/liquid recommendations, consistently follow swallow precautions, manage finances, manage medications, arrive to therapy appointments on time, complete daily memory log entries, solve problems related to safety situations, review education related to hospitalization, complete caregiver training     Speech Therapy Problems (Active)       Problem: Speech/Swallowing LTGs       Dates: Start:  09/30/23         Goal: LTG-By discharge, " patient will solve complex problems with min DAVID for safe d/c home       Dates: Start:  09/30/23

## 2023-10-03 NOTE — CARE PLAN
Problem: Knowledge Deficit - Standard  Goal: Patient and family/care givers will demonstrate understanding of plan of care, disease process/condition, diagnostic tests and medications  Outcome: Progressing   Pt education given regarding plan of care with emphasis on adequate hydration, pt shows good understanding, with poor follow through, will continue to reinforce education and continue to monitor.         Problem: Fall Risk - Rehab  Goal: Patient will remain free from falls  Outcome: Progressing   Pt education given regarding fall precautions AND safety measures, pt shows good understanding, has not attempted to self transfer this shift, will continue to reinforce education and continue to monitor.

## 2023-10-03 NOTE — THERAPY
Physical Therapy   Daily Treatment     Patient Name: Casper Rowley  Age:  58 y.o., Sex:  male  Medical Record #: [id number]  Today's Date: 10/3/2023     Precautions  Precautions: Fall Risk    Subjective    Pt up in wc, finished with breakfast and willing to participate.     Objective       10/03/23 0831   PT Charge Group   PT Gait Training (Units) 2   PT Therapeutic Exercise (Units) 2   PT Total Time Spent   PT Individual Total Time Spent (Mins) 60   Gait Functional Level of Assist    Gait Level Of Assist Contact Guard Assist   Assistive Device Front Wheel Walker  (B toe-up straps)   Distance (Feet) 200   # of Times Distance was Traveled 2   Deviation Bradykinetic;Decreased Heel Strike;Decreased Toe Off   Transfer Functional Level of Assist   Bed, Chair, Wheelchair Transfer Contact Guard Assist   Bed Chair Wheelchair Transfer Description Adaptive equipment;Increased time;Set-up of equipment   Supine Lower Body Exercise   Supine Lower Body Exercises Yes   Hip Abduction 2 sets of 10   Hip Adduction  2 sets of 10   Knee to Chest 2 sets of 10   Ankle Pumps 2 sets of 10   Comments first set for PROM/ stretching with overpressure as tolerated at end ranges, second set AAROM for strength training.   Sitting Lower Body Exercises   Sit to Stand 1 set of 10  (from elevated edge of mat, UE support for sit>stand, no Ue support for eccentric strength training stand>sit.)   Nustep Resistance Level 3  (4 minutes x 4 extrmities/ 1 minutes with LE's only to complete 10 minutes total)   Bed Mobility    Supine to Sit Standby Assist   Sit to Supine Standby Assist   Sit to Stand Contact Guard Assist         Assessment    Pt tolerated therapy session well, improving gait endurance but remains limited by LE weakness, tightness/ limited dorsiflexion B and edema.     Strengths: Able to follow instructions, Effective communication skills, Good insight into deficits/needs, Independent prior level of function, Motivated for self care and  independence, Pleasant and cooperative, Supportive family, Willingly participates in therapeutic activities  Barriers: Decreased endurance, Fatigue, Generalized weakness, Impaired balance, Limited mobility (metastatic disease / LE swelling)    Plan    Gait endurance with FWW, LE strength training, mobility training, standing balance/ activity tolerance.     Passport items to be completed:  Get in/out of bed safely, in/out of a vehicle, safely use mobility device, walk or wheel around home/community, navigate up and down stairs, show how to get up/down from the ground, ensure home is accessible, demonstrate HEP, complete caregiver training      Physical Therapy Problems (Active)       Problem: Balance       Dates: Start:  09/30/23         Goal: STG-Within one week, patient will maintain dynamic standing to complete 10 MWT with FWW and SBA       Dates: Start:  09/30/23               Problem: Mobility       Dates: Start:  09/30/23         Goal: STG-Within one week, patient will ambulate community distances with FWW and CGA x 150 ft       Dates: Start:  09/30/23               Problem: Mobility Transfers       Dates: Start:  09/30/23         Goal: STG-Within one week, patient will perform bed mobility SPV       Dates: Start:  09/30/23            Goal: STG-Within one week, patient will transfer bed to chair SPV after set-up with FWW       Dates: Start:  09/30/23               Problem: PT-Long Term Goals       Dates: Start:  09/30/23         Goal: LTG-By discharge, patient will propel wheelchair modified independent x 150 ft level / indoors       Dates: Start:  09/30/23            Goal: LTG-By discharge, patient will ambulate modified independent with FWW x 150 ft       Dates: Start:  09/30/23            Goal: LTG-By discharge, patient will transfer one surface to another modified independent with FWW       Dates: Start:  09/30/23            Goal: LTG-By discharge, patient will transfer in/out of a car SPV / modified  independent after set-up with FWW       Dates: Start:  09/30/23

## 2023-10-03 NOTE — PROGRESS NOTES
NURSING DAILY NOTE    Name: Casper Rowley   Date of Admission: 9/29/2023   Admitting Diagnosis: Cerebellar mass  Attending Physician: Kasie Lin M.d.  Allergies: Patient has no known allergies.    Safety  Patient Assist  sba  Patient Precautions  Fall Risk  Precaution Comments     Bed Transfer Status  Minimal Assist  Toilet Transfer Status   Minimal Assist  Assistive Devices  Wheelchair  Oxygen  None - Room Air  Diet/Therapeutic Dining  Current Diet Order   Procedures    Diet Order Diet: Consistent CHO (Diabetic)     Pill Administration  whole  Agitated Behavioral Scale     ABS Level of Severity       Fall Risk  Has the patient had a fall this admission?   No  Lizzy Mar Fall Risk Scoring  14, MODERATE RISK  Fall Risk Safety Measures  bed alarm, chair alarm, and poor balance    Vitals  Temperature: 36.4 °C (97.5 °F)  Temp src: Oral  Pulse: (!) 104  Respiration: 18  Blood Pressure: 112/70  Blood Pressure MAP (Calculated): 84 MM HG  BP Location: Left, Upper Arm  Patient BP Position: Sitting     Oxygen  Pulse Oximetry: 96 %  O2 (LPM): 0  O2 Delivery Device: None - Room Air    Bowel and Bladder  Last Bowel Movement  10/02/23  Stool Type  Type 6: Fluffy pieces with ragged edges, a mushy stool  Bowel Device  Bathroom  Continent  Bladder: Continent void   Bowel: Continent movement  Bladder Function  Urine Void (mL): 500 ml  Number of Times Voided: 1  Urine Color: Unable To Evaluate  Number of Times Incontinent of Urine: 0  Genitourinary Assessment   Bladder Assessment (WDL):  Within Defined Limits  Urine Color: Unable To Evaluate  Number of Bladder Accidents: 0  Total Number of Bladder of Accidents in Last 7 Days: 0  Number of Times Incontinent of Urine: 0  Bladder Device: Bathroom  Time Void: Yes  Bladder Scan: Post Void  $ Bladder Scan Results (mL): 1    Skin  Ravin Score   18  Sensory Interventions   Bed Types: Standard/Trauma  Mattress  Skin Preventative Measures: Pillows in Use for Support / Positioning  Moisture Interventions         Pain  Pain Rating Scale  0 - No Pain  Pain Location  Generalized  Pain Location Orientation  Right, Left  Pain Interventions   Medication (see MAR)    ADLs    Bathing      Linen Change   Complete  Personal Hygiene     Chlorhexidine Bath      Oral Care  Brushed Teeth (self)  Teeth/Dentures     Shave  Self  Nutrition Percentage Eaten  *  * Meal *  *, Dinner, Between % Consumed  Environmental Precautions  Bed in Low Position, Treaded Slipper Socks on Patient  Patient Turns/Positioning  Patient Turns Self from Side to Side  Patient Turns Assistance/Tolerance     Bed Positions  Bed Controls On  Head of Bed Elevated  Self regulated      Psychosocial/Neurologic Assessment  Psychosocial Assessment  Psychosocial (WDL):  Within Defined Limits  Neurologic Assessment  Neuro (WDL): Exceptions to WDL (Craniotomy.)  Level of Consciousness: Alert  Orientation Level: Oriented X4  Cognition: Appropriate judgement, Appropriate safety awareness, Appropriate attention/concentration  Speech: Clear  Pupil Assesment: No  R Pupil Size (mm): 3  R Pupil Shape / Description: Round  R Pupil Reaction: Brisk  L Pupil Size (mm): 3  L Pupil Shape / Description: Round  L Pupil Reaction: Brisk  EENT (WDL):  Within Defined Limits    Cardio/Pulmonary Assessment  Edema      Respiratory Breath Sounds  RUL Breath Sounds: Clear  RML Breath Sounds: Clear  RLL Breath Sounds: Diminished  BRIAN Breath Sounds: Clear  LLL Breath Sounds: Diminished  Cardiac Assessment   Cardiac (WDL):  WDL Except

## 2023-10-03 NOTE — CARE PLAN
"The patient is Stable - Low risk of patient condition declining or worsening    Shift Goals  Clinical Goals: Pain management  Patient Goals: Rest    Patient is not progressing towards the following goals:    Problem: Fall Risk - Rehab  Goal: Patient will remain free from falls  Outcome: Not Met  Note: Lizzy Mar Fall risk Assessment Score: 15    High fall risk Interventions   - Bed and strip alarm   - Yellow sign by the door   - Yellow wrist band \"Fall risk\"  - Room near to the nurse station  - Fall risk education provided     "

## 2023-10-03 NOTE — THERAPY
Occupational Therapy  Daily Treatment     Patient Name: Casper Rowley  Age:  58 y.o., Sex:  male  Medical Record #: [id number]  Today's Date: 10/3/2023     Precautions  Precautions: (P) Fall Risk    Safety   ADL Safety : Requires Supervision for Safety  Bathroom Safety: Requires Supervision for Safety    Subjective    Patient was seated in w/c in room and ready for OT.       Objective       10/03/23 0931   OT Charge Group   OT Self Care / ADL (Units) 1   OT Therapy Activity (Units) 1   OT Total Time Spent   OT Individual Total Time Spent (Mins) 30   Precautions   Precautions Fall Risk   Interdisciplinary Plan of Care Collaboration   Patient Position at End of Therapy Seated;Call Light within Reach;Tray Table within Reach;Phone within Reach     Functional mobility with FWW from room <> ADL practice room in main gym with CGA for endurance building.      Completed dry tub/shower transfer using tub bench and min A for sitting balance when transferring into tub.  He required min/mod A for STS from tub bench when done due to LB weakness.      Assessment    Patient with impaired sitting balance and STS from tub bench during dry tub/shower transfer.  He would benefit from core and LB strengthening.    Strengths: Able to follow instructions, Alert and oriented, Good insight into deficits/needs, Independent prior level of function, Motivated for self care and independence, Pleasant and cooperative, Supportive family, Willingly participates in therapeutic activities  Barriers: Decreased endurance, Fatigue, Impaired activity tolerance, Impaired balance, Impulsive    Plan    ADLs, IADLs, functional mobility, strength/endurance, standing tolerance/balance, core/LB strengthening    Occupational Therapy Goals (Active)       Problem: Dressing       Dates: Start:  10/03/23         Goal: STG-Within one week, patient will dress LB with supervision       Dates: Start:  10/03/23               Problem: Functional Transfers        Dates: Start:  09/30/23         Goal: STG-Within one week, patient will transfer to toilet with CGA.       Dates: Start:  09/30/23       Description: Using 3:1 commode and grabbar.      Goal Note filed on 10/03/23 1047 by Harpal Zazueta OT/L       Min A              Goal: STG-Within one week, patient will transfer to toilet with CGA using grab bar       Dates: Start:  10/03/23               Problem: OT Long Term Goals       Dates: Start:  09/30/23         Goal: LTG-By discharge, patient will complete basic self care tasks at Mod Independent level.       Dates: Start:  09/30/23       Description: With adaptive equipment as needed.         Goal: LTG-By discharge, patient will perform bathroom transfers at Mod Independent level with DME as needed.       Dates: Start:  09/30/23               Problem: Toileting       Dates: Start:  10/03/23         Goal: STG-Within one week, patient will complete toileting tasks with SBA       Dates: Start:  10/03/23

## 2023-10-03 NOTE — PROGRESS NOTES
Physical Medicine & Rehabilitation Progress Note    Encounter Date: 10/3/2023    Chief Complaint: Decreased mobility    Interval Events (Subjective):  Patient sitting up in room. He reports therapy is going well. He brought in his DM medications. Reviewed and will start on trulicity and PO medications. He is in agreement. Will discontinue insulin.     _____________________________________  Interdisciplinary Team Conference   Most recent IDT on 10/3/2023    IKasie M.D./Ph.D., was present and led the interdisciplinary team conference on 10/3/2023.  I led the IDT conference and agree with the IDT conference documentation and plan of care as noted below.     Nursing:  Diet Current Diet Order   Procedures    Diet Order Diet: Consistent CHO (Diabetic)       Eating ADL Independent      % of Last Meal  Oral Nutrition: Between % Consumed   Sleep    Bowel Last BM: 10/03/23   Bladder Continent   Barriers to Discharge Home:  DM management    Physical Therapy:  Bed Mobility    Transfers Contact Guard Assist  Adaptive equipment (FWW)   Mobility Contact Guard Assist   Stairs Laila   Barriers to Discharge Home:  Variable at times SBA to Laila with fatigue  LE edema    Rental WC    Occupational Therapy:  Grooming Modified Independent, Seated   Bathing Standby Assist   UB Dressing Modified Independent   LB Dressing Standby Assist   Toileting Contact Guard Assist   Shower & Transfer Laila   Barriers to Discharge Home:  Limited endurance  LE edema and weakness    Speech-Language Pathology:  Comprehension:  Modified Independent  Comprehension Description:  Glasses  Expression:  Independent  Expression Description:     Social Interaction:  Independent  Social Interaction Description:  Other (comment) (tangetial and lacks turn taking.)  Problem Solving:  Supervision  Problem Solving Description:  Verbal cueing, Therapy schedule, Bed/chair alarm, Seat belt  Memory:  Moderate Assist  Memory Description:  Verbal  "cueing, Therapy schedule, Increased time, Seat belt, Bed/chair alarm  Barriers to Discharge Home:  Memory      Case Management:  Continues to work on disposition and DME needs.      Discharge Date/Disposition:  10/11/23  _____________________________________    Objective:  VITAL SIGNS: /65   Pulse 98   Temp 36.7 °C (98 °F) (Oral)   Resp 18   Ht 1.702 m (5' 7\")   Wt 81 kg (178 lb 9 oz)   SpO2 97%   BMI 27.97 kg/m²   Gen: NAD  Psych: Mood and affect appropriate  CV: RRR, 0 edema  Resp: CTAB, no upper airway sounds  Abd: NTND  Neuro: AOx4, following commands  Unchanged from 10/2/23    Laboratory Values:  Recent Results (from the past 72 hour(s))   POCT glucose device results    Collection Time: 09/30/23  5:32 PM   Result Value Ref Range    POC Glucose, Blood 309 (H) 65 - 99 mg/dL   POCT glucose device results    Collection Time: 09/30/23  9:43 PM   Result Value Ref Range    POC Glucose, Blood 335 (H) 65 - 99 mg/dL   POCT glucose device results    Collection Time: 10/01/23  7:12 AM   Result Value Ref Range    POC Glucose, Blood 273 (H) 65 - 99 mg/dL   POCT glucose device results    Collection Time: 10/01/23 11:10 AM   Result Value Ref Range    POC Glucose, Blood 248 (H) 65 - 99 mg/dL   POCT glucose device results    Collection Time: 10/01/23  5:11 PM   Result Value Ref Range    POC Glucose, Blood 347 (H) 65 - 99 mg/dL   POCT glucose device results    Collection Time: 10/01/23  9:44 PM   Result Value Ref Range    POC Glucose, Blood 294 (H) 65 - 99 mg/dL   POCT glucose device results    Collection Time: 10/02/23  8:11 AM   Result Value Ref Range    POC Glucose, Blood 232 (H) 65 - 99 mg/dL   POCT glucose device results    Collection Time: 10/02/23 11:34 AM   Result Value Ref Range    POC Glucose, Blood 151 (H) 65 - 99 mg/dL   POCT glucose device results    Collection Time: 10/02/23  5:24 PM   Result Value Ref Range    POC Glucose, Blood 218 (H) 65 - 99 mg/dL   POCT glucose device results    Collection Time: " 10/02/23  9:33 PM   Result Value Ref Range    POC Glucose, Blood 296 (H) 65 - 99 mg/dL   POCT glucose device results    Collection Time: 10/03/23  7:37 AM   Result Value Ref Range    POC Glucose, Blood 158 (H) 65 - 99 mg/dL   POCT glucose device results    Collection Time: 10/03/23 11:15 AM   Result Value Ref Range    POC Glucose, Blood 176 (H) 65 - 99 mg/dL       Medications:  Scheduled Medications   Medication Dose Frequency    Dulaglutide  1 Pen Q7 DAYS    Canagliflozin  100 mg Daily-0800    Pharmacy Consult Request  1 Each PHARMACY TO DOSE    senna-docusate  2 Tablet BID    omeprazole  20 mg DAILY    enoxaparin (LOVENOX) injection  40 mg DAILY AT 1800    lisinopril  20 mg DAILY    metoprolol SR  25 mg DAILY     PRN medications: Respiratory Therapy Consult, hydrALAZINE, acetaminophen, senna-docusate **AND** polyethylene glycol/lytes **AND** magnesium hydroxide **AND** bisacodyl, mag hydrox-al hydrox-simeth, ondansetron **OR** ondansetron, traZODone, sodium chloride, oxyCODONE immediate-release **OR** oxyCODONE immediate-release, midazolam, [DISCONTINUED] insulin regular **AND** POC blood glucose manual result **AND** NOTIFY MD and PharmD **AND** Administer 20 grams of glucose (approximately 8 ounces of fruit juice) every 15 minutes PRN FSBG less than 70 mg/dL **AND** dextrose bolus, meclizine, promethazine, zolpidem    Diet:  Current Diet Order   Procedures    Diet Order Diet: Consistent CHO (Diabetic)       Medical Decision Making and Plan:   Cerebellar mass - Patient with 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 Be.  -PT and OT for mobility and ADLs. Per guidelines, 15 hours per week between PT, OT and/or SLP.  -Follow-up [facility] [place]     Metastatic prostate cancer - Patient with multiple lesions s/p radiation previously. On Decadron 2 mg daily. Follow-up Oncology. Completed Decadron.      HTN - Patient on Lisinopril  20 mg and Metoprolol 25 mg daily. Continue Lisinopril and Metoprolol.     DM2 with hyperglycemia - Patient on Lantus and SSI on transfer.  Brought in home trulicity and canagliflozin, will discontinue SSI and lantus     NED - RT to consult     Pain - Patient on PRN Oxycodone and Tylenol     Skin - Patient at risk for skin breakdown due to debility in areas including sacrum, achilles, elbows and head in addition to other sites. Nursing to assess skin daily.      GI Ppx - Patient on Prilosec for GERD prophylaxis. Patient on Senna-docusate for constipation prophylaxis.      DVT Ppx - Patient Lovenox on transfer. Continue Lovenox  ____________________________________    T. Erick Lin MD/PhD  [facility] - Physical Medicine & Rehabilitation   [facility] - Brain Injury Medicine   ____________________________________    Total time:  50 minutes. Time spent included pre-rounding review of vitals and tests, unit/floor time, face-to-face time with the patient including physical examination, care coordination, counseling of patient and/or family, ordering medications/procedures/tests, discussion with CM, PT, OT, SLP and/or other healthcare providers, and documentation in the electronic medical record. Topics discussed included discharge planning, DM medications, discontinue insulin, and continue Lovenox. Patient was discussed separately in IDT today; please see details above.

## 2023-10-03 NOTE — THERAPY
Occupational Therapy  Daily Treatment     Patient Name: Casper Rowley  Age:  58 y.o., Sex:  male  Medical Record #: [id number]  Today's Date: 10/3/2023     Precautions  Precautions: (P) Fall Risk    Safety   ADL Safety : Requires Supervision for Safety  Bathroom Safety: Requires Supervision for Safety    Subjective    Pt sitting in WC in room upon arrival. Pt agreeable for tx session.     Objective       10/03/23 1230   OT Charge Group   OT Self Care / ADL (Units) 1   OT Therapy Activity (Units) 2   OT Therapeutic Exercise (Units) 1   OT Total Time Spent   OT Individual Total Time Spent (Mins) 60   Precautions   Precautions Fall Risk   Functional Level of Assist   Toileting Contact Guard Assist   Toileting Description Grab bar   Bed, Chair, Wheelchair Transfer Contact Guard Assist   Bed Chair Wheelchair Transfer Description Adaptive equipment  (FWW)   Toilet Transfers Contact Guard Assist   Toilet Transfer Description Grab bar;Increased time   Standing Upper Body Exercises   Other Exercises Cross body horizontal pulls; 3 sets of 10  (5Ib weight; standing, reaching across midline for yamilex on table then pulling toward and across torso to improve core strength)   IADL Treatments   IADL Treatments Home management   Home Management Pt able to navigate around kitchen using FWW and CGA for safety. Pt collected x10 cones in various heights and distances and demo no LOB. Pt required VCs for safety and body mechanics   Interdisciplinary Plan of Care Collaboration   Patient Position at End of Therapy Seated;Chair Alarm On;Self Releasing Lap Belt Applied;Call Light within Reach;Tray Table within Reach;Phone within Reach     Pt completed 10 minutes at Nustep, resistance level 5 to improve endurance and strength needed for functional mobility and ADLs.     Assessment    Pt demo good safety awareness requiring Min VC's during kitchen mobility task. Pt demo good carryover after receiving feedback. Pt fatigues quickly  requiring frequent seated rest breaks. Pt demo good progress toward goals.    Strengths: Able to follow instructions, Alert and oriented, Good insight into deficits/needs, Independent prior level of function, Motivated for self care and independence, Pleasant and cooperative, Supportive family, Willingly participates in therapeutic activities  Barriers: Decreased endurance, Fatigue, Impaired activity tolerance, Impaired balance, Impulsive    Plan    ADLs, IADLs, functional mobility, strength/endurance, standing tolerance/balance, core/LB strengthening    Occupational Therapy Goals (Active)       Problem: Dressing       Dates: Start:  10/03/23         Goal: STG-Within one week, patient will dress LB with supervision       Dates: Start:  10/03/23               Problem: Functional Transfers       Dates: Start:  09/30/23         Goal: STG-Within one week, patient will transfer to toilet with CGA.       Dates: Start:  09/30/23       Description: Using 3:1 commode and grabbar.      Goal Note filed on 10/03/23 1047 by Harpal Zazueta, OT/L       Min A              Goal: STG-Within one week, patient will transfer to toilet with CGA using grab bar       Dates: Start:  10/03/23               Problem: OT Long Term Goals       Dates: Start:  09/30/23         Goal: LTG-By discharge, patient will complete basic self care tasks at Mod Independent level.       Dates: Start:  09/30/23       Description: With adaptive equipment as needed.         Goal: LTG-By discharge, patient will perform bathroom transfers at Mod Independent level with DME as needed.       Dates: Start:  09/30/23               Problem: Toileting       Dates: Start:  10/03/23         Goal: STG-Within one week, patient will complete toileting tasks with SBA       Dates: Start:  10/03/23

## 2023-10-03 NOTE — CARE PLAN
Problem: Functional Transfers  Goal: STG-Within one week, patient will transfer to toilet with CGA.  Description: Using 3:1 commode and grabbar.  Outcome: Not Met  Note: Min A     Problem: Bathing  Goal: STG-Within one week, patient will bathe Min A.  Description: From seated level on shower bench. Using grabbars as needed appropriately.  Outcome: Met  Note: SBA     Problem: Dressing  Goal: STG-Within one week, patient will dress UB with set up.  Outcome: Met  Note: Mod I from w/c level  Goal: STG-Within one week, patient will dress LB with Min A.  Description: Using adaptive equipment as needed.  Outcome: Met  Note: SBA with AE and grab bar, used w/c to gather clothing from closet     Problem: Functional Transfers  Goal: STG-Within one week, patient will transfer to step in shower with Min A.  Description: Using grabbar and tub/shower bench.  Outcome: Met  Note: Min A

## 2023-10-03 NOTE — CARE PLAN
Problem: Balance  Goal: STG-Within one week, patient will maintain dynamic standing to complete 10 MWT with FWW and SBA  Outcome: Not Met     Problem: Mobility Transfers  Goal: STG-Within one week, patient will transfer bed to chair SPV after set-up with FWW  Outcome: Progressing     Problem: Mobility  Goal: STG-Within one week, patient will ambulate community distances with FWW and CGA x 150 ft  Outcome: Met     Problem: Mobility Transfers  Goal: STG-Within one week, patient will perform bed mobility SPV  Outcome: Met

## 2023-10-03 NOTE — CARE PLAN
Problem: Problem Solving STGs  Goal: STG-Within one week, patient will engage in problem solving tasks (medication management, sequencing) with 75% given mod A.  Outcome: Met     Problem: Memory STGs  Goal: STG-Within one week, patient will recall daily events and safety strategies given mod A and external aids.  Outcome: Met  Goal: STG-Within one week, patient will engage in alternating attention tasks with 75% given mod A  Outcome: Met

## 2023-10-03 NOTE — PROGRESS NOTES
..                                                         NURSING DAILY NOTE    Name: Casper Rowley   Date of Admission: 9/29/2023   Admitting Diagnosis: Cerebellar mass  Attending Physician: Kasie Lin M.d.  Allergies: Patient has no known allergies.    Safety  Patient Assist  sba  Patient Precautions  Fall Risk  Precaution Comments     Bed Transfer Status  Minimal Assist  Toilet Transfer Status   Minimal Assist  Assistive Devices  Rails, Wheelchair  Oxygen  None - Room Air  Diet/Therapeutic Dining  Current Diet Order   Procedures    Diet Order Diet: Consistent CHO (Diabetic)     Pill Administration  whole  Agitated Behavioral Scale     ABS Level of Severity       Fall Risk  Has the patient had a fall this admission?   No  Lizzy Mar Fall Risk Scoring  15, HIGH RISK  Fall Risk Safety Measures  bed alarm, chair alarm, and poor balance    Vitals  Temperature: 36.7 °C (98 °F)  Temp src: Oral  Pulse: 98  Respiration: 18  Blood Pressure: 108/65  Blood Pressure MAP (Calculated): 79 MM HG  BP Location: Left, Upper Arm  Patient BP Position: Sitting     Oxygen  Pulse Oximetry: 97 %  O2 (LPM): 0  O2 Delivery Device: None - Room Air    Bowel and Bladder  Last Bowel Movement  10/02/23  Stool Type  Type 5: Soft blob with clear cut edges (passed easily)  Bowel Device  Bathroom  Continent  Bladder: Continent void   Bowel: Continent movement  Bladder Function  Urine Void (mL): 300 ml (urinals)  Number of Times Voided: 1  Urine Color: Yellow  Number of Times Incontinent of Urine: 0  Genitourinary Assessment   Bladder Assessment (WDL):  Within Defined Limits  Urine Color: Yellow  Number of Bladder Accidents: 0  Total Number of Bladder of Accidents in Last 7 Days: 0  Number of Times Incontinent of Urine: 0  Bladder Device: Urinal  Time Void: Yes  Bladder Scan: Post Void  $ Bladder Scan Results (mL): 1    Skin  Ravin Score   19  Sensory Interventions   Bed Types: Standard/Trauma Mattress  Skin Preventative  "Measures: Pillows in Use for Support / Positioning  Moisture Interventions         Pain  Pain Rating Scale   (\"ok right now\")  Pain Location  Generalized  Pain Location Orientation  Right, Left  Pain Interventions   Medication (see MAR)    ADLs    Bathing      Linen Change   Complete  Personal Hygiene     Chlorhexidine Bath      Oral Care  Brushed Teeth (self)  Teeth/Dentures     Shave  Self  Nutrition Percentage Eaten  *  * Meal *  *, Dinner, Between % Consumed  Environmental Precautions  Bed in Low Position, Treaded Slipper Socks on Patient  Patient Turns/Positioning  Patient Turns Self from Side to Side  Patient Turns Assistance/Tolerance     Bed Positions  Bed Controls On  Head of Bed Elevated  Self regulated      Psychosocial/Neurologic Assessment  Psychosocial Assessment  Psychosocial (WDL):  Within Defined Limits  Neurologic Assessment  Neuro (WDL): Exceptions to WDL (neuropathy in toes occasionally)  Level of Consciousness: Alert  Orientation Level: Oriented X4  Cognition: Appropriate judgement, Appropriate safety awareness, Appropriate attention/concentration  Speech: Clear  Pupil Assesment: No  R Pupil Size (mm): 3  R Pupil Shape / Description: Round  R Pupil Reaction: Brisk  L Pupil Size (mm): 3  L Pupil Shape / Description: Round  L Pupil Reaction: Brisk  EENT (WDL):  Within Defined Limits    Cardio/Pulmonary Assessment  Edema      Respiratory Breath Sounds  RUL Breath Sounds: Clear  RML Breath Sounds: Clear  RLL Breath Sounds: Diminished  BRIAN Breath Sounds: Clear  LLL Breath Sounds: Diminished  Cardiac Assessment   Cardiac (WDL):  Within Defined Limits        "

## 2023-10-04 ENCOUNTER — APPOINTMENT (OUTPATIENT)
Dept: SPEECH THERAPY | Facility: REHABILITATION | Age: 58
DRG: 949 | End: 2023-10-04
Attending: HOSPITALIST
Payer: COMMERCIAL

## 2023-10-04 ENCOUNTER — APPOINTMENT (OUTPATIENT)
Dept: OCCUPATIONAL THERAPY | Facility: REHABILITATION | Age: 58
DRG: 949 | End: 2023-10-04
Attending: PHYSICAL MEDICINE & REHABILITATION
Payer: COMMERCIAL

## 2023-10-04 ENCOUNTER — APPOINTMENT (OUTPATIENT)
Dept: PHYSICAL THERAPY | Facility: REHABILITATION | Age: 58
DRG: 949 | End: 2023-10-04
Attending: PHYSICAL MEDICINE & REHABILITATION
Payer: COMMERCIAL

## 2023-10-04 LAB — GLUCOSE BLD STRIP.AUTO-MCNC: 195 MG/DL (ref 65–99)

## 2023-10-04 PROCEDURE — 97116 GAIT TRAINING THERAPY: CPT

## 2023-10-04 PROCEDURE — 97129 THER IVNTJ 1ST 15 MIN: CPT

## 2023-10-04 PROCEDURE — 700111 HCHG RX REV CODE 636 W/ 250 OVERRIDE (IP): Mod: JZ | Performed by: PHYSICAL MEDICINE & REHABILITATION

## 2023-10-04 PROCEDURE — 97110 THERAPEUTIC EXERCISES: CPT

## 2023-10-04 PROCEDURE — 97530 THERAPEUTIC ACTIVITIES: CPT

## 2023-10-04 PROCEDURE — 770010 HCHG ROOM/CARE - REHAB SEMI PRIVAT*

## 2023-10-04 PROCEDURE — A9270 NON-COVERED ITEM OR SERVICE: HCPCS | Performed by: PHYSICAL MEDICINE & REHABILITATION

## 2023-10-04 PROCEDURE — 82962 GLUCOSE BLOOD TEST: CPT

## 2023-10-04 PROCEDURE — 700102 HCHG RX REV CODE 250 W/ 637 OVERRIDE(OP): Performed by: PHYSICAL MEDICINE & REHABILITATION

## 2023-10-04 PROCEDURE — 97535 SELF CARE MNGMENT TRAINING: CPT

## 2023-10-04 PROCEDURE — 97130 THER IVNTJ EA ADDL 15 MIN: CPT

## 2023-10-04 PROCEDURE — 99232 SBSQ HOSP IP/OBS MODERATE 35: CPT | Performed by: PHYSICAL MEDICINE & REHABILITATION

## 2023-10-04 RX ADMIN — LISINOPRIL 20 MG: 20 TABLET ORAL at 18:15

## 2023-10-04 RX ADMIN — ZOLPIDEM TARTRATE 10 MG: 5 TABLET ORAL at 23:57

## 2023-10-04 RX ADMIN — OMEPRAZOLE 20 MG: 20 CAPSULE, DELAYED RELEASE ORAL at 07:53

## 2023-10-04 RX ADMIN — OXYCODONE HYDROCHLORIDE 10 MG: 10 TABLET ORAL at 19:31

## 2023-10-04 RX ADMIN — METOPROLOL SUCCINATE 25 MG: 25 TABLET, EXTENDED RELEASE ORAL at 07:53

## 2023-10-04 RX ADMIN — OXYCODONE HYDROCHLORIDE 5 MG: 5 TABLET ORAL at 10:09

## 2023-10-04 RX ADMIN — ENOXAPARIN SODIUM 40 MG: 100 INJECTION SUBCUTANEOUS at 18:15

## 2023-10-04 ASSESSMENT — PAIN SCALES - PAIN ASSESSMENT IN ADVANCED DEMENTIA (PAINAD)
BODYLANGUAGE: RELAXED
CONSOLABILITY: NO NEED TO CONSOLE
CONSOLABILITY: NO NEED TO CONSOLE
BREATHING: NORMAL
BODYLANGUAGE: RELAXED
FACIALEXPRESSION: SMILING OR INEXPRESSIVE
BREATHING: NORMAL
FACIALEXPRESSION: SMILING OR INEXPRESSIVE
TOTALSCORE: 0
TOTALSCORE: 0

## 2023-10-04 ASSESSMENT — GAIT ASSESSMENTS
DISTANCE (FEET): 200
ASSISTIVE DEVICE: FRONT WHEEL WALKER
GAIT LEVEL OF ASSIST: CONTACT GUARD ASSIST
DEVIATION: BRADYKINETIC;DECREASED HEEL STRIKE;DECREASED TOE OFF

## 2023-10-04 ASSESSMENT — ACTIVITIES OF DAILY LIVING (ADL)
TUB_SHOWER_TRANSFER_DESCRIPTION: GRAB BAR;SHOWER BENCH;SET-UP OF EQUIPMENT;SUPERVISION FOR SAFETY
TOILET_TRANSFER_DESCRIPTION: GRAB BAR;SET-UP OF EQUIPMENT;SUPERVISION FOR SAFETY
TOILETING_LEVEL_OF_ASSIST_DESCRIPTION: GRAB BAR;SET-UP OF EQUIPMENT;SUPERVISION FOR SAFETY;VERBAL CUEING
BED_CHAIR_WHEELCHAIR_TRANSFER_DESCRIPTION: SET-UP OF EQUIPMENT;SUPERVISION FOR SAFETY

## 2023-10-04 ASSESSMENT — PAIN SCALES - WONG BAKER
WONGBAKER_NUMERICALRESPONSE: HURTS JUST A LITTLE BIT
WONGBAKER_NUMERICALRESPONSE: DOESN'T HURT AT ALL

## 2023-10-04 ASSESSMENT — PAIN DESCRIPTION - PAIN TYPE
TYPE: ACUTE PAIN
TYPE: ACUTE PAIN

## 2023-10-04 NOTE — CARE PLAN
"The patient is Stable - Low risk of patient condition declining or worsening    Shift Goals  Clinical Goals: Pain management  Patient Goals: Rest    Progress made toward(s) clinical / shift goals:      Problem: Risk for Aspiration  Goal: Patient's risk for aspiration will be absent or decrease  Outcome: Met  Note: Patient is not at risk for aspiration     Patient is not progressing towards the following goals:    Problem: Fall Risk - Rehab  Goal: Patient will remain free from falls  Outcome: Not Met  Note: Lizzy Mar Fall risk Assessment Score: 15    High fall risk Interventions   - Bed and strip alarm   - Yellow sign by the door   - Yellow wrist band \"Fall risk\"  - Room near to the nurse station  - Fall risk education provided     "

## 2023-10-04 NOTE — CARE PLAN
The patient is Stable - Low risk of patient condition declining or worsening    Shift Goals  Clinical Goals: pain mgt, safety  Patient Goals: Rest    Progress made toward(s) clinical / shift goals:  2  Problem: Mobility  Goal: Patient's capacity to carry out activities will improve  Outcome: Progressing: Pt OOB most of day. Participating in therapies.      Problem: Diabetes Management  Goal: Patient's ability to maintain appropriate glucose levels will be maintained or improve  Outcome: Progressing: fsbg before breakfast = 195.  Canagliflozin 100 mg tab (home med) administered per MAR. Also on once weekly Trulicity.  [facility] Pt has own has glucose monitoring system. Attended diabetes education class today.

## 2023-10-04 NOTE — THERAPY
Occupational Therapy  Daily Treatment     Patient Name: Casper Rowley  Age:  58 y.o., Sex:  male  Medical Record #: [id number]  Today's Date: 10/4/2023     Precautions  Precautions: (P) Fall Risk    Safety   ADL Safety : Requires Supervision for Safety  Bathroom Safety: Requires Supervision for Safety    Subjective    Patient reported hurting his back sometime yesterday and was around a 4-5/10 this morning.  He requested a shower.       Objective       10/04/23 0831   OT Charge Group   OT Self Care / ADL (Units) 3   OT Therapy Activity (Units) 1   OT Total Time Spent   OT Individual Total Time Spent (Mins) 60   Precautions   Precautions Fall Risk   Pain 0 - 10 Group   Location Back   Therapist Pain Assessment Prior to Activity   Functional Level of Assist   Bathing Standby Assist   Bathing Description Grab bar;Hand held shower;Tub bench;Set-up of equipment;Supervision for safety   Upper Body Dressing Modified Independent   Upper Body Dressing Description Assist device equipment  (wc)   Lower Body Dressing Minimal Assist   Lower Body Dressing Description Grab bar;Reacher;Shoe horn;Sock aid;Application of orthotic or brace;Set-up of equipment;Supervision for safety;Verbal cueing  (assist w/ donning ankle orthotics, otherwise was SBA with AE)   Toileting Standby Assist   Toileting Description Grab bar;Set-up of equipment;Supervision for safety;Verbal cueing   Bed, Chair, Wheelchair Transfer Contact Guard Assist   Bed Chair Wheelchair Transfer Description Set-up of equipment;Supervision for safety  (SPT)   Toilet Transfers Standby Assist   Toilet Transfer Description Grab bar;Set-up of equipment;Supervision for safety   Tub / Shower Transfers Standby Assist   Tub Shower Transfer Description Grab bar;Shower bench;Set-up of equipment;Supervision for safety   Interdisciplinary Plan of Care Collaboration   Patient Position at End of Therapy Seated;Call Light within Reach;Tray Table within Reach;Phone within Reach;Chair  Alarm On;Self Releasing Lap Belt Applied       Functional mobility with FWW and CGA/SBA from room <> main gym.    Assessment    Patient SBA for all adls and bathroom transfers today with the exception of needing assist to don ankle foot orthotics due to impaired flexibility at the hips.    Strengths: Able to follow instructions, Alert and oriented, Good insight into deficits/needs, Independent prior level of function, Motivated for self care and independence, Pleasant and cooperative, Supportive family, Willingly participates in therapeutic activities  Barriers: Decreased endurance, Fatigue, Impaired activity tolerance, Impaired balance, Impulsive    Plan    ADLs, IADLs, functional mobility, strength/endurance, standing tolerance/balance, core/LB strengthening    Occupational Therapy Goals (Active)       Problem: Dressing       Dates: Start:  10/03/23         Goal: STG-Within one week, patient will dress LB with supervision       Dates: Start:  10/03/23               Problem: Functional Transfers       Dates: Start:  09/30/23         Goal: STG-Within one week, patient will transfer to toilet with CGA.       Dates: Start:  09/30/23       Description: Using 3:1 commode and grabbar.      Goal Note filed on 10/03/23 1047 by Harpal Zazueta OT/L       Min A              Goal: STG-Within one week, patient will transfer to toilet with CGA using grab bar       Dates: Start:  10/03/23               Problem: OT Long Term Goals       Dates: Start:  09/30/23         Goal: LTG-By discharge, patient will complete basic self care tasks at Mod Independent level.       Dates: Start:  09/30/23       Description: With adaptive equipment as needed.         Goal: LTG-By discharge, patient will perform bathroom transfers at Mod Independent level with DME as needed.       Dates: Start:  09/30/23               Problem: Toileting       Dates: Start:  10/03/23         Goal: STG-Within one week, patient will complete toileting tasks with  SBA       Dates: Start:  10/03/23

## 2023-10-04 NOTE — PROGRESS NOTES
NURSING DAILY NOTE    Name: Casper Rowley   Date of Admission: 9/29/2023   Admitting Diagnosis: Cerebellar mass  Attending Physician: Kasie Lin M.d.  Allergies: Patient has no known allergies.    Safety  Patient Assist  sba  Patient Precautions  Fall Risk  Precaution Comments     Bed Transfer Status  Contact Guard Assist  Toilet Transfer Status   Contact Guard Assist  Assistive Devices  Rails, Wheelchair  Oxygen  None - Room Air  Diet/Therapeutic Dining  Current Diet Order   Procedures    Diet Order Diet: Consistent CHO (Diabetic)     Pill Administration  whole  Agitated Behavioral Scale     ABS Level of Severity       Fall Risk  Has the patient had a fall this admission?   No  Lizzy Mar Fall Risk Scoring  15, HIGH RISK  Fall Risk Safety Measures  bed alarm, chair alarm, and poor balance    Vitals  Temperature: 36.8 °C (98.2 °F)  Temp src: Oral  Pulse: 100  Respiration: 18  Blood Pressure: 110/72  Blood Pressure MAP (Calculated): 85 MM HG  BP Location: Right, Upper Arm  Patient BP Position: Supine     Oxygen  Pulse Oximetry: 99 %  O2 (LPM): 0  O2 Delivery Device: None - Room Air    Bowel and Bladder  Last Bowel Movement  10/03/23  Stool Type  Type 5: Soft blob with clear cut edges (passed easily)  Bowel Device  Bathroom  Continent  Bladder: Continent void   Bowel: Continent movement  Bladder Function  Urine Void (mL): 300 ml (urinals)  Number of Times Voided: 1  Urine Color: Unable To Evaluate  Number of Times Incontinent of Urine: 0  Genitourinary Assessment   Bladder Assessment (WDL):  Within Defined Limits  Urine Color: Unable To Evaluate  Number of Bladder Accidents: 0  Total Number of Bladder of Accidents in Last 7 Days: 0  Number of Times Incontinent of Urine: 0  Bladder Device: Urinal  Time Void: Yes  Bladder Scan: Post Void  $ Bladder Scan Results (mL): 1    Skin  Ravin Score   19  Sensory Interventions   Bed Types:  Standard/Trauma Mattress  Skin Preventative Measures: Pillows in Use for Support / Positioning  Moisture Interventions         Pain  Pain Rating Scale  0 - No Pain  Pain Location  Generalized  Pain Location Orientation  Right, Left  Pain Interventions   Declines    ADLs    Bathing      Linen Change   Complete  Personal Hygiene     Chlorhexidine Bath      Oral Care  Brushed Teeth (self)  Teeth/Dentures     Shave  Self  Nutrition Percentage Eaten  Between % Consumed  Environmental Precautions  Bed in Low Position, Treaded Slipper Socks on Patient  Patient Turns/Positioning  Patient Turns Self from Side to Side  Patient Turns Assistance/Tolerance     Bed Positions  Bed Controls On  Head of Bed Elevated  Self regulated      Psychosocial/Neurologic Assessment  Psychosocial Assessment  Psychosocial (WDL):  Within Defined Limits  Neurologic Assessment  Neuro (WDL): Exceptions to WDL (neuropathy in toes occasionally)  Level of Consciousness: Alert  Orientation Level: Oriented X4  Cognition: Appropriate judgement, Appropriate safety awareness, Appropriate attention/concentration  Speech: Clear  Pupil Assesment: No  R Pupil Size (mm): 3  R Pupil Shape / Description: Round  R Pupil Reaction: Brisk  L Pupil Size (mm): 3  L Pupil Shape / Description: Round  L Pupil Reaction: Brisk  EENT (WDL):  Within Defined Limits    Cardio/Pulmonary Assessment  Edema      Respiratory Breath Sounds  RUL Breath Sounds: Clear  RML Breath Sounds: Clear  RLL Breath Sounds: Clear, Diminished  BRIAN Breath Sounds: Clear  LLL Breath Sounds: Clear, Diminished  Cardiac Assessment   Cardiac (WDL):  Within Defined Limits

## 2023-10-04 NOTE — THERAPY
Speech Language Pathology  Daily Treatment     Patient Name: Casper Rowley  Age:  58 y.o., Sex:  male  Medical Record #: [id number]  Today's Date: 10/4/2023     Precautions  Precautions: Fall Risk    Subjective    Pt reports low back, hips and leg pain. Informed nursing via voalte of request for pain meds and ice pack provided at end of session to low back.     Objective       10/04/23 0934   Treatment Charges   SLP Cognitive Skill Development First 15 Minutes 1   SLP Cognitive Skill Development Additional 15 Minutes 1   SLP Total Time Spent   SLP Individual Total Time Spent (Mins) 30   Cognition   Verbal Short Term Memory Other (Comments)  (immediate recall)   Functional Math / Financial Management Supervision (5)   Interdisciplinary Plan of Care Collaboration   IDT Collaboration with  Nursing   Patient Position at End of Therapy Seated;Call Light within Reach;Phone within Reach   Collaboration Comments messaged nurse via Voalte, pt requesting pain pill         Assessment    Pt corrected errors on who has more activity to achieve 100% accuracy. Working memory targeted using voicemail activity. Pt listening to 6 voicemail messages and documenting information with repetitions needed on 50% of messages. Pt then tasked with transferring information from messages onto a calendar. Upon transfer pt was able to identify several key pieces of information missing, requiring to go back to listen again to messages (on 2nd attempt pt listening 2-3x to ensure all information was captured). Pt encouraged to answer all wh- questions when listening for information (I.e. who is calling, what are they calling about, when does event occur, where does event occur).    Strengths: Able to follow instructions, Alert and oriented, Effective communication skills, Making steady progress towards goals, Supportive family, Pleasant and cooperative, Independent prior level of function, Motivated for self care and independence, Willingly  participates in therapeutic activities  Barriers:  (STM deficits)    Plan    Continue voicemail activity for remaining 3 messages. Continue to target working memory, use of external aids.     Passport items to be completed:  arrive to therapy appointments on time, complete daily memory log entries, solve problems related to safety situations, review education related to hospitalization, complete caregiver training     Speech Therapy Problems (Active)       Problem: Speech/Swallowing LTGs       Dates: Start:  09/30/23         Goal: LTG-By discharge, patient will solve complex problems with min A for safe d/c home       Dates: Start:  09/30/23

## 2023-10-04 NOTE — THERAPY
"Occupational Therapy  Daily Treatment     Patient Name: Casper Rowley  Age:  58 y.o., Sex:  male  Medical Record #: [id number]  Today's Date: 10/4/2023     Precautions  Precautions: (P) Fall Risk    Safety   ADL Safety : Requires Supervision for Safety  Bathroom Safety: Requires Supervision for Safety    Subjective    \"I need to figure out if I need to get different shoes or different braces.\"     Objective       10/04/23 1231   OT Charge Group   OT Therapy Activity (Units) 1   OT Therapeutic Exercise (Units) 1   OT Total Time Spent   OT Individual Total Time Spent (Mins) 30   Precautions   Precautions Fall Risk   Sitting Lower Body Exercises   Nustep Time (See Comments)  (nustep machine level 3 x 12 minutes with B UE/LE; gait belt around distal femurs to keep in neutral position)   Interdisciplinary Plan of Care Collaboration   Patient Position at End of Therapy Seated;Chair Alarm On;Self Releasing Lap Belt Applied;Call Light within Reach;Tray Table within Reach;Phone within Reach     W/C mobility mod I from room <> back gym.    Assessment    Patient tolerated nustep well, but unable to keep legs from externally rotating to keep in neutral position without use of gait belt for support.    Strengths: Able to follow instructions, Alert and oriented, Good insight into deficits/needs, Independent prior level of function, Motivated for self care and independence, Pleasant and cooperative, Supportive family, Willingly participates in therapeutic activities  Barriers: Decreased endurance, Fatigue, Impaired activity tolerance, Impaired balance, Impulsive    Plan    ADLs, IADLs, functional mobility, strength/endurance, standing tolerance/balance, core/LB strengthening    Occupational Therapy Goals (Active)       Problem: Dressing       Dates: Start:  10/03/23         Goal: STG-Within one week, patient will dress LB with supervision       Dates: Start:  10/03/23               Problem: Functional Transfers       Dates: " Start:  09/30/23         Goal: STG-Within one week, patient will transfer to toilet with CGA.       Dates: Start:  09/30/23       Description: Using 3:1 commode and grabbar.      Goal Note filed on 10/03/23 1047 by Harpal Zazueta, OT/L       Min A              Goal: STG-Within one week, patient will transfer to toilet with CGA using grab bar       Dates: Start:  10/03/23               Problem: OT Long Term Goals       Dates: Start:  09/30/23         Goal: LTG-By discharge, patient will complete basic self care tasks at Mod Independent level.       Dates: Start:  09/30/23       Description: With adaptive equipment as needed.         Goal: LTG-By discharge, patient will perform bathroom transfers at Mod Independent level with DME as needed.       Dates: Start:  09/30/23               Problem: Toileting       Dates: Start:  10/03/23         Goal: STG-Within one week, patient will complete toileting tasks with SBA       Dates: Start:  10/03/23

## 2023-10-04 NOTE — PROGRESS NOTES
"  Physical Medicine & Rehabilitation Progress Note    Encounter Date: 10/4/2023    Chief Complaint: Decreased mobility    Interval Events (Subjective):  Patient sitting up in cafeteria. He reports he is doing well. Discussed discontinuing finger sticks. Denies NVD.     _____________________________________  Interdisciplinary Team Conference   Most recent IDT on 10/3/2023    Discharge Date/Disposition:  10/11/23  _____________________________________    Objective:  VITAL SIGNS: /66   Pulse (!) 104   Temp 36.6 °C (97.8 °F) (Oral)   Resp 18   Ht 1.702 m (5' 7\")   Wt 81 kg (178 lb 9 oz)   SpO2 95%   BMI 27.97 kg/m²   Gen: NAD  Psych: Mood and affect appropriate  CV: RRR, 1+ BLE edema  Resp: CTAB, no upper airway sounds  Abd: NTND  Neuro: AOx4, following commands    Laboratory Values:  Recent Results (from the past 72 hour(s))   POCT glucose device results    Collection Time: 10/01/23  5:11 PM   Result Value Ref Range    POC Glucose, Blood 347 (H) 65 - 99 mg/dL   POCT glucose device results    Collection Time: 10/01/23  9:44 PM   Result Value Ref Range    POC Glucose, Blood 294 (H) 65 - 99 mg/dL   POCT glucose device results    Collection Time: 10/02/23  8:11 AM   Result Value Ref Range    POC Glucose, Blood 232 (H) 65 - 99 mg/dL   POCT glucose device results    Collection Time: 10/02/23 11:34 AM   Result Value Ref Range    POC Glucose, Blood 151 (H) 65 - 99 mg/dL   POCT glucose device results    Collection Time: 10/02/23  5:24 PM   Result Value Ref Range    POC Glucose, Blood 218 (H) 65 - 99 mg/dL   POCT glucose device results    Collection Time: 10/02/23  9:33 PM   Result Value Ref Range    POC Glucose, Blood 296 (H) 65 - 99 mg/dL   POCT glucose device results    Collection Time: 10/03/23  7:37 AM   Result Value Ref Range    POC Glucose, Blood 158 (H) 65 - 99 mg/dL   POCT glucose device results    Collection Time: 10/03/23 11:15 AM   Result Value Ref Range    POC Glucose, Blood 176 (H) 65 - 99 mg/dL   POCT " glucose device results    Collection Time: 10/03/23  5:09 PM   Result Value Ref Range    POC Glucose, Blood 276 (H) 65 - 99 mg/dL   POCT glucose device results    Collection Time: 10/04/23  7:51 AM   Result Value Ref Range    POC Glucose, Blood 195 (H) 65 - 99 mg/dL       Medications:  Scheduled Medications   Medication Dose Frequency    Dulaglutide  1 Pen Q7 DAYS    Canagliflozin  100 mg Daily-0800    Pharmacy Consult Request  1 Each PHARMACY TO DOSE    senna-docusate  2 Tablet BID    omeprazole  20 mg DAILY    enoxaparin (LOVENOX) injection  40 mg DAILY AT 1800    lisinopril  20 mg DAILY    metoprolol SR  25 mg DAILY     PRN medications: Respiratory Therapy Consult, hydrALAZINE, acetaminophen, senna-docusate **AND** polyethylene glycol/lytes **AND** magnesium hydroxide **AND** bisacodyl, mag hydrox-al hydrox-simeth, ondansetron **OR** ondansetron, traZODone, sodium chloride, oxyCODONE immediate-release **OR** oxyCODONE immediate-release, midazolam, [DISCONTINUED] insulin regular **AND** [CANCELED] POC blood glucose manual result **AND** NOTIFY MD and PharmD **AND** Administer 20 grams of glucose (approximately 8 ounces of fruit juice) every 15 minutes PRN FSBG less than 70 mg/dL **AND** dextrose bolus, meclizine, promethazine, zolpidem    Diet:  Current Diet Order   Procedures    Diet Order Diet: Consistent CHO (Diabetic)       Medical Decision Making and Plan:   Cerebellar mass - Patient with 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 Be.  -PT and OT for mobility and ADLs. Per guidelines, 15 hours per week between PT, OT and/or SLP.  -Follow-up NSG     Metastatic prostate cancer - Patient with multiple lesions s/p radiation previously. On Decadron 2 mg daily. Follow-up Oncology. Completed Decadron.      HTN - Patient on Lisinopril 20 mg and Metoprolol 25 mg daily. Continue Lisinopril and Metoprolol      DM2 with  hyperglycemia - Patient on Lantus and SSI on transfer.  Brought in home trulicity and canagliflozin, will discontinue SSI and lantus     NED - RT to consult     Pain - Patient on PRN Oxycodone and Tylenol     Skin - Patient at risk for skin breakdown due to debility in areas including sacrum, achilles, elbows and head in addition to other sites. Nursing to assess skin daily.      GI Ppx - Patient on Prilosec for GERD prophylaxis. Patient on Senna-docusate for constipation prophylaxis.      DVT Ppx - Patient Lovenox on transfer. Continue Lovenox.  -Bilateral LE edema,  start SCDs, consider US to rule out DVT  ____________________________________    T. Erick Lin MD/PhD  [facility] - Physical Medicine & Rehabilitation   [facility] - Brain Injury Medicine   ____________________________________

## 2023-10-04 NOTE — DISCHARGE PLANNING
Home Health order sent to [facility].    [facility]              W/C order sent to [facility]

## 2023-10-04 NOTE — THERAPY
"Physical Therapy   Daily Treatment     Patient Name: Casper Rowley  Age:  58 y.o., Sex:  male  Medical Record #: [id number]  Today's Date: 10/4/2023     Precautions  Precautions: Fall Risk    Subjective    Patient frustrated with toe straps, agreeable to work on ambulation this session     Objective       10/04/23 1401   PT Charge Group   PT Gait Training (Units) 2   PT Therapeutic Exercise (Units) 1   PT Therapeutic Activities (Units) 1   PT Total Time Spent   PT Individual Total Time Spent (Mins) 60   Precautions   Precautions Fall Risk   Gait Functional Level of Assist    Gait Level Of Assist Contact Guard Assist   Assistive Device Front Wheel Walker   Distance (Feet) 200   # of Times Distance was Traveled 3   Deviation Bradykinetic;Decreased Heel Strike;Decreased Toe Off   Sitting Lower Body Exercises   Ankle Pumps 2 sets of 10;Bilateral   Hip Flexion 2 sets of 10;Bilateral   Long Arc Quad 2 sets of 10;Bilateral   Comments added gait belt PROM for hamstring and calves   Interdisciplinary Plan of Care Collaboration   IDT Collaboration with  Certified Nursing Assistant;Physical Therapist   Collaboration Comments CNA asking to complete laundry task; treatment planning, suggested toe straps for bilateral toe drop       Assessment    Patient able to demonstrate 100ft FWW CGA with no toe straps, after 80ft begins to \"scuff\" feet with no LOB due to fatigue    Strengths: Able to follow instructions, Effective communication skills, Good insight into deficits/needs, Independent prior level of function, Motivated for self care and independence, Pleasant and cooperative, Supportive family, Willingly participates in therapeutic activities  Barriers: Decreased endurance, Fatigue, Generalized weakness, Impaired balance, Limited mobility (metastatic disease / LE swelling)    Plan    Gait endurance with FWW, LE strength training, mobility training, standing balance/ activity tolerance.     Passport items to be " completed:  Get in/out of bed safely, in/out of a vehicle, safely use mobility device, walk or wheel around home/community, navigate up and down stairs, show how to get up/down from the ground, ensure home is accessible, demonstrate HEP, complete caregiver training    Physical Therapy Problems (Active)       Problem: Balance       Dates: Start:  09/30/23         Goal: STG-Within one week, patient will maintain dynamic standing to complete 10 MWT with FWW and SBA       Dates: Start:  09/30/23               Problem: Mobility Transfers       Dates: Start:  09/30/23         Goal: STG-Within one week, patient will transfer bed to chair SPV after set-up with FWW       Dates: Start:  09/30/23               Problem: PT-Long Term Goals       Dates: Start:  09/30/23         Goal: LTG-By discharge, patient will propel wheelchair modified independent x 150 ft level / indoors       Dates: Start:  09/30/23            Goal: LTG-By discharge, patient will ambulate modified independent with FWW x 150 ft       Dates: Start:  09/30/23            Goal: LTG-By discharge, patient will transfer one surface to another modified independent with FWW       Dates: Start:  09/30/23            Goal: LTG-By discharge, patient will transfer in/out of a car SPV / modified independent after set-up with FWW       Dates: Start:  09/30/23

## 2023-10-04 NOTE — PROGRESS NOTES
NURSING DAILY NOTE    Name: Casper Rowley   Date of Admission: 9/29/2023   Admitting Diagnosis: Cerebellar mass  Attending Physician: Kasie Lin M.d.  Allergies: Patient has no known allergies.    Safety  Patient Assist  sba  Patient Precautions  Fall Risk  Precaution Comments     Bed Transfer Status  Contact Guard Assist  Toilet Transfer Status   Standby Assist  Assistive Devices  Wheelchair  Oxygen  None - Room Air  Diet/Therapeutic Dining  Current Diet Order   Procedures    Diet Order Diet: Consistent CHO (Diabetic)     Pill Administration  whole  Agitated Behavioral Scale     ABS Level of Severity       Fall Risk  Has the patient had a fall this admission?   No  Lizzy Mar Fall Risk Scoring  19, HIGH RISK  Fall Risk Safety Measures  bed alarm, chair alarm, and poor balance    Vitals  Temperature: 36.4 °C (97.6 °F)  Temp src: Oral  Pulse: (!) 102  Respiration: 18  Blood Pressure: 112/78  Blood Pressure MAP (Calculated): 89 MM HG  BP Location: Left, Upper Arm  Patient BP Position: Sitting     Oxygen  Pulse Oximetry: 97 %  O2 (LPM): 0  O2 Delivery Device: None - Room Air    Bowel and Bladder  Last Bowel Movement  10/04/23  Stool Type  Type 5: Soft blob with clear cut edges (passed easily)  Bowel Device  Bathroom  Continent  Bladder: Continent void   Bowel: Continent movement  Bladder Function  Urine Void (mL): 600 ml  Number of Times Voided: 1  Urine Color: Unable To Evaluate  Number of Times Incontinent of Urine: 0  Genitourinary Assessment   Bladder Assessment (WDL):  Within Defined Limits  Urine Color: Unable To Evaluate  Number of Bladder Accidents: 0  Total Number of Bladder of Accidents in Last 7 Days: 0  Number of Times Incontinent of Urine: 0  Bladder Device: Urinal  Time Void: Yes  Bladder Scan: Post Void  $ Bladder Scan Results (mL): 1    Skin  Ravin Score   18  Sensory Interventions   Bed Types: Standard/Trauma  Mattress  Skin Preventative Measures: Pillows in Use for Support / Positioning  Moisture Interventions         Pain  Pain Rating Scale  2 - Notice Pain, does not interfere with activities  Pain Location  Back, Leg  Pain Location Orientation  Lower  Pain Interventions   Medication (see MAR), Distraction, Environmental Changes    ADLs    Bathing    (shower with ot)  Linen Change   Complete  Personal Hygiene     Chlorhexidine Bath      Oral Care  Brushed Teeth (self)  Teeth/Dentures     Shave  Self  Nutrition Percentage Eaten  *  * Meal *  *, Lunch, Between % Consumed  Environmental Precautions  Bed in Low Position  Patient Turns/Positioning  Patient Turns Self from Side to Side  Patient Turns Assistance/Tolerance     Bed Positions  Bed Controls On  Head of Bed Elevated  Self regulated      Psychosocial/Neurologic Assessment  Psychosocial Assessment  Psychosocial (WDL):  Within Defined Limits  Neurologic Assessment  Neuro (WDL): Within Defined Limits  Level of Consciousness: Alert  Orientation Level: Oriented X4  Cognition: Appropriate judgement, Appropriate safety awareness, Appropriate attention/concentration  Speech: Clear  Pupil Assesment: No  R Pupil Size (mm): 3  R Pupil Shape / Description: Round  R Pupil Reaction: Brisk  L Pupil Size (mm): 3  L Pupil Shape / Description: Round  L Pupil Reaction: Brisk  EENT (WDL):  Within Defined Limits    Cardio/Pulmonary Assessment  Edema      Respiratory Breath Sounds  RUL Breath Sounds: Clear  RML Breath Sounds: Clear  RLL Breath Sounds: Clear  BRIAN Breath Sounds: Clear  LLL Breath Sounds: Clear  Cardiac Assessment   Cardiac (WDL):  Within Defined Limits

## 2023-10-05 ENCOUNTER — APPOINTMENT (OUTPATIENT)
Dept: PHYSICAL THERAPY | Facility: REHABILITATION | Age: 58
DRG: 949 | End: 2023-10-05
Attending: PHYSICAL MEDICINE & REHABILITATION
Payer: COMMERCIAL

## 2023-10-05 ENCOUNTER — APPOINTMENT (OUTPATIENT)
Dept: OCCUPATIONAL THERAPY | Facility: REHABILITATION | Age: 58
DRG: 949 | End: 2023-10-05
Attending: PHYSICAL MEDICINE & REHABILITATION
Payer: COMMERCIAL

## 2023-10-05 ENCOUNTER — APPOINTMENT (OUTPATIENT)
Dept: SPEECH THERAPY | Facility: REHABILITATION | Age: 58
DRG: 949 | End: 2023-10-05
Attending: HOSPITALIST
Payer: COMMERCIAL

## 2023-10-05 PROCEDURE — 97110 THERAPEUTIC EXERCISES: CPT

## 2023-10-05 PROCEDURE — 97129 THER IVNTJ 1ST 15 MIN: CPT

## 2023-10-05 PROCEDURE — 770010 HCHG ROOM/CARE - REHAB SEMI PRIVAT*

## 2023-10-05 PROCEDURE — 700111 HCHG RX REV CODE 636 W/ 250 OVERRIDE (IP): Mod: JZ | Performed by: PHYSICAL MEDICINE & REHABILITATION

## 2023-10-05 PROCEDURE — 97535 SELF CARE MNGMENT TRAINING: CPT

## 2023-10-05 PROCEDURE — 97530 THERAPEUTIC ACTIVITIES: CPT

## 2023-10-05 PROCEDURE — 97116 GAIT TRAINING THERAPY: CPT

## 2023-10-05 PROCEDURE — A9270 NON-COVERED ITEM OR SERVICE: HCPCS | Performed by: PHYSICAL MEDICINE & REHABILITATION

## 2023-10-05 PROCEDURE — 99232 SBSQ HOSP IP/OBS MODERATE 35: CPT | Performed by: PHYSICAL MEDICINE & REHABILITATION

## 2023-10-05 PROCEDURE — 700102 HCHG RX REV CODE 250 W/ 637 OVERRIDE(OP): Performed by: PHYSICAL MEDICINE & REHABILITATION

## 2023-10-05 PROCEDURE — 97130 THER IVNTJ EA ADDL 15 MIN: CPT

## 2023-10-05 RX ORDER — BISMUTH SUBSALICYLATE 262 MG/1
262 TABLET, CHEWABLE ORAL EVERY 4 HOURS PRN
Status: DISCONTINUED | OUTPATIENT
Start: 2023-10-05 | End: 2023-10-05

## 2023-10-05 RX ORDER — LISINOPRIL 5 MG/1
10 TABLET ORAL DAILY
Status: DISCONTINUED | OUTPATIENT
Start: 2023-10-05 | End: 2023-10-11 | Stop reason: HOSPADM

## 2023-10-05 RX ORDER — SIMETHICONE 125 MG
125 TABLET,CHEWABLE ORAL 3 TIMES DAILY PRN
Status: DISCONTINUED | OUTPATIENT
Start: 2023-10-05 | End: 2023-10-11 | Stop reason: HOSPADM

## 2023-10-05 RX ADMIN — ZOLPIDEM TARTRATE 10 MG: 5 TABLET ORAL at 22:14

## 2023-10-05 RX ADMIN — OXYCODONE HYDROCHLORIDE 5 MG: 5 TABLET ORAL at 15:41

## 2023-10-05 RX ADMIN — LISINOPRIL 10 MG: 5 TABLET ORAL at 18:21

## 2023-10-05 RX ADMIN — OXYCODONE HYDROCHLORIDE 10 MG: 10 TABLET ORAL at 20:38

## 2023-10-05 RX ADMIN — SIMETHICONE 125 MG: 125 TABLET, CHEWABLE ORAL at 16:27

## 2023-10-05 RX ADMIN — ENOXAPARIN SODIUM 40 MG: 100 INJECTION SUBCUTANEOUS at 18:21

## 2023-10-05 RX ADMIN — OMEPRAZOLE 20 MG: 20 CAPSULE, DELAYED RELEASE ORAL at 09:09

## 2023-10-05 RX ADMIN — METOPROLOL SUCCINATE 25 MG: 25 TABLET, EXTENDED RELEASE ORAL at 09:09

## 2023-10-05 ASSESSMENT — ACTIVITIES OF DAILY LIVING (ADL)
TOILET_TRANSFER_DESCRIPTION: GRAB BAR;SET-UP OF EQUIPMENT;SUPERVISION FOR SAFETY
TOILETING_LEVEL_OF_ASSIST_DESCRIPTION: GRAB BAR;SET-UP OF EQUIPMENT;SUPERVISION FOR SAFETY
BED_CHAIR_WHEELCHAIR_TRANSFER_DESCRIPTION: ADAPTIVE EQUIPMENT;SET-UP OF EQUIPMENT;SUPERVISION FOR SAFETY
BED_CHAIR_WHEELCHAIR_TRANSFER_DESCRIPTION: ADAPTIVE EQUIPMENT;SET-UP OF EQUIPMENT;VERBAL CUEING
TOILET_TRANSFER_DESCRIPTION: GRAB BAR;SET-UP OF EQUIPMENT;SUPERVISION FOR SAFETY
TUB_SHOWER_TRANSFER_DESCRIPTION: GRAB BAR;SHOWER BENCH;SET-UP OF EQUIPMENT;SUPERVISION FOR SAFETY
BED_CHAIR_WHEELCHAIR_TRANSFER_DESCRIPTION: ADAPTIVE EQUIPMENT;SET-UP OF EQUIPMENT;SUPERVISION FOR SAFETY
TOILETING_LEVEL_OF_ASSIST_DESCRIPTION: GRAB BAR;SET-UP OF EQUIPMENT;SUPERVISION FOR SAFETY

## 2023-10-05 ASSESSMENT — PAIN DESCRIPTION - PAIN TYPE: TYPE: ACUTE PAIN

## 2023-10-05 ASSESSMENT — GAIT ASSESSMENTS
DISTANCE (FEET): 150
GAIT LEVEL OF ASSIST: CONTACT GUARD ASSIST
DEVIATION: BRADYKINETIC;DECREASED HEEL STRIKE;DECREASED TOE OFF
ASSISTIVE DEVICE: FRONT WHEEL WALKER

## 2023-10-05 ASSESSMENT — PATIENT HEALTH QUESTIONNAIRE - PHQ9
2. FEELING DOWN, DEPRESSED, IRRITABLE, OR HOPELESS: NOT AT ALL
1. LITTLE INTEREST OR PLEASURE IN DOING THINGS: NOT AT ALL
SUM OF ALL RESPONSES TO PHQ9 QUESTIONS 1 AND 2: 0

## 2023-10-05 NOTE — PROGRESS NOTES
NURSING DAILY NOTE    Name: Casper Rowley   Date of Admission: 9/29/2023   Admitting Diagnosis: Cerebellar mass  Attending Physician: Kasie Lin M.d.  Allergies: Patient has no known allergies.    Safety  Patient Assist  sba  Patient Precautions  Fall Risk  Precaution Comments     Bed Transfer Status  Contact Guard Assist  Toilet Transfer Status   Standby Assist  Assistive Devices  Rails, Wheelchair  Oxygen  None - Room Air  Diet/Therapeutic Dining  Current Diet Order   Procedures    Diet Order Diet: Consistent CHO (Diabetic)     Pill Administration  whole  Agitated Behavioral Scale     ABS Level of Severity       Fall Risk  Has the patient had a fall this admission?   No  Lizzy Mar Fall Risk Scoring  19, HIGH RISK  Fall Risk Safety Measures  bed alarm, chair alarm, and poor balance    Vitals  Temperature: 37.2 °C (98.9 °F)  Temp src: Oral  Pulse: (!) 105 (notified RN Vidhya)  Respiration: 18  Blood Pressure: 98/65 (notified RN Vidhya)  Blood Pressure MAP (Calculated): 76 MM HG  BP Location: Right, Upper Arm  Patient BP Position: Sitting     Oxygen  Pulse Oximetry: 95 %  O2 (LPM): 0  O2 Delivery Device: None - Room Air    Bowel and Bladder  Last Bowel Movement  10/05/23  Stool Type  Type 7: Watery, no solid pieces-entirely liquid  Bowel Device  Bathroom  Continent  Bladder: Continent void   Bowel: Continent movement  Bladder Function  Urine Void (mL): 350 ml  Number of Times Voided: 1  Urine Color: Yellow  Number of Times Incontinent of Urine: 0  Genitourinary Assessment   Bladder Assessment (WDL):  Within Defined Limits  Urine Color: Yellow  Number of Bladder Accidents: 0  Total Number of Bladder of Accidents in Last 7 Days: 0  Number of Times Incontinent of Urine: 0  Bladder Device: Urinal  Time Void: Yes  Bladder Scan: Post Void  $ Bladder Scan Results (mL): 1    Skin  Ravin Score   18  Sensory Interventions   Bed Types:  Standard/Trauma Mattress  Skin Preventative Measures: Pillows in Use for Support / Positioning  Moisture Interventions         Pain  Pain Rating Scale  0 - No Pain  Pain Location  Back  Pain Location Orientation  Lower  Pain Interventions   Medication (see MAR)    ADLs    Bathing   Patient Refused Bathing (notified DARA Kee)  Linen Change   Complete  Personal Hygiene     Chlorhexidine Bath      Oral Care  Brushed Teeth (self)  Teeth/Dentures     Shave  Self  Nutrition Percentage Eaten  *  * Meal *  *, Lunch, Between % Consumed  Environmental Precautions  Bed in Low Position  Patient Turns/Positioning  Patient Turns Self from Side to Side  Patient Turns Assistance/Tolerance     Bed Positions  Bed Controls On, Bed Locked  Head of Bed Elevated  Self regulated      Psychosocial/Neurologic Assessment  Psychosocial Assessment  Psychosocial (WDL):  Within Defined Limits  Neurologic Assessment  Neuro (WDL): Within Defined Limits  Level of Consciousness: Alert  Orientation Level: Oriented X4  Cognition: Appropriate judgement, Appropriate safety awareness, Appropriate attention/concentration  Speech: Clear  Pupil Assesment: No  R Pupil Size (mm): 3  R Pupil Shape / Description: Round  R Pupil Reaction: Brisk  L Pupil Size (mm): 3  L Pupil Shape / Description: Round  L Pupil Reaction: Brisk  EENT (WDL):  Within Defined Limits    Cardio/Pulmonary Assessment  Edema      Respiratory Breath Sounds  RUL Breath Sounds: Clear  RML Breath Sounds: Clear  RLL Breath Sounds: Clear  BRIAN Breath Sounds: Clear  LLL Breath Sounds: Clear  Cardiac Assessment   Cardiac (WDL):  Within Defined Limits

## 2023-10-05 NOTE — THERAPY
Physical Therapy   Daily Treatment     Patient Name: Casper Rowley  Age:  58 y.o., Sex:  male  Medical Record #: [id number]  Today's Date: 10/5/2023     Precautions  Precautions: Fall Risk    Subjective    Pt up in wc, ready for PT     Objective       10/05/23 1301   PT Charge Group   PT Gait Training (Units) 1   PT Therapeutic Exercise (Units) 1   PT Total Time Spent   PT Individual Total Time Spent (Mins) 30   Gait Functional Level of Assist    Gait Level Of Assist Contact Guard Assist   Assistive Device Front Wheel Walker   Distance (Feet) 150   # of Times Distance was Traveled 2   Deviation Bradykinetic;Decreased Heel Strike;Decreased Toe Off   Transfer Functional Level of Assist   Bed, Chair, Wheelchair Transfer Contact Guard Assist   Bed Chair Wheelchair Transfer Description Adaptive equipment;Set-up of equipment;Verbal cueing   Supine Lower Body Exercise   Bridges 2 sets of 10   Hip Abduction 2 sets of 10;Side Lying   Hip Adduction  2 sets of 10  (hooklying MIKE ball squeeze)   Knee to Chest 2 sets of 10   Ankle Pumps 2 sets of 10   Comments knee>chest and ankle pumps for PROM x 1 set for stretching/ AAROM for second set with strength training.   Bed Mobility    Supine to Sit Contact Guard Assist  (therapy mat/ flat surface)   Sit to Supine Standby Assist   Sit to Stand Contact Guard Assist   Rolling Contact Guard Assist         Assessment    Pt tolerated supine exercises and understands how to complete self stretching with gait belt for knee>chest and ankle df. Able to achieve partial lift-off with bridging today.     Strengths: Able to follow instructions, Effective communication skills, Good insight into deficits/needs, Independent prior level of function, Motivated for self care and independence, Pleasant and cooperative, Supportive family, Willingly participates in therapeutic activities  Barriers: Decreased endurance, Fatigue, Generalized weakness, Impaired balance, Limited mobility (metastatic  disease / LE swelling)    Plan    Gait endurance with FWW, LE strength training, mobility training, standing balance/ activity tolerance.     Passport items to be completed:  Get in/out of bed safely, in/out of a vehicle, safely use mobility device, walk or wheel around home/community, navigate up and down stairs, show how to get up/down from the ground, ensure home is accessible, demonstrate HEP, complete caregiver training    Physical Therapy Problems (Active)       Problem: Balance       Dates: Start:  09/30/23         Goal: STG-Within one week, patient will maintain dynamic standing to complete 10 MWT with FWW and SBA       Dates: Start:  09/30/23               Problem: Mobility Transfers       Dates: Start:  09/30/23         Goal: STG-Within one week, patient will transfer bed to chair SPV after set-up with FWW       Dates: Start:  09/30/23               Problem: PT-Long Term Goals       Dates: Start:  09/30/23         Goal: LTG-By discharge, patient will propel wheelchair modified independent x 150 ft level / indoors       Dates: Start:  09/30/23            Goal: LTG-By discharge, patient will ambulate modified independent with FWW x 150 ft       Dates: Start:  09/30/23            Goal: LTG-By discharge, patient will transfer one surface to another modified independent with FWW       Dates: Start:  09/30/23            Goal: LTG-By discharge, patient will transfer in/out of a car SPV / modified independent after set-up with FWW       Dates: Start:  09/30/23

## 2023-10-05 NOTE — PROGRESS NOTES
"  Physical Medicine & Rehabilitation Progress Note    Encounter Date: 10/5/2023    Chief Complaint: Decreased mobility    Interval Events (Subjective):  Patient sitting up in room. He reports therapy is going well. Contacted by NSG and recommending staple removal. Discussed with patient. He is reporting acid reflux, will start Pepto.     _____________________________________  Interdisciplinary Team Conference   Most recent IDT on 10/3/2023    Discharge Date/Disposition:  10/11/23  _____________________________________    Objective:  VITAL SIGNS: /76   Pulse (!) 109   Temp 36.3 °C (97.3 °F) (Oral)   Resp 18   Ht 1.702 m (5' 7\")   Wt 81 kg (178 lb 9 oz)   SpO2 95%   BMI 27.97 kg/m²   Gen: NAD  Psych: Mood and affect appropriate  CV: RRR, 1+ BLE edema  Resp: CTAB, no upper airway sounds  Abd: NTND  Neuro: AOx4, following commands  Unchanged from 10/4/23    Laboratory Values:  Recent Results (from the past 72 hour(s))   POCT glucose device results    Collection Time: 10/02/23  5:24 PM   Result Value Ref Range    POC Glucose, Blood 218 (H) 65 - 99 mg/dL   POCT glucose device results    Collection Time: 10/02/23  9:33 PM   Result Value Ref Range    POC Glucose, Blood 296 (H) 65 - 99 mg/dL   POCT glucose device results    Collection Time: 10/03/23  7:37 AM   Result Value Ref Range    POC Glucose, Blood 158 (H) 65 - 99 mg/dL   POCT glucose device results    Collection Time: 10/03/23 11:15 AM   Result Value Ref Range    POC Glucose, Blood 176 (H) 65 - 99 mg/dL   POCT glucose device results    Collection Time: 10/03/23  5:09 PM   Result Value Ref Range    POC Glucose, Blood 276 (H) 65 - 99 mg/dL   POCT glucose device results    Collection Time: 10/04/23  7:51 AM   Result Value Ref Range    POC Glucose, Blood 195 (H) 65 - 99 mg/dL       Medications:  Scheduled Medications   Medication Dose Frequency    Dulaglutide  1 Pen Q7 DAYS    Canagliflozin  100 mg Daily-0800    Pharmacy Consult Request  1 Each PHARMACY TO " DOSE    senna-docusate  2 Tablet BID    omeprazole  20 mg DAILY    enoxaparin (LOVENOX) injection  40 mg DAILY AT 1800    lisinopril  20 mg DAILY    metoprolol SR  25 mg DAILY     PRN medications: bismuth subsalicylate, Respiratory Therapy Consult, hydrALAZINE, acetaminophen, senna-docusate **AND** polyethylene glycol/lytes **AND** magnesium hydroxide **AND** bisacodyl, mag hydrox-al hydrox-simeth, ondansetron **OR** ondansetron, traZODone, sodium chloride, oxyCODONE immediate-release **OR** oxyCODONE immediate-release, midazolam, [DISCONTINUED] insulin regular **AND** [CANCELED] POC blood glucose manual result **AND** NOTIFY MD and PharmD **AND** Administer 20 grams of glucose (approximately 8 ounces of fruit juice) every 15 minutes PRN FSBG less than 70 mg/dL **AND** dextrose bolus, meclizine, promethazine, zolpidem    Diet:  Current Diet Order   Procedures    Diet Order Diet: Consistent CHO (Diabetic)       Medical Decision Making and Plan:   Cerebellar mass - Patient with 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 Be.  -PT and OT for mobility and ADLs. Per guidelines, 15 hours per week between PT, OT and/or SLP.  [place] [facility] [place]     Metastatic prostate cancer - Patient with multiple lesions s/p radiation previously. On Decadron 2 mg daily. Follow-up Oncology. Completed Decadron.      HTN - Patient on Lisinopril 20 mg and Metoprolol 25 mg daily. Continue Lisinopril 20 mg and Metoprolol 25 mg daily, SBP low 100s, will reduce Lisinopril to 10 mg     DM2 with hyperglycemia - Patient on Lantus and SSI on transfer.  Brought in home trulicity and canagliflozin, will discontinue SSI and lantus     NED - RT to consult     Pain - Patient on PRN Oxycodone and Tylenol     Skin - Patient at risk for skin breakdown due to debility in areas including sacrum, achilles, elbows and head in addition to other sites. Nursing to  assess skin daily.      GI Ppx - Patient on Prilosec for GERD prophylaxis. Patient on Senna-docusate for constipation prophylaxis.   -GERD/acid reflux, start Pepto     DVT Ppx - Patient Lovenox on transfer.   -Bilateral LE edema,  start SCDs. Continue Lovenox  ____________________________________    T. Erick Lin MD/PhD  [facility] - Physical Medicine & Rehabilitation   [facility] - Brain Injury Medicine   ____________________________________

## 2023-10-05 NOTE — THERAPY
Occupational Therapy  Daily Treatment     Patient Name: Casper Rowley  Age:  58 y.o., Sex:  male  Medical Record #: [id number]  Today's Date: 10/5/2023     Precautions  Precautions: Fall Risk    Safety   ADL Safety : Requires Supervision for Safety  Bathroom Safety: Requires Supervision for Safety    Subjective    Patient was awake in bed and reported spilling the urinal on himself in bed overnight. He requested a shower.       Objective       10/05/23 0701   OT Charge Group   OT Self Care / ADL (Units) 3   OT Therapy Activity (Units) 1   OT Total Time Spent   OT Individual Total Time Spent (Mins) 60   Precautions   Precautions Fall Risk   Functional Level of Assist   Eating Independent   Grooming Modified Independent;Seated   Bathing Supervision   Bathing Description Grab bar;Hand held shower;Tub bench;Set-up of equipment;Supervision for safety   Upper Body Dressing Modified Independent   Upper Body Dressing Description Assist device equipment  (wc)   Lower Body Dressing Supervision   Lower Body Dressing Description Shoe horn;Reacher;Sock aid;Supervision for safety;Grab bar  (wc to retrieve)   Toileting Standby Assist   Toileting Description Grab bar;Set-up of equipment;Supervision for safety   Bed, Chair, Wheelchair Transfer Contact Guard Assist   Bed Chair Wheelchair Transfer Description Adaptive equipment;Set-up of equipment;Supervision for safety  (fww)   Toilet Transfers Standby Assist   Toilet Transfer Description Grab bar;Set-up of equipment;Supervision for safety   Tub / Shower Transfers Standby Assist   Tub Shower Transfer Description Grab bar;Shower bench;Set-up of equipment;Supervision for safety   Sitting Lower Body Exercises   Nustep Time (See Comments)  (nustep machine level 4 x 7 minutes with UE/LE)   Bed Mobility    Supine to Sit Standby Assist   Scooting Supervised   Interdisciplinary Plan of Care Collaboration   Patient Position at End of Therapy Seated;Chair Alarm On;Self Releasing Lap Belt  Applied  (in dining room)     Functional mobility with FWW from room to main gym with CGA.      Assessment    Completed adl routine with sba to mod I using w/c to gather clothing from closet.  He chose not to don his ankle foot orthotics today.    Strengths: Able to follow instructions, Alert and oriented, Good insight into deficits/needs, Independent prior level of function, Motivated for self care and independence, Pleasant and cooperative, Supportive family, Willingly participates in therapeutic activities  Barriers: Decreased endurance, Fatigue, Impaired activity tolerance, Impaired balance, Impulsive    Plan    ADLs, IADLs, functional mobility, strength/endurance, standing tolerance/balance, core/LB strengthening    Occupational Therapy Goals (Active)       Problem: Dressing       Dates: Start:  10/03/23         Goal: STG-Within one week, patient will dress LB with supervision       Dates: Start:  10/03/23               Problem: Functional Transfers       Dates: Start:  09/30/23         Goal: STG-Within one week, patient will transfer to toilet with CGA.       Dates: Start:  09/30/23       Description: Using 3:1 commode and grabbar.      Goal Note filed on 10/03/23 1047 by Harpal Zazueta OT/L       Min A              Goal: STG-Within one week, patient will transfer to toilet with CGA using grab bar       Dates: Start:  10/03/23               Problem: OT Long Term Goals       Dates: Start:  09/30/23         Goal: LTG-By discharge, patient will complete basic self care tasks at Mod Independent level.       Dates: Start:  09/30/23       Description: With adaptive equipment as needed.         Goal: LTG-By discharge, patient will perform bathroom transfers at Mod Independent level with DME as needed.       Dates: Start:  09/30/23               Problem: Toileting       Dates: Start:  10/03/23         Goal: STG-Within one week, patient will complete toileting tasks with SBA       Dates: Start:  10/03/23

## 2023-10-05 NOTE — THERAPY
"Speech Language Pathology  Daily Treatment     Patient Name: Casper Rowley  Age:  58 y.o., Sex:  male  Medical Record #: [id number]  Today's Date: 10/5/2023     Precautions  Precautions: Fall Risk    Subjective    Assumed care from OT, pt reports feeling fatigued from physical activity, however, was agreeable to session in ST office.     Objective       10/05/23 1034   Treatment Charges   SLP Cognitive Skill Development First 15 Minutes 1   SLP Cognitive Skill Development Additional 15 Minutes 1   SLP Total Time Spent   SLP Individual Total Time Spent (Mins) 30   Cognition   Functional Memory Activities Minimal (4)   Executive Functioning / Organization Minimal (4)   Interdisciplinary Plan of Care Collaboration   IDT Collaboration with  Physician;Nursing   Patient Position at End of Therapy Seated;Call Light within Reach;Phone within Reach   Collaboration Comments CLOF and removal of staples with physician and nursing         Assessment    Continued voicemail task targeting working memory, exec function/organization. Pt recalled difficulty with task from day prior, \"I need to slow down and listen to the details\" as strategies to implement this session. Pt required multiple repetitions of each message and continues to benefit from review of wh- questions to ensure all information is accounted for (I.e. who is calling, what is the message about?). Pt able to answer questions re: schedule and any conflicts with 100% accuracy this date.     Strengths: Able to follow instructions, Alert and oriented, Effective communication skills, Making steady progress towards goals, Supportive family, Pleasant and cooperative, Independent prior level of function, Motivated for self care and independence, Willingly participates in therapeutic activities  Barriers:  (STM deficits)    Plan    Continue to target working memory    Passport items to be completed:  manage finances, manage medications, arrive to therapy appointments on " time, complete daily memory log entries, solve problems related to safety situations, review education related to hospitalization, complete caregiver training     Speech Therapy Problems (Active)       Problem: Speech/Swallowing LTGs       Dates: Start:  09/30/23         Goal: LTG-By discharge, patient will solve complex problems with min A for safe d/c home       Dates: Start:  09/30/23

## 2023-10-05 NOTE — PROGRESS NOTES
NURSING DAILY NOTE    Name: Casper Rowley   Date of Admission: 9/29/2023   Admitting Diagnosis: Cerebellar mass  Attending Physician: Kasie Lin M.d.  Allergies: Patient has no known allergies.    Safety  Patient Assist  sba  Patient Precautions  Fall Risk  Precaution Comments     Bed Transfer Status  Contact Guard Assist  Toilet Transfer Status   Standby Assist  Assistive Devices  Walker - front wheel  Oxygen  None - Room Air  Diet/Therapeutic Dining  Current Diet Order   Procedures    Diet Order Diet: Consistent CHO (Diabetic)     Pill Administration  whole  Agitated Behavioral Scale     ABS Level of Severity       Fall Risk  Has the patient had a fall this admission?   No  Lizzy Mar Fall Risk Scoring  22, HIGH RISK  Fall Risk Safety Measures  bed alarm, chair alarm, and poor balance    Vitals  Temperature: 36.5 °C (97.7 °F)  Temp src: Oral  Pulse: 98  Respiration: 18  Blood Pressure: 114/76  Blood Pressure MAP (Calculated): 89 MM HG  BP Location: Right, Upper Arm  Patient BP Position: Sitting     Oxygen  Pulse Oximetry: 96 %  O2 (LPM): 0  O2 Delivery Device: None - Room Air    Bowel and Bladder  Last Bowel Movement  10/05/23  Stool Type  Type 7: Watery, no solid pieces-entirely liquid  Bowel Device  Bathroom  Continent  Bladder: Continent void   Bowel: Continent movement  Bladder Function  Urine Void (mL): 500 ml  Number of Times Voided: 1  Urine Color: Unable To Evaluate  Number of Times Incontinent of Urine: 0  Genitourinary Assessment   Bladder Assessment (WDL):  Within Defined Limits  Urine Color: Unable To Evaluate  Number of Bladder Accidents: 0  Total Number of Bladder of Accidents in Last 7 Days: 0  Number of Times Incontinent of Urine: 0  Bladder Device: Urinal  Time Void: Yes  Bladder Scan: Post Void  $ Bladder Scan Results (mL): 1    Skin  Ravin Score   18  Sensory Interventions   Bed Types: Standard/Trauma Mattress with  Overlay  Skin Preventative Measures: Pillows in Use for Support / Positioning  Moisture Interventions         Pain  Pain Rating Scale  5 - Interrupts some activities  Pain Location  Back  Pain Location Orientation  Lower  Pain Interventions   Medication (see MAR)    ADLs    Bathing   Patient Refused Bathing (notified RN Vidhya)  Linen Change   Complete  Personal Hygiene     Chlorhexidine Bath      Oral Care  Brushed Teeth (self)  Teeth/Dentures     Shave  Self  Nutrition Percentage Eaten  Lunch, Between % Consumed  Environmental Precautions  Bed in Low Position  Patient Turns/Positioning  Patient Turns Self from Side to Side  Patient Turns Assistance/Tolerance     Bed Positions  Bed Controls On, Bed Locked  Head of Bed Elevated  Self regulated      Psychosocial/Neurologic Assessment  Psychosocial Assessment  Psychosocial (WDL):  Within Defined Limits  Neurologic Assessment  Neuro (WDL): Within Defined Limits  Level of Consciousness: Alert  Orientation Level: Oriented X4  Cognition: Appropriate judgement, Appropriate safety awareness, Appropriate attention/concentration  Speech: Clear  Pupil Assesment: No  R Pupil Size (mm): 3  R Pupil Shape / Description: Round  R Pupil Reaction: Brisk  L Pupil Size (mm): 3  L Pupil Shape / Description: Round  L Pupil Reaction: Brisk  EENT (WDL):  Within Defined Limits    Cardio/Pulmonary Assessment  Edema      Respiratory Breath Sounds  RUL Breath Sounds: Clear  RML Breath Sounds: Clear  RLL Breath Sounds: Clear  BRIAN Breath Sounds: Clear  LLL Breath Sounds: Clear  Cardiac Assessment   Cardiac (WDL):  Within Defined Limits

## 2023-10-05 NOTE — CARE PLAN
"The patient is Stable - Low risk of patient condition declining or worsening    Shift Goals  Clinical Goals: safety  Patient Goals: safety, pain management      Problem: Fall Risk - Rehab  Goal: Patient will remain free from falls  Outcome: Progressing  Note: Lizzy Mar Fall risk Assessment Score: 19    High fall risk Interventions   - Alarming seatbelt  - Bed and strip alarm   - Yellow sign by the door   - Yellow wrist band \"Fall risk\"  - Room near to the nurse station  - Do not leave patient unattended in the bathroom  - Fall risk education provided  Patient uses call light consistently and appropriately this shift.  Waits for assistance when needed and does not attempt self transfer.  Able to verbalize needs.  Will continue to monitor.            Problem: Skin Integrity  Goal: Patient's skin integrity will be maintained or improve  Outcome: Progressing  Note: Surgical incision to head with staples/STEVO with no evidence of bleeding or s/s of infections. Afebrile. Patient's skin remains intact and free from new or accidental injury this shift.  Will continue to monitor.        "

## 2023-10-05 NOTE — THERAPY
Occupational Therapy  Daily Treatment     Patient Name: Casper Rowley  Age:  58 y.o., Sex:  male  Medical Record #: [id number]  Today's Date: 10/5/2023     Precautions  Precautions: (P) Fall Risk    Safety   ADL Safety : Requires Supervision for Safety  Bathroom Safety: Requires Supervision for Safety    Subjective    Pt encountered for OT entering the bathroom. Agreeable for OT following toileting.     Objective       10/05/23 0931   OT Charge Group   Charges Yes   OT Self Care / ADL (Units) 2   OT Therapeutic Exercise (Units) 2   OT Total Time Spent   OT Individual Total Time Spent (Mins) 60   Precautions   Precautions Fall Risk   Functional Level of Assist   Toileting Standby Assist   Toileting Description Grab bar;Set-up of equipment;Supervision for safety   Bed, Chair, Wheelchair Transfer Contact Guard Assist   Bed Chair Wheelchair Transfer Description Adaptive equipment;Set-up of equipment;Supervision for safety  (FWW)   Toilet Transfers Standby Assist   Toilet Transfer Description Grab bar;Set-up of equipment;Supervision for safety   Supine Lower Body Exercise   Other Exercises Shuttle 2000-1; level 3 resistance   Comments 1x20 bilateral; 1x20 r/L   Interdisciplinary Plan of Care Collaboration   Patient Position at End of Therapy Seated;Chair Alarm On;Phone within Reach  (Hand off to speech)   Strengths & Barriers   Strengths Able to follow instructions;Alert and oriented;Good insight into deficits/needs;Independent prior level of function;Motivated for self care and independence;Pleasant and cooperative;Supportive family;Willingly participates in therapeutic activities   Barriers Decreased endurance;Fatigue;Impaired activity tolerance     Attempted kitchen mobility and cooking task; however, no milk available for pudding. During initial kitchen mobility, pt demonstrated good dynamic standing using FWW while navigating through high and low cabinets for up to 8 min prior to requiring a seated rest break.      Additionally, worked on functional mobility using FWW (WC followed as needed for breaks) while navigating from room to rehab kitchen and rehab kitchen to gym with up to 3 rest breaks needed.      Assessment    Overall, pt is highly motivated to increase functional potential and demonstrated fair activity tolerance during functional mobility and LB thera ex, but is limited by reduce strength in U/LE and activity endurance.     Strengths: (P) Able to follow instructions, Alert and oriented, Good insight into deficits/needs, Independent prior level of function, Motivated for self care and independence, Pleasant and cooperative, Supportive family, Willingly participates in therapeutic activities  Barriers: (P) Decreased endurance, Fatigue, Impaired activity tolerance    Plan    Cont thera act, thera ex, functional mobility, and activity tolerance to maximize functional potential.     Passport items to be completed:  Perform bathroom transfers, complete dressing, complete feeding, get ready for the day, prepare a simple meal, participate in household tasks, adapt home for safety needs, demonstrate home exercise program, complete caregiver training     Occupational Therapy Goals (Active)       Problem: Dressing       Dates: Start:  10/03/23         Goal: STG-Within one week, patient will dress LB with supervision       Dates: Start:  10/03/23               Problem: Functional Transfers       Dates: Start:  09/30/23         Goal: STG-Within one week, patient will transfer to toilet with CGA.       Dates: Start:  09/30/23       Description: Using 3:1 commode and grabbar.      Goal Note filed on 10/03/23 1047 by Harpal Zazueta OT/L       Min A              Goal: STG-Within one week, patient will transfer to toilet with CGA using grab bar       Dates: Start:  10/03/23               Problem: OT Long Term Goals       Dates: Start:  09/30/23         Goal: LTG-By discharge, patient will complete basic self care tasks at  Mod Independent level.       Dates: Start:  09/30/23       Description: With adaptive equipment as needed.         Goal: LTG-By discharge, patient will perform bathroom transfers at Mod Independent level with DME as needed.       Dates: Start:  09/30/23               Problem: Toileting       Dates: Start:  10/03/23         Goal: STG-Within one week, patient will complete toileting tasks with SBA       Dates: Start:  10/03/23

## 2023-10-05 NOTE — CARE PLAN
The patient is Stable - Low risk of patient condition declining or worsening    Shift Goals  Clinical Goals: safety  Patient Goals: safety, pain management    Progress made toward(s) clinical / shift goals:  2    Problem: Skin Integrity  Goal: Patient's skin integrity will be maintained or improve  Outcome: Progressing: staples and suture to right head removed per order. Incisions are CDI, approximated, STEVO, no s/s infection noted.      Problem: Pain - Standard  Goal: Alleviation of pain or a reduction in pain to the patient’s comfort goal  Outcome: Progressing: PRN oxycodone 5 mg administered, per pt request this afternoon, for 5/10 low back pain. Participating in therapies.

## 2023-10-06 ENCOUNTER — APPOINTMENT (OUTPATIENT)
Dept: OCCUPATIONAL THERAPY | Facility: REHABILITATION | Age: 58
DRG: 949 | End: 2023-10-06
Attending: PHYSICAL MEDICINE & REHABILITATION
Payer: COMMERCIAL

## 2023-10-06 ENCOUNTER — APPOINTMENT (OUTPATIENT)
Dept: PHYSICAL THERAPY | Facility: REHABILITATION | Age: 58
DRG: 949 | End: 2023-10-06
Attending: PHYSICAL MEDICINE & REHABILITATION
Payer: COMMERCIAL

## 2023-10-06 ENCOUNTER — APPOINTMENT (OUTPATIENT)
Dept: SPEECH THERAPY | Facility: REHABILITATION | Age: 58
DRG: 949 | End: 2023-10-06
Attending: HOSPITALIST
Payer: COMMERCIAL

## 2023-10-06 PROCEDURE — A9270 NON-COVERED ITEM OR SERVICE: HCPCS | Performed by: PHYSICAL MEDICINE & REHABILITATION

## 2023-10-06 PROCEDURE — 700111 HCHG RX REV CODE 636 W/ 250 OVERRIDE (IP): Mod: JZ | Performed by: PHYSICAL MEDICINE & REHABILITATION

## 2023-10-06 PROCEDURE — 97535 SELF CARE MNGMENT TRAINING: CPT

## 2023-10-06 PROCEDURE — 99232 SBSQ HOSP IP/OBS MODERATE 35: CPT | Performed by: PHYSICAL MEDICINE & REHABILITATION

## 2023-10-06 PROCEDURE — 700102 HCHG RX REV CODE 250 W/ 637 OVERRIDE(OP): Performed by: PHYSICAL MEDICINE & REHABILITATION

## 2023-10-06 PROCEDURE — 97130 THER IVNTJ EA ADDL 15 MIN: CPT

## 2023-10-06 PROCEDURE — 97110 THERAPEUTIC EXERCISES: CPT

## 2023-10-06 PROCEDURE — 97116 GAIT TRAINING THERAPY: CPT

## 2023-10-06 PROCEDURE — 770010 HCHG ROOM/CARE - REHAB SEMI PRIVAT*

## 2023-10-06 PROCEDURE — 97129 THER IVNTJ 1ST 15 MIN: CPT

## 2023-10-06 PROCEDURE — 97530 THERAPEUTIC ACTIVITIES: CPT

## 2023-10-06 RX ORDER — BISMUTH SUBSALICYLATE 262 MG/1
262 TABLET, CHEWABLE ORAL EVERY 4 HOURS PRN
Status: DISCONTINUED | OUTPATIENT
Start: 2023-10-06 | End: 2023-10-11 | Stop reason: HOSPADM

## 2023-10-06 RX ORDER — METOPROLOL SUCCINATE 25 MG/1
37.5 TABLET, EXTENDED RELEASE ORAL DAILY
Status: DISCONTINUED | OUTPATIENT
Start: 2023-10-07 | End: 2023-10-09

## 2023-10-06 RX ADMIN — METOPROLOL SUCCINATE 25 MG: 25 TABLET, EXTENDED RELEASE ORAL at 08:48

## 2023-10-06 RX ADMIN — OXYCODONE HYDROCHLORIDE 5 MG: 5 TABLET ORAL at 22:09

## 2023-10-06 RX ADMIN — ENOXAPARIN SODIUM 40 MG: 100 INJECTION SUBCUTANEOUS at 18:28

## 2023-10-06 RX ADMIN — OXYCODONE HYDROCHLORIDE 5 MG: 5 TABLET ORAL at 14:33

## 2023-10-06 RX ADMIN — OMEPRAZOLE 20 MG: 20 CAPSULE, DELAYED RELEASE ORAL at 08:48

## 2023-10-06 RX ADMIN — ZOLPIDEM TARTRATE 10 MG: 5 TABLET ORAL at 22:10

## 2023-10-06 RX ADMIN — ACETAMINOPHEN 650 MG: 325 TABLET ORAL at 14:33

## 2023-10-06 ASSESSMENT — ACTIVITIES OF DAILY LIVING (ADL)
TOILETING_LEVEL_OF_ASSIST_DESCRIPTION: ADAPTIVE EQUIPMENT;GRAB BAR;SUPERVISION FOR SAFETY
BED_CHAIR_WHEELCHAIR_TRANSFER_DESCRIPTION: ADAPTIVE EQUIPMENT;SUPERVISION FOR SAFETY
TOILET_TRANSFER_DESCRIPTION: GRAB BAR;ADAPTIVE EQUIPMENT;SUPERVISION FOR SAFETY

## 2023-10-06 ASSESSMENT — PAIN DESCRIPTION - PAIN TYPE
TYPE: ACUTE PAIN

## 2023-10-06 ASSESSMENT — GAIT ASSESSMENTS
ASSISTIVE DEVICE: FRONT WHEEL WALKER
DISTANCE (FEET): 200
DEVIATION: SHUFFLED GAIT
GAIT LEVEL OF ASSIST: STANDBY ASSIST

## 2023-10-06 NOTE — PROGRESS NOTES
"  Physical Medicine & Rehabilitation Progress Note    Encounter Date: 10/6/2023    Chief Complaint: Decreased mobility    Interval Events (Subjective):  Patient sitting up in room. He reports having diarrhea. Discussed will discontinue stool softener. They did not have pepto so started on simethicone. Now in stock so will have both pepto and simethicone PRN if heart burn.     _____________________________________  Interdisciplinary Team Conference   Most recent IDT on 10/3/2023    Discharge Date/Disposition:  10/11/23  _____________________________________    Objective:  VITAL SIGNS: /68   Pulse (!) 107   Temp 36.2 °C (97.2 °F) (Oral)   Resp 18   Ht 1.702 m (5' 7\")   Wt 81 kg (178 lb 9 oz)   SpO2 97%   BMI 27.97 kg/m²   Gen: NAD  Psych: Mood and affect appropriate  CV: RRR, 0 edema  Resp: CTAB, no upper airway sounds  Abd: NTND, BS+  Neuro: AOx4, following commands.     Laboratory Values:  Recent Results (from the past 72 hour(s))   POCT glucose device results    Collection Time: 10/03/23  5:09 PM   Result Value Ref Range    POC Glucose, Blood 276 (H) 65 - 99 mg/dL   POCT glucose device results    Collection Time: 10/04/23  7:51 AM   Result Value Ref Range    POC Glucose, Blood 195 (H) 65 - 99 mg/dL       Medications:  Scheduled Medications   Medication Dose Frequency    lisinopril  10 mg DAILY    Dulaglutide  1 Pen Q7 DAYS    Canagliflozin  100 mg Daily-0800    Pharmacy Consult Request  1 Each PHARMACY TO DOSE    senna-docusate  2 Tablet BID    omeprazole  20 mg DAILY    enoxaparin (LOVENOX) injection  40 mg DAILY AT 1800    metoprolol SR  25 mg DAILY     PRN medications: bismuth subsalicylate, simethicone, Respiratory Therapy Consult, hydrALAZINE, acetaminophen, senna-docusate **AND** polyethylene glycol/lytes **AND** magnesium hydroxide **AND** bisacodyl, mag hydrox-al hydrox-simeth, ondansetron **OR** ondansetron, traZODone, sodium chloride, oxyCODONE immediate-release **OR** oxyCODONE " immediate-release, midazolam, [DISCONTINUED] insulin regular **AND** [CANCELED] POC blood glucose manual result **AND** NOTIFY MD and PharmD **AND** Administer 20 grams of glucose (approximately 8 ounces of fruit juice) every 15 minutes PRN FSBG less than 70 mg/dL **AND** dextrose bolus, meclizine, promethazine, zolpidem    Diet:  Current Diet Order   Procedures    Diet Order Diet: Consistent CHO (Diabetic)       Medical Decision Making and Plan:   Cerebellar mass - Patient with 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 Be.  -PT and OT for mobility and ADLs. Per guidelines, 15 hours per week between PT, OT and/or SLP.  -Follow-up [facility] [place]     Metastatic prostate cancer - Patient with multiple lesions s/p radiation previously. On Decadron 2 mg daily. Follow-up Oncology. Completed Decadron.      HTN - Patient on Lisinopril 20 mg and Metoprolol 25 mg daily. Continue Lisinopril 20 mg and Metoprolol 25 mg daily, SBP low 100s, will reduce Lisinopril to 10 mg. Continue Lisinopril 10 mg. HR still into low 100s, will increase Metoprolol to 37.5     DM2 with hyperglycemia - Patient on Lantus and SSI on transfer.  Brought in home trulicity and canagliflozin, will discontinue SSI and lantus. Continue Trulicity and Cangliflozin     NED - RT to consult     Pain - Patient on PRN Oxycodone and Tylenol     Skin - Patient at risk for skin breakdown due to debility in areas including sacrum, achilles, elbows and head in addition to other sites. Nursing to assess skin daily.      GI Ppx - Patient on Prilosec for GERD prophylaxis. Patient on Senna-docusate for constipation prophylaxis.   -GERD/acid reflux, start Pepto     DVT Ppx - Patient Lovenox on transfer.   -Bilateral LE edema,  start SCDs. Continue Lovenox  ____________________________________    T. Erick Lin MD/PhD  [facility] - Physical Medicine & Rehabilitation   [facility] - Brain  Injury Medicine   ____________________________________

## 2023-10-06 NOTE — THERAPY
Speech Language Pathology  Daily Treatment     Patient Name: Casper Rowley  Age:  58 y.o., Sex:  male  Medical Record #: [id number]  Today's Date: 10/6/2023     Precautions  Precautions: Fall Risk    Subjective    Pt pleasant and agreeable to therapy, reports he would like to walk more - encouraged to discuss with PT in session immediately after.     Objective       10/06/23 0934   Treatment Charges   SLP Cognitive Skill Development First 15 Minutes 1   SLP Cognitive Skill Development Additional 15 Minutes 1   SLP Total Time Spent   SLP Individual Total Time Spent (Mins) 30   Cognition   Executive Functioning / Organization Minimal (4)   Interdisciplinary Plan of Care Collaboration   IDT Collaboration with  Physician   Patient Position at End of Therapy Seated;Chair Alarm On;Call Light within Reach;Phone within Reach   Collaboration Comments transfer of care to physician         Assessment    Executive function/complex attention task - pt tasked with finding remaining balances on giftcards using 1800 number or website to verify. Pt required MIN A to attend to obvious details, I.e. verifying company where giftcard is from, ensuring cursor was in place before keying in information. Pt able to locate amounts with 100% accuracy.    Strengths: Able to follow instructions, Alert and oriented, Effective communication skills, Making steady progress towards goals, Supportive family, Pleasant and cooperative, Independent prior level of function, Motivated for self care and independence, Willingly participates in therapeutic activities  Barriers:  (STM deficits)    Plan    Continue giftcard activity ( please note updated answer key as amounts on activity have changed since initial publication). Complete outcome assessment prior to d/c.     Passport items to be completed:  Express basic needs, understand food/liquid recommendations, consistently follow swallow precautions, manage finances, manage medications, arrive to  therapy appointments on time, complete daily memory log entries, solve problems related to safety situations, review education related to hospitalization, complete caregiver training     Speech Therapy Problems (Active)       Problem: Speech/Swallowing LTGs       Dates: Start:  09/30/23         Goal: LTG-By discharge, patient will solve complex problems with min A for safe d/c home       Dates: Start:  09/30/23    Expected End:  10/11/23

## 2023-10-06 NOTE — CARE PLAN
"The patient is Stable - Low risk of patient condition declining or worsening    Shift Goals  Clinical Goals: safety  Patient Goals: safety, pain management    Problem: Fall Risk - Rehab  Goal: Patient will remain free from falls  Outcome: Progressing  Note: Lizzy Mar Fall risk Assessment Score: 22    High fall risk Interventions   - Alarming seatbelt  - Bed and strip alarm   - Yellow sign by the door   - Yellow wrist band \"Fall risk\"  - Room near to the nurse station  - Do not leave patient unattended in the bathroom  - Fall risk education provided      Problem: Skin Integrity  Goal: Patient's skin integrity will be maintained or improve  Outcome: Progressing  Note:   Ravin Score: 18    Patient's skin remains intact and free from new or accidental injury this shift; no s/s of infection. RN wound protocol checked. Encouraged hydration and educated about the importance of nutrition to keep skin integrity. Will continue to monitor.       "

## 2023-10-06 NOTE — THERAPY
Occupational Therapy  Daily Treatment     Patient Name: Casper Rowley  Age:  58 y.o., Sex:  male  Medical Record #: [id number]  Today's Date: 10/6/2023     Precautions  Precautions: (P) Fall Risk    Safety   ADL Safety : Requires Supervision for Safety  Bathroom Safety: Requires Supervision for Safety    Subjective    Patient was awake in bed and agreeable to dressing, toileting, grooming.       Objective       10/06/23 0701   OT Charge Group   OT Self Care / ADL (Units) 2   OT Therapy Activity (Units) 1   OT Therapeutic Exercise (Units) 1   OT Total Time Spent   OT Individual Total Time Spent (Mins) 60   Precautions   Precautions Fall Risk   Functional Level of Assist   Eating Independent   Grooming Standby Assist;Standing   Grooming Description Standing at sink;Supervision for safety   Upper Body Dressing Stand by Assist   Upper Body Dressing Description Assist device equipment  (fww to gather)   Lower Body Dressing Standby Assist   Lower Body Dressing Description Reacher;Shoe horn;Sock aid;Assistive devices;Supervision for safety  (fww to gather)   Toileting Supervision   Toileting Description Adaptive equipment;Grab bar;Supervision for safety  (fww)   Bed, Chair, Wheelchair Transfer Standby Assist   Bed Chair Wheelchair Transfer Description Adaptive equipment;Supervision for safety  (fww)   Toilet Transfers Supervised   Toilet Transfer Description Grab bar;Adaptive equipment;Supervision for safety  (fww)   Sitting Upper Body Exercises   Sitting Upper Body Exercises Yes   Lat Pull Bilateral;Weight (See Comments for lbs)  (2 x 20 with 20 lbs)   Tricep Press 3 sets of 10;Bilateral;Weight (See Comments for lbs)  (30 lbs on rickshaw facing forward)   Bed Mobility    Supine to Sit Supervised   Scooting Supervised   Interdisciplinary Plan of Care Collaboration   Patient Position at End of Therapy Seated;Chair Alarm On     Functional mobility in room/bathroom, hallways, gyms with SBA and  FWW.    Assessment    Patient with no losses of balance during session.  Patient continues to be highly motivated with good safety.    Strengths: Able to follow instructions, Alert and oriented, Good insight into deficits/needs, Independent prior level of function, Motivated for self care and independence, Pleasant and cooperative, Supportive family, Willingly participates in therapeutic activities  Barriers: Decreased endurance, Fatigue, Impaired activity tolerance    Plan    ADLs, IADLs, functional mobility, strength/endurance, standing tolerance/balance, core/LB strengthening    Occupational Therapy Goals (Active)       Problem: Dressing       Dates: Start:  10/03/23         Goal: STG-Within one week, patient will dress LB with supervision       Dates: Start:  10/03/23    Expected End:  10/11/23               Problem: Functional Transfers       Dates: Start:  09/30/23         Goal: STG-Within one week, patient will transfer to toilet with CGA.       Dates: Start:  09/30/23    Expected End:  10/11/23       Description: Using 3:1 commode and grabbar.      Goal Note filed on 10/03/23 1047 by Harpal Zazueta OT/L       Min A              Goal: STG-Within one week, patient will transfer to toilet with CGA using grab bar       Dates: Start:  10/03/23    Expected End:  10/11/23               Problem: OT Long Term Goals       Dates: Start:  09/30/23         Goal: LTG-By discharge, patient will complete basic self care tasks at Mod Independent level.       Dates: Start:  09/30/23    Expected End:  10/11/23       Description: With adaptive equipment as needed.         Goal: LTG-By discharge, patient will perform bathroom transfers at Mod Independent level with DME as needed.       Dates: Start:  09/30/23    Expected End:  10/11/23               Problem: Toileting       Dates: Start:  10/03/23         Goal: STG-Within one week, patient will complete toileting tasks with SBA       Dates: Start:  10/03/23    Expected End:   10/11/23

## 2023-10-06 NOTE — THERAPY
"Physical Therapy   Daily Treatment     Patient Name: Casper Rowley  Age:  58 y.o., Sex:  male  Medical Record #: [id number]  Today's Date: 10/6/2023     Precautions  Precautions: Fall Risk    Subjective    Pt is pleasant and cooperative.      Objective       10/06/23 1030   PT Charge Group   PT Gait Training (Units) 2   PT Therapeutic Exercise (Units) 2   PT Total Time Spent   PT Individual Total Time Spent (Mins) 60   Precautions   Precautions Fall Risk   Pain   Intervention Declines   Gait Functional Level of Assist    Gait Level Of Assist Standby Assist   Assistive Device Front Wheel Walker   Distance (Feet) 200   # of Times Distance was Traveled 3   Deviation Shuffled Gait   Stairs Functional Level of Assist   Level of Assist with Stairs Contact Guard Assist   # of Stairs Climbed 12   Stairs Description Extra time;Hand rails   Interdisciplinary Plan of Care Collaboration   Patient Position at End of Therapy Seated;Chair Alarm On  (dining room)       Pt ambulated 3 bouts of 200', 2 bouts indoors and 1 bout outdoors. Pt demonstrates mild foot drop bilaterally however no toe drag or losses of balance noted.     Pt performed 12 4\" stairs with B handrails and CGA. Step to step pattern, he needs to perform circumduction to clear his R foot up the stairs.     Nu step BUE/BLE lvl 6 12 mins.     Assessment    Pt continues to improve his strength, balance, and endurance.   Strengths: Able to follow instructions, Effective communication skills, Good insight into deficits/needs, Independent prior level of function, Motivated for self care and independence, Pleasant and cooperative, Supportive family, Willingly participates in therapeutic activities  Barriers: Decreased endurance, Fatigue, Generalized weakness, Impaired balance, Limited mobility (metastatic disease / LE swelling)    Plan    Continue to progress pt's functional independence as per plan of care.     Passport items to be completed:  Get in/out of bed " safely, in/out of a vehicle, safely use mobility device, walk or wheel around home/community, navigate up and down stairs, show how to get up/down from the ground, ensure home is accessible, demonstrate HEP, complete caregiver training    Physical Therapy Problems (Active)       Problem: Balance       Dates: Start:  09/30/23         Goal: STG-Within one week, patient will maintain dynamic standing to complete 10 MWT with FWW and SBA       Dates: Start:  09/30/23    Expected End:  10/11/23               Problem: Mobility Transfers       Dates: Start:  09/30/23         Goal: STG-Within one week, patient will transfer bed to chair SPV after set-up with FWW       Dates: Start:  09/30/23    Expected End:  10/11/23               Problem: PT-Long Term Goals       Dates: Start:  09/30/23         Goal: LTG-By discharge, patient will propel wheelchair modified independent x 150 ft level / indoors       Dates: Start:  09/30/23    Expected End:  10/11/23            Goal: LTG-By discharge, patient will ambulate modified independent with FWW x 150 ft       Dates: Start:  09/30/23    Expected End:  10/11/23            Goal: LTG-By discharge, patient will transfer one surface to another modified independent with FWW       Dates: Start:  09/30/23    Expected End:  10/11/23            Goal: LTG-By discharge, patient will transfer in/out of a car SPV / modified independent after set-up with FWW       Dates: Start:  09/30/23    Expected End:  10/11/23

## 2023-10-06 NOTE — DISCHARGE PLANNING
Spoke with patient about Alyson denial. Patient would like to work with [facility]. Orders to be sent.

## 2023-10-06 NOTE — THERAPY
"Occupational Therapy  Daily Treatment     Patient Name: Casper Rowley  Age:  58 y.o., Sex:  male  Medical Record #: [id number]  Today's Date: 10/6/2023     Precautions  Precautions: (P) Fall Risk    Safety   ADL Safety : Requires Supervision for Safety  Bathroom Safety: Requires Supervision for Safety    Subjective    \"Are we going to set the kitchen on fire?\"     Objective       10/06/23 1331   OT Charge Group   OT Therapy Activity (Units) 2   OT Total Time Spent   OT Individual Total Time Spent (Mins) 30   Precautions   Precautions Fall Risk   Pain 0 - 10 Group   Location Knee   Location Orientation Right   Therapist Pain Assessment Prior to Activity;During Activity   IADL Treatments   IADL Treatments Meal preparation   Kitchen Mobility Education mobilized around kitchen with FWW and SBA while retrieving all needed items from cupboards and fridge.   Meal Preparation Used stove to scramble an egg with SBA with good safety with burner.   Home Management Rinsed dishes in sink and placed in dirty dishes bin with SBA     W/C mobility mod I from room to adl kitchen.    Assessment    Patient completed simple meal prep, cleanup and kitchen mobility with SBA using FWW with good safety.    Strengths: Able to follow instructions, Alert and oriented, Good insight into deficits/needs, Independent prior level of function, Motivated for self care and independence, Pleasant and cooperative, Supportive family, Willingly participates in therapeutic activities  Barriers: Decreased endurance, Fatigue, Impaired activity tolerance    Plan    ADLs, IADLs, functional mobility, strength/endurance, standing tolerance/balance, core/LB strengthening    Occupational Therapy Goals (Active)       Problem: Dressing       Dates: Start:  10/03/23         Goal: STG-Within one week, patient will dress LB with supervision       Dates: Start:  10/03/23    Expected End:  10/11/23               Problem: Functional Transfers       Dates: Start:  " 09/30/23         Goal: STG-Within one week, patient will transfer to toilet with CGA.       Dates: Start:  09/30/23    Expected End:  10/11/23       Description: Using 3:1 commode and grabbar.      Goal Note filed on 10/03/23 1047 by Harpal Zazueta, OT/L       Min A              Goal: STG-Within one week, patient will transfer to toilet with CGA using grab bar       Dates: Start:  10/03/23    Expected End:  10/11/23               Problem: OT Long Term Goals       Dates: Start:  09/30/23         Goal: LTG-By discharge, patient will complete basic self care tasks at Mod Independent level.       Dates: Start:  09/30/23    Expected End:  10/11/23       Description: With adaptive equipment as needed.         Goal: LTG-By discharge, patient will perform bathroom transfers at Mod Independent level with DME as needed.       Dates: Start:  09/30/23    Expected End:  10/11/23               Problem: Toileting       Dates: Start:  10/03/23         Goal: STG-Within one week, patient will complete toileting tasks with SBA       Dates: Start:  10/03/23    Expected End:  10/11/23

## 2023-10-06 NOTE — PROGRESS NOTES
NURSING DAILY NOTE    Name: Casper Rowley   Date of Admission: 9/29/2023   Admitting Diagnosis: Cerebellar mass  Attending Physician: Kasie Lin M.d.  Allergies: Patient has no known allergies.    Safety  Patient Assist  SBA  Patient Precautions  Fall Risk  Precaution Comments     Bed Transfer Status  Contact Guard Assist  Toilet Transfer Status   Standby Assist  Assistive Devices  Rails, Wheelchair  Oxygen  None - Room Air  Diet/Therapeutic Dining  Current Diet Order   Procedures    Diet Order Diet: Consistent CHO (Diabetic)     Pill Administration  whole  Agitated Behavioral Scale     ABS Level of Severity       Fall Risk  Has the patient had a fall this admission?   No  Lizzy Mar Fall Risk Scoring  22, HIGH RISK  Fall Risk Safety Measures  bed alarm and chair alarm    Vitals  Temperature: 36.8 °C (98.2 °F)  Temp src: Oral  Pulse: (!) 102 (notified RN Pat)  Respiration: 18  Blood Pressure: 103/71  Blood Pressure MAP (Calculated): 82 MM HG  BP Location: Right, Upper Arm  Patient BP Position: Sitting     Oxygen  Pulse Oximetry: 98 %  O2 (LPM): 0  O2 Delivery Device: None - Room Air    Bowel and Bladder  Last Bowel Movement  10/06/23  Stool Type  Type 6: Fluffy pieces with ragged edges, a mushy stool  Bowel Device  Bathroom  Continent  Bladder: Continent void   Bowel: Continent movement  Bladder Function  Urine Void (mL): 500 ml (urinal)  Number of Times Voided: 1  Urine Color: Yellow  Number of Times Incontinent of Urine: 0  Genitourinary Assessment   Bladder Assessment (WDL):  Within Defined Limits  Urine Color: Yellow  Number of Bladder Accidents: 0  Total Number of Bladder of Accidents in Last 7 Days: 0  Number of Times Incontinent of Urine: 0  Bladder Device: Urinal  Time Void: Yes  Bladder Scan: Post Void  $ Bladder Scan Results (mL): 1    Skin  Ravin Score   18  Sensory Interventions   Bed Types: Standard/Trauma  Mattress  Skin Preventative Measures: Pillows in Use for Support / Positioning  Moisture Interventions         Pain  Pain Rating Scale  0 - No Pain  Pain Location  Back  Pain Location Orientation  Lower  Pain Interventions   Medication (see MAR)    ADLs    Bathing   Patient Refused Bathing (notified DARA Kee)  Linen Change   Complete  Personal Hygiene     Chlorhexidine Bath      Oral Care  Brushed Teeth (self)  Teeth/Dentures     Shave  Self  Nutrition Percentage Eaten  Lunch, Between % Consumed  Environmental Precautions  Bed in Low Position  Patient Turns/Positioning  Patient Turns Self from Side to Side  Patient Turns Assistance/Tolerance     Bed Positions  Bed Controls On, Bed Locked  Head of Bed Elevated  Self regulated      Psychosocial/Neurologic Assessment  Psychosocial Assessment  Psychosocial (WDL):  Within Defined Limits  Neurologic Assessment  Neuro (WDL): Within Defined Limits  Level of Consciousness: Alert  Orientation Level: Oriented X4  Cognition: Appropriate judgement, Appropriate safety awareness, Appropriate attention/concentration  Speech: Clear  Pupil Assesment: No  R Pupil Size (mm): 3  R Pupil Shape / Description: Round  R Pupil Reaction: Brisk  L Pupil Size (mm): 3  L Pupil Shape / Description: Round  L Pupil Reaction: Brisk  EENT (WDL):  Within Defined Limits    Cardio/Pulmonary Assessment  Edema      Respiratory Breath Sounds  RUL Breath Sounds: Clear  RML Breath Sounds: Clear  RLL Breath Sounds: Clear  BRIAN Breath Sounds: Clear  LLL Breath Sounds: Clear  Cardiac Assessment   Cardiac (WDL):  Within Defined Limits

## 2023-10-07 PROCEDURE — A9270 NON-COVERED ITEM OR SERVICE: HCPCS | Performed by: PHYSICAL MEDICINE & REHABILITATION

## 2023-10-07 PROCEDURE — 700102 HCHG RX REV CODE 250 W/ 637 OVERRIDE(OP): Performed by: PHYSICAL MEDICINE & REHABILITATION

## 2023-10-07 PROCEDURE — 700111 HCHG RX REV CODE 636 W/ 250 OVERRIDE (IP): Mod: JZ | Performed by: PHYSICAL MEDICINE & REHABILITATION

## 2023-10-07 PROCEDURE — 770010 HCHG ROOM/CARE - REHAB SEMI PRIVAT*

## 2023-10-07 RX ADMIN — OXYCODONE HYDROCHLORIDE 5 MG: 5 TABLET ORAL at 21:25

## 2023-10-07 RX ADMIN — METOPROLOL SUCCINATE 37.5 MG: 25 TABLET, EXTENDED RELEASE ORAL at 08:31

## 2023-10-07 RX ADMIN — ZOLPIDEM TARTRATE 10 MG: 5 TABLET ORAL at 21:25

## 2023-10-07 RX ADMIN — OMEPRAZOLE 20 MG: 20 CAPSULE, DELAYED RELEASE ORAL at 08:31

## 2023-10-07 RX ADMIN — ENOXAPARIN SODIUM 40 MG: 100 INJECTION SUBCUTANEOUS at 18:14

## 2023-10-07 RX ADMIN — LISINOPRIL 10 MG: 5 TABLET ORAL at 18:14

## 2023-10-07 RX ADMIN — OXYCODONE HYDROCHLORIDE 5 MG: 5 TABLET ORAL at 17:18

## 2023-10-07 ASSESSMENT — PAIN DESCRIPTION - PAIN TYPE
TYPE: ACUTE PAIN

## 2023-10-07 NOTE — CARE PLAN
The patient is Stable - Low risk of patient condition declining or worsening    Shift Goals  Clinical Goals: safety, pain mgmt, sleep  Patient Goals: sleep  Family Goals: Education    Progress made toward(s) clinical / shift goals:    Problem: Knowledge Deficit - Standard  Goal: Patient and family/care givers will demonstrate understanding of plan of care, disease process/condition, diagnostic tests and medications  Outcome: Progressing  Note: Pt education reinforced regarding plan of care with emphasis on adequate hydration, pt again shows better understanding with improved follow through, pt again reports understanding how this can effect constipation, will continue to reinforce education and continue to monitor.       Patient is not progressing towards the following goals:      Problem: Bowel Elimination  Goal: Patient will participate in bowel management program  Outcome: Not Progressing  Note: Pt c/o having diarrhea 10/06/23 bowl medications held.

## 2023-10-07 NOTE — PROGRESS NOTES
NURSING DAILY NOTE    Name: Casper Rowley   Date of Admission: 9/29/2023   Admitting Diagnosis: Cerebellar mass  Attending Physician: Kasie Lin M.d.  Allergies: Patient has no known allergies.    Safety  Patient Assist  sba  Patient Precautions  Fall Risk  Precaution Comments     Bed Transfer Status  Standby Assist  Toilet Transfer Status   Supervised  Assistive Devices  Rails, Wheelchair  Oxygen  None - Room Air  Diet/Therapeutic Dining  Current Diet Order   Procedures    Diet Order Diet: Consistent CHO (Diabetic)     Pill Administration  whole  Agitated Behavioral Scale     ABS Level of Severity       Fall Risk  Has the patient had a fall this admission?   No  Lizzy Mar Fall Risk Scoring  19, HIGH RISK  Fall Risk Safety Measures  bed alarm and chair alarm    Vitals  Temperature: 36.9 °C (98.4 °F)  Temp src: Oral  Pulse: 98  Respiration: 18  Blood Pressure: 128/56  Blood Pressure MAP (Calculated): 80 MM HG  BP Location: Left, Upper Arm  Patient BP Position: Sitting     Oxygen  Pulse Oximetry: 95 %  O2 (LPM): 0  O2 Delivery Device: None - Room Air    Bowel and Bladder  Last Bowel Movement  10/07/23  Stool Type  Type 5: Soft blob with clear cut edges (passed easily)  Bowel Device  Bathroom  Continent  Bladder: Continent void   Bowel: Continent movement  Bladder Function  Urine Void (mL):  (moderate)  Number of Times Voided: 2  Urine Color: Unable To Evaluate  Number of Times Incontinent of Urine: 0  Genitourinary Assessment   Bladder Assessment (WDL):  Within Defined Limits  Urine Color: Unable To Evaluate  Number of Bladder Accidents: 0  Total Number of Bladder of Accidents in Last 7 Days: 0  Number of Times Incontinent of Urine: 0  Bladder Device: Urinal  Time Void: Yes  Bladder Scan: Post Void  $ Bladder Scan Results (mL): 1    Skin  Ravin Score   18  Sensory Interventions   Bed Types: Standard/Trauma Mattress  Skin Preventative  Measures: Pillows in Use for Support / Positioning  Moisture Interventions         Pain  Pain Rating Scale  5 - Interrupts some activities  Pain Location  Back, Leg, Hip  Pain Location Orientation  Right, Left  Pain Interventions   Medication (see MAR)    ADLs    Bathing   Shower, * * With Assistance from, Staff  Linen Change   Complete  Personal Hygiene     Chlorhexidine Bath      Oral Care  Brushed Teeth  Teeth/Dentures     Shave  Self  Nutrition Percentage Eaten  Dinner, Between % Consumed  Environmental Precautions  Bed in Low Position, Treaded Slipper Socks on Patient  Patient Turns/Positioning  Patient Turns Self from Side to Side  Patient Turns Assistance/Tolerance     Bed Positions  Bed Controls On  Head of Bed Elevated  Self regulated      Psychosocial/Neurologic Assessment  Psychosocial Assessment  Psychosocial (WDL):  Within Defined Limits  Neurologic Assessment  Neuro (WDL): Within Defined Limits  Level of Consciousness: Alert  Orientation Level: Oriented X4  Cognition: Appropriate judgement, Appropriate safety awareness, Appropriate attention/concentration  Speech: Clear  Pupil Assesment: No  R Pupil Size (mm): 3  R Pupil Shape / Description: Round  R Pupil Reaction: Brisk  L Pupil Size (mm): 3  L Pupil Shape / Description: Round  L Pupil Reaction: Brisk  EENT (WDL):  Within Defined Limits    Cardio/Pulmonary Assessment  Edema   RLE Edema: 1+  LLE Edema: 1+  Respiratory Breath Sounds  RUL Breath Sounds: Clear  RML Breath Sounds: Clear  RLL Breath Sounds: Clear  BRIAN Breath Sounds: Clear  LLL Breath Sounds: Clear  Cardiac Assessment   Cardiac (WDL):  Within Defined Limits

## 2023-10-08 ENCOUNTER — APPOINTMENT (OUTPATIENT)
Dept: SPEECH THERAPY | Facility: REHABILITATION | Age: 58
DRG: 949 | End: 2023-10-08
Attending: HOSPITALIST
Payer: COMMERCIAL

## 2023-10-08 LAB
FLUAV RNA SPEC QL NAA+PROBE: NEGATIVE
FLUBV RNA SPEC QL NAA+PROBE: NEGATIVE
RSV RNA SPEC QL NAA+PROBE: NEGATIVE
SARS-COV-2 RNA RESP QL NAA+PROBE: NOTDETECTED
SPECIMEN SOURCE: NORMAL

## 2023-10-08 PROCEDURE — 97129 THER IVNTJ 1ST 15 MIN: CPT

## 2023-10-08 PROCEDURE — 0241U HCHG SARS-COV-2 COVID-19 NFCT DS RESP RNA 4 TRGT MIC: CPT

## 2023-10-08 PROCEDURE — 97130 THER IVNTJ EA ADDL 15 MIN: CPT

## 2023-10-08 PROCEDURE — 700111 HCHG RX REV CODE 636 W/ 250 OVERRIDE (IP): Mod: JZ | Performed by: PHYSICAL MEDICINE & REHABILITATION

## 2023-10-08 PROCEDURE — A9270 NON-COVERED ITEM OR SERVICE: HCPCS | Performed by: PHYSICAL MEDICINE & REHABILITATION

## 2023-10-08 PROCEDURE — 770010 HCHG ROOM/CARE - REHAB SEMI PRIVAT*

## 2023-10-08 PROCEDURE — 700102 HCHG RX REV CODE 250 W/ 637 OVERRIDE(OP): Performed by: PHYSICAL MEDICINE & REHABILITATION

## 2023-10-08 RX ADMIN — ENOXAPARIN SODIUM 40 MG: 100 INJECTION SUBCUTANEOUS at 16:58

## 2023-10-08 RX ADMIN — OXYCODONE HYDROCHLORIDE 10 MG: 10 TABLET ORAL at 16:57

## 2023-10-08 RX ADMIN — METOPROLOL SUCCINATE 37.5 MG: 25 TABLET, EXTENDED RELEASE ORAL at 09:05

## 2023-10-08 RX ADMIN — OXYCODONE HYDROCHLORIDE 10 MG: 10 TABLET ORAL at 09:04

## 2023-10-08 RX ADMIN — ZOLPIDEM TARTRATE 10 MG: 5 TABLET ORAL at 21:48

## 2023-10-08 RX ADMIN — OMEPRAZOLE 20 MG: 20 CAPSULE, DELAYED RELEASE ORAL at 09:05

## 2023-10-08 RX ADMIN — OXYCODONE HYDROCHLORIDE 10 MG: 10 TABLET ORAL at 21:48

## 2023-10-08 RX ADMIN — LISINOPRIL 10 MG: 5 TABLET ORAL at 16:58

## 2023-10-08 ASSESSMENT — PAIN DESCRIPTION - PAIN TYPE
TYPE: ACUTE PAIN
TYPE: ACUTE PAIN

## 2023-10-08 NOTE — CARE PLAN
The patient is Stable - Low risk of patient condition declining or worsening    Shift Goals  Clinical Goals: safety, pain mgmt, sleep  Patient Goals: sleep  Family Goals: Education    Progress made toward(s) clinical / shift goals:     Problem: Skin Integrity  Goal: Patient's skin integrity will be maintained or improve  Outcome: Progressing  Note: Patient's skin remains intact and free from new or accidental injury this shift.  Will continue to monitor.

## 2023-10-08 NOTE — PROGRESS NOTES
NURSING DAILY NOTE    Name: Casper Rowley   Date of Admission: 9/29/2023   Admitting Diagnosis: Cerebellar mass  Attending Physician: Kasie Lin M.d.  Allergies: Patient has no known allergies.    Safety  Patient Assist  sba  Patient Precautions  Fall Risk  Precaution Comments     Bed Transfer Status  Standby Assist  Toilet Transfer Status   Supervised  Assistive Devices  Rails, Wheelchair  Oxygen  None - Room Air  Diet/Therapeutic Dining  Current Diet Order   Procedures    Diet Order Diet: Consistent CHO (Diabetic)     Pill Administration  whole  Agitated Behavioral Scale     ABS Level of Severity       Fall Risk  Has the patient had a fall this admission?   No  Lizzy Mar Fall Risk Scoring  19, HIGH RISK  Fall Risk Safety Measures  bed alarm, chair alarm, and ok to leave pt in bathroom    Vitals  Temperature: 36.9 °C (98.4 °F)  Temp src: Oral  Pulse: 100  Respiration: 18  Blood Pressure: 126/81  Blood Pressure MAP (Calculated): 96 MM HG  BP Location: Left, Upper Arm  Patient BP Position: Sitting     Oxygen  Pulse Oximetry: 95 %  O2 (LPM): 0  O2 Delivery Device: None - Room Air    Bowel and Bladder  Last Bowel Movement  10/07/23  Stool Type  Type 7: Watery, no solid pieces-entirely liquid  Bowel Device  Bathroom  Continent  Bladder: Continent void   Bowel: Continent movement  Bladder Function  Urine Void (mL): 600 ml (urinals)  Number of Times Voided: 1  Urine Color: Yellow  Number of Times Incontinent of Urine: 0  Genitourinary Assessment   Bladder Assessment (WDL):  Within Defined Limits  Urine Color: Yellow  Number of Bladder Accidents: 0  Total Number of Bladder of Accidents in Last 7 Days: 0  Number of Times Incontinent of Urine: 0  Bladder Device: Urinal  Time Void: Yes  Bladder Scan: Post Void  $ Bladder Scan Results (mL): 1    Skin  Ravin Score   18  Sensory Interventions   Bed Types: Standard/Trauma Mattress  Skin  Preventative Measures: Pillows in Use for Support / Positioning  Moisture Interventions         Pain  Pain Rating Scale  5 - Interrupts some activities  Pain Location  Back  Pain Location Orientation  Posterior  Pain Interventions   Medication (see MAR) (pt requests med be given with NOC med pass & that he be the last med pass tonight)    ADLs    Bathing   Shower, * * With Assistance from, Staff  Linen Change   Complete  Personal Hygiene     Chlorhexidine Bath      Oral Care  Brushed Teeth  Teeth/Dentures     Shave  Self  Nutrition Percentage Eaten  *  * Meal *  *, Dinner, Between 25-50% Consumed  Environmental Precautions  Bed in Low Position, Treaded Slipper Socks on Patient  Patient Turns/Positioning  Patient Turns Self from Side to Side  Patient Turns Assistance/Tolerance     Bed Positions  Bed Controls On  Head of Bed Elevated  Self regulated      Psychosocial/Neurologic Assessment  Psychosocial Assessment  Psychosocial (WDL):  Within Defined Limits  Neurologic Assessment  Neuro (WDL): Within Defined Limits  Level of Consciousness: Alert  Orientation Level: Oriented X4  Cognition: Appropriate judgement, Appropriate safety awareness, Appropriate attention/concentration  Speech: Clear  Pupil Assesment: No  R Pupil Size (mm): 3  R Pupil Shape / Description: Round  R Pupil Reaction: Brisk  L Pupil Size (mm): 3  L Pupil Shape / Description: Round  L Pupil Reaction: Brisk  EENT (WDL):  Within Defined Limits    Cardio/Pulmonary Assessment  Edema   RLE Edema: 1+  LLE Edema: 1+  Respiratory Breath Sounds  RUL Breath Sounds: Clear  RML Breath Sounds: Clear  RLL Breath Sounds: Clear  BRIAN Breath Sounds: Clear  LLL Breath Sounds: Clear  Cardiac Assessment   Cardiac (WDL):  Within Defined Limits

## 2023-10-08 NOTE — PROGRESS NOTES
NURSING DAILY NOTE    Name: Casper Rowley   Date of Admission: 9/29/2023   Admitting Diagnosis: Cerebellar mass  Attending Physician: Kasie Lin M.d.  Allergies: Patient has no known allergies.    Safety  Patient Assist  sba  Patient Precautions  Fall Risk  Precaution Comments     Bed Transfer Status  Standby Assist  Toilet Transfer Status   Supervised  Assistive Devices  Rails, Wheelchair  Oxygen  None - Room Air  Diet/Therapeutic Dining  Current Diet Order   Procedures    Diet Order Diet: Consistent CHO (Diabetic)     Pill Administration  whole  Agitated Behavioral Scale     ABS Level of Severity       Fall Risk  Has the patient had a fall this admission?   No  Lizzy Mar Fall Risk Scoring  19, HIGH RISK  Fall Risk Safety Measures  poor balance and low vision/ hearing    Vitals  Temperature: 37.2 °C (99 °F)  Temp src: Oral  Pulse: 96  Respiration: 16  Blood Pressure: 115/58  Blood Pressure MAP (Calculated): 77 MM HG  BP Location: Left, Upper Arm  Patient BP Position: Supine     Oxygen  Pulse Oximetry: 96 %  O2 (LPM): 0  O2 Delivery Device: None - Room Air    Bowel and Bladder  Last Bowel Movement  10/07/23  Stool Type  Type 7: Watery, no solid pieces-entirely liquid  Bowel Device  Bathroom  Continent  Bladder: Continent void   Bowel: Continent movement  Bladder Function  Urine Void (mL): 600 ml (urinals)  Number of Times Voided: 1  Urine Color: Pale  Number of Times Incontinent of Urine: 0  Genitourinary Assessment   Bladder Assessment (WDL):  Within Defined Limits  Urine Color: Pale  Number of Bladder Accidents: 0  Total Number of Bladder of Accidents in Last 7 Days: 0  Number of Times Incontinent of Urine: 0  Bladder Device: Urinal  Time Void: Yes  Bladder Scan: Post Void  $ Bladder Scan Results (mL): 1    Skin  Ravin Score   18  Sensory Interventions   Bed Types: Standard/Trauma Mattress  Skin Preventative Measures: Pillows in Use  for Support / Positioning  Moisture Interventions         Pain  Pain Rating Scale  10 - As bad as it could be, nothing else matters  Pain Location  Back  Pain Location Orientation  Posterior  Pain Interventions   Declines    ADLs    Bathing   Shower, * * With Assistance from, Staff  Linen Change   Complete  Personal Hygiene     Chlorhexidine Bath      Oral Care  Brushed Teeth  Teeth/Dentures     Shave  Self  Nutrition Percentage Eaten  Breakfast, Between % Consumed  Environmental Precautions  Bed in Low Position, Treaded Slipper Socks on Patient  Patient Turns/Positioning  Patient Turns Self from Side to Side  Patient Turns Assistance/Tolerance     Bed Positions  Bed Controls On  Head of Bed Elevated  Self regulated      Psychosocial/Neurologic Assessment  Psychosocial Assessment  Psychosocial (WDL):  Within Defined Limits  Neurologic Assessment  Neuro (WDL): Within Defined Limits  Level of Consciousness: Alert  Orientation Level: Oriented X4  Cognition: Appropriate judgement, Appropriate safety awareness, Appropriate attention/concentration  Speech: Clear  Pupil Assesment: No  R Pupil Size (mm): 3  R Pupil Shape / Description: Round  R Pupil Reaction: Brisk  L Pupil Size (mm): 3  L Pupil Shape / Description: Round  L Pupil Reaction: Brisk  EENT (WDL):  Within Defined Limits    Cardio/Pulmonary Assessment  Edema   RLE Edema: 1+  LLE Edema: 1+  Respiratory Breath Sounds  RUL Breath Sounds: Clear  RML Breath Sounds: Clear  RLL Breath Sounds: Clear  BRIAN Breath Sounds: Clear  LLL Breath Sounds: Clear  Cardiac Assessment   Cardiac (WDL):  Within Defined Limits

## 2023-10-08 NOTE — CARE PLAN
The patient is Stable - Low risk of patient condition declining or worsening    Shift Goals  Clinical Goals: safety, pain mgmt, sleep  Patient Goals: sleep  Family Goals: Education    Progress made toward(s) clinical / shift goals: Pt's VSS, pain controlled with PRN medication, skin intact, able to participate in therapy.

## 2023-10-08 NOTE — THERAPY
Speech Language Pathology  Daily Treatment     Patient Name: Casper Rowley  Age:  58 y.o., Sex:  male  Medical Record #: [id number]  Today's Date: 10/8/2023     Precautions  Precautions: Fall Risk    Subjective    Pt seen in ST office; pleasant and cooperative during ST      Objective       10/08/23 1331   Treatment Charges   SLP Cognitive Skill Development First 15 Minutes 1   SLP Cognitive Skill Development Additional 15 Minutes 1   SLP Total Time Spent   SLP Individual Total Time Spent (Mins) 30   Interdisciplinary Plan of Care Collaboration   Patient Position at End of Therapy Seated;Chair Alarm On;Call Light within Reach;Tray Table within Reach;Phone within Reach         Assessment    Pt completed gift card activity from previous ST session. Pt required min A to locate errors; however, once errors were identified pt corrected with indep.     Strengths: Able to follow instructions, Alert and oriented, Effective communication skills, Making steady progress towards goals, Supportive family, Pleasant and cooperative, Independent prior level of function, Motivated for self care and independence, Willingly participates in therapeutic activities  Barriers:  (STM deficits)    Plan    Continue to target complex problem solving, complete outcome assessment     Passport items to be completed:  Express basic needs, understand food/liquid recommendations, consistently follow swallow precautions, manage finances, manage medications, arrive to therapy appointments on time, complete daily memory log entries, solve problems related to safety situations, review education related to hospitalization, complete caregiver training     Speech Therapy Problems (Active)       Problem: Speech/Swallowing LTGs       Dates: Start:  09/30/23         Goal: LTG-By discharge, patient will solve complex problems with min A for safe d/c home       Dates: Start:  09/30/23    Expected End:  10/11/23

## 2023-10-09 ENCOUNTER — APPOINTMENT (OUTPATIENT)
Dept: PHYSICAL THERAPY | Facility: REHABILITATION | Age: 58
DRG: 949 | End: 2023-10-09
Attending: PHYSICAL MEDICINE & REHABILITATION
Payer: COMMERCIAL

## 2023-10-09 ENCOUNTER — APPOINTMENT (OUTPATIENT)
Dept: OCCUPATIONAL THERAPY | Facility: REHABILITATION | Age: 58
DRG: 949 | End: 2023-10-09
Attending: PHYSICAL MEDICINE & REHABILITATION
Payer: COMMERCIAL

## 2023-10-09 ENCOUNTER — APPOINTMENT (OUTPATIENT)
Dept: SPEECH THERAPY | Facility: REHABILITATION | Age: 58
DRG: 949 | End: 2023-10-09
Attending: HOSPITALIST
Payer: COMMERCIAL

## 2023-10-09 DIAGNOSIS — C79.31 METASTASIS TO BRAIN (HCC): ICD-10-CM

## 2023-10-09 PROCEDURE — 99232 SBSQ HOSP IP/OBS MODERATE 35: CPT | Performed by: PHYSICAL MEDICINE & REHABILITATION

## 2023-10-09 PROCEDURE — 770010 HCHG ROOM/CARE - REHAB SEMI PRIVAT*

## 2023-10-09 PROCEDURE — 97535 SELF CARE MNGMENT TRAINING: CPT

## 2023-10-09 PROCEDURE — 700111 HCHG RX REV CODE 636 W/ 250 OVERRIDE (IP): Mod: JZ | Performed by: PHYSICAL MEDICINE & REHABILITATION

## 2023-10-09 PROCEDURE — 97530 THERAPEUTIC ACTIVITIES: CPT

## 2023-10-09 PROCEDURE — 97129 THER IVNTJ 1ST 15 MIN: CPT

## 2023-10-09 PROCEDURE — A9270 NON-COVERED ITEM OR SERVICE: HCPCS | Performed by: PHYSICAL MEDICINE & REHABILITATION

## 2023-10-09 PROCEDURE — 97110 THERAPEUTIC EXERCISES: CPT

## 2023-10-09 PROCEDURE — 97112 NEUROMUSCULAR REEDUCATION: CPT

## 2023-10-09 PROCEDURE — 700102 HCHG RX REV CODE 250 W/ 637 OVERRIDE(OP): Performed by: PHYSICAL MEDICINE & REHABILITATION

## 2023-10-09 PROCEDURE — 97130 THER IVNTJ EA ADDL 15 MIN: CPT

## 2023-10-09 RX ORDER — METOPROLOL SUCCINATE 25 MG/1
50 TABLET, EXTENDED RELEASE ORAL DAILY
Status: DISCONTINUED | OUTPATIENT
Start: 2023-10-10 | End: 2023-10-11 | Stop reason: HOSPADM

## 2023-10-09 RX ADMIN — OXYCODONE HYDROCHLORIDE 10 MG: 10 TABLET ORAL at 13:45

## 2023-10-09 RX ADMIN — OXYCODONE HYDROCHLORIDE 10 MG: 10 TABLET ORAL at 20:17

## 2023-10-09 RX ADMIN — ZOLPIDEM TARTRATE 10 MG: 5 TABLET ORAL at 22:16

## 2023-10-09 RX ADMIN — ENOXAPARIN SODIUM 40 MG: 100 INJECTION SUBCUTANEOUS at 17:04

## 2023-10-09 RX ADMIN — LISINOPRIL 10 MG: 5 TABLET ORAL at 17:04

## 2023-10-09 RX ADMIN — OMEPRAZOLE 20 MG: 20 CAPSULE, DELAYED RELEASE ORAL at 07:49

## 2023-10-09 RX ADMIN — METOPROLOL SUCCINATE 37.5 MG: 25 TABLET, EXTENDED RELEASE ORAL at 07:49

## 2023-10-09 ASSESSMENT — BALANCE ASSESSMENTS
STANDING UNSUPPORTED: 0
TRANSFERS: 3
SITTING UNSUPPORTED: 4
STANDING TO SITTING: 4
STANDING UNSUPPORTED WITH EYES CLOSED: 0
STANDING UNSUPPORTED WITH FEET TOGETHER: 1
SITTING TO STANDING: 3

## 2023-10-09 ASSESSMENT — ACTIVITIES OF DAILY LIVING (ADL)
TOILET_TRANSFER_DESCRIPTION: GRAB BAR;ADAPTIVE EQUIPMENT
TUB_SHOWER_TRANSFER_DESCRIPTION: GRAB BAR;SHOWER BENCH;SUPERVISION FOR SAFETY
BED_CHAIR_WHEELCHAIR_TRANSFER_DESCRIPTION: SET-UP OF EQUIPMENT

## 2023-10-09 NOTE — THERAPY
Speech Language Pathology  Daily Treatment     Patient Name: Casper Rowley  Age:  58 y.o., Sex:  male  Medical Record #: [id number]  Today's Date: 10/9/2023     Precautions  Precautions: Fall Risk    Subjective    Pt pleasant and appeared in good spirits.      Objective       10/09/23 0934   Treatment Charges   SLP Cognitive Skill Development First 15 Minutes 1   SLP Cognitive Skill Development Additional 15 Minutes 1   SLP Total Time Spent   SLP Individual Total Time Spent (Mins) 30   SCCAN (Scales of Cognitive and Communicative Ability for Neurorehabilitation)   Oral Expression - Raw Score 18   Oral Expression - Scale Performance Score 95   Orientation - Raw Score 12   Orientation - Scale Performance Score 100   Memory - Raw Score 14   Memory - Scale Performance Score 74   Speech Comprehension - Raw Score 13   Speech Comprehension - Scale Performance Score 100   Interdisciplinary Plan of Care Collaboration   IDT Collaboration with  Certified Nursing Assistant   Patient Position at End of Therapy Seated;Tray Table within Reach;Call Light within Reach;Phone within Reach   Collaboration Comments informed CNA of pt request to use restroom         Assessment    Initiated outcome assessment in preparation for anticipated d/c on 10/11/23. Pt performed as follows on the following subtests of the SCCAN (Scales of Cognitive and Communicative Ability for Neurorehabilitation)    Oral Expression - Raw Score: 18  Oral Expression - Scale Performance Score: 95  Orientation - Raw Score: 12  Orientation - Scale Performance Score: 100  Memory - Raw Score: 14  Memory - Scale Performance Score: 74  Speech Comprehension - Raw Score: 13  Speech Comprehension - Scale Performance Score: 100    Pt with improvement in all test scores thus far, will continue administration in next therapy session with full reporting of scores to follow.       Strengths: Able to follow instructions, Alert and oriented, Effective communication skills,  Making steady progress towards goals, Supportive family, Pleasant and cooperative, Independent prior level of function, Motivated for self care and independence, Willingly participates in therapeutic activities  Barriers:  (STM deficits)    Plan    Continue remaining subtests of SCCAN, family training with SO    Passport items to be completed:  Express basic needs, understand food/liquid recommendations, consistently follow swallow precautions, manage finances, manage medications, arrive to therapy appointments on time, complete daily memory log entries, solve problems related to safety situations, review education related to hospitalization, complete caregiver training     Speech Therapy Problems (Active)       Problem: Speech/Swallowing LTGs       Dates: Start:  09/30/23         Goal: LTG-By discharge, patient will solve complex problems with min A for safe d/c home       Dates: Start:  09/30/23    Expected End:  10/11/23

## 2023-10-09 NOTE — CARE PLAN
The patient is Stable - Low risk of patient condition declining or worsening    Shift Goals  Clinical Goals: safety, sleep  Patient Goals: sleep  Family Goals: Education    Progress made toward(s) clinical / shift goals:     Pt's VSS, pain controlled with PRN medication, skin intact, any questions/concerns addressed/ Will continue to monitor pt.

## 2023-10-09 NOTE — PROGRESS NOTES
NURSING DAILY NOTE    Name: Casper Rowley   Date of Admission: 9/29/2023   Admitting Diagnosis: Cerebellar mass  Attending Physician: Kasie Lin M.d.  Allergies: Patient has no known allergies.    Safety  Patient Assist  sba  Patient Precautions  Fall Risk  Precaution Comments     Bed Transfer Status  Standby Assist  Toilet Transfer Status   Supervised  Assistive Devices  Rails, Wheelchair  Oxygen  None - Room Air  Diet/Therapeutic Dining  Current Diet Order   Procedures    Diet Order Diet: Consistent CHO (Diabetic)     Pill Administration  whole  Agitated Behavioral Scale     ABS Level of Severity       Fall Risk  Has the patient had a fall this admission?   No  Lizzy Mar Fall Risk Scoring  19, HIGH RISK  Fall Risk Safety Measures  poor balance and low vision/ hearing    Vitals  Temperature: 36.9 °C (98.4 °F)  Temp src: Oral  Pulse: 69  Respiration: 20  Blood Pressure: 116/61  Blood Pressure MAP (Calculated): 79 MM HG  BP Location: Left, Upper Arm  Patient BP Position: Sitting     Oxygen  Pulse Oximetry: 96 %  O2 (LPM): 0  O2 Delivery Device: None - Room Air    Bowel and Bladder  Last Bowel Movement  10/07/23  Stool Type  Type 7: Watery, no solid pieces-entirely liquid  Bowel Device  Bathroom  Continent  Bladder: Continent void   Bowel: Continent movement  Bladder Function  Urine Void (mL): 600 ml (urinals)  Number of Times Voided: 1  Urine Color: Unable To Evaluate  Number of Times Incontinent of Urine: 0  Genitourinary Assessment   Bladder Assessment (WDL):  Within Defined Limits  Urine Color: Unable To Evaluate  Number of Bladder Accidents: 0  Total Number of Bladder of Accidents in Last 7 Days: 0  Number of Times Incontinent of Urine: 0  Bladder Device: Urinal  Time Void: Yes  Bladder Scan: Post Void  $ Bladder Scan Results (mL): 1    Skin  Ravin Score   18  Sensory Interventions   Bed Types: Standard/Trauma Mattress  Skin  Preventative Measures: Pillows in Use for Support / Positioning  Moisture Interventions         Pain  Pain Rating Scale  9 - Can't bear the pain, unable to do anything  Pain Location  Back  Pain Location Orientation  Posterior  Pain Interventions   Declines    ADLs    Bathing   Shower, * * With Assistance from, Staff  Linen Change   Complete  Personal Hygiene     Chlorhexidine Bath      Oral Care  Brushed Teeth  Teeth/Dentures     Shave  Self  Nutrition Percentage Eaten  *  * Meal *  *, Lunch, Between % Consumed  Environmental Precautions  Bed in Low Position, Treaded Slipper Socks on Patient  Patient Turns/Positioning  Patient Turns Self from Side to Side  Patient Turns Assistance/Tolerance     Bed Positions  Bed Controls On  Head of Bed Elevated  Self regulated      Psychosocial/Neurologic Assessment  Psychosocial Assessment  Psychosocial (WDL):  Within Defined Limits  Neurologic Assessment  Neuro (WDL): Within Defined Limits  Level of Consciousness: Alert  Orientation Level: Oriented X4  Cognition: Appropriate judgement, Appropriate safety awareness, Appropriate attention/concentration  Speech: Clear  Pupil Assesment: No  R Pupil Size (mm): 3  R Pupil Shape / Description: Round  R Pupil Reaction: Brisk  L Pupil Size (mm): 3  L Pupil Shape / Description: Round  L Pupil Reaction: Brisk  EENT (WDL):  Within Defined Limits    Cardio/Pulmonary Assessment  Edema   RLE Edema: 1+  LLE Edema: 1+  Respiratory Breath Sounds  RUL Breath Sounds: Clear  RML Breath Sounds: Clear  RLL Breath Sounds: Clear  BRIAN Breath Sounds: Clear  LLL Breath Sounds: Clear  Cardiac Assessment   Cardiac (WDL):  Within Defined Limits

## 2023-10-09 NOTE — THERAPY
Physical Therapy   Daily Treatment     Patient Name: Casper Rowley  Age:  58 y.o., Sex:  male  Medical Record #: [id number]  Today's Date: 10/9/2023     Precautions  Precautions: Fall Risk    Subjective    Pt acknowledged balance deficits, but reports overall improvement.      Objective       10/09/23 0831   PT Charge Group   PT Therapeutic Exercise (Units) 1   PT Neuromuscular Re-Education / Balance (Units) 2   PT Therapeutic Activities (Units) 1   PT Total Time Spent   PT Individual Total Time Spent (Mins) 60   Precautions   Precautions Fall Risk   Transfer Functional Level of Assist   Bed, Chair, Wheelchair Transfer Standby Assist   Bed Chair Wheelchair Transfer Description Set-up of equipment  (SPT)   Toilet Transfers Standby Assist   Toilet Transfer Description Grab bar;Adaptive equipment   Supine Lower Body Exercise   Pelvic Tilt 1 set of 15;Bilateral   Abdominal Bracing 1 set of 15;Bilateral   Hip Adduction  1 set of 15;Bilateral  (ball squeezes)   Bed Mobility    Supine to Sit Supervised   Sit to Supine Supervised   Sit to Stand Standby Assist   Scooting Independent   Rolling Supervised   Neuro-Muscular Treatments   Neuro-Muscular Treatments Weight Shift Left;Weight Shift Right;Verbal Cuing;Tactile Cuing;Sequencing   Comments Education on Trasnversus Abd recruitment, and breathing strategies.   Outcome Measures   Outcome Measures Utilized Biggs Balance Scale   Biggs Balance Scale   Sitting Unsupported (Score 0-4) 4   Change Of Positon: Sitting To Standing (Score 0-4) 3   Change Of Positon: Standing To Sitting (Score 0-4) 4   Transfers (Score 0-4) 3   Standing Unsupported (Score 0-4) 0  (LOB)   Standing With Eyes Closed (Score 0-4) 0  (LOB)   Standing With Feet Together (Score 0-4) 1   Reaching Forward While Standing (Score 0-4)   (Discontinuued: hyperverbose, unable to complet assessment this session.)         Assessment    Pt completed half of the BIGGS Balance Scale, which was limited by time (d/t  pleasance hyper verbose nature), and LOB noted during standing without UE support, and and eyes closed, and assistance to position feet together. It can be assumed that patient would demonstrate difficulty completing remaining tasks which pose higher challenge, and therefor remains at high risk for falls. Pt completed supine spinal bracing exercises, and was educated on transverse abdominis rationale, and recruitment.      Strengths: Able to follow instructions, Effective communication skills, Good insight into deficits/needs, Independent prior level of function, Motivated for self care and independence, Pleasant and cooperative, Supportive family, Willingly participates in therapeutic activities  Barriers: Decreased endurance, Fatigue, Generalized weakness, Impaired balance, Limited mobility (metastatic disease / LE swelling)    Plan    Gait endurance with FWW, LE strength training, mobility training, standing balance/ activity tolerance.    Passport items to be completed:  Get in/out of bed safely, in/out of a vehicle, safely use mobility device, walk or wheel around home/community, navigate up and down stairs, show how to get up/down from the ground, ensure home is accessible, demonstrate HEP, complete caregiver training    Physical Therapy Problems (Active)       Problem: Balance       Dates: Start:  09/30/23         Goal: STG-Within one week, patient will maintain dynamic standing to complete 10 MWT with FWW and SBA       Dates: Start:  09/30/23    Expected End:  10/11/23               Problem: Mobility Transfers       Dates: Start:  09/30/23         Goal: STG-Within one week, patient will transfer bed to chair SPV after set-up with FWW       Dates: Start:  09/30/23    Expected End:  10/11/23               Problem: PT-Long Term Goals       Dates: Start:  09/30/23         Goal: LTG-By discharge, patient will propel wheelchair modified independent x 150 ft level / indoors       Dates: Start:  09/30/23    Expected  End:  10/11/23            Goal: LTG-By discharge, patient will ambulate modified independent with FWW x 150 ft       Dates: Start:  09/30/23    Expected End:  10/11/23            Goal: LTG-By discharge, patient will transfer one surface to another modified independent with FWW       Dates: Start:  09/30/23    Expected End:  10/11/23            Goal: LTG-By discharge, patient will transfer in/out of a car SPV / modified independent after set-up with FWW       Dates: Start:  09/30/23    Expected End:  10/11/23

## 2023-10-09 NOTE — PROGRESS NOTES
NURSING DAILY NOTE    Name: Casper Rowley   Date of Admission: 9/29/2023   Admitting Diagnosis: Cerebellar mass  Attending Physician: Kasei Lin M.d.  Allergies: Patient has no known allergies.    Safety  Patient Assist  sba  Patient Precautions  Fall Risk  Precaution Comments     Bed Transfer Status  Standby Assist  Toilet Transfer Status   Supervised  Assistive Devices  Rails, Wheelchair  Oxygen  None - Room Air  Diet/Therapeutic Dining  Current Diet Order   Procedures    Diet Order Diet: Consistent CHO (Diabetic)     Pill Administration  whole  Agitated Behavioral Scale     ABS Level of Severity       Fall Risk  Has the patient had a fall this admission?   No  Lizzy Mar Fall Risk Scoring  19, HIGH RISK  Fall Risk Safety Measures  chair alarm, poor balance, and low vision/ hearing    Vitals  Temperature: 36.9 °C (98.5 °F)  Temp src: Oral  Pulse: 92  Respiration: 18  Blood Pressure: 125/85  Blood Pressure MAP (Calculated): 98 MM HG  BP Location: Left, Upper Arm  Patient BP Position: Sitting     Oxygen  Pulse Oximetry: 95 %  O2 (LPM): 0  O2 Delivery Device: None - Room Air    Bowel and Bladder  Last Bowel Movement  10/07/23  Stool Type  Type 7: Watery, no solid pieces-entirely liquid  Bowel Device  Bathroom  Continent  Bladder: Continent void   Bowel: Continent movement  Bladder Function  Urine Void (mL): 900 ml (urinals)  Number of Times Voided: 1  Urine Color: Yellow  Number of Times Incontinent of Urine: 0  Genitourinary Assessment   Bladder Assessment (WDL):  Within Defined Limits  Urine Color: Yellow  Number of Bladder Accidents: 0  Total Number of Bladder of Accidents in Last 7 Days: 0  Number of Times Incontinent of Urine: 0  Bladder Device: Bathroom, Urinal  Time Void: Yes  Bladder Scan: Post Void  $ Bladder Scan Results (mL): 1    Skin  Ravin Score   18  Sensory Interventions   Bed Types: Standard/Trauma Mattress  Skin  Preventative Measures: Pillows in Use for Support / Positioning  Moisture Interventions         Pain  Pain Rating Scale  8 - Awful, hard to do anything  Pain Location  Back  Pain Location Orientation  Posterior  Pain Interventions   Declines (pt requested meds to be given last with NOC med pass due to sleep meds)    ADLs    Bathing   Shower, * * With Assistance from, Staff  Linen Change   Complete  Personal Hygiene     Chlorhexidine Bath      Oral Care  Brushed Teeth  Teeth/Dentures     Shave  Self  Nutrition Percentage Eaten  *  * Meal *  *, Lunch, Between % Consumed  Environmental Precautions  Bed in Low Position, Treaded Slipper Socks on Patient  Patient Turns/Positioning  Patient Turns Self from Side to Side  Patient Turns Assistance/Tolerance     Bed Positions  Bed Controls On  Head of Bed Elevated  Self regulated      Psychosocial/Neurologic Assessment  Psychosocial Assessment  Psychosocial (WDL):  Within Defined Limits  Neurologic Assessment  Neuro (WDL): Within Defined Limits  Level of Consciousness: Alert  Orientation Level: Oriented X4  Cognition: Appropriate judgement, Appropriate safety awareness, Appropriate attention/concentration  Speech: Clear  Pupil Assesment: No  R Pupil Size (mm): 3  R Pupil Shape / Description: Round  R Pupil Reaction: Brisk  L Pupil Size (mm): 3  L Pupil Shape / Description: Round  L Pupil Reaction: Brisk  EENT (WDL):  Within Defined Limits    Cardio/Pulmonary Assessment  Edema   RLE Edema: 1+  LLE Edema: 1+  Respiratory Breath Sounds  RUL Breath Sounds: Clear  RML Breath Sounds: Clear  RLL Breath Sounds: Clear  BRIAN Breath Sounds: Clear  LLL Breath Sounds: Clear  Cardiac Assessment   Cardiac (WDL):  Within Defined Limits

## 2023-10-09 NOTE — PROGRESS NOTES
"  Physical Medicine & Rehabilitation Progress Note    Encounter Date: 10/9/2023    Chief Complaint: Decreased mobility    Interval Events (Subjective):  Patient sitting up in room. He reports therapy is going well. Denies NVD.      _____________________________________  Interdisciplinary Team Conference   Most recent IDT on 10/3/2023    Discharge Date/Disposition:  10/11/23  _____________________________________    Objective:  VITAL SIGNS: /85   Pulse 72   Temp 37.4 °C (99.3 °F) (Oral)   Resp 16   Ht 1.702 m (5' 7\")   Wt 81 kg (178 lb 9 oz)   SpO2 97%   BMI 27.97 kg/m²   Gen: NAD  Psych: Mood and affect appropriate  CV: RRR, 0 edema  Resp: CTAB, no upper airway sounds  Abd: NTND  Neuro: AOx4, following commands    Laboratory Values:  Recent Results (from the past 72 hour(s))   CoV-2, Flu A/B, And RSV by PCR ([employer])    Collection Time: 10/08/23 12:24 AM    Specimen: Respirate   Result Value Ref Range    Influenza virus A RNA Negative Negative    Influenza virus B, PCR Negative Negative    RSV, PCR Negative Negative    SARS-CoV-2 by PCR NotDetected     SARS-CoV-2 Source NP Swab        Medications:  Scheduled Medications   Medication Dose Frequency    metoprolol SR  37.5 mg DAILY    lisinopril  10 mg DAILY    Dulaglutide  1 Pen Q7 DAYS    Canagliflozin  100 mg Daily-0800    Pharmacy Consult Request  1 Each PHARMACY TO DOSE    senna-docusate  2 Tablet BID    omeprazole  20 mg DAILY    enoxaparin (LOVENOX) injection  40 mg DAILY AT 1800     PRN medications: bismuth subsalicylate, simethicone, Respiratory Therapy Consult, hydrALAZINE, acetaminophen, senna-docusate **AND** polyethylene glycol/lytes **AND** magnesium hydroxide **AND** bisacodyl, mag hydrox-al hydrox-simeth, ondansetron **OR** ondansetron, traZODone, sodium chloride, oxyCODONE immediate-release **OR** oxyCODONE immediate-release, midazolam, [DISCONTINUED] insulin regular **AND** [CANCELED] POC blood glucose manual result **AND** NOTIFY MD and " PharmD **AND** Administer 20 grams of glucose (approximately 8 ounces of fruit juice) every 15 minutes PRN FSBG less than 70 mg/dL **AND** dextrose bolus, meclizine, promethazine, zolpidem    Diet:  Current Diet Order   Procedures    Diet Order Diet: Consistent CHO (Diabetic)       Medical Decision Making and Plan:   Cerebellar mass - Patient with 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 Be.  -PT and OT for mobility and ADLs. Per guidelines, 15 hours per week between PT, OT and/or SLP.  -Follow-up NSG     Metastatic prostate cancer - Patient with multiple lesions s/p radiation previously. On Decadron 2 mg daily. Follow-up Oncology. Completed Decadron.      HTN - Patient on Lisinopril 20 mg and Metoprolol 25 mg daily. Continue Lisinopril 20 mg and Metoprolol 25 mg daily, SBP low 100s, will reduce Lisinopril to 10 mg. Continue Lisinopril 10 mg. HR still into low 100s, will increase Metoprolol to 37.5. Still into low 100s on 10/9, increase to 50     DM2 with hyperglycemia - Patient on Lantus and SSI on transfer.  Brought in home trulicity and canagliflozin, will discontinue SSI and lantus. Continue Trulicity and Cangliflozin     NED - RT to consult     Pain - Patient on PRN Oxycodone and Tylenol     Skin - Patient at risk for skin breakdown due to debility in areas including sacrum, achilles, elbows and head in addition to other sites. Nursing to assess skin daily.      GI Ppx - Patient on Prilosec for GERD prophylaxis. Patient on Senna-docusate for constipation prophylaxis.   -GERD/acid reflux, start Pepto     DVT Ppx - Patient Lovenox on transfer.   -Bilateral LE edema,  start SCDs. Continue Lovenox in setting of metastatic disease  ____________________________________    T. Erick Lin MD/PhD  [facility] - Physical Medicine & Rehabilitation   [facility] - Brain Injury Medicine   ____________________________________

## 2023-10-09 NOTE — CARE PLAN
Problem: Fall Risk - Rehab  Goal: Patient will remain free from falls  Outcome: Progressing     Problem: Mobility  Goal: Patient's capacity to carry out activities will improve  Outcome: Progressing       The patient is Stable - Low risk of patient condition declining or worsening    Shift Goals  Clinical Goals: safety, sleep  Patient Goals: sleep  Family Goals: Education

## 2023-10-09 NOTE — THERAPY
Occupational Therapy  Daily Treatment     Patient Name: Casper Rowley  Age:  58 y.o., Sex:  male  Medical Record #: [id number]  Today's Date: 10/9/2023     Precautions  Precautions: Fall Risk    Safety   ADL Safety : Requires Supervision for Safety  Bathroom Safety: Requires Supervision for Safety    Subjective    Patient was seated in w/c watching a show on his laptop.  He was agreeable to shower.     Objective       10/09/23 1231   OT Charge Group   OT Self Care / ADL (Units) 2   OT Therapy Activity (Units) 1   OT Therapeutic Exercise (Units) 1   OT Total Time Spent   OT Individual Total Time Spent (Mins) 60   Precautions   Precautions Fall Risk   Functional Level of Assist   Grooming Independent;Seated   Bathing Modified Independent   Bathing Description Grab bar;Hand held shower;Tub bench;Set-up of equipment;Supervision for safety   Upper Body Dressing Modified Independent   Upper Body Dressing Description Assist device equipment  (wc)   Lower Body Dressing Modified Independent   Lower Body Dressing Description Grab bar;Reacher;Sock aid;Dressing stick;Assistive devices  (wc)   Tub / Shower Transfers Supervised   Tub Shower Transfer Description Grab bar;Shower bench;Supervision for safety   Sitting Lower Body Exercises   Nustep Time (See Comments)  (nustep level 5 x 17.5 minutes with UE/LE)   Interdisciplinary Plan of Care Collaboration   Patient Position at End of Therapy Seated;Call Light within Reach;Tray Table within Reach;Phone within Reach;Chair Alarm On     Functional mobility with FWW from room <> back gym (to the nustep) with SBA.      Assessment    Patient doing well with mobility and adls today. No losses of balance and good safety throughout session.    Strengths: Able to follow instructions, Alert and oriented, Good insight into deficits/needs, Independent prior level of function, Motivated for self care and independence, Pleasant and cooperative, Supportive family, Willingly participates in  therapeutic activities  Barriers: Decreased endurance, Fatigue, Impaired activity tolerance    Plan    ADLs, IADLs, transfers, mobility, strength/endurance, balance    Occupational Therapy Goals (Active)       Problem: Dressing       Dates: Start:  10/03/23         Goal: STG-Within one week, patient will dress LB with supervision       Dates: Start:  10/03/23    Expected End:  10/11/23               Problem: Functional Transfers       Dates: Start:  09/30/23         Goal: STG-Within one week, patient will transfer to toilet with CGA.       Dates: Start:  09/30/23    Expected End:  10/11/23       Description: Using 3:1 commode and grabbar.      Goal Note filed on 10/03/23 1047 by Harpal Zazueta, OT/L       Min A              Goal: STG-Within one week, patient will transfer to toilet with CGA using grab bar       Dates: Start:  10/03/23    Expected End:  10/11/23               Problem: OT Long Term Goals       Dates: Start:  09/30/23         Goal: LTG-By discharge, patient will complete basic self care tasks at Mod Independent level.       Dates: Start:  09/30/23    Expected End:  10/11/23       Description: With adaptive equipment as needed.         Goal: LTG-By discharge, patient will perform bathroom transfers at Mod Independent level with DME as needed.       Dates: Start:  09/30/23    Expected End:  10/11/23               Problem: Toileting       Dates: Start:  10/03/23         Goal: STG-Within one week, patient will complete toileting tasks with SBA       Dates: Start:  10/03/23    Expected End:  10/11/23

## 2023-10-09 NOTE — THERAPY
Occupational Therapy  Daily Treatment     Patient Name: Casper Rowley  Age:  58 y.o., Sex:  male  Medical Record #: [id number]  Today's Date: 10/9/2023     Precautions  Precautions: (P) Fall Risk    Safety   ADL Safety : Requires Supervision for Safety  Bathroom Safety: Requires Supervision for Safety    Subjective    Patient was seated in w/c in room talking with Dr. Lin upon OT arrival.  He reported he does not want to change rooms and is looking forward to d/c home Wednesday.       Objective       10/09/23 1031   OT Charge Group   OT Therapy Activity (Units) 2   OT Total Time Spent   OT Individual Total Time Spent (Mins) 30   Precautions   Precautions Fall Risk   IADL Treatments   Home Management Patient removed clean clothing from dryer while standing using a reacher and SBA.  He stood at the dryer to fold them with SBA.  FWW used for intermittent support.   Interdisciplinary Plan of Care Collaboration   IDT Collaboration with  Physician   Patient Position at End of Therapy Seated;Call Light within Reach;Tray Table within Reach;Phone within Reach   Collaboration Comments Dr Lin rounded with patient  (patient does not want to change rooms)     Functional mobility from room <> ADL practice kitchen/laundry room with SBA.    Assessment    Patient required no rest breaks for mobility with FWW from room <> laundry room.  He did require two short seated rest breaks during laundry task.  On track for d/c home Wednesday.  Strengths: Able to follow instructions, Alert and oriented, Good insight into deficits/needs, Independent prior level of function, Motivated for self care and independence, Pleasant and cooperative, Supportive family, Willingly participates in therapeutic activities  Barriers: Decreased endurance, Fatigue, Impaired activity tolerance    Plan    D/C [facility]'s Tuesday    Occupational Therapy Goals (Active)       Problem: Dressing       Dates: Start:  10/03/23         Goal: STG-Within one  week, patient will dress LB with supervision       Dates: Start:  10/03/23    Expected End:  10/11/23               Problem: Functional Transfers       Dates: Start:  09/30/23         Goal: STG-Within one week, patient will transfer to toilet with CGA.       Dates: Start:  09/30/23    Expected End:  10/11/23       Description: Using 3:1 commode and grabbar.      Goal Note filed on 10/03/23 1047 by Harpal Zazueta, OT/L       Min A              Goal: STG-Within one week, patient will transfer to toilet with CGA using grab bar       Dates: Start:  10/03/23    Expected End:  10/11/23               Problem: OT Long Term Goals       Dates: Start:  09/30/23         Goal: LTG-By discharge, patient will complete basic self care tasks at Mod Independent level.       Dates: Start:  09/30/23    Expected End:  10/11/23       Description: With adaptive equipment as needed.         Goal: LTG-By discharge, patient will perform bathroom transfers at Mod Independent level with DME as needed.       Dates: Start:  09/30/23    Expected End:  10/11/23               Problem: Toileting       Dates: Start:  10/03/23         Goal: STG-Within one week, patient will complete toileting tasks with SBA       Dates: Start:  10/03/23    Expected End:  10/11/23

## 2023-10-10 ENCOUNTER — APPOINTMENT (OUTPATIENT)
Dept: SPEECH THERAPY | Facility: REHABILITATION | Age: 58
DRG: 949 | End: 2023-10-10
Attending: HOSPITALIST
Payer: COMMERCIAL

## 2023-10-10 ENCOUNTER — APPOINTMENT (OUTPATIENT)
Dept: OCCUPATIONAL THERAPY | Facility: REHABILITATION | Age: 58
DRG: 949 | End: 2023-10-10
Attending: PHYSICAL MEDICINE & REHABILITATION
Payer: COMMERCIAL

## 2023-10-10 ENCOUNTER — APPOINTMENT (OUTPATIENT)
Dept: INPATIENT REHAB | Facility: REHABILITATION | Age: 58
DRG: 949 | End: 2023-10-10
Attending: PHYSICAL MEDICINE & REHABILITATION
Payer: COMMERCIAL

## 2023-10-10 ENCOUNTER — APPOINTMENT (OUTPATIENT)
Dept: PHYSICAL THERAPY | Facility: REHABILITATION | Age: 58
DRG: 949 | End: 2023-10-10
Attending: PHYSICAL MEDICINE & REHABILITATION
Payer: COMMERCIAL

## 2023-10-10 PROCEDURE — 97110 THERAPEUTIC EXERCISES: CPT | Mod: CQ

## 2023-10-10 PROCEDURE — 97530 THERAPEUTIC ACTIVITIES: CPT

## 2023-10-10 PROCEDURE — A9270 NON-COVERED ITEM OR SERVICE: HCPCS | Performed by: PHYSICAL MEDICINE & REHABILITATION

## 2023-10-10 PROCEDURE — 97110 THERAPEUTIC EXERCISES: CPT

## 2023-10-10 PROCEDURE — 97535 SELF CARE MNGMENT TRAINING: CPT

## 2023-10-10 PROCEDURE — 97129 THER IVNTJ 1ST 15 MIN: CPT

## 2023-10-10 PROCEDURE — 97116 GAIT TRAINING THERAPY: CPT | Mod: CQ

## 2023-10-10 PROCEDURE — 97130 THER IVNTJ EA ADDL 15 MIN: CPT

## 2023-10-10 PROCEDURE — 770010 HCHG ROOM/CARE - REHAB SEMI PRIVAT*

## 2023-10-10 PROCEDURE — 97530 THERAPEUTIC ACTIVITIES: CPT | Mod: CQ

## 2023-10-10 PROCEDURE — 700102 HCHG RX REV CODE 250 W/ 637 OVERRIDE(OP): Performed by: PHYSICAL MEDICINE & REHABILITATION

## 2023-10-10 PROCEDURE — 99232 SBSQ HOSP IP/OBS MODERATE 35: CPT | Performed by: PHYSICAL MEDICINE & REHABILITATION

## 2023-10-10 RX ORDER — LISINOPRIL 20 MG/1
20 TABLET ORAL DAILY
Qty: 30 TABLET | Refills: 2 | Status: SHIPPED | OUTPATIENT
Start: 2023-10-10 | End: 2024-01-07

## 2023-10-10 RX ORDER — METOPROLOL SUCCINATE 25 MG/1
50 TABLET, EXTENDED RELEASE ORAL DAILY
Qty: 60 TABLET | Refills: 2 | Status: SHIPPED | OUTPATIENT
Start: 2023-10-10 | End: 2024-01-07

## 2023-10-10 RX ORDER — OXYCODONE HYDROCHLORIDE 10 MG/1
10 TABLET ORAL EVERY 6 HOURS PRN
Qty: 42 TABLET | Refills: 0 | Status: SHIPPED | OUTPATIENT
Start: 2023-10-10 | End: 2023-10-24

## 2023-10-10 RX ORDER — LISINOPRIL 20 MG/1
20 TABLET ORAL DAILY
Qty: 30 TABLET | Refills: 2 | Status: SHIPPED | OUTPATIENT
Start: 2023-10-10

## 2023-10-10 RX ORDER — METOPROLOL SUCCINATE 25 MG/1
50 TABLET, EXTENDED RELEASE ORAL DAILY
Qty: 60 TABLET | Refills: 2 | Status: SHIPPED | OUTPATIENT
Start: 2023-10-10

## 2023-10-10 RX ORDER — ZOLPIDEM TARTRATE 10 MG/1
10 TABLET ORAL NIGHTLY PRN
Qty: 30 TABLET | Refills: 2 | Status: SHIPPED | OUTPATIENT
Start: 2023-10-10 | End: 2024-01-08

## 2023-10-10 RX ADMIN — OXYCODONE HYDROCHLORIDE 5 MG: 5 TABLET ORAL at 20:49

## 2023-10-10 RX ADMIN — ZOLPIDEM TARTRATE 10 MG: 5 TABLET ORAL at 20:48

## 2023-10-10 RX ADMIN — OXYCODONE HYDROCHLORIDE 10 MG: 10 TABLET ORAL at 17:21

## 2023-10-10 RX ADMIN — LISINOPRIL 10 MG: 5 TABLET ORAL at 17:21

## 2023-10-10 RX ADMIN — OXYCODONE HYDROCHLORIDE 10 MG: 10 TABLET ORAL at 08:05

## 2023-10-10 RX ADMIN — OMEPRAZOLE 20 MG: 20 CAPSULE, DELAYED RELEASE ORAL at 08:02

## 2023-10-10 RX ADMIN — METOPROLOL SUCCINATE 50 MG: 25 TABLET, EXTENDED RELEASE ORAL at 08:02

## 2023-10-10 ASSESSMENT — GAIT ASSESSMENTS
GAIT LEVEL OF ASSIST: STANDBY ASSIST
DEVIATION: SHUFFLED GAIT
DISTANCE (FEET): 150
ASSISTIVE DEVICE: FRONT WHEEL WALKER

## 2023-10-10 ASSESSMENT — PAIN DESCRIPTION - PAIN TYPE
TYPE: ACUTE PAIN
TYPE: ACUTE PAIN

## 2023-10-10 ASSESSMENT — BRIEF INTERVIEW FOR MENTAL STATUS (BIMS)
INITIAL REPETITION OF BED BLUE SOCK - FIRST ATTEMPT: 3
COGNITIVE PATTERN ASSESSMENT USED: BIMS
BIMS SUMMARY SCORE: 11
ASKED TO RECALL BLUE: YES, AFTER CUEING (A COLOR")"
ASKED TO RECALL SOCK: YES, AFTER CUEING (SOMETHING TO WEAR")"
ASKED TO RECALL BED: NO, COULD NOT RECALL
WHAT MONTH IS IT: ACCURATE WITHIN 5 DAYS
WHAT YEAR IS IT: CORRECT
WHAT DAY OF THE WEEK IS IT: CORRECT

## 2023-10-10 ASSESSMENT — PATIENT HEALTH QUESTIONNAIRE - PHQ9
SUM OF ALL RESPONSES TO PHQ9 QUESTIONS 1 AND 2: 0
1. LITTLE INTEREST OR PLEASURE IN DOING THINGS: NOT AT ALL
2. FEELING DOWN, DEPRESSED, IRRITABLE, OR HOPELESS: NOT AT ALL

## 2023-10-10 ASSESSMENT — ACTIVITIES OF DAILY LIVING (ADL)
TOILETING_LEVEL_OF_ASSIST: ABLE TO COMPLETE TOILETING WITHOUT ASSIST
TUB_SHOWER_TRANSFER_DESCRIPTION: ADAPTIVE EQUIPMENT
TOILET_TRANSFER_LEVEL_OF_ASSIST: ABLE TO COMPLETE TOILET TRANSFER WITHOUT ASSIST
BED_CHAIR_WHEELCHAIR_TRANSFER_DESCRIPTION: SUPERVISION FOR SAFETY;SET-UP OF EQUIPMENT;ADAPTIVE EQUIPMENT
TOILET_TRANSFER_DESCRIPTION: ADAPTIVE EQUIPMENT;GRAB BAR;SUPERVISION FOR SAFETY
SHOWER_TRANSFER_LEVEL_OF_ASSIST: ABLE TO COMPLETE SHOWER TRANSFER WITHOUT ASSIST

## 2023-10-10 NOTE — CARE PLAN
"  Problem: Fall Risk - Rehab  Goal: Patient will remain free from falls  Outcome: Progressing  Note: Ball Zaid Fall risk Assessment Score: 19    High fall risk Interventions   - Bed and strip alarm   - Yellow sign by the door   - Yellow wrist band \"Fall risk\"  - Room near to the nurse station  - Do not leave patient unattended in the bathroom  - Fall risk education provided      Problem: Pain - Standard  Goal: Alleviation of pain or a reduction in pain to the patient’s comfort goal  Outcome: Progressing  Note: Assessed ,for pain and discomfort , medicated with oxycodone 10 mg with relief [ see mar ].  Needs anticipated and attended.   The patient is Stable - Low risk of patient condition declining or worsening    Shift Goals  Clinical Goals: safety, sleep  Patient Goals: sleep  Family Goals: Education    Progress made toward(s) clinical / shift goals:  Pt free from fall and injury.    "

## 2023-10-10 NOTE — DISCHARGE PLANNING
Case Management/IDT follow up/Final Note  IDT confirms pt ready for dc tomorrow 10/11; referral made for out pt therapy for PT/OT; follow up w/ pcp and below appts previously scheduled; wc/cushion ordred; pt has fww.                           10/11/2023 10:00 AM Ambika Harrison, Therapy Tech RHPT None   10/16/2023  9:00 AM Sri Baker M.D. RADT None       Met with pt confirming above; he is eager for dc.   No further intervention

## 2023-10-10 NOTE — CARE PLAN
Problem: Functional Transfers  Goal: STG-Within one week, patient will transfer to toilet with CGA using grab bar  Outcome: Met  Note: Mod I     Problem: Dressing  Goal: STG-Within one week, patient will dress LB with supervision  Outcome: Met  Note: Supervised/mod I     Problem: Toileting  Goal: STG-Within one week, patient will complete toileting tasks with SBA  Outcome: Met  Note: Supervised/mod I

## 2023-10-10 NOTE — PROGRESS NOTES
NURSING DAILY NOTE    Name: Casper Rowley   Date of Admission: 9/29/2023   Admitting Diagnosis: Cerebellar mass  Attending Physician: Kasie Lin M.d.  Allergies: Patient has no known allergies.    Safety  Patient Assist  sba  Patient Precautions  Fall Risk  Precaution Comments     Bed Transfer Status  Standby Assist  Toilet Transfer Status   Supervised  Assistive Devices  Rails, Wheelchair  Oxygen  None - Room Air  Diet/Therapeutic Dining  Current Diet Order   Procedures    Diet Order Diet: Consistent CHO (Diabetic)     Pill Administration  whole  Agitated Behavioral Scale     ABS Level of Severity       Fall Risk  Has the patient had a fall this admission?   No  Lizzy Mar Fall Risk Scoring  19, HIGH RISK  Fall Risk Safety Measures  bed alarm, chair alarm, and poor balance    Vitals  Temperature: 37 °C (98.6 °F)  Temp src: Oral  Pulse: 85  Respiration: 18  Blood Pressure: 120/74  Blood Pressure MAP (Calculated): 89 MM HG  BP Location: Left, Upper Arm  Patient BP Position: Sitting     Oxygen  Pulse Oximetry: 97 %  O2 (LPM): 0  O2 Delivery Device: None - Room Air    Bowel and Bladder  Last Bowel Movement  10/10/23 (per patient)  Stool Type  Type 7: Watery, no solid pieces-entirely liquid  Bowel Device  Bathroom  Continent  Bladder: Continent void   Bowel: Continent movement  Bladder Function  Urine Void (mL): 900 ml (uerinals)  Number of Times Voided: 1  Urine Color: Yellow  Number of Times Incontinent of Urine: 0  Genitourinary Assessment   Bladder Assessment (WDL):  Within Defined Limits  Urine Color: Yellow  Number of Bladder Accidents: 0  Total Number of Bladder of Accidents in Last 7 Days: 0  Number of Times Incontinent of Urine: 0  Bladder Device: Urinal  Time Void: Yes  Bladder Scan: Post Void  $ Bladder Scan Results (mL): 1    Skin  Ravin Score   18  Sensory Interventions   Bed Types: Standard/Trauma Mattress  Skin Preventative  Measures: Pillows in Use for Support / Positioning  Moisture Interventions         Pain  Pain Rating Scale  8 - Awful, hard to do anything  Pain Location  Back  Pain Location Orientation  Posterior  Pain Interventions   Medication (see MAR), [place]    ADLs    Bathing   Shower, * * With Assistance from, Staff  Linen Change   Complete  Personal Hygiene     Chlorhexidine Bath      Oral Care  Brushed Teeth  Teeth/Dentures     Shave  Self  Nutrition Percentage Eaten  Lunch, 0% Consumed  Environmental Precautions  Bed in Low Position  Patient Turns/Positioning  Patient Turns Self from Side to Side  Patient Turns Assistance/Tolerance     Bed Positions  Bed Controls On  Head of Bed Elevated  Self regulated      Psychosocial/Neurologic Assessment  Psychosocial Assessment  Psychosocial (WDL):  Within Defined Limits  Neurologic Assessment  Neuro (WDL): Within Defined Limits  Level of Consciousness: Alert  Orientation Level: Oriented X4  Cognition: Appropriate judgement, Appropriate safety awareness, Appropriate attention/concentration  Speech: Clear  Pupil Assesment: No  R Pupil Size (mm): 3  R Pupil Shape / Description: Round  R Pupil Reaction: Brisk  L Pupil Size (mm): 3  L Pupil Shape / Description: Round  L Pupil Reaction: Brisk  EENT (WDL):  Within Defined Limits    Cardio/Pulmonary Assessment  Edema   RLE Edema: 1+  LLE Edema: 1+  Respiratory Breath Sounds  RUL Breath Sounds: Clear  RML Breath Sounds: Clear  RLL Breath Sounds: Clear  BRIAN Breath Sounds: Clear  LLL Breath Sounds: Clear  Cardiac Assessment   Cardiac (WDL):  Within Defined Limits

## 2023-10-10 NOTE — PROGRESS NOTES
NURSING DAILY NOTE    Name: Casper Rowley   Date of Admission: 9/29/2023   Admitting Diagnosis: Cerebellar mass  Attending Physician: Kasie Lin M.d.  Allergies: Patient has no known allergies.    Safety  Patient Assist  sba  Patient Precautions  Fall Risk  Precaution Comments     Bed Transfer Status  Standby Assist  Toilet Transfer Status   Standby Assist  Assistive Devices  Rails, Wheelchair  Oxygen  None - Room Air  Diet/Therapeutic Dining  Current Diet Order   Procedures    Diet Order Diet: Consistent CHO (Diabetic)     Pill Administration  whole  Agitated Behavioral Scale     ABS Level of Severity       Fall Risk  Has the patient had a fall this admission?   No  Lizzy Mar Fall Risk Scoring  19, HIGH RISK  Fall Risk Safety Measures  bed alarm and chair alarm    Vitals  Temperature: 37 °C (98.6 °F)  Temp src: Oral  Pulse: 90  Respiration: 18  Blood Pressure: 139/79  Blood Pressure MAP (Calculated): 99 MM HG  BP Location: Left, Upper Arm  Patient BP Position: Sitting     Oxygen  Pulse Oximetry: 97 %  O2 (LPM): 0  O2 Delivery Device: None - Room Air    Bowel and Bladder  Last Bowel Movement  10/07/23  Stool Type  Type 7: Watery, no solid pieces-entirely liquid  Bowel Device  Bathroom  Continent  Bladder: Continent void   Bowel: Continent movement  Bladder Function  Urine Void (mL): 400 ml  Number of Times Voided: 1  Urine Color: Unable To Evaluate  Number of Times Incontinent of Urine: 0  Genitourinary Assessment   Bladder Assessment (WDL):  Within Defined Limits  Urine Color: Unable To Evaluate  Number of Bladder Accidents: 0  Total Number of Bladder of Accidents in Last 7 Days: 0  Number of Times Incontinent of Urine: 0  Bladder Device: Urinal  Time Void: Yes  Bladder Scan: Post Void  $ Bladder Scan Results (mL): 1    Skin  Ravin Score   18  Sensory Interventions   Bed Types: Standard/Trauma Mattress  Skin Preventative Measures:  Pillows in Use for Support / Positioning  Moisture Interventions         Pain  Pain Rating Scale  8 - Awful, hard to do anything  Pain Location  Back  Pain Location Orientation  Posterior  Pain Interventions   Declines (pt requested meds to be given last with NOC med pass due to sleep meds)    ADLs    Bathing   Shower, * * With Assistance from, Staff  Linen Change   Complete  Personal Hygiene     Chlorhexidine Bath      Oral Care  Brushed Teeth  Teeth/Dentures     Shave  Self  Nutrition Percentage Eaten  *  * Meal *  *, Lunch, Between 50-75% Consumed  Environmental Precautions  Bed in Low Position  Patient Turns/Positioning  Patient Turns Self from Side to Side  Patient Turns Assistance/Tolerance     Bed Positions  Bed Controls On  Head of Bed Elevated  Self regulated      Psychosocial/Neurologic Assessment  Psychosocial Assessment  Psychosocial (WDL):  Within Defined Limits  Neurologic Assessment  Neuro (WDL): Within Defined Limits  Level of Consciousness: Alert  Orientation Level: Oriented X4  Cognition: Appropriate judgement, Appropriate safety awareness, Appropriate attention/concentration  Speech: Clear  Pupil Assesment: No  R Pupil Size (mm): 3  R Pupil Shape / Description: Round  R Pupil Reaction: Brisk  L Pupil Size (mm): 3  L Pupil Shape / Description: Round  L Pupil Reaction: Brisk  EENT (WDL):  Within Defined Limits    Cardio/Pulmonary Assessment  Edema   RLE Edema: 1+  LLE Edema: 1+  Respiratory Breath Sounds  RUL Breath Sounds: Clear  RML Breath Sounds: Clear  RLL Breath Sounds: Clear  BRIAN Breath Sounds: Clear  LLL Breath Sounds: Clear  Cardiac Assessment   Cardiac (WDL):  Within Defined Limits

## 2023-10-10 NOTE — DISCHARGE INSTRUCTIONS
Physical Therapy Discharge Instructions for Casper Rowley    10/10/2023    Level of Assist Required for Ambulation: Supervision on Flat Surfaces, Assist for Balance on Curbs, Assist for Balance on Stairs  Distance Patient May Ambulate: house hold distances  Device Recommended for Ambulation: Front-Wheeled Walker  Level of Assist Required to Propel Wheelchair: Requires No Assist  Level of Assist Required for Transfers: Supervision  Device Recommended for Transfers: Front-Wheeled Walker  Home Exercise Program: Refer to Home Exercise Program Handout for Details  Prosthesis / Orthosis Recommendation / Location: No Prosthesis  or Orthosis Recommended      Please use B foot drop assistance if planning to walk more than house hold distances    Occupational Therapy Discharge Instructions for Casper Rowley    10/10/2023    Level of Assist Required for Eating: Able to Complete Eating without Assist  Level of Assist Required for Grooming: Able to Complete Grooming without Assist  Level of Assist Required for Dressing: Able to Complete Dressing without Assist  Equipment for Dressing: Sock Aid, Reacher, Dressing Stick, Long Handled Shoe Horn  Level of Assist Required for Toileting: Able to Complete Toileting without Assist  Level of Assist Required for Toilet Transfer: Able to Complete Toilet Transfer without Assist  Equipment for Toilet Transfer: Grab Bars by Toilet, Other (walker and raised toilet seat)  Level of Assist Required for Bathing: Able to Complete Bathing without Assist  Equipment for Bathing: Tub Transfer Bench  Level of Assist Required for Shower Transfer: Able to Complete Shower Transfer without Assist  Equipment for Shower Transfer: Tub Transfer Bench  Level of Assist Required for Home Mgmt: Requires Supervision with Home Management  Level of Assist Required for Meal Prep: Requires Supervision with Meal Preparation  Driving: Please Contact Physician Prior to Driving  Home Exercise Program: None  Issued    Speech Therapy Discharge Instructions for Casper Rowley    10/10/2023    Diet: Regular (7), Thin (0)  Swallow Strategies: NA  Other Diet Instructions: NA  Supervision: intermittent supervision, check with physician re: return to driving at this time.  Cognition / Communication: It has been a pleasure working with you, Casper! Best of luck in your upcoming appointments and treatments! ~ Rosy Jones, MS, CCC-SLP    Executive function is a set of mental skills that help you get things done. These skills are controlled by an area of the brain called the frontal lobe.    Executive function helps you:  Manage time  Pay attention  Switch focus  Plan and organize  Remember details  Avoid saying or doing the wrong thing  Do things based on your experience  Multitask    When executive function isn’t working as it should, your behavior is less controlled. This can affect your ability to:  Work or go to school  Do things independently  Maintain relationships    How to Manage Executive Function Problems  Take a step-by-step approach to work.  Rely on visual organizational aids.  Use tools like time organizers, computers, or watches with alarms.  Make schedules and look at them several times a day.  Ask for written and oral instructions whenever possible.  Plan for transition times and shifts in activities.  Allow and budget extra time to get tasks done.    When you are about to try a task that you haven’t done in a while (or struggled with the last time), think about the steps involved, try to anticipate possible challenges and identify what might give you trouble, come up with a plan to compensate of those challenges, and evaluate after the fact - to determine if your plan worked or if it needs adjustment.   If you experience an outcome that you didn’t expect, think back to what may have contributed to the problem.   Break complex problems down into smaller parts.   Sometimes a complicated task can be  overwhelming, but if you break it down into components, you will be able to find a place where you can cope with the information.     Be patient and persistent.    Develop tools and strategies that will help organize, filter and narrow down information so that you can deal with it effectively.    What is memory?   Memory is the ability to learn, store, and retrieve information. New or increasing problems with any or all of these 3 stages of memory often occur after a traumatic brain injury, stroke, brain tumor, multiple sclerosis, or other kind of injury or illness that affects your nervous system.   Some kinds of memory problems may also occur as part of normal aging, when many people have more trouble retrieving new information.     Types of Memory:    Long-term (remote): memory for old, well-learned information that has been “rehearsed” (used) over time, such as the name of a childhood pet, memories of past vacations, or where you went to high school. Long-term memory tends to be retained after injury or illness.   Short-term (recent): memory for new experiences that took place a few minutes, hours, or days ago, such as what you had for breakfast or what you did yesterday. Short-term memory tends to be the most severely affected after injury. People who have had brain injuries may have problems with their attention span, how much memory they can store, how quickly they can think, and how efficiently they learn. These memory problems will make it hard to understand and save short-term memories so that they can be correctly rehearsed and stored in long-term memory.   Immediate (working): memory for information that is current, that you usually keep track of mentally, such as a phone number you look up, directions someone just gave you, or keeping track of numbers in your head when you add or subtract.   Prospective: the ability to remember to do something in the future, such as remembering to take a medicine, go to  an appointment, or follow through on an assignment or project.       Strategies to Help Improve Your Memory   Your speech therapist can work with you to develop strategies to help you remember new information. There are 2 main types of strategies to help your memory: internal reminders and external reminders.     Internal Reminders     Rehearsal: retelling yourself information you just learned, or restating it out loud in your own words.   Repetition: saying the same information over and over, either silently or out loud.   Clarification: asking someone else to repeat or rephrase information.   Chunking: grouping items together to reduce the number of items to remember, such as grouping 7-digit phone numbers into 2 chunks, one with 3 numbers and the other with 4 numbers.   Rhyming: making a rhyme out of important information.   Acronyms or alphabet cueing: creating a letter for each word you want to remember, or vice versa. One example is using the sentence “Every Good Boy Does Fine” to remember that the lines of a treble staff in music are the notes E, G, B, D, and F.   Imagery (also called visualization): creating pictures of the information in your mind.   Association: linking old information or habits with the new, such as remembering to take your medicine every night at the same time that you brush your teeth.   Personal meaning: making the new information meaningful or emotionally important to you in some way.     External Reminders     Using a paper or electronic calendar or day planner.   Setting timers or alarms to remind you to do something.   Writing down reminders such as to-do lists, shopping lists, and project outlines.   Recording new information with a voice recorder.   Using a medicine organizing tool.   Creating specific, permanent places for important items. One example is always putting your keys, wallet, and cell phone in the same place every time you get home.

## 2023-10-10 NOTE — THERAPY
Occupational Therapy  Daily Treatment     Patient Name: Casper Rowley  Age:  58 y.o., Sex:  male  Medical Record #: [id number]  Today's Date: 10/10/2023     Precautions  Precautions: (P) Fall Risk    Safety   ADL Safety : Requires Supervision for Safety  Bathroom Safety: Requires Supervision for Safety    Subjective    Patient had just arrived back to room upon OT arrival.  He stated he was in the dining room, as OT was unable to locate him.       Objective       10/10/23 1301   OT Charge Group   OT Therapy Activity (Units) 1   OT Therapeutic Exercise (Units) 1   OT Total Time Spent   OT Individual Total Time Spent (Mins) 30   Precautions   Precautions Fall Risk   Sitting Lower Body Exercises   Nustep Time (See Comments)  (nustep level 6 x 10 minutes with UE/LE)   Interdisciplinary Plan of Care Collaboration   IDT Collaboration with  Family / Caregiver   Patient Position at End of Therapy Seated;Call Light within Reach;Tray Table within Reach;Phone within Reach;Family / Friend in Room   Collaboration Comments S.O. present     Functional mobility with FWW from room <> gym with SBA.     Assessment    Patient tolerated walking and nustep well and without complaints.  Ready to d/c home.    Strengths: Able to follow instructions, Alert and oriented, Good insight into deficits/needs, Independent prior level of function, Motivated for self care and independence, Pleasant and cooperative, Supportive family, Willingly participates in therapeutic activities  Barriers: Decreased endurance, Fatigue, Impaired activity tolerance    Plan    D/C home tomorrow    Occupational Therapy Goals (Active)       Problem: Functional Transfers       Dates: Start:  09/30/23         Goal: STG-Within one week, patient will transfer to toilet with CGA.       Dates: Start:  09/30/23    Expected End:  10/11/23       Description: Using 3:1 commode and grabbar.      Goal Note filed on 10/03/23 1047 by Harpal Zazueta OT/L       Min A                  Problem: OT Long Term Goals       Dates: Start:  09/30/23         Goal: LTG-By discharge, patient will complete basic self care tasks at Mod Independent level.       Dates: Start:  09/30/23    Expected End:  10/11/23       Description: With adaptive equipment as needed.         Goal: LTG-By discharge, patient will perform bathroom transfers at Mod Independent level with DME as needed.       Dates: Start:  09/30/23    Expected End:  10/11/23

## 2023-10-10 NOTE — DISCHARGE SUMMARY
Physical Medicine & Rehabilitation Discharge Summary    Admission Date: 9/29/2023    Discharge Date: 10/11/2023    Attending Provider: Kasie Lin MD/PhD    Admission Diagnosis:   Active Hospital Problems    Diagnosis     *Cerebellar mass     Prostate cancer (HCC)     NED (obstructive sleep apnea)     HTN (hypertension), benign     DM (diabetes mellitus) (HCC)        Discharge Diagnosis:  Active Hospital Problems    Diagnosis     *Cerebellar mass     Prostate cancer (HCC)     NED (obstructive sleep apnea)     HTN (hypertension), benign     DM (diabetes mellitus) (HCC)        HPI per Admission History & Physical:  The patient is a 58 y.o. right hand dominant male with a past medical history of prostate cancer with mets to spine status post decompression/fusion in March, type 2 diabetes, hyperlipidemia, hypertension;  who presented on 9/19/2023 12:50 PM with intractable nausea vomiting.  Work-up with CT head found new cerebellar mass with vasogenic edema which was confirmed on MRI.  Patient also had urinary retention requiring Mistry placement.  Patient was started on Decadron, hypertonic saline, and seen by neurosurgery who performed craniotomy on 9/21 with EVD placement by Dr. Archana Be MD.  EVD removed 9/24.  Surgical pathology remains pending, but it is suspected to be metastatic prostate cancer.  Patient is set up for radiation mapping in mid October.  Patient was seen by PT and OT found to have deficits with mobility and ADLs, therefore PMR was consulted to assess for rehab appropriateness.    Patient was admitted to [facility] on 9/29/2023.     Hospital Course by Problem List:  Cerebellar mass - Patient with 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 Be.  -PT and OT for mobility and ADLs. Per guidelines, 15 hours per week between PT, OT and/or SLP.  -Follow-up NSG      Metastatic prostate cancer - Patient with multiple lesions s/p radiation previously. On Decadron 2 mg daily. Follow-up Oncology. Completed Decadron.      HTN - Patient on Lisinopril 20 mg and Metoprolol 25 mg daily. Continue Lisinopril 20 mg and Metoprolol 25 mg daily, SBP low 100s, will reduce Lisinopril to 10 mg. Continue Lisinopril 10 mg. HR still into low 100s, will increase Metoprolol to 37.5. Still into low 100s on 10/9, increase to 50     DM2 with hyperglycemia - Patient on Lantus and SSI on transfer.  Brought in home trulicity and canagliflozin, will discontinue SSI and lantus. Continue Trulicity and Cangliflozin     NED - RT to consult     Pain - Patient on PRN Oxycodone and Tylenol     Skin - Patient at risk for skin breakdown due to debility in areas including sacrum, achilles, elbows and head in addition to other sites. Nursing to assess skin daily.      GI Ppx - Patient on Prilosec for GERD prophylaxis. Patient on Senna-docusate for constipation prophylaxis.   -GERD/acid reflux, start Pepto     DVT Ppx - Patient Lovenox on transfer.   -Bilateral LE edema,  start SCDs. Continue Lovenox in setting of metastatic disease    Functional Status at Discharge  Eating:  Independent  Eating Description:     Grooming:  Independent, Seated  Grooming Description:  Standing at sink, Supervision for safety  Bathing:  Modified Independent  Bathing Description:  Grab bar, Hand held shower, Tub bench, Set-up of equipment, Supervision for safety  Upper Body Dressing:  Modified Independent  Upper Body Dressing Description:  Assist device equipment (wc)  Lower Body Dressing:  Modified Independent  Lower Body Dressing Description:  Grab bar, Reacher, Sock aid, Dressing stick, Assistive devices (wc)     Walk:  Standby Assist  Distance Walked:  150  Number of Times Distance Was Traveled:  1  Assistive Device:  Front Wheel Walker  Gait Deviation:  Shuffled Gait  Wheelchair:  Supervised  Distance Propelled:  150   Wheelchair  Description:  Extra time, Limited by fatigue, Supervision for safety  Stairs Standby Assist  Stairs Description Extra time, Hand rails, Supervision for safety  Discharge Location: Other (See Comments)  Patient Discharging with Assist of: Spouse / Significant Other  Level of Supervision Required Upon Discharge: Intermittent Supervision  Recommended Equipment for Discharge: Front-Wheeled Walker  Recommeded Services Upon Discharge: Home Health Physical Therapy  Long Term Goals Met: 2  Long Term Goals Not Met: 2  Reason(s) for Goals Not Met: SPV vs Rochelle  Criteria for Termination of Services: Maximum Function Achieved for Inpatient Rehabilitation  Comprehension:  Modified Independent  Comprehension Description:  Glasses  Expression:  Independent  Expression Description:     Social Interaction:  Independent  Social Interaction Description:  Other (comment) (tangetial and lacks turn taking.)  Problem Solving:  Supervision  Problem Solving Description:  Verbal cueing, Therapy schedule, Bed/chair alarm, Seat belt  Memory:  Moderate Assist  Memory Description:  Verbal cueing, Therapy schedule, Increased time, Seat belt, Bed/chair alarm       Kasie STINSON M.D., personally performed a complete drug regimen review and no potential clinically significant medication issues were identified.   Discharge Medication:     Medication List        START taking these medications        Instructions   oxyCODONE immediate release 10 MG immediate release tablet  Commonly known as: Roxicodone   Take 1 Tablet by mouth every 6 hours as needed for Severe Pain for up to 14 days.  Dose: 10 mg            CHANGE how you take these medications        Instructions   metoprolol SR 25 MG Tb24  What changed: how much to take  Commonly known as: Toprol XL   Take 2 Tablets by mouth every day.  Dose: 50 mg            CONTINUE taking these medications        Instructions   Invokana 100 MG Tabs  Generic drug: Canagliflozin   Take 100 mg by mouth  every day.  Dose: 100 mg     lisinopril 20 MG Tabs  Commonly known as: Prinivil   Take 1 Tablet by mouth every day.  Dose: 20 mg     Trulicity 1.5 MG/0.5ML Sopn  Generic drug: Dulaglutide   Inject 1.5 mg as instructed every Saturday.  Dose: 1.5 mg     Vitamin D 50 MCG (2000 UT) Caps   Take 6,000 Units by mouth every day. 2000 units x 3 tablets = 6000 mcg  Dose: 6,000 Units     Xgeva 120 MG/1.7ML injection  Generic drug: denosumab   120 mg Q30 DAYS.  Dose: 120 mg     zolpidem 10 MG Tabs  Commonly known as: Ambien   Take 1 Tablet by mouth at bedtime as needed for Sleep for up to 90 days.  Dose: 10 mg            ASK your doctor about these medications        Instructions   cyclobenzaprine 10 mg Tabs  Commonly known as: Flexeril   Take 10 mg by mouth at bedtime.  Dose: 10 mg     dexamethasone 2 MG tablet  Commonly known as: Decadron   Take 1 Tablet by mouth every day.  Dose: 2 mg              Discharge Diet:  Current Diet Order   Procedures    Diet Order Diet: Consistent CHO (Diabetic)       Discharge Activity:  As tolerated     Disposition:  Patient to discharge home with family support and community resources.    Equipment:  FWW    Follow-up & Discharge Instructions:  Follow up with your primary care provider (PCP) within 7-10 days of discharge to review your medications and take over your care.     If you develop chest pain, fever, chills, change in neurologic function (weakness, sensation changes, vision changes), or other concerning sxs, seek immediate medical attention or call 911.      Future Appointments   Date Time Provider Department Center   10/10/2023  1:00 PM Harpal Zazueta OT/L [facility] None   10/10/2023  2:30 PM Rosy Jones MS,CCC-SLP [facility] None   10/10/2023  3:00 PM REHAB NON-THERAPY PROVIDER [facility] None   10/11/2023 10:00 AM Ambika Harrison, Therapy Tech [facility] None   10/16/2023  9:00 AM Sri Baker M.D. [facility] None       Condition on Discharge:  Good    More than 31 minutes was spent on discharging  this patient, including face-to-face time, prescription management, and the dictation of this note.    Kasie Lin M.D.    Date of Service: 10/11/2023

## 2023-10-10 NOTE — THERAPY
Physical Therapy   Daily Treatment     Patient Name: Casper Rowley  Age:  58 y.o., Sex:  male  Medical Record #: [id number]  Today's Date: 10/10/2023     Precautions  Precautions: Fall Risk    Subjective    Seated in wc upon arrival, agreeable to therapy     Objective       10/10/23 0831   PT Charge Group   PT Gait Training (Units) 1   PT Therapeutic Exercise (Units) 1   PT Therapeutic Activities (Units) 2   Supervising Physical Therapist Nahomi Zamudio   PT Total Time Spent   PT Individual Total Time Spent (Mins) 60   Precautions   Precautions Fall Risk   Gait Functional Level of Assist    Gait Level Of Assist Standby Assist   Assistive Device Front Wheel Walker   Distance (Feet) 150   # of Times Distance was Traveled 1   Deviation Shuffled Gait   Wheelchair Functional Level of Assist   Wheelchair Assist Supervised   Distance Wheelchair (Feet or Distance) 150   Wheelchair Description Extra time;Limited by fatigue;Supervision for safety   Stairs Functional Level of Assist   Level of Assist with Stairs Standby Assist   # of Stairs Climbed 12   Stairs Description Extra time;Hand rails;Supervision for safety   Transfer Functional Level of Assist   Bed, Chair, Wheelchair Transfer Standby Assist   Bed Chair Wheelchair Transfer Description Supervision for safety;Set-up of equipment;Adaptive equipment   Supine Lower Body Exercise   Comments reviewed supine HEP handout verbally   Sitting Lower Body Exercises   Sitting Lower Body Exercises Yes   Ankle Pumps 1 set of 10   Hip Abduction 1 set of 10   Hip Adduction 1 set of 10   Long Arc Quad 1 set of 10   Hamstring Curl 1 set of 10   Bed Mobility    Supine to Sit Supervised   Sit to Supine Supervised   Sit to Stand Standby Assist   Scooting Independent   Rolling Supervised   Interdisciplinary Plan of Care Collaboration   IDT Collaboration with  Family / Caregiver   Patient Position at End of Therapy Seated;Self Releasing Lap Belt Applied   Collaboration Comments  caregiver training with SO   Roll Left and Right   Assistance Needed Independent   CARE Score - Roll Left and Right 6   Sit to Lying   Assistance Needed Supervision   CARE Score - Sit to Lying 4   Lying to Sitting on Side of Bed   Assistance Needed Independent   CARE Score - Lying to Sitting on Side of Bed 6   Sit to Stand   Assistance Needed Supervision   CARE Score - Sit to Stand 4   Chair/Bed-to-Chair Transfer   Assistance Needed Adaptive equipment;Set-up / clean-up   CARE Score - Chair/Bed-to-Chair Transfer 5   Car Transfer   Assistance Needed Set-up / clean-up;Supervision   CARE Score - Car Transfer 4   Walk 10 Feet   Assistance Needed Adaptive equipment;Supervision   CARE Score - Walk 10 Feet 4   Walk 50 Feet with Two Turns   Assistance Needed Adaptive equipment;Supervision   CARE Score - Walk 50 Feet with Two Turns 4   Walk 150 Feet   Assistance Needed Adaptive equipment;Supervision   CARE Score - Walk 150 Feet 4   Walking 10 Feet on Uneven Surfaces   Assistance Needed Adaptive equipment;Supervision   CARE Score - Walking 10 Feet on Uneven Surfaces 4   1 Step (Curb)   Assistance Needed Adaptive equipment;Incidental touching;Supervision   CARE Score - 1 Step (Curb) 4   4 Steps   Assistance Needed Adaptive equipment;Incidental touching;Supervision   CARE Score - 4 Steps 4   12 Steps   Assistance Needed Incidental touching   CARE Score - 12 Steps 4   Picking Up Object   Reason if not Attempted Safety concerns   CARE Score - Picking Up Object 88   Wheel 50 Feet with Two Turns   Assistance Needed Independent   CARE Score - Wheel 50 Feet with Two Turns 6   Wheel 150 Feet   Assistance Needed Independent   CARE Score - Wheel 150 Feet 6   P.T. Discharge Summary   Discharge Location Other (See Comments)   Patient Discharging with Assist of Spouse / Significant Other   Level of Supervision Required Upon Discharge Intermittent Supervision   Recommended Equipment for Discharge Front-Wheeled Walker   Recommeded Services  Upon Discharge Home Health Physical Therapy   Long Term Goals Met 2   Long Term Goals Not Met 2   Reason(s) for Goals Not Met SPV vs Rochelle   Criteria for Termination of Services Maximum Function Achieved for Inpatient Rehabilitation   Discharge Instructions to Patient   Level of Assist Required for Ambulation Supervision on Flat Surfaces;Assist for Balance on Curbs;Assist for Balance on Stairs   Distance Patient May Ambulate house hold distances   Device Recommended for Ambulation Front-Wheeled Walker   Level of Assist Required to Propel Wheelchair Requires No Assist   Level of Assist Required for Transfers Supervision   Device Recommended for Transfers Front-Wheeled Walker   Home Exercise Program Refer to Home Exercise Program Handout for Details   Prosthesis / Orthosis Recommendation / Location No Prosthesis  or Orthosis Recommended     Family training:  Hands on caregiver training completed with Spouse  Car transfer training demo on nustep with FWW  Stair training with CGA/SBA  Gait training with FWW using SBA    Transfer training in/out of standard bed using FWW with set up assistance  Edu provided on fall safety packet    Reviewed fall safety packet and demonstrated floor transfer     Assessment    FT completed, pt prepared for DC wednesday  Strengths: Able to follow instructions, Effective communication skills, Good insight into deficits/needs, Independent prior level of function, Motivated for self care and independence, Pleasant and cooperative, Supportive family, Willingly participates in therapeutic activities  Barriers: Decreased endurance, Fatigue, Generalized weakness, Impaired balance, Limited mobility (metastatic disease / LE swelling)    Plan    DC home recommending HH or outpatient PT    DME     Pt owns FWW,4WW, quad cane    Passport items to be completed:    Physical Therapy Problems (Active)       Problem: Balance       Dates: Start:  09/30/23         Goal: STG-Within one week, patient will  maintain dynamic standing to complete 10 MWT with FWW and SBA       Dates: Start:  09/30/23    Expected End:  10/11/23               Problem: Mobility Transfers       Dates: Start:  09/30/23         Goal: STG-Within one week, patient will transfer bed to chair SPV after set-up with FWW       Dates: Start:  09/30/23    Expected End:  10/11/23               Problem: PT-Long Term Goals       Dates: Start:  09/30/23         Goal: LTG-By discharge, patient will propel wheelchair modified independent x 150 ft level / indoors       Dates: Start:  09/30/23    Expected End:  10/11/23            Goal: LTG-By discharge, patient will ambulate modified independent with FWW x 150 ft       Dates: Start:  09/30/23    Expected End:  10/11/23            Goal: LTG-By discharge, patient will transfer one surface to another modified independent with FWW       Dates: Start:  09/30/23    Expected End:  10/11/23            Goal: LTG-By discharge, patient will transfer in/out of a car SPV / modified independent after set-up with FWW       Dates: Start:  09/30/23    Expected End:  10/11/23

## 2023-10-10 NOTE — PROGRESS NOTES
Physical Medicine & Rehabilitation Progress Note    Encounter Date: 10/10/2023    Chief Complaint: Decreased mobility    Interval Events (Subjective):  Patient sitting up in room. He reports appointment first thing in the morning. Discussed would complete discharge this afternoon. Discussed he would have IDT later today to discuss final planning. Family in for training.       _____________________________________  Interdisciplinary Team Conference   Most recent IDT on 10/10/2023    IKasie M.D./Ph.D., was present and led the interdisciplinary team conference on 10/10/2023.  I led the IDT conference and agree with the IDT conference documentation and plan of care as noted below.     Nursing:  Diet Current Diet Order   Procedures    Diet Order Diet: Consistent CHO (Diabetic)       Eating ADL Independent      % of Last Meal  Oral Nutrition: Breakfast, Between % Consumed   Sleep    Bowel Last BM: 10/07/23   Bladder    Barriers to Discharge Home:      Physical Therapy:  Bed Mobility    Transfers Standby Assist  Supervision for safety, Set-up of equipment, Adaptive equipment   Mobility Standby Assist   Stairs    Barriers to Discharge Home:  None    Occupational Therapy:  Grooming Independent, Seated   Bathing Modified Independent   UB Dressing Modified Independent   LB Dressing Modified Independent   Toileting Supervision   Shower & Transfer    Barriers to Discharge Home:  None    Speech-Language Pathology:  Comprehension:  Modified Independent  Comprehension Description:  Glasses  Expression:  Independent  Expression Description:     Social Interaction:  Independent  Social Interaction Description:  Other (comment) (tangetial and lacks turn taking.)  Problem Solving:  Supervision  Problem Solving Description:  Verbal cueing, Therapy schedule, Bed/chair alarm, Seat belt  Memory:  Moderate Assist  Memory Description:  Verbal cueing, Therapy schedule, Increased time, Seat belt, Bed/chair  "alarm  Barriers to Discharge Home:    Respiratory Therapy:  O2 (LPM): 0  O2 Delivery Device: None - Room Air    Case Management:  Continues to work on disposition and DME needs.      Discharge Date/Disposition:  10/11/23  _____________________________________    Objective:  VITAL SIGNS: /78   Pulse 99   Temp 36.6 °C (97.8 °F) (Oral)   Resp 18   Ht 1.702 m (5' 7\")   Wt 81 kg (178 lb 9 oz)   SpO2 98%   BMI 27.97 kg/m²   Gen: NAD  Psych: Mood and affect appropriate  CV: RRR, 0 edema  Resp: CTAB, no upper airway sounds  Abd: NTND  Neuro: AOx4, following commands  Unchanged from 10/9/23    Laboratory Values:  Recent Results (from the past 72 hour(s))   CoV-2, Flu A/B, And RSV by PCR ([facility])    Collection Time: 10/08/23 12:24 AM    Specimen: Respirate   Result Value Ref Range    Influenza virus A RNA Negative Negative    Influenza virus B, PCR Negative Negative    RSV, PCR Negative Negative    SARS-CoV-2 by PCR NotDetected     SARS-CoV-2 Source NP Swab        Medications:  Scheduled Medications   Medication Dose Frequency    metoprolol SR  50 mg DAILY    lisinopril  10 mg DAILY    Dulaglutide  1 Pen Q7 DAYS    Canagliflozin  100 mg Daily-0800    Pharmacy Consult Request  1 Each PHARMACY TO DOSE    senna-docusate  2 Tablet BID    omeprazole  20 mg DAILY    enoxaparin (LOVENOX) injection  40 mg DAILY AT 1800     PRN medications: bismuth subsalicylate, simethicone, Respiratory Therapy Consult, hydrALAZINE, acetaminophen, senna-docusate **AND** polyethylene glycol/lytes **AND** magnesium hydroxide **AND** bisacodyl, mag hydrox-al hydrox-simeth, ondansetron **OR** ondansetron, traZODone, sodium chloride, oxyCODONE immediate-release **OR** oxyCODONE immediate-release, midazolam, [DISCONTINUED] insulin regular **AND** [CANCELED] POC blood glucose manual result **AND** NOTIFY MD and PharmD **AND** Administer 20 grams of glucose (approximately 8 ounces of fruit juice) every 15 minutes PRN FSBG less than 70 mg/dL " **AND** dextrose bolus, meclizine, promethazine, zolpidem    Diet:  Current Diet Order   Procedures    Diet Order Diet: Consistent CHO (Diabetic)       Medical Decision Making and Plan:   Cerebellar mass - Patient with 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 Be.  -PT and OT for mobility and ADLs. Per guidelines, 15 hours per week between PT, OT and/or SLP.  -Follow-up NSG     Metastatic prostate cancer - Patient with multiple lesions s/p radiation previously. On Decadron 2 mg daily. Follow-up Oncology. Completed Decadron.      HTN - Patient on Lisinopril 20 mg and Metoprolol 25 mg daily. Continue Lisinopril 20 mg and Metoprolol 25 mg daily, SBP low 100s, will reduce Lisinopril to 10 mg. Continue Lisinopril 10 mg. HR still into low 100s, will increase Metoprolol to 37.5. Still into low 100s on 10/9, increase to 50. Continue Metoprolol 50 at discharge     DM2 with hyperglycemia - Patient on Lantus and SSI on transfer.  Brought in home trulicity and canagliflozin, will discontinue SSI and lantus. Continue Trulicity and Canagliflozin      NED - RT to consult     Pain - Patient on PRN Oxycodone and Tylenol     Skin - Patient at risk for skin breakdown due to debility in areas including sacrum, achilles, elbows and head in addition to other sites. Nursing to assess skin daily.      GI Ppx - Patient on Prilosec for GERD prophylaxis. Patient on Senna-docusate for constipation prophylaxis.   -GERD/acid reflux, start Pepto     DVT Ppx - Patient Lovenox on transfer.   -Bilateral LE edema,  start SCDs. Ambulating > 125 feet, discontinue Lovenox  ____________________________________    T. Erick Lin MD/PhD  [facility] - Physical Medicine & Rehabilitation   [facility] - Brain Injury Medicine   ____________________________________

## 2023-10-10 NOTE — PROGRESS NOTES
NURSING DAILY NOTE    Name: Casper Rowley   Date of Admission: 9/29/2023   Admitting Diagnosis: Cerebellar mass  Attending Physician: Kasie Lin M.d.  Allergies: Patient has no known allergies.    Safety  Patient Assist  sba  Patient Precautions  Fall Risk  Precaution Comments     Bed Transfer Status  Standby Assist  Toilet Transfer Status   Standby Assist  Assistive Devices  Rails, Wheelchair  Oxygen  None - Room Air  Diet/Therapeutic Dining  Current Diet Order   Procedures    Diet Order Diet: Consistent CHO (Diabetic)     Pill Administration  whole  Agitated Behavioral Scale     ABS Level of Severity       Fall Risk  Has the patient had a fall this admission?   No  Lizzy Mar Fall Risk Scoring  19, HIGH RISK  Fall Risk Safety Measures  bed alarm, chair alarm, and poor balance    Vitals  Temperature: 36.9 °C (98.5 °F)  Temp src: Oral  Pulse: 98  Respiration: 18  Blood Pressure: 124/79  Blood Pressure MAP (Calculated): 94 MM HG  BP Location: Left, Upper Arm  Patient BP Position: Sitting     Oxygen  Pulse Oximetry: 96 %  O2 (LPM): 0  O2 Delivery Device: None - Room Air    Bowel and Bladder  Last Bowel Movement  10/07/23  Stool Type  Type 7: Watery, no solid pieces-entirely liquid  Bowel Device  Bathroom  Continent  Bladder: Continent void   Bowel: Continent movement  Bladder Function  Urine Void (mL): 900 ml (uerinals)  Number of Times Voided: 1  Urine Color: Yellow  Number of Times Incontinent of Urine: 0  Genitourinary Assessment   Bladder Assessment (WDL):  Within Defined Limits  Urine Color: Yellow  Number of Bladder Accidents: 0  Total Number of Bladder of Accidents in Last 7 Days: 0  Number of Times Incontinent of Urine: 0  Bladder Device: Urinal  Time Void: Yes  Bladder Scan: Post Void  $ Bladder Scan Results (mL): 1    Skin  Ravin Score   18  Sensory Interventions   Bed Types: Standard/Trauma Mattress  Skin Preventative  Measures: Pillows in Use for Support / Positioning  Moisture Interventions         Pain  Pain Rating Scale  0 - No Pain  Pain Location  Back  Pain Location Orientation  Posterior  Pain Interventions   Medication (see MAR), [place]    ADLs    Bathing   Shower, * * With Assistance from, Staff  Linen Change   Complete  Personal Hygiene     Chlorhexidine Bath      Oral Care  Brushed Teeth  Teeth/Dentures     Shave  Self  Nutrition Percentage Eaten  *  * Meal *  *, Lunch, Between 50-75% Consumed  Environmental Precautions  Bed in Low Position, Treaded Slipper Socks on Patient  Patient Turns/Positioning  Patient Turns Self from Side to Side  Patient Turns Assistance/Tolerance     Bed Positions  Bed Controls On  Head of Bed Elevated  Self regulated      Psychosocial/Neurologic Assessment  Psychosocial Assessment  Psychosocial (WDL):  Within Defined Limits  Neurologic Assessment  Neuro (WDL): Within Defined Limits  Level of Consciousness: Alert  Orientation Level: Oriented X4  Cognition: Appropriate judgement, Appropriate safety awareness, Appropriate attention/concentration  Speech: Clear  Pupil Assesment: No  R Pupil Size (mm): 3  R Pupil Shape / Description: Round  R Pupil Reaction: Brisk  L Pupil Size (mm): 3  L Pupil Shape / Description: Round  L Pupil Reaction: Brisk  EENT (WDL):  Within Defined Limits    Cardio/Pulmonary Assessment  Edema   RLE Edema: 1+  LLE Edema: 1+  Respiratory Breath Sounds  RUL Breath Sounds: Clear  RML Breath Sounds: Clear  RLL Breath Sounds: Clear  BRIAN Breath Sounds: Clear  LLL Breath Sounds: Clear  Cardiac Assessment   Cardiac (WDL):  Within Defined Limits

## 2023-10-10 NOTE — CARE PLAN
Problem: Speech/Swallowing LTGs  Goal: LTG-By discharge, patient will solve complex problems with min A for safe d/c home  Outcome: Met

## 2023-10-10 NOTE — CARE PLAN
Problem: Fall Risk - Rehab  Goal: Patient will remain free from falls  Note: Patient remains free from falls this shift. Patient was educated on using the call light to prevent falls. Patients bed is in the lowest position. The patients belongings are placed in near proximity to the patient.       Problem: Pain - Standard  Goal: Alleviation of pain or a reduction in pain to the patient’s comfort goal  Flowsheets  Taken 10/10/2023 0805 by Olive Joyce R.N.  Pain Rating Scale (NPRS): 8     The patient is Stable - Low risk of patient condition declining or worsening

## 2023-10-10 NOTE — THERAPY
Occupational Therapy  Daily Treatment     Patient Name: Casper Rowley  Age:  58 y.o., Sex:  male  Medical Record #: [id number]  Today's Date: 10/10/2023     Precautions  Precautions: (P) Fall Risk    Safety   ADL Safety : Requires Supervision for Safety  Bathroom Safety: Requires Supervision for Safety    Subjective    Patient and S.O. in patient's room and ready for family training.       Objective       10/10/23 1001   OT Charge Group   OT Self Care / ADL (Units) 1   OT Therapy Activity (Units) 3   OT Total Time Spent   OT Individual Total Time Spent (Mins) 60   Precautions   Precautions Fall Risk   Pain 0 - 10 Group   Therapist Pain Assessment 0   Functional Level of Assist   Toilet Transfers Supervised   Toilet Transfer Description Adaptive equipment;Grab bar;Supervision for safety  (commode over toilet, grab bar, FWW)   Tub / Shower Transfers Modified Independent   Tub Shower Transfer Description Adaptive equipment  (tub bench)   Sitting Upper Body Exercises   Lat Pull 3 sets of 10;Bilateral;Weight (See Comments for lbs)  (25 lbs on equalizer)   Interdisciplinary Plan of Care Collaboration   IDT Collaboration with  Family / Caregiver;Physician   Patient Position at End of Therapy Seated;Call Light within Reach;Tray Table within Reach;Phone within Reach;Family / Friend in Room   Collaboration Comments S.O. present for family training; Dr Lin rounded with patient     Functional mobility in room, bathroom, hallways, gym with SBA/supervision using FWW.      OT verbally reviewed patient's CLOF regarding adls, iadls, transfers and mobility using FWW.  Patient demonstrated supervised toilet transfer and mod I tub/shower transfer using tub bench.  OT recommended grab bar installation in tub/shower and by toilets at home.      Assessment    Patient and S.O. feel ready for tomorrow's d/c home.  They had no further questions or concerns.    Strengths: Able to follow instructions, Alert and oriented, Good insight  into deficits/needs, Independent prior level of function, Motivated for self care and independence, Pleasant and cooperative, Supportive family, Willingly participates in therapeutic activities  Barriers: Decreased endurance, Fatigue, Impaired activity tolerance    Plan    D/C home tomorrow    Occupational Therapy Goals (Active)       Problem: Functional Transfers       Dates: Start:  09/30/23         Goal: STG-Within one week, patient will transfer to toilet with CGA.       Dates: Start:  09/30/23    Expected End:  10/11/23       Description: Using 3:1 commode and grabbar.      Goal Note filed on 10/03/23 1047 by Harpal Zazueta OT/JERRY HERNANDEZ                 Problem: OT Long Term Goals       Dates: Start:  09/30/23         Goal: LTG-By discharge, patient will complete basic self care tasks at Mod Independent level.       Dates: Start:  09/30/23    Expected End:  10/11/23       Description: With adaptive equipment as needed.         Goal: LTG-By discharge, patient will perform bathroom transfers at Mod Independent level with DME as needed.       Dates: Start:  09/30/23    Expected End:  10/11/23

## 2023-10-10 NOTE — CARE PLAN
Problem: OT Long Term Goals  Goal: LTG-By discharge, patient will complete basic self care tasks at Mod Independent level.  Description: With adaptive equipment as needed.  Outcome: Met     Problem: OT Long Term Goals  Goal: LTG-By discharge, patient will perform bathroom transfers at Mod Independent level with DME as needed.  Outcome: Met

## 2023-10-10 NOTE — CARE PLAN
Problem: Balance  Goal: STG-Within one week, patient will maintain dynamic standing to complete 10 MWT with FWW and SBA  Outcome: Not Met     Problem: Mobility Transfers  Goal: STG-Within one week, patient will transfer bed to chair SPV after set-up with FWW  Outcome: Met

## 2023-10-10 NOTE — THERAPY
Speech Language Pathology  Daily Treatment     Patient Name: Casper Rowley  Age:  58 y.o., Sex:  male  Medical Record #: [id number]  Today's Date: 10/10/2023     Precautions  Precautions: Fall Risk    Subjective    Pt and SO completing family training in family lounge while pt was waiting for virtual appointment with [facility] over Zoom.      Objective       10/10/23 1434   Treatment Charges   SLP Cognitive Skill Development First 15 Minutes 1   SLP Cognitive Skill Development Additional 15 Minutes 1   SLP Total Time Spent   SLP Individual Total Time Spent (Mins) 30   SCCAN (Scales of Cognitive and Communicative Ability for Neurorehabilitation)   Oral Expression - Raw Score 18   Oral Expression - Scale Performance Score 95   Orientation - Raw Score 12   Orientation - Scale Performance Score 100   Memory - Raw Score 14   Memory - Scale Performance Score 74   Speech Comprehension - Raw Score 13   Speech Comprehension - Scale Performance Score 100   Reading Comprehension - Raw Score 12   Reading Comprehension - Scale Performance Score 100   Writing - Raw Score 7   Writing - Scale Performance Score 100   Attention - Raw Score 16   Attention - Scale Performance Score 100   Problem Solving - Raw Score 22   Problem Solving - Scale Performance Score 96   SCCAN Total Raw Score 88   SCCAN Degree of Severity Typical Functioning   Interdisciplinary Plan of Care Collaboration   IDT Collaboration with  Family / Caregiver   Patient Position at End of Therapy Seated;Family / Friend in Room   Collaboration Comments SO present for family training         Assessment    Follow up outcome assessment completed and reviewed with pt and SO. Pt performed as follows:     SCCAN (Scales of Cognitive and Communicative Ability for Neurorehabilitation)  Oral Expression - Raw Score: 18  Oral Expression - Scale Performance Score: 95  Orientation - Raw Score: 12  Orientation - Scale Performance Score: 100  Memory - Raw Score: 14  Memory - Scale  Performance Score: 74  Speech Comprehension - Raw Score: 13  Speech Comprehension - Scale Performance Score: 100  Reading Comprehension - Raw Score: 12  Reading Comprehension - Scale Performance Score: 100  Writing - Raw Score: 7  Writing - Scale Performance Score: 100  Attention - Raw Score: 16  Attention - Scale Performance Score: 100  Problem Solving - Raw Score: 22  Problem Solving - Scale Performance Score: 96  SCCAN Total Raw Score: 88  SCCAN Degree of Severity: Typical Functioning    Pt with improvement across all domains, continues to demonstrate mild to moderate impairments in memory. Overall score of 88 (increased from 80) improves severity from mild to typical functioning at this time. Education provided via verbal and written handouts re: use of compensatory strategies and external aids for STM/recall of novel information as well as calendar, timer and alarm functions on phone. Pt and SO confirmed understanding all information presented and in agreement with recommendation for no further ST services upon d/c.       Strengths: Able to follow instructions, Alert and oriented, Effective communication skills, Pleasant and cooperative, Supportive family, Motivated for self care and independence, Making steady progress towards goals, Willingly participates in therapeutic activities  Barriers:  (complex attention, executive function)    Plan    Anticipated d/c scheduled for tomorrow 10/11/23    Passport items to be completed:  Completed.     Speech Therapy Problems (Active)       Problem: Speech/Swallowing LTGs       Dates: Start:  09/30/23         Goal: LTG-By discharge, patient will solve complex problems with min A for safe d/c home       Dates: Start:  09/30/23    Expected End:  10/11/23

## 2023-10-11 VITALS
HEIGHT: 67 IN | SYSTOLIC BLOOD PRESSURE: 109 MMHG | WEIGHT: 178.56 LBS | DIASTOLIC BLOOD PRESSURE: 65 MMHG | OXYGEN SATURATION: 98 % | RESPIRATION RATE: 18 BRPM | HEART RATE: 89 BPM | TEMPERATURE: 98.8 F | BODY MASS INDEX: 28.02 KG/M2

## 2023-10-11 PROCEDURE — 99239 HOSP IP/OBS DSCHRG MGMT >30: CPT | Performed by: PHYSICAL MEDICINE & REHABILITATION

## 2023-10-11 PROCEDURE — A9270 NON-COVERED ITEM OR SERVICE: HCPCS | Performed by: PHYSICAL MEDICINE & REHABILITATION

## 2023-10-11 PROCEDURE — 700102 HCHG RX REV CODE 250 W/ 637 OVERRIDE(OP): Performed by: PHYSICAL MEDICINE & REHABILITATION

## 2023-10-11 RX ADMIN — OMEPRAZOLE 20 MG: 20 CAPSULE, DELAYED RELEASE ORAL at 07:13

## 2023-10-11 RX ADMIN — METOPROLOL SUCCINATE 50 MG: 25 TABLET, EXTENDED RELEASE ORAL at 07:13

## 2023-10-11 NOTE — PROGRESS NOTES
Patient discharged to home per order.  Discharge instructions reviewed with patient ; they verbalize understanding and signed copies placed in chart.  Patient has all belongings.  Patient left facility at 0720 via wheelchair accompanied by rehab staff and father.  Have enjoyed working with this pleasant patient.

## 2023-10-11 NOTE — PROGRESS NOTES
NURSING DAILY NOTE    Name: Casper Rowley   Date of Admission: 9/29/2023   Admitting Diagnosis: Cerebellar mass  Attending Physician: Kasie Lin M.d.  Allergies: Patient has no known allergies.    Safety  Patient Assist  SBA  Patient Precautions  Fall Risk  Precaution Comments     Bed Transfer Status  Standby Assist  Toilet Transfer Status   Supervised  Assistive Devices  Rails, Wheelchair  Oxygen  None - Room Air  Diet/Therapeutic Dining  Current Diet Order   Procedures    Diet Order Diet: Consistent CHO (Diabetic)     Pill Administration  whole  Agitated Behavioral Scale     ABS Level of Severity       Fall Risk  Has the patient had a fall this admission?   No  Lizzy Mar Fall Risk Scoring  14, MODERATE RISK  Fall Risk Safety Measures  bed alarm, chair alarm, and poor balance    Vitals  Temperature: 37.1 °C (98.8 °F)  Temp src: Oral  Pulse: 89  Respiration: 18  Blood Pressure: 109/65  Blood Pressure MAP (Calculated): 80 MM HG  BP Location: Left, Upper Arm  Patient BP Position: Sitting     Oxygen  Pulse Oximetry: 98 %  O2 (LPM): 0  O2 Delivery Device: None - Room Air    Bowel and Bladder  Last Bowel Movement  10/11/23  Stool Type  Type 3: Like a sausage, but with cracks on its surface  Bowel Device  Bathroom  Continent  Bladder: Continent void   Bowel: Continent movement  Bladder Function  Urine Void (mL): 600 ml (urinal and bathroom vd)  Number of Times Voided: 2  Urinary Options: Yes  Urine Color: Yellow  Number of Times Incontinent of Urine: 0  Genitourinary Assessment   Bladder Assessment (WDL):  Within Defined Limits  Mistry Catheter: Not Applicable  Urine Color: Yellow  Number of Bladder Accidents: 0  Total Number of Bladder of Accidents in Last 7 Days: 0  Number of Times Incontinent of Urine: 0  Bladder Device: Bathroom  Time Void: Yes  Bladder Scan: Post Void  $ Bladder Scan Results (mL): 1    Skin  Ravin Score   19  Sensory  Interventions   Bed Types: Standard/Trauma Mattress  Skin Preventative Measures: Pillows in Use for Support / Positioning  Moisture Interventions  Moisturizers/Barriers: Barrier Paste, Barrier Wipes      Pain  Pain Rating Scale  0 - No Pain  Pain Location  Back  Pain Location Orientation  Posterior  Pain Interventions   Rest    ADLs    Bathing   Patient Refused Bathing (got one yesterday)  Linen Change   Complete  Personal Hygiene     Chlorhexidine Bath      Oral Care  Brushed Teeth  Teeth/Dentures     Shave  Self  Nutrition Percentage Eaten  Lunch, 0% Consumed  Environmental Precautions  Treaded Slipper Socks on Patient, Bed in Low Position  Patient Turns/Positioning  Patient Turns Self from Side to Side  Patient Turns Assistance/Tolerance  Standby Assist  Bed Positions  Bed Controls On, Bed Locked  Head of Bed Elevated  Self regulated      Psychosocial/Neurologic Assessment  Psychosocial Assessment  Psychosocial (WDL):  Within Defined Limits  Neurologic Assessment  Neuro (WDL): Within Defined Limits  Level of Consciousness: Alert  Orientation Level: Oriented X4  Cognition: Appropriate judgement, Appropriate safety awareness, Appropriate attention/concentration  Speech: Clear  Pupil Assesment: No  R Pupil Size (mm): 3  R Pupil Shape / Description: Round  R Pupil Reaction: Brisk  L Pupil Size (mm): 3  L Pupil Shape / Description: Round  L Pupil Reaction: Brisk  EENT (WDL):  Within Defined Limits    Cardio/Pulmonary Assessment  Edema   RLE Edema: 1+  LLE Edema: 1+  Respiratory Breath Sounds  RUL Breath Sounds: Clear  RML Breath Sounds: Clear  RLL Breath Sounds: Clear  BRIAN Breath Sounds: Clear  LLL Breath Sounds: Clear  Cardiac Assessment   Cardiac (WDL):  Within Defined Limits

## 2023-10-11 NOTE — PROGRESS NOTES
NURSING DAILY NOTE    Name: Casper Rowley   Date of Admission: 9/29/2023   Admitting Diagnosis: Cerebellar mass  Attending Physician: Kasie Lin M.d.  Allergies: Patient has no known allergies.    Safety  Patient Assist  SBA  Patient Precautions  Fall Risk  Precaution Comments     Bed Transfer Status  Standby Assist  Toilet Transfer Status   Supervised  Assistive Devices  Rails, Wheelchair  Oxygen  None - Room Air  Diet/Therapeutic Dining  Current Diet Order   Procedures    Diet Order Diet: Consistent CHO (Diabetic)     Pill Administration  whole  Agitated Behavioral Scale     ABS Level of Severity       Fall Risk  Has the patient had a fall this admission?   No  Lizzy Mar Fall Risk Scoring  19, HIGH RISK  Fall Risk Safety Measures  bed alarm and chair alarm    Vitals  Temperature: 37.3 °C (99.1 °F)  Temp src: Oral  Pulse: 89  Respiration: 18  Blood Pressure: 121/87  Blood Pressure MAP (Calculated): 98 MM HG  BP Location: Left, Upper Arm  Patient BP Position: Sitting     Oxygen  Pulse Oximetry: 96 %  O2 (LPM): 0  O2 Delivery Device: None - Room Air    Bowel and Bladder  Last Bowel Movement  10/11/23  Stool Type  Type 3: Like a sausage, but with cracks on its surface  Bowel Device  Bathroom  Continent  Bladder: Continent void   Bowel: Continent movement  Bladder Function  Urine Void (mL): 600 ml (urinal and bathroom vd)  Number of Times Voided: 2  Urinary Options: Yes  Urine Color: Yellow  Number of Times Incontinent of Urine: 0  Genitourinary Assessment   Bladder Assessment (WDL):  Within Defined Limits  Mistry Catheter: Not Applicable  Urine Color: Yellow  Number of Bladder Accidents: 0  Total Number of Bladder of Accidents in Last 7 Days: 0  Number of Times Incontinent of Urine: 0  Bladder Device: Urinal  Time Void: Yes  Bladder Scan: Post Void  $ Bladder Scan Results (mL): 1    Skin  Ravin Score   18  Sensory Interventions   Bed  Types: Standard/Trauma Mattress  Skin Preventative Measures: Pillows in Use for Support / Positioning  Moisture Interventions  Moisturizers/Barriers: Barrier Paste, Barrier Wipes      Pain  Pain Rating Scale  6 - Hard to ignore, avoid usual activities  Pain Location  Back  Pain Location Orientation  Posterior  Pain Interventions   Rest    ADLs    Bathing   Patient Refused Bathing (got one yesterday)  Linen Change   Complete  Personal Hygiene     Chlorhexidine Bath      Oral Care  Brushed Teeth  Teeth/Dentures     Shave  Self  Nutrition Percentage Eaten  Lunch, 0% Consumed  Environmental Precautions  Treaded Slipper Socks on Patient, Bed in Low Position  Patient Turns/Positioning  Patient Turns Self from Side to Side  Patient Turns Assistance/Tolerance  Standby Assist  Bed Positions  Bed Controls On, Bed Locked  Head of Bed Elevated  Self regulated      Psychosocial/Neurologic Assessment  Psychosocial Assessment  Psychosocial (WDL):  Within Defined Limits  Neurologic Assessment  Neuro (WDL): Within Defined Limits  Level of Consciousness: Alert  Orientation Level: Oriented X4  Cognition: Appropriate judgement, Appropriate safety awareness, Appropriate attention/concentration  Speech: Clear  Pupil Assesment: No  R Pupil Size (mm): 3  R Pupil Shape / Description: Round  R Pupil Reaction: Brisk  L Pupil Size (mm): 3  L Pupil Shape / Description: Round  L Pupil Reaction: Brisk  EENT (WDL):  Within Defined Limits    Cardio/Pulmonary Assessment  Edema   RLE Edema: 1+  LLE Edema: 1+  Respiratory Breath Sounds  RUL Breath Sounds: Clear  RML Breath Sounds: Clear  RLL Breath Sounds: Clear  BRIAN Breath Sounds: Clear  LLL Breath Sounds: Clear  Cardiac Assessment   Cardiac (WDL):  WDL Except (H/O HTN)

## 2023-10-11 NOTE — CARE PLAN
"  Problem: Knowledge Deficit - Standard  Goal: Patient and family/care givers will demonstrate understanding of plan of care, disease process/condition, diagnostic tests and medications  Outcome: Progressing  Note: Pt agrees with plan of care tonight regarding medications and safety.  Discharge instructions given tonight for discharge at 0800 per pt.  Will continue to monitor patient.      Problem: Fall Risk - Rehab  Goal: Patient will remain free from falls  Outcome: Progressing  Note: Lizzy Mar Fall risk Assessment Score:  15    High fall risk Interventions   - Alarming seatbelt  - Wander guard  - Bed and strip alarm   - Yellow sign by the door   - Yellow wrist band \"Fall risk\"  - Room near to the nurse station  - Do not leave patient unattended in the bathroom  - Fall risk education provided         Problem: Pain - Standard  Goal: Alleviation of pain or a reduction in pain to the patient’s comfort goal  Outcome: Progressing  Note: C/o pain to back, medicated with prn Oxycodone.  Has + relief.  See MAR and doc flow sheet.  Will continue to monitor patient.     The patient is Stable - Low risk of patient condition declining or worsening    Shift Goals  Clinical Goals: Safety  Patient Goals: Sleep well  Family Goals: Education    Progress made toward(s) clinical / shift goals:  progressing          "

## 2023-10-11 NOTE — PROGRESS NOTES
Discharge instructions given to patient and Controlled Substances Use Informed Consent form signed by patient.  Questions and concerns addressed at this time.  Per pt wants to leave at 0800.

## 2023-10-11 NOTE — CARE PLAN
Problem: Knowledge Deficit - Standard  Goal: Patient and family/care givers will demonstrate understanding of plan of care, disease process/condition, diagnostic tests and medications  Outcome: Met     Problem: Fall Risk - Rehab  Goal: Patient will remain free from falls  Outcome: Met     Problem: Discharge Barriers/Planning  Goal: Patient's continuum of care needs are met  Outcome: Met     Problem: Psychosocial  Goal: Patient's level of anxiety will decrease  Outcome: Met  Goal: Patient's ability to verbalize feelings about condition will improve  Outcome: Met  Goal: Patient's ability to re-evaluate and adapt role responsibilities will improve  Outcome: Met  Goal: Patient and family will demonstrate ability to cope with life altering diagnosis and/or procedure  Outcome: Met  Goal: Spiritual and cultural needs incorporated into hospitalization  Outcome: Met     Problem: Hemodynamics  Goal: Patient's hemodynamics, fluid balance and neurologic status will be stable or improve  Outcome: Met     Problem: Respiratory  Goal: Patient will understand use and administration of respiratory medications to improve respiratory function  Outcome: Met     Problem: Bladder / Voiding  Goal: Patient will establish and maintain regular urinary output  Outcome: Met  Goal: Patient will establish and maintain bladder regimen  Outcome: Met     Problem: Bowel Elimination  Goal: Patient will participate in bowel management program  Outcome: Met     Problem: Skin Integrity  Goal: Patient's skin integrity will be maintained or improve  Outcome: Met     Problem: Nutrition  Goal: Patient's nutritional and fluid intake will be adequate or improve  Outcome: Met  Goal: Patient will display progressive weight gain toward goal have adequate food and fluid intake  Outcome: Met  Goal: Patient will demonstrate ability to administer respiratory medications  Outcome: Met     Problem: Self Care  Goal: Patient will have the ability to perform ADLs  independently or with assistance  Outcome: Met     Problem: Mobility  Goal: Patient's capacity to carry out activities will improve  Outcome: Met     Problem: Infection  Goal: Patient will remain free from infection  Outcome: Met     Problem: VTE Prevention  Goal: Patient will remain free from venous thromboembolism (VTE)  Outcome: Met     Problem: Diabetes Management  Goal: Patient's ability to maintain appropriate glucose levels will be maintained or improve  Outcome: Met     Problem: Pain - Standard  Goal: Alleviation of pain or a reduction in pain to the patient’s comfort goal  Outcome: Met   The patient is Stable - Low risk of patient condition declining or worsening

## 2023-10-11 NOTE — PROGRESS NOTES
Received bedside shift report from Christina BALL RN regarding patient and assumed care. Patient awake, calm and stable, currently positioned in bed for comfort and safety; call light within reach. Denies pain or discomfort at this time. Will continue to monitor.

## 2023-10-12 ENCOUNTER — PATIENT MESSAGE (OUTPATIENT)
Dept: ONCOLOGY | Facility: MEDICAL CENTER | Age: 58
End: 2023-10-12
Payer: COMMERCIAL

## 2023-10-12 ENCOUNTER — PATIENT OUTREACH (OUTPATIENT)
Dept: ONCOLOGY | Facility: MEDICAL CENTER | Age: 58
End: 2023-10-12
Payer: COMMERCIAL

## 2023-10-12 DIAGNOSIS — Z65.8 PSYCHOSOCIAL DISTRESS: ICD-10-CM

## 2023-10-12 NOTE — LETTER
Cody AQUINO [facility]    [address]  [place] [address]  Phone: [phone] - Fax: [phone]              Casper Arsen Rowley  [address]  [address]  [place] [address]     Date: 10/12/23    Dear Casper,    On behalf of your entire care team at [facility], I wish you a speedy recovery as you heal from your surgery. I wanted to let you know about the services our entire care team at [facility] have to help you during your treatment.  As Cancer Nurse Navigator I can address any needs you may have with education, guidance and support. I am available to you and your family through your treatment at [facility].       I am available to address your needs during your journey with the following services:     Care Coordination  I can assist you in facilitating communication between your cancer care treatment team to ensure timely treatment and follow-up.  I can also assist with transition of care back to your primary care provider, or other specialist, as needed.  My goal is to bridge gaps for you throughout the course of your active treatment.       Education Services  Understanding the recommended treatment course by your physician is key. I can provide educational resources personalized to your cancer diagnosis to help you understand your diagnosis and treatment. Please let me know if you would like to receive information about your diagnosis and treatment plan.  I am here to help.     Support Services/Resource Information  [facility] we offer a full scope of support services.  I can assist you with referral information to:  Cancer Clinical Trials & Research  Nutrition counseling  Support groups  Complementary Therapies such as Mind-Body Techniques Meditation  Patient Financial Advocates    [facility], an [employer] affiliate office, our volunteers can assist you with accessing our Websand library, support services information, head  coverings and comfort items  Community and national resources, included eligibility based calixto assistance and pharmaceutical access programs, if you are in need of additional information.     [facility] offers services that include:  Behavioral Health  Genetic counseling & testing  Lymphedema prevention/treatment program  Palliative care services.        I hope you have an excellent patient experience.  Please feel free to share with me your comments regarding the care you have received- we value your feedback.    Sincerely,     Lidia Darden R.N.  Cancer Nurse Navigator    Office: [phone]  Main: [phone]  Email: [email or web address]

## 2023-10-12 NOTE — PROGRESS NOTES
"First call to pt to introduce self and assess. Pt and ONN had a great conversation. Pt informed SATISH of his experiences thus far leading up to surgery and since. He has discharged from [facility] yesterday. He will have and MRI on 10/13 and meet Dr. Baker on 10/16. Pt was a 4/10 on the distress scale related to worry and nervousness about having radiation to the brain. He states that his SO Basilia is the real worrier. Pt states he is also waiting for a follow up visit with surgeon Dr. Be. SATISH will reach out to office to request they contact the pt and set up appt. No barriers to care identified on this call and pt is reporting \"doing really well.\" Introduction letter and support services calendar sent to pt via [email or web address]. Will continue to follow pt through treatments.   "

## 2023-10-13 ENCOUNTER — HOSPITAL ENCOUNTER (OUTPATIENT)
Facility: MEDICAL CENTER | Age: 58
End: 2023-10-13
Attending: STUDENT IN AN ORGANIZED HEALTH CARE EDUCATION/TRAINING PROGRAM
Payer: COMMERCIAL

## 2023-10-13 ENCOUNTER — HOSPITAL ENCOUNTER (OUTPATIENT)
Dept: LAB | Facility: MEDICAL CENTER | Age: 58
End: 2023-10-13
Attending: PHYSICIAN ASSISTANT
Payer: COMMERCIAL

## 2023-10-13 ENCOUNTER — HOSPITAL ENCOUNTER (OUTPATIENT)
Dept: LAB | Facility: MEDICAL CENTER | Age: 58
End: 2023-10-13
Attending: NURSE PRACTITIONER
Payer: COMMERCIAL

## 2023-10-13 ENCOUNTER — APPOINTMENT (OUTPATIENT)
Dept: RADIOLOGY | Facility: MEDICAL CENTER | Age: 58
End: 2023-10-13
Attending: RADIOLOGY
Payer: COMMERCIAL

## 2023-10-13 DIAGNOSIS — C79.31 METASTASIS TO BRAIN (HCC): ICD-10-CM

## 2023-10-13 LAB
ALBUMIN SERPL BCP-MCNC: 3.6 G/DL (ref 3.2–4.9)
ALBUMIN/GLOB SERPL: 1 G/DL
ALP SERPL-CCNC: 141 U/L (ref 30–99)
ALT SERPL-CCNC: 11 U/L (ref 2–50)
ANION GAP SERPL CALC-SCNC: 15 MMOL/L (ref 7–16)
AST SERPL-CCNC: 18 U/L (ref 12–45)
BASOPHILS # BLD AUTO: 0.3 % (ref 0–1.8)
BASOPHILS # BLD: 0.01 K/UL (ref 0–0.12)
BILIRUB SERPL-MCNC: 0.3 MG/DL (ref 0.1–1.5)
BUN SERPL-MCNC: 5 MG/DL (ref 8–22)
CALCIUM ALBUM COR SERPL-MCNC: 8.6 MG/DL (ref 8.5–10.5)
CALCIUM SERPL-MCNC: 8.3 MG/DL (ref 8.4–10.2)
CHLORIDE SERPL-SCNC: 105 MMOL/L (ref 96–112)
CO2 SERPL-SCNC: 21 MMOL/L (ref 20–33)
CREAT SERPL-MCNC: 0.41 MG/DL (ref 0.5–1.4)
EOSINOPHIL # BLD AUTO: 0.1 K/UL (ref 0–0.51)
EOSINOPHIL NFR BLD: 3 % (ref 0–6.9)
ERYTHROCYTE [DISTWIDTH] IN BLOOD BY AUTOMATED COUNT: 61.1 FL (ref 35.9–50)
GFR SERPLBLD CREATININE-BSD FMLA CKD-EPI: 126 ML/MIN/1.73 M 2
GLOBULIN SER CALC-MCNC: 3.5 G/DL (ref 1.9–3.5)
GLUCOSE SERPL-MCNC: 176 MG/DL (ref 65–99)
HCT VFR BLD AUTO: 33.2 % (ref 42–52)
HGB BLD-MCNC: 10.4 G/DL (ref 14–18)
IMM GRANULOCYTES # BLD AUTO: 0.01 K/UL (ref 0–0.11)
IMM GRANULOCYTES NFR BLD AUTO: 0.3 % (ref 0–0.9)
LYMPHOCYTES # BLD AUTO: 0.48 K/UL (ref 1–4.8)
LYMPHOCYTES NFR BLD: 14.3 % (ref 22–41)
MCH RBC QN AUTO: 29.9 PG (ref 27–33)
MCHC RBC AUTO-ENTMCNC: 31.3 G/DL (ref 32.3–36.5)
MCV RBC AUTO: 95.4 FL (ref 81.4–97.8)
MONOCYTES # BLD AUTO: 0.26 K/UL (ref 0–0.85)
MONOCYTES NFR BLD AUTO: 7.7 % (ref 0–13.4)
NEUTROPHILS # BLD AUTO: 2.5 K/UL (ref 1.82–7.42)
NEUTROPHILS NFR BLD: 74.4 % (ref 44–72)
NRBC # BLD AUTO: 0 K/UL
NRBC BLD-RTO: 0 /100 WBC (ref 0–0.2)
PLATELET # BLD AUTO: 383 K/UL (ref 164–446)
PMV BLD AUTO: 8.9 FL (ref 9–12.9)
POTASSIUM SERPL-SCNC: 3.6 MMOL/L (ref 3.6–5.5)
PROT SERPL-MCNC: 7.1 G/DL (ref 6–8.2)
PSA SERPL-MCNC: 0.66 NG/ML (ref 0–4)
RBC # BLD AUTO: 3.48 M/UL (ref 4.7–6.1)
SODIUM SERPL-SCNC: 141 MMOL/L (ref 135–145)
TESTOST SERPL-MCNC: 7 NG/DL (ref 175–781)
WBC # BLD AUTO: 3.4 K/UL (ref 4.8–10.8)

## 2023-10-13 PROCEDURE — 84153 ASSAY OF PSA TOTAL: CPT

## 2023-10-13 PROCEDURE — 36415 COLL VENOUS BLD VENIPUNCTURE: CPT

## 2023-10-13 PROCEDURE — 87086 URINE CULTURE/COLONY COUNT: CPT

## 2023-10-13 PROCEDURE — 85025 COMPLETE CBC W/AUTO DIFF WBC: CPT

## 2023-10-13 PROCEDURE — 700117 HCHG RX CONTRAST REV CODE 255: Performed by: RADIOLOGY

## 2023-10-13 PROCEDURE — 84403 ASSAY OF TOTAL TESTOSTERONE: CPT

## 2023-10-13 PROCEDURE — 80053 COMPREHEN METABOLIC PANEL: CPT

## 2023-10-13 PROCEDURE — A9579 GAD-BASE MR CONTRAST NOS,1ML: HCPCS | Performed by: RADIOLOGY

## 2023-10-13 PROCEDURE — 70553 MRI BRAIN STEM W/O & W/DYE: CPT

## 2023-10-13 RX ADMIN — GADOTERIDOL 15 ML: 279.3 INJECTION, SOLUTION INTRAVENOUS at 15:06

## 2023-10-15 LAB
BACTERIA UR CULT: NORMAL
SIGNIFICANT IND 70042: NORMAL
SITE SITE: NORMAL
SOURCE SOURCE: NORMAL

## 2023-10-16 ENCOUNTER — HOSPITAL ENCOUNTER (OUTPATIENT)
Dept: RADIATION ONCOLOGY | Facility: MEDICAL CENTER | Age: 58
End: 2023-10-16

## 2023-10-16 PROCEDURE — 77290 THER RAD SIMULAJ FIELD CPLX: CPT | Performed by: RADIOLOGY

## 2023-10-16 PROCEDURE — 77334 RADIATION TREATMENT AID(S): CPT | Performed by: RADIOLOGY

## 2023-10-16 PROCEDURE — 77334 RADIATION TREATMENT AID(S): CPT | Mod: 26 | Performed by: RADIOLOGY

## 2023-10-16 PROCEDURE — 77290 THER RAD SIMULAJ FIELD CPLX: CPT | Mod: 26 | Performed by: RADIOLOGY

## 2023-10-16 PROCEDURE — 77470 SPECIAL RADIATION TREATMENT: CPT | Performed by: RADIOLOGY

## 2023-10-16 PROCEDURE — 77263 THER RADIOLOGY TX PLNG CPLX: CPT | Performed by: RADIOLOGY

## 2023-10-16 PROCEDURE — 77470 SPECIAL RADIATION TREATMENT: CPT | Mod: 26 | Performed by: RADIOLOGY

## 2023-10-16 NOTE — RADIATION COMPLETION NOTES
PATIENT NAME Casper Rowley   PRIMARY PHYSICIAN Mojgan Garcia [id number]   REFERRING PHYSICIAN No ref. provider found 1965     DATE OF SERVICE: 10/16/2023    DIAGNOSIS:   metastatic prostate cancer to brain      SPECIAL TREATMENT PROCEDURE NOTE:  Considerable additional effort required in the management of this case because of administration of Stereotactic Radiotherapy, which may result in increased normal tissue toxicity and require greater effort in contouring and treatment because of greater precision.

## 2023-10-16 NOTE — RADIATION COMPLETION NOTES
DATE OF SERVICE: 10/16/2023    DIAGNOSIS: metastatic prostate cancer to brain      DATE OF SERVICE: 10/16/2023    TYPE OF SIMULATION: Brain    GOAL OF TREATMENT:   [] Curative  [x] Palliative  [] Oligometastatic    CONTRAST:    [] IV Contrast*                POSITION:    [x]  Supine  [] Prone     COMPLEX:  [x] Complex Blocking   []Arcs  [] Custom Blocks  [] >3 Sites    PROCEDURE: Patient placed Stereotactic Mask with head in neutral position. CT scan obtained at 1 mm intervals. Images viewed and approved.  Images reconstructed as need.  Image data sets transferred to Brain-Lab for planning.    I have personally reviewed the relevant data, performed the target localization, and determined all relevant factors for this patient’s simulation.    *Omnipaque 80 -100cc IVP in conjunction with 500cc NS

## 2023-10-16 NOTE — RADIATION COMPLETION NOTES
Clinical Treatment Planning Note    DATE OF SERVICE: 10/16/2023    DIAGNOSIS:    Metastatic prostate cancer to brain      IMAGING REVIEWED:  [] CT     [x] MRI     [] PET/CT     [] BONE SCAN     [] MAMMO     [] OTHER      TREATMENT INTENT:   [] CURATIVE     [] MAINTENANCE     [x]  PALLIATIVE      []  SUPPORTIVE     []  PROPHYLACTIC     [] BENIGN     []  CONSOLIDATIVE      [] DEFINITIVE   []  OLOGIMETASTATIC      LINE OF TREATMENT:  [] ADJUVANT   [x] DEFINITIVE   [] NEOADJUVANT   [] RE-TREATMENT      TECHNIQUE PLANNED:  [] IMRT   [] 3D   [] SBRT   [x] SRS/SRT   [] HDR   [] ELECTRON       IMRT JUSTIFICATION:  []   An immediately adjacent area has been previously irradiated and abutting portals must be established with high precision.    []  Dose escalation is planned to deliver radiation doses in excess of those commonly utilized for similar tumors with conventional treatment.    []  The target volume is concave or convex, and the critical normal tissues are within or around that convexity or concavity.    []  The target volume is in close proximity to critical structures that must be protected.    []  The volume of interest must be covered with narrow margins to adequately protect  immediately adjacent structures.      FIELDS & BLOCKING:  [x] COMPLEX BLOCKS     []  = 3 TX AREAS     []  ARCS     []  CUSTOM SHEILD        []  SIMPLE BLOCK      CHEMOTHERAPY:  []  CONCURRENT     []  INDUCTION     [] SEQUENTIAL     []  <30 DAYS FROM XRT      NOTES:  OAR CONSTRAINTS: (GUIDELINES ONLY NOT ABSOLUTE)    Target Prescribed Coverage   PTV 95% of PTV covered by 100% (Gy) of RX Dose       BETTINA Goal   Cord Max Dose < 13Gy   Brainstem Max Dose < 12.5Gy   Optic Chiasm Max Dose < 12Gy   R Optic Nerve Max Dose < 12Gy   L Optic Nerve Max Dose < 12Gy   R Eye Max Dose < 5-7Gy   L Eye Max Dose < 5-7Gy   R Lens Max Dose < 5-7Gy   L Lens Max Dose < 5-7Gy

## 2023-10-18 ENCOUNTER — HOSPITAL ENCOUNTER (OUTPATIENT)
Dept: RADIOLOGY | Facility: MEDICAL CENTER | Age: 58
End: 2023-10-18
Payer: COMMERCIAL

## 2023-10-18 PROCEDURE — 77334 RADIATION TREATMENT AID(S): CPT | Mod: 26 | Performed by: RADIOLOGY

## 2023-10-18 PROCEDURE — 77334 RADIATION TREATMENT AID(S): CPT | Performed by: RADIOLOGY

## 2023-10-18 PROCEDURE — 77295 3-D RADIOTHERAPY PLAN: CPT | Mod: 26 | Performed by: RADIOLOGY

## 2023-10-18 PROCEDURE — 77300 RADIATION THERAPY DOSE PLAN: CPT | Mod: 26 | Performed by: RADIOLOGY

## 2023-10-18 PROCEDURE — 77300 RADIATION THERAPY DOSE PLAN: CPT | Performed by: RADIOLOGY

## 2023-10-18 PROCEDURE — 77295 3-D RADIOTHERAPY PLAN: CPT | Performed by: RADIOLOGY

## 2023-10-20 ENCOUNTER — HOSPITAL ENCOUNTER (OUTPATIENT)
Dept: LAB | Facility: MEDICAL CENTER | Age: 58
End: 2023-10-20
Attending: NURSE PRACTITIONER
Payer: COMMERCIAL

## 2023-10-20 LAB
ALBUMIN SERPL BCP-MCNC: 3.4 G/DL (ref 3.2–4.9)
ALBUMIN SERPL BCP-MCNC: 3.4 G/DL (ref 3.2–4.9)
ALBUMIN/GLOB SERPL: 0.9 G/DL
ALP SERPL-CCNC: 145 U/L (ref 30–99)
ALP SERPL-CCNC: 145 U/L (ref 30–99)
ALT SERPL-CCNC: 6 U/L (ref 2–50)
ALT SERPL-CCNC: 6 U/L (ref 2–50)
ANION GAP SERPL CALC-SCNC: 13 MMOL/L (ref 7–16)
ANION GAP SERPL CALC-SCNC: 13 MMOL/L (ref 7–16)
AST SERPL-CCNC: 12 U/L (ref 12–45)
AST SERPL-CCNC: 12 U/L (ref 12–45)
BASOPHILS # BLD AUTO: 0.4 % (ref 0–1.8)
BASOPHILS # BLD: 0.02 K/UL (ref 0–0.12)
BILIRUB SERPL-MCNC: 0.3 MG/DL (ref 0.1–1.5)
BILIRUB SERPL-MCNC: 0.3 MG/DL (ref 0.1–1.5)
BUN SERPL-MCNC: 4 MG/DL (ref 8–22)
BUN SERPL-MCNC: 4 MG/DL (ref 8–22)
CALCIUM ALBUM COR SERPL-MCNC: 8.9 MG/DL (ref 8.5–10.5)
CALCIUM SERPL-MCNC: 8.4 MG/DL (ref 8.4–10.2)
CALCIUM SERPL-MCNC: 8.4 MG/DL (ref 8.4–10.2)
CHLORIDE SERPL-SCNC: 102 MMOL/L (ref 96–112)
CHLORIDE SERPL-SCNC: 102 MMOL/L (ref 96–112)
CO2 SERPL-SCNC: 21 MMOL/L (ref 20–33)
CO2 SERPL-SCNC: 21 MMOL/L (ref 20–33)
CREAT SERPL-MCNC: 0.39 MG/DL (ref 0.5–1.4)
CREAT SERPL-MCNC: 0.39 MG/DL (ref 0.5–1.4)
EOSINOPHIL # BLD AUTO: 0.1 K/UL (ref 0–0.51)
EOSINOPHIL NFR BLD: 2 % (ref 0–6.9)
ERYTHROCYTE [DISTWIDTH] IN BLOOD BY AUTOMATED COUNT: 60.5 FL (ref 35.9–50)
GFR SERPLBLD CREATININE-BSD FMLA CKD-EPI: 128 ML/MIN/1.73 M 2
GLOBULIN SER CALC-MCNC: 3.8 G/DL (ref 1.9–3.5)
GLUCOSE SERPL-MCNC: 144 MG/DL (ref 65–99)
GLUCOSE SERPL-MCNC: 144 MG/DL (ref 65–99)
HCT VFR BLD AUTO: 30.6 % (ref 42–52)
HGB BLD-MCNC: 9.5 G/DL (ref 14–18)
IMM GRANULOCYTES # BLD AUTO: 0.03 K/UL (ref 0–0.11)
IMM GRANULOCYTES NFR BLD AUTO: 0.6 % (ref 0–0.9)
LYMPHOCYTES # BLD AUTO: 0.51 K/UL (ref 1–4.8)
LYMPHOCYTES NFR BLD: 10 % (ref 22–41)
MCH RBC QN AUTO: 28.9 PG (ref 27–33)
MCHC RBC AUTO-ENTMCNC: 31 G/DL (ref 32.3–36.5)
MCV RBC AUTO: 93 FL (ref 81.4–97.8)
MONOCYTES # BLD AUTO: 0.35 K/UL (ref 0–0.85)
MONOCYTES NFR BLD AUTO: 6.9 % (ref 0–13.4)
NEUTROPHILS # BLD AUTO: 4.09 K/UL (ref 1.82–7.42)
NEUTROPHILS NFR BLD: 80.1 % (ref 44–72)
NRBC # BLD AUTO: 0 K/UL
NRBC BLD-RTO: 0 /100 WBC (ref 0–0.2)
PLATELET # BLD AUTO: 400 K/UL (ref 164–446)
PMV BLD AUTO: 8.4 FL (ref 9–12.9)
POTASSIUM SERPL-SCNC: 3.5 MMOL/L (ref 3.6–5.5)
POTASSIUM SERPL-SCNC: 3.5 MMOL/L (ref 3.6–5.5)
PROT SERPL-MCNC: 7.2 G/DL (ref 6–8.2)
PROT SERPL-MCNC: 7.2 G/DL (ref 6–8.2)
PSA SERPL-MCNC: 0.76 NG/ML (ref 0–4)
RBC # BLD AUTO: 3.29 M/UL (ref 4.7–6.1)
SODIUM SERPL-SCNC: 136 MMOL/L (ref 135–145)
SODIUM SERPL-SCNC: 136 MMOL/L (ref 135–145)
WBC # BLD AUTO: 5.1 K/UL (ref 4.8–10.8)

## 2023-10-20 PROCEDURE — 80053 COMPREHEN METABOLIC PANEL: CPT

## 2023-10-20 PROCEDURE — 85025 COMPLETE CBC W/AUTO DIFF WBC: CPT

## 2023-10-20 PROCEDURE — 36415 COLL VENOUS BLD VENIPUNCTURE: CPT

## 2023-10-20 PROCEDURE — 84153 ASSAY OF PSA TOTAL: CPT

## 2023-10-26 ENCOUNTER — PATIENT OUTREACH (OUTPATIENT)
Dept: ONCOLOGY | Facility: MEDICAL CENTER | Age: 58
End: 2023-10-26
Payer: COMMERCIAL

## 2023-10-26 ENCOUNTER — PATIENT MESSAGE (OUTPATIENT)
Dept: ONCOLOGY | Facility: MEDICAL CENTER | Age: 58
End: 2023-10-26

## 2023-11-03 ENCOUNTER — PATIENT MESSAGE (OUTPATIENT)
Dept: ONCOLOGY | Facility: MEDICAL CENTER | Age: 58
End: 2023-11-03

## 2023-11-28 ENCOUNTER — HOSPITAL ENCOUNTER (OUTPATIENT)
Dept: RADIOLOGY | Facility: MEDICAL CENTER | Age: 58
End: 2023-11-28
Payer: COMMERCIAL

## 2023-12-14 ENCOUNTER — HOSPITAL ENCOUNTER (OUTPATIENT)
Dept: LAB | Facility: MEDICAL CENTER | Age: 58
End: 2023-12-14
Attending: PHYSICIAN ASSISTANT
Payer: COMMERCIAL

## 2023-12-14 LAB — PSA SERPL-MCNC: 0.98 NG/ML (ref 0–4)

## 2023-12-14 PROCEDURE — 84153 ASSAY OF PSA TOTAL: CPT

## 2023-12-14 PROCEDURE — 36415 COLL VENOUS BLD VENIPUNCTURE: CPT

## 2024-01-01 ENCOUNTER — OUTPATIENT INFUSION SERVICES (OUTPATIENT)
Dept: ONCOLOGY | Facility: MEDICAL CENTER | Age: 59
End: 2024-01-01
Attending: STUDENT IN AN ORGANIZED HEALTH CARE EDUCATION/TRAINING PROGRAM
Payer: COMMERCIAL

## 2024-01-01 ENCOUNTER — APPOINTMENT (OUTPATIENT)
Dept: RADIOLOGY | Facility: MEDICAL CENTER | Age: 59
End: 2024-01-01
Attending: INTERNAL MEDICINE
Payer: COMMERCIAL

## 2024-01-01 ENCOUNTER — APPOINTMENT (OUTPATIENT)
Dept: RADIOLOGY | Facility: MEDICAL CENTER | Age: 59
End: 2024-01-01
Attending: STUDENT IN AN ORGANIZED HEALTH CARE EDUCATION/TRAINING PROGRAM
Payer: COMMERCIAL

## 2024-01-01 ENCOUNTER — APPOINTMENT (OUTPATIENT)
Dept: CARDIOLOGY | Facility: MEDICAL CENTER | Age: 59
End: 2024-01-01
Attending: GENERAL PRACTICE
Payer: COMMERCIAL

## 2024-01-01 ENCOUNTER — TELEPHONE (OUTPATIENT)
Dept: HEMATOLOGY ONCOLOGY | Facility: MEDICAL CENTER | Age: 59
End: 2024-01-01
Payer: COMMERCIAL

## 2024-01-01 ENCOUNTER — APPOINTMENT (OUTPATIENT)
Dept: RADIOLOGY | Facility: MEDICAL CENTER | Age: 59
End: 2024-01-01
Attending: HOSPITALIST
Payer: COMMERCIAL

## 2024-01-01 ENCOUNTER — OFFICE VISIT (OUTPATIENT)
Dept: INTERNAL MEDICINE | Facility: OTHER | Age: 59
End: 2024-01-01
Payer: COMMERCIAL

## 2024-01-01 ENCOUNTER — HOSPITAL ENCOUNTER (OUTPATIENT)
Dept: LAB | Facility: MEDICAL CENTER | Age: 59
End: 2024-08-15
Payer: COMMERCIAL

## 2024-01-01 ENCOUNTER — TELEPHONE (OUTPATIENT)
Dept: INTERNAL MEDICINE | Facility: OTHER | Age: 59
End: 2024-01-01
Payer: COMMERCIAL

## 2024-01-01 ENCOUNTER — APPOINTMENT (OUTPATIENT)
Dept: HEMATOLOGY ONCOLOGY | Facility: MEDICAL CENTER | Age: 59
End: 2024-01-01
Payer: COMMERCIAL

## 2024-01-01 ENCOUNTER — APPOINTMENT (OUTPATIENT)
Dept: RADIOLOGY | Facility: MEDICAL CENTER | Age: 59
End: 2024-01-01
Attending: GENERAL PRACTICE
Payer: COMMERCIAL

## 2024-01-01 ENCOUNTER — PATIENT MESSAGE (OUTPATIENT)
Dept: HEALTH INFORMATION MANAGEMENT | Facility: OTHER | Age: 59
End: 2024-01-01

## 2024-01-01 ENCOUNTER — HOSPITAL ENCOUNTER (OUTPATIENT)
Dept: LAB | Facility: MEDICAL CENTER | Age: 59
End: 2024-08-15
Attending: UROLOGY
Payer: COMMERCIAL

## 2024-01-01 ENCOUNTER — APPOINTMENT (OUTPATIENT)
Dept: INTERNAL MEDICINE | Facility: OTHER | Age: 59
End: 2024-01-01
Payer: COMMERCIAL

## 2024-01-01 ENCOUNTER — HOSPITAL ENCOUNTER (INPATIENT)
Facility: MEDICAL CENTER | Age: 59
LOS: 4 days | End: 2024-08-29
Attending: STUDENT IN AN ORGANIZED HEALTH CARE EDUCATION/TRAINING PROGRAM | Admitting: STUDENT IN AN ORGANIZED HEALTH CARE EDUCATION/TRAINING PROGRAM
Payer: COMMERCIAL

## 2024-01-01 ENCOUNTER — PHARMACY VISIT (OUTPATIENT)
Dept: PHARMACY | Facility: MEDICAL CENTER | Age: 59
End: 2024-01-01
Payer: COMMERCIAL

## 2024-01-01 VITALS
BODY MASS INDEX: 23.63 KG/M2 | WEIGHT: 147.05 LBS | HEART RATE: 97 BPM | HEIGHT: 66 IN | DIASTOLIC BLOOD PRESSURE: 79 MMHG | TEMPERATURE: 97.4 F | OXYGEN SATURATION: 95 % | RESPIRATION RATE: 18 BRPM | SYSTOLIC BLOOD PRESSURE: 114 MMHG

## 2024-01-01 VITALS
OXYGEN SATURATION: 97 % | WEIGHT: 160.05 LBS | TEMPERATURE: 96.8 F | BODY MASS INDEX: 25.72 KG/M2 | HEART RATE: 136 BPM | DIASTOLIC BLOOD PRESSURE: 80 MMHG | RESPIRATION RATE: 19 BRPM | HEIGHT: 66 IN | SYSTOLIC BLOOD PRESSURE: 125 MMHG

## 2024-01-01 VITALS
OXYGEN SATURATION: 95 % | WEIGHT: 151.46 LBS | TEMPERATURE: 98 F | HEART RATE: 127 BPM | HEIGHT: 66 IN | BODY MASS INDEX: 24.34 KG/M2 | SYSTOLIC BLOOD PRESSURE: 125 MMHG | DIASTOLIC BLOOD PRESSURE: 87 MMHG | RESPIRATION RATE: 18 BRPM

## 2024-01-01 VITALS
SYSTOLIC BLOOD PRESSURE: 108 MMHG | WEIGHT: 159.83 LBS | BODY MASS INDEX: 25.69 KG/M2 | RESPIRATION RATE: 16 BRPM | OXYGEN SATURATION: 94 % | HEART RATE: 111 BPM | TEMPERATURE: 97 F | DIASTOLIC BLOOD PRESSURE: 71 MMHG | HEIGHT: 66 IN

## 2024-01-01 VITALS
SYSTOLIC BLOOD PRESSURE: 98 MMHG | TEMPERATURE: 97.4 F | BODY MASS INDEX: 24.39 KG/M2 | HEART RATE: 133 BPM | OXYGEN SATURATION: 96 % | DIASTOLIC BLOOD PRESSURE: 63 MMHG | HEIGHT: 67 IN | WEIGHT: 155.4 LBS

## 2024-01-01 VITALS
DIASTOLIC BLOOD PRESSURE: 80 MMHG | OXYGEN SATURATION: 97 % | WEIGHT: 150.13 LBS | SYSTOLIC BLOOD PRESSURE: 116 MMHG | BODY MASS INDEX: 24.13 KG/M2 | RESPIRATION RATE: 18 BRPM | HEIGHT: 66 IN | HEART RATE: 125 BPM | TEMPERATURE: 96.6 F

## 2024-01-01 VITALS
RESPIRATION RATE: 7 BRPM | HEART RATE: 54 BPM | WEIGHT: 163.58 LBS | BODY MASS INDEX: 25.67 KG/M2 | DIASTOLIC BLOOD PRESSURE: 22 MMHG | SYSTOLIC BLOOD PRESSURE: 52 MMHG | HEIGHT: 67 IN | TEMPERATURE: 95 F

## 2024-01-01 VITALS
SYSTOLIC BLOOD PRESSURE: 112 MMHG | HEART RATE: 105 BPM | HEIGHT: 66 IN | BODY MASS INDEX: 23.74 KG/M2 | WEIGHT: 147.71 LBS | TEMPERATURE: 98 F | DIASTOLIC BLOOD PRESSURE: 77 MMHG | RESPIRATION RATE: 18 BRPM | OXYGEN SATURATION: 96 %

## 2024-01-01 DIAGNOSIS — D64.81 ANEMIA ASSOCIATED WITH CHEMOTHERAPY: ICD-10-CM

## 2024-01-01 DIAGNOSIS — C61 PROSTATE CANCER (HCC): ICD-10-CM

## 2024-01-01 DIAGNOSIS — E87.1 HYPONATREMIA: ICD-10-CM

## 2024-01-01 DIAGNOSIS — Z86.39 HISTORY OF DIABETES MELLITUS: ICD-10-CM

## 2024-01-01 DIAGNOSIS — C61 PROSTATE CANCER METASTATIC TO BONE (HCC): ICD-10-CM

## 2024-01-01 DIAGNOSIS — T45.1X5A ANEMIA ASSOCIATED WITH CHEMOTHERAPY: ICD-10-CM

## 2024-01-01 DIAGNOSIS — E88.3 TUMOR LYSIS SYNDROME: ICD-10-CM

## 2024-01-01 DIAGNOSIS — J90 PLEURAL EFFUSION: ICD-10-CM

## 2024-01-01 DIAGNOSIS — R65.20 SEVERE SEPSIS (HCC): ICD-10-CM

## 2024-01-01 DIAGNOSIS — C79.51 PROSTATE CANCER METASTATIC TO BONE (HCC): ICD-10-CM

## 2024-01-01 DIAGNOSIS — C61 SMALL CELL CARCINOMA OF PROSTATE (HCC): ICD-10-CM

## 2024-01-01 DIAGNOSIS — R80.9 PROTEINURIA, UNSPECIFIED TYPE: ICD-10-CM

## 2024-01-01 DIAGNOSIS — I10 PRIMARY HYPERTENSION: ICD-10-CM

## 2024-01-01 DIAGNOSIS — R00.0 TACHYCARDIA: ICD-10-CM

## 2024-01-01 DIAGNOSIS — E78.5 HYPERLIPIDEMIA, UNSPECIFIED HYPERLIPIDEMIA TYPE: ICD-10-CM

## 2024-01-01 DIAGNOSIS — C79.82 METASTATIC ADENOCARCINOMA TO PROSTATE (HCC): ICD-10-CM

## 2024-01-01 DIAGNOSIS — E86.0 DEHYDRATION: ICD-10-CM

## 2024-01-01 DIAGNOSIS — G89.3 CANCER RELATED PAIN: ICD-10-CM

## 2024-01-01 DIAGNOSIS — C79.31 METASTASIS TO BRAIN (HCC): ICD-10-CM

## 2024-01-01 DIAGNOSIS — J18.9 PNEUMONIA OF RIGHT LOWER LOBE DUE TO INFECTIOUS ORGANISM: Primary | ICD-10-CM

## 2024-01-01 DIAGNOSIS — A41.9 SEVERE SEPSIS (HCC): ICD-10-CM

## 2024-01-01 DIAGNOSIS — E87.20 LACTIC ACIDOSIS: ICD-10-CM

## 2024-01-01 LAB
ABO GROUP BLD: NORMAL
ALBUMIN SERPL BCP-MCNC: 2.1 G/DL (ref 3.2–4.9)
ALBUMIN SERPL BCP-MCNC: 2.4 G/DL (ref 3.2–4.9)
ALBUMIN SERPL BCP-MCNC: 2.5 G/DL (ref 3.2–4.9)
ALBUMIN SERPL BCP-MCNC: 2.7 G/DL (ref 3.2–4.9)
ALBUMIN SERPL BCP-MCNC: 2.9 G/DL (ref 3.2–4.9)
ALBUMIN SERPL BCP-MCNC: 3 G/DL (ref 3.2–4.9)
ALBUMIN SERPL BCP-MCNC: 3.4 G/DL (ref 3.2–4.9)
ALBUMIN SERPL BCP-MCNC: 3.5 G/DL (ref 3.2–4.9)
ALBUMIN/GLOB SERPL: 0.9 G/DL
ALBUMIN/GLOB SERPL: 1 G/DL
ALBUMIN/GLOB SERPL: 1.1 G/DL
ALBUMIN/GLOB SERPL: 1.1 G/DL
ALBUMIN/GLOB SERPL: 1.3 G/DL
ALP SERPL-CCNC: 394 U/L (ref 30–99)
ALP SERPL-CCNC: 433 U/L (ref 30–99)
ALP SERPL-CCNC: 443 U/L (ref 30–99)
ALP SERPL-CCNC: 444 U/L (ref 30–99)
ALP SERPL-CCNC: 463 U/L (ref 30–99)
ALP SERPL-CCNC: 489 U/L (ref 30–99)
ALP SERPL-CCNC: 561 U/L (ref 30–99)
ALP SERPL-CCNC: 564 U/L (ref 30–99)
ALT SERPL-CCNC: 213 U/L (ref 2–50)
ALT SERPL-CCNC: 280 U/L (ref 2–50)
ALT SERPL-CCNC: 373 U/L (ref 2–50)
ALT SERPL-CCNC: 44 U/L (ref 2–50)
ALT SERPL-CCNC: 514 U/L (ref 2–50)
ALT SERPL-CCNC: 66 U/L (ref 2–50)
ALT SERPL-CCNC: 698 U/L (ref 2–50)
ALT SERPL-CCNC: 73 U/L (ref 2–50)
AMMONIA PLAS-SCNC: 41 UMOL/L (ref 11–45)
AMYLASE FLD-CCNC: <3 U/L
ANION GAP SERPL CALC-SCNC: 16 MMOL/L (ref 7–16)
ANION GAP SERPL CALC-SCNC: 18 MMOL/L (ref 7–16)
ANION GAP SERPL CALC-SCNC: 20 MMOL/L (ref 7–16)
ANION GAP SERPL CALC-SCNC: 20 MMOL/L (ref 7–16)
ANION GAP SERPL CALC-SCNC: 21 MMOL/L (ref 7–16)
ANION GAP SERPL CALC-SCNC: 21 MMOL/L (ref 7–16)
ANION GAP SERPL CALC-SCNC: 22 MMOL/L (ref 7–16)
ANION GAP SERPL CALC-SCNC: 22 MMOL/L (ref 7–16)
ANION GAP SERPL CALC-SCNC: 24 MMOL/L (ref 7–16)
ANION GAP SERPL CALC-SCNC: 25 MMOL/L (ref 7–16)
ANION GAP SERPL CALC-SCNC: 27 MMOL/L (ref 7–16)
ANION GAP SERPL CALC-SCNC: 30 MMOL/L (ref 7–16)
ANISOCYTOSIS BLD QL SMEAR: ABNORMAL
APPEARANCE FLD: CLEAR
APPEARANCE UR: ABNORMAL
APPEARANCE UR: CLEAR
AST SERPL-CCNC: 1042 U/L (ref 12–45)
AST SERPL-CCNC: 144 U/L (ref 12–45)
AST SERPL-CCNC: 168 U/L (ref 12–45)
AST SERPL-CCNC: 2591 U/L (ref 12–45)
AST SERPL-CCNC: 4800 U/L (ref 12–45)
AST SERPL-CCNC: 486 U/L (ref 12–45)
AST SERPL-CCNC: 709 U/L (ref 12–45)
AST SERPL-CCNC: 86 U/L (ref 12–45)
BACTERIA #/AREA URNS HPF: NEGATIVE /HPF
BACTERIA FLD AEROBE CULT: NORMAL
BACTERIA UR CULT: NORMAL
BASE EXCESS BLDV CALC-SCNC: -11 MMOL/L (ref -4–3)
BASOPHILS # BLD AUTO: 0 % (ref 0–1.8)
BASOPHILS # BLD AUTO: 0 % (ref 0–1.8)
BASOPHILS # BLD AUTO: 0.3 % (ref 0–1.8)
BASOPHILS # BLD AUTO: 0.3 % (ref 0–1.8)
BASOPHILS # BLD AUTO: 0.4 % (ref 0–1.8)
BASOPHILS # BLD AUTO: 0.5 % (ref 0–1.8)
BASOPHILS # BLD AUTO: 0.5 % (ref 0–1.8)
BASOPHILS # BLD AUTO: 0.6 % (ref 0–1.8)
BASOPHILS # BLD AUTO: 0.8 % (ref 0–1.8)
BASOPHILS # BLD: 0 K/UL (ref 0–0.12)
BASOPHILS # BLD: 0 K/UL (ref 0–0.12)
BASOPHILS # BLD: 0.01 K/UL (ref 0–0.12)
BASOPHILS # BLD: 0.01 K/UL (ref 0–0.12)
BASOPHILS # BLD: 0.02 K/UL (ref 0–0.12)
BASOPHILS # BLD: 0.07 K/UL (ref 0–0.12)
BILIRUB SERPL-MCNC: 0.5 MG/DL (ref 0.1–1.5)
BILIRUB SERPL-MCNC: 0.9 MG/DL (ref 0.1–1.5)
BILIRUB SERPL-MCNC: 1.6 MG/DL (ref 0.1–1.5)
BILIRUB SERPL-MCNC: 2.3 MG/DL (ref 0.1–1.5)
BILIRUB SERPL-MCNC: 2.5 MG/DL (ref 0.1–1.5)
BILIRUB SERPL-MCNC: 3 MG/DL (ref 0.1–1.5)
BILIRUB SERPL-MCNC: 3.8 MG/DL (ref 0.1–1.5)
BILIRUB SERPL-MCNC: 4.3 MG/DL (ref 0.1–1.5)
BILIRUB UR QL STRIP.AUTO: NEGATIVE
BILIRUB UR QL STRIP.AUTO: NEGATIVE
BLD GP AB SCN SERPL QL: NORMAL
BODY FLD TYPE: NORMAL
BODY TEMPERATURE: ABNORMAL DEGREES
BUN SERPL-MCNC: 15 MG/DL (ref 8–22)
BUN SERPL-MCNC: 20 MG/DL (ref 8–22)
BUN SERPL-MCNC: 22 MG/DL (ref 8–22)
BUN SERPL-MCNC: 23 MG/DL (ref 8–22)
BUN SERPL-MCNC: 25 MG/DL (ref 8–22)
BUN SERPL-MCNC: 26 MG/DL (ref 8–22)
BUN SERPL-MCNC: 37 MG/DL (ref 8–22)
BUN SERPL-MCNC: 41 MG/DL (ref 8–22)
BUN SERPL-MCNC: 44 MG/DL (ref 8–22)
BUN SERPL-MCNC: 44 MG/DL (ref 8–22)
BUN SERPL-MCNC: 6 MG/DL (ref 8–22)
BUN SERPL-MCNC: 9 MG/DL (ref 8–22)
BURR CELLS BLD QL SMEAR: NORMAL
CALCIUM ALBUM COR SERPL-MCNC: 8.9 MG/DL (ref 8.5–10.5)
CALCIUM ALBUM COR SERPL-MCNC: 9.1 MG/DL (ref 8.5–10.5)
CALCIUM ALBUM COR SERPL-MCNC: 9.2 MG/DL (ref 8.5–10.5)
CALCIUM ALBUM COR SERPL-MCNC: 9.3 MG/DL (ref 8.5–10.5)
CALCIUM ALBUM COR SERPL-MCNC: 9.7 MG/DL (ref 8.5–10.5)
CALCIUM ALBUM COR SERPL-MCNC: 9.8 MG/DL (ref 8.5–10.5)
CALCIUM SERPL-MCNC: 8 MG/DL (ref 8.5–10.5)
CALCIUM SERPL-MCNC: 8.2 MG/DL (ref 8.5–10.5)
CALCIUM SERPL-MCNC: 8.3 MG/DL (ref 8.5–10.5)
CALCIUM SERPL-MCNC: 8.4 MG/DL (ref 8.5–10.5)
CALCIUM SERPL-MCNC: 8.5 MG/DL (ref 8.5–10.5)
CALCIUM SERPL-MCNC: 8.6 MG/DL (ref 8.5–10.5)
CALCIUM SERPL-MCNC: 8.7 MG/DL (ref 8.5–10.5)
CALCIUM SERPL-MCNC: 8.8 MG/DL (ref 8.5–10.5)
CALCIUM SERPL-MCNC: 8.9 MG/DL (ref 8.5–10.5)
CALCIUM SERPL-MCNC: 9 MG/DL (ref 8.5–10.5)
CAOX CRY #/AREA URNS HPF: ABNORMAL /HPF
CHLORIDE SERPL-SCNC: 80 MMOL/L (ref 96–112)
CHLORIDE SERPL-SCNC: 90 MMOL/L (ref 96–112)
CHLORIDE SERPL-SCNC: 91 MMOL/L (ref 96–112)
CHLORIDE SERPL-SCNC: 92 MMOL/L (ref 96–112)
CHLORIDE SERPL-SCNC: 93 MMOL/L (ref 96–112)
CHLORIDE SERPL-SCNC: 94 MMOL/L (ref 96–112)
CHLORIDE SERPL-SCNC: 98 MMOL/L (ref 96–112)
CHOLEST SERPL-MCNC: 116 MG/DL (ref 100–199)
CO2 BLDV-SCNC: 15 MMOL/L (ref 20–33)
CO2 SERPL-SCNC: 11 MMOL/L (ref 20–33)
CO2 SERPL-SCNC: 12 MMOL/L (ref 20–33)
CO2 SERPL-SCNC: 14 MMOL/L (ref 20–33)
CO2 SERPL-SCNC: 15 MMOL/L (ref 20–33)
CO2 SERPL-SCNC: 15 MMOL/L (ref 20–33)
CO2 SERPL-SCNC: 16 MMOL/L (ref 20–33)
CO2 SERPL-SCNC: 16 MMOL/L (ref 20–33)
CO2 SERPL-SCNC: 18 MMOL/L (ref 20–33)
CO2 SERPL-SCNC: 18 MMOL/L (ref 20–33)
CO2 SERPL-SCNC: 19 MMOL/L (ref 20–33)
COLOR FLD: YELLOW
COLOR UR: YELLOW
COLOR UR: YELLOW
CREAT SERPL-MCNC: 0.43 MG/DL (ref 0.5–1.4)
CREAT SERPL-MCNC: 0.44 MG/DL (ref 0.5–1.4)
CREAT SERPL-MCNC: 0.5 MG/DL (ref 0.5–1.4)
CREAT SERPL-MCNC: 0.56 MG/DL (ref 0.5–1.4)
CREAT SERPL-MCNC: 0.59 MG/DL (ref 0.5–1.4)
CREAT SERPL-MCNC: 0.64 MG/DL (ref 0.5–1.4)
CREAT SERPL-MCNC: 0.7 MG/DL (ref 0.5–1.4)
CREAT SERPL-MCNC: 0.79 MG/DL (ref 0.5–1.4)
CREAT SERPL-MCNC: 1.09 MG/DL (ref 0.5–1.4)
CREAT SERPL-MCNC: 1.41 MG/DL (ref 0.5–1.4)
CREAT SERPL-MCNC: 1.46 MG/DL (ref 0.5–1.4)
CREAT SERPL-MCNC: 1.53 MG/DL (ref 0.5–1.4)
CREAT UR-MCNC: 93.97 MG/DL
CREAT UR-MCNC: 97.66 MG/DL
CSF COMMENTS 1658: NORMAL
CYTOLOGY REG CYTOL: NORMAL
DELSYS IDSYS: ABNORMAL
EKG IMPRESSION: NORMAL
EOSINOPHIL # BLD AUTO: 0 K/UL (ref 0–0.51)
EOSINOPHIL # BLD AUTO: 0 K/UL (ref 0–0.51)
EOSINOPHIL # BLD AUTO: 0.05 K/UL (ref 0–0.51)
EOSINOPHIL # BLD AUTO: 0.06 K/UL (ref 0–0.51)
EOSINOPHIL # BLD AUTO: 0.06 K/UL (ref 0–0.51)
EOSINOPHIL # BLD AUTO: 0.07 K/UL (ref 0–0.51)
EOSINOPHIL # BLD AUTO: 0.07 K/UL (ref 0–0.51)
EOSINOPHIL # BLD AUTO: 0.11 K/UL (ref 0–0.51)
EOSINOPHIL # BLD AUTO: 0.14 K/UL (ref 0–0.51)
EOSINOPHIL NFR BLD: 0 % (ref 0–6.9)
EOSINOPHIL NFR BLD: 0 % (ref 0–6.9)
EOSINOPHIL NFR BLD: 1.5 % (ref 0–6.9)
EOSINOPHIL NFR BLD: 1.6 % (ref 0–6.9)
EOSINOPHIL NFR BLD: 1.7 % (ref 0–6.9)
EOSINOPHIL NFR BLD: 1.9 % (ref 0–6.9)
EOSINOPHIL NFR BLD: 2.1 % (ref 0–6.9)
EPI CELLS #/AREA URNS HPF: ABNORMAL /HPF
ERYTHROCYTE [DISTWIDTH] IN BLOOD BY AUTOMATED COUNT: 65 FL (ref 35.9–50)
ERYTHROCYTE [DISTWIDTH] IN BLOOD BY AUTOMATED COUNT: 65.1 FL (ref 35.9–50)
ERYTHROCYTE [DISTWIDTH] IN BLOOD BY AUTOMATED COUNT: 67.5 FL (ref 35.9–50)
ERYTHROCYTE [DISTWIDTH] IN BLOOD BY AUTOMATED COUNT: 70.4 FL (ref 35.9–50)
ERYTHROCYTE [DISTWIDTH] IN BLOOD BY AUTOMATED COUNT: 70.5 FL (ref 35.9–50)
ERYTHROCYTE [DISTWIDTH] IN BLOOD BY AUTOMATED COUNT: 76.5 FL (ref 35.9–50)
ERYTHROCYTE [DISTWIDTH] IN BLOOD BY AUTOMATED COUNT: 82.6 FL (ref 35.9–50)
ERYTHROCYTE [DISTWIDTH] IN BLOOD BY AUTOMATED COUNT: 83.9 FL (ref 35.9–50)
ERYTHROCYTE [DISTWIDTH] IN BLOOD BY AUTOMATED COUNT: 84.4 FL (ref 35.9–50)
ERYTHROCYTE [DISTWIDTH] IN BLOOD BY AUTOMATED COUNT: 86.9 FL (ref 35.9–50)
EST. AVERAGE GLUCOSE BLD GHB EST-MCNC: 143 MG/DL
FERRITIN SERPL-MCNC: ABNORMAL NG/ML (ref 22–322)
FLUAV RNA SPEC QL NAA+PROBE: NEGATIVE
FLUBV RNA SPEC QL NAA+PROBE: NEGATIVE
FUNGUS SPEC CULT: NORMAL
FUNGUS SPEC FUNGUS STN: NORMAL
FUNGUS SPEC FUNGUS STN: NORMAL
GFR SERPLBLD CREATININE-BSD FMLA CKD-EPI: 103 ML/MIN/1.73 M 2
GFR SERPLBLD CREATININE-BSD FMLA CKD-EPI: 107 ML/MIN/1.73 M 2
GFR SERPLBLD CREATININE-BSD FMLA CKD-EPI: 110 ML/MIN/1.73 M 2
GFR SERPLBLD CREATININE-BSD FMLA CKD-EPI: 112 ML/MIN/1.73 M 2
GFR SERPLBLD CREATININE-BSD FMLA CKD-EPI: 114 ML/MIN/1.73 M 2
GFR SERPLBLD CREATININE-BSD FMLA CKD-EPI: 118 ML/MIN/1.73 M 2
GFR SERPLBLD CREATININE-BSD FMLA CKD-EPI: 123 ML/MIN/1.73 M 2
GFR SERPLBLD CREATININE-BSD FMLA CKD-EPI: 124 ML/MIN/1.73 M 2
GFR SERPLBLD CREATININE-BSD FMLA CKD-EPI: 52 ML/MIN/1.73 M 2
GFR SERPLBLD CREATININE-BSD FMLA CKD-EPI: 55 ML/MIN/1.73 M 2
GFR SERPLBLD CREATININE-BSD FMLA CKD-EPI: 58 ML/MIN/1.73 M 2
GFR SERPLBLD CREATININE-BSD FMLA CKD-EPI: 79 ML/MIN/1.73 M 2
GLOBULIN SER CALC-MCNC: 2.3 G/DL (ref 1.9–3.5)
GLOBULIN SER CALC-MCNC: 2.4 G/DL (ref 1.9–3.5)
GLOBULIN SER CALC-MCNC: 2.6 G/DL (ref 1.9–3.5)
GLOBULIN SER CALC-MCNC: 2.7 G/DL (ref 1.9–3.5)
GLOBULIN SER CALC-MCNC: 2.8 G/DL (ref 1.9–3.5)
GLOBULIN SER CALC-MCNC: 2.8 G/DL (ref 1.9–3.5)
GLOBULIN SER CALC-MCNC: 3.3 G/DL (ref 1.9–3.5)
GLOBULIN SER CALC-MCNC: 3.5 G/DL (ref 1.9–3.5)
GLUCOSE BLD STRIP.AUTO-MCNC: 111 MG/DL (ref 65–99)
GLUCOSE BLD STRIP.AUTO-MCNC: 124 MG/DL (ref 65–99)
GLUCOSE BLD STRIP.AUTO-MCNC: 136 MG/DL (ref 65–99)
GLUCOSE BLD STRIP.AUTO-MCNC: 139 MG/DL (ref 65–99)
GLUCOSE BLD STRIP.AUTO-MCNC: 147 MG/DL (ref 65–99)
GLUCOSE BLD STRIP.AUTO-MCNC: 147 MG/DL (ref 65–99)
GLUCOSE BLD STRIP.AUTO-MCNC: 148 MG/DL (ref 65–99)
GLUCOSE BLD STRIP.AUTO-MCNC: 159 MG/DL (ref 65–99)
GLUCOSE BLD STRIP.AUTO-MCNC: 163 MG/DL (ref 65–99)
GLUCOSE BLD STRIP.AUTO-MCNC: 167 MG/DL (ref 65–99)
GLUCOSE BLD STRIP.AUTO-MCNC: 171 MG/DL (ref 65–99)
GLUCOSE BLD STRIP.AUTO-MCNC: 178 MG/DL (ref 65–99)
GLUCOSE BLD STRIP.AUTO-MCNC: 188 MG/DL (ref 65–99)
GLUCOSE BLD STRIP.AUTO-MCNC: 201 MG/DL (ref 65–99)
GLUCOSE BLD STRIP.AUTO-MCNC: 203 MG/DL (ref 65–99)
GLUCOSE BLD STRIP.AUTO-MCNC: 211 MG/DL (ref 65–99)
GLUCOSE BLD STRIP.AUTO-MCNC: 219 MG/DL (ref 65–99)
GLUCOSE BLD STRIP.AUTO-MCNC: 232 MG/DL (ref 65–99)
GLUCOSE BLD STRIP.AUTO-MCNC: 243 MG/DL (ref 65–99)
GLUCOSE BLD STRIP.AUTO-MCNC: 264 MG/DL (ref 65–99)
GLUCOSE BLD STRIP.AUTO-MCNC: 48 MG/DL (ref 65–99)
GLUCOSE BLD STRIP.AUTO-MCNC: 72 MG/DL (ref 65–99)
GLUCOSE BLD STRIP.AUTO-MCNC: 77 MG/DL (ref 65–99)
GLUCOSE BLD STRIP.AUTO-MCNC: 87 MG/DL (ref 65–99)
GLUCOSE BLD STRIP.AUTO-MCNC: 94 MG/DL (ref 65–99)
GLUCOSE FLD-MCNC: 189 MG/DL
GLUCOSE SERPL-MCNC: 122 MG/DL (ref 65–99)
GLUCOSE SERPL-MCNC: 138 MG/DL (ref 65–99)
GLUCOSE SERPL-MCNC: 148 MG/DL (ref 65–99)
GLUCOSE SERPL-MCNC: 148 MG/DL (ref 65–99)
GLUCOSE SERPL-MCNC: 156 MG/DL (ref 65–99)
GLUCOSE SERPL-MCNC: 169 MG/DL (ref 65–99)
GLUCOSE SERPL-MCNC: 170 MG/DL (ref 65–99)
GLUCOSE SERPL-MCNC: 179 MG/DL (ref 65–99)
GLUCOSE SERPL-MCNC: 185 MG/DL (ref 65–99)
GLUCOSE SERPL-MCNC: 233 MG/DL (ref 65–99)
GLUCOSE SERPL-MCNC: 233 MG/DL (ref 65–99)
GLUCOSE SERPL-MCNC: 90 MG/DL (ref 65–99)
GLUCOSE UR STRIP.AUTO-MCNC: NEGATIVE MG/DL
GLUCOSE UR STRIP.AUTO-MCNC: NEGATIVE MG/DL
GRAM STN SPEC: NORMAL
GRAM STN SPEC: NORMAL
HBA1C MFR BLD: 6.6 % (ref 4–5.6)
HCO3 BLDV-SCNC: 14.2 MMOL/L (ref 24–28)
HCT VFR BLD AUTO: 24.4 % (ref 42–52)
HCT VFR BLD AUTO: 24.7 % (ref 42–52)
HCT VFR BLD AUTO: 24.9 % (ref 42–52)
HCT VFR BLD AUTO: 25.9 % (ref 42–52)
HCT VFR BLD AUTO: 26.4 % (ref 42–52)
HCT VFR BLD AUTO: 26.9 % (ref 42–52)
HCT VFR BLD AUTO: 28.3 % (ref 42–52)
HCT VFR BLD AUTO: 28.6 % (ref 42–52)
HCT VFR BLD AUTO: 28.7 % (ref 42–52)
HCT VFR BLD AUTO: 29.7 % (ref 42–52)
HDLC SERPL-MCNC: 29 MG/DL
HGB BLD-MCNC: 7.6 G/DL (ref 14–18)
HGB BLD-MCNC: 7.9 G/DL (ref 14–18)
HGB BLD-MCNC: 8.3 G/DL (ref 14–18)
HGB BLD-MCNC: 8.4 G/DL (ref 14–18)
HGB BLD-MCNC: 8.7 G/DL (ref 14–18)
HGB BLD-MCNC: 9 G/DL (ref 14–18)
HGB BLD-MCNC: 9.2 G/DL (ref 14–18)
HGB BLD-MCNC: 9.4 G/DL (ref 14–18)
HGB BLD-MCNC: 9.8 G/DL (ref 14–18)
HGB BLD-MCNC: 9.9 G/DL (ref 14–18)
HGB RETIC QN AUTO: 33.7 PG/CELL (ref 29–35)
HOLDING TUBE BB 8507: NORMAL
HYALINE CASTS #/AREA URNS LPF: ABNORMAL /LPF
HYPOCHROMIA BLD QL SMEAR: ABNORMAL
IMM GRANULOCYTES # BLD AUTO: 0.02 K/UL (ref 0–0.11)
IMM GRANULOCYTES # BLD AUTO: 0.03 K/UL (ref 0–0.11)
IMM GRANULOCYTES # BLD AUTO: 0.06 K/UL (ref 0–0.11)
IMM GRANULOCYTES # BLD AUTO: 0.07 K/UL (ref 0–0.11)
IMM GRANULOCYTES NFR BLD AUTO: 0.5 % (ref 0–0.9)
IMM GRANULOCYTES NFR BLD AUTO: 0.6 % (ref 0–0.9)
IMM GRANULOCYTES NFR BLD AUTO: 0.6 % (ref 0–0.9)
IMM GRANULOCYTES NFR BLD AUTO: 0.8 % (ref 0–0.9)
IMM GRANULOCYTES NFR BLD AUTO: 1.3 % (ref 0–0.9)
IMM GRANULOCYTES NFR BLD AUTO: 1.5 % (ref 0–0.9)
IMM RETICS NFR: 35 % (ref 2.6–16.1)
INR PPP: 2.49 (ref 0.87–1.13)
IRON SATN MFR SERPL: ABNORMAL % (ref 15–55)
IRON SERPL-MCNC: 248 UG/DL (ref 50–180)
KETONES UR STRIP.AUTO-MCNC: 15 MG/DL
KETONES UR STRIP.AUTO-MCNC: NEGATIVE MG/DL
LACTATE SERPL-SCNC: 10.4 MMOL/L (ref 0.5–2)
LACTATE SERPL-SCNC: 12.8 MMOL/L (ref 0.5–2)
LACTATE SERPL-SCNC: 13 MMOL/L (ref 0.5–2)
LACTATE SERPL-SCNC: 7.3 MMOL/L (ref 0.5–2)
LACTATE SERPL-SCNC: 8.4 MMOL/L (ref 0.5–2)
LACTATE SERPL-SCNC: 9.3 MMOL/L (ref 0.5–2)
LDH FLD L TO P-CCNC: 2347 U/L
LDH SERPL L TO P-CCNC: >2500 U/L (ref 107–266)
LDLC SERPL CALC-MCNC: 55 MG/DL
LEUKOCYTE ESTERASE UR QL STRIP.AUTO: NEGATIVE
LEUKOCYTE ESTERASE UR QL STRIP.AUTO: NEGATIVE
LPM ILPM: 5 LPM
LV EJECT FRACT  99904: 60
LV EJECT FRACT MOD 4C 99902: 62.55
LYMPHOCYTES # BLD AUTO: 0.18 K/UL (ref 1–4.8)
LYMPHOCYTES # BLD AUTO: 0.19 K/UL (ref 1–4.8)
LYMPHOCYTES # BLD AUTO: 0.22 K/UL (ref 1–4.8)
LYMPHOCYTES # BLD AUTO: 0.24 K/UL (ref 1–4.8)
LYMPHOCYTES # BLD AUTO: 0.26 K/UL (ref 1–4.8)
LYMPHOCYTES # BLD AUTO: 0.28 K/UL (ref 1–4.8)
LYMPHOCYTES # BLD AUTO: 0.29 K/UL (ref 1–4.8)
LYMPHOCYTES # BLD AUTO: 0.3 K/UL (ref 1–4.8)
LYMPHOCYTES # BLD AUTO: 0.36 K/UL (ref 1–4.8)
LYMPHOCYTES NFR BLD: 2.6 % (ref 22–41)
LYMPHOCYTES NFR BLD: 3.4 % (ref 22–41)
LYMPHOCYTES NFR BLD: 4.3 % (ref 22–41)
LYMPHOCYTES NFR BLD: 5.1 % (ref 22–41)
LYMPHOCYTES NFR BLD: 5.5 % (ref 22–41)
LYMPHOCYTES NFR BLD: 5.5 % (ref 22–41)
LYMPHOCYTES NFR BLD: 7.1 % (ref 22–41)
LYMPHOCYTES NFR BLD: 7.2 % (ref 22–41)
LYMPHOCYTES NFR BLD: 8.2 % (ref 22–41)
LYMPHOCYTES NFR FLD: 4 %
MACROCYTES BLD QL SMEAR: ABNORMAL
MAGNESIUM SERPL-MCNC: 1.7 MG/DL (ref 1.5–2.5)
MAGNESIUM SERPL-MCNC: 1.8 MG/DL (ref 1.5–2.5)
MAGNESIUM SERPL-MCNC: 1.9 MG/DL (ref 1.5–2.5)
MAGNESIUM SERPL-MCNC: 2 MG/DL (ref 1.5–2.5)
MANUAL DIFF BLD: NORMAL
MCH RBC QN AUTO: 29.8 PG (ref 27–33)
MCH RBC QN AUTO: 29.8 PG (ref 27–33)
MCH RBC QN AUTO: 30.2 PG (ref 27–33)
MCH RBC QN AUTO: 30.2 PG (ref 27–33)
MCH RBC QN AUTO: 30.4 PG (ref 27–33)
MCH RBC QN AUTO: 30.4 PG (ref 27–33)
MCH RBC QN AUTO: 30.5 PG (ref 27–33)
MCH RBC QN AUTO: 30.5 PG (ref 27–33)
MCH RBC QN AUTO: 31 PG (ref 27–33)
MCH RBC QN AUTO: 31 PG (ref 27–33)
MCHC RBC AUTO-ENTMCNC: 31.1 G/DL (ref 32.3–36.5)
MCHC RBC AUTO-ENTMCNC: 32 G/DL (ref 32.3–36.5)
MCHC RBC AUTO-ENTMCNC: 32 G/DL (ref 32.3–36.5)
MCHC RBC AUTO-ENTMCNC: 32.5 G/DL (ref 32.3–36.5)
MCHC RBC AUTO-ENTMCNC: 32.9 G/DL (ref 32.3–36.5)
MCHC RBC AUTO-ENTMCNC: 33 G/DL (ref 32.3–36.5)
MCHC RBC AUTO-ENTMCNC: 33.3 G/DL (ref 32.3–36.5)
MCHC RBC AUTO-ENTMCNC: 33.5 G/DL (ref 32.3–36.5)
MCHC RBC AUTO-ENTMCNC: 33.7 G/DL (ref 32.3–36.5)
MCHC RBC AUTO-ENTMCNC: 34.1 G/DL (ref 32.3–36.5)
MCV RBC AUTO: 89.4 FL (ref 81.4–97.8)
MCV RBC AUTO: 90.2 FL (ref 81.4–97.8)
MCV RBC AUTO: 90.3 FL (ref 81.4–97.8)
MCV RBC AUTO: 90.5 FL (ref 81.4–97.8)
MCV RBC AUTO: 90.8 FL (ref 81.4–97.8)
MCV RBC AUTO: 92.6 FL (ref 81.4–97.8)
MCV RBC AUTO: 95 FL (ref 81.4–97.8)
MCV RBC AUTO: 95.3 FL (ref 81.4–97.8)
MCV RBC AUTO: 95.7 FL (ref 81.4–97.8)
MCV RBC AUTO: 96.6 FL (ref 81.4–97.8)
METAMYELOCYTES NFR BLD MANUAL: 0.8 %
METAMYELOCYTES NFR BLD MANUAL: 1.7 %
METAMYELOCYTES NFR BLD MANUAL: 3.4 %
MICRO URNS: ABNORMAL
MICRO URNS: ABNORMAL
MICROALBUMIN UR-MCNC: 3.5 MG/DL
MICROALBUMIN/CREAT UR: 37 MG/G (ref 0–30)
MICROCYTES BLD QL SMEAR: ABNORMAL
MICROCYTES BLD QL SMEAR: ABNORMAL
MONOCYTES # BLD AUTO: 0.25 K/UL (ref 0–0.85)
MONOCYTES # BLD AUTO: 0.27 K/UL (ref 0–0.85)
MONOCYTES # BLD AUTO: 0.29 K/UL (ref 0–0.85)
MONOCYTES # BLD AUTO: 0.34 K/UL (ref 0–0.85)
MONOCYTES # BLD AUTO: 0.38 K/UL (ref 0–0.85)
MONOCYTES # BLD AUTO: 0.39 K/UL (ref 0–0.85)
MONOCYTES # BLD AUTO: 0.5 K/UL (ref 0–0.85)
MONOCYTES # BLD AUTO: 0.55 K/UL (ref 0–0.85)
MONOCYTES # BLD AUTO: 0.66 K/UL (ref 0–0.85)
MONOCYTES NFR BLD AUTO: 11.6 % (ref 0–13.4)
MONOCYTES NFR BLD AUTO: 12.5 % (ref 0–13.4)
MONOCYTES NFR BLD AUTO: 3.5 % (ref 0–13.4)
MONOCYTES NFR BLD AUTO: 6 % (ref 0–13.4)
MONOCYTES NFR BLD AUTO: 6.7 % (ref 0–13.4)
MONOCYTES NFR BLD AUTO: 7.4 % (ref 0–13.4)
MONOCYTES NFR BLD AUTO: 7.8 % (ref 0–13.4)
MONOCYTES NFR BLD AUTO: 9.4 % (ref 0–13.4)
MONOCYTES NFR BLD AUTO: 9.5 % (ref 0–13.4)
MONOS+MACROS NFR FLD MANUAL: 76 %
MORPHOLOGY BLD-IMP: NORMAL
MYCOBACTERIUM SPEC CULT: NORMAL
MYELOCYTES NFR BLD MANUAL: 2.6 %
MYELOCYTES NFR BLD MANUAL: 2.6 %
MYELOCYTES NFR BLD MANUAL: 3.4 %
NEUTROPHILS # BLD AUTO: 2.65 K/UL (ref 1.82–7.42)
NEUTROPHILS # BLD AUTO: 2.9 K/UL (ref 1.82–7.42)
NEUTROPHILS # BLD AUTO: 2.98 K/UL (ref 1.82–7.42)
NEUTROPHILS # BLD AUTO: 3.12 K/UL (ref 1.82–7.42)
NEUTROPHILS # BLD AUTO: 3.26 K/UL (ref 1.82–7.42)
NEUTROPHILS # BLD AUTO: 4.11 K/UL (ref 1.82–7.42)
NEUTROPHILS # BLD AUTO: 6.36 K/UL (ref 1.82–7.42)
NEUTROPHILS # BLD AUTO: 6.75 K/UL (ref 1.82–7.42)
NEUTROPHILS # BLD AUTO: 6.96 K/UL (ref 1.82–7.42)
NEUTROPHILS NFR BLD: 78.2 % (ref 44–72)
NEUTROPHILS NFR BLD: 78.9 % (ref 44–72)
NEUTROPHILS NFR BLD: 79.5 % (ref 44–72)
NEUTROPHILS NFR BLD: 80.5 % (ref 44–72)
NEUTROPHILS NFR BLD: 81 % (ref 44–72)
NEUTROPHILS NFR BLD: 81.3 % (ref 44–72)
NEUTROPHILS NFR BLD: 81.5 % (ref 44–72)
NEUTROPHILS NFR BLD: 82.3 % (ref 44–72)
NEUTROPHILS NFR BLD: 84.4 % (ref 44–72)
NEUTROPHILS NFR FLD: 18 %
NEUTS BAND NFR BLD MANUAL: 4.3 % (ref 0–10)
NEUTS BAND NFR BLD MANUAL: 8.6 % (ref 0–10)
NITRITE UR QL STRIP.AUTO: NEGATIVE
NITRITE UR QL STRIP.AUTO: NEGATIVE
NRBC # BLD AUTO: 0 K/UL
NRBC # BLD AUTO: 0.02 K/UL
NRBC # BLD AUTO: 0.02 K/UL
NRBC # BLD AUTO: 0.05 K/UL
NRBC # BLD AUTO: 0.4 K/UL
NRBC # BLD AUTO: 0.57 K/UL
NRBC # BLD AUTO: 1.12 K/UL
NRBC BLD-RTO: 0 /100 WBC (ref 0–0.2)
NRBC BLD-RTO: 0.5 /100 WBC (ref 0–0.2)
NRBC BLD-RTO: 0.5 /100 WBC (ref 0–0.2)
NRBC BLD-RTO: 1 /100 WBC (ref 0–0.2)
NRBC BLD-RTO: 13.4 /100 WBC (ref 0–0.2)
NRBC BLD-RTO: 4.9 /100 WBC (ref 0–0.2)
NRBC BLD-RTO: 8.1 /100 WBC (ref 0–0.2)
NT-PROBNP SERPL IA-MCNC: 1822 PG/ML (ref 0–125)
NUC CELL # FLD: 103 CELLS/UL
OUTPT INFUS CBC COMMENT OICOM: ABNORMAL
OVALOCYTES BLD QL SMEAR: NORMAL
PATH REV: NORMAL
PATH REV: NORMAL
PCO2 BLDV: 29.2 MMHG (ref 41–51)
PCO2 TEMP ADJ BLDV: 26.8 MMHG (ref 41–51)
PH BLDV: 7.29 [PH] (ref 7.31–7.45)
PH FLD: 8 [PH]
PH TEMP ADJ BLDV: 7.32 [PH] (ref 7.31–7.45)
PH UR STRIP.AUTO: 5.5 [PH] (ref 5–8)
PH UR STRIP.AUTO: 6 [PH] (ref 5–8)
PHOSPHATE SERPL-MCNC: 2.7 MG/DL (ref 2.5–4.5)
PHOSPHATE SERPL-MCNC: 2.9 MG/DL (ref 2.5–4.5)
PHOSPHATE SERPL-MCNC: 5.1 MG/DL (ref 2.5–4.5)
PLATELET # BLD AUTO: 105 K/UL (ref 164–446)
PLATELET # BLD AUTO: 113 K/UL (ref 164–446)
PLATELET # BLD AUTO: 117 K/UL (ref 164–446)
PLATELET # BLD AUTO: 119 K/UL (ref 164–446)
PLATELET # BLD AUTO: 123 K/UL (ref 164–446)
PLATELET # BLD AUTO: 126 K/UL (ref 164–446)
PLATELET # BLD AUTO: 129 K/UL (ref 164–446)
PLATELET # BLD AUTO: 47 K/UL (ref 164–446)
PLATELET # BLD AUTO: 50 K/UL (ref 164–446)
PLATELET # BLD AUTO: 60 K/UL (ref 164–446)
PLATELET BLD QL SMEAR: NORMAL
PLATELETS.RETICULATED NFR BLD AUTO: 2 % (ref 0.6–13.1)
PLATELETS.RETICULATED NFR BLD AUTO: 2.6 % (ref 0.6–13.1)
PLATELETS.RETICULATED NFR BLD AUTO: 3.4 % (ref 0.6–13.1)
PLATELETS.RETICULATED NFR BLD AUTO: 4.3 % (ref 0.6–13.1)
PLATELETS.RETICULATED NFR BLD AUTO: 5.4 % (ref 0.6–13.1)
PMV BLD AUTO: 10.1 FL (ref 9–12.9)
PMV BLD AUTO: 10.3 FL (ref 9–12.9)
PMV BLD AUTO: 10.6 FL (ref 9–12.9)
PMV BLD AUTO: 12.2 FL (ref 9–12.9)
PMV BLD AUTO: 8.9 FL (ref 9–12.9)
PMV BLD AUTO: 9.4 FL (ref 9–12.9)
PMV BLD AUTO: 9.6 FL (ref 9–12.9)
PMV BLD AUTO: 9.9 FL (ref 9–12.9)
PO2 BLDV: 43 MMHG (ref 25–40)
PO2 TEMP ADJ BLDV: 37 MMHG (ref 25–40)
POIKILOCYTOSIS BLD QL SMEAR: NORMAL
POLYCHROMASIA BLD QL SMEAR: NORMAL
POLYCHROMASIA BLD QL SMEAR: NORMAL
POTASSIUM SERPL-SCNC: 4 MMOL/L (ref 3.6–5.5)
POTASSIUM SERPL-SCNC: 4.3 MMOL/L (ref 3.6–5.5)
POTASSIUM SERPL-SCNC: 4.6 MMOL/L (ref 3.6–5.5)
POTASSIUM SERPL-SCNC: 4.9 MMOL/L (ref 3.6–5.5)
POTASSIUM SERPL-SCNC: 5.8 MMOL/L (ref 3.6–5.5)
POTASSIUM SERPL-SCNC: 6.1 MMOL/L (ref 3.6–5.5)
POTASSIUM SERPL-SCNC: 6.3 MMOL/L (ref 3.6–5.5)
POTASSIUM SERPL-SCNC: 6.4 MMOL/L (ref 3.6–5.5)
POTASSIUM SERPL-SCNC: 6.9 MMOL/L (ref 3.6–5.5)
PROCALCITONIN SERPL-MCNC: 2.32 NG/ML
PROMYELOCYTES NFR BLD MANUAL: 0.8 %
PROT FLD-MCNC: 2.4 G/DL
PROT SERPL-MCNC: 4.4 G/DL (ref 6–8.2)
PROT SERPL-MCNC: 5 G/DL (ref 6–8.2)
PROT SERPL-MCNC: 5.1 G/DL (ref 6–8.2)
PROT SERPL-MCNC: 5.2 G/DL (ref 6–8.2)
PROT SERPL-MCNC: 5.8 G/DL (ref 6–8.2)
PROT SERPL-MCNC: 6.2 G/DL (ref 6–8.2)
PROT SERPL-MCNC: 6.3 G/DL (ref 6–8.2)
PROT SERPL-MCNC: 6.9 G/DL (ref 6–8.2)
PROT UR QL STRIP: NEGATIVE MG/DL
PROT UR QL STRIP: NEGATIVE MG/DL
PROT UR-MCNC: 17 MG/DL (ref 0–15)
PROT/CREAT UR: 174 MG/G (ref 15–68)
PROTHROMBIN TIME: 27.2 SEC (ref 12–14.6)
PSA SERPL-MCNC: 1.72 NG/ML (ref 0–4)
RBC # BLD AUTO: 2.55 M/UL (ref 4.7–6.1)
RBC # BLD AUTO: 2.6 M/UL (ref 4.7–6.1)
RBC # BLD AUTO: 2.68 M/UL (ref 4.7–6.1)
RBC # BLD AUTO: 2.76 M/UL (ref 4.7–6.1)
RBC # BLD AUTO: 2.85 M/UL (ref 4.7–6.1)
RBC # BLD AUTO: 2.97 M/UL (ref 4.7–6.1)
RBC # BLD AUTO: 2.98 M/UL (ref 4.7–6.1)
RBC # BLD AUTO: 3.15 M/UL (ref 4.7–6.1)
RBC # BLD AUTO: 3.21 M/UL (ref 4.7–6.1)
RBC # BLD AUTO: 3.28 M/UL (ref 4.7–6.1)
RBC # FLD: <2000 CELLS/UL
RBC # URNS HPF: ABNORMAL /HPF
RBC BLD AUTO: PRESENT
RBC UR QL AUTO: NEGATIVE
RBC UR QL AUTO: NEGATIVE
RETICS # AUTO: 0.12 M/UL (ref 0.04–0.12)
RETICS/RBC NFR: 4.5 % (ref 0.8–2.6)
RH BLD: NORMAL
RHODAMINE-AURAMINE STN SPEC: NORMAL
RHODAMINE-AURAMINE STN SPEC: NORMAL
RSV RNA SPEC QL NAA+PROBE: NEGATIVE
SAO2 % BLDV: 74 %
SARS-COV-2 RNA RESP QL NAA+PROBE: NOTDETECTED
SCCMEC + MECA PNL NOSE NAA+PROBE: NEGATIVE
SIGNIFICANT IND 70042: NORMAL
SITE SITE: NORMAL
SODIUM SERPL-SCNC: 122 MMOL/L (ref 135–145)
SODIUM SERPL-SCNC: 126 MMOL/L (ref 135–145)
SODIUM SERPL-SCNC: 128 MMOL/L (ref 135–145)
SODIUM SERPL-SCNC: 128 MMOL/L (ref 135–145)
SODIUM SERPL-SCNC: 129 MMOL/L (ref 135–145)
SODIUM SERPL-SCNC: 131 MMOL/L (ref 135–145)
SODIUM SERPL-SCNC: 132 MMOL/L (ref 135–145)
SOURCE SOURCE: NORMAL
SP GR UR STRIP.AUTO: 1.01
SP GR UR STRIP.AUTO: 1.02
SPECIMEN DRAWN FROM PATIENT: ABNORMAL
T4 FREE SERPL-MCNC: 1.1 NG/DL (ref 0.93–1.7)
T4 FREE SERPL-MCNC: 1.56 NG/DL (ref 0.93–1.7)
TESTOST SERPL-MCNC: 7 NG/DL (ref 175–781)
TIBC SERPL-MCNC: ABNORMAL UG/DL (ref 250–450)
TRANSFERRIN SERPL-MCNC: 117 MG/DL (ref 200–370)
TRIGL SERPL-MCNC: 162 MG/DL (ref 0–149)
TROPONIN T SERPL-MCNC: 28 NG/L (ref 6–19)
TROPONIN T SERPL-MCNC: 34 NG/L (ref 6–19)
TROPONIN T SERPL-MCNC: 50 NG/L (ref 6–19)
TSH SERPL-ACNC: 4.59 UIU/ML (ref 0.35–5.5)
TSH SERPL-ACNC: 4.97 UIU/ML (ref 0.35–5.5)
UIBC SERPL-MCNC: <17 UG/DL (ref 110–370)
URATE SERPL-MCNC: 13.5 MG/DL (ref 2.5–8.3)
URATE SERPL-MCNC: 13.6 MG/DL (ref 2.5–8.3)
UROBILINOGEN UR STRIP.AUTO-MCNC: 1 MG/DL
UROBILINOGEN UR STRIP.AUTO-MCNC: 1 MG/DL
VIT B12 SERPL-MCNC: >4000 PG/ML (ref 211–911)
WBC # BLD AUTO: 3.4 K/UL (ref 4.8–10.8)
WBC # BLD AUTO: 3.6 K/UL (ref 4.8–10.8)
WBC # BLD AUTO: 3.7 K/UL (ref 4.8–10.8)
WBC # BLD AUTO: 3.7 K/UL (ref 4.8–10.8)
WBC # BLD AUTO: 4 K/UL (ref 4.8–10.8)
WBC # BLD AUTO: 5.3 K/UL (ref 4.8–10.8)
WBC # BLD AUTO: 7.1 K/UL (ref 4.8–10.8)
WBC # BLD AUTO: 7.3 K/UL (ref 4.8–10.8)
WBC # BLD AUTO: 8.2 K/UL (ref 4.8–10.8)
WBC # BLD AUTO: 8.3 K/UL (ref 4.8–10.8)
WBC #/AREA URNS HPF: ABNORMAL /HPF
WBC OTHER NFR FLD: 2 %

## 2024-01-01 PROCEDURE — 84484 ASSAY OF TROPONIN QUANT: CPT

## 2024-01-01 PROCEDURE — 36600 WITHDRAWAL OF ARTERIAL BLOOD: CPT

## 2024-01-01 PROCEDURE — 700105 HCHG RX REV CODE 258: Performed by: NURSE PRACTITIONER

## 2024-01-01 PROCEDURE — 84156 ASSAY OF PROTEIN URINE: CPT

## 2024-01-01 PROCEDURE — 700101 HCHG RX REV CODE 250: Performed by: STUDENT IN AN ORGANIZED HEALTH CARE EDUCATION/TRAINING PROGRAM

## 2024-01-01 PROCEDURE — 85027 COMPLETE CBC AUTOMATED: CPT

## 2024-01-01 PROCEDURE — 85055 RETICULATED PLATELET ASSAY: CPT

## 2024-01-01 PROCEDURE — 83605 ASSAY OF LACTIC ACID: CPT

## 2024-01-01 PROCEDURE — 80053 COMPREHEN METABOLIC PANEL: CPT

## 2024-01-01 PROCEDURE — 86850 RBC ANTIBODY SCREEN: CPT

## 2024-01-01 PROCEDURE — A9270 NON-COVERED ITEM OR SERVICE: HCPCS | Performed by: HOSPITALIST

## 2024-01-01 PROCEDURE — 97535 SELF CARE MNGMENT TRAINING: CPT

## 2024-01-01 PROCEDURE — 87205 SMEAR GRAM STAIN: CPT | Mod: 91

## 2024-01-01 PROCEDURE — 94669 MECHANICAL CHEST WALL OSCILL: CPT

## 2024-01-01 PROCEDURE — 99214 OFFICE O/P EST MOD 30 MIN: CPT | Mod: GC

## 2024-01-01 PROCEDURE — 93005 ELECTROCARDIOGRAM TRACING: CPT | Performed by: STUDENT IN AN ORGANIZED HEALTH CARE EDUCATION/TRAINING PROGRAM

## 2024-01-01 PROCEDURE — 83615 LACTATE (LD) (LDH) ENZYME: CPT

## 2024-01-01 PROCEDURE — 700111 HCHG RX REV CODE 636 W/ 250 OVERRIDE (IP): Mod: JZ | Performed by: STUDENT IN AN ORGANIZED HEALTH CARE EDUCATION/TRAINING PROGRAM

## 2024-01-01 PROCEDURE — 82150 ASSAY OF AMYLASE: CPT

## 2024-01-01 PROCEDURE — 3074F SYST BP LT 130 MM HG: CPT | Mod: GC

## 2024-01-01 PROCEDURE — 80048 BASIC METABOLIC PNL TOTAL CA: CPT | Mod: 91

## 2024-01-01 PROCEDURE — 96413 CHEMO IV INFUSION 1 HR: CPT

## 2024-01-01 PROCEDURE — 96375 TX/PRO/DX INJ NEW DRUG ADDON: CPT

## 2024-01-01 PROCEDURE — 83605 ASSAY OF LACTIC ACID: CPT | Mod: 91

## 2024-01-01 PROCEDURE — 700102 HCHG RX REV CODE 250 W/ 637 OVERRIDE(OP): Performed by: STUDENT IN AN ORGANIZED HEALTH CARE EDUCATION/TRAINING PROGRAM

## 2024-01-01 PROCEDURE — 51798 US URINE CAPACITY MEASURE: CPT

## 2024-01-01 PROCEDURE — 84550 ASSAY OF BLOOD/URIC ACID: CPT

## 2024-01-01 PROCEDURE — 97602 WOUND(S) CARE NON-SELECTIVE: CPT

## 2024-01-01 PROCEDURE — 85025 COMPLETE CBC W/AUTO DIFF WBC: CPT

## 2024-01-01 PROCEDURE — 88112 CYTOPATH CELL ENHANCE TECH: CPT

## 2024-01-01 PROCEDURE — 83036 HEMOGLOBIN GLYCOSYLATED A1C: CPT

## 2024-01-01 PROCEDURE — 700102 HCHG RX REV CODE 250 W/ 637 OVERRIDE(OP): Performed by: HOSPITALIST

## 2024-01-01 PROCEDURE — 36415 COLL VENOUS BLD VENIPUNCTURE: CPT

## 2024-01-01 PROCEDURE — 83880 ASSAY OF NATRIURETIC PEPTIDE: CPT

## 2024-01-01 PROCEDURE — A9270 NON-COVERED ITEM OR SERVICE: HCPCS | Performed by: STUDENT IN AN ORGANIZED HEALTH CARE EDUCATION/TRAINING PROGRAM

## 2024-01-01 PROCEDURE — 700105 HCHG RX REV CODE 258: Performed by: STUDENT IN AN ORGANIZED HEALTH CARE EDUCATION/TRAINING PROGRAM

## 2024-01-01 PROCEDURE — 700102 HCHG RX REV CODE 250 W/ 637 OVERRIDE(OP): Performed by: GENERAL PRACTICE

## 2024-01-01 PROCEDURE — 84439 ASSAY OF FREE THYROXINE: CPT

## 2024-01-01 PROCEDURE — 700102 HCHG RX REV CODE 250 W/ 637 OVERRIDE(OP): Performed by: INTERNAL MEDICINE

## 2024-01-01 PROCEDURE — 82962 GLUCOSE BLOOD TEST: CPT

## 2024-01-01 PROCEDURE — 81003 URINALYSIS AUTO W/O SCOPE: CPT

## 2024-01-01 PROCEDURE — 88305 TISSUE EXAM BY PATHOLOGIST: CPT

## 2024-01-01 PROCEDURE — 96368 THER/DIAG CONCURRENT INF: CPT

## 2024-01-01 PROCEDURE — RXMED WILLOW AMBULATORY MEDICATION CHARGE: Performed by: STUDENT IN AN ORGANIZED HEALTH CARE EDUCATION/TRAINING PROGRAM

## 2024-01-01 PROCEDURE — 84443 ASSAY THYROID STIM HORMONE: CPT

## 2024-01-01 PROCEDURE — 99285 EMERGENCY DEPT VISIT HI MDM: CPT

## 2024-01-01 PROCEDURE — 83986 ASSAY PH BODY FLUID NOS: CPT

## 2024-01-01 PROCEDURE — 85007 BL SMEAR W/DIFF WBC COUNT: CPT

## 2024-01-01 PROCEDURE — 81001 URINALYSIS AUTO W/SCOPE: CPT

## 2024-01-01 PROCEDURE — 99223 1ST HOSP IP/OBS HIGH 75: CPT | Performed by: STUDENT IN AN ORGANIZED HEALTH CARE EDUCATION/TRAINING PROGRAM

## 2024-01-01 PROCEDURE — 71045 X-RAY EXAM CHEST 1 VIEW: CPT

## 2024-01-01 PROCEDURE — C1729 CATH, DRAINAGE: HCPCS

## 2024-01-01 PROCEDURE — 82728 ASSAY OF FERRITIN: CPT

## 2024-01-01 PROCEDURE — 80061 LIPID PANEL: CPT

## 2024-01-01 PROCEDURE — 83735 ASSAY OF MAGNESIUM: CPT

## 2024-01-01 PROCEDURE — 84466 ASSAY OF TRANSFERRIN: CPT

## 2024-01-01 PROCEDURE — 97162 PT EVAL MOD COMPLEX 30 MIN: CPT

## 2024-01-01 PROCEDURE — 82945 GLUCOSE OTHER FLUID: CPT

## 2024-01-01 PROCEDURE — 87641 MR-STAPH DNA AMP PROBE: CPT

## 2024-01-01 PROCEDURE — 93306 TTE W/DOPPLER COMPLETE: CPT | Mod: 26 | Performed by: INTERNAL MEDICINE

## 2024-01-01 PROCEDURE — 86900 BLOOD TYPING SEROLOGIC ABO: CPT

## 2024-01-01 PROCEDURE — 87015 SPECIMEN INFECT AGNT CONCNTJ: CPT

## 2024-01-01 PROCEDURE — 82803 BLOOD GASES ANY COMBINATION: CPT

## 2024-01-01 PROCEDURE — 99292 CRITICAL CARE ADDL 30 MIN: CPT | Mod: 25 | Performed by: INTERNAL MEDICINE

## 2024-01-01 PROCEDURE — 82607 VITAMIN B-12: CPT

## 2024-01-01 PROCEDURE — 84157 ASSAY OF PROTEIN OTHER: CPT

## 2024-01-01 PROCEDURE — 99233 SBSQ HOSP IP/OBS HIGH 50: CPT | Performed by: GENERAL PRACTICE

## 2024-01-01 PROCEDURE — 700101 HCHG RX REV CODE 250: Performed by: INTERNAL MEDICINE

## 2024-01-01 PROCEDURE — 02HV33Z INSERTION OF INFUSION DEVICE INTO SUPERIOR VENA CAVA, PERCUTANEOUS APPROACH: ICD-10-PCS | Performed by: INTERNAL MEDICINE

## 2024-01-01 PROCEDURE — A9270 NON-COVERED ITEM OR SERVICE: HCPCS | Performed by: INTERNAL MEDICINE

## 2024-01-01 PROCEDURE — 84100 ASSAY OF PHOSPHORUS: CPT

## 2024-01-01 PROCEDURE — 83540 ASSAY OF IRON: CPT

## 2024-01-01 PROCEDURE — 99291 CRITICAL CARE FIRST HOUR: CPT | Performed by: INTERNAL MEDICINE

## 2024-01-01 PROCEDURE — 87102 FUNGUS ISOLATION CULTURE: CPT

## 2024-01-01 PROCEDURE — 86901 BLOOD TYPING SEROLOGIC RH(D): CPT

## 2024-01-01 PROCEDURE — A9270 NON-COVERED ITEM OR SERVICE: HCPCS | Performed by: GENERAL PRACTICE

## 2024-01-01 PROCEDURE — 770004 HCHG ROOM/CARE - ONCOLOGY PRIVATE *

## 2024-01-01 PROCEDURE — 96365 THER/PROPH/DIAG IV INF INIT: CPT

## 2024-01-01 PROCEDURE — C1751 CATH, INF, PER/CENT/MIDLINE: HCPCS

## 2024-01-01 PROCEDURE — 770022 HCHG ROOM/CARE - ICU (200)

## 2024-01-01 PROCEDURE — 99233 SBSQ HOSP IP/OBS HIGH 50: CPT | Performed by: HOSPITALIST

## 2024-01-01 PROCEDURE — 76604 US EXAM CHEST: CPT

## 2024-01-01 PROCEDURE — 87040 BLOOD CULTURE FOR BACTERIA: CPT

## 2024-01-01 PROCEDURE — 700105 HCHG RX REV CODE 258: Performed by: INTERNAL MEDICINE

## 2024-01-01 PROCEDURE — 99152 MOD SED SAME PHYS/QHP 5/>YRS: CPT

## 2024-01-01 PROCEDURE — 3078F DIAST BP <80 MM HG: CPT | Mod: GC

## 2024-01-01 PROCEDURE — 87206 SMEAR FLUORESCENT/ACID STAI: CPT

## 2024-01-01 PROCEDURE — 700111 HCHG RX REV CODE 636 W/ 250 OVERRIDE (IP): Performed by: INTERNAL MEDICINE

## 2024-01-01 PROCEDURE — 71275 CT ANGIOGRAPHY CHEST: CPT

## 2024-01-01 PROCEDURE — 85610 PROTHROMBIN TIME: CPT

## 2024-01-01 PROCEDURE — 0241U HCHG SARS-COV-2 COVID-19 NFCT DS RESP RNA 4 TRGT ED POC: CPT

## 2024-01-01 PROCEDURE — 36556 INSERT NON-TUNNEL CV CATH: CPT

## 2024-01-01 PROCEDURE — 80503 PATH CLIN CONSLTJ SF 5-20: CPT

## 2024-01-01 PROCEDURE — 89051 BODY FLUID CELL COUNT: CPT

## 2024-01-01 PROCEDURE — 88342 IMHCHEM/IMCYTCHM 1ST ANTB: CPT

## 2024-01-01 PROCEDURE — 36556 INSERT NON-TUNNEL CV CATH: CPT | Performed by: INTERNAL MEDICINE

## 2024-01-01 PROCEDURE — 93005 ELECTROCARDIOGRAM TRACING: CPT

## 2024-01-01 PROCEDURE — 82570 ASSAY OF URINE CREATININE: CPT | Mod: 91

## 2024-01-01 PROCEDURE — 700117 HCHG RX CONTRAST REV CODE 255: Performed by: STUDENT IN AN ORGANIZED HEALTH CARE EDUCATION/TRAINING PROGRAM

## 2024-01-01 PROCEDURE — 85046 RETICYTE/HGB CONCENTRATE: CPT

## 2024-01-01 PROCEDURE — 93306 TTE W/DOPPLER COMPLETE: CPT

## 2024-01-01 PROCEDURE — 700101 HCHG RX REV CODE 250

## 2024-01-01 PROCEDURE — 97166 OT EVAL MOD COMPLEX 45 MIN: CPT

## 2024-01-01 PROCEDURE — 93010 ELECTROCARDIOGRAM REPORT: CPT | Mod: 76 | Performed by: INTERNAL MEDICINE

## 2024-01-01 PROCEDURE — 93005 ELECTROCARDIOGRAM TRACING: CPT | Performed by: INTERNAL MEDICINE

## 2024-01-01 PROCEDURE — 87086 URINE CULTURE/COLONY COUNT: CPT

## 2024-01-01 PROCEDURE — 82043 UR ALBUMIN QUANTITATIVE: CPT

## 2024-01-01 PROCEDURE — 83550 IRON BINDING TEST: CPT

## 2024-01-01 PROCEDURE — 84153 ASSAY OF PSA TOTAL: CPT

## 2024-01-01 PROCEDURE — 80048 BASIC METABOLIC PNL TOTAL CA: CPT

## 2024-01-01 PROCEDURE — 82140 ASSAY OF AMMONIA: CPT

## 2024-01-01 PROCEDURE — 84403 ASSAY OF TOTAL TESTOSTERONE: CPT

## 2024-01-01 PROCEDURE — 99233 SBSQ HOSP IP/OBS HIGH 50: CPT | Performed by: INTERNAL MEDICINE

## 2024-01-01 PROCEDURE — 84145 PROCALCITONIN (PCT): CPT

## 2024-01-01 PROCEDURE — 99291 CRITICAL CARE FIRST HOUR: CPT | Mod: 25 | Performed by: INTERNAL MEDICINE

## 2024-01-01 PROCEDURE — 700111 HCHG RX REV CODE 636 W/ 250 OVERRIDE (IP): Performed by: STUDENT IN AN ORGANIZED HEALTH CARE EDUCATION/TRAINING PROGRAM

## 2024-01-01 PROCEDURE — 87070 CULTURE OTHR SPECIMN AEROBIC: CPT

## 2024-01-01 PROCEDURE — 87116 MYCOBACTERIA CULTURE: CPT

## 2024-01-01 RX ORDER — DEXTROSE MONOHYDRATE 25 G/50ML
25 INJECTION, SOLUTION INTRAVENOUS ONCE
Status: COMPLETED | OUTPATIENT
Start: 2024-01-01 | End: 2024-01-01

## 2024-01-01 RX ORDER — SODIUM CHLORIDE 9 MG/ML
INJECTION, SOLUTION INTRAVENOUS CONTINUOUS
Status: CANCELLED | OUTPATIENT
Start: 2024-01-01

## 2024-01-01 RX ORDER — SODIUM CHLORIDE, SODIUM LACTATE, POTASSIUM CHLORIDE, CALCIUM CHLORIDE 600; 310; 30; 20 MG/100ML; MG/100ML; MG/100ML; MG/100ML
INJECTION, SOLUTION INTRAVENOUS CONTINUOUS
Status: DISCONTINUED | OUTPATIENT
Start: 2024-01-01 | End: 2024-01-01

## 2024-01-01 RX ORDER — PROMETHAZINE HYDROCHLORIDE 25 MG/1
12.5-25 TABLET ORAL EVERY 4 HOURS PRN
Status: DISCONTINUED | OUTPATIENT
Start: 2024-01-01 | End: 2024-01-01

## 2024-01-01 RX ORDER — POLYETHYLENE GLYCOL 3350 17 G/17G
1 POWDER, FOR SOLUTION ORAL
Status: DISCONTINUED | OUTPATIENT
Start: 2024-01-01 | End: 2024-01-01

## 2024-01-01 RX ORDER — DIPHENHYDRAMINE HYDROCHLORIDE 50 MG/ML
25 INJECTION INTRAMUSCULAR; INTRAVENOUS PRN
Status: CANCELLED | OUTPATIENT
Start: 2024-01-01

## 2024-01-01 RX ORDER — DIPHENHYDRAMINE HCL 25 MG
25 TABLET ORAL ONCE
Status: CANCELLED | OUTPATIENT
Start: 2024-01-01 | End: 2024-01-01

## 2024-01-01 RX ORDER — METOPROLOL SUCCINATE 50 MG/1
25 TABLET, EXTENDED RELEASE ORAL DAILY
Status: DISCONTINUED | OUTPATIENT
Start: 2024-01-01 | End: 2024-01-01

## 2024-01-01 RX ORDER — LORAZEPAM 2 MG/ML
2 INJECTION INTRAMUSCULAR
Status: DISCONTINUED | OUTPATIENT
Start: 2024-01-01 | End: 2024-01-01 | Stop reason: HOSPADM

## 2024-01-01 RX ORDER — HYDROMORPHONE HYDROCHLORIDE 1 MG/ML
0.5 INJECTION, SOLUTION INTRAMUSCULAR; INTRAVENOUS; SUBCUTANEOUS
Status: DISCONTINUED | OUTPATIENT
Start: 2024-01-01 | End: 2024-01-01

## 2024-01-01 RX ORDER — 0.9 % SODIUM CHLORIDE 0.9 %
10 VIAL (ML) INJECTION PRN
Status: CANCELLED | OUTPATIENT
Start: 2024-01-01

## 2024-01-01 RX ORDER — AMOXICILLIN 250 MG
2 CAPSULE ORAL EVERY EVENING
Status: DISCONTINUED | OUTPATIENT
Start: 2024-01-01 | End: 2024-01-01

## 2024-01-01 RX ORDER — ACETAMINOPHEN 325 MG/1
650 TABLET ORAL PRN
Status: CANCELLED | OUTPATIENT
Start: 2024-01-01

## 2024-01-01 RX ORDER — AMOXICILLIN 250 MG
2 CAPSULE ORAL 2 TIMES DAILY
Status: DISCONTINUED | OUTPATIENT
Start: 2024-01-01 | End: 2024-01-01

## 2024-01-01 RX ORDER — ACETAMINOPHEN 325 MG/1
650 TABLET ORAL ONCE
Status: CANCELLED | OUTPATIENT
Start: 2024-01-01

## 2024-01-01 RX ORDER — HYDROMORPHONE HYDROCHLORIDE 2 MG/ML
4 INJECTION, SOLUTION INTRAMUSCULAR; INTRAVENOUS; SUBCUTANEOUS
Status: DISCONTINUED | OUTPATIENT
Start: 2024-01-01 | End: 2024-01-01 | Stop reason: HOSPADM

## 2024-01-01 RX ORDER — 0.9 % SODIUM CHLORIDE 0.9 %
3 VIAL (ML) INJECTION PRN
Status: CANCELLED | OUTPATIENT
Start: 2024-01-01

## 2024-01-01 RX ORDER — ONDANSETRON 4 MG/1
8 TABLET, ORALLY DISINTEGRATING ORAL EVERY 4 HOURS PRN
Status: DISCONTINUED | OUTPATIENT
Start: 2024-01-01 | End: 2024-01-01 | Stop reason: HOSPADM

## 2024-01-01 RX ORDER — DEXTROSE MONOHYDRATE 25 G/50ML
25 INJECTION, SOLUTION INTRAVENOUS
Status: DISCONTINUED | OUTPATIENT
Start: 2024-01-01 | End: 2024-01-01

## 2024-01-01 RX ORDER — 0.9 % SODIUM CHLORIDE 0.9 %
VIAL (ML) INJECTION PRN
Status: CANCELLED | OUTPATIENT
Start: 2024-01-01

## 2024-01-01 RX ORDER — MINERAL OIL/HYDROPHIL PETROLAT
OINTMENT (GRAM) TOPICAL 2 TIMES DAILY
Status: DISCONTINUED | OUTPATIENT
Start: 2024-01-01 | End: 2024-01-01

## 2024-01-01 RX ORDER — ONDANSETRON 4 MG/1
4 TABLET, ORALLY DISINTEGRATING ORAL EVERY 4 HOURS PRN
Status: DISCONTINUED | OUTPATIENT
Start: 2024-01-01 | End: 2024-01-01

## 2024-01-01 RX ORDER — SODIUM CHLORIDE 9 MG/ML
INJECTION, SOLUTION INTRAVENOUS CONTINUOUS
Status: DISCONTINUED | OUTPATIENT
Start: 2024-01-01 | End: 2024-01-01

## 2024-01-01 RX ORDER — PROCHLORPERAZINE MALEATE 10 MG
10 TABLET ORAL EVERY 6 HOURS PRN
Status: CANCELLED | OUTPATIENT
Start: 2024-01-01

## 2024-01-01 RX ORDER — ATROPINE SULFATE 10 MG/ML
2 SOLUTION/ DROPS OPHTHALMIC EVERY 4 HOURS PRN
Status: DISCONTINUED | OUTPATIENT
Start: 2024-01-01 | End: 2024-01-01 | Stop reason: HOSPADM

## 2024-01-01 RX ORDER — OXYCODONE HYDROCHLORIDE 10 MG/1
10 TABLET ORAL
Status: DISCONTINUED | OUTPATIENT
Start: 2024-01-01 | End: 2024-01-01

## 2024-01-01 RX ORDER — CABOZANTINIB 20 MG/1
20 TABLET ORAL DAILY
Qty: 30 TABLET | Refills: 0 | Status: SHIPPED | OUTPATIENT
Start: 2024-01-01 | End: 2024-01-01

## 2024-01-01 RX ORDER — HYDROCODONE BITARTRATE AND ACETAMINOPHEN 5; 325 MG/1; MG/1
1 TABLET ORAL EVERY 4 HOURS PRN
Status: DISCONTINUED | OUTPATIENT
Start: 2024-01-01 | End: 2024-01-01

## 2024-01-01 RX ORDER — PROCHLORPERAZINE EDISYLATE 5 MG/ML
5-10 INJECTION INTRAMUSCULAR; INTRAVENOUS EVERY 4 HOURS PRN
Status: DISCONTINUED | OUTPATIENT
Start: 2024-01-01 | End: 2024-01-01

## 2024-01-01 RX ORDER — NOREPINEPHRINE BITARTRATE 0.03 MG/ML
0-1 INJECTION, SOLUTION INTRAVENOUS CONTINUOUS
Status: DISCONTINUED | OUTPATIENT
Start: 2024-01-01 | End: 2024-01-01

## 2024-01-01 RX ORDER — DIPHENHYDRAMINE HYDROCHLORIDE 50 MG/ML
50 INJECTION INTRAMUSCULAR; INTRAVENOUS PRN
Status: CANCELLED | OUTPATIENT
Start: 2024-01-01

## 2024-01-01 RX ORDER — ALLOPURINOL 300 MG/1
300 TABLET ORAL DAILY
Status: DISCONTINUED | OUTPATIENT
Start: 2024-01-01 | End: 2024-01-01

## 2024-01-01 RX ORDER — ACETAMINOPHEN 650 MG/1
650 SUPPOSITORY RECTAL EVERY 4 HOURS PRN
Status: DISCONTINUED | OUTPATIENT
Start: 2024-01-01 | End: 2024-01-01 | Stop reason: HOSPADM

## 2024-01-01 RX ORDER — SODIUM CHLORIDE 9 MG/ML
1000 INJECTION, SOLUTION INTRAVENOUS ONCE
Status: COMPLETED | OUTPATIENT
Start: 2024-01-01 | End: 2024-01-01

## 2024-01-01 RX ORDER — CALCIUM GLUCONATE 20 MG/ML
2 INJECTION, SOLUTION INTRAVENOUS ONCE
Status: COMPLETED | OUTPATIENT
Start: 2024-01-01 | End: 2024-01-01

## 2024-01-01 RX ORDER — LORAZEPAM 2 MG/ML
2 CONCENTRATE ORAL
Status: DISCONTINUED | OUTPATIENT
Start: 2024-01-01 | End: 2024-01-01 | Stop reason: HOSPADM

## 2024-01-01 RX ORDER — MORPHINE SULFATE 15 MG/1
15 TABLET, FILM COATED, EXTENDED RELEASE ORAL EVERY 12 HOURS
Status: DISCONTINUED | OUTPATIENT
Start: 2024-01-01 | End: 2024-01-01

## 2024-01-01 RX ORDER — SODIUM BICARBONATE IN D5W 150/1000ML
PLASTIC BAG, INJECTION (ML) INTRAVENOUS CONTINUOUS
Status: DISCONTINUED | OUTPATIENT
Start: 2024-01-01 | End: 2024-01-01

## 2024-01-01 RX ORDER — CYCLOBENZAPRINE HCL 10 MG
10 TABLET ORAL
Status: DISCONTINUED | OUTPATIENT
Start: 2024-01-01 | End: 2024-01-01

## 2024-01-01 RX ORDER — TAMSULOSIN HYDROCHLORIDE 0.4 MG/1
0.4 CAPSULE ORAL
Status: DISCONTINUED | OUTPATIENT
Start: 2024-01-01 | End: 2024-01-01

## 2024-01-01 RX ORDER — OXYCODONE HYDROCHLORIDE 5 MG/1
5 TABLET ORAL
Status: DISCONTINUED | OUTPATIENT
Start: 2024-01-01 | End: 2024-01-01

## 2024-01-01 RX ORDER — AZITHROMYCIN 500 MG/5ML
500 INJECTION, POWDER, LYOPHILIZED, FOR SOLUTION INTRAVENOUS ONCE
Status: DISCONTINUED | OUTPATIENT
Start: 2024-01-01 | End: 2024-01-01

## 2024-01-01 RX ORDER — ONDANSETRON 2 MG/ML
8 INJECTION INTRAMUSCULAR; INTRAVENOUS EVERY 4 HOURS PRN
Status: DISCONTINUED | OUTPATIENT
Start: 2024-01-01 | End: 2024-01-01 | Stop reason: HOSPADM

## 2024-01-01 RX ORDER — CALCIUM CHLORIDE 100 MG/ML
1 INJECTION INTRAVENOUS; INTRAVENTRICULAR ONCE
Status: COMPLETED | OUTPATIENT
Start: 2024-01-01 | End: 2024-01-01

## 2024-01-01 RX ORDER — AZITHROMYCIN 500 MG/5ML
500 INJECTION, POWDER, LYOPHILIZED, FOR SOLUTION INTRAVENOUS EVERY 24 HOURS
Status: DISCONTINUED | OUTPATIENT
Start: 2024-01-01 | End: 2024-01-01

## 2024-01-01 RX ORDER — ONDANSETRON 8 MG/1
8 TABLET, ORALLY DISINTEGRATING ORAL PRN
Status: CANCELLED | OUTPATIENT
Start: 2024-01-01

## 2024-01-01 RX ORDER — METHYLPREDNISOLONE SODIUM SUCCINATE 125 MG/2ML
125 INJECTION, POWDER, LYOPHILIZED, FOR SOLUTION INTRAMUSCULAR; INTRAVENOUS PRN
Status: CANCELLED | OUTPATIENT
Start: 2024-01-01

## 2024-01-01 RX ORDER — ACETAMINOPHEN 325 MG/1
650 TABLET ORAL EVERY 4 HOURS PRN
Status: DISCONTINUED | OUTPATIENT
Start: 2024-01-01 | End: 2024-01-01 | Stop reason: HOSPADM

## 2024-01-01 RX ORDER — EPINEPHRINE 1 MG/ML(1)
0.5 AMPUL (ML) INJECTION PRN
Status: CANCELLED | OUTPATIENT
Start: 2024-01-01

## 2024-01-01 RX ORDER — SODIUM CHLORIDE, SODIUM LACTATE, POTASSIUM CHLORIDE, AND CALCIUM CHLORIDE .6; .31; .03; .02 G/100ML; G/100ML; G/100ML; G/100ML
500 INJECTION, SOLUTION INTRAVENOUS ONCE
Status: COMPLETED | OUTPATIENT
Start: 2024-01-01 | End: 2024-01-01

## 2024-01-01 RX ORDER — HYDROCODONE BITARTRATE AND ACETAMINOPHEN 5; 325 MG/1; MG/1
1 TABLET ORAL EVERY 4 HOURS PRN
Qty: 120 TABLET | Refills: 0 | Status: SHIPPED
Start: 2024-01-01 | End: 2024-01-01

## 2024-01-01 RX ORDER — ERGOCALCIFEROL 1.25 MG/1
50000 CAPSULE ORAL
Qty: 6 CAPSULE | Refills: 0 | Status: SHIPPED
Start: 2024-01-01 | End: 2024-01-01

## 2024-01-01 RX ORDER — ONDANSETRON 2 MG/ML
4 INJECTION INTRAMUSCULAR; INTRAVENOUS EVERY 4 HOURS PRN
Status: DISCONTINUED | OUTPATIENT
Start: 2024-01-01 | End: 2024-01-01

## 2024-01-01 RX ORDER — AZITHROMYCIN 500 MG/1
500 INJECTION, POWDER, LYOPHILIZED, FOR SOLUTION INTRAVENOUS ONCE
Status: COMPLETED | OUTPATIENT
Start: 2024-01-01 | End: 2024-01-01

## 2024-01-01 RX ORDER — CALCIUM GLUCONATE 20 MG/ML
1 INJECTION, SOLUTION INTRAVENOUS ONCE
Status: COMPLETED | OUTPATIENT
Start: 2024-01-01 | End: 2024-01-01

## 2024-01-01 RX ORDER — MORPHINE SULFATE 15 MG/1
15 TABLET, FILM COATED, EXTENDED RELEASE ORAL EVERY 12 HOURS
Qty: 60 TABLET | Refills: 0 | Status: SHIPPED
Start: 2024-01-01 | End: 2024-01-01

## 2024-01-01 RX ORDER — SODIUM CHLORIDE, SODIUM LACTATE, POTASSIUM CHLORIDE, CALCIUM CHLORIDE 600; 310; 30; 20 MG/100ML; MG/100ML; MG/100ML; MG/100ML
1000 INJECTION, SOLUTION INTRAVENOUS ONCE
Status: DISCONTINUED | OUTPATIENT
Start: 2024-01-01 | End: 2024-01-01

## 2024-01-01 RX ORDER — ONDANSETRON 2 MG/ML
4 INJECTION INTRAMUSCULAR; INTRAVENOUS PRN
Status: CANCELLED | OUTPATIENT
Start: 2024-01-01

## 2024-01-01 RX ORDER — ENOXAPARIN SODIUM 100 MG/ML
40 INJECTION SUBCUTANEOUS DAILY
Status: DISCONTINUED | OUTPATIENT
Start: 2024-01-01 | End: 2024-01-01

## 2024-01-01 RX ORDER — ONDANSETRON 4 MG/1
4 TABLET, ORALLY DISINTEGRATING ORAL ONCE
Status: COMPLETED | OUTPATIENT
Start: 2024-01-01 | End: 2024-01-01

## 2024-01-01 RX ORDER — HYDROMORPHONE HYDROCHLORIDE 2 MG/ML
2 INJECTION, SOLUTION INTRAMUSCULAR; INTRAVENOUS; SUBCUTANEOUS
Status: DISCONTINUED | OUTPATIENT
Start: 2024-01-01 | End: 2024-01-01 | Stop reason: HOSPADM

## 2024-01-01 RX ORDER — ZOLPIDEM TARTRATE 5 MG/1
10 TABLET ORAL NIGHTLY PRN
Status: DISCONTINUED | OUTPATIENT
Start: 2024-01-01 | End: 2024-01-01

## 2024-01-01 RX ORDER — AZITHROMYCIN 250 MG/1
500 TABLET, FILM COATED ORAL DAILY
Status: COMPLETED | OUTPATIENT
Start: 2024-01-01 | End: 2024-01-01

## 2024-01-01 RX ORDER — NOREPINEPHRINE BITARTRATE 0.03 MG/ML
INJECTION, SOLUTION INTRAVENOUS
Status: COMPLETED
Start: 2024-01-01 | End: 2024-01-01

## 2024-01-01 RX ORDER — PROMETHAZINE HYDROCHLORIDE 25 MG/1
12.5-25 SUPPOSITORY RECTAL EVERY 4 HOURS PRN
Status: DISCONTINUED | OUTPATIENT
Start: 2024-01-01 | End: 2024-01-01

## 2024-01-01 RX ORDER — LISINOPRIL 10 MG/1
10 TABLET ORAL DAILY
Qty: 60 TABLET | Refills: 1 | Status: SHIPPED
Start: 2024-01-01 | End: 2024-01-01

## 2024-01-01 RX ADMIN — PETROLATUM: 42 OINTMENT TOPICAL at 18:29

## 2024-01-01 RX ADMIN — SODIUM CHLORIDE, POTASSIUM CHLORIDE, SODIUM LACTATE AND CALCIUM CHLORIDE 500 ML: 600; 310; 30; 20 INJECTION, SOLUTION INTRAVENOUS at 00:27

## 2024-01-01 RX ADMIN — CALCIUM GLUCONATE 2 G: 20 INJECTION, SOLUTION INTRAVENOUS at 11:23

## 2024-01-01 RX ADMIN — CALCIUM CHLORIDE 1 G: 100 INJECTION, SOLUTION INTRAVENOUS at 02:36

## 2024-01-01 RX ADMIN — AMPICILLIN SODIUM, SULBACTAM SODIUM 3 G: 2; 1 INJECTION, POWDER, FOR SOLUTION INTRAMUSCULAR; INTRAVENOUS at 13:20

## 2024-01-01 RX ADMIN — SODIUM BICARBONATE 50 MEQ: 84 INJECTION INTRAVENOUS at 12:43

## 2024-01-01 RX ADMIN — AMPICILLIN SODIUM, SULBACTAM SODIUM 3 G: 2; 1 INJECTION, POWDER, FOR SOLUTION INTRAMUSCULAR; INTRAVENOUS at 16:15

## 2024-01-01 RX ADMIN — METOPROLOL SUCCINATE 25 MG: 25 TABLET, EXTENDED RELEASE ORAL at 05:13

## 2024-01-01 RX ADMIN — SODIUM BICARBONATE: 84 INJECTION, SOLUTION INTRAVENOUS at 19:17

## 2024-01-01 RX ADMIN — ONDANSETRON 4 MG: 4 TABLET, ORALLY DISINTEGRATING ORAL at 11:30

## 2024-01-01 RX ADMIN — MORPHINE SULFATE 15 MG: 15 TABLET, FILM COATED, EXTENDED RELEASE ORAL at 18:26

## 2024-01-01 RX ADMIN — AZITHROMYCIN 500 MG: 500 INJECTION, POWDER, LYOPHILIZED, FOR SOLUTION INTRAVENOUS at 00:47

## 2024-01-01 RX ADMIN — INSULIN HUMAN 10 UNITS: 100 INJECTION, SOLUTION PARENTERAL at 16:13

## 2024-01-01 RX ADMIN — PETROLATUM: 42 OINTMENT TOPICAL at 21:17

## 2024-01-01 RX ADMIN — AMPICILLIN SODIUM, SULBACTAM SODIUM 3 G: 2; 1 INJECTION, POWDER, FOR SOLUTION INTRAMUSCULAR; INTRAVENOUS at 05:25

## 2024-01-01 RX ADMIN — SODIUM CHLORIDE 1000 ML: 9 INJECTION, SOLUTION INTRAVENOUS at 12:59

## 2024-01-01 RX ADMIN — INSULIN HUMAN 1 UNITS: 100 INJECTION, SOLUTION PARENTERAL at 20:58

## 2024-01-01 RX ADMIN — AMPICILLIN SODIUM, SULBACTAM SODIUM 3 G: 2; 1 INJECTION, POWDER, FOR SOLUTION INTRAMUSCULAR; INTRAVENOUS at 21:40

## 2024-01-01 RX ADMIN — MORPHINE SULFATE 15 MG: 15 TABLET, FILM COATED, EXTENDED RELEASE ORAL at 05:18

## 2024-01-01 RX ADMIN — INSULIN HUMAN 1 UNITS: 100 INJECTION, SOLUTION PARENTERAL at 09:14

## 2024-01-01 RX ADMIN — AMPICILLIN SODIUM, SULBACTAM SODIUM 3 G: 2; 1 INJECTION, POWDER, FOR SOLUTION INTRAMUSCULAR; INTRAVENOUS at 05:38

## 2024-01-01 RX ADMIN — VANCOMYCIN HYDROCHLORIDE 1750 MG: 5 INJECTION, POWDER, LYOPHILIZED, FOR SOLUTION INTRAVENOUS at 02:25

## 2024-01-01 RX ADMIN — DEXTROSE MONOHYDRATE 25 G: 25 INJECTION, SOLUTION INTRAVENOUS at 02:35

## 2024-01-01 RX ADMIN — DEXTROSE MONOHYDRATE 25 G: 25 INJECTION, SOLUTION INTRAVENOUS at 22:01

## 2024-01-01 RX ADMIN — HYDROMORPHONE HYDROCHLORIDE 4 MG: 2 INJECTION, SOLUTION INTRAMUSCULAR; INTRAVENOUS; SUBCUTANEOUS at 01:03

## 2024-01-01 RX ADMIN — CYCLOBENZAPRINE 10 MG: 10 TABLET, FILM COATED ORAL at 23:48

## 2024-01-01 RX ADMIN — NOREPINEPHRINE BITARTRATE 0.05 MCG/KG/MIN: 1 INJECTION, SOLUTION, CONCENTRATE INTRAVENOUS at 10:24

## 2024-01-01 RX ADMIN — MORPHINE SULFATE 15 MG: 15 TABLET, FILM COATED, EXTENDED RELEASE ORAL at 17:16

## 2024-01-01 RX ADMIN — SODIUM BICARBONATE: 84 INJECTION, SOLUTION INTRAVENOUS at 06:02

## 2024-01-01 RX ADMIN — MORPHINE SULFATE 15 MG: 15 TABLET, FILM COATED, EXTENDED RELEASE ORAL at 17:56

## 2024-01-01 RX ADMIN — ALLOPURINOL 300 MG: 300 TABLET ORAL at 09:57

## 2024-01-01 RX ADMIN — INSULIN HUMAN 1 UNITS: 100 INJECTION, SOLUTION PARENTERAL at 19:23

## 2024-01-01 RX ADMIN — SODIUM CHLORIDE, POTASSIUM CHLORIDE, SODIUM LACTATE AND CALCIUM CHLORIDE: 600; 310; 30; 20 INJECTION, SOLUTION INTRAVENOUS at 12:30

## 2024-01-01 RX ADMIN — SODIUM ZIRCONIUM CYCLOSILICATE 10 G: 10 POWDER, FOR SUSPENSION ORAL at 12:37

## 2024-01-01 RX ADMIN — PETROLATUM: 42 OINTMENT TOPICAL at 17:18

## 2024-01-01 RX ADMIN — IOHEXOL 41 ML: 350 INJECTION, SOLUTION INTRAVENOUS at 00:45

## 2024-01-01 RX ADMIN — SENNOSIDES AND DOCUSATE SODIUM 2 TABLET: 50; 8.6 TABLET ORAL at 17:56

## 2024-01-01 RX ADMIN — AMPICILLIN SODIUM, SULBACTAM SODIUM 3 G: 2; 1 INJECTION, POWDER, FOR SOLUTION INTRAMUSCULAR; INTRAVENOUS at 01:04

## 2024-01-01 RX ADMIN — AMPICILLIN SODIUM, SULBACTAM SODIUM 3 G: 2; 1 INJECTION, POWDER, FOR SOLUTION INTRAMUSCULAR; INTRAVENOUS at 09:59

## 2024-01-01 RX ADMIN — ATEZOLIZUMAB 1200 MG: 1200 INJECTION, SOLUTION INTRAVENOUS at 13:04

## 2024-01-01 RX ADMIN — AMPICILLIN SODIUM, SULBACTAM SODIUM 3 G: 2; 1 INJECTION, POWDER, FOR SOLUTION INTRAMUSCULAR; INTRAVENOUS at 22:08

## 2024-01-01 RX ADMIN — LORAZEPAM 2 MG: 2 INJECTION INTRAMUSCULAR; INTRAVENOUS at 01:03

## 2024-01-01 RX ADMIN — INSULIN HUMAN 10 UNITS: 100 INJECTION, SOLUTION PARENTERAL at 12:50

## 2024-01-01 RX ADMIN — DEXTROSE MONOHYDRATE 25 G: 25 INJECTION, SOLUTION INTRAVENOUS at 12:21

## 2024-01-01 RX ADMIN — SODIUM ZIRCONIUM CYCLOSILICATE 10 G: 10 POWDER, FOR SUSPENSION ORAL at 20:23

## 2024-01-01 RX ADMIN — SODIUM BICARBONATE 50 MEQ: 84 INJECTION INTRAVENOUS at 16:16

## 2024-01-01 RX ADMIN — METOPROLOL SUCCINATE 25 MG: 25 TABLET, EXTENDED RELEASE ORAL at 05:18

## 2024-01-01 RX ADMIN — HYDROMORPHONE HYDROCHLORIDE 0.5 MG: 1 INJECTION, SOLUTION INTRAMUSCULAR; INTRAVENOUS; SUBCUTANEOUS at 12:09

## 2024-01-01 RX ADMIN — ONDANSETRON 4 MG: 4 TABLET, ORALLY DISINTEGRATING ORAL at 02:40

## 2024-01-01 RX ADMIN — MORPHINE SULFATE 15 MG: 15 TABLET, FILM COATED, EXTENDED RELEASE ORAL at 05:14

## 2024-01-01 RX ADMIN — DEXTROSE MONOHYDRATE 25 G: 25 INJECTION, SOLUTION INTRAVENOUS at 12:43

## 2024-01-01 RX ADMIN — INSULIN HUMAN 1 UNITS: 100 INJECTION, SOLUTION PARENTERAL at 08:33

## 2024-01-01 RX ADMIN — AMPICILLIN AND SULBACTAM 3 G: 1; 2 INJECTION, POWDER, FOR SOLUTION INTRAMUSCULAR; INTRAVENOUS at 00:43

## 2024-01-01 RX ADMIN — AMPICILLIN SODIUM, SULBACTAM SODIUM 3 G: 2; 1 INJECTION, POWDER, FOR SOLUTION INTRAMUSCULAR; INTRAVENOUS at 17:18

## 2024-01-01 RX ADMIN — SENNOSIDES AND DOCUSATE SODIUM 2 TABLET: 50; 8.6 TABLET ORAL at 05:13

## 2024-01-01 RX ADMIN — MORPHINE SULFATE 15 MG: 15 TABLET, FILM COATED, EXTENDED RELEASE ORAL at 05:38

## 2024-01-01 RX ADMIN — PETROLATUM: 42 OINTMENT TOPICAL at 04:53

## 2024-01-01 RX ADMIN — AZITHROMYCIN DIHYDRATE 500 MG: 250 TABLET ORAL at 05:18

## 2024-01-01 RX ADMIN — SENNOSIDES AND DOCUSATE SODIUM 2 TABLET: 50; 8.6 TABLET ORAL at 17:16

## 2024-01-01 RX ADMIN — AMPICILLIN SODIUM, SULBACTAM SODIUM 3 G: 2; 1 INJECTION, POWDER, FOR SOLUTION INTRAMUSCULAR; INTRAVENOUS at 03:53

## 2024-01-01 RX ADMIN — AMPICILLIN SODIUM, SULBACTAM SODIUM 3 G: 2; 1 INJECTION, POWDER, FOR SOLUTION INTRAMUSCULAR; INTRAVENOUS at 15:59

## 2024-01-01 RX ADMIN — SODIUM BICARBONATE 75 MEQ: 84 INJECTION, SOLUTION INTRAVENOUS at 14:12

## 2024-01-01 RX ADMIN — INSULIN HUMAN 5 UNITS: 100 INJECTION, SOLUTION PARENTERAL at 02:31

## 2024-01-01 RX ADMIN — AMPICILLIN SODIUM, SULBACTAM SODIUM 3 G: 2; 1 INJECTION, POWDER, FOR SOLUTION INTRAMUSCULAR; INTRAVENOUS at 18:00

## 2024-01-01 RX ADMIN — AMPICILLIN SODIUM, SULBACTAM SODIUM 3 G: 2; 1 INJECTION, POWDER, FOR SOLUTION INTRAMUSCULAR; INTRAVENOUS at 04:51

## 2024-01-01 RX ADMIN — AMPICILLIN SODIUM, SULBACTAM SODIUM 3 G: 2; 1 INJECTION, POWDER, FOR SOLUTION INTRAMUSCULAR; INTRAVENOUS at 21:55

## 2024-01-01 RX ADMIN — PETROLATUM: 42 OINTMENT TOPICAL at 18:00

## 2024-01-01 RX ADMIN — NOREPINEPHRINE BITARTRATE 0.35 MCG/KG/MIN: 1 INJECTION, SOLUTION, CONCENTRATE INTRAVENOUS at 21:51

## 2024-01-01 RX ADMIN — CALCIUM GLUCONATE 1 G: 20 INJECTION, SOLUTION INTRAVENOUS at 16:18

## 2024-01-01 RX ADMIN — INSULIN HUMAN 1 UNITS: 100 INJECTION, SOLUTION PARENTERAL at 20:54

## 2024-01-01 RX ADMIN — SODIUM CHLORIDE 1000 ML: 9 INJECTION, SOLUTION INTRAVENOUS at 00:50

## 2024-01-01 RX ADMIN — SODIUM CHLORIDE: 9 INJECTION, SOLUTION INTRAVENOUS at 02:39

## 2024-01-01 RX ADMIN — NOREPINEPHRINE BITARTRATE 0.05 MCG/KG/MIN: 1 INJECTION INTRAVENOUS at 10:24

## 2024-01-01 RX ADMIN — CYCLOBENZAPRINE 10 MG: 10 TABLET, FILM COATED ORAL at 20:58

## 2024-01-01 RX ADMIN — METOPROLOL SUCCINATE 25 MG: 25 TABLET, EXTENDED RELEASE ORAL at 09:17

## 2024-01-01 RX ADMIN — PETROLATUM: 42 OINTMENT TOPICAL at 05:18

## 2024-01-01 RX ADMIN — DEXTROSE MONOHYDRATE 25 G: 25 INJECTION, SOLUTION INTRAVENOUS at 16:10

## 2024-01-01 RX ADMIN — PETROLATUM: 42 OINTMENT TOPICAL at 05:14

## 2024-01-01 RX ADMIN — AZITHROMYCIN DIHYDRATE 500 MG: 250 TABLET ORAL at 05:14

## 2024-01-01 SDOH — ECONOMIC STABILITY: TRANSPORTATION INSECURITY
IN THE PAST 12 MONTHS, HAS LACK OF RELIABLE TRANSPORTATION KEPT YOU FROM MEDICAL APPOINTMENTS, MEETINGS, WORK OR FROM GETTING THINGS NEEDED FOR DAILY LIVING?: PATIENT DECLINED

## 2024-01-01 SDOH — ECONOMIC STABILITY: TRANSPORTATION INSECURITY
IN THE PAST 12 MONTHS, HAS THE LACK OF TRANSPORTATION KEPT YOU FROM MEDICAL APPOINTMENTS OR FROM GETTING MEDICATIONS?: PATIENT DECLINED

## 2024-01-01 ASSESSMENT — COGNITIVE AND FUNCTIONAL STATUS - GENERAL
MOBILITY SCORE: 17
TOILETING: A LOT
DRESSING REGULAR UPPER BODY CLOTHING: A LOT
PERSONAL GROOMING: A LOT
DRESSING REGULAR UPPER BODY CLOTHING: A LOT
STANDING UP FROM CHAIR USING ARMS: A LITTLE
MOVING TO AND FROM BED TO CHAIR: A LITTLE
SUGGESTED CMS G CODE MODIFIER DAILY ACTIVITY: CK
DAILY ACTIVITIY SCORE: 20
PERSONAL GROOMING: A LITTLE
MOVING FROM LYING ON BACK TO SITTING ON SIDE OF FLAT BED: A LITTLE
EATING MEALS: A LITTLE
WALKING IN HOSPITAL ROOM: A LITTLE
HELP NEEDED FOR BATHING: A LOT
TURNING FROM BACK TO SIDE WHILE IN FLAT BAD: A LITTLE
SUGGESTED CMS G CODE MODIFIER DAILY ACTIVITY: CL
DRESSING REGULAR LOWER BODY CLOTHING: A LITTLE
STANDING UP FROM CHAIR USING ARMS: A LITTLE
DRESSING REGULAR LOWER BODY CLOTHING: A LOT
HELP NEEDED FOR BATHING: A LITTLE
TOILETING: A LOT
PERSONAL GROOMING: A LOT
DRESSING REGULAR LOWER BODY CLOTHING: A LOT
MOVING TO AND FROM BED TO CHAIR: A LITTLE
DAILY ACTIVITIY SCORE: 15
DRESSING REGULAR UPPER BODY CLOTHING: A LITTLE
MOVING TO AND FROM BED TO CHAIR: A LITTLE
SUGGESTED CMS G CODE MODIFIER DAILY ACTIVITY: CK
SUGGESTED CMS G CODE MODIFIER MOBILITY: CK
DAILY ACTIVITIY SCORE: 13
MOBILITY SCORE: 17
CLIMB 3 TO 5 STEPS WITH RAILING: A LOT
DAILY ACTIVITIY SCORE: 14
SUGGESTED CMS G CODE MODIFIER DAILY ACTIVITY: CJ
SUGGESTED CMS G CODE MODIFIER MOBILITY: CK
EATING MEALS: A LITTLE
DRESSING REGULAR UPPER BODY CLOTHING: A LITTLE
WALKING IN HOSPITAL ROOM: A LITTLE
DRESSING REGULAR LOWER BODY CLOTHING: A LOT
TOILETING: A LITTLE
STANDING UP FROM CHAIR USING ARMS: A LITTLE
TURNING FROM BACK TO SIDE WHILE IN FLAT BAD: A LITTLE
TURNING FROM BACK TO SIDE WHILE IN FLAT BAD: A LITTLE
CLIMB 3 TO 5 STEPS WITH RAILING: A LOT
MOVING FROM LYING ON BACK TO SITTING ON SIDE OF FLAT BED: A LITTLE
CLIMB 3 TO 5 STEPS WITH RAILING: A LOT
HELP NEEDED FOR BATHING: A LOT
TOILETING: A LOT
SUGGESTED CMS G CODE MODIFIER MOBILITY: CK
MOBILITY SCORE: 17
MOVING FROM LYING ON BACK TO SITTING ON SIDE OF FLAT BED: A LITTLE
WALKING IN HOSPITAL ROOM: A LITTLE
HELP NEEDED FOR BATHING: A LOT

## 2024-01-01 ASSESSMENT — FIBROSIS 4 INDEX
FIB4 SCORE: 9.91
FIB4 SCORE: 6.32
FIB4 SCORE: 6.01
FIB4 SCORE: 18.08
FIB4 SCORE: 1.99
FIB4 SCORE: 9.58
FIB4 SCORE: 7.45
FIB4 SCORE: 5.87
FIB4 SCORE: 6.54

## 2024-01-01 ASSESSMENT — LIFESTYLE VARIABLES
ALCOHOL_USE: NO
HAVE YOU EVER FELT YOU SHOULD CUT DOWN ON YOUR DRINKING: NO
ON A TYPICAL DAY WHEN YOU DRINK ALCOHOL HOW MANY DRINKS DO YOU HAVE: 0
EVER FELT BAD OR GUILTY ABOUT YOUR DRINKING: NO
TOTAL SCORE: 0
HAVE PEOPLE ANNOYED YOU BY CRITICIZING YOUR DRINKING: NO
TOTAL SCORE: 0
CONSUMPTION TOTAL: NEGATIVE
AVERAGE NUMBER OF DAYS PER WEEK YOU HAVE A DRINK CONTAINING ALCOHOL: 0
HOW MANY TIMES IN THE PAST YEAR HAVE YOU HAD 5 OR MORE DRINKS IN A DAY: 0
DOES PATIENT WANT TO STOP DRINKING: NO
EVER HAD A DRINK FIRST THING IN THE MORNING TO STEADY YOUR NERVES TO GET RID OF A HANGOVER: NO
TOTAL SCORE: 0

## 2024-01-01 ASSESSMENT — ENCOUNTER SYMPTOMS
WEIGHT LOSS: 1
ORTHOPNEA: 0
NAUSEA: 1
NAUSEA: 0
DIZZINESS: 0
COUGH: 0
SHORTNESS OF BREATH: 0
DIARRHEA: 0
HEARTBURN: 0
VOMITING: 0
NECK PAIN: 0
DIZZINESS: 0
ABDOMINAL PAIN: 0
HEMOPTYSIS: 0
SINUS PAIN: 0
FEVER: 0
MYALGIAS: 0
HEADACHES: 0
DIARRHEA: 0
BACK PAIN: 0
VOMITING: 0
COUGH: 0
TINGLING: 0
BLOOD IN STOOL: 0
STRIDOR: 0
FEVER: 0
HEMOPTYSIS: 0
SPUTUM PRODUCTION: 0
ORTHOPNEA: 0
WHEEZING: 0
CONSTIPATION: 0
BACK PAIN: 0
HEADACHES: 0
WEAKNESS: 0
CLAUDICATION: 0
WEIGHT LOSS: 0
SPUTUM PRODUCTION: 0
SHORTNESS OF BREATH: 1
NECK PAIN: 0
LOSS OF CONSCIOUSNESS: 0
PALPITATIONS: 0
CHILLS: 0
FLANK PAIN: 0
VOMITING: 1
PALPITATIONS: 0
CONSTIPATION: 0
NAUSEA: 1
CHILLS: 0

## 2024-01-01 ASSESSMENT — PAIN DESCRIPTION - PAIN TYPE
TYPE: ACUTE PAIN
TYPE: CHRONIC PAIN
TYPE: ACUTE PAIN
TYPE: ACUTE PAIN
TYPE: CHRONIC PAIN
TYPE: ACUTE PAIN
TYPE: ACUTE PAIN
TYPE: CHRONIC PAIN
TYPE: ACUTE PAIN

## 2024-01-01 ASSESSMENT — SOCIAL DETERMINANTS OF HEALTH (SDOH)
WITHIN THE LAST YEAR, HAVE YOU BEEN AFRAID OF YOUR PARTNER OR EX-PARTNER?: NO
WITHIN THE LAST YEAR, HAVE TO BEEN RAPED OR FORCED TO HAVE ANY KIND OF SEXUAL ACTIVITY BY YOUR PARTNER OR EX-PARTNER?: NO
IN THE PAST 12 MONTHS, HAS THE ELECTRIC, GAS, OIL, OR WATER COMPANY THREATENED TO SHUT OFF SERVICE IN YOUR HOME?: PATIENT DECLINED
WITHIN THE LAST YEAR, HAVE YOU BEEN KICKED, HIT, SLAPPED, OR OTHERWISE PHYSICALLY HURT BY YOUR PARTNER OR EX-PARTNER?: NO
WITHIN THE PAST 12 MONTHS, THE FOOD YOU BOUGHT JUST DIDN'T LAST AND YOU DIDN'T HAVE MONEY TO GET MORE: PATIENT DECLINED
WITHIN THE PAST 12 MONTHS, YOU WORRIED THAT YOUR FOOD WOULD RUN OUT BEFORE YOU GOT THE MONEY TO BUY MORE: PATIENT DECLINED
WITHIN THE LAST YEAR, HAVE YOU BEEN HUMILIATED OR EMOTIONALLY ABUSED IN OTHER WAYS BY YOUR PARTNER OR EX-PARTNER?: NO

## 2024-01-01 ASSESSMENT — COPD QUESTIONNAIRES
DURING THE PAST 4 WEEKS HOW MUCH DID YOU FEEL SHORT OF BREATH: NONE/LITTLE OF THE TIME
COPD SCREENING SCORE: 1
HAVE YOU SMOKED AT LEAST 100 CIGARETTES IN YOUR ENTIRE LIFE: NO/DON'T KNOW
DO YOU EVER COUGH UP ANY MUCUS OR PHLEGM?: NO/ONLY WITH OCCASIONAL COLDS OR INFECTIONS

## 2024-01-01 ASSESSMENT — GAIT ASSESSMENTS
ASSISTIVE DEVICE: FRONT WHEEL WALKER
DISTANCE (FEET): 5
DEVIATION: DECREASED BASE OF SUPPORT;BRADYKINETIC;OTHER (COMMENT)
GAIT LEVEL OF ASSIST: MINIMAL ASSIST

## 2024-01-01 ASSESSMENT — ACTIVITIES OF DAILY LIVING (ADL): TOILETING: UNABLE TO DETERMINE AT THIS TIME

## 2024-01-01 ASSESSMENT — PATIENT HEALTH QUESTIONNAIRE - PHQ9
1. LITTLE INTEREST OR PLEASURE IN DOING THINGS: NOT AT ALL
SUM OF ALL RESPONSES TO PHQ9 QUESTIONS 1 AND 2: 0
2. FEELING DOWN, DEPRESSED, IRRITABLE, OR HOPELESS: NOT AT ALL

## 2024-01-02 DIAGNOSIS — D64.81 ANEMIA ASSOCIATED WITH CHEMOTHERAPY: ICD-10-CM

## 2024-01-02 DIAGNOSIS — T45.1X5A ANEMIA ASSOCIATED WITH CHEMOTHERAPY: ICD-10-CM

## 2024-01-02 RX ORDER — DIPHENHYDRAMINE HCL 25 MG
25 TABLET ORAL ONCE
Status: CANCELLED | OUTPATIENT
Start: 2024-01-02 | End: 2024-01-02

## 2024-01-02 RX ORDER — ACETAMINOPHEN 325 MG/1
650 TABLET ORAL ONCE
Status: CANCELLED | OUTPATIENT
Start: 2024-01-02

## 2024-01-02 RX ORDER — DIPHENHYDRAMINE HYDROCHLORIDE 50 MG/ML
25 INJECTION INTRAMUSCULAR; INTRAVENOUS PRN
Status: CANCELLED | OUTPATIENT
Start: 2024-01-02

## 2024-01-02 RX ORDER — ACETAMINOPHEN 325 MG/1
650 TABLET ORAL PRN
Status: CANCELLED | OUTPATIENT
Start: 2024-01-02

## 2024-01-07 ENCOUNTER — OUTPATIENT INFUSION SERVICES (OUTPATIENT)
Dept: ONCOLOGY | Facility: MEDICAL CENTER | Age: 59
End: 2024-01-07
Attending: INTERNAL MEDICINE
Payer: COMMERCIAL

## 2024-01-07 VITALS
SYSTOLIC BLOOD PRESSURE: 98 MMHG | DIASTOLIC BLOOD PRESSURE: 62 MMHG | OXYGEN SATURATION: 96 % | HEART RATE: 86 BPM | TEMPERATURE: 97.8 F | WEIGHT: 169.09 LBS | HEIGHT: 65 IN | RESPIRATION RATE: 16 BRPM | BODY MASS INDEX: 28.17 KG/M2

## 2024-01-07 DIAGNOSIS — D64.81 ANEMIA ASSOCIATED WITH CHEMOTHERAPY: ICD-10-CM

## 2024-01-07 DIAGNOSIS — T45.1X5A ANEMIA ASSOCIATED WITH CHEMOTHERAPY: ICD-10-CM

## 2024-01-07 LAB
ABO + RH BLD: NORMAL
ABO GROUP BLD: NORMAL
BARCODED ABORH UBTYP: 5100
BARCODED PRD CODE UBPRD: NORMAL
BARCODED UNIT NUM UBUNT: NORMAL
BLD GP AB SCN SERPL QL: NORMAL
COMPONENT R 8504R: NORMAL
PRODUCT TYPE UPROD: NORMAL
RH BLD: NORMAL
UNIT STATUS USTAT: NORMAL

## 2024-01-07 PROCEDURE — 86850 RBC ANTIBODY SCREEN: CPT

## 2024-01-07 PROCEDURE — P9016 RBC LEUKOCYTES REDUCED: HCPCS

## 2024-01-07 PROCEDURE — 306780 HCHG STAT FOR TRANSFUSION PER CASE

## 2024-01-07 PROCEDURE — 86923 COMPATIBILITY TEST ELECTRIC: CPT

## 2024-01-07 PROCEDURE — A9270 NON-COVERED ITEM OR SERVICE: HCPCS | Performed by: NURSE PRACTITIONER

## 2024-01-07 PROCEDURE — 36430 TRANSFUSION BLD/BLD COMPNT: CPT

## 2024-01-07 PROCEDURE — 86901 BLOOD TYPING SEROLOGIC RH(D): CPT

## 2024-01-07 PROCEDURE — 700102 HCHG RX REV CODE 250 W/ 637 OVERRIDE(OP): Performed by: NURSE PRACTITIONER

## 2024-01-07 PROCEDURE — 86900 BLOOD TYPING SEROLOGIC ABO: CPT

## 2024-01-07 RX ORDER — HYDROCODONE BITARTRATE AND ACETAMINOPHEN 10; 325 MG/1; MG/1
1 TABLET ORAL EVERY 6 HOURS PRN
COMMUNITY
Start: 2023-10-17 | End: 2024-03-14 | Stop reason: SDUPTHER

## 2024-01-07 RX ORDER — FUROSEMIDE 20 MG/1
1 TABLET ORAL PRN
COMMUNITY

## 2024-01-07 RX ORDER — DIPHENHYDRAMINE HYDROCHLORIDE 50 MG/ML
25 INJECTION INTRAMUSCULAR; INTRAVENOUS PRN
Status: DISCONTINUED | OUTPATIENT
Start: 2024-01-07 | End: 2024-01-07 | Stop reason: HOSPADM

## 2024-01-07 RX ORDER — DIPHENHYDRAMINE HYDROCHLORIDE 50 MG/ML
25 INJECTION INTRAMUSCULAR; INTRAVENOUS PRN
Status: CANCELLED | OUTPATIENT
Start: 2024-01-07

## 2024-01-07 RX ORDER — CYCLOBENZAPRINE HCL 10 MG
10 TABLET ORAL
COMMUNITY
Start: 2023-10-24

## 2024-01-07 RX ORDER — CLOTRIMAZOLE AND BETAMETHASONE DIPROPIONATE 10; .64 MG/G; MG/G
1 CREAM TOPICAL PRN
COMMUNITY

## 2024-01-07 RX ORDER — ACETAMINOPHEN 325 MG/1
650 TABLET ORAL ONCE
Status: CANCELLED | OUTPATIENT
Start: 2024-01-07

## 2024-01-07 RX ORDER — DIPHENHYDRAMINE HCL 25 MG
25 TABLET ORAL ONCE
Status: COMPLETED | OUTPATIENT
Start: 2024-01-07 | End: 2024-01-07

## 2024-01-07 RX ORDER — ACETAMINOPHEN 325 MG/1
650 TABLET ORAL PRN
Status: DISCONTINUED | OUTPATIENT
Start: 2024-01-07 | End: 2024-01-07 | Stop reason: HOSPADM

## 2024-01-07 RX ORDER — HYDROCODONE POLISTIREX AND CHLORPHENIRAMINE POLISTIREX 10; 8 MG/5ML; MG/5ML
5 SUSPENSION, EXTENDED RELEASE ORAL PRN
COMMUNITY

## 2024-01-07 RX ORDER — ACETAMINOPHEN 325 MG/1
650 TABLET ORAL PRN
Status: CANCELLED | OUTPATIENT
Start: 2024-01-07

## 2024-01-07 RX ORDER — ONDANSETRON 8 MG/1
8 TABLET, ORALLY DISINTEGRATING ORAL PRN
COMMUNITY

## 2024-01-07 RX ORDER — PROCHLORPERAZINE MALEATE 10 MG
10 TABLET ORAL PRN
COMMUNITY

## 2024-01-07 RX ORDER — ACETAMINOPHEN 325 MG/1
650 TABLET ORAL ONCE
Status: COMPLETED | OUTPATIENT
Start: 2024-01-07 | End: 2024-01-07

## 2024-01-07 RX ORDER — TAMSULOSIN HYDROCHLORIDE 0.4 MG/1
0.4 CAPSULE ORAL DAILY
COMMUNITY
Start: 2023-12-08

## 2024-01-07 RX ORDER — DIPHENHYDRAMINE HCL 25 MG
25 TABLET ORAL ONCE
Status: CANCELLED | OUTPATIENT
Start: 2024-01-07 | End: 2024-01-07

## 2024-01-07 RX ADMIN — ACETAMINOPHEN 650 MG: 325 TABLET ORAL at 08:41

## 2024-01-07 RX ADMIN — DIPHENHYDRAMINE HYDROCHLORIDE 25 MG: 25 TABLET ORAL at 08:41

## 2024-01-07 ASSESSMENT — FIBROSIS 4 INDEX: FIB4 SCORE: 0.7

## 2024-01-07 NOTE — PROGRESS NOTES
Casper arrives to [facility] for a blood transfusion. Patient had his labs drawn at [facility] on 1/2: Hgb 7.3. Patient c/o fatigue, weakness, and dyspnea upon exertion. Patient visibly pale. Patient denies any other acute health concerns. 22g PIV placed to RAC, which flushes easily and has brisk blood return. Consents signed today. COD collected. Premedications given. 1 unit of PRBCs transfused without s/s blood transfusion reaction. Post-transfusion VSS. Patient tolerated well. Educated patient/caregiver regarding s/s to monitor for at home (I.e. fever, chills, flank pain). Discharged home to self care in no apparent distress.

## 2024-01-08 ENCOUNTER — HOSPITAL ENCOUNTER (OUTPATIENT)
Dept: LAB | Facility: MEDICAL CENTER | Age: 59
End: 2024-01-08
Attending: INTERNAL MEDICINE
Payer: COMMERCIAL

## 2024-01-08 LAB
BASOPHILS # BLD AUTO: 0.2 % (ref 0–1.8)
BASOPHILS # BLD: 0.01 K/UL (ref 0–0.12)
EOSINOPHIL # BLD AUTO: 0 K/UL (ref 0–0.51)
EOSINOPHIL NFR BLD: 0 % (ref 0–6.9)
ERYTHROCYTE [DISTWIDTH] IN BLOOD BY AUTOMATED COUNT: 60.6 FL (ref 35.9–50)
HCT VFR BLD AUTO: 26.5 % (ref 42–52)
HGB BLD-MCNC: 8 G/DL (ref 14–18)
IMM GRANULOCYTES # BLD AUTO: 0.29 K/UL (ref 0–0.11)
IMM GRANULOCYTES NFR BLD AUTO: 4.6 % (ref 0–0.9)
LYMPHOCYTES # BLD AUTO: 0.28 K/UL (ref 1–4.8)
LYMPHOCYTES NFR BLD: 4.5 % (ref 22–41)
MCH RBC QN AUTO: 26.8 PG (ref 27–33)
MCHC RBC AUTO-ENTMCNC: 30.2 G/DL (ref 32.3–36.5)
MCV RBC AUTO: 88.9 FL (ref 81.4–97.8)
MONOCYTES # BLD AUTO: 0.49 K/UL (ref 0–0.85)
MONOCYTES NFR BLD AUTO: 7.8 % (ref 0–13.4)
NEUTROPHILS # BLD AUTO: 5.22 K/UL (ref 1.82–7.42)
NEUTROPHILS NFR BLD: 82.9 % (ref 44–72)
NRBC # BLD AUTO: 0.09 K/UL
NRBC BLD-RTO: 1.4 /100 WBC (ref 0–0.2)
PLATELET # BLD AUTO: 158 K/UL (ref 164–446)
PMV BLD AUTO: 10.7 FL (ref 9–12.9)
RBC # BLD AUTO: 2.98 M/UL (ref 4.7–6.1)
WBC # BLD AUTO: 6.3 K/UL (ref 4.8–10.8)

## 2024-01-08 PROCEDURE — 36415 COLL VENOUS BLD VENIPUNCTURE: CPT

## 2024-01-08 PROCEDURE — 84153 ASSAY OF PSA TOTAL: CPT

## 2024-01-08 PROCEDURE — 80053 COMPREHEN METABOLIC PANEL: CPT

## 2024-01-08 PROCEDURE — 85025 COMPLETE CBC W/AUTO DIFF WBC: CPT

## 2024-01-09 LAB
ALBUMIN SERPL BCP-MCNC: 3.6 G/DL (ref 3.2–4.9)
ALBUMIN/GLOB SERPL: 1.7 G/DL
ALP SERPL-CCNC: 107 U/L (ref 30–99)
ALT SERPL-CCNC: 9 U/L (ref 2–50)
ANION GAP SERPL CALC-SCNC: 10 MMOL/L (ref 7–16)
AST SERPL-CCNC: 17 U/L (ref 12–45)
BILIRUB SERPL-MCNC: 0.3 MG/DL (ref 0.1–1.5)
BUN SERPL-MCNC: 9 MG/DL (ref 8–22)
CALCIUM ALBUM COR SERPL-MCNC: 8 MG/DL (ref 8.5–10.5)
CALCIUM SERPL-MCNC: 7.7 MG/DL (ref 8.5–10.5)
CHLORIDE SERPL-SCNC: 106 MMOL/L (ref 96–112)
CO2 SERPL-SCNC: 22 MMOL/L (ref 20–33)
CREAT SERPL-MCNC: 0.26 MG/DL (ref 0.5–1.4)
GFR SERPLBLD CREATININE-BSD FMLA CKD-EPI: 145 ML/MIN/1.73 M 2
GLOBULIN SER CALC-MCNC: 2.1 G/DL (ref 1.9–3.5)
GLUCOSE SERPL-MCNC: 118 MG/DL (ref 65–99)
POTASSIUM SERPL-SCNC: 4.3 MMOL/L (ref 3.6–5.5)
PROT SERPL-MCNC: 5.7 G/DL (ref 6–8.2)
PSA SERPL-MCNC: 0.54 NG/ML (ref 0–4)
SODIUM SERPL-SCNC: 138 MMOL/L (ref 135–145)

## 2024-01-11 ENCOUNTER — PATIENT MESSAGE (OUTPATIENT)
Dept: ONCOLOGY | Facility: MEDICAL CENTER | Age: 59
End: 2024-01-11

## 2024-01-11 RX ORDER — ACETAMINOPHEN 325 MG/1
650 TABLET ORAL ONCE
Status: CANCELLED | OUTPATIENT
Start: 2024-01-11

## 2024-01-11 RX ORDER — ACETAMINOPHEN 325 MG/1
650 TABLET ORAL PRN
Status: CANCELLED | OUTPATIENT
Start: 2024-01-11

## 2024-01-11 RX ORDER — DIPHENHYDRAMINE HYDROCHLORIDE 50 MG/ML
25 INJECTION INTRAMUSCULAR; INTRAVENOUS PRN
Status: CANCELLED | OUTPATIENT
Start: 2024-01-11

## 2024-01-11 RX ORDER — DIPHENHYDRAMINE HCL 25 MG
25 TABLET ORAL ONCE
Status: CANCELLED | OUTPATIENT
Start: 2024-01-11 | End: 2024-01-11

## 2024-01-13 ENCOUNTER — OUTPATIENT INFUSION SERVICES (OUTPATIENT)
Dept: ONCOLOGY | Facility: MEDICAL CENTER | Age: 59
End: 2024-01-13
Attending: NURSE PRACTITIONER
Payer: COMMERCIAL

## 2024-01-13 VITALS
WEIGHT: 168.21 LBS | HEART RATE: 86 BPM | BODY MASS INDEX: 28.03 KG/M2 | TEMPERATURE: 97.4 F | HEIGHT: 65 IN | DIASTOLIC BLOOD PRESSURE: 57 MMHG | OXYGEN SATURATION: 95 % | SYSTOLIC BLOOD PRESSURE: 95 MMHG | RESPIRATION RATE: 18 BRPM

## 2024-01-13 DIAGNOSIS — D64.81 ANEMIA ASSOCIATED WITH CHEMOTHERAPY: ICD-10-CM

## 2024-01-13 DIAGNOSIS — T45.1X5A ANEMIA ASSOCIATED WITH CHEMOTHERAPY: ICD-10-CM

## 2024-01-13 LAB
ABO GROUP BLD: NORMAL
BARCODED ABORH UBTYP: 5100
BARCODED PRD CODE UBPRD: NORMAL
BARCODED UNIT NUM UBUNT: NORMAL
BASOPHILS # BLD AUTO: 0.2 % (ref 0–1.8)
BASOPHILS # BLD: 0.03 K/UL (ref 0–0.12)
BLD GP AB SCN SERPL QL: NORMAL
COMPONENT R 8504R: NORMAL
EOSINOPHIL # BLD AUTO: 0 K/UL (ref 0–0.51)
EOSINOPHIL NFR BLD: 0 % (ref 0–6.9)
ERYTHROCYTE [DISTWIDTH] IN BLOOD BY AUTOMATED COUNT: 65.8 FL (ref 35.9–50)
HCT VFR BLD AUTO: 25 % (ref 42–52)
HGB BLD-MCNC: 7.9 G/DL (ref 14–18)
IMM GRANULOCYTES # BLD AUTO: 0.25 K/UL (ref 0–0.11)
IMM GRANULOCYTES NFR BLD AUTO: 1.7 % (ref 0–0.9)
LYMPHOCYTES # BLD AUTO: 0.11 K/UL (ref 1–4.8)
LYMPHOCYTES NFR BLD: 0.8 % (ref 22–41)
MCH RBC QN AUTO: 28.3 PG (ref 27–33)
MCHC RBC AUTO-ENTMCNC: 31.6 G/DL (ref 32.3–36.5)
MCV RBC AUTO: 89.6 FL (ref 81.4–97.8)
MONOCYTES # BLD AUTO: 0.2 K/UL (ref 0–0.85)
MONOCYTES NFR BLD AUTO: 1.4 % (ref 0–13.4)
NEUTROPHILS # BLD AUTO: 14.03 K/UL (ref 1.82–7.42)
NEUTROPHILS NFR BLD: 95.9 % (ref 44–72)
NRBC # BLD AUTO: 0.03 K/UL
NRBC BLD-RTO: 0.2 /100 WBC (ref 0–0.2)
OUTPT INFUS CBC COMMENT OICOM: ABNORMAL
PLATELET # BLD AUTO: 136 K/UL (ref 164–446)
PMV BLD AUTO: 10 FL (ref 9–12.9)
PRODUCT TYPE UPROD: NORMAL
RBC # BLD AUTO: 2.79 M/UL (ref 4.7–6.1)
RH BLD: NORMAL
UNIT STATUS USTAT: NORMAL
WBC # BLD AUTO: 14.6 K/UL (ref 4.8–10.8)

## 2024-01-13 PROCEDURE — 86850 RBC ANTIBODY SCREEN: CPT

## 2024-01-13 PROCEDURE — 86901 BLOOD TYPING SEROLOGIC RH(D): CPT

## 2024-01-13 PROCEDURE — P9016 RBC LEUKOCYTES REDUCED: HCPCS

## 2024-01-13 PROCEDURE — 85025 COMPLETE CBC W/AUTO DIFF WBC: CPT

## 2024-01-13 PROCEDURE — 36430 TRANSFUSION BLD/BLD COMPNT: CPT

## 2024-01-13 PROCEDURE — 86923 COMPATIBILITY TEST ELECTRIC: CPT

## 2024-01-13 PROCEDURE — 86900 BLOOD TYPING SEROLOGIC ABO: CPT

## 2024-01-13 PROCEDURE — A9270 NON-COVERED ITEM OR SERVICE: HCPCS

## 2024-01-13 PROCEDURE — 306780 HCHG STAT FOR TRANSFUSION PER CASE

## 2024-01-13 PROCEDURE — 700102 HCHG RX REV CODE 250 W/ 637 OVERRIDE(OP)

## 2024-01-13 RX ORDER — DIPHENHYDRAMINE HCL 25 MG
25 TABLET ORAL ONCE
Status: CANCELLED | OUTPATIENT
Start: 2024-01-13 | End: 2024-01-13

## 2024-01-13 RX ORDER — ACETAMINOPHEN 325 MG/1
650 TABLET ORAL ONCE
Status: COMPLETED | OUTPATIENT
Start: 2024-01-13 | End: 2024-01-13

## 2024-01-13 RX ORDER — DIPHENHYDRAMINE HYDROCHLORIDE 50 MG/ML
25 INJECTION INTRAMUSCULAR; INTRAVENOUS PRN
Status: CANCELLED | OUTPATIENT
Start: 2024-01-13

## 2024-01-13 RX ORDER — DIPHENHYDRAMINE HCL 25 MG
25 TABLET ORAL ONCE
Status: COMPLETED | OUTPATIENT
Start: 2024-01-13 | End: 2024-01-13

## 2024-01-13 RX ORDER — ACETAMINOPHEN 325 MG/1
650 TABLET ORAL ONCE
Status: CANCELLED | OUTPATIENT
Start: 2024-01-13

## 2024-01-13 RX ORDER — ACETAMINOPHEN 325 MG/1
650 TABLET ORAL PRN
Status: CANCELLED | OUTPATIENT
Start: 2024-01-13

## 2024-01-13 RX ADMIN — ACETAMINOPHEN 650 MG: 325 TABLET ORAL at 08:08

## 2024-01-13 RX ADMIN — DIPHENHYDRAMINE HYDROCHLORIDE 25 MG: 25 TABLET ORAL at 08:08

## 2024-01-13 ASSESSMENT — FIBROSIS 4 INDEX: FIB4 SCORE: 2.04

## 2024-01-13 NOTE — PROGRESS NOTES
Pt arrived to [facility], ambulatory, for transfusion. Pt voices no new complaints. 22g PIV established in the L-AC, positive blood reutrn noted. Labs drawn and reviewed, hgb 7.9 today. COD drawn and sent. Pre-medications administered. 1 unit PRBCs transfused with no s/sx of adverse reaction. PIV flushed and removed. Pt left [facility] with no s/sx of distress. Follow up appointment to be determined by MD office.

## 2024-01-16 ENCOUNTER — TELEPHONE (OUTPATIENT)
Dept: HEMATOLOGY ONCOLOGY | Facility: MEDICAL CENTER | Age: 59
End: 2024-01-16

## 2024-01-16 NOTE — TELEPHONE ENCOUNTER
Patient calling to schedule appointment for second opinion, referral from Mojgan Garcia's office.  Patient has [facility] commercial insurance, and we were waiting for authorization from [facility], from Dr. Garcia's office.    Patient states his [facility] is a COBRA plan, that ends on 1/26/2024.  He applied for Medicaid.  PAR found his Member ID for Medicaid on the portal.  Patient has and [place] plan through Medicaid.    Appointment scheduled for 1/31/2024.      Patient also stated that since he has Medicaid now, [facility] will probably not see him for Infusion.

## 2024-01-24 ENCOUNTER — HOSPITAL ENCOUNTER (OUTPATIENT)
Dept: LAB | Facility: MEDICAL CENTER | Age: 59
End: 2024-01-24
Attending: UROLOGY
Payer: COMMERCIAL

## 2024-01-24 LAB
ALBUMIN SERPL BCP-MCNC: 4 G/DL (ref 3.2–4.9)
ALBUMIN/GLOB SERPL: 1.8 G/DL
ALP SERPL-CCNC: 108 U/L (ref 30–99)
ALT SERPL-CCNC: 10 U/L (ref 2–50)
ANION GAP SERPL CALC-SCNC: 11 MMOL/L (ref 7–16)
AST SERPL-CCNC: 16 U/L (ref 12–45)
BILIRUB SERPL-MCNC: 0.3 MG/DL (ref 0.1–1.5)
BUN SERPL-MCNC: 4 MG/DL (ref 8–22)
CALCIUM ALBUM COR SERPL-MCNC: 8.7 MG/DL (ref 8.5–10.5)
CALCIUM SERPL-MCNC: 8.7 MG/DL (ref 8.4–10.2)
CHLORIDE SERPL-SCNC: 106 MMOL/L (ref 96–112)
CO2 SERPL-SCNC: 22 MMOL/L (ref 20–33)
CREAT SERPL-MCNC: 0.3 MG/DL (ref 0.5–1.4)
GFR SERPLBLD CREATININE-BSD FMLA CKD-EPI: 139 ML/MIN/1.73 M 2
GLOBULIN SER CALC-MCNC: 2.2 G/DL (ref 1.9–3.5)
GLUCOSE SERPL-MCNC: 107 MG/DL (ref 65–99)
POTASSIUM SERPL-SCNC: 4.7 MMOL/L (ref 3.6–5.5)
PROT SERPL-MCNC: 6.2 G/DL (ref 6–8.2)
PSA SERPL-MCNC: 0.59 NG/ML (ref 0–4)
SODIUM SERPL-SCNC: 139 MMOL/L (ref 135–145)
TESTOST SERPL-MCNC: <3 NG/DL (ref 175–781)

## 2024-01-24 PROCEDURE — 84153 ASSAY OF PSA TOTAL: CPT

## 2024-01-24 PROCEDURE — 84403 ASSAY OF TOTAL TESTOSTERONE: CPT

## 2024-01-24 PROCEDURE — 80053 COMPREHEN METABOLIC PANEL: CPT

## 2024-01-24 PROCEDURE — 36415 COLL VENOUS BLD VENIPUNCTURE: CPT

## 2024-01-25 ENCOUNTER — APPOINTMENT (RX ONLY)
Dept: URBAN - METROPOLITAN AREA CLINIC 35 | Facility: CLINIC | Age: 59
Setting detail: DERMATOLOGY
End: 2024-01-25

## 2024-01-25 ENCOUNTER — HOSPITAL ENCOUNTER (OUTPATIENT)
Dept: RADIOLOGY | Facility: MEDICAL CENTER | Age: 59
End: 2024-01-25
Attending: RADIOLOGY
Payer: COMMERCIAL

## 2024-01-25 DIAGNOSIS — C79.51 SECONDARY MALIGNANT NEOPLASM OF BONE AND BONE MARROW (HCC): ICD-10-CM

## 2024-01-25 DIAGNOSIS — C79.70 MALIGNANT NEOPLASM METASTATIC TO ADRENAL GLAND, UNSPECIFIED LATERALITY (HCC): ICD-10-CM

## 2024-01-25 DIAGNOSIS — Z71.89 OTHER SPECIFIED COUNSELING: ICD-10-CM

## 2024-01-25 DIAGNOSIS — C79.52 SECONDARY MALIGNANT NEOPLASM OF BONE AND BONE MARROW (HCC): ICD-10-CM

## 2024-01-25 DIAGNOSIS — L82.1 OTHER SEBORRHEIC KERATOSIS: ICD-10-CM

## 2024-01-25 DIAGNOSIS — D22 MELANOCYTIC NEVI: ICD-10-CM

## 2024-01-25 DIAGNOSIS — L81.4 OTHER MELANIN HYPERPIGMENTATION: ICD-10-CM

## 2024-01-25 DIAGNOSIS — Z85.828 PERSONAL HISTORY OF OTHER MALIGNANT NEOPLASM OF SKIN: ICD-10-CM

## 2024-01-25 PROBLEM — D22.4 MELANOCYTIC NEVI OF SCALP AND NECK: Status: ACTIVE | Noted: 2024-01-25

## 2024-01-25 PROCEDURE — 99213 OFFICE O/P EST LOW 20 MIN: CPT

## 2024-01-25 PROCEDURE — 700117 HCHG RX CONTRAST REV CODE 255: Mod: UD | Performed by: RADIOLOGY

## 2024-01-25 PROCEDURE — 70553 MRI BRAIN STEM W/O & W/DYE: CPT

## 2024-01-25 PROCEDURE — ? COUNSELING

## 2024-01-25 PROCEDURE — A9579 GAD-BASE MR CONTRAST NOS,1ML: HCPCS | Mod: UD | Performed by: RADIOLOGY

## 2024-01-25 RX ADMIN — GADOTERIDOL 15 ML: 279.3 INJECTION, SOLUTION INTRAVENOUS at 17:16

## 2024-01-25 ASSESSMENT — LOCATION DETAILED DESCRIPTION DERM
LOCATION DETAILED: RIGHT POSTERIOR SHOULDER
LOCATION DETAILED: RIGHT LATERAL TRAPEZIAL NECK
LOCATION DETAILED: RIGHT MID PREAURICULAR CHEEK
LOCATION DETAILED: RIGHT INFERIOR LATERAL NECK

## 2024-01-25 ASSESSMENT — LOCATION ZONE DERM
LOCATION ZONE: NECK
LOCATION ZONE: ARM
LOCATION ZONE: FACE

## 2024-01-25 ASSESSMENT — LOCATION SIMPLE DESCRIPTION DERM
LOCATION SIMPLE: POSTERIOR NECK
LOCATION SIMPLE: RIGHT SHOULDER
LOCATION SIMPLE: RIGHT CHEEK

## 2024-01-29 ENCOUNTER — HOSPITAL ENCOUNTER (OUTPATIENT)
Dept: LAB | Facility: MEDICAL CENTER | Age: 59
End: 2024-01-29
Attending: INTERNAL MEDICINE
Payer: MEDICAID

## 2024-01-29 LAB
ALBUMIN SERPL BCP-MCNC: 4.3 G/DL (ref 3.2–4.9)
ALBUMIN/GLOB SERPL: 1.7 G/DL
ALP SERPL-CCNC: 106 U/L (ref 30–99)
ALT SERPL-CCNC: 15 U/L (ref 2–50)
ANION GAP SERPL CALC-SCNC: 11 MMOL/L (ref 7–16)
ANISOCYTOSIS BLD QL SMEAR: ABNORMAL
AST SERPL-CCNC: 21 U/L (ref 12–45)
BASOPHILS # BLD AUTO: 0.2 % (ref 0–1.8)
BASOPHILS # BLD: 0.01 K/UL (ref 0–0.12)
BILIRUB SERPL-MCNC: 0.5 MG/DL (ref 0.1–1.5)
BUN SERPL-MCNC: 12 MG/DL (ref 8–22)
BURR CELLS BLD QL SMEAR: NORMAL
CALCIUM ALBUM COR SERPL-MCNC: 9 MG/DL (ref 8.5–10.5)
CALCIUM SERPL-MCNC: 9.2 MG/DL (ref 8.5–10.5)
CHLORIDE SERPL-SCNC: 103 MMOL/L (ref 96–112)
CO2 SERPL-SCNC: 21 MMOL/L (ref 20–33)
COMMENT 1642: NORMAL
CREAT SERPL-MCNC: 0.34 MG/DL (ref 0.5–1.4)
EOSINOPHIL # BLD AUTO: 0 K/UL (ref 0–0.51)
EOSINOPHIL NFR BLD: 0 % (ref 0–6.9)
ERYTHROCYTE [DISTWIDTH] IN BLOOD BY AUTOMATED COUNT: 80.9 FL (ref 35.9–50)
GFR SERPLBLD CREATININE-BSD FMLA CKD-EPI: 133 ML/MIN/1.73 M 2
GLOBULIN SER CALC-MCNC: 2.6 G/DL (ref 1.9–3.5)
GLUCOSE SERPL-MCNC: 112 MG/DL (ref 65–99)
HCT VFR BLD AUTO: 31.4 % (ref 42–52)
HGB BLD-MCNC: 10 G/DL (ref 14–18)
IMM GRANULOCYTES # BLD AUTO: 0.09 K/UL (ref 0–0.11)
IMM GRANULOCYTES NFR BLD AUTO: 1.6 % (ref 0–0.9)
LYMPHOCYTES # BLD AUTO: 0.33 K/UL (ref 1–4.8)
LYMPHOCYTES NFR BLD: 5.7 % (ref 22–41)
MACROCYTES BLD QL SMEAR: ABNORMAL
MCH RBC QN AUTO: 29.5 PG (ref 27–33)
MCHC RBC AUTO-ENTMCNC: 31.8 G/DL (ref 32.3–36.5)
MCV RBC AUTO: 92.6 FL (ref 81.4–97.8)
MICROCYTES BLD QL SMEAR: ABNORMAL
MONOCYTES # BLD AUTO: 0.69 K/UL (ref 0–0.85)
MONOCYTES NFR BLD AUTO: 11.9 % (ref 0–13.4)
MORPHOLOGY BLD-IMP: NORMAL
NEUTROPHILS # BLD AUTO: 4.68 K/UL (ref 1.82–7.42)
NEUTROPHILS NFR BLD: 80.6 % (ref 44–72)
NRBC # BLD AUTO: 0.03 K/UL
NRBC BLD-RTO: 0.5 /100 WBC (ref 0–0.2)
OVALOCYTES BLD QL SMEAR: NORMAL
PLATELET # BLD AUTO: 209 K/UL (ref 164–446)
PLATELET BLD QL SMEAR: NORMAL
PMV BLD AUTO: 10.7 FL (ref 9–12.9)
POIKILOCYTOSIS BLD QL SMEAR: NORMAL
POTASSIUM SERPL-SCNC: 4.9 MMOL/L (ref 3.6–5.5)
PROT SERPL-MCNC: 6.9 G/DL (ref 6–8.2)
PSA SERPL-MCNC: 0.6 NG/ML (ref 0–4)
RBC # BLD AUTO: 3.39 M/UL (ref 4.7–6.1)
RBC BLD AUTO: PRESENT
SODIUM SERPL-SCNC: 135 MMOL/L (ref 135–145)
WBC # BLD AUTO: 5.8 K/UL (ref 4.8–10.8)

## 2024-01-29 PROCEDURE — 84153 ASSAY OF PSA TOTAL: CPT

## 2024-01-29 PROCEDURE — 36415 COLL VENOUS BLD VENIPUNCTURE: CPT

## 2024-01-29 PROCEDURE — 85025 COMPLETE CBC W/AUTO DIFF WBC: CPT

## 2024-01-29 PROCEDURE — 80053 COMPREHEN METABOLIC PANEL: CPT

## 2024-01-31 ENCOUNTER — HOSPITAL ENCOUNTER (OUTPATIENT)
Dept: HEMATOLOGY ONCOLOGY | Facility: MEDICAL CENTER | Age: 59
End: 2024-01-31
Attending: STUDENT IN AN ORGANIZED HEALTH CARE EDUCATION/TRAINING PROGRAM
Payer: MEDICAID

## 2024-01-31 VITALS
BODY MASS INDEX: 26.23 KG/M2 | HEIGHT: 65 IN | SYSTOLIC BLOOD PRESSURE: 98 MMHG | HEART RATE: 96 BPM | TEMPERATURE: 96.8 F | OXYGEN SATURATION: 96 % | WEIGHT: 157.4 LBS | DIASTOLIC BLOOD PRESSURE: 58 MMHG | RESPIRATION RATE: 16 BRPM

## 2024-01-31 DIAGNOSIS — C79.31 METASTASIS TO BRAIN (HCC): ICD-10-CM

## 2024-01-31 DIAGNOSIS — C61 PROSTATE CANCER (HCC): ICD-10-CM

## 2024-01-31 PROCEDURE — 99212 OFFICE O/P EST SF 10 MIN: CPT | Performed by: STUDENT IN AN ORGANIZED HEALTH CARE EDUCATION/TRAINING PROGRAM

## 2024-01-31 PROCEDURE — 99205 OFFICE O/P NEW HI 60 MIN: CPT | Performed by: STUDENT IN AN ORGANIZED HEALTH CARE EDUCATION/TRAINING PROGRAM

## 2024-01-31 RX ORDER — 0.9 % SODIUM CHLORIDE 0.9 %
3 VIAL (ML) INJECTION PRN
Status: CANCELLED | OUTPATIENT
Start: 2024-02-23

## 2024-01-31 RX ORDER — DEXAMETHASONE SODIUM PHOSPHATE 4 MG/ML
8 INJECTION, SOLUTION INTRA-ARTICULAR; INTRALESIONAL; INTRAMUSCULAR; INTRAVENOUS; SOFT TISSUE ONCE
Status: CANCELLED | OUTPATIENT
Start: 2024-02-23 | End: 2024-02-22

## 2024-01-31 RX ORDER — 0.9 % SODIUM CHLORIDE 0.9 %
10 VIAL (ML) INJECTION PRN
Status: CANCELLED | OUTPATIENT
Start: 2024-02-23

## 2024-01-31 RX ORDER — DIPHENHYDRAMINE HYDROCHLORIDE 50 MG/ML
50 INJECTION INTRAMUSCULAR; INTRAVENOUS PRN
Status: CANCELLED | OUTPATIENT
Start: 2024-02-23

## 2024-01-31 RX ORDER — 0.9 % SODIUM CHLORIDE 0.9 %
10 VIAL (ML) INJECTION PRN
Status: CANCELLED | OUTPATIENT
Start: 2024-02-22

## 2024-01-31 RX ORDER — PROCHLORPERAZINE MALEATE 10 MG
10 TABLET ORAL EVERY 6 HOURS PRN
Status: CANCELLED | OUTPATIENT
Start: 2024-02-23

## 2024-01-31 RX ORDER — 0.9 % SODIUM CHLORIDE 0.9 %
VIAL (ML) INJECTION PRN
Status: CANCELLED | OUTPATIENT
Start: 2024-02-22

## 2024-01-31 RX ORDER — 0.9 % SODIUM CHLORIDE 0.9 %
VIAL (ML) INJECTION PRN
Status: CANCELLED | OUTPATIENT
Start: 2024-02-23

## 2024-01-31 RX ORDER — 0.9 % SODIUM CHLORIDE 0.9 %
3 VIAL (ML) INJECTION PRN
Status: CANCELLED | OUTPATIENT
Start: 2024-02-22

## 2024-01-31 RX ORDER — ZOLPIDEM TARTRATE 10 MG/1
TABLET ORAL
COMMUNITY
Start: 2024-01-17

## 2024-01-31 RX ORDER — ONDANSETRON 2 MG/ML
4 INJECTION INTRAMUSCULAR; INTRAVENOUS PRN
Status: CANCELLED | OUTPATIENT
Start: 2024-02-23

## 2024-01-31 RX ORDER — EPINEPHRINE 1 MG/ML(1)
0.5 AMPUL (ML) INJECTION PRN
Status: CANCELLED | OUTPATIENT
Start: 2024-02-23

## 2024-01-31 RX ORDER — ONDANSETRON 8 MG/1
8 TABLET, ORALLY DISINTEGRATING ORAL PRN
Status: CANCELLED | OUTPATIENT
Start: 2024-02-23

## 2024-01-31 RX ORDER — METHYLPREDNISOLONE SODIUM SUCCINATE 125 MG/2ML
125 INJECTION, POWDER, LYOPHILIZED, FOR SOLUTION INTRAMUSCULAR; INTRAVENOUS PRN
Status: CANCELLED | OUTPATIENT
Start: 2024-02-23

## 2024-01-31 RX ORDER — SODIUM CHLORIDE 9 MG/ML
INJECTION, SOLUTION INTRAVENOUS CONTINUOUS
Status: CANCELLED | OUTPATIENT
Start: 2024-02-23

## 2024-01-31 ASSESSMENT — ENCOUNTER SYMPTOMS
CHILLS: 0
HEARTBURN: 0
PALPITATIONS: 0
BLURRED VISION: 0
TINGLING: 0
HEADACHES: 0
NAUSEA: 0
COUGH: 0
ABDOMINAL PAIN: 0
FEVER: 0
BACK PAIN: 0
VOMITING: 0
BLOOD IN STOOL: 0
DIAPHORESIS: 0
SHORTNESS OF BREATH: 0
DOUBLE VISION: 0
BRUISES/BLEEDS EASILY: 0
INSOMNIA: 0
MYALGIAS: 0
WEAKNESS: 1
ORTHOPNEA: 0
SORE THROAT: 0
WEIGHT LOSS: 0
SINUS PAIN: 0
SPUTUM PRODUCTION: 0
CONSTIPATION: 0
WHEEZING: 0
NERVOUS/ANXIOUS: 0
DIARRHEA: 0
DIZZINESS: 0

## 2024-01-31 ASSESSMENT — FIBROSIS 4 INDEX: FIB4 SCORE: 1.47877545946101372

## 2024-01-31 ASSESSMENT — PAIN SCALES - GENERAL: PAINLEVEL: NO PAIN

## 2024-01-31 NOTE — PROGRESS NOTES
Consult:  Hematology/Oncology      Referring Physician: PCP  Primary Care:  Mojgan Garcia M.D.    Diagnosis: Metastatic castrate-resistant prostate adenocarcinoma    Chief Complaint:  Transfer of care for ongoing therapy    History of Presenting Illness:  Casper Rowley is a 58 y.o.  man who presents to the clinic for transfer of care for ongoing management of metastatic castrate resistant prostate adenocarcinoma.  He was originally diagnosed in 2021 and was metastatic at that time with bone metastases, and was treated with engine deprivation therapy as well as enzalutamide.  He got radium 223 and 2022 and was then treated with docetaxel for 11 cycles, as well as getting radiation therapy to thoracic spinal metastases as well as his adrenals.  His oncology history is summarized below:    Oncologic History:  6/21 Obstructive sxs with abnl DAYRON PSA 7 6/21 Presented with LBP worsening leg pain prompting spine MR, myeloma STRICKLAND negative, PSA 20   7/13/21 L2 Bone biopsy: prostate cancer  7/26/21 TRUSBP: [place] 5+5, 1/12 core 5%, 4+4=5/12 cores 80-95%  7/28/21 Firmagon 240mg  9/04/21 PSA: 1.4 Alk Phos 681  9/8/21 Denosumab started  9/27/21 Enzalutamide started  11/11/21 PSA 1.05 chapito  3/15/22 PSA 1.8  3-5/25/22 [place]-223 x 3, paused due to decreased counts  5/6/22 RT to L2  6/10/22 PSA 4.07  8/19/22 PSA 1.73  8/29/22 Started Docetaxel with Dr. Butts and discontinued enzalutamide.   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 Be.  10/20/23 PSA 0.76  12/2023 Receiving RT to his adrenals and lower thoracic spine.     He was started on carboplatin and cabazitaxel, and was evaluated for PSMA Pluvicto therapy, but his disease is not  PSMA avid and therefore he has been on chemotherapy, which he has been doing okay with. However, he decided he wanted to switch physicians due to his oncologist frankly telling him about the aggressive nature of his disease and the poor prognosis associated with it. He was subsequently referred to me.     Interval History:  Patient is here for consultation. He states that he is doing well enough, and is tolerating chemotherapy reasonably well. However, for a few days after getting chemotherapy, he feels really awful and has no energy. He otherwise has no new complaints. His next chemo is scheduled for tomorrow.     Past Medical History:   Diagnosis Date    Arthritis 02/2019    osteo-hollie knees    Bronchitis 2019    Cancer (HCC)     Basal cell - top of head    Cancer (HCC)     Prostate    Cold 02/08/2019    Cold two weeks ago, denies productive cough, SOB    Diabetes     type 2    High cholesterol     HTN (hypertension), benign 08/14/2009    Hypertension     Infectious disease     Mumps age 6 yrs and Chickenpox age 41 yrs    Obstructive sleep apnea 02/2019    Uses cpap    Prostate cancer (HCC)        Past Surgical History:   Procedure Laterality Date    CRANIOTOMY STEALTH Right 9/21/2023    Procedure: CRANIOTOMY, USING FRAMELESS STEREOTAXY - RIGHT RETROSIGMOID FOR RESECTION OF CEREBELLAR MASS, USE OF INTRAOPERATIVE NEURONAVIGATION, EXTERNAL VENTRICULAR DRAIN;  Surgeon: Archana Be M.D.;  Location: SURGERY [facility];  Service: Neurosurgery    [place] SHLDR ARTHROSCOP EXTEN DEBRIDE 3+ Left 11/1/2021    Procedure: ARTHROSCOPY,SHOULDER,WITH EXTENSIVE DEBRIDEMENT;  Surgeon: Piter Otero M.D.;  Location: SURGERY [facility];  Service: Orthopedics    PB ARTHROSCOPY SHOULDER SURGICAL BICEPS TENODES* Left 11/1/2021    Procedure: ARTHROSCOPY, SHOULDER, WITH BICEPS TENOTOMY - WITH SUB SCAPULARIS REPAIR;  Surgeon: Piter Otero M.D.;  Location: SURGERY [facility];  Service: Orthopedics    CARPAL TUNNEL RELEASE Left  2/11/2019    Procedure: CARPAL TUNNEL RELEASE;  Surgeon: Piter Otero M.D.;  Location: SURGERY [facility];  Service: Orthopedics    KNEE ARTHROSCOPY Right 05/2014    ACL       Social History     Socioeconomic History    Marital status: Single     Spouse name: Not on file    Number of children: Not on file    Years of education: Not on file    Highest education level: Not on file   Occupational History    Not on file   Tobacco Use    Smoking status: Never    Smokeless tobacco: Never   Vaping Use    Vaping Use: Never used   Substance and Sexual Activity    Alcohol use: Yes     Comment: 2 per week    Drug use: No    Sexual activity: Not on file   Other Topics Concern    Not on file   Social History Narrative    Lives with his long-term partner, Bas[place], in [facility]. He has no kids.      Social Determinants of Health     Financial Resource Strain: Not on file   Food Insecurity: Not on file   Transportation Needs: No Transportation Needs (10/2/2023)    PRAPARE - Transportation     Lack of Transportation (Medical): No     Lack of Transportation (Non-Medical): No   Physical Activity: Not on file   Stress: Not on file   Social Connections: Not on file   Intimate Partner Violence: Not on file   Housing Stability: Not on file       Family History   Problem Relation Age of Onset    Cancer Father         Bladder cancer    Genetic Disorder Neg Hx        OB History   No obstetric history on file.       Allergies as of 01/31/2024    (No Known Allergies)         Current Outpatient Medications:     zolpidem (AMBIEN) 10 MG Tab, TAKE ONE TABLET BY MOUTH AT BEDTIME FOR 30 DAYS, Disp: , Rfl:     clotrimazole-betamethasone (LOTRISONE) 1-0.05 % Cream, Apply 1 Application topically as needed., Disp: , Rfl:     cyclobenzaprine (FLEXERIL) 10 mg Tab, Take 10 mg by mouth at bedtime., Disp: , Rfl:     furosemide (LASIX) 20 MG Tab, Take 1 Tablet by mouth as needed., Disp: , Rfl:     hydrocod shreya-chlorphe shreya ER (TUSSIONEX) 10-8 MG/5ML  Suspension Extended Release, Take 5 mL by mouth as needed., Disp: , Rfl:     HYDROcodone/acetaminophen (NORCO)  MG Tab, Take 1 Tablet by mouth every 6 hours as needed., Disp: , Rfl:     ondansetron (ZOFRAN ODT) 8 MG TABLET DISPERSIBLE, Take 8 mg by mouth as needed., Disp: , Rfl:     prochlorperazine (COMPAZINE) 10 MG Tab, Take 10 mg by mouth as needed., Disp: , Rfl:     silver sulfADIAZINE (SILVADENE) 1 % Cream, Apply  topically every day., Disp: , Rfl:     tamsulosin (FLOMAX) 0.4 MG capsule, Take 0.4 mg by mouth every day., Disp: , Rfl:     lisinopril (PRINIVIL) 20 MG Tab, Take 1 tablet by mouth every day., Disp: 30 Tablet, Rfl: 2    metoprolol SR (TOPROL XL) 25 MG TABLET SR 24 HR, Take 2 tablets by mouth every day., Disp: 60 Tablet, Rfl: 2    Canagliflozin (INVOKANA) 100 MG Tab, Take 100 mg by mouth every day., Disp: , Rfl:     XGEVA 120 MG/1.7ML injection, 120 mg Q30 DAYS., Disp: , Rfl:     Dulaglutide (TRULICITY) 1.5 MG/0.5ML Solution Pen-injector, Inject 1.5 mg as instructed every Saturday., Disp: , Rfl:     Cholecalciferol (VITAMIN D) 2000 UNITS CAPS, Take 6,000 Units by mouth every day. 2000 units x 3 tablets = 6000 mcg, Disp: , Rfl:     Review of Systems:  Review of Systems   Constitutional:  Positive for malaise/fatigue. Negative for chills, diaphoresis, fever and weight loss.   HENT:  Negative for hearing loss, nosebleeds, sinus pain and sore throat.    Eyes:  Negative for blurred vision and double vision.   Respiratory:  Negative for cough, sputum production, shortness of breath and wheezing.    Cardiovascular:  Negative for chest pain, palpitations, orthopnea and leg swelling.   Gastrointestinal:  Negative for abdominal pain, blood in stool, constipation, diarrhea, heartburn, melena, nausea and vomiting.   Genitourinary:  Negative for dysuria, frequency, hematuria and urgency.   Musculoskeletal:  Negative for back pain, joint pain and myalgias.   Skin:  Negative for rash.   Neurological:  Positive  "for weakness. Negative for dizziness, tingling and headaches.   Endo/Heme/Allergies:  Does not bruise/bleed easily.   Psychiatric/Behavioral:  The patient is not nervous/anxious and does not have insomnia.           Physical Exam:  Vitals:    01/31/24 0812   BP: 98/58   Pulse: 96   Resp: 16   Temp: 36 °C (96.8 °F)   TempSrc: Temporal   SpO2: 96%   Weight: 71.4 kg (157 lb 6.4 oz)   Height: 1.66 m (5' 5.35\")       DESC; KARNOFSKY SCALE WITH ECOG EQUIVALENT: 80, Normal activity with effort; some signs or symptoms of disease (ECOG equivalent 1)    DISTRESS LEVEL: no apparent distress    Physical Exam  Vitals and nursing note reviewed.   Constitutional:       General: He is awake. He is not in acute distress.     Appearance: Normal appearance. He is normal weight. He is not ill-appearing, toxic-appearing or diaphoretic.   HENT:      Head: Normocephalic and atraumatic.      Nose: Nose normal. No congestion.      Mouth/Throat:      Pharynx: Oropharynx is clear. No oropharyngeal exudate or posterior oropharyngeal erythema.   Eyes:      General: No scleral icterus.     Extraocular Movements: Extraocular movements intact.      Conjunctiva/sclera: Conjunctivae normal.      Pupils: Pupils are equal, round, and reactive to light.   Cardiovascular:      Rate and Rhythm: Normal rate and regular rhythm.      Pulses: Normal pulses.      Heart sounds: Normal heart sounds. No murmur heard.     No friction rub. No gallop.   Pulmonary:      Effort: Pulmonary effort is normal.      Breath sounds: Normal breath sounds. No decreased air movement. No wheezing, rhonchi or rales.   Abdominal:      General: Bowel sounds are normal. There is no distension.      Tenderness: There is no abdominal tenderness.   Musculoskeletal:         General: No deformity. Normal range of motion.      Cervical back: Normal range of motion and neck supple. No tenderness.      Right lower leg: No edema.      Left lower leg: No edema.   Lymphadenopathy:      " Cervical: No cervical adenopathy.      Upper Body:      Right upper body: No axillary adenopathy.      Left upper body: No axillary adenopathy.      Lower Body: No right inguinal adenopathy. No left inguinal adenopathy.   Skin:     General: Skin is warm and dry.      Coloration: Skin is not jaundiced.      Findings: No erythema or rash.   Neurological:      General: No focal deficit present.      Mental Status: He is alert and oriented to person, place, and time.      Sensory: Sensation is intact.      Motor: Weakness present.      Gait: Gait abnormal (Ambulates with a cane).   Psychiatric:         Attention and Perception: Attention normal.         Mood and Affect: Mood normal.         Behavior: Behavior normal. Behavior is cooperative.         Thought Content: Thought content normal.         Judgment: Judgment normal.          Depression Screening    Little interest or pleasure in doing things?      Feeling down, depressed , or hopeless?     Trouble falling or staying asleep, or sleeping too much?      Feeling tired or having little energy?      Poor appetite or overeating?      Feeling bad about yourself - or that you are a failure or have let yourself or your family down?     Trouble concentrating on things, such as reading the newspaper or watching television?     Moving or speaking so slowly that other people could have noticed.  Or the opposite - being so fidgety or restless that you have been moving around a lot more than usual?      Thoughts that you would be better off dead, or of hurting yourself?      Patient Health Questionnaire Score:         If depressive symptoms identified deferred to follow up visit unless specifically addressed in assesment and plan.    Interpretation of PHQ-9 Total Score   Score Severity   1-4 No Depression   5-9 Mild Depression   10-14 Moderate Depression   15-19 Moderately Severe Depression   20-27 Severe Depression    Labs:  Hospital Outpatient Visit on 01/29/2024   Component  Date Value Ref Range Status    WBC 01/29/2024 5.8  4.8 - 10.8 K/uL Final    RBC 01/29/2024 3.39 (L)  4.70 - 6.10 M/uL Final    Hemoglobin 01/29/2024 10.0 (L)  14.0 - 18.0 g/dL Final    Hematocrit 01/29/2024 31.4 (L)  42.0 - 52.0 % Final    MCV 01/29/2024 92.6  81.4 - 97.8 fL Final    MCH 01/29/2024 29.5  27.0 - 33.0 pg Final    MCHC 01/29/2024 31.8 (L)  32.3 - 36.5 g/dL Final    Please note new reference range effective 05/22/2023.    RDW 01/29/2024 80.9 (H)  35.9 - 50.0 fL Final    Platelet Count 01/29/2024 209  164 - 446 K/uL Final    MPV 01/29/2024 10.7  9.0 - 12.9 fL Final    Neutrophils-Polys 01/29/2024 80.60 (H)  44.00 - 72.00 % Final    Lymphocytes 01/29/2024 5.70 (L)  22.00 - 41.00 % Final    Monocytes 01/29/2024 11.90  0.00 - 13.40 % Final    Eosinophils 01/29/2024 0.00  0.00 - 6.90 % Final    Basophils 01/29/2024 0.20  0.00 - 1.80 % Final    Immature Granulocytes 01/29/2024 1.60 (H)  0.00 - 0.90 % Final    Nucleated RBC 01/29/2024 0.50 (H)  0.00 - 0.20 /100 WBC Final    Please note new reference range effective 05/22/2023.    Neutrophils (Absolute) 01/29/2024 4.68  1.82 - 7.42 K/uL Final    Comment: Includes immature neutrophils, if present.  Please note new reference range effective 05/22/2023.      Lymphs (Absolute) 01/29/2024 0.33 (L)  1.00 - 4.80 K/uL Final    Monos (Absolute) 01/29/2024 0.69  0.00 - 0.85 K/uL Final    Eos (Absolute) 01/29/2024 0.00  0.00 - 0.51 K/uL Final    Baso (Absolute) 01/29/2024 0.01  0.00 - 0.12 K/uL Final    Immature Granulocytes (abs) 01/29/2024 0.09  0.00 - 0.11 K/uL Final    NRBC (Absolute) 01/29/2024 0.03  K/uL Final    Anisocytosis 01/29/2024 2+ (A)   Final    Macrocytosis 01/29/2024 2+ (A)   Final    Microcytosis 01/29/2024 2+ (A)   Final    Sodium 01/29/2024 135  135 - 145 mmol/L Final    Potassium 01/29/2024 4.9  3.6 - 5.5 mmol/L Final    Chloride 01/29/2024 103  96 - 112 mmol/L Final    Co2 01/29/2024 21  20 - 33 mmol/L Final    Anion Gap 01/29/2024 11.0  7.0 - 16.0  Final    Glucose 01/29/2024 112 (H)  65 - 99 mg/dL Final    Bun 01/29/2024 12  8 - 22 mg/dL Final    Creatinine 01/29/2024 0.34 (L)  0.50 - 1.40 mg/dL Final    Calcium 01/29/2024 9.2  8.5 - 10.5 mg/dL Final    Correct Calcium 01/29/2024 9.0  8.5 - 10.5 mg/dL Final    AST(SGOT) 01/29/2024 21  12 - 45 U/L Final    ALT(SGPT) 01/29/2024 15  2 - 50 U/L Final    Alkaline Phosphatase 01/29/2024 106 (H)  30 - 99 U/L Final    Total Bilirubin 01/29/2024 0.5  0.1 - 1.5 mg/dL Final    Albumin 01/29/2024 4.3  3.2 - 4.9 g/dL Final    Total Protein 01/29/2024 6.9  6.0 - 8.2 g/dL Final    Globulin 01/29/2024 2.6  1.9 - 3.5 g/dL Final    A-G Ratio 01/29/2024 1.7  g/dL Final    Prostatic Specific Antigen Tot 01/29/2024 0.60  0.00 - 4.00 ng/mL Final    Comment: Performed using Roche donnie e immunoassay analyzer. tPSA values determined  on patient samples by different testing procedures cannot be directly  compared with one another and could be the cause of erroneous medical  interpretations. If there is a change in the tPSA assay procedure used while  monitoring therapy, then new baselines may need to be established when  comparing previous results.      GFR (CKD-EPI) 01/29/2024 133  >60 mL/min/1.73 m 2 Final    Comment: Estimated Glomerular Filtration Rate is calculated using  race neutral CKD-EPI 2021 equation per NKF-ASN recommendations.      Plt Estimation 01/29/2024 Normal   Final    RBC Morphology 01/29/2024 Present   Final    Poikilocytosis 01/29/2024 1+   Final    Ovalocytes 01/29/2024 1+   Final    Echinocytes 01/29/2024 1+   Final    Peripheral Smear Review 01/29/2024 see below   Final    Comment: Due to instrument suspect flags, further review of peripheral smear is  indicated on this patient sample. This review may or may not result in  abnormal findings.      Comments-Diff 01/29/2024 see below   Final    Results have been verified by peripheral blood smear review.       Imaging:   All listed images below have been  independently reviewed by me. I agree with the findings as summarized below:    MR-BRAIN-WITH & W/O    Result Date: 1/26/2024 1/25/2024 4:44 PM HISTORY/REASON FOR EXAM: MRI BRAIN WITH AND WITHOUT CONTRAST (THIN-SLICED) TO ASSESS CHANGE IN BRAIN METASTASIS TECHNIQUE/EXAM DESCRIPTION: T1 sagittal, T2 axial, flair coronal, T1 coronal, and diffusion-weighted axial images were obtained of the brain pre-contrast followed by T1 coronal and axial images post intravenous administration of 15 mL ProHance. COMPARISON: 10/13/2023, 9/22/2023 FINDINGS: Age-related volume loss noted with prominent sulci, cisterns and ventricles. Ventricular dilatation is proportionate to the degree of cerebral volume loss. Diffusion-weighted images are normal. Gradient-echo images demonstrate hemosiderin staining of the right inferior cerebellum. FLAIR/T2 images demonstrate encephalomalacia in this region, with resolution of the acute phase postoperative changes seen on the previous examination. Postoperative changes are noted with a right retromastoid craniotomy. Nonspecific T2 hyperintensities are noted in the periventricular and deep white matter, most likely related to chronic microvascular ischemia. Postcontrast images through the whole brain demonstrated mild dural thickening underlying the right retromastoid craniotomy with no evidence of abnormal cerebellar enhancement to suggest recurrent disease. There is no evidence of focal cerebral edema. There are no extra axial collections. Visualized intracranial arterial flow voids are within normal limits. Bone marrow signal in the calvarium is within normal limits. Included portions of the paranasal sinuses are within normal limits. Included portions of the mastoid air cells are within normal limits. Included portions of the orbits are within normal limits     Postoperative changes in the right cerebellum with encephalomalacia and no abnormal enhancement to suggest residual or recurrent  metastatic disease.        Pathology:        PD-L1 IHC Analysis (Dako 22C3 pharmDx):   Tumor Proportion Score (TPS)(%) 0     Addendum Signed By:  Jay Saldana D.O.   Addendum Date:  10/20/2023   Original Report Date:  09/25/2023     FINAL DIAGNOSIS:     A. Right cerebellar mass:          Metastatic prostatic adenocarcinoma.          Abundant tumor necrosis, including comedonecrosis noted.                                           Diagnosis performed by:                                       JAY SALDANA DO     Assessment & Plan:  1. Prostate cancer (HCC)  Referral to Oncology Palliative Care      2. Metastasis to brain (HCC)            This is a 58 year old  man with metastatic castrate-resistant prostate adenocarcinoma. He is s/p multiple lines of therapy including enzalutamide, Radium-223, docetaxel, and is now on cabazitaxel with carboplatin. He is also s/p palliative XRT as well as metastasectomy for a cerebellar metastasis. His disease is not PSMA avid.     Current Diagnosis and Staging: Metastatic prostate adenocarcinoma, castrate resistant, Felix 5+5=10    Treatment Plan: Carboplatin with cabazitaxel    Treatment Citation: MICHEL Wilde et al. Lancet Oncol 2019    Plan of Care:    Primary Therapy: Contiue cabazitaxel and carboplatin  Supportive Therapy: Antiemetics per protocol. Continue leuprolide, denosumab  Toxicity: Patient is getting antineoplastic therapy and needs monitoring of blood counts, hepatic function, and renal function due to potential for organ dysfunction.   Labs: CBC with diff, CMP, PSA monitoring  Imaging: MRI brain q 3 months, continue monitoring scans at q 3 month intervals given aggressive nature of disease  Treatment Planning: The patient has highly-aggressive prostate adenocarcinoma and I agree with the assessment that his prognosis is poor. We discussed working on addressing quality of life and goals of care. The patient wishes to continue with chemotherapy and therefore we will  continue with cabazitaxel and carboplatin. An option upon progression might be cabozantinib and atezolizumab, per the recently-released COMPACT-02 trial data, which showed PFS improvement (OS data is still pending) and was presented at [facility]- last week.  Consultations: Radiation oncology (Dr. Valadez); Medical oncology (Dr. Butts); Neurosurgery (Dr. Be); Interventional Radiology (Dr. Reardon, [facility]); Palliative care (Dr. Fair)  Code Status: Full  Miscellaneous: NA  Return for Follow Up: 3 weeks    Any questions and concerns raised by the patient were answered to the best of my ability. Thank you for allowing me to participate in the care for this patient. Please feel free to contact me for any questions or concerns.     Total time spent on chart review, clinic encounter, and documentation: 64 minutes.

## 2024-02-01 ENCOUNTER — PATIENT MESSAGE (OUTPATIENT)
Dept: ONCOLOGY | Facility: MEDICAL CENTER | Age: 59
End: 2024-02-01
Payer: MEDICAID

## 2024-02-07 DIAGNOSIS — C61 PROSTATE CANCER (HCC): ICD-10-CM

## 2024-02-08 ENCOUNTER — TELEPHONE (OUTPATIENT)
Dept: HEMATOLOGY ONCOLOGY | Facility: MEDICAL CENTER | Age: 59
End: 2024-02-08
Payer: MEDICAID

## 2024-02-08 NOTE — TELEPHONE ENCOUNTER
Let Pt know.    Pt transferring from [facility] r/t change of insurance.    Call insurance company: [employer] [phone]: member #[id number]   Record # [id number]      We are waiting to get his Carboplatin + Cabazitaxel approved so we can get him scheduled downstairs. They say should be approved today 2/12/24.     Call [phone]; pre-authorization

## 2024-02-14 ENCOUNTER — HOSPITAL ENCOUNTER (OUTPATIENT)
Dept: HEMATOLOGY ONCOLOGY | Facility: MEDICAL CENTER | Age: 59
End: 2024-02-14
Attending: STUDENT IN AN ORGANIZED HEALTH CARE EDUCATION/TRAINING PROGRAM
Payer: MEDICAID

## 2024-02-14 VITALS
TEMPERATURE: 97.4 F | HEIGHT: 65 IN | HEART RATE: 113 BPM | BODY MASS INDEX: 26.74 KG/M2 | RESPIRATION RATE: 12 BRPM | DIASTOLIC BLOOD PRESSURE: 82 MMHG | OXYGEN SATURATION: 96 % | WEIGHT: 160.5 LBS | SYSTOLIC BLOOD PRESSURE: 110 MMHG

## 2024-02-14 DIAGNOSIS — G89.3 CANCER RELATED PAIN: ICD-10-CM

## 2024-02-14 DIAGNOSIS — C61 PROSTATE CANCER (HCC): ICD-10-CM

## 2024-02-14 PROCEDURE — 99214 OFFICE O/P EST MOD 30 MIN: CPT | Performed by: FAMILY MEDICINE

## 2024-02-14 PROCEDURE — 99212 OFFICE O/P EST SF 10 MIN: CPT | Performed by: FAMILY MEDICINE

## 2024-02-14 RX ORDER — HYDROCODONE BITARTRATE AND ACETAMINOPHEN 10; 325 MG/1; MG/1
1 TABLET ORAL EVERY 4 HOURS PRN
Qty: 84 TABLET | Refills: 0 | Status: SHIPPED | OUTPATIENT
Start: 2024-02-14 | End: 2024-02-28

## 2024-02-14 ASSESSMENT — FIBROSIS 4 INDEX: FIB4 SCORE: 1.47877545946101372

## 2024-02-15 DIAGNOSIS — C61 PROSTATE CANCER (HCC): ICD-10-CM

## 2024-02-15 DIAGNOSIS — C79.31 METASTASIS TO BRAIN (HCC): ICD-10-CM

## 2024-02-19 PROBLEM — G89.3 CANCER RELATED PAIN: Status: ACTIVE | Noted: 2024-02-19

## 2024-02-19 ASSESSMENT — ENCOUNTER SYMPTOMS
HEADACHES: 0
DIARRHEA: 0
COUGH: 0
CONSTIPATION: 0
BRUISES/BLEEDS EASILY: 0
BLURRED VISION: 0
MYALGIAS: 1
DEPRESSION: 0
PALPITATIONS: 0
BACK PAIN: 1
SHORTNESS OF BREATH: 0
FEVER: 0
NAUSEA: 0
WEIGHT LOSS: 0
VOMITING: 0
CHILLS: 0
NERVOUS/ANXIOUS: 0

## 2024-02-19 NOTE — ASSESSMENT & PLAN NOTE
Patient with long history of metastatic prostate cancer.  Recently reestablished with new oncologist at [facility].  Does have aggressive cancer.  Has good quality of life and functioning currently.  Will be proceeding with further treatments.  Will continue goals of care discussion surrounding his prostate cancer as needed.  Will follow-up in 1 month for reevaluation.

## 2024-02-19 NOTE — PROGRESS NOTES
Date & Time note created:    2/19/2024   8:55 AM       Requesting Provider: Dr. Baires  Reason for Referral: Symptom management and goals of care    Chief Complaint: I have cancer  HPI:   Casper Rowley is a 58 y.o. male presenting to palliative care clinic.  The role of palliative care was discussed and he was open to a conversation.  We spent the first part of the visit learning about his cancer history including his several lines of treatment as well as progression of his prostate cancer to include multiple metastases even including brain metastases.  He recently changed care to our medical oncology practice.  He has been told he has an aggressive cancer with a poor prognosis.  It is his hope that his new regimen will continue to provide quality of life in the future.  He reports currently he feels well and reports he has minimal pain and the pain he does is controlled with Norco 10/325.  He has pain in several musculoskeletal locations secondary to his metastatic bony lesions.  Does not endorse any significant nausea or vomiting or diarrhea or constipation.  We did also discuss goals of care including the importance of an advanced directive and he was provided with advanced directive paperwork.  We concluded the visit with a plan to refill his Norco 10/325 and follow-up in 1 month for reevaluation.    Oncological history: Metastatic prostate cancer    Past Medical History:   Diagnosis Date    Arthritis 02/2019    osteo-hollie knees    Bronchitis 2019    Cancer (HCC)     Basal cell - top of head    Cancer (HCC)     Prostate    Cold 02/08/2019    Cold two weeks ago, denies productive cough, SOB    Diabetes     type 2    High cholesterol     HTN (hypertension), benign 08/14/2009    Hypertension     Infectious disease     Mumps age 6 yrs and Chickenpox age 41 yrs    Obstructive sleep apnea 02/2019    Uses cpap    Prostate cancer (HCC)      Past Surgical History:   Procedure Laterality Date    CRANIOTOMY STEALTH  Right 9/21/2023    Procedure: CRANIOTOMY, USING FRAMELESS STEREOTAXY - RIGHT RETROSIGMOID FOR RESECTION OF CEREBELLAR MASS, USE OF INTRAOPERATIVE NEURONAVIGATION, EXTERNAL VENTRICULAR DRAIN;  Surgeon: Archana Be M.D.;  Location: SURGERY [facility];  Service: Neurosurgery    WV SHLDR ARTHROSCOP EXTEN DEBRIDE 3+ Left 11/1/2021    Procedure: ARTHROSCOPY,SHOULDER,WITH EXTENSIVE DEBRIDEMENT;  Surgeon: Piter Otero M.D.;  Location: SURGERY [facility];  Service: Orthopedics    PB ARTHROSCOPY SHOULDER SURGICAL BICEPS TENODES* Left 11/1/2021    Procedure: ARTHROSCOPY, SHOULDER, WITH BICEPS TENOTOMY - WITH SUB SCAPULARIS REPAIR;  Surgeon: Piter Otero M.D.;  Location: SURGERY [facility];  Service: Orthopedics    CARPAL TUNNEL RELEASE Left 2/11/2019    Procedure: CARPAL TUNNEL RELEASE;  Surgeon: Piter Otero M.D.;  Location: SURGERY [facility];  Service: Orthopedics    KNEE ARTHROSCOPY Right 05/2014    ACL     Current Medications:  Current Outpatient Medications on File Prior to Encounter   Medication Sig Dispense Refill    zolpidem (AMBIEN) 10 MG Tab TAKE ONE TABLET BY MOUTH AT BEDTIME FOR 30 DAYS      clotrimazole-betamethasone (LOTRISONE) 1-0.05 % Cream Apply 1 Application topically as needed.      cyclobenzaprine (FLEXERIL) 10 mg Tab Take 10 mg by mouth at bedtime.      furosemide (LASIX) 20 MG Tab Take 1 Tablet by mouth as needed.      hydrocod shreya-chlorphe shreya ER (TUSSIONEX) 10-8 MG/5ML Suspension Extended Release Take 5 mL by mouth as needed.      HYDROcodone/acetaminophen (NORCO)  MG Tab Take 1 Tablet by mouth every 6 hours as needed.      ondansetron (ZOFRAN ODT) 8 MG TABLET DISPERSIBLE Take 8 mg by mouth as needed.      prochlorperazine (COMPAZINE) 10 MG Tab Take 10 mg by mouth as needed.      silver sulfADIAZINE (SILVADENE) 1 % Cream Apply  topically every day.      tamsulosin (FLOMAX) 0.4 MG capsule Take 0.4 mg by mouth every day.      lisinopril (PRINIVIL) 20 MG Tab Take 1 tablet  by mouth every day. 30 Tablet 2    metoprolol SR (TOPROL XL) 25 MG TABLET SR 24 HR Take 2 tablets by mouth every day. 60 Tablet 2    Canagliflozin (INVOKANA) 100 MG Tab Take 100 mg by mouth every day.      XGEVA 120 MG/1.7ML injection 120 mg Q30 DAYS.      Dulaglutide (TRULICITY) 1.5 MG/0.5ML Solution Pen-injector Inject 1.5 mg as instructed every Saturday.      Cholecalciferol (VITAMIN D) 2000 UNITS CAPS Take 6,000 Units by mouth every day. 2000 units x 3 tablets = 6000 mcg       No current facility-administered medications on file prior to encounter.     Medication Allergy/Sensitivities:  No Known Allergies  Family History   Problem Relation Age of Onset    Cancer Father         Bladder cancer    Genetic Disorder Neg Hx        Social History     Socioeconomic History    Marital status: Single     Spouse name: Not on file    Number of children: Not on file    Years of education: Not on file    Highest education level: Not on file   Occupational History    Not on file   Tobacco Use    Smoking status: Never    Smokeless tobacco: Never   Vaping Use    Vaping Use: Never used   Substance and Sexual Activity    Alcohol use: Yes     Comment: 2 per week    Drug use: No    Sexual activity: Not on file   Other Topics Concern    Not on file   Social History Narrative    Lives with his long-term partner, Bas[place], in [facility]. He has no kids.      Social Determinants of Health     Financial Resource Strain: Not on file   Food Insecurity: Not on file   Transportation Needs: No Transportation Needs (10/2/2023)    PRAPARE - Transportation     Lack of Transportation (Medical): No     Lack of Transportation (Non-Medical): No   Physical Activity: Not on file   Stress: Not on file   Social Connections: Not on file   Intimate Partner Violence: Not on file   Housing Stability: Not on file     Palliative Performance Scale: 90%  ECO      ROS:    Review of Systems   Constitutional:  Positive for malaise/fatigue. Negative for chills, fever  "and weight loss.   HENT:  Negative for congestion and hearing loss.    Eyes:  Negative for blurred vision.   Respiratory:  Negative for cough and shortness of breath.    Cardiovascular:  Negative for chest pain and palpitations.   Gastrointestinal:  Negative for constipation, diarrhea, nausea and vomiting.   Musculoskeletal:  Positive for back pain and myalgias. Negative for joint pain.   Skin:  Negative for rash.   Neurological:  Negative for headaches.   Endo/Heme/Allergies:  Does not bruise/bleed easily.   Psychiatric/Behavioral:  Negative for depression. The patient is not nervous/anxious.      PE:   Recent vital signs  Weight/BMI: Body mass index is 26.71 kg/m².  /82   Pulse (!) 113   Temp 36.3 °C (97.4 °F) (Temporal)   Resp 12   Ht 1.651 m (5' 5\")   Wt 72.8 kg (160 lb 7.9 oz)   SpO2 96%   BMI 26.71 kg/m²   Vitals:    02/14/24 1022   BP: 110/82   Pulse: (!) 113   Resp: 12   Temp: 36.3 °C (97.4 °F)   TempSrc: Temporal   SpO2: 96%   Weight: 72.8 kg (160 lb 7.9 oz)   Height: 1.651 m (5' 5\")     Oxygen Therapy:     Physical Exam  Constitutional:       Appearance: Normal appearance. He is normal weight.   HENT:      Head: Normocephalic and atraumatic.      Mouth/Throat:      Mouth: Mucous membranes are moist.      Pharynx: Oropharynx is clear.   Eyes:      Extraocular Movements: Extraocular movements intact.      Pupils: Pupils are equal, round, and reactive to light.   Cardiovascular:      Rate and Rhythm: Normal rate.   Pulmonary:      Effort: Pulmonary effort is normal.   Abdominal:      General: Abdomen is flat.   Musculoskeletal:         General: Tenderness present. Normal range of motion.      Cervical back: Normal range of motion.   Skin:     General: Skin is warm.   Neurological:      General: No focal deficit present.      Mental Status: He is alert.   Psychiatric:         Mood and Affect: Mood normal.         Behavior: Behavior normal.           ASSESSMENT/PLAN WITH SHARED DECISION MAKING: "   Cancer related pain  Patient with known metastatic prostate cancer with bony metastases.  Pain has been well-controlled on Norco 10/325.  No request or need to change medications at this visit.  PDMP reviewed, no signs of diversion or abuse, risk and benefits of medication discussed including risk of dependence and abuse, controlled substance agreement completed.  1 month refill sent to pharmacy.  Will follow-up in 4 weeks for reevaluation of symptoms.    Prostate cancer (HCC)  Patient with long history of metastatic prostate cancer.  Recently reestablished with new oncologist at [facility].  Does have aggressive cancer.  Has good quality of life and functioning currently.  Will be proceeding with further treatments.  Will continue goals of care discussion surrounding his prostate cancer as needed.  Will follow-up in 1 month for reevaluation.    36 minutes in total spent on patient encounter including chart review and consultation with patient's oncological providers.

## 2024-02-19 NOTE — ASSESSMENT & PLAN NOTE
Patient with known metastatic prostate cancer with bony metastases.  Pain has been well-controlled on Norco 10/325.  No request or need to change medications at this visit.  PDMP reviewed, no signs of diversion or abuse, risk and benefits of medication discussed including risk of dependence and abuse, controlled substance agreement completed.  1 month refill sent to pharmacy.  Will follow-up in 4 weeks for reevaluation of symptoms.

## 2024-02-20 NOTE — PROGRESS NOTES
"Pharmacy Chemotherapy Calculations    Dx: Metastatic Prostate Cancer  Cycle 1, Day 1  Previous treatment =radiation, Docetaxel x 11 cycles 8/22-5/23, cerebellar resection 9/23    Protocol: Cabazitaxel + CARBOplatin  CABAZItaxel 20 mg/m² IV over 60 minutes on Day 1   CARBOplatin AUC 4 IV over 30 minutes on Day 1   PredniSONE 5 mg PO twice daily on Days 1 - 21    21-day cycle until disease progression or unacceptable toxicity     NCCNGuidelines® for Prostate Cancer V.1.2023.   Andry PG , et al. Lancet Oncol. 2019;20(10):7279-5840.a    Allergies:  Patient has no known allergies.       /75   Pulse (!) 110   Temp 36.4 °C (97.5 °F) (Temporal)   Resp 18   Ht 1.67 m (5' 5.75\")   Wt 72 kg (158 lb 11.7 oz)   SpO2 95%   BMI 25.82 kg/m²  Body surface area is 1.83 meters squared.    Labs 2/21/24:  ANC~ 4940 Plt = 277k   Hgb = 11.1     SCr = 0.36 mg/dL CrCl ~ 118 mL/min (min Scr 0.7)  AST/ALT/AP = 24/10/103 TBili = 0.5      CABAZitaxel 20 mg/m² x 1.83 m² = 36.6  mg   <10% difference, OK to treat with final dose = 36 mg    CARBOplatin  AUC 4 (118 ml/min + 25) = 572 mg   <10% difference, OK to treat with final dose = 570 mg    Gabriela Shaikh, PharmD    "

## 2024-02-21 ENCOUNTER — HOSPITAL ENCOUNTER (OUTPATIENT)
Dept: HEMATOLOGY ONCOLOGY | Facility: MEDICAL CENTER | Age: 59
End: 2024-02-21
Attending: NURSE PRACTITIONER
Payer: MEDICAID

## 2024-02-21 ENCOUNTER — HOSPITAL ENCOUNTER (OUTPATIENT)
Dept: LAB | Facility: MEDICAL CENTER | Age: 59
End: 2024-02-21
Attending: STUDENT IN AN ORGANIZED HEALTH CARE EDUCATION/TRAINING PROGRAM
Payer: MEDICAID

## 2024-02-21 VITALS
RESPIRATION RATE: 14 BRPM | HEART RATE: 100 BPM | BODY MASS INDEX: 26.09 KG/M2 | SYSTOLIC BLOOD PRESSURE: 98 MMHG | TEMPERATURE: 97.7 F | HEIGHT: 65 IN | WEIGHT: 156.6 LBS | OXYGEN SATURATION: 98 % | DIASTOLIC BLOOD PRESSURE: 62 MMHG

## 2024-02-21 DIAGNOSIS — C61 PROSTATE CANCER (HCC): ICD-10-CM

## 2024-02-21 DIAGNOSIS — Z79.899 ENCOUNTER FOR LONG-TERM CURRENT USE OF HIGH RISK MEDICATION: ICD-10-CM

## 2024-02-21 DIAGNOSIS — C79.31 METASTASIS TO BRAIN (HCC): ICD-10-CM

## 2024-02-21 LAB
ALBUMIN SERPL BCP-MCNC: 4.1 G/DL (ref 3.2–4.9)
ALBUMIN/GLOB SERPL: 1.2 G/DL
ALP SERPL-CCNC: 103 U/L (ref 30–99)
ALT SERPL-CCNC: 10 U/L (ref 2–50)
ANION GAP SERPL CALC-SCNC: 14 MMOL/L (ref 7–16)
ANISOCYTOSIS BLD QL SMEAR: ABNORMAL
AST SERPL-CCNC: 24 U/L (ref 12–45)
BASOPHILS # BLD AUTO: 0.5 % (ref 0–1.8)
BASOPHILS # BLD: 0.03 K/UL (ref 0–0.12)
BILIRUB SERPL-MCNC: 0.5 MG/DL (ref 0.1–1.5)
BUN SERPL-MCNC: 11 MG/DL (ref 8–22)
BURR CELLS BLD QL SMEAR: NORMAL
CALCIUM ALBUM COR SERPL-MCNC: 9.4 MG/DL (ref 8.5–10.5)
CALCIUM SERPL-MCNC: 9.5 MG/DL (ref 8.5–10.5)
CHLORIDE SERPL-SCNC: 99 MMOL/L (ref 96–112)
CO2 SERPL-SCNC: 20 MMOL/L (ref 20–33)
COMMENT 1642: NORMAL
CREAT SERPL-MCNC: 0.36 MG/DL (ref 0.5–1.4)
EOSINOPHIL # BLD AUTO: 0.12 K/UL (ref 0–0.51)
EOSINOPHIL NFR BLD: 2 % (ref 0–6.9)
ERYTHROCYTE [DISTWIDTH] IN BLOOD BY AUTOMATED COUNT: 82.7 FL (ref 35.9–50)
GFR SERPLBLD CREATININE-BSD FMLA CKD-EPI: 131 ML/MIN/1.73 M 2
GLOBULIN SER CALC-MCNC: 3.3 G/DL (ref 1.9–3.5)
GLUCOSE SERPL-MCNC: 127 MG/DL (ref 65–99)
HCT VFR BLD AUTO: 34.9 % (ref 42–52)
HGB BLD-MCNC: 11.1 G/DL (ref 14–18)
IMM GRANULOCYTES # BLD AUTO: 0.03 K/UL (ref 0–0.11)
IMM GRANULOCYTES NFR BLD AUTO: 0.5 % (ref 0–0.9)
LYMPHOCYTES # BLD AUTO: 0.28 K/UL (ref 1–4.8)
LYMPHOCYTES NFR BLD: 4.6 % (ref 22–41)
MCH RBC QN AUTO: 31.4 PG (ref 27–33)
MCHC RBC AUTO-ENTMCNC: 31.8 G/DL (ref 32.3–36.5)
MCV RBC AUTO: 98.6 FL (ref 81.4–97.8)
MICROCYTES BLD QL SMEAR: ABNORMAL
MONOCYTES # BLD AUTO: 0.64 K/UL (ref 0–0.85)
MONOCYTES NFR BLD AUTO: 10.6 % (ref 0–13.4)
MORPHOLOGY BLD-IMP: NORMAL
NEUTROPHILS # BLD AUTO: 4.94 K/UL (ref 1.82–7.42)
NEUTROPHILS NFR BLD: 81.8 % (ref 44–72)
NRBC # BLD AUTO: 0 K/UL
NRBC BLD-RTO: 0 /100 WBC (ref 0–0.2)
OVALOCYTES BLD QL SMEAR: NORMAL
PLATELET # BLD AUTO: 277 K/UL (ref 164–446)
PLATELET BLD QL SMEAR: NORMAL
PMV BLD AUTO: 9.7 FL (ref 9–12.9)
POIKILOCYTOSIS BLD QL SMEAR: NORMAL
POTASSIUM SERPL-SCNC: 4.6 MMOL/L (ref 3.6–5.5)
PROT SERPL-MCNC: 7.4 G/DL (ref 6–8.2)
PSA SERPL-MCNC: 1.01 NG/ML (ref 0–4)
RBC # BLD AUTO: 3.54 M/UL (ref 4.7–6.1)
RBC BLD AUTO: PRESENT
SODIUM SERPL-SCNC: 133 MMOL/L (ref 135–145)
WBC # BLD AUTO: 6 K/UL (ref 4.8–10.8)

## 2024-02-21 PROCEDURE — 80053 COMPREHEN METABOLIC PANEL: CPT

## 2024-02-21 PROCEDURE — 99212 OFFICE O/P EST SF 10 MIN: CPT | Performed by: NURSE PRACTITIONER

## 2024-02-21 PROCEDURE — 84153 ASSAY OF PSA TOTAL: CPT

## 2024-02-21 PROCEDURE — 36415 COLL VENOUS BLD VENIPUNCTURE: CPT

## 2024-02-21 PROCEDURE — 85025 COMPLETE CBC W/AUTO DIFF WBC: CPT

## 2024-02-21 PROCEDURE — 99214 OFFICE O/P EST MOD 30 MIN: CPT | Performed by: NURSE PRACTITIONER

## 2024-02-21 RX ORDER — 0.9 % SODIUM CHLORIDE 0.9 %
VIAL (ML) INJECTION PRN
Status: CANCELLED | OUTPATIENT
Start: 2024-02-21

## 2024-02-21 RX ORDER — 0.9 % SODIUM CHLORIDE 0.9 %
3 VIAL (ML) INJECTION PRN
Status: CANCELLED | OUTPATIENT
Start: 2024-02-21

## 2024-02-21 RX ORDER — DIPHENHYDRAMINE HYDROCHLORIDE 50 MG/ML
50 INJECTION INTRAMUSCULAR; INTRAVENOUS PRN
Status: CANCELLED | OUTPATIENT
Start: 2024-02-22

## 2024-02-21 RX ORDER — METHYLPREDNISOLONE SODIUM SUCCINATE 125 MG/2ML
125 INJECTION, POWDER, LYOPHILIZED, FOR SOLUTION INTRAMUSCULAR; INTRAVENOUS PRN
Status: CANCELLED | OUTPATIENT
Start: 2024-02-22

## 2024-02-21 RX ORDER — SODIUM CHLORIDE 9 MG/ML
INJECTION, SOLUTION INTRAVENOUS CONTINUOUS
Status: CANCELLED | OUTPATIENT
Start: 2024-02-22

## 2024-02-21 RX ORDER — 0.9 % SODIUM CHLORIDE 0.9 %
10 VIAL (ML) INJECTION PRN
Status: CANCELLED | OUTPATIENT
Start: 2024-02-22

## 2024-02-21 RX ORDER — ONDANSETRON 2 MG/ML
4 INJECTION INTRAMUSCULAR; INTRAVENOUS PRN
Status: CANCELLED | OUTPATIENT
Start: 2024-02-22

## 2024-02-21 RX ORDER — DEXAMETHASONE SODIUM PHOSPHATE 4 MG/ML
8 INJECTION, SOLUTION INTRA-ARTICULAR; INTRALESIONAL; INTRAMUSCULAR; INTRAVENOUS; SOFT TISSUE ONCE
Status: CANCELLED | OUTPATIENT
Start: 2024-02-22 | End: 2024-02-22

## 2024-02-21 RX ORDER — 0.9 % SODIUM CHLORIDE 0.9 %
10 VIAL (ML) INJECTION PRN
Status: CANCELLED | OUTPATIENT
Start: 2024-02-21

## 2024-02-21 RX ORDER — PROCHLORPERAZINE MALEATE 10 MG
10 TABLET ORAL EVERY 6 HOURS PRN
Status: CANCELLED | OUTPATIENT
Start: 2024-02-22

## 2024-02-21 RX ORDER — EPINEPHRINE 1 MG/ML(1)
0.5 AMPUL (ML) INJECTION PRN
Status: CANCELLED | OUTPATIENT
Start: 2024-02-22

## 2024-02-21 RX ORDER — 0.9 % SODIUM CHLORIDE 0.9 %
VIAL (ML) INJECTION PRN
Status: CANCELLED | OUTPATIENT
Start: 2024-02-22

## 2024-02-21 RX ORDER — 0.9 % SODIUM CHLORIDE 0.9 %
3 VIAL (ML) INJECTION PRN
Status: CANCELLED | OUTPATIENT
Start: 2024-02-22

## 2024-02-21 RX ORDER — ONDANSETRON 8 MG/1
8 TABLET, ORALLY DISINTEGRATING ORAL PRN
Status: CANCELLED | OUTPATIENT
Start: 2024-02-22

## 2024-02-21 ASSESSMENT — ENCOUNTER SYMPTOMS
CONSTIPATION: 0
PALPITATIONS: 0
COUGH: 1
INSOMNIA: 0
HEADACHES: 0
FOCAL WEAKNESS: 1
MYALGIAS: 0
VOMITING: 0
CHILLS: 0
BLOOD IN STOOL: 0
NAUSEA: 0
TINGLING: 1
FEVER: 0
DIZZINESS: 0
BACK PAIN: 1
WHEEZING: 0
WEIGHT LOSS: 0
SHORTNESS OF BREATH: 0

## 2024-02-21 ASSESSMENT — FIBROSIS 4 INDEX: FIB4 SCORE: 1.47877545946101372

## 2024-02-21 NOTE — PROGRESS NOTES
Subjective     Casper Rowley is a 58 y.o. male who presents with Prostate Cancer (Viry/pre chemo)            HPI  Mr. Rowley presents for evaluation prior to cycle 3 carboplatin, cabazitaxel for treatment of stage IVb, cTxNx pM1c, prostate cancer.  He is unaccompanied for today's visit.    Patient diagnosed with metastatic disease to bones in 6/2021 and was treated with androgen deprivation therapy (Firmagon, denosumab); enzalutamide, initiated 9/27/21.  He completed 3 treatment of radium-223 from March-May 2022 (stopped d/t decreased counts). Palliative radiation to L2 completed 5/6/22. PSA rising in 6/2022, patient discontinued enzalutamide and initiated docetaxel 8/29/2022. Lupron started 11/2022. Brain mets to cerebellum resected per Dr. Be 9/21/23; he completed cycle 11 docetaxel in approx. 9/2023; s/p XRT to adrenals and thoracic spine 12/2023. Patient has been evaluated at [facility] (Dr. Reardon) for Pluvicto therapy but his disease is not PSMA avid. He initiated cabazitaxel, carboplatin at [facility] on 12/21/23, completing 2 cycles with their office; transitioning care to our office and receiving cycle 3 on 2/23/24. Lupron continues per urology every 3 months.    Patient continues tolerating treatment fairly well. He continues with manual therapy for ongoing back pain. Associated neuropathy persists.    Review of Systems   Constitutional:  Negative for chills, fever, malaise/fatigue and weight loss (stable).   Respiratory:  Positive for cough (post nasal drip). Negative for shortness of breath and wheezing.    Cardiovascular:  Negative for chest pain, palpitations and leg swelling.   Gastrointestinal:  Positive for diarrhea (with treatment; immodium prn). Negative for blood in stool, constipation, nausea and vomiting.   Genitourinary:  Negative for dysuria and hematuria.   Musculoskeletal:  Positive for back pain (Manual therapy for low back and L scapularis ~ q3 weeks). Negative for joint pain and  myalgias.        S/p spine surgery in 3/2023 - L1-3 laminectomy and fusion   Neurological:  Positive for tingling (vague at feet - but also s/t spinal issues) and focal weakness (left side a touch weaker). Negative for dizziness and headaches.        9/21/23 cerebellar mass excision - no long term effects (Dr. Faria)   Psychiatric/Behavioral:  The patient does not have insomnia.      No Known Allergies      Current Outpatient Medications on File Prior to Encounter   Medication Sig Dispense Refill    zolpidem (AMBIEN) 10 MG Tab TAKE ONE TABLET BY MOUTH AT BEDTIME FOR 30 DAYS      clotrimazole-betamethasone (LOTRISONE) 1-0.05 % Cream Apply 1 Application topically as needed.      cyclobenzaprine (FLEXERIL) 10 mg Tab Take 10 mg by mouth at bedtime.      furosemide (LASIX) 20 MG Tab Take 1 Tablet by mouth as needed.      hydrocod shreya-chlorphe shreya ER (TUSSIONEX) 10-8 MG/5ML Suspension Extended Release Take 5 mL by mouth as needed.      HYDROcodone/acetaminophen (NORCO)  MG Tab Take 1 Tablet by mouth every 6 hours as needed.      ondansetron (ZOFRAN ODT) 8 MG TABLET DISPERSIBLE Take 8 mg by mouth as needed.      prochlorperazine (COMPAZINE) 10 MG Tab Take 10 mg by mouth as needed.      silver sulfADIAZINE (SILVADENE) 1 % Cream Apply  topically every day.      tamsulosin (FLOMAX) 0.4 MG capsule Take 0.4 mg by mouth every day.      lisinopril (PRINIVIL) 20 MG Tab Take 1 tablet by mouth every day. 30 Tablet 2    metoprolol SR (TOPROL XL) 25 MG TABLET SR 24 HR Take 2 tablets by mouth every day. 60 Tablet 2    Canagliflozin (INVOKANA) 100 MG Tab Take 100 mg by mouth every day.      XGEVA 120 MG/1.7ML injection 120 mg Q30 DAYS.      Dulaglutide (TRULICITY) 1.5 MG/0.5ML Solution Pen-injector Inject 1.5 mg as instructed every Saturday.      Cholecalciferol (VITAMIN D) 2000 UNITS CAPS Take 6,000 Units by mouth every day. 2000 units x 3 tablets = 6000 mcg       No current facility-administered medications on file prior to  "encounter.            Objective     BP 98/62 (BP Location: Left arm, Patient Position: Sitting, BP Cuff Size: Adult)   Pulse 100   Temp 36.5 °C (97.7 °F) (Temporal)   Resp 14   Ht 1.651 m (5' 5\")   Wt 71 kg (156 lb 9.6 oz)   SpO2 98%   BMI 26.06 kg/m²      Physical Exam  Vitals reviewed.   Constitutional:       General: He is not in acute distress.     Appearance: He is well-developed. He is not diaphoretic.   HENT:      Head: Normocephalic and atraumatic.   Eyes:      General: No scleral icterus.        Right eye: No discharge.         Left eye: No discharge.   Cardiovascular:      Rate and Rhythm: Normal rate and regular rhythm.      Heart sounds: Normal heart sounds. No murmur heard.     No friction rub. No gallop.   Pulmonary:      Effort: Pulmonary effort is normal. No respiratory distress.      Breath sounds: Normal breath sounds. No wheezing.   Abdominal:      General: There is no distension.      Palpations: Abdomen is soft.      Tenderness: There is no abdominal tenderness.   Musculoskeletal:         General: Normal range of motion.      Cervical back: Normal range of motion.   Skin:     General: Skin is warm and dry.      Coloration: Skin is not pale.      Findings: No erythema or rash.   Neurological:      Mental Status: He is alert and oriented to person, place, and time.   Psychiatric:         Behavior: Behavior normal.         Hospital Outpatient Visit on 02/21/2024   Component Date Value Ref Range Status    Prostatic Specific Antigen Tot 02/21/2024 1.01  0.00 - 4.00 ng/mL Final    Comment: Performed using Roche donnie e immunoassay analyzer. tPSA values determined  on patient samples by different testing procedures cannot be directly  compared with one another and could be the cause of erroneous medical  interpretations. If there is a change in the tPSA assay procedure used while  monitoring therapy, then new baselines may need to be established when  comparing previous results.      Sodium " 02/21/2024 133 (L)  135 - 145 mmol/L Final    Potassium 02/21/2024 4.6  3.6 - 5.5 mmol/L Final    Chloride 02/21/2024 99  96 - 112 mmol/L Final    Co2 02/21/2024 20  20 - 33 mmol/L Final    Anion Gap 02/21/2024 14.0  7.0 - 16.0 Final    Glucose 02/21/2024 127 (H)  65 - 99 mg/dL Final    Bun 02/21/2024 11  8 - 22 mg/dL Final    Creatinine 02/21/2024 0.36 (L)  0.50 - 1.40 mg/dL Final    Calcium 02/21/2024 9.5  8.5 - 10.5 mg/dL Final    Correct Calcium 02/21/2024 9.4  8.5 - 10.5 mg/dL Final    AST(SGOT) 02/21/2024 24  12 - 45 U/L Final    ALT(SGPT) 02/21/2024 10  2 - 50 U/L Final    Alkaline Phosphatase 02/21/2024 103 (H)  30 - 99 U/L Final    Total Bilirubin 02/21/2024 0.5  0.1 - 1.5 mg/dL Final    Albumin 02/21/2024 4.1  3.2 - 4.9 g/dL Final    Total Protein 02/21/2024 7.4  6.0 - 8.2 g/dL Final    Globulin 02/21/2024 3.3  1.9 - 3.5 g/dL Final    A-G Ratio 02/21/2024 1.2  g/dL Final    WBC 02/21/2024 6.0  4.8 - 10.8 K/uL Final    RBC 02/21/2024 3.54 (L)  4.70 - 6.10 M/uL Final    Hemoglobin 02/21/2024 11.1 (L)  14.0 - 18.0 g/dL Final    Hematocrit 02/21/2024 34.9 (L)  42.0 - 52.0 % Final    MCV 02/21/2024 98.6 (H)  81.4 - 97.8 fL Final    MCH 02/21/2024 31.4  27.0 - 33.0 pg Final    MCHC 02/21/2024 31.8 (L)  32.3 - 36.5 g/dL Final    Please note new reference range effective 05/22/2023.    RDW 02/21/2024 82.7 (H)  35.9 - 50.0 fL Final    Platelet Count 02/21/2024 277  164 - 446 K/uL Final    MPV 02/21/2024 9.7  9.0 - 12.9 fL Final    Neutrophils-Polys 02/21/2024 81.80 (H)  44.00 - 72.00 % Final    Lymphocytes 02/21/2024 4.60 (L)  22.00 - 41.00 % Final    Monocytes 02/21/2024 10.60  0.00 - 13.40 % Final    Eosinophils 02/21/2024 2.00  0.00 - 6.90 % Final    Basophils 02/21/2024 0.50  0.00 - 1.80 % Final    Immature Granulocytes 02/21/2024 0.50  0.00 - 0.90 % Final    Nucleated RBC 02/21/2024 0.00  0.00 - 0.20 /100 WBC Final    Please note new reference range effective 05/22/2023.    Neutrophils (Absolute) 02/21/2024  4.94  1.82 - 7.42 K/uL Final    Comment: Includes immature neutrophils, if present.  Please note new reference range effective 05/22/2023.      Lymphs (Absolute) 02/21/2024 0.28 (L)  1.00 - 4.80 K/uL Final    Monos (Absolute) 02/21/2024 0.64  0.00 - 0.85 K/uL Final    Eos (Absolute) 02/21/2024 0.12  0.00 - 0.51 K/uL Final    Baso (Absolute) 02/21/2024 0.03  0.00 - 0.12 K/uL Final    Immature Granulocytes (abs) 02/21/2024 0.03  0.00 - 0.11 K/uL Final    NRBC (Absolute) 02/21/2024 0.00  K/uL Final    Anisocytosis 02/21/2024 2+ (A)   Final    Microcytosis 02/21/2024 2+ (A)   Final    Plt Estimation 02/21/2024 Normal   Final    RBC Morphology 02/21/2024 Present   Final    Poikilocytosis 02/21/2024 1+   Final    Ovalocytes 02/21/2024 1+   Final    Echinocytes 02/21/2024 1+   Final    Peripheral Smear Review 02/21/2024 see below   Final    Comment: Due to instrument suspect flags, further review of peripheral smear is  indicated on this patient sample. This review may or may not result in  abnormal findings.      Comments-Diff 02/21/2024 see below   Final    Results have been verified by peripheral blood smear review.    GFR (CKD-EPI) 02/21/2024 131  >60 mL/min/1.73 m 2 Final    Comment: Estimated Glomerular Filtration Rate is calculated using  race neutral CKD-EPI 2021 equation per NKF-ASN recommendations.           Assessment & Plan     1. Prostate cancer (HCC)        2. Encounter for long-term current use of high risk medication          1.  Brain mets: s/p resection 9/21/23, surveillance continues.    2.  Bone support: Monthly Xgeva initiated 6/2021, continues per urology.    3.  Prostate cancer: Diagnosed 6/2021; s/p firmagon, enzalutamide 9/2021-8/2022; s/p radium-223 x 3 2-5/2022; s/p docetaxel 8/29/22- 9/2023; initiated q 90 d Lupron 11/2022 (per urology); s/p XRT to adrenals 12/2023; initiated cabazitaxel, carboplatin 12/21/23 [transitioned care to [facility] 2/2024, C3].    Lupron next due 5/2024    Patient  continues tolerating treatment fairly well. CBC, CMP, PSA have been evaluated and found to be within acceptable limits.  Patient will proceed with cycle 3 of treatment and return in 3 weeks for evaluation prior to cycle 4, sooner as needed.    PMSA PET pending    The patient verbalized agreement and understanding of current plan. All questions and concerns were addressed at time of visit.    Please note that this dictation was created using voice recognition software. I have made every reasonable attempt to correct obvious errors, but I expect that there are errors of grammar and possibly content that I did not discover before finalizing the note.

## 2024-02-23 ENCOUNTER — OUTPATIENT INFUSION SERVICES (OUTPATIENT)
Dept: ONCOLOGY | Facility: MEDICAL CENTER | Age: 59
End: 2024-02-23
Attending: STUDENT IN AN ORGANIZED HEALTH CARE EDUCATION/TRAINING PROGRAM
Payer: MEDICAID

## 2024-02-23 ENCOUNTER — PATIENT OUTREACH (OUTPATIENT)
Dept: ONCOLOGY | Facility: MEDICAL CENTER | Age: 59
End: 2024-02-23
Payer: MEDICAID

## 2024-02-23 VITALS
TEMPERATURE: 97.5 F | DIASTOLIC BLOOD PRESSURE: 75 MMHG | HEART RATE: 110 BPM | SYSTOLIC BLOOD PRESSURE: 121 MMHG | RESPIRATION RATE: 18 BRPM | OXYGEN SATURATION: 95 % | WEIGHT: 158.73 LBS | HEIGHT: 66 IN | BODY MASS INDEX: 25.51 KG/M2

## 2024-02-23 DIAGNOSIS — C61 PROSTATE CANCER (HCC): ICD-10-CM

## 2024-02-23 PROCEDURE — 700111 HCHG RX REV CODE 636 W/ 250 OVERRIDE (IP): Mod: JZ,UD | Performed by: NURSE PRACTITIONER

## 2024-02-23 PROCEDURE — 96413 CHEMO IV INFUSION 1 HR: CPT

## 2024-02-23 PROCEDURE — 96417 CHEMO IV INFUS EACH ADDL SEQ: CPT

## 2024-02-23 PROCEDURE — 700105 HCHG RX REV CODE 258: Mod: UD | Performed by: NURSE PRACTITIONER

## 2024-02-23 PROCEDURE — 96375 TX/PRO/DX INJ NEW DRUG ADDON: CPT

## 2024-02-23 PROCEDURE — 304540 HCHG NITRO SET VENT 2ND TUB

## 2024-02-23 RX ORDER — DEXAMETHASONE SODIUM PHOSPHATE 4 MG/ML
8 INJECTION, SOLUTION INTRA-ARTICULAR; INTRALESIONAL; INTRAMUSCULAR; INTRAVENOUS; SOFT TISSUE ONCE
Status: COMPLETED | OUTPATIENT
Start: 2024-02-23 | End: 2024-02-23

## 2024-02-23 RX ADMIN — SODIUM CHLORIDE 36 MG: 0.9 INJECTION, SOLUTION INTRAVENOUS at 11:44

## 2024-02-23 RX ADMIN — DIPHENHYDRAMINE HYDROCHLORIDE 25 MG: 50 INJECTION, SOLUTION INTRAMUSCULAR; INTRAVENOUS at 10:10

## 2024-02-23 RX ADMIN — DEXAMETHASONE SODIUM PHOSPHATE 8 MG: 4 INJECTION INTRA-ARTICULAR; INTRALESIONAL; INTRAMUSCULAR; INTRAVENOUS; SOFT TISSUE at 10:15

## 2024-02-23 RX ADMIN — FAMOTIDINE 20 MG: 10 INJECTION, SOLUTION INTRAVENOUS at 10:09

## 2024-02-23 RX ADMIN — CARBOPLATIN 570 MG: 10 INJECTION, SOLUTION INTRAVENOUS at 10:44

## 2024-02-23 ASSESSMENT — FIBROSIS 4 INDEX: FIB4 SCORE: 1.56

## 2024-02-23 NOTE — PROGRESS NOTES
Chemotherapy Verification - PRIMARY RN      Height = 1.67 m  Weight = 72 kg  BSA = 1.83 m2       Medication: Carboplatin (Paraplatin)  Dose: Target AUC= 4  Calculated Dose: 571.772 mg                            (In mg/m2, AUC, mg/kg)     Medication: cabazitaxel (Jevtana)  Dose: 20 mg/m2  Calculated Dose: 36.6 mg                             (In mg/m2, AUC, mg/kg)    Carboplatin calculation (if applicable):  (4*(117.943+25)) = 571.772       I confirm this process was performed independently with the BSA and all final chemotherapy dosing calculations congruent.  Any discrepancies of 10% or greater have been addressed with the chemotherapy pharmacist. The resolution of the discrepancy has been documented in the EPIC progress notes.

## 2024-02-23 NOTE — PROGRESS NOTES
Met with pt Casper and MADISON Cui in [place] today while he was finishing his chemo infusion. ONN able to reintroduce self and explain role to patient. ONN listened to patient and was able to socialize. Casper stated that he forget to ask Lida Gibbons one thing on Wednesday. He was wondering about possible clincal trials available for his ds type, particularly at [facility]. ONN will reach out to APRN to ask about researching this for patient. ONN able to walk Tigre out of [place] and they left for the parking garage in [facility].

## 2024-02-23 NOTE — PROGRESS NOTES
Pharmacy Chemotherapy Calculation:    Dx: Castrate-resistant prostate adenocarcinoma metastatic    Protocol: CABAZItaxel/CARBOplatin/PredniSONE  *Dosing Reference*  CABAZItaxel 20 mg/m² IV over 60 minutes on Day 1   CARBOplatin AUC 4 IV over 30 minutes on Day 1   PredniSONE 5 mg PO twice daily on Days 1 - 21  21-day cycle until disease progression or unacceptable toxicity     NCCN Guidelines® for Prostate Cancer V.1.2023.   Andry PG, et al. Lancet Oncol. 2019;20(10):7795-4944.a    Allergies:  Patient has no known allergies.       There were no vitals taken for this visit. There is no height or weight on file to calculate BSA.    Labs 2/21/24:  ANC~ 4940 Plt = 277k   Hgb = 11.1     SCr = 0.36 mg/dL CrCl ~ 117.94 mL/min (min SCr 0.7 used)  AST/ALT/AP = 24/10/103 TBili = 0.5      Drug Order   (Drug name, dose, route, IV Fluid & volume, frequency, number of doses) Cycle 3   Previous treatment: C1 and C2 given at [facility] 2/1/24 per MD note; s/p docetaxel; enzalutamide; Radium-223     Medication = CABAZItaxel   Base Dose = 20 mg/m2  Calc Dose: Base Dose x 1.83 m2 = 36.6 mg  Final Dose = 36 mg  Route = IV  Fluid & Volume =  mL  Admin Duration = Over 60 minutes       <10% difference, OK to treat with final dose   Medication = CARBOplatin   Base Dose = AUC 4  Calc Dose: Base Dose x (117.94 mL/min + 25) = 571.76 mg  Final Dose = 570 mg  Route = IV  Fluid & Volume =  mL  Admin Duration = Over 30 minutes          <10% difference, OK to treat with final dose     By my signature below, I confirm this process was performed independently with the BSA and all final chemotherapy dosing calculations congruent. I have reviewed the above chemotherapy order and that my calculation of the final dose and BSA (when applicable) corroborate those calculations of the  pharmacist. Discrepancies of 10% or greater in the written dose have been addressed and documented within the [facility] Progress notes.    Angelito YeD

## 2024-02-23 NOTE — PROGRESS NOTES
Chemotherapy Verification - SECONDARY RN       Height = 167 cm  Weight = 72 kg  BSA = 1.83 m2       Medication: Cabazitaxel  Dose: 20 mg/m2  Calculated Dose: 36.6 mg                             (In mg/m2, AUC, mg/kg)     Medication: Carboplatin  Dose: AUC 4  Calculated Dose: 571.772 mg                             (In mg/m2, AUC, mg/kg)      Carboplatin calculation (if applicable):  (4*(117.943+25)) = 571.772     I confirm that this process was performed independently.

## 2024-02-24 ENCOUNTER — OUTPATIENT INFUSION SERVICES (OUTPATIENT)
Dept: ONCOLOGY | Facility: MEDICAL CENTER | Age: 59
End: 2024-02-24
Attending: STUDENT IN AN ORGANIZED HEALTH CARE EDUCATION/TRAINING PROGRAM
Payer: MEDICAID

## 2024-02-24 VITALS
RESPIRATION RATE: 18 BRPM | WEIGHT: 160.5 LBS | OXYGEN SATURATION: 97 % | BODY MASS INDEX: 25.79 KG/M2 | HEIGHT: 66 IN | DIASTOLIC BLOOD PRESSURE: 69 MMHG | SYSTOLIC BLOOD PRESSURE: 113 MMHG | TEMPERATURE: 97.5 F | HEART RATE: 87 BPM

## 2024-02-24 DIAGNOSIS — C61 PROSTATE CANCER (HCC): ICD-10-CM

## 2024-02-24 PROCEDURE — 700111 HCHG RX REV CODE 636 W/ 250 OVERRIDE (IP): Mod: JZ,UD | Performed by: NURSE PRACTITIONER

## 2024-02-24 PROCEDURE — 96372 THER/PROPH/DIAG INJ SC/IM: CPT

## 2024-02-24 RX ADMIN — PEGFILGRASTIM-CBQV 6 MG: 6 INJECTION, SOLUTION SUBCUTANEOUS at 15:02

## 2024-02-24 ASSESSMENT — FIBROSIS 4 INDEX: FIB4 SCORE: 1.56

## 2024-02-24 NOTE — PROGRESS NOTES
Pt presented to Infusion for Udenyca injection. POC discussed and pt verbalized understanding. Pt reports left shoulder pain; mentioned that he took pain medications prior to arrival. He denies s/s of acute illness today. Udenyca injection administered per MAR in the back of the right arm, band-aid applied to site and pt tolerated well. No s/s of reactions or complications noted.  Future appts confirmed, confirmed MyChart access to view appts. Pt discharged to self care in [facility].

## 2024-02-24 NOTE — PROGRESS NOTES
Pt presented to Infusion for D1C3 OP Prostate cabazitaxel +carboplatin. POC discussed and pt verbalized understanding. PIV established without difficulty, brisk blood return noted, line flushes with ease; pt tolerated well. Labs were drawn 02/21 and were reviewed. Pre-medications and chemotherapy administered per MAR. Pt tolerated well. No s/s of adverse reactions or complications noted. PIV flushed, removed with tip intact and site covered with sterile gauze/coban. Educated pt on when to contact provider including fever, s/s of infection, worsening symptoms and/or defer judgment to ED for provider evaluation. Pt verbalized understanding. Pt confirmed next appt and discharged to self care, accompanied by his friend. Pt in NAD at time of discharge.

## 2024-02-28 ENCOUNTER — HOSPITAL ENCOUNTER (OUTPATIENT)
Dept: LAB | Facility: MEDICAL CENTER | Age: 59
End: 2024-02-28
Attending: FAMILY MEDICINE
Payer: MEDICAID

## 2024-02-28 LAB
ANISOCYTOSIS BLD QL SMEAR: ABNORMAL
BASOPHILS # BLD AUTO: 0 % (ref 0–1.8)
BASOPHILS # BLD: 0 K/UL (ref 0–0.12)
COMMENT NL1176: NORMAL
EOSINOPHIL # BLD AUTO: 0 K/UL (ref 0–0.51)
EOSINOPHIL NFR BLD: 0 % (ref 0–6.9)
ERYTHROCYTE [DISTWIDTH] IN BLOOD BY AUTOMATED COUNT: 74.7 FL (ref 35.9–50)
HCT VFR BLD AUTO: 31.4 % (ref 42–52)
HGB BLD-MCNC: 10.4 G/DL (ref 14–18)
LYMPHOCYTES # BLD AUTO: 0.2 K/UL (ref 1–4.8)
LYMPHOCYTES NFR BLD: 5.2 % (ref 22–41)
MANUAL DIFF BLD: NORMAL
MCH RBC QN AUTO: 32.4 PG (ref 27–33)
MCHC RBC AUTO-ENTMCNC: 33.1 G/DL (ref 32.3–36.5)
MCV RBC AUTO: 97.8 FL (ref 81.4–97.8)
METAMYELOCYTES NFR BLD MANUAL: 3.4 %
MICROCYTES BLD QL SMEAR: ABNORMAL
MONOCYTES # BLD AUTO: 0.14 K/UL (ref 0–0.85)
MONOCYTES NFR BLD AUTO: 3.5 % (ref 0–13.4)
MORPHOLOGY BLD-IMP: NORMAL
NEUTROPHILS # BLD AUTO: 3.43 K/UL (ref 1.82–7.42)
NEUTROPHILS NFR BLD: 84.5 % (ref 44–72)
NEUTS BAND NFR BLD MANUAL: 3.4 % (ref 0–10)
NRBC # BLD AUTO: 0 K/UL
NRBC BLD-RTO: 0 /100 WBC (ref 0–0.2)
PLATELET # BLD AUTO: 136 K/UL (ref 164–446)
PLATELET BLD QL SMEAR: NORMAL
PMV BLD AUTO: 11.6 FL (ref 9–12.9)
RBC # BLD AUTO: 3.21 M/UL (ref 4.7–6.1)
RBC BLD AUTO: PRESENT
TOXIC GRANULES BLD QL SMEAR: NORMAL
WBC # BLD AUTO: 3.9 K/UL (ref 4.8–10.8)

## 2024-02-28 PROCEDURE — 36415 COLL VENOUS BLD VENIPUNCTURE: CPT

## 2024-02-28 PROCEDURE — 85027 COMPLETE CBC AUTOMATED: CPT

## 2024-02-28 PROCEDURE — 85007 BL SMEAR W/DIFF WBC COUNT: CPT

## 2024-03-04 RX ORDER — METHYLPREDNISOLONE SODIUM SUCCINATE 125 MG/2ML
125 INJECTION, POWDER, LYOPHILIZED, FOR SOLUTION INTRAMUSCULAR; INTRAVENOUS PRN
Status: CANCELLED | OUTPATIENT
Start: 2024-03-15

## 2024-03-04 RX ORDER — 0.9 % SODIUM CHLORIDE 0.9 %
10 VIAL (ML) INJECTION PRN
Status: CANCELLED | OUTPATIENT
Start: 2024-03-15

## 2024-03-04 RX ORDER — DIPHENHYDRAMINE HYDROCHLORIDE 50 MG/ML
50 INJECTION INTRAMUSCULAR; INTRAVENOUS PRN
Status: CANCELLED | OUTPATIENT
Start: 2024-03-15

## 2024-03-04 RX ORDER — ONDANSETRON 2 MG/ML
4 INJECTION INTRAMUSCULAR; INTRAVENOUS PRN
Status: CANCELLED | OUTPATIENT
Start: 2024-03-15

## 2024-03-04 RX ORDER — 0.9 % SODIUM CHLORIDE 0.9 %
3 VIAL (ML) INJECTION PRN
Status: CANCELLED | OUTPATIENT
Start: 2024-03-14

## 2024-03-04 RX ORDER — SODIUM CHLORIDE 9 MG/ML
INJECTION, SOLUTION INTRAVENOUS CONTINUOUS
Status: CANCELLED | OUTPATIENT
Start: 2024-03-15

## 2024-03-04 RX ORDER — PROCHLORPERAZINE MALEATE 10 MG
10 TABLET ORAL EVERY 6 HOURS PRN
Status: CANCELLED | OUTPATIENT
Start: 2024-03-15

## 2024-03-04 RX ORDER — 0.9 % SODIUM CHLORIDE 0.9 %
3 VIAL (ML) INJECTION PRN
Status: CANCELLED | OUTPATIENT
Start: 2024-03-15

## 2024-03-04 RX ORDER — 0.9 % SODIUM CHLORIDE 0.9 %
VIAL (ML) INJECTION PRN
Status: CANCELLED | OUTPATIENT
Start: 2024-03-14

## 2024-03-04 RX ORDER — 0.9 % SODIUM CHLORIDE 0.9 %
VIAL (ML) INJECTION PRN
Status: CANCELLED | OUTPATIENT
Start: 2024-03-15

## 2024-03-04 RX ORDER — ONDANSETRON 8 MG/1
8 TABLET, ORALLY DISINTEGRATING ORAL PRN
Status: CANCELLED | OUTPATIENT
Start: 2024-03-15

## 2024-03-04 RX ORDER — 0.9 % SODIUM CHLORIDE 0.9 %
10 VIAL (ML) INJECTION PRN
Status: CANCELLED | OUTPATIENT
Start: 2024-03-14

## 2024-03-04 RX ORDER — EPINEPHRINE 1 MG/ML(1)
0.5 AMPUL (ML) INJECTION PRN
Status: CANCELLED | OUTPATIENT
Start: 2024-03-15

## 2024-03-04 RX ORDER — DEXAMETHASONE SODIUM PHOSPHATE 4 MG/ML
8 INJECTION, SOLUTION INTRA-ARTICULAR; INTRALESIONAL; INTRAMUSCULAR; INTRAVENOUS; SOFT TISSUE ONCE
Status: CANCELLED | OUTPATIENT
Start: 2024-03-15 | End: 2024-03-15

## 2024-03-06 ENCOUNTER — PATIENT OUTREACH (OUTPATIENT)
Dept: ONCOLOGY | Facility: MEDICAL CENTER | Age: 59
End: 2024-03-06
Payer: MEDICAID

## 2024-03-06 ENCOUNTER — PATIENT MESSAGE (OUTPATIENT)
Dept: ONCOLOGY | Facility: MEDICAL CENTER | Age: 59
End: 2024-03-06

## 2024-03-11 ENCOUNTER — HOSPITAL ENCOUNTER (OUTPATIENT)
Dept: LAB | Facility: MEDICAL CENTER | Age: 59
End: 2024-03-11
Attending: UROLOGY
Payer: MEDICAID

## 2024-03-11 LAB
ALBUMIN SERPL BCP-MCNC: 4.2 G/DL (ref 3.2–4.9)
ALBUMIN/GLOB SERPL: 1.6 G/DL
ALP SERPL-CCNC: 114 U/L (ref 30–99)
ALT SERPL-CCNC: 9 U/L (ref 2–50)
ANION GAP SERPL CALC-SCNC: 12 MMOL/L (ref 7–16)
AST SERPL-CCNC: 26 U/L (ref 12–45)
BILIRUB SERPL-MCNC: 0.4 MG/DL (ref 0.1–1.5)
BUN SERPL-MCNC: 9 MG/DL (ref 8–22)
CALCIUM ALBUM COR SERPL-MCNC: 8.9 MG/DL (ref 8.5–10.5)
CALCIUM SERPL-MCNC: 9.1 MG/DL (ref 8.5–10.5)
CHLORIDE SERPL-SCNC: 102 MMOL/L (ref 96–112)
CO2 SERPL-SCNC: 21 MMOL/L (ref 20–33)
CREAT SERPL-MCNC: 0.36 MG/DL (ref 0.5–1.4)
GFR SERPLBLD CREATININE-BSD FMLA CKD-EPI: 131 ML/MIN/1.73 M 2
GLOBULIN SER CALC-MCNC: 2.7 G/DL (ref 1.9–3.5)
GLUCOSE SERPL-MCNC: 113 MG/DL (ref 65–99)
POTASSIUM SERPL-SCNC: 4 MMOL/L (ref 3.6–5.5)
PROT SERPL-MCNC: 6.9 G/DL (ref 6–8.2)
PSA SERPL-MCNC: 1.29 NG/ML (ref 0–4)
SODIUM SERPL-SCNC: 135 MMOL/L (ref 135–145)
TESTOST SERPL-MCNC: 3 NG/DL (ref 175–781)

## 2024-03-11 PROCEDURE — 80053 COMPREHEN METABOLIC PANEL: CPT

## 2024-03-11 PROCEDURE — 84403 ASSAY OF TOTAL TESTOSTERONE: CPT

## 2024-03-11 PROCEDURE — 36415 COLL VENOUS BLD VENIPUNCTURE: CPT

## 2024-03-11 PROCEDURE — 84153 ASSAY OF PSA TOTAL: CPT

## 2024-03-13 ENCOUNTER — HOSPITAL ENCOUNTER (OUTPATIENT)
Dept: LAB | Facility: MEDICAL CENTER | Age: 59
End: 2024-03-13
Attending: STUDENT IN AN ORGANIZED HEALTH CARE EDUCATION/TRAINING PROGRAM
Payer: MEDICAID

## 2024-03-13 DIAGNOSIS — C79.31 METASTASIS TO BRAIN (HCC): ICD-10-CM

## 2024-03-13 DIAGNOSIS — C61 PROSTATE CANCER (HCC): ICD-10-CM

## 2024-03-13 LAB
ANISOCYTOSIS BLD QL SMEAR: ABNORMAL
BASOPHILS # BLD AUTO: 0.3 % (ref 0–1.8)
BASOPHILS # BLD: 0.02 K/UL (ref 0–0.12)
COMMENT 1642: NORMAL
EOSINOPHIL # BLD AUTO: 0 K/UL (ref 0–0.51)
EOSINOPHIL NFR BLD: 0 % (ref 0–6.9)
ERYTHROCYTE [DISTWIDTH] IN BLOOD BY AUTOMATED COUNT: 75.9 FL (ref 35.9–50)
HCT VFR BLD AUTO: 30.9 % (ref 42–52)
HGB BLD-MCNC: 10.1 G/DL (ref 14–18)
IMM GRANULOCYTES # BLD AUTO: 0.05 K/UL (ref 0–0.11)
IMM GRANULOCYTES NFR BLD AUTO: 0.7 % (ref 0–0.9)
LYMPHOCYTES # BLD AUTO: 0.36 K/UL (ref 1–4.8)
LYMPHOCYTES NFR BLD: 5.2 % (ref 22–41)
MCH RBC QN AUTO: 32.7 PG (ref 27–33)
MCHC RBC AUTO-ENTMCNC: 32.7 G/DL (ref 32.3–36.5)
MCV RBC AUTO: 100 FL (ref 81.4–97.8)
MICROCYTES BLD QL SMEAR: ABNORMAL
MONOCYTES # BLD AUTO: 0.66 K/UL (ref 0–0.85)
MONOCYTES NFR BLD AUTO: 9.6 % (ref 0–13.4)
MORPHOLOGY BLD-IMP: NORMAL
NEUTROPHILS # BLD AUTO: 5.78 K/UL (ref 1.82–7.42)
NEUTROPHILS NFR BLD: 84.2 % (ref 44–72)
NRBC # BLD AUTO: 0 K/UL
NRBC BLD-RTO: 0 /100 WBC (ref 0–0.2)
PLATELET # BLD AUTO: 221 K/UL (ref 164–446)
PLATELET BLD QL SMEAR: NORMAL
PMV BLD AUTO: 10.2 FL (ref 9–12.9)
RBC # BLD AUTO: 3.09 M/UL (ref 4.7–6.1)
RBC BLD AUTO: PRESENT
WBC # BLD AUTO: 6.9 K/UL (ref 4.8–10.8)

## 2024-03-13 PROCEDURE — 85025 COMPLETE CBC W/AUTO DIFF WBC: CPT

## 2024-03-13 PROCEDURE — 80053 COMPREHEN METABOLIC PANEL: CPT

## 2024-03-13 PROCEDURE — 84153 ASSAY OF PSA TOTAL: CPT

## 2024-03-13 PROCEDURE — 36415 COLL VENOUS BLD VENIPUNCTURE: CPT

## 2024-03-13 NOTE — PROGRESS NOTES
"Pharmacy Chemotherapy Calculation:    Dx: Castrate-resistant prostate adenocarcinoma metastatic    Protocol: CABAZItaxel/CARBOplatin/PredniSONE  *Dosing Reference*  CABAZItaxel 20 mg/m² IV over 60 minutes on Day 1   CARBOplatin AUC 4 IV over 30 minutes on Day 1   PredniSONE 5 mg PO twice daily on Days 1 - 21  21-day cycle until disease progression or unacceptable toxicity     NCCN Guidelines® for Prostate Cancer V.1.2023.   Andry PG, et al. Lancet Oncol. 2019;20(10):2885-0926.a    Allergies:  Patient has no known allergies.       /74   Pulse 99   Temp 36.3 °C (97.3 °F) (Temporal)   Resp 18   Ht 1.67 m (5' 5.75\")   Wt 71.5 kg (157 lb 10.1 oz)   SpO2 97%   BMI 25.64 kg/m²   Body surface area is 1.82 meters squared.      Labs 3/13/24:  ANC~ 13638 Plt = 221k   Hgb = 10.1     SCr = 0.45 mg/dL CrCl ~ 117 mL/min (min SCr 0.7 used)  AST/ALT/AP = WNL TBili = 0.5      Drug Order   (Drug name, dose, route, IV Fluid & volume, frequency, number of doses) Cycle 4   Previous treatment: C3 2/23/24; s/p docetaxel; enzalutamide; Radium-223     Medication = CABAZItaxel   Base Dose = 20 mg/m2  Calc Dose: Base Dose x 1.82 m2 = 36.4 mg  Final Dose = 36 mg  Route = IV  Fluid & Volume =  mL  Admin Duration = Over 60 minutes       <10% difference, OK to treat with final dose   Medication = CARBOplatin   Base Dose = AUC 4  Calc Dose: Base Dose x (117 mL/min + 25) = 568 mg  Final Dose = 570 mg  Route = IV  Fluid & Volume =  mL  Admin Duration = Over 30 minutes          <10% difference, OK to treat with final dose     By my signature below, I confirm this process was performed independently with the BSA and all final chemotherapy dosing calculations congruent. I have reviewed the above chemotherapy order and that my calculation of the final dose and BSA (when applicable) corroborate those calculations of the  pharmacist. Discrepancies of 10% or greater in the written dose have been addressed and documented " within the EPIC Progress notes.    Gabriela Shaikh PharmD

## 2024-03-14 ENCOUNTER — HOSPITAL ENCOUNTER (OUTPATIENT)
Dept: HEMATOLOGY ONCOLOGY | Facility: MEDICAL CENTER | Age: 59
End: 2024-03-14
Attending: FAMILY MEDICINE
Payer: MEDICAID

## 2024-03-14 VITALS
HEIGHT: 65 IN | TEMPERATURE: 96.6 F | RESPIRATION RATE: 12 BRPM | DIASTOLIC BLOOD PRESSURE: 72 MMHG | OXYGEN SATURATION: 95 % | HEART RATE: 112 BPM | WEIGHT: 158.51 LBS | BODY MASS INDEX: 26.41 KG/M2 | SYSTOLIC BLOOD PRESSURE: 122 MMHG

## 2024-03-14 DIAGNOSIS — G89.3 CANCER RELATED PAIN: ICD-10-CM

## 2024-03-14 DIAGNOSIS — C61 PROSTATE CANCER (HCC): ICD-10-CM

## 2024-03-14 LAB
ALBUMIN SERPL BCP-MCNC: 4.2 G/DL (ref 3.2–4.9)
ALBUMIN/GLOB SERPL: 1.4 G/DL
ALP SERPL-CCNC: 97 U/L (ref 30–99)
ALT SERPL-CCNC: 8 U/L (ref 2–50)
ANION GAP SERPL CALC-SCNC: 13 MMOL/L (ref 7–16)
AST SERPL-CCNC: 25 U/L (ref 12–45)
BILIRUB SERPL-MCNC: 0.5 MG/DL (ref 0.1–1.5)
BUN SERPL-MCNC: 14 MG/DL (ref 8–22)
CALCIUM ALBUM COR SERPL-MCNC: 8.8 MG/DL (ref 8.5–10.5)
CALCIUM SERPL-MCNC: 9 MG/DL (ref 8.5–10.5)
CHLORIDE SERPL-SCNC: 98 MMOL/L (ref 96–112)
CO2 SERPL-SCNC: 21 MMOL/L (ref 20–33)
CREAT SERPL-MCNC: 0.45 MG/DL (ref 0.5–1.4)
FASTING STATUS PATIENT QL REPORTED: NORMAL
GFR SERPLBLD CREATININE-BSD FMLA CKD-EPI: 122 ML/MIN/1.73 M 2
GLOBULIN SER CALC-MCNC: 2.9 G/DL (ref 1.9–3.5)
GLUCOSE SERPL-MCNC: 102 MG/DL (ref 65–99)
POTASSIUM SERPL-SCNC: 4.4 MMOL/L (ref 3.6–5.5)
PROT SERPL-MCNC: 7.1 G/DL (ref 6–8.2)
PSA SERPL-MCNC: 1.43 NG/ML (ref 0–4)
SODIUM SERPL-SCNC: 132 MMOL/L (ref 135–145)

## 2024-03-14 PROCEDURE — 99213 OFFICE O/P EST LOW 20 MIN: CPT | Performed by: FAMILY MEDICINE

## 2024-03-14 PROCEDURE — 99212 OFFICE O/P EST SF 10 MIN: CPT | Performed by: FAMILY MEDICINE

## 2024-03-14 RX ORDER — HYDROCODONE BITARTRATE AND ACETAMINOPHEN 10; 325 MG/1; MG/1
1 TABLET ORAL 4 TIMES DAILY PRN
Qty: 84 TABLET | Refills: 0 | Status: SHIPPED | OUTPATIENT
Start: 2024-03-14 | End: 2024-03-28

## 2024-03-14 ASSESSMENT — FIBROSIS 4 INDEX: FIB4 SCORE: 2.28

## 2024-03-15 ENCOUNTER — OUTPATIENT INFUSION SERVICES (OUTPATIENT)
Dept: ONCOLOGY | Facility: MEDICAL CENTER | Age: 59
End: 2024-03-15
Attending: STUDENT IN AN ORGANIZED HEALTH CARE EDUCATION/TRAINING PROGRAM
Payer: MEDICAID

## 2024-03-15 ENCOUNTER — HOSPITAL ENCOUNTER (OUTPATIENT)
Dept: HEMATOLOGY ONCOLOGY | Facility: MEDICAL CENTER | Age: 59
End: 2024-03-15
Attending: NURSE PRACTITIONER
Payer: MEDICAID

## 2024-03-15 VITALS
BODY MASS INDEX: 26.19 KG/M2 | HEART RATE: 93 BPM | DIASTOLIC BLOOD PRESSURE: 68 MMHG | TEMPERATURE: 98.2 F | HEIGHT: 65 IN | RESPIRATION RATE: 15 BRPM | SYSTOLIC BLOOD PRESSURE: 108 MMHG | WEIGHT: 157.2 LBS | OXYGEN SATURATION: 97 %

## 2024-03-15 VITALS
HEART RATE: 99 BPM | DIASTOLIC BLOOD PRESSURE: 74 MMHG | SYSTOLIC BLOOD PRESSURE: 119 MMHG | HEIGHT: 66 IN | TEMPERATURE: 97.3 F | RESPIRATION RATE: 18 BRPM | OXYGEN SATURATION: 97 % | WEIGHT: 157.63 LBS | BODY MASS INDEX: 25.33 KG/M2

## 2024-03-15 DIAGNOSIS — C79.31 METASTASIS TO BRAIN (HCC): ICD-10-CM

## 2024-03-15 DIAGNOSIS — Z79.899 ENCOUNTER FOR LONG-TERM CURRENT USE OF HIGH RISK MEDICATION: ICD-10-CM

## 2024-03-15 DIAGNOSIS — C61 PROSTATE CANCER (HCC): ICD-10-CM

## 2024-03-15 PROCEDURE — 99212 OFFICE O/P EST SF 10 MIN: CPT | Performed by: NURSE PRACTITIONER

## 2024-03-15 PROCEDURE — 700111 HCHG RX REV CODE 636 W/ 250 OVERRIDE (IP): Mod: UD | Performed by: STUDENT IN AN ORGANIZED HEALTH CARE EDUCATION/TRAINING PROGRAM

## 2024-03-15 PROCEDURE — 700105 HCHG RX REV CODE 258: Mod: UD | Performed by: STUDENT IN AN ORGANIZED HEALTH CARE EDUCATION/TRAINING PROGRAM

## 2024-03-15 PROCEDURE — 96413 CHEMO IV INFUSION 1 HR: CPT

## 2024-03-15 PROCEDURE — 96417 CHEMO IV INFUS EACH ADDL SEQ: CPT

## 2024-03-15 PROCEDURE — 99214 OFFICE O/P EST MOD 30 MIN: CPT | Performed by: NURSE PRACTITIONER

## 2024-03-15 PROCEDURE — 96375 TX/PRO/DX INJ NEW DRUG ADDON: CPT

## 2024-03-15 PROCEDURE — 304540 HCHG NITRO SET VENT 2ND TUB

## 2024-03-15 RX ORDER — DIPHENOXYLATE HYDROCHLORIDE AND ATROPINE SULFATE 2.5; .025 MG/1; MG/1
1 TABLET ORAL 4 TIMES DAILY PRN
COMMUNITY

## 2024-03-15 RX ORDER — DEXAMETHASONE SODIUM PHOSPHATE 4 MG/ML
8 INJECTION, SOLUTION INTRA-ARTICULAR; INTRALESIONAL; INTRAMUSCULAR; INTRAVENOUS; SOFT TISSUE ONCE
Status: COMPLETED | OUTPATIENT
Start: 2024-03-15 | End: 2024-03-15

## 2024-03-15 RX ADMIN — FAMOTIDINE 20 MG: 10 INJECTION, SOLUTION INTRAVENOUS at 10:36

## 2024-03-15 RX ADMIN — SODIUM CHLORIDE 36 MG: 0.9 INJECTION, SOLUTION INTRAVENOUS at 11:01

## 2024-03-15 RX ADMIN — DIPHENHYDRAMINE HYDROCHLORIDE 25 MG: 50 INJECTION, SOLUTION INTRAMUSCULAR; INTRAVENOUS at 10:42

## 2024-03-15 RX ADMIN — DEXAMETHASONE SODIUM PHOSPHATE 8 MG: 4 INJECTION INTRA-ARTICULAR; INTRALESIONAL; INTRAMUSCULAR; INTRAVENOUS; SOFT TISSUE at 10:38

## 2024-03-15 RX ADMIN — CARBOPLATIN 570 MG: 10 INJECTION, SOLUTION INTRAVENOUS at 12:14

## 2024-03-15 ASSESSMENT — ENCOUNTER SYMPTOMS
VOMITING: 0
TINGLING: 1
WHEEZING: 0
WEIGHT LOSS: 0
CONSTIPATION: 0
INSOMNIA: 1
FEVER: 0
NAUSEA: 0
DIARRHEA: 1
PALPITATIONS: 0
SHORTNESS OF BREATH: 0
DIZZINESS: 0
CHILLS: 0
HEADACHES: 0
COUGH: 0
MYALGIAS: 0

## 2024-03-15 ASSESSMENT — FIBROSIS 4 INDEX
FIB4 SCORE: 2.28
FIB4 SCORE: 2.28

## 2024-03-15 NOTE — PROGRESS NOTES
Chemotherapy Verification - PRIMARY RN      Height = 167 cm  Weight = 71.5 kg  BSA = 1.82 m^2       Medication: carboplatin (Paraplatin)  Dose: Target AUC = 4  Calculated Dose: 568.164 mg                             (In mg/m2, AUC, mg/kg)     Medication: cabazitaxel (Jevtana)  Dose: 20 mg/m^2  Calculated Dose: 36.4 mg                             (In mg/m2, AUC, mg/kg)    Carboplatin calculation (if applicable):  (4*(117.041+25)) = 568.164 mg    I confirm this process was performed independently with the BSA and all final chemotherapy dosing calculations congruent.  Any discrepancies of 10% or greater have been addressed with the chemotherapy pharmacist. The resolution of the discrepancy has been documented in the EPIC progress notes.

## 2024-03-15 NOTE — PROGRESS NOTES
Casper [facility] for Day 1/ Cycle 4 of Jevtana and carboplatin for prostate cancer. Pt denied having any new or acute complaints today, reports tolerating past treatments well. PIV started, had positive blood return and flushed briskly. Labs were drawn on 03/13/2024 and reviewed. Pt meets parameters for treatment today. Premeds given as ordered. Pt given Jevtana and carboplatin as prescribed, tolerated well, denied having any complaints during or after infusions. PIV discontinued, bleeding controlled with gauze and Coban. Pt has future appointments. Pt discharged to home in good condition with family.

## 2024-03-15 NOTE — PROGRESS NOTES
"Pharmacy Chemotherapy Calculations    Dx: Metastatic Prostate Cancer  Cycle 4  Previous treatment = C3 on 2/23/24 (C1 and C2 given at [facility])    Protocol: Cabazitaxel + CARBOplatin  CABAZItaxel 20 mg/m² IV over 60 minutes on Day 1   CARBOplatin AUC 4 IV over 30 minutes on Day 1   PredniSONE 5 mg PO twice daily on Days 1 - 21  21-day cycle until disease progression or unacceptable toxicity   NCCNGuidelines® for Prostate Cancer V.1.2023.   Andry PG , et al. Lancet Oncol. 2019;20(10):4361-9279.a    Allergies:  Patient has no known allergies.     /74   Pulse 99   Temp 36.3 °C (97.3 °F) (Temporal)   Resp 18   Ht 1.67 m (5' 5.75\")   Wt 71.5 kg (157 lb 10.1 oz)   SpO2 97%   BMI 25.64 kg/m²  Body surface area is 1.82 meters squared.    Labs 3/13/24  ANC~ 5780 Plt = 221k   Hgb = 10.1     SCr = 0.45 mg/dL CrCl ~ 117 mL/min (minimum SCr of 0.7)   LFT's = WNLs  TBili = 0.5     CABAZitaxel 20 mg/m² x 1.82 m² = 36.4 mg   <10% difference, OK to treat with final dose = 36 mg IV    CARBOplatin  AUC 4 (117 mL/min + 25) = 568 mg   <10% difference, OK to treat with final dose = 570 mg IV    Roshan Jean, PharmD    "

## 2024-03-15 NOTE — PROGRESS NOTES
Subjective     Casper Rowley is a 58 y.o. male who presents with Prostate Cancer (Viry/pre chemo)            HPI  Mr. Rowley presents for evaluation prior to cycle 4 carboplatin, cabazitaxel for treatment of stage IVb, cTxNx pM1c, prostate cancer.     Patient diagnosed with metastatic disease to bones in 6/2021 and was treated with androgen deprivation therapy (Firmagon, denosumab); enzalutamide, initiated 9/27/21.  He completed 3 treatment of radium-223 from March-May 2022 (stopped d/t decreased counts). Palliative radiation to L2 completed 5/6/22. PSA rising in 6/2022, patient discontinued enzalutamide and initiated docetaxel 8/29/2022. Lupron started 11/2022. Brain mets to cerebellum resected per Dr. Be 9/21/23; he completed cycle 11 docetaxel in approx. 9/2023; s/p XRT to adrenals and thoracic spine 12/2023. Patient has been evaluated at [facility] (Dr. Reardon) for Pluvicto therapy but his disease is not PSMA avid. He initiated cabazitaxel, carboplatin at [facility] on 12/21/23, completing 2 cycles with their office; transitioning care to our office and receiving cycle 3 on 2/23/24. Lupron continues per urology every 3 months.     Patient is engaging in his usual activity, a bit slower the week of treatment. Lomotil has helped best with diarrhea that occurs the week following treatment - one daily is sufficient. He notes mild rash at posterior aspect of BUE, likely treatment related, no pruritis.       Review of Systems   Constitutional:  Negative for chills, fever, malaise/fatigue (usual activity - slower the week of treatment) and weight loss (stable).   HENT:  Positive for tinnitus (L intermittent tinnitus since teenager - not worse).    Respiratory:  Negative for cough, shortness of breath and wheezing.    Cardiovascular:  Negative for chest pain, palpitations and leg swelling.   Gastrointestinal:  Positive for diarrhea (Lomotil in am has helped best (worst the week after treatment)). Negative for  constipation, nausea and vomiting (PRN Zofran - rarely).   Genitourinary:  Negative for dysuria.   Musculoskeletal:  Negative for joint pain and myalgias.   Skin:  Positive for rash (posterior RUE - random and itchy (clortimazole cream)).   Neurological:  Positive for tingling (bilateral feet - consistent with baseline (pre-treatment) h/o spinal sx). Negative for dizziness and headaches.   Psychiatric/Behavioral:  The patient has insomnia (consistent with baseline - ambien helps best).        No Known Allergies      Current Outpatient Medications on File Prior to Encounter   Medication Sig Dispense Refill    diphenoxylate-atropine (LOMOTIL) 2.5-0.025 MG Tab Take 1 Tablet by mouth 4 times a day as needed for Diarrhea.      HYDROcodone/acetaminophen (NORCO)  MG Tab Take 1 Tablet by mouth 4 times a day as needed for Moderate Pain for up to 14 days. 84 Tablet 0    zolpidem (AMBIEN) 10 MG Tab TAKE ONE TABLET BY MOUTH AT BEDTIME FOR 30 DAYS      clotrimazole-betamethasone (LOTRISONE) 1-0.05 % Cream Apply 1 Application topically as needed.      cyclobenzaprine (FLEXERIL) 10 mg Tab Take 10 mg by mouth at bedtime.      furosemide (LASIX) 20 MG Tab Take 1 Tablet by mouth as needed.      ondansetron (ZOFRAN ODT) 8 MG TABLET DISPERSIBLE Take 8 mg by mouth as needed.      prochlorperazine (COMPAZINE) 10 MG Tab Take 10 mg by mouth as needed.      tamsulosin (FLOMAX) 0.4 MG capsule Take 0.4 mg by mouth every day.      lisinopril (PRINIVIL) 20 MG Tab Take 1 tablet by mouth every day. 30 Tablet 2    metoprolol SR (TOPROL XL) 25 MG TABLET SR 24 HR Take 2 tablets by mouth every day. 60 Tablet 2    Canagliflozin (INVOKANA) 100 MG Tab Take 100 mg by mouth every day.      XGEVA 120 MG/1.7ML injection 120 mg Q30 DAYS.      Dulaglutide (TRULICITY) 1.5 MG/0.5ML Solution Pen-injector Inject 1.5 mg as instructed every Saturday.      hydrocod shreya-chlorphe shreya ER (TUSSIONEX) 10-8 MG/5ML Suspension Extended Release Take 5 mL by mouth as  "needed. (Patient not taking: Reported on 3/15/2024)      silver sulfADIAZINE (SILVADENE) 1 % Cream Apply  topically every day.      Cholecalciferol (VITAMIN D) 2000 UNITS CAPS Take 6,000 Units by mouth every day. 2000 units x 3 tablets = 6000 mcg (Patient not taking: Reported on 3/15/2024)       No current facility-administered medications on file prior to encounter.                Objective     /68 (BP Location: Left arm, Patient Position: Sitting, BP Cuff Size: Adult)   Pulse 93   Temp 36.8 °C (98.2 °F) (Temporal)   Resp 15   Ht 1.651 m (5' 5\")   Wt 71.3 kg (157 lb 3.2 oz)   SpO2 97%   BMI 26.16 kg/m²      Physical Exam  Vitals reviewed.   Constitutional:       General: He is not in acute distress.     Appearance: He is well-developed. He is not diaphoretic.   HENT:      Head: Normocephalic and atraumatic.   Eyes:      General: No scleral icterus.        Right eye: No discharge.         Left eye: No discharge.   Cardiovascular:      Rate and Rhythm: Normal rate and regular rhythm.      Heart sounds: Normal heart sounds. No murmur heard.     No friction rub. No gallop.   Pulmonary:      Effort: Pulmonary effort is normal. No respiratory distress.      Breath sounds: Normal breath sounds. No wheezing.   Abdominal:      General: There is no distension.      Palpations: Abdomen is soft.      Tenderness: There is no abdominal tenderness.   Musculoskeletal:         General: Normal range of motion.      Cervical back: Normal range of motion.   Skin:     General: Skin is warm and dry.      Coloration: Skin is not pale.      Findings: No erythema or rash.   Neurological:      Mental Status: He is alert and oriented to person, place, and time.   Psychiatric:         Behavior: Behavior normal.         No visits with results within 1 Day(s) from this visit.   Latest known visit with results is:   Hospital Outpatient Visit on 03/13/2024   Component Date Value Ref Range Status    Prostatic Specific Antigen Tot " 03/13/2024 1.43  0.00 - 4.00 ng/mL Final    Comment: Performed using Roche donnie e immunoassay analyzer. tPSA values determined  on patient samples by different testing procedures cannot be directly  compared with one another and could be the cause of erroneous medical  interpretations. If there is a change in the tPSA assay procedure used while  monitoring therapy, then new baselines may need to be established when  comparing previous results.      Sodium 03/13/2024 132 (L)  135 - 145 mmol/L Final    Potassium 03/13/2024 4.4  3.6 - 5.5 mmol/L Final    Chloride 03/13/2024 98  96 - 112 mmol/L Final    Co2 03/13/2024 21  20 - 33 mmol/L Final    Anion Gap 03/13/2024 13.0  7.0 - 16.0 Final    Glucose 03/13/2024 102 (H)  65 - 99 mg/dL Final    Bun 03/13/2024 14  8 - 22 mg/dL Final    Creatinine 03/13/2024 0.45 (L)  0.50 - 1.40 mg/dL Final    Calcium 03/13/2024 9.0  8.5 - 10.5 mg/dL Final    Correct Calcium 03/13/2024 8.8  8.5 - 10.5 mg/dL Final    AST(SGOT) 03/13/2024 25  12 - 45 U/L Final    ALT(SGPT) 03/13/2024 8  2 - 50 U/L Final    Alkaline Phosphatase 03/13/2024 97  30 - 99 U/L Final    Total Bilirubin 03/13/2024 0.5  0.1 - 1.5 mg/dL Final    Albumin 03/13/2024 4.2  3.2 - 4.9 g/dL Final    Total Protein 03/13/2024 7.1  6.0 - 8.2 g/dL Final    Globulin 03/13/2024 2.9  1.9 - 3.5 g/dL Final    A-G Ratio 03/13/2024 1.4  g/dL Final    WBC 03/13/2024 6.9  4.8 - 10.8 K/uL Final    RBC 03/13/2024 3.09 (L)  4.70 - 6.10 M/uL Final    Hemoglobin 03/13/2024 10.1 (L)  14.0 - 18.0 g/dL Final    Hematocrit 03/13/2024 30.9 (L)  42.0 - 52.0 % Final    MCV 03/13/2024 100.0 (H)  81.4 - 97.8 fL Final    MCH 03/13/2024 32.7  27.0 - 33.0 pg Final    MCHC 03/13/2024 32.7  32.3 - 36.5 g/dL Final    Please note new reference range effective 05/22/2023.    RDW 03/13/2024 75.9 (H)  35.9 - 50.0 fL Final    Platelet Count 03/13/2024 221  164 - 446 K/uL Final    MPV 03/13/2024 10.2  9.0 - 12.9 fL Final    Neutrophils-Polys 03/13/2024 84.20 (H)   44.00 - 72.00 % Final    Lymphocytes 03/13/2024 5.20 (L)  22.00 - 41.00 % Final    Monocytes 03/13/2024 9.60  0.00 - 13.40 % Final    Eosinophils 03/13/2024 0.00  0.00 - 6.90 % Final    Basophils 03/13/2024 0.30  0.00 - 1.80 % Final    Immature Granulocytes 03/13/2024 0.70  0.00 - 0.90 % Final    Nucleated RBC 03/13/2024 0.00  0.00 - 0.20 /100 WBC Final    Please note new reference range effective 05/22/2023.    Neutrophils (Absolute) 03/13/2024 5.78  1.82 - 7.42 K/uL Final    Comment: Includes immature neutrophils, if present.  Please note new reference range effective 05/22/2023.      Lymphs (Absolute) 03/13/2024 0.36 (L)  1.00 - 4.80 K/uL Final    Monos (Absolute) 03/13/2024 0.66  0.00 - 0.85 K/uL Final    Eos (Absolute) 03/13/2024 0.00  0.00 - 0.51 K/uL Final    Baso (Absolute) 03/13/2024 0.02  0.00 - 0.12 K/uL Final    Immature Granulocytes (abs) 03/13/2024 0.05  0.00 - 0.11 K/uL Final    NRBC (Absolute) 03/13/2024 0.00  K/uL Final    Anisocytosis 03/13/2024 1+   Final    Microcytosis 03/13/2024 1+   Final    Fasting Status 03/13/2024 Fasting   Final    Plt Estimation 03/13/2024 Normal   Final    RBC Morphology 03/13/2024 Present   Final    Peripheral Smear Review 03/13/2024 see below   Final    Comment: Due to instrument suspect flags, further review of peripheral smear is  indicated on this patient sample. This review may or may not result in  abnormal findings.      Comments-Diff 03/13/2024 see below   Final    Results have been verified by peripheral blood smear review.    GFR (CKD-EPI) 03/13/2024 122  >60 mL/min/1.73 m 2 Final    Comment: Estimated Glomerular Filtration Rate is calculated using  race neutral CKD-EPI 2021 equation per NKF-ASN recommendations.              Assessment & Plan     1. Prostate cancer (HCC)        2. Encounter for long-term current use of high risk medication          1.  Brain mets: s/p resection 9/21/23, surveillance continues. Per discussion with Dr. Baires, MRI only as  indicated.     2.  Bone support: Monthly Xgeva initiated 6/2021, continues per urology.     3.  Prostate cancer: Diagnosed 6/2021; s/p firmagon, enzalutamide 9/2021-8/2022; s/p radium-223 x 3 2-5/2022; s/p docetaxel 8/29/22- 9/2023; initiated q 90 d Lupron 11/2022 (per urology); s/p XRT to adrenals 12/2023; initiated cabazitaxel, carboplatin 12/21/23 [transitioned care to [facility] 2/2024, C3].     Lupron next due 5/2024     Patient continues tolerating treatment fairly well. CBC, CMP, PSA have been evaluated and found to be within acceptable limits.  Patient will proceed with cycle 4 of treatment and return in 3 weeks for evaluation prior to cycle 5, sooner as needed.      The patient verbalized agreement and understanding of current plan. All questions and concerns were addressed at time of visit.    Please note that this dictation was created using voice recognition software. I have made every reasonable attempt to correct obvious errors, but I expect that there are errors of grammar and possibly content that I did not discover before finalizing the note.

## 2024-03-15 NOTE — PROGRESS NOTES
Chemotherapy Verification - SECONDARY RN       Height = 1.67m  Weight = 71.5kg  BSA = 1.82m2       Medication: carboplatin  Dose: AUC 4  Calculated Dose: 568mg                             (In mg/m2, AUC, mg/kg)     Medication: cabazitaxel  Dose: 20mg/m2  Calculated Dose: 36.4mg                             (In mg/m2, AUC, mg/kg)      Carboplatin calculation (if applicable):  (4*(117+25)) = 568     I confirm that this process was performed independently.

## 2024-03-16 ENCOUNTER — APPOINTMENT (OUTPATIENT)
Dept: ONCOLOGY | Facility: MEDICAL CENTER | Age: 59
End: 2024-03-16
Attending: STUDENT IN AN ORGANIZED HEALTH CARE EDUCATION/TRAINING PROGRAM
Payer: MEDICAID

## 2024-03-17 ENCOUNTER — OUTPATIENT INFUSION SERVICES (OUTPATIENT)
Dept: ONCOLOGY | Facility: MEDICAL CENTER | Age: 59
End: 2024-03-17
Attending: STUDENT IN AN ORGANIZED HEALTH CARE EDUCATION/TRAINING PROGRAM
Payer: MEDICAID

## 2024-03-17 DIAGNOSIS — C61 PROSTATE CANCER (HCC): ICD-10-CM

## 2024-03-17 PROCEDURE — 96372 THER/PROPH/DIAG INJ SC/IM: CPT

## 2024-03-17 PROCEDURE — 700111 HCHG RX REV CODE 636 W/ 250 OVERRIDE (IP): Mod: JZ,UD | Performed by: STUDENT IN AN ORGANIZED HEALTH CARE EDUCATION/TRAINING PROGRAM

## 2024-03-17 RX ADMIN — PEGFILGRASTIM-CBQV 6 MG: 6 INJECTION, SOLUTION SUBCUTANEOUS at 13:26

## 2024-03-17 ASSESSMENT — ENCOUNTER SYMPTOMS
DIARRHEA: 1
SHORTNESS OF BREATH: 0
COUGH: 0
PALPITATIONS: 0
VOMITING: 0
NAUSEA: 0
BACK PAIN: 1
MYALGIAS: 1
DIARRHEA: 0
FEVER: 0
CONSTIPATION: 0
CHILLS: 0

## 2024-03-17 NOTE — PROGRESS NOTES
Subjective:     Chief Complaint   Patient presents with    Cancer      [facility]/ [facility] APPT     Casper Rowley is a 58 y.o. male here today for follow-up on symptom management for oncological pain.  Patient reports since last visit he has had no significant changes in his symptoms.  He continues to use Norco occasionally for pain.  He does not endorse any significant nausea or vomiting or constipation or diarrhea.  He is doing well with treatment.  He continues to use Ambien for insomnia.  We then discussed that since his symptoms are stable and we do not expect any significant change in the near future that we would leave our visits as needed and I would refill his Jay.    Problem   Cancer Related Pain        Current medicines (including changes today)  Current Outpatient Medications   Medication Sig Dispense Refill    HYDROcodone/acetaminophen (NORCO)  MG Tab Take 1 Tablet by mouth 4 times a day as needed for Moderate Pain for up to 14 days. 84 Tablet 0    zolpidem (AMBIEN) 10 MG Tab TAKE ONE TABLET BY MOUTH AT BEDTIME FOR 30 DAYS      clotrimazole-betamethasone (LOTRISONE) 1-0.05 % Cream Apply 1 Application topically as needed.      cyclobenzaprine (FLEXERIL) 10 mg Tab Take 10 mg by mouth at bedtime.      furosemide (LASIX) 20 MG Tab Take 1 Tablet by mouth as needed.      hydrocod shreya-chlorphe shreya ER (TUSSIONEX) 10-8 MG/5ML Suspension Extended Release Take 5 mL by mouth as needed. (Patient not taking: Reported on 3/15/2024)      ondansetron (ZOFRAN ODT) 8 MG TABLET DISPERSIBLE Take 8 mg by mouth as needed.      prochlorperazine (COMPAZINE) 10 MG Tab Take 10 mg by mouth as needed.      silver sulfADIAZINE (SILVADENE) 1 % Cream Apply  topically every day.      tamsulosin (FLOMAX) 0.4 MG capsule Take 0.4 mg by mouth every day.      lisinopril (PRINIVIL) 20 MG Tab Take 1 tablet by mouth every day. 30 Tablet 2    metoprolol SR (TOPROL XL) 25 MG TABLET SR 24 HR Take 2 tablets by mouth every day. 60  "Tablet 2    Canagliflozin (INVOKANA) 100 MG Tab Take 100 mg by mouth every day.      XGEVA 120 MG/1.7ML injection 120 mg Q30 DAYS.      Dulaglutide (TRULICITY) 1.5 MG/0.5ML Solution Pen-injector Inject 1.5 mg as instructed every Saturday.      Cholecalciferol (VITAMIN D) 2000 UNITS CAPS Take 6,000 Units by mouth every day. 2000 units x 3 tablets = 6000 mcg (Patient not taking: Reported on 3/15/2024)      diphenoxylate-atropine (LOMOTIL) 2.5-0.025 MG Tab Take 1 Tablet by mouth 4 times a day as needed for Diarrhea.       No current facility-administered medications for this encounter.       Social History     Tobacco Use    Smoking status: Never    Smokeless tobacco: Never   Vaping Use    Vaping Use: Never used   Substance Use Topics    Alcohol use: Yes     Comment: 2 per week    Drug use: No     Past Medical History:   Diagnosis Date    Arthritis 02/2019    osteo-hollie knees    Bronchitis 2019    Cancer (HCC)     Basal cell - top of head    Cancer (HCC)     Prostate    Cold 02/08/2019    Cold two weeks ago, denies productive cough, SOB    Diabetes     type 2    High cholesterol     HTN (hypertension), benign 08/14/2009    Hypertension     Infectious disease     Mumps age 6 yrs and Chickenpox age 41 yrs    Obstructive sleep apnea 02/2019    Uses cpap    Prostate cancer (HCC)       Family History   Problem Relation Age of Onset    Cancer Father         Bladder cancer    Genetic Disorder Neg Hx          Objective:     Vitals:    03/14/24 1013   BP: 122/72   Pulse: (!) 112   Resp: 12   Temp: 35.9 °C (96.6 °F)   TempSrc: Temporal   SpO2: 95%   Weight: 71.9 kg (158 lb 8.2 oz)   Height: 1.651 m (5' 5\")     Body mass index is 26.38 kg/m².     Review of Systems   Constitutional:  Negative for chills and fever.   Respiratory:  Negative for cough and shortness of breath.    Cardiovascular:  Negative for chest pain and palpitations.   Gastrointestinal:  Negative for constipation, diarrhea, nausea and vomiting.   Musculoskeletal:  " Positive for back pain and myalgias.       Physical Exam:   Gen: Well developed, well nourished in no acute distress.   Skin: Pink, warm, and dry  HEENT: conjunctiva non-injected, sclera non-icteric. EOMs intact.   Nasal mucosa without edema nor erythema.   Pinna normal.    Oral mucous membranes pink and moist with no lesions.  Neck: Supple, trachea midline. No adenopathy or masses in the neck or supraclavicular regions.  Lungs: Effort is normal.   CV: regular rate and rhythm  Abdomen: Flat  Ext: no edema, color normal, vascularity normal, temperature normal  Alert and oriented Eye contact is good, speech goal directed, affect calm    Assessment and Plan:   Cancer related pain  Patient with known metastatic prostate cancer.  Has chronic oncological pain secondary to his cancer.  Pain is currently well-controlled with occasional use of Norco 10/325.  Patient not requesting a change to symptom management today.  PDMP reviewed, risk and benefits of medication discussed, controlled subs agreement on file, refill sent to pharmacy.  Will follow-up as needed for refills.    25 minutes in total spent on patient encounter including chart review and consultation with patient's oncological providers.    Followup: Return if symptoms worsen or fail to improve.    Ronnie Fair M.D.

## 2024-03-17 NOTE — PROGRESS NOTES
Pt arrived to [facility], ambulatory, for Udenyca injection. Pt voices no complaints. Udenyca injected into the L-back arm with no complications. Pt left [facility] with no s/sx of distress. Follow up appointment confirmed.

## 2024-03-17 NOTE — ASSESSMENT & PLAN NOTE
Patient with known metastatic prostate cancer.  Has chronic oncological pain secondary to his cancer.  Pain is currently well-controlled with occasional use of Norco 10/325.  Patient not requesting a change to symptom management today.  PDMP reviewed, risk and benefits of medication discussed, controlled subs agreement on file, refill sent to pharmacy.  Will follow-up as needed for refills.

## 2024-03-18 NOTE — ADDENDUM NOTE
Encounter addended by: ROMEO Mora on: 3/17/2024 5:32 PM   Actions taken: SmartForm saved, Clinical Note Signed

## 2024-03-19 ENCOUNTER — TELEPHONE (OUTPATIENT)
Dept: HEMATOLOGY ONCOLOGY | Facility: MEDICAL CENTER | Age: 59
End: 2024-03-19
Payer: MEDICAID

## 2024-03-19 NOTE — TELEPHONE ENCOUNTER
Returned Pt call r/t hearth rate being 130s.  Discussed with Pt that he may be dehydrated.  Pt stated that his heart rate at this time is 97.  Reported that he tends to run high based on the trends in our system.  Pt reported that he has been fatigued in bed yesterday and today.  Educated that he is probably a bit dehydrated and to try to get additional hydration in.  Pt verbalized understanding of the POC.  Also advised for the Pt to seek attention if he starts having additional symptoms or his heart rate raises again and stays up.

## 2024-03-25 ENCOUNTER — HOSPITAL ENCOUNTER (OUTPATIENT)
Dept: RADIOLOGY | Facility: MEDICAL CENTER | Age: 59
End: 2024-03-25
Attending: RADIOLOGY
Payer: MEDICAID

## 2024-03-25 DIAGNOSIS — C79.70 MALIGNANT NEOPLASM METASTATIC TO ADRENAL GLAND, UNSPECIFIED LATERALITY (HCC): ICD-10-CM

## 2024-03-25 DIAGNOSIS — C79.51 SECONDARY MALIGNANT NEOPLASM OF BONE (HCC): ICD-10-CM

## 2024-03-25 PROCEDURE — 72158 MRI LUMBAR SPINE W/O & W/DYE: CPT

## 2024-03-25 PROCEDURE — 700117 HCHG RX CONTRAST REV CODE 255: Mod: UD | Performed by: RADIOLOGY

## 2024-03-25 PROCEDURE — A9579 GAD-BASE MR CONTRAST NOS,1ML: HCPCS | Mod: UD | Performed by: RADIOLOGY

## 2024-03-25 PROCEDURE — 72157 MRI CHEST SPINE W/O & W/DYE: CPT

## 2024-03-25 RX ADMIN — GADOTERIDOL 15 ML: 279.3 INJECTION, SOLUTION INTRAVENOUS at 14:33

## 2024-03-26 ENCOUNTER — TELEPHONE (OUTPATIENT)
Dept: HEMATOLOGY ONCOLOGY | Facility: MEDICAL CENTER | Age: 59
End: 2024-03-26
Payer: MEDICAID

## 2024-03-26 NOTE — TELEPHONE ENCOUNTER
Following review of MRI results with Dr. Baires, called pt and notified him that there is an area in the thoracic spine that radiation oncology may treat.  Informed him that his PSA is stable.  Pt states he follows up with Dr. Valadez on 3/27 at 2:45pm and will keep Medical Oncology updated.

## 2024-04-03 ENCOUNTER — APPOINTMENT (OUTPATIENT)
Dept: RADIOLOGY | Facility: MEDICAL CENTER | Age: 59
End: 2024-04-03
Attending: NEUROLOGICAL SURGERY
Payer: MEDICAID

## 2024-04-03 ENCOUNTER — APPOINTMENT (OUTPATIENT)
Dept: RADIOLOGY | Facility: MEDICAL CENTER | Age: 59
End: 2024-04-03
Attending: UROLOGY
Payer: MEDICAID

## 2024-04-03 ENCOUNTER — HOSPITAL ENCOUNTER (OUTPATIENT)
Dept: LAB | Facility: MEDICAL CENTER | Age: 59
End: 2024-04-03
Attending: STUDENT IN AN ORGANIZED HEALTH CARE EDUCATION/TRAINING PROGRAM
Payer: MEDICAID

## 2024-04-03 DIAGNOSIS — M54.16 LUMBAR RADICULOPATHY: ICD-10-CM

## 2024-04-03 DIAGNOSIS — C61 PROSTATE CANCER (HCC): ICD-10-CM

## 2024-04-03 DIAGNOSIS — M48.061 SPINAL STENOSIS, LUMBAR REGION, WITHOUT NEUROGENIC CLAUDICATION: ICD-10-CM

## 2024-04-03 DIAGNOSIS — C79.51 SECONDARY MALIGNANT NEOPLASM OF BONE (HCC): ICD-10-CM

## 2024-04-03 DIAGNOSIS — C79.31 METASTASIS TO BRAIN (HCC): ICD-10-CM

## 2024-04-03 LAB
ALBUMIN SERPL BCP-MCNC: 4 G/DL (ref 3.2–4.9)
ALBUMIN/GLOB SERPL: 1.7 G/DL
ALP SERPL-CCNC: 101 U/L (ref 30–99)
ALT SERPL-CCNC: 11 U/L (ref 2–50)
ANION GAP SERPL CALC-SCNC: 11 MMOL/L (ref 7–16)
ANISOCYTOSIS BLD QL SMEAR: ABNORMAL
AST SERPL-CCNC: 27 U/L (ref 12–45)
BASOPHILS # BLD AUTO: 0 % (ref 0–1.8)
BASOPHILS # BLD: 0 K/UL (ref 0–0.12)
BILIRUB SERPL-MCNC: 0.4 MG/DL (ref 0.1–1.5)
BUN SERPL-MCNC: 14 MG/DL (ref 8–22)
CALCIUM ALBUM COR SERPL-MCNC: 8.7 MG/DL (ref 8.5–10.5)
CALCIUM SERPL-MCNC: 8.7 MG/DL (ref 8.5–10.5)
CHLORIDE SERPL-SCNC: 101 MMOL/L (ref 96–112)
CO2 SERPL-SCNC: 20 MMOL/L (ref 20–33)
CREAT SERPL-MCNC: 0.38 MG/DL (ref 0.5–1.4)
EOSINOPHIL # BLD AUTO: 0.13 K/UL (ref 0–0.51)
EOSINOPHIL NFR BLD: 1.6 % (ref 0–6.9)
ERYTHROCYTE [DISTWIDTH] IN BLOOD BY AUTOMATED COUNT: 74.9 FL (ref 35.9–50)
GFR SERPLBLD CREATININE-BSD FMLA CKD-EPI: 129 ML/MIN/1.73 M 2
GLOBULIN SER CALC-MCNC: 2.4 G/DL (ref 1.9–3.5)
GLUCOSE SERPL-MCNC: 107 MG/DL (ref 65–99)
HCT VFR BLD AUTO: 26 % (ref 42–52)
HGB BLD-MCNC: 8.1 G/DL (ref 14–18)
LYMPHOCYTES # BLD AUTO: 0.4 K/UL (ref 1–4.8)
LYMPHOCYTES NFR BLD: 5 % (ref 22–41)
MANUAL DIFF BLD: NORMAL
MCH RBC QN AUTO: 32.7 PG (ref 27–33)
MCHC RBC AUTO-ENTMCNC: 31.2 G/DL (ref 32.3–36.5)
MCV RBC AUTO: 104.8 FL (ref 81.4–97.8)
MICROCYTES BLD QL SMEAR: ABNORMAL
MONOCYTES # BLD AUTO: 0.78 K/UL (ref 0–0.85)
MONOCYTES NFR BLD AUTO: 9.9 % (ref 0–13.4)
MORPHOLOGY BLD-IMP: NORMAL
NEUTROPHILS # BLD AUTO: 6.6 K/UL (ref 1.82–7.42)
NEUTROPHILS NFR BLD: 83.5 % (ref 44–72)
NRBC # BLD AUTO: 0 K/UL
NRBC BLD-RTO: 0 /100 WBC (ref 0–0.2)
PLATELET # BLD AUTO: 178 K/UL (ref 164–446)
PLATELET BLD QL SMEAR: NORMAL
PMV BLD AUTO: 10.7 FL (ref 9–12.9)
POTASSIUM SERPL-SCNC: 4.4 MMOL/L (ref 3.6–5.5)
PROT SERPL-MCNC: 6.4 G/DL (ref 6–8.2)
PSA SERPL-MCNC: 1.54 NG/ML (ref 0–4)
RBC # BLD AUTO: 2.48 M/UL (ref 4.7–6.1)
RBC BLD AUTO: PRESENT
SODIUM SERPL-SCNC: 132 MMOL/L (ref 135–145)
TOXIC GRANULES BLD QL SMEAR: NORMAL
WBC # BLD AUTO: 7.9 K/UL (ref 4.8–10.8)

## 2024-04-03 PROCEDURE — 80053 COMPREHEN METABOLIC PANEL: CPT

## 2024-04-03 PROCEDURE — 84153 ASSAY OF PSA TOTAL: CPT

## 2024-04-03 PROCEDURE — 85027 COMPLETE CBC AUTOMATED: CPT

## 2024-04-03 PROCEDURE — 700117 HCHG RX CONTRAST REV CODE 255: Performed by: UROLOGY

## 2024-04-03 PROCEDURE — 73223 MRI JOINT UPR EXTR W/O&W/DYE: CPT | Mod: LT

## 2024-04-03 PROCEDURE — 85007 BL SMEAR W/DIFF WBC COUNT: CPT

## 2024-04-03 PROCEDURE — A9579 GAD-BASE MR CONTRAST NOS,1ML: HCPCS | Performed by: UROLOGY

## 2024-04-03 PROCEDURE — 72100 X-RAY EXAM L-S SPINE 2/3 VWS: CPT

## 2024-04-03 PROCEDURE — 36415 COLL VENOUS BLD VENIPUNCTURE: CPT

## 2024-04-03 RX ADMIN — GADOTERIDOL 15 ML: 279.3 INJECTION, SOLUTION INTRAVENOUS at 18:44

## 2024-04-04 ENCOUNTER — HOSPITAL ENCOUNTER (OUTPATIENT)
Dept: HEMATOLOGY ONCOLOGY | Facility: MEDICAL CENTER | Age: 59
End: 2024-04-04
Attending: STUDENT IN AN ORGANIZED HEALTH CARE EDUCATION/TRAINING PROGRAM
Payer: MEDICAID

## 2024-04-04 ENCOUNTER — TELEPHONE (OUTPATIENT)
Dept: HEMATOLOGY ONCOLOGY | Facility: MEDICAL CENTER | Age: 59
End: 2024-04-04

## 2024-04-04 VITALS
BODY MASS INDEX: 25.52 KG/M2 | DIASTOLIC BLOOD PRESSURE: 68 MMHG | OXYGEN SATURATION: 97 % | RESPIRATION RATE: 14 BRPM | HEIGHT: 66 IN | HEART RATE: 90 BPM | TEMPERATURE: 97.8 F | WEIGHT: 158.8 LBS | SYSTOLIC BLOOD PRESSURE: 110 MMHG

## 2024-04-04 DIAGNOSIS — K52.1 CHEMOTHERAPY INDUCED DIARRHEA: ICD-10-CM

## 2024-04-04 DIAGNOSIS — T45.1X5A CHEMOTHERAPY INDUCED DIARRHEA: ICD-10-CM

## 2024-04-04 DIAGNOSIS — C61 PROSTATE CANCER (HCC): ICD-10-CM

## 2024-04-04 PROCEDURE — 99214 OFFICE O/P EST MOD 30 MIN: CPT | Performed by: STUDENT IN AN ORGANIZED HEALTH CARE EDUCATION/TRAINING PROGRAM

## 2024-04-04 PROCEDURE — 99212 OFFICE O/P EST SF 10 MIN: CPT | Performed by: STUDENT IN AN ORGANIZED HEALTH CARE EDUCATION/TRAINING PROGRAM

## 2024-04-04 RX ORDER — 0.9 % SODIUM CHLORIDE 0.9 %
10 VIAL (ML) INJECTION PRN
Status: CANCELLED | OUTPATIENT
Start: 2024-04-04

## 2024-04-04 RX ORDER — METHYLPREDNISOLONE SODIUM SUCCINATE 125 MG/2ML
125 INJECTION, POWDER, LYOPHILIZED, FOR SOLUTION INTRAMUSCULAR; INTRAVENOUS PRN
Status: CANCELLED | OUTPATIENT
Start: 2024-04-05

## 2024-04-04 RX ORDER — ONDANSETRON 8 MG/1
8 TABLET, ORALLY DISINTEGRATING ORAL EVERY 4 HOURS PRN
Qty: 180 TABLET | Refills: 1 | Status: SHIPPED | OUTPATIENT
Start: 2024-04-04

## 2024-04-04 RX ORDER — ONDANSETRON 8 MG/1
8 TABLET, ORALLY DISINTEGRATING ORAL PRN
Status: CANCELLED | OUTPATIENT
Start: 2024-04-05

## 2024-04-04 RX ORDER — 0.9 % SODIUM CHLORIDE 0.9 %
VIAL (ML) INJECTION PRN
Status: CANCELLED | OUTPATIENT
Start: 2024-04-04

## 2024-04-04 RX ORDER — DEXAMETHASONE SODIUM PHOSPHATE 4 MG/ML
8 INJECTION, SOLUTION INTRA-ARTICULAR; INTRALESIONAL; INTRAMUSCULAR; INTRAVENOUS; SOFT TISSUE ONCE
Status: CANCELLED | OUTPATIENT
Start: 2024-04-05 | End: 2024-04-05

## 2024-04-04 RX ORDER — SODIUM CHLORIDE 9 MG/ML
INJECTION, SOLUTION INTRAVENOUS CONTINUOUS
Status: CANCELLED | OUTPATIENT
Start: 2024-04-05

## 2024-04-04 RX ORDER — 0.9 % SODIUM CHLORIDE 0.9 %
3 VIAL (ML) INJECTION PRN
Status: CANCELLED | OUTPATIENT
Start: 2024-04-05

## 2024-04-04 RX ORDER — 0.9 % SODIUM CHLORIDE 0.9 %
10 VIAL (ML) INJECTION PRN
Status: CANCELLED | OUTPATIENT
Start: 2024-04-05

## 2024-04-04 RX ORDER — 0.9 % SODIUM CHLORIDE 0.9 %
VIAL (ML) INJECTION PRN
Status: CANCELLED | OUTPATIENT
Start: 2024-04-05

## 2024-04-04 RX ORDER — ONDANSETRON 2 MG/ML
4 INJECTION INTRAMUSCULAR; INTRAVENOUS PRN
Status: CANCELLED | OUTPATIENT
Start: 2024-04-05

## 2024-04-04 RX ORDER — PROCHLORPERAZINE MALEATE 10 MG
10 TABLET ORAL EVERY 6 HOURS PRN
Status: CANCELLED | OUTPATIENT
Start: 2024-04-05

## 2024-04-04 RX ORDER — DIPHENOXYLATE HYDROCHLORIDE AND ATROPINE SULFATE 2.5; .025 MG/1; MG/1
1 TABLET ORAL 4 TIMES DAILY PRN
Qty: 120 TABLET | Refills: 0 | Status: SHIPPED | OUTPATIENT
Start: 2024-04-04 | End: 2024-05-04

## 2024-04-04 RX ORDER — EPINEPHRINE 1 MG/ML(1)
0.5 AMPUL (ML) INJECTION PRN
Status: CANCELLED | OUTPATIENT
Start: 2024-04-05

## 2024-04-04 RX ORDER — 0.9 % SODIUM CHLORIDE 0.9 %
3 VIAL (ML) INJECTION PRN
Status: CANCELLED | OUTPATIENT
Start: 2024-04-04

## 2024-04-04 RX ORDER — ONDANSETRON 8 MG/1
8 TABLET, ORALLY DISINTEGRATING ORAL PRN
Qty: 15 TABLET | Refills: 2 | Status: SHIPPED | OUTPATIENT
Start: 2024-04-04 | End: 2024-04-04 | Stop reason: SDUPTHER

## 2024-04-04 RX ORDER — DIPHENHYDRAMINE HYDROCHLORIDE 50 MG/ML
50 INJECTION INTRAMUSCULAR; INTRAVENOUS PRN
Status: CANCELLED | OUTPATIENT
Start: 2024-04-05

## 2024-04-04 ASSESSMENT — ENCOUNTER SYMPTOMS
INSOMNIA: 0
FEVER: 0
VOMITING: 0
DIZZINESS: 0
WEAKNESS: 0
DOUBLE VISION: 0
BLOOD IN STOOL: 0
SINUS PAIN: 0
NERVOUS/ANXIOUS: 0
SORE THROAT: 0
COUGH: 0
CHILLS: 0
DIAPHORESIS: 0
ABDOMINAL PAIN: 0
WHEEZING: 0
BACK PAIN: 1
SPUTUM PRODUCTION: 0
PALPITATIONS: 0
MYALGIAS: 1
HEADACHES: 0
DIARRHEA: 0
SHORTNESS OF BREATH: 0
CONSTIPATION: 0
TINGLING: 0
NAUSEA: 0
ORTHOPNEA: 0
BRUISES/BLEEDS EASILY: 0
BLURRED VISION: 0
HEARTBURN: 0
WEIGHT LOSS: 0

## 2024-04-04 ASSESSMENT — PATIENT HEALTH QUESTIONNAIRE - PHQ9: CLINICAL INTERPRETATION OF PHQ2 SCORE: 0

## 2024-04-04 ASSESSMENT — FIBROSIS 4 INDEX: FIB4 SCORE: 2.61

## 2024-04-04 NOTE — TELEPHONE ENCOUNTER
Returned Pts call.  Pt questions if he needs a blood transfusion.  Educated that we do not normally give a transfusion until the HGB 7 or below and since he is currently at 8.2 and not symptomatic so we would not recommend a transfusion.  Pt verbalized understanding.  Pt reported he had an MRI of his shoulder and wants Dr Baires to read it.  Notified that I will let Dr Baires know to read the final report.

## 2024-04-05 ENCOUNTER — OUTPATIENT INFUSION SERVICES (OUTPATIENT)
Dept: ONCOLOGY | Facility: MEDICAL CENTER | Age: 59
End: 2024-04-05
Attending: STUDENT IN AN ORGANIZED HEALTH CARE EDUCATION/TRAINING PROGRAM
Payer: MEDICAID

## 2024-04-05 VITALS
SYSTOLIC BLOOD PRESSURE: 129 MMHG | OXYGEN SATURATION: 97 % | DIASTOLIC BLOOD PRESSURE: 79 MMHG | HEIGHT: 66 IN | RESPIRATION RATE: 18 BRPM | TEMPERATURE: 97.4 F | HEART RATE: 100 BPM | WEIGHT: 162.48 LBS | BODY MASS INDEX: 26.11 KG/M2

## 2024-04-05 DIAGNOSIS — C61 PROSTATE CANCER (HCC): ICD-10-CM

## 2024-04-05 PROCEDURE — 304540 HCHG NITRO SET VENT 2ND TUB

## 2024-04-05 PROCEDURE — 96375 TX/PRO/DX INJ NEW DRUG ADDON: CPT

## 2024-04-05 PROCEDURE — 700105 HCHG RX REV CODE 258: Mod: UD | Performed by: STUDENT IN AN ORGANIZED HEALTH CARE EDUCATION/TRAINING PROGRAM

## 2024-04-05 PROCEDURE — 700111 HCHG RX REV CODE 636 W/ 250 OVERRIDE (IP): Mod: UD | Performed by: STUDENT IN AN ORGANIZED HEALTH CARE EDUCATION/TRAINING PROGRAM

## 2024-04-05 PROCEDURE — 96417 CHEMO IV INFUS EACH ADDL SEQ: CPT

## 2024-04-05 PROCEDURE — 96413 CHEMO IV INFUSION 1 HR: CPT

## 2024-04-05 RX ORDER — DEXAMETHASONE SODIUM PHOSPHATE 4 MG/ML
8 INJECTION, SOLUTION INTRA-ARTICULAR; INTRALESIONAL; INTRAMUSCULAR; INTRAVENOUS; SOFT TISSUE ONCE
Status: COMPLETED | OUTPATIENT
Start: 2024-04-05 | End: 2024-04-05

## 2024-04-05 RX ADMIN — DEXAMETHASONE SODIUM PHOSPHATE 8 MG: 4 INJECTION INTRA-ARTICULAR; INTRALESIONAL; INTRAMUSCULAR; INTRAVENOUS; SOFT TISSUE at 11:08

## 2024-04-05 RX ADMIN — FAMOTIDINE 20 MG: 10 INJECTION, SOLUTION INTRAVENOUS at 11:04

## 2024-04-05 RX ADMIN — DIPHENHYDRAMINE HYDROCHLORIDE 25 MG: 50 INJECTION, SOLUTION INTRAMUSCULAR; INTRAVENOUS at 11:16

## 2024-04-05 RX ADMIN — SODIUM CHLORIDE 36 MG: 0.9 INJECTION, SOLUTION INTRAVENOUS at 12:05

## 2024-04-05 RX ADMIN — CARBOPLATIN 580 MG: 10 INJECTION, SOLUTION INTRAVENOUS at 13:09

## 2024-04-05 ASSESSMENT — FIBROSIS 4 INDEX: FIB4 SCORE: 2.61

## 2024-04-05 NOTE — PROGRESS NOTES
Pt presented to infusion for C5 Jevtana/Carboplatin. He had his labs drawn 4/3, met parameters. Denied any infections. Hemoglobin=8.1, pt had discussed this with MD office. Advised pt in a week he may want to have CBC checked and possibly get scheduled if he has dropped below 8. Offered to get pt scheduled in advance for possible blood appt, but he declined, will see what labs are and call office if he becomes symptomatic.   PIV started, brisk blood return observed. Pre-meds given. Chemo infused without issue. PIV flushed and removed. Pt has next appt, left on foot to self care.

## 2024-04-05 NOTE — PROGRESS NOTES
Chemotherapy Verification - SECONDARY RN       Height = 167 cm  Weight = 73.7 kg  BSA = 1.85 m2       Medication: Carboplatin  Dose: AUC = 4  Calculated Dose: 585.5 mg                             (In mg/m2, AUC, mg/kg)     Medication: Jevtana  Dose: 20 mg/m2  Calculated Dose: 37 mg                             (In mg/m2, AUC, mg/kg)    Carboplatin calculation (if applicable):  (((140-57)*[id number])*(0.7+(2*0.15))/(0.7*72)+25)*4*((2=1)+(2=2)) = 585.483 mg    I confirm that this process was performed independently.

## 2024-04-05 NOTE — PROGRESS NOTES
Chemotherapy Verification - PRIMARY RN      Height = 167 cm  Weight = 73.7 kg  BSA = 1.85 m^2       Medication: Cabazitaxel  Dose: 20 mg/m^2  Calculated Dose: 37 mg                             (In mg/m2, AUC, mg/kg)     Medication: Carboplatin  Dose: AUC=4  Calculated Dose: 585.5 mg                            (In mg/m2, AUC, mg/kg)    Carboplatin calculation (if applicable):  (((140-57)*[id number])*(0.7+(2*0.15))/(0.7*72)+25)*4*((2=1)+(2=2)) = 585.483       I confirm this process was performed independently with the BSA and all final chemotherapy dosing calculations congruent.  Any discrepancies of 10% or greater have been addressed with the chemotherapy pharmacist. The resolution of the discrepancy has been documented in the EPIC progress notes.

## 2024-04-05 NOTE — PROGRESS NOTES
"Pharmacy Chemotherapy Calculations    Dx: Metastatic Prostate Cancer  Cycle 5  Previous treatment = C4 on 3/15/24 (C1 and C2 given at [facility])    Protocol: Cabazitaxel + CARBOplatin  CABAZItaxel 20 mg/m² IV over 60 minutes on Day 1   CARBOplatin AUC 4 IV over 30 minutes on Day 1   PredniSONE 5 mg PO twice daily on Days 1 - 21  21-day cycle until disease progression or unacceptable toxicity   NCCNGuidelines® for Prostate Cancer V.1.2023.   Andry PG , et al. Lancet Oncol. 2019;20(10):9821-3029.a    Allergies:  Patient has no known allergies.     /79   Pulse 100   Temp 36.3 °C (97.4 °F) (Temporal)   Resp 18   Ht 1.67 m (5' 5.75\")   Wt 73.7 kg (162 lb 7.7 oz)   SpO2 97%   BMI 26.43 kg/m²  Body surface area is 1.85 meters squared.    Labs 4/3/24  ANC~ 6600 Plt = 178k   Hgb = 8.1     SCr = 0.38 mg/dL CrCl ~ 120.6 mL/min (minimum SCr of 0.7)   LFT's = 27/11/101 TBili = 0.4     CABAZitaxel 20 mg/m² x 1.85 m² = 37 mg   <10% difference, OK to treat with final dose = 36 mg IV    CARBOplatin  AUC 4 (120.6 mL/min + 25) = 582.4 mg   <10% difference, OK to treat with final dose = 580 mg IV    Roshan Jean, PharmD    "

## 2024-04-05 NOTE — PROGRESS NOTES
Follow Up Note:  Hematology/Oncology      Primary Care:  KOKI Fagan    Diagnosis: Metastatic castrate-resistant prostate adenocarcinoma    Chief Complaint: On-treatment visit    Current Treatment: Carboplatin with cabazitaxel, leuprolide    Prior Treatment: Firmagon, enzalutamide, radium 223, docetaxel, leuprolide, resection of brain mets, palliative XRT    Oncology History of Presenting Illness:  Casper Rowley is a 58 y.o.  man who presents to the clinic for transfer of care for ongoing management of metastatic castrate resistant prostate adenocarcinoma.  He was originally diagnosed in 2021 and was metastatic at that time with bone metastases, and was treated with engine deprivation therapy as well as enzalutamide.  He got radium 223 and 2022 and was then treated with docetaxel for 11 cycles, as well as getting radiation therapy to thoracic spinal metastases as well as his adrenals.  His oncology history is summarized below:     Oncologic History:  6/21 Obstructive sxs with abnl DAYRON PSA 7  6/21 Presented with LBP worsening leg pain prompting spine MR, myeloma STRICKLAND negative, PSA 20   7/13/21 L2 Bone biopsy: prostate cancer  7/26/21 TRUSBP: [place] 5+5, 1/12 core 5%, 4+4=5/12 cores 80-95%  7/28/21 Firmagon 240mg  9/04/21 PSA: 1.4 Alk Phos 681  9/8/21 Denosumab started  9/27/21 Enzalutamide started  11/11/21 PSA 1.05 chapito  3/15/22 PSA 1.8  3-5/25/22 Valentine-223 x 3, paused due to decreased counts  5/6/22 RT to L2  6/10/22 PSA 4.07  8/19/22 PSA 1.73  8/29/22 Started Docetaxel with Dr. Butts and discontinued enzalutamide.   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 Be.  10/20/23 PSA 0.76  12/2023 Receiving RT to  his adrenals and lower thoracic spine.      He was started on carboplatin and cabazitaxel, and was evaluated for PSMA Pluvicto therapy, but his disease is not PSMA avid and therefore he has been on chemotherapy, which he has been doing okay with. However, he decided he wanted to switch physicians due to his oncologist frankly telling him about the aggressive nature of his disease and the poor prognosis associated with it. He was subsequently referred to me.     Treatment History: See HPI  02/23/24: C3 carbo/cabazitaxel (first two cycles given at [facility])  03/15/24: C4 carbo/cabazitaxel  04/05/24: C5 carbo/cabazitaxel scheduled    Interval History:  Patient is here for follow up visit. He is feeling okay for the most part but has to deal with the back pain and shoulder pain. He otherwise is tolerating treatment well. He is established with radiation oncology as well, and they are planning on radiating the areas in the thoracic and lumbar spine.     Allergies as of 04/04/2024    (No Known Allergies)         Current Outpatient Medications:     diphenoxylate-atropine (LOMOTIL) 2.5-0.025 MG Tab, Take 1 Tablet by mouth 4 times a day as needed for Diarrhea for up to 30 days., Disp: 120 Tablet, Rfl: 0    ondansetron (ZOFRAN ODT) 8 MG TABLET DISPERSIBLE, Take 1 Tablet by mouth every four hours as needed for Nausea., Disp: 180 Tablet, Rfl: 1    zolpidem (AMBIEN) 10 MG Tab, TAKE ONE TABLET BY MOUTH AT BEDTIME FOR 30 DAYS, Disp: , Rfl:     clotrimazole-betamethasone (LOTRISONE) 1-0.05 % Cream, Apply 1 Application topically as needed., Disp: , Rfl:     cyclobenzaprine (FLEXERIL) 10 mg Tab, Take 10 mg by mouth at bedtime., Disp: , Rfl:     furosemide (LASIX) 20 MG Tab, Take 1 Tablet by mouth as needed., Disp: , Rfl:     prochlorperazine (COMPAZINE) 10 MG Tab, Take 10 mg by mouth as needed., Disp: , Rfl:     silver sulfADIAZINE (SILVADENE) 1 % Cream, Apply  topically every day., Disp: , Rfl:     tamsulosin (FLOMAX) 0.4 MG capsule,  Take 0.4 mg by mouth every day., Disp: , Rfl:     lisinopril (PRINIVIL) 20 MG Tab, Take 1 tablet by mouth every day., Disp: 30 Tablet, Rfl: 2    metoprolol SR (TOPROL XL) 25 MG TABLET SR 24 HR, Take 2 tablets by mouth every day., Disp: 60 Tablet, Rfl: 2    Canagliflozin (INVOKANA) 100 MG Tab, Take 100 mg by mouth every day., Disp: , Rfl:     Dulaglutide (TRULICITY) 1.5 MG/0.5ML Solution Pen-injector, Inject 1.5 mg as instructed every Saturday., Disp: , Rfl:     hydrocod shreya-chlorphe shreya ER (TUSSIONEX) 10-8 MG/5ML Suspension Extended Release, Take 5 mL by mouth as needed. (Patient not taking: Reported on 3/15/2024), Disp: , Rfl:     XGEVA 120 MG/1.7ML injection, 120 mg Q30 DAYS., Disp: , Rfl:     Cholecalciferol (VITAMIN D) 2000 UNITS CAPS, Take 6,000 Units by mouth every day. 2000 units x 3 tablets = 6000 mcg (Patient not taking: Reported on 3/15/2024), Disp: , Rfl:       Review of Systems:  Review of Systems   Constitutional:  Positive for malaise/fatigue. Negative for chills, diaphoresis, fever and weight loss.   HENT:  Negative for hearing loss, nosebleeds, sinus pain and sore throat.    Eyes:  Negative for blurred vision and double vision.   Respiratory:  Negative for cough, sputum production, shortness of breath and wheezing.    Cardiovascular:  Negative for chest pain, palpitations, orthopnea and leg swelling.   Gastrointestinal:  Negative for abdominal pain, blood in stool, constipation, diarrhea, heartburn, melena, nausea and vomiting.   Genitourinary:  Negative for dysuria, frequency, hematuria and urgency.   Musculoskeletal:  Positive for back pain and myalgias. Negative for joint pain.   Skin:  Negative for rash.   Neurological:  Negative for dizziness, tingling, weakness and headaches.   Endo/Heme/Allergies:  Does not bruise/bleed easily.   Psychiatric/Behavioral:  The patient is not nervous/anxious and does not have insomnia.          Physical Exam:  Vitals:    04/04/24 1026   BP: 110/68   BP  "Location: Right arm   Patient Position: Sitting   BP Cuff Size: Adult   Pulse: 90   Resp: 14   Temp: 36.6 °C (97.8 °F)   TempSrc: Temporal   SpO2: 97%   Weight: 72 kg (158 lb 12.8 oz)   Height: 1.67 m (5' 5.75\")       DESC; KARNOFSKY SCALE WITH ECOG EQUIVALENT: 100, Fully active, able to carry on all pre-disease performed without restriction (ECOG equivalent 0)    DISTRESS LEVEL: no apparent distress    Physical Exam      Labs:  Hospital Outpatient Visit on 04/03/2024   Component Date Value Ref Range Status    Prostatic Specific Antigen Tot 04/03/2024 1.54  0.00 - 4.00 ng/mL Final    Comment: Performed using Roche donnie e immunoassay analyzer. tPSA values determined  on patient samples by different testing procedures cannot be directly  compared with one another and could be the cause of erroneous medical  interpretations. If there is a change in the tPSA assay procedure used while  monitoring therapy, then new baselines may need to be established when  comparing previous results.      Sodium 04/03/2024 132 (L)  135 - 145 mmol/L Final    Potassium 04/03/2024 4.4  3.6 - 5.5 mmol/L Final    Chloride 04/03/2024 101  96 - 112 mmol/L Final    Co2 04/03/2024 20  20 - 33 mmol/L Final    Anion Gap 04/03/2024 11.0  7.0 - 16.0 Final    Glucose 04/03/2024 107 (H)  65 - 99 mg/dL Final    Bun 04/03/2024 14  8 - 22 mg/dL Final    Creatinine 04/03/2024 0.38 (L)  0.50 - 1.40 mg/dL Final    Calcium 04/03/2024 8.7  8.5 - 10.5 mg/dL Final    Correct Calcium 04/03/2024 8.7  8.5 - 10.5 mg/dL Final    AST(SGOT) 04/03/2024 27  12 - 45 U/L Final    ALT(SGPT) 04/03/2024 11  2 - 50 U/L Final    Alkaline Phosphatase 04/03/2024 101 (H)  30 - 99 U/L Final    Total Bilirubin 04/03/2024 0.4  0.1 - 1.5 mg/dL Final    Albumin 04/03/2024 4.0  3.2 - 4.9 g/dL Final    Total Protein 04/03/2024 6.4  6.0 - 8.2 g/dL Final    Globulin 04/03/2024 2.4  1.9 - 3.5 g/dL Final    A-G Ratio 04/03/2024 1.7  g/dL Final    WBC 04/03/2024 7.9  4.8 - 10.8 K/uL Final "    RBC 04/03/2024 2.48 (L)  4.70 - 6.10 M/uL Final    Hemoglobin 04/03/2024 8.1 (L)  14.0 - 18.0 g/dL Final    Hematocrit 04/03/2024 26.0 (L)  42.0 - 52.0 % Final    MCV 04/03/2024 104.8 (H)  81.4 - 97.8 fL Final    MCH 04/03/2024 32.7  27.0 - 33.0 pg Final    MCHC 04/03/2024 31.2 (L)  32.3 - 36.5 g/dL Final    Please note new reference range effective 05/22/2023.    RDW 04/03/2024 74.9 (H)  35.9 - 50.0 fL Final    Platelet Count 04/03/2024 178  164 - 446 K/uL Final    MPV 04/03/2024 10.7  9.0 - 12.9 fL Final    Neutrophils-Polys 04/03/2024 83.50 (H)  44.00 - 72.00 % Final    Lymphocytes 04/03/2024 5.00 (L)  22.00 - 41.00 % Final    Monocytes 04/03/2024 9.90  0.00 - 13.40 % Final    Eosinophils 04/03/2024 1.60  0.00 - 6.90 % Final    Basophils 04/03/2024 0.00  0.00 - 1.80 % Final    Nucleated RBC 04/03/2024 0.00  0.00 - 0.20 /100 WBC Final    Please note new reference range effective 05/22/2023.    Neutrophils (Absolute) 04/03/2024 6.60  1.82 - 7.42 K/uL Final    Comment: Includes immature neutrophils, if present.  Please note new reference range effective 05/22/2023.      Lymphs (Absolute) 04/03/2024 0.40 (L)  1.00 - 4.80 K/uL Final    Monos (Absolute) 04/03/2024 0.78  0.00 - 0.85 K/uL Final    Eos (Absolute) 04/03/2024 0.13  0.00 - 0.51 K/uL Final    Baso (Absolute) 04/03/2024 0.00  0.00 - 0.12 K/uL Final    NRBC (Absolute) 04/03/2024 0.00  K/uL Final    Anisocytosis 04/03/2024 1+   Final    Microcytosis 04/03/2024 2+ (A)   Final    Manual Diff Status 04/03/2024 PERFORMED   Final    Peripheral Smear Review 04/03/2024 see below   Final    Comment: Due to instrument suspect flags, further review of peripheral smear is  indicated on this patient sample. This review may or may not result in  abnormal findings.      Plt Estimation 04/03/2024 Normal   Final    RBC Morphology 04/03/2024 Present   Final    Toxic Gran 04/03/2024 Moderate   Final    GFR (CKD-EPI) 04/03/2024 129  >60 mL/min/1.73 m 2 Final    Comment:  Estimated Glomerular Filtration Rate is calculated using  race neutral CKD-EPI 2021 equation per NKF-ASN recommendations.         Imaging:     All listed images below have been independently reviewed by me. I agree with the findings as summarized below:    MR-SHOULDER-WITH & W/O LEFT    Result Date: 4/4/2024  4/3/2024 5:50 PM HISTORY/REASON FOR EXAM:  Other; Image Left Shoulder Metastatic disease, shoulder pain, frozen shoulder, history of prior surgery. TECHNIQUE/EXAM DESCRIPTION: MRI of the LEFT shoulder without and with contrast. The study was performed on a [employer]Leaf Signa 1.5 Suha MRI scanner. Sagittal, axial, coronal T1 and T2 as well as postcontrast coronal and axial T1-weighted images were obtained. 15 mL ProHance contrast was administered intravenously. COMPARISON:  None. FINDINGS: There is diffuse marrow heterogeneity and signal abnormality consistent with the history of diffuse osseous metastatic disease. No fracture is evident. There is no os acromiale or acromioclavicular separation. There is mild acromioclavicular osteoarthrosis. Acromion is mildly laterally downward sloping. There is a suture anchor in the anterior aspect of the humeral head with susceptibility artifact. There is a full-thickness tear of the distal subscapularis tendon. There is associated fatty atrophy of the subscapularis muscle. The supraspinatus, infraspinatus, and teres minor tendons are intact. The long head of biceps tendon is not visualized proximally. This may be secondary to rupture, tenotomy, or tenodesis. There is intermediate T2-weighted signal within the superior labrum. This is consistent with degeneration and possible degenerative tearing. There is no significant joint effusion or bursal fluid collection. There is lobulated signal abnormality surrounding the proximal humeral diametaphysis extending superiorly into the subacromial-subdeltoid bursa laterally. This demonstrates similar signal  intensity to skeletal muscle on  the T1-weighted sequence with mild T2 hyperintensity and mild enhancement. This likely represents tumor extension.     1. There is extensive osseous heterogeneity consistent with metastatic disease. Soft tissue signal abnormality surrounding the proximal humerus likely represents soft tissue tumor extension. 2. Full-thickness tear of the subscapularis tendon and either rupture or postsurgical change of the long head of biceps tendon. 3. Degeneration and possible degenerative tearing of the glenoid labrum. 4. Acromioclavicular osteoarthrosis.    DX-LUMBAR SPINE-2 OR 3 VIEWS    Result Date: 4/3/2024  4/3/2024 11:19 AM HISTORY/REASON FOR EXAM:  Chronic atraumatic low back pain radiating into both hips. History of metastatic prostate cancer. TECHNIQUE/ EXAM DESCRIPTION AND NUMBER OF VIEWS:  3 views of the lumbar spine. COMPARISON: MRI lumbar spine 3/25/2024 FINDINGS: The alignment demonstrates a dextroconvex curvature. There is a trace of degenerative L4-5 anterolisthesis and L2-3 and L5-S1 retrolisthesis. There is posterior pedicle screw fixation from the L1-L3 levels with hardware grossly intact. Again there is superior endplate loss of height at the L3 vertebral body level. The L1 and L2 levels are somewhat obscured. There is diffuse sclerotic abnormalities throughout the spine and visualized pelvis consistent with multifocal prostate metastasis. There is degenerative disc space narrowing at all levels with bilateral arthropathy.     1.  There is a dextroconvex curvature of the spine with multilevel minimal degenerative anterolisthesis and retrolisthesis as noted above. 2.  There is multifocal sclerotic bone metastases. 3.  There is posterior pedicle screw fixation L1-L3 levels. 4.  Superior endplate loss of height at the L3 vertebral body level is similar to the recent MR with the upper levels not well visualized on the lateral view due to the scoliosis. 5.  Multilevel degenerative arthropathy and disc  disease.    MR-THORACIC SPINE-WITH & W/O    Result Date: 3/25/2024  3/25/2024 12:55 PM HISTORY/REASON FOR EXAM:  Metastatic prostate cancer. Multiple osseous and epidural metastases. TECHNIQUE/EXAM DESCRIPTION: MRI of the thoracic spine with contrast. The study was performed on a Rj 1.5 Suha MRI scanner. T1 sagittal, T2 sagittal, and T2 axial images were obtained of the thoracic spine pre-contrast followed by T1 fat-suppressed sagittal and T1 axial images post intravenous administration of 15 mL  ProHance. COMPARISON:  9/20/2023 FINDINGS: There is again seen diffuse abnormal marrow signal involving the entire thoracic spine with diffuse patchy abnormal enhancement following contrast administration. There is scoliotic deformity. There is mild chronic superior endplate compression fracture at T12. There is again seen multilevel decreased disc height and multilevel endplate spurring. There is again seen abnormal epidural enhancement which extends from T7 through T10 with effacement of the thecal sac at those levels. This is more prominent on the left compared to the right. This extends into the T7, T8 and T9 neural foramina on the left greater than right. There is again seen attenuation of the spinal canal in the area. Again seen bilateral renal masses. There are multilevel disc bulges. No evidence of canal narrowing secondary to disc disease.     1.  Again seen diffuse osseous metastases. 2.  Epidural tumor extending from T7 through T9 asymmetric to the left of midline. This results in moderate central canal narrowing with extension into the left T7, T8 and T9 neural foramen. 3.  No new compression fractures. 4.  Again seen bilateral adrenal masses.    MR-LUMBAR SPINE-WITH & W/O    Result Date: 3/25/2024  3/25/2024 12:55 PM HISTORY/REASON FOR EXAM: Metastatic prostate cancer. Back pain. Prior back surgery. TECHNIQUE/EXAM DESCRIPTION: MRI of the lumbar spine with intravenous contrast. The study was performed on a  Rj 1.5 Suha MRI scanner. T1 sagittal, T2 sagittal, and T2 axial images were obtained of the lumbar spine precontrast followed by T1 fat suppressed sagittal and T1 axial images post intravenous administration of 15 mL ProHance. COMPARISON: 9/20/2023 FINDINGS: There is again seen postsurgical change consistent with posterior pedicular screw and luis alberto fixation extending from L1 through L3. There is laminectomy change present. There is scoliotic deformity. There is degenerative retrolisthesis at T12-L1, L1-L2, L2-L3 and L5-S1. There is again seen postsurgical fluid collection at the surgical site, unchanged. There is again diffuse abnormal marrow signal involving all vertebral levels. There is posterior bony expansion of the L2 vertebral body, unchanged. There is again seen mild height loss at T12, L1, L2 and L3. There is multilevel loss of the normal disc height and bright T2 disc signal. There is again seen epidural enhancement posterior to the L2 vertebral body, unchanged. The conus is located normally at T12-L1. There is a simple appearing right renal cyst, unchanged no follow-up recommended. Findings at specific levels: T12-L1: There is annular disk bulge and bilateral facet degeneration without significant central canal or neural foraminal narrowing. L1-2: There is annular disk bulge and bilateral facet degeneration without significant central canal or neural foraminal narrowing. L2-3: There is annular disc bulge and bilateral facet degeneration. No central canal narrowing. Again seen epidural enhancement and bony expansion of the L2 vertebra posteriorly resulting in attenuation of the ventral surface of the thecal sac. This also  extends asymmetrically to the right of midline into the paraspinous soft tissues and into the right pedicle. There is no central canal narrowing due to decompression of the central spinal canal. There is moderate right and mild left neural foraminal narrowing. L3-4: There is annular  disc bulge and posterior osseous spurring with bilateral facet degeneration. No central canal narrowing. There is moderate left and mild right neural foraminal narrowing. L4-5: There is annular disc bulge and posterior osseous spurring as well as bilateral facet degeneration. No central canal narrowing. There is moderate right and mild left neural foraminal narrowing. L5-S1: There is annular disc bulge with a left paracentral disc protrusion and bilateral facet degeneration with posterior osseous spurring. No central canal narrowing. There is moderate bilateral, right greater than left neural foraminal narrowing.     1.  Again seen diffuse osseous metastases. There is again seen mild vertebral height loss at T12, L1, L2 and L3. 2.  Again seen posterior bony expansion of the L2 vertebral body with some epidural enhancement. There is asymmetric bony expansion into the right paraspinous soft tissues with extension into the right pedicle. There is no central canal narrowing. There is moderate right and mild left neural foraminal narrowing. 3.  Multilevel degenerative disc disease and facet degeneration which results in varying degrees of neural foraminal narrowing as specifically described above. 4.  Surgical change consistent with posterior decompression and pedicular screw and luis alberto fixation extending from L1 through L3.      Pathology:         PD-L1 IHC Analysis (Dako 22C3 pharmDx):   Tumor Proportion Score (TPS)(%) 0     Addendum Signed By:  Jay Saldana D.O.   Addendum Date:  10/20/2023   Original Report Date:  09/25/2023     FINAL DIAGNOSIS:     A. Right cerebellar mass:          Metastatic prostatic adenocarcinoma.          Abundant tumor necrosis, including comedonecrosis noted.                                           Diagnosis performed by:                                       JAY SALDANA DO     Assessment & Plan:  1. Chemotherapy induced diarrhea  diphenoxylate-atropine (LOMOTIL) 2.5-0.025 MG Tab     DISCONTINUED: ondansetron (ZOFRAN ODT) 8 MG TABLET DISPERSIBLE      2. Prostate cancer (HCC)          This is a 58 year old  man with metastatic castrate-resistant prostate adenocarcinoma. He is s/p multiple lines of therapy including enzalutamide, Radium-223, docetaxel, and is now on cabazitaxel with carboplatin. He is also s/p palliative XRT as well as metastasectomy for a cerebellar metastasis. His disease is not PSMA avid.      Current Diagnosis and Staging: Metastatic prostate adenocarcinoma, castrate resistant, Felix 5+5=10    Update: Patient is doing well with therapy. Continue with C5 carboplatin/cabazitaxel. Patient will get palliative XRT as well with Dr. Valadez.      Treatment Plan: Carboplatin with cabazitaxel     Treatment Citation: Andry P et al. Lancet Oncol 2019     Plan of Care:     Primary Therapy: Contiue cabazitaxel and carboplatin C5 tomorrow  Supportive Therapy: Antiemetics per protocol. Continue leuprolide, denosumab. Palliative XRT with Dr. Valadez  Toxicity: Patient is getting antineoplastic therapy and needs monitoring of blood counts, hepatic function, and renal function due to potential for organ dysfunction.   Labs: CBC with diff, CMP, PSA monitoring  Imaging: MRI brain q 3 months, continue monitoring scans at q 3 month intervals given aggressive nature of disease (next CT scan due 05/2024)  Treatment Planning: The patient has highly-aggressive prostate adenocarcinoma and I agree with the assessment that his prognosis is poor. We discussed working on addressing quality of life and goals of care. The patient wishes to continue with chemotherapy and therefore we will continue with cabazitaxel and carboplatin. An option upon progression might be cabozantinib and atezolizumab, per the recently-released COMPACT-02 trial data, which showed PFS improvement (OS data is still pending) and was presented at [facility]- last week.  Consultations: Radiation oncology (Dr. Valadez); Medical oncology  (Dr. Butts); Neurosurgery (Dr. Be); Interventional Radiology (Dr. Reardon, [facility]); Palliative care (Dr. Fair)  Code Status: Full  Miscellaneous: NA  Return for Follow Up: 3 weeks    Any questions and concerns raised by the patient were answered to the best of my ability. Thank you for allowing me to participate in the care for this patient. Please feel free to contact me for any questions or concerns.       Total time spent on chart review, clinic encounter, and documentation: 28 minutes.

## 2024-04-05 NOTE — PROGRESS NOTES
"Pharmacy Chemotherapy Calculation:    Dx: Castrate-resistant prostate adenocarcinoma metastatic    Protocol: CABAZItaxel/CARBOplatin/PredniSONE  *Dosing Reference*  CABAZItaxel 20 mg/m² IV over 60 minutes on Day 1   CARBOplatin AUC 4 IV over 30 minutes on Day 1   PredniSONE 5 mg PO twice daily on Days 1 - 21  21-day cycle until disease progression or unacceptable toxicity     NCCN Guidelines® for Prostate Cancer V.1.2023.   Andry PG, et al. Lancet Oncol. 2019;20(10):2522-7344.a    Allergies:  Patient has no known allergies.       /79   Pulse 100   Temp 36.3 °C (97.4 °F) (Temporal)   Resp 18   Ht 1.67 m (5' 5.75\")   Wt 73.7 kg (162 lb 7.7 oz)   SpO2 97%   BMI 26.43 kg/m²   Body surface area is 1.85 meters squared.    Labs 4/3/24:  ANC~ 6600 Plt = 178k   Hgb = 8.1     SCr = 0.38 mg/dL CrCl ~ 121 mL/min (min SCr 0.7 used)  AST/ALT/AP = 27/11/101 TBili = 0.4       Drug Order   (Drug name, dose, route, IV Fluid & volume, frequency, number of doses) Cycle 5   Previous treatment: C4 on 3/15/24; s/p docetaxel; enzalutamide; Radium-223     Medication = CABAZItaxel   Base Dose = 20 mg/m2  Calc Dose: Base Dose x 1.85 m2 = 37 mg  Final Dose = 36 mg  Route = IV  Fluid & Volume =  mL  Admin Duration = Over 60 minutes       <10% difference, OK to treat with final dose   Medication = CARBOplatin   Base Dose = AUC 4  Calc Dose: Base Dose x (121 mL/min + 25) = 584 mg  Final Dose = 580 mg  Route = IV  Fluid & Volume =  mL  Admin Duration = Over 30 minutes          <10% difference, OK to treat with final dose     By my signature below, I confirm this process was performed independently with the BSA and all final chemotherapy dosing calculations congruent. I have reviewed the above chemotherapy order and that my calculation of the final dose and BSA (when applicable) corroborate those calculations of the  pharmacist. Discrepancies of 10% or greater in the written dose have been addressed and documented " within the EPIC Progress notes.    Adela Woods, PharmD, BCOP

## 2024-04-07 ENCOUNTER — OUTPATIENT INFUSION SERVICES (OUTPATIENT)
Dept: ONCOLOGY | Facility: MEDICAL CENTER | Age: 59
End: 2024-04-07
Attending: STUDENT IN AN ORGANIZED HEALTH CARE EDUCATION/TRAINING PROGRAM
Payer: MEDICAID

## 2024-04-07 VITALS
HEART RATE: 85 BPM | TEMPERATURE: 97.8 F | DIASTOLIC BLOOD PRESSURE: 74 MMHG | OXYGEN SATURATION: 98 % | SYSTOLIC BLOOD PRESSURE: 111 MMHG | RESPIRATION RATE: 16 BRPM | BODY MASS INDEX: 26.61 KG/M2 | WEIGHT: 163.58 LBS

## 2024-04-07 DIAGNOSIS — C61 PROSTATE CANCER (HCC): ICD-10-CM

## 2024-04-07 PROCEDURE — 96372 THER/PROPH/DIAG INJ SC/IM: CPT

## 2024-04-07 PROCEDURE — 700111 HCHG RX REV CODE 636 W/ 250 OVERRIDE (IP): Mod: JZ,UD | Performed by: STUDENT IN AN ORGANIZED HEALTH CARE EDUCATION/TRAINING PROGRAM

## 2024-04-07 RX ADMIN — PEGFILGRASTIM-CBQV 6 MG: 6 INJECTION, SOLUTION SUBCUTANEOUS at 12:42

## 2024-04-07 ASSESSMENT — FIBROSIS 4 INDEX: FIB4 SCORE: 2.61

## 2024-04-07 NOTE — PROGRESS NOTES
Patient presents for Udenyca injection. Reviewed plan of care patient verbalizes understanding. Injection given to the back of the patient's left arm band aid to site. Next appointment confirmed and patient released in no acute distress.

## 2024-04-15 ENCOUNTER — HOSPITAL ENCOUNTER (OUTPATIENT)
Dept: HEMATOLOGY ONCOLOGY | Facility: MEDICAL CENTER | Age: 59
End: 2024-04-15
Attending: FAMILY MEDICINE
Payer: MEDICAID

## 2024-04-15 VITALS
BODY MASS INDEX: 26.82 KG/M2 | HEART RATE: 89 BPM | SYSTOLIC BLOOD PRESSURE: 104 MMHG | TEMPERATURE: 96.6 F | WEIGHT: 161 LBS | OXYGEN SATURATION: 96 % | HEIGHT: 65 IN | DIASTOLIC BLOOD PRESSURE: 66 MMHG | RESPIRATION RATE: 12 BRPM

## 2024-04-15 DIAGNOSIS — L30.9 ECZEMA, UNSPECIFIED TYPE: ICD-10-CM

## 2024-04-15 DIAGNOSIS — G89.3 CANCER RELATED PAIN: ICD-10-CM

## 2024-04-15 DIAGNOSIS — C61 PROSTATE CANCER (HCC): ICD-10-CM

## 2024-04-15 PROCEDURE — 99213 OFFICE O/P EST LOW 20 MIN: CPT | Performed by: FAMILY MEDICINE

## 2024-04-15 PROCEDURE — 99212 OFFICE O/P EST SF 10 MIN: CPT | Performed by: FAMILY MEDICINE

## 2024-04-15 RX ORDER — CLOBETASOL PROPIONATE 0.5 MG/G
CREAM TOPICAL
Qty: 30 G | Refills: 2 | Status: SHIPPED | OUTPATIENT
Start: 2024-04-15

## 2024-04-15 RX ORDER — MORPHINE SULFATE 15 MG/1
15 TABLET, FILM COATED, EXTENDED RELEASE ORAL EVERY 12 HOURS
Qty: 28 TABLET | Refills: 0 | Status: SHIPPED | OUTPATIENT
Start: 2024-04-15 | End: 2024-04-29

## 2024-04-15 ASSESSMENT — ENCOUNTER SYMPTOMS
NAUSEA: 0
BACK PAIN: 1
CONSTIPATION: 0
CHILLS: 0
PALPITATIONS: 0
DIARRHEA: 1
SHORTNESS OF BREATH: 0
FEVER: 0
COUGH: 0
VOMITING: 0

## 2024-04-15 ASSESSMENT — PAIN SCALES - GENERAL: PAINLEVEL: NO PAIN

## 2024-04-15 ASSESSMENT — FIBROSIS 4 INDEX: FIB4 SCORE: 2.61

## 2024-04-15 NOTE — PROGRESS NOTES
Subjective:     Chief Complaint   Patient presents with    Follow-Up     management of Metastatic prostate adenocarcinoma     Casper Rowley is a 58 y.o. male here today for follow-up on symptom management for oncological pain.  Patient reports that since her last visit he has had steady worsening of his back pain and is still awaiting insurance approval to start radiation.  He reports he is taking his pain medications regularly and is only getting a few hours relief.  We then discussed adding a long-acting morphine to his regimen which she was open doing.  He endorses diarrhea secondary to his treatments and is not experiencing constipation.  We concluded the visit with a plan to introduce long-acting morphine to his regimen and follow-up to reevaluate his symptoms in 2 to 4 weeks.  He does also report an eczematous rash that he develops after his treatments.  He was recommended to try a steroid cream and is asking about a prescription for clobetasol which I told him I would fill for him.    Problem   Eczema   Cancer Related Pain        Current medicines (including changes today)  Current Outpatient Medications   Medication Sig Dispense Refill    morphine ER (MS CONTIN) 15 MG Tab CR tablet Take 1 Tablet by mouth every 12 hours for 14 days. 28 Tablet 0    clobetasol (TEMOVATE) 0.05 % Cream Apply to affected area BID for two weeks 30 g 2    diphenoxylate-atropine (LOMOTIL) 2.5-0.025 MG Tab Take 1 Tablet by mouth 4 times a day as needed for Diarrhea for up to 30 days. 120 Tablet 0    ondansetron (ZOFRAN ODT) 8 MG TABLET DISPERSIBLE Take 1 Tablet by mouth every four hours as needed for Nausea. 180 Tablet 1    zolpidem (AMBIEN) 10 MG Tab TAKE ONE TABLET BY MOUTH AT BEDTIME FOR 30 DAYS      clotrimazole-betamethasone (LOTRISONE) 1-0.05 % Cream Apply 1 Application topically as needed.      cyclobenzaprine (FLEXERIL) 10 mg Tab Take 10 mg by mouth at bedtime.      furosemide (LASIX) 20 MG Tab Take 1 Tablet by mouth  as needed.      hydrocod shreya-chlorphe shreya ER (TUSSIONEX) 10-8 MG/5ML Suspension Extended Release Take 5 mL by mouth as needed.      prochlorperazine (COMPAZINE) 10 MG Tab Take 10 mg by mouth as needed.      silver sulfADIAZINE (SILVADENE) 1 % Cream Apply  topically every day.      tamsulosin (FLOMAX) 0.4 MG capsule Take 0.4 mg by mouth every day.      lisinopril (PRINIVIL) 20 MG Tab Take 1 tablet by mouth every day. 30 Tablet 2    metoprolol SR (TOPROL XL) 25 MG TABLET SR 24 HR Take 2 tablets by mouth every day. 60 Tablet 2    Canagliflozin (INVOKANA) 100 MG Tab Take 100 mg by mouth every day.      Dulaglutide (TRULICITY) 1.5 MG/0.5ML Solution Pen-injector Inject 1.5 mg as instructed every Saturday.      XGEVA 120 MG/1.7ML injection 120 mg Q30 DAYS.      Cholecalciferol (VITAMIN D) 2000 UNITS CAPS Take 6,000 Units by mouth every day. 2000 units x 3 tablets = 6000 mcg (Patient not taking: Reported on 3/15/2024)       No current facility-administered medications for this encounter.       Social History     Tobacco Use    Smoking status: Never    Smokeless tobacco: Never   Vaping Use    Vaping Use: Never used   Substance Use Topics    Alcohol use: Yes     Comment: 2 per week    Drug use: No     Past Medical History:   Diagnosis Date    Arthritis 02/2019    osteo-hollie knees    Bronchitis 2019    Cancer (HCC)     Basal cell - top of head    Cancer (HCC)     Prostate    Cold 02/08/2019    Cold two weeks ago, denies productive cough, SOB    Diabetes     type 2    High cholesterol     HTN (hypertension), benign 08/14/2009    Hypertension     Infectious disease     Mumps age 6 yrs and Chickenpox age 41 yrs    Obstructive sleep apnea 02/2019    Uses cpap    Prostate cancer (HCC)       Family History   Problem Relation Age of Onset    Cancer Father         Bladder cancer    Genetic Disorder Neg Hx          Objective:     Vitals:    04/15/24 0809   BP: 104/66   BP Location: Left arm   Patient Position: Sitting   BP Cuff Size:  "Adult   Pulse: 89   Resp: 12   Temp: 35.9 °C (96.6 °F)   TempSrc: Temporal   SpO2: 96%   Weight: 73 kg (161 lb)   Height: 1.651 m (5' 5\")     Body mass index is 26.79 kg/m².     Review of Systems   Constitutional:  Negative for chills and fever.   Respiratory:  Negative for cough and shortness of breath.    Cardiovascular:  Negative for chest pain and palpitations.   Gastrointestinal:  Positive for diarrhea. Negative for constipation, nausea and vomiting.   Musculoskeletal:  Positive for back pain.         Physical Exam:   Gen: Well developed, well nourished in no acute distress.   Skin: Pink, warm, and dry  HEENT: conjunctiva non-injected, sclera non-icteric. EOMs intact.   Nasal mucosa without edema nor erythema.   Pinna normal.    Oral mucous membranes pink and moist with no lesions.  Neck: Supple, trachea midline. No adenopathy or masses in the neck or supraclavicular regions.  Lungs: Effort is normal.  CV: regular rate and rhythm  Abdomen: flat  Ext: no edema, color normal, vascularity normal, temperature normal  Alert and oriented Eye contact is good, speech goal directed, affect calm    Assessment and Plan:   Cancer related pain  Patient with known metastatic prostate cancer.  He is having worsening back pain secondary to his metastatic lesions.  Is awaiting radiation.  Is taking his Norco 10/325 regularly and is only getting a few hours relief.  Through shared decision making we will start long-acting morphine 15 mg twice daily.  Patient will advise if he needs a refill of his short acting when he gets home.  PDMP reviewed, no signs of diversion or abuse, controlled subs agreement on file, risk and benefits of medication discussed including risk of dependence and abuse.  2-week prescription sent to pharmacy patient will advise if medication is working well send in a full 1 month supply.    Eczema  Rash on upper extremities.  Will send in steroid cream to assist with his    24 minutes in total spent on " patient encounter including chart review and consultation with patient's oncological providers.    Followup: Return in about 4 weeks (around 5/13/2024).    Ronnie Fair M.D.

## 2024-04-15 NOTE — ASSESSMENT & PLAN NOTE
Patient with known metastatic prostate cancer.  He is having worsening back pain secondary to his metastatic lesions.  Is awaiting radiation.  Is taking his Norco 10/325 regularly and is only getting a few hours relief.  Through shared decision making we will start long-acting morphine 15 mg twice daily.  Patient will advise if he needs a refill of his short acting when he gets home.  PDMP reviewed, no signs of diversion or abuse, controlled subs agreement on file, risk and benefits of medication discussed including risk of dependence and abuse.  2-week prescription sent to pharmacy patient will advise if medication is working well send in a full 1 month supply.

## 2024-04-17 ENCOUNTER — PATIENT MESSAGE (OUTPATIENT)
Dept: ONCOLOGY | Facility: MEDICAL CENTER | Age: 59
End: 2024-04-17
Payer: MEDICAID

## 2024-04-17 ENCOUNTER — PATIENT OUTREACH (OUTPATIENT)
Dept: ONCOLOGY | Facility: MEDICAL CENTER | Age: 59
End: 2024-04-17
Payer: MEDICAID

## 2024-04-18 DIAGNOSIS — G89.3 CANCER RELATED PAIN: ICD-10-CM

## 2024-04-18 RX ORDER — NALOXONE HYDROCHLORIDE 4 MG/.1ML
1 SPRAY NASAL PRN
Qty: 1 EACH | Refills: 0 | Status: SHIPPED | OUTPATIENT
Start: 2024-04-18

## 2024-04-21 NOTE — PROGRESS NOTES
"Pharmacy Chemotherapy Calculation:    Dx: Castrate-resistant prostate adenocarcinoma metastatic    Protocol: CABAZItaxel/CARBOplatin/PredniSONE  *Dosing Reference*  CABAZItaxel 20 mg/m² IV over 60 minutes on Day 1   CARBOplatin AUC 4 IV over 30 minutes on Day 1   PredniSONE 5 mg PO twice daily on Days 1 - 21  21-day cycle until disease progression or unacceptable toxicity     NCCN Guidelines® for Prostate Cancer V.1.2023.   Andry PG, et al. Lancet Oncol. 2019;20(10):9713-6494.a    Allergies:  Patient has no known allergies.       /78   Pulse (!) 127   Temp 36.2 °C (97.1 °F) (Temporal)   Resp 16   Ht 1.67 m (5' 5.75\")   Wt 74 kg (163 lb 2.3 oz)   SpO2 94%   BMI 26.53 kg/m²   Body surface area is 1.85 meters squared.    Labs 4/23/24:  ANC~ 7300 Plt = 142k   Hgb = 7.4*     SCr = 0.44 mg/dL CrCl ~ 121 mL/min   AST/ALT/AP = WNL TBili = 0.5      *APRN andreas Allred to proceed with treatment today, to receive 1 unit PRBC    Drug Order   (Drug name, dose, route, IV Fluid & volume, frequency, number of doses) Cycle 6  Previous treatment: C5 on 4/5/24; s/p docetaxel; enzalutamide; Radium-223     Medication = CABAZItaxel   Base Dose = 20 mg/m2  Calc Dose: Base Dose x 1.85 m2 = 37 mg  Final Dose = 36 mg  Route = IV  Fluid & Volume =  mL  Admin Duration = Over 60 minutes       <10% difference, OK to treat with final dose   Medication = CARBOplatin   Base Dose = AUC 4  Calc Dose: Base Dose x (121 mL/min + 25) = 584 mg  Final Dose = 580 mg  Route = IV  Fluid & Volume =  mL  Admin Duration = Over 30 minutes          <10% difference, OK to treat with final dose     By my signature below, I confirm this process was performed independently with the BSA and all final chemotherapy dosing calculations congruent. I have reviewed the above chemotherapy order and that my calculation of the final dose and BSA (when applicable) corroborate those calculations of the  pharmacist. Discrepancies of 10% or " greater in the written dose have been addressed and documented within the EPIC Progress notes.    Gabriela Shaikh, PharmD,

## 2024-04-23 ENCOUNTER — HOSPITAL ENCOUNTER (OUTPATIENT)
Dept: LAB | Facility: MEDICAL CENTER | Age: 59
End: 2024-04-23
Attending: FAMILY MEDICINE
Payer: MEDICAID

## 2024-04-23 ENCOUNTER — HOSPITAL ENCOUNTER (OUTPATIENT)
Dept: LAB | Facility: MEDICAL CENTER | Age: 59
End: 2024-04-23
Attending: STUDENT IN AN ORGANIZED HEALTH CARE EDUCATION/TRAINING PROGRAM
Payer: MEDICAID

## 2024-04-23 DIAGNOSIS — C61 PROSTATE CANCER (HCC): ICD-10-CM

## 2024-04-23 DIAGNOSIS — C79.31 METASTASIS TO BRAIN (HCC): ICD-10-CM

## 2024-04-23 LAB
ALBUMIN SERPL BCP-MCNC: 4.1 G/DL (ref 3.2–4.9)
ALBUMIN SERPL BCP-MCNC: 4.2 G/DL (ref 3.2–4.9)
ALBUMIN/GLOB SERPL: 2 G/DL
ALBUMIN/GLOB SERPL: 2.1 G/DL
ALP SERPL-CCNC: 95 U/L (ref 30–99)
ALP SERPL-CCNC: 96 U/L (ref 30–99)
ALT SERPL-CCNC: 6 U/L (ref 2–50)
ALT SERPL-CCNC: 6 U/L (ref 2–50)
ANION GAP SERPL CALC-SCNC: 12 MMOL/L (ref 7–16)
ANION GAP SERPL CALC-SCNC: 13 MMOL/L (ref 7–16)
ANISOCYTOSIS BLD QL SMEAR: ABNORMAL
ANISOCYTOSIS BLD QL SMEAR: ABNORMAL
AST SERPL-CCNC: 29 U/L (ref 12–45)
AST SERPL-CCNC: 31 U/L (ref 12–45)
BASOPHILS # BLD AUTO: 0.1 % (ref 0–1.8)
BASOPHILS # BLD AUTO: 0.2 % (ref 0–1.8)
BASOPHILS # BLD: 0.01 K/UL (ref 0–0.12)
BASOPHILS # BLD: 0.02 K/UL (ref 0–0.12)
BILIRUB SERPL-MCNC: 0.5 MG/DL (ref 0.1–1.5)
BILIRUB SERPL-MCNC: 0.5 MG/DL (ref 0.1–1.5)
BUN SERPL-MCNC: 13 MG/DL (ref 8–22)
BUN SERPL-MCNC: 14 MG/DL (ref 8–22)
CALCIUM ALBUM COR SERPL-MCNC: 8.4 MG/DL (ref 8.5–10.5)
CALCIUM ALBUM COR SERPL-MCNC: 8.5 MG/DL (ref 8.5–10.5)
CALCIUM SERPL-MCNC: 8.6 MG/DL (ref 8.5–10.5)
CALCIUM SERPL-MCNC: 8.6 MG/DL (ref 8.5–10.5)
CHLORIDE SERPL-SCNC: 103 MMOL/L (ref 96–112)
CHLORIDE SERPL-SCNC: 104 MMOL/L (ref 96–112)
CO2 SERPL-SCNC: 20 MMOL/L (ref 20–33)
CO2 SERPL-SCNC: 21 MMOL/L (ref 20–33)
COMMENT 1642: NORMAL
COMMENT 1642: NORMAL
CREAT SERPL-MCNC: 0.39 MG/DL (ref 0.5–1.4)
CREAT SERPL-MCNC: 0.44 MG/DL (ref 0.5–1.4)
DACRYOCYTES BLD QL SMEAR: NORMAL
DACRYOCYTES BLD QL SMEAR: NORMAL
EOSINOPHIL # BLD AUTO: 0 K/UL (ref 0–0.51)
EOSINOPHIL # BLD AUTO: 0 K/UL (ref 0–0.51)
EOSINOPHIL NFR BLD: 0 % (ref 0–6.9)
EOSINOPHIL NFR BLD: 0 % (ref 0–6.9)
ERYTHROCYTE [DISTWIDTH] IN BLOOD BY AUTOMATED COUNT: 76.1 FL (ref 35.9–50)
ERYTHROCYTE [DISTWIDTH] IN BLOOD BY AUTOMATED COUNT: 76.3 FL (ref 35.9–50)
EST. AVERAGE GLUCOSE BLD GHB EST-MCNC: 131 MG/DL
GFR SERPLBLD CREATININE-BSD FMLA CKD-EPI: 123 ML/MIN/1.73 M 2
GFR SERPLBLD CREATININE-BSD FMLA CKD-EPI: 128 ML/MIN/1.73 M 2
GLOBULIN SER CALC-MCNC: 2 G/DL (ref 1.9–3.5)
GLOBULIN SER CALC-MCNC: 2.1 G/DL (ref 1.9–3.5)
GLUCOSE SERPL-MCNC: 133 MG/DL (ref 65–99)
GLUCOSE SERPL-MCNC: 133 MG/DL (ref 65–99)
HBA1C MFR BLD: 6.2 % (ref 4–5.6)
HCT VFR BLD AUTO: 22.3 % (ref 42–52)
HCT VFR BLD AUTO: 22.8 % (ref 42–52)
HGB BLD-MCNC: 7.3 G/DL (ref 14–18)
HGB BLD-MCNC: 7.4 G/DL (ref 14–18)
IMM GRANULOCYTES # BLD AUTO: 0.09 K/UL (ref 0–0.11)
IMM GRANULOCYTES # BLD AUTO: 0.12 K/UL (ref 0–0.11)
IMM GRANULOCYTES NFR BLD AUTO: 1.1 % (ref 0–0.9)
IMM GRANULOCYTES NFR BLD AUTO: 1.5 % (ref 0–0.9)
LYMPHOCYTES # BLD AUTO: 0.17 K/UL (ref 1–4.8)
LYMPHOCYTES # BLD AUTO: 0.17 K/UL (ref 1–4.8)
LYMPHOCYTES NFR BLD: 2.1 % (ref 22–41)
LYMPHOCYTES NFR BLD: 2.1 % (ref 22–41)
MACROCYTES BLD QL SMEAR: ABNORMAL
MACROCYTES BLD QL SMEAR: ABNORMAL
MCH RBC QN AUTO: 34.1 PG (ref 27–33)
MCH RBC QN AUTO: 34.4 PG (ref 27–33)
MCHC RBC AUTO-ENTMCNC: 32.5 G/DL (ref 32.3–36.5)
MCHC RBC AUTO-ENTMCNC: 32.7 G/DL (ref 32.3–36.5)
MCV RBC AUTO: 104.2 FL (ref 81.4–97.8)
MCV RBC AUTO: 106 FL (ref 81.4–97.8)
MICROCYTES BLD QL SMEAR: ABNORMAL
MICROCYTES BLD QL SMEAR: ABNORMAL
MONOCYTES # BLD AUTO: 0.51 K/UL (ref 0–0.85)
MONOCYTES # BLD AUTO: 0.57 K/UL (ref 0–0.85)
MONOCYTES NFR BLD AUTO: 6.3 % (ref 0–13.4)
MONOCYTES NFR BLD AUTO: 7 % (ref 0–13.4)
MORPHOLOGY BLD-IMP: NORMAL
MORPHOLOGY BLD-IMP: NORMAL
NEUTROPHILS # BLD AUTO: 7.3 K/UL (ref 1.82–7.42)
NEUTROPHILS # BLD AUTO: 7.32 K/UL (ref 1.82–7.42)
NEUTROPHILS NFR BLD: 89.7 % (ref 44–72)
NEUTROPHILS NFR BLD: 89.9 % (ref 44–72)
NRBC # BLD AUTO: 0 K/UL
NRBC # BLD AUTO: 0 K/UL
NRBC BLD-RTO: 0 /100 WBC (ref 0–0.2)
NRBC BLD-RTO: 0 /100 WBC (ref 0–0.2)
PLATELET # BLD AUTO: 136 K/UL (ref 164–446)
PLATELET # BLD AUTO: 142 K/UL (ref 164–446)
PLATELET BLD QL SMEAR: NORMAL
PLATELET BLD QL SMEAR: NORMAL
PMV BLD AUTO: 10.6 FL (ref 9–12.9)
PMV BLD AUTO: 11.2 FL (ref 9–12.9)
POIKILOCYTOSIS BLD QL SMEAR: NORMAL
POIKILOCYTOSIS BLD QL SMEAR: NORMAL
POTASSIUM SERPL-SCNC: 4.2 MMOL/L (ref 3.6–5.5)
POTASSIUM SERPL-SCNC: 4.4 MMOL/L (ref 3.6–5.5)
PROT SERPL-MCNC: 6.2 G/DL (ref 6–8.2)
PROT SERPL-MCNC: 6.2 G/DL (ref 6–8.2)
PSA SERPL-MCNC: 2.01 NG/ML (ref 0–4)
RBC # BLD AUTO: 2.14 M/UL (ref 4.7–6.1)
RBC # BLD AUTO: 2.15 M/UL (ref 4.7–6.1)
RBC BLD AUTO: PRESENT
RBC BLD AUTO: PRESENT
SODIUM SERPL-SCNC: 136 MMOL/L (ref 135–145)
SODIUM SERPL-SCNC: 137 MMOL/L (ref 135–145)
WBC # BLD AUTO: 8.1 K/UL (ref 4.8–10.8)
WBC # BLD AUTO: 8.1 K/UL (ref 4.8–10.8)

## 2024-04-23 PROCEDURE — 80053 COMPREHEN METABOLIC PANEL: CPT | Mod: 91

## 2024-04-23 PROCEDURE — 36415 COLL VENOUS BLD VENIPUNCTURE: CPT

## 2024-04-23 PROCEDURE — 80053 COMPREHEN METABOLIC PANEL: CPT

## 2024-04-23 PROCEDURE — 83036 HEMOGLOBIN GLYCOSYLATED A1C: CPT

## 2024-04-23 PROCEDURE — 85025 COMPLETE CBC W/AUTO DIFF WBC: CPT

## 2024-04-23 PROCEDURE — 84153 ASSAY OF PSA TOTAL: CPT

## 2024-04-23 PROCEDURE — 85025 COMPLETE CBC W/AUTO DIFF WBC: CPT | Mod: 91

## 2024-04-23 RX ORDER — 0.9 % SODIUM CHLORIDE 0.9 %
10 VIAL (ML) INJECTION PRN
OUTPATIENT
Start: 2024-04-23

## 2024-04-23 RX ORDER — 0.9 % SODIUM CHLORIDE 0.9 %
VIAL (ML) INJECTION PRN
OUTPATIENT
Start: 2024-04-23

## 2024-04-23 RX ORDER — ACETAMINOPHEN 325 MG/1
650 TABLET ORAL ONCE
Status: CANCELLED | OUTPATIENT
Start: 2024-04-23

## 2024-04-23 RX ORDER — ACETAMINOPHEN 325 MG/1
650 TABLET ORAL PRN
OUTPATIENT
Start: 2024-04-23

## 2024-04-23 RX ORDER — DIPHENHYDRAMINE HCL 25 MG
25 TABLET ORAL ONCE
Status: CANCELLED | OUTPATIENT
Start: 2024-04-23 | End: 2024-04-23

## 2024-04-23 RX ORDER — DIPHENHYDRAMINE HYDROCHLORIDE 50 MG/ML
25 INJECTION INTRAMUSCULAR; INTRAVENOUS PRN
OUTPATIENT
Start: 2024-04-23

## 2024-04-23 RX ORDER — 0.9 % SODIUM CHLORIDE 0.9 %
3 VIAL (ML) INJECTION PRN
OUTPATIENT
Start: 2024-04-23

## 2024-04-23 RX ORDER — SODIUM CHLORIDE 9 MG/ML
INJECTION, SOLUTION INTRAVENOUS CONTINUOUS
OUTPATIENT
Start: 2024-04-23

## 2024-04-25 RX ORDER — METHYLPREDNISOLONE SODIUM SUCCINATE 125 MG/2ML
125 INJECTION, POWDER, LYOPHILIZED, FOR SOLUTION INTRAMUSCULAR; INTRAVENOUS PRN
Status: CANCELLED | OUTPATIENT
Start: 2024-04-26

## 2024-04-25 RX ORDER — ONDANSETRON 2 MG/ML
4 INJECTION INTRAMUSCULAR; INTRAVENOUS PRN
Status: CANCELLED | OUTPATIENT
Start: 2024-04-26

## 2024-04-25 RX ORDER — 0.9 % SODIUM CHLORIDE 0.9 %
3 VIAL (ML) INJECTION PRN
Status: CANCELLED | OUTPATIENT
Start: 2024-04-25

## 2024-04-25 RX ORDER — 0.9 % SODIUM CHLORIDE 0.9 %
VIAL (ML) INJECTION PRN
Status: CANCELLED | OUTPATIENT
Start: 2024-04-26

## 2024-04-25 RX ORDER — PROCHLORPERAZINE MALEATE 10 MG
10 TABLET ORAL EVERY 6 HOURS PRN
Status: CANCELLED | OUTPATIENT
Start: 2024-04-26

## 2024-04-25 RX ORDER — EPINEPHRINE 1 MG/ML(1)
0.5 AMPUL (ML) INJECTION PRN
Status: CANCELLED | OUTPATIENT
Start: 2024-04-26

## 2024-04-25 RX ORDER — 0.9 % SODIUM CHLORIDE 0.9 %
3 VIAL (ML) INJECTION PRN
Status: CANCELLED | OUTPATIENT
Start: 2024-04-26

## 2024-04-25 RX ORDER — 0.9 % SODIUM CHLORIDE 0.9 %
10 VIAL (ML) INJECTION PRN
Status: CANCELLED | OUTPATIENT
Start: 2024-04-25

## 2024-04-25 RX ORDER — 0.9 % SODIUM CHLORIDE 0.9 %
VIAL (ML) INJECTION PRN
Status: CANCELLED | OUTPATIENT
Start: 2024-04-25

## 2024-04-25 RX ORDER — SODIUM CHLORIDE 9 MG/ML
INJECTION, SOLUTION INTRAVENOUS CONTINUOUS
Status: CANCELLED | OUTPATIENT
Start: 2024-04-26

## 2024-04-25 RX ORDER — 0.9 % SODIUM CHLORIDE 0.9 %
10 VIAL (ML) INJECTION PRN
Status: CANCELLED | OUTPATIENT
Start: 2024-04-26

## 2024-04-25 RX ORDER — DEXAMETHASONE SODIUM PHOSPHATE 4 MG/ML
8 INJECTION, SOLUTION INTRA-ARTICULAR; INTRALESIONAL; INTRAMUSCULAR; INTRAVENOUS; SOFT TISSUE ONCE
Status: CANCELLED | OUTPATIENT
Start: 2024-04-26 | End: 2024-04-26

## 2024-04-25 RX ORDER — DIPHENHYDRAMINE HYDROCHLORIDE 50 MG/ML
50 INJECTION INTRAMUSCULAR; INTRAVENOUS PRN
Status: CANCELLED | OUTPATIENT
Start: 2024-04-26

## 2024-04-25 RX ORDER — ONDANSETRON 8 MG/1
8 TABLET, ORALLY DISINTEGRATING ORAL PRN
Status: CANCELLED | OUTPATIENT
Start: 2024-04-26

## 2024-04-26 ENCOUNTER — HOSPITAL ENCOUNTER (OUTPATIENT)
Dept: HEMATOLOGY ONCOLOGY | Facility: MEDICAL CENTER | Age: 59
End: 2024-04-26
Attending: NURSE PRACTITIONER
Payer: MEDICAID

## 2024-04-26 ENCOUNTER — OUTPATIENT INFUSION SERVICES (OUTPATIENT)
Dept: ONCOLOGY | Facility: MEDICAL CENTER | Age: 59
End: 2024-04-26
Attending: STUDENT IN AN ORGANIZED HEALTH CARE EDUCATION/TRAINING PROGRAM
Payer: MEDICAID

## 2024-04-26 VITALS
HEIGHT: 65 IN | SYSTOLIC BLOOD PRESSURE: 106 MMHG | WEIGHT: 160.6 LBS | OXYGEN SATURATION: 98 % | BODY MASS INDEX: 26.76 KG/M2 | HEART RATE: 115 BPM | RESPIRATION RATE: 12 BRPM | DIASTOLIC BLOOD PRESSURE: 68 MMHG | TEMPERATURE: 97.9 F

## 2024-04-26 VITALS
SYSTOLIC BLOOD PRESSURE: 122 MMHG | HEIGHT: 66 IN | HEART RATE: 104 BPM | RESPIRATION RATE: 16 BRPM | OXYGEN SATURATION: 97 % | WEIGHT: 163.14 LBS | DIASTOLIC BLOOD PRESSURE: 75 MMHG | TEMPERATURE: 97.9 F | BODY MASS INDEX: 26.22 KG/M2

## 2024-04-26 DIAGNOSIS — C61 PROSTATE CANCER (HCC): ICD-10-CM

## 2024-04-26 DIAGNOSIS — Z79.899 ENCOUNTER FOR LONG-TERM CURRENT USE OF HIGH RISK MEDICATION: ICD-10-CM

## 2024-04-26 DIAGNOSIS — T45.1X5A ANEMIA ASSOCIATED WITH CHEMOTHERAPY: ICD-10-CM

## 2024-04-26 DIAGNOSIS — C79.31 METASTASIS TO BRAIN (HCC): ICD-10-CM

## 2024-04-26 DIAGNOSIS — D64.81 ANTINEOPLASTIC CHEMOTHERAPY INDUCED ANEMIA: ICD-10-CM

## 2024-04-26 DIAGNOSIS — D64.81 ANEMIA ASSOCIATED WITH CHEMOTHERAPY: ICD-10-CM

## 2024-04-26 DIAGNOSIS — T45.1X5A ANTINEOPLASTIC CHEMOTHERAPY INDUCED ANEMIA: ICD-10-CM

## 2024-04-26 DIAGNOSIS — C79.51 CANCER, METASTATIC TO BONE (HCC): ICD-10-CM

## 2024-04-26 LAB
ABO GROUP BLD: NORMAL
BARCODED ABORH UBTYP: 5100
BARCODED PRD CODE UBPRD: NORMAL
BARCODED UNIT NUM UBUNT: NORMAL
BLD GP AB SCN SERPL QL: NORMAL
COMPONENT R 8504R: NORMAL
PRODUCT TYPE UPROD: NORMAL
RH BLD: NORMAL
UNIT STATUS USTAT: NORMAL

## 2024-04-26 PROCEDURE — P9016 RBC LEUKOCYTES REDUCED: HCPCS

## 2024-04-26 PROCEDURE — 36430 TRANSFUSION BLD/BLD COMPNT: CPT

## 2024-04-26 PROCEDURE — 304540 HCHG NITRO SET VENT 2ND TUB

## 2024-04-26 PROCEDURE — 86901 BLOOD TYPING SEROLOGIC RH(D): CPT

## 2024-04-26 PROCEDURE — 96413 CHEMO IV INFUSION 1 HR: CPT

## 2024-04-26 PROCEDURE — A9270 NON-COVERED ITEM OR SERVICE: HCPCS | Mod: UD | Performed by: STUDENT IN AN ORGANIZED HEALTH CARE EDUCATION/TRAINING PROGRAM

## 2024-04-26 PROCEDURE — 86850 RBC ANTIBODY SCREEN: CPT

## 2024-04-26 PROCEDURE — 96417 CHEMO IV INFUS EACH ADDL SEQ: CPT

## 2024-04-26 PROCEDURE — 96375 TX/PRO/DX INJ NEW DRUG ADDON: CPT

## 2024-04-26 PROCEDURE — 306780 HCHG STAT FOR TRANSFUSION PER CASE

## 2024-04-26 PROCEDURE — 86900 BLOOD TYPING SEROLOGIC ABO: CPT

## 2024-04-26 PROCEDURE — 700105 HCHG RX REV CODE 258: Mod: UD | Performed by: STUDENT IN AN ORGANIZED HEALTH CARE EDUCATION/TRAINING PROGRAM

## 2024-04-26 PROCEDURE — 99212 OFFICE O/P EST SF 10 MIN: CPT | Performed by: NURSE PRACTITIONER

## 2024-04-26 PROCEDURE — 700102 HCHG RX REV CODE 250 W/ 637 OVERRIDE(OP): Mod: UD | Performed by: STUDENT IN AN ORGANIZED HEALTH CARE EDUCATION/TRAINING PROGRAM

## 2024-04-26 PROCEDURE — 86923 COMPATIBILITY TEST ELECTRIC: CPT

## 2024-04-26 PROCEDURE — 700111 HCHG RX REV CODE 636 W/ 250 OVERRIDE (IP): Mod: UD | Performed by: STUDENT IN AN ORGANIZED HEALTH CARE EDUCATION/TRAINING PROGRAM

## 2024-04-26 RX ORDER — DEXAMETHASONE SODIUM PHOSPHATE 4 MG/ML
8 INJECTION, SOLUTION INTRA-ARTICULAR; INTRALESIONAL; INTRAMUSCULAR; INTRAVENOUS; SOFT TISSUE ONCE
Status: COMPLETED | OUTPATIENT
Start: 2024-04-26 | End: 2024-04-26

## 2024-04-26 RX ORDER — DIPHENHYDRAMINE HCL 25 MG
25 TABLET ORAL ONCE
Qty: 1 TABLET | Refills: 0 | Status: DISCONTINUED | OUTPATIENT
Start: 2024-04-26 | End: 2024-04-26 | Stop reason: HOSPADM

## 2024-04-26 RX ORDER — ACETAMINOPHEN 325 MG/1
650 TABLET ORAL ONCE
OUTPATIENT
Start: 2024-04-26

## 2024-04-26 RX ORDER — 0.9 % SODIUM CHLORIDE 0.9 %
VIAL (ML) INJECTION PRN
OUTPATIENT
Start: 2024-04-26

## 2024-04-26 RX ORDER — ACETAMINOPHEN 325 MG/1
650 TABLET ORAL ONCE
Status: COMPLETED | OUTPATIENT
Start: 2024-04-26 | End: 2024-04-26

## 2024-04-26 RX ORDER — 0.9 % SODIUM CHLORIDE 0.9 %
3 VIAL (ML) INJECTION PRN
OUTPATIENT
Start: 2024-04-26

## 2024-04-26 RX ORDER — DIPHENHYDRAMINE HYDROCHLORIDE 50 MG/ML
25 INJECTION INTRAMUSCULAR; INTRAVENOUS PRN
OUTPATIENT
Start: 2024-04-26

## 2024-04-26 RX ORDER — SODIUM CHLORIDE 9 MG/ML
INJECTION, SOLUTION INTRAVENOUS CONTINUOUS
OUTPATIENT
Start: 2024-04-26

## 2024-04-26 RX ORDER — DIPHENHYDRAMINE HCL 25 MG
25 TABLET ORAL ONCE
OUTPATIENT
Start: 2024-04-26 | End: 2024-04-26

## 2024-04-26 RX ORDER — 0.9 % SODIUM CHLORIDE 0.9 %
10 VIAL (ML) INJECTION PRN
OUTPATIENT
Start: 2024-04-26

## 2024-04-26 RX ORDER — ACETAMINOPHEN 325 MG/1
650 TABLET ORAL PRN
OUTPATIENT
Start: 2024-04-26

## 2024-04-26 RX ADMIN — FAMOTIDINE 20 MG: 10 INJECTION, SOLUTION INTRAVENOUS at 10:53

## 2024-04-26 RX ADMIN — DIPHENHYDRAMINE HYDROCHLORIDE 25 MG: 50 INJECTION, SOLUTION INTRAMUSCULAR; INTRAVENOUS at 10:52

## 2024-04-26 RX ADMIN — ACETAMINOPHEN 650 MG: 325 TABLET ORAL at 12:35

## 2024-04-26 RX ADMIN — CARBOPLATIN 580 MG: 10 INJECTION, SOLUTION INTRAVENOUS at 12:35

## 2024-04-26 RX ADMIN — DEXAMETHASONE SODIUM PHOSPHATE 8 MG: 4 INJECTION, SOLUTION INTRA-ARTICULAR; INTRALESIONAL; INTRAMUSCULAR; INTRAVENOUS; SOFT TISSUE at 10:47

## 2024-04-26 RX ADMIN — SODIUM CHLORIDE 36 MG: 0.9 INJECTION, SOLUTION INTRAVENOUS at 11:22

## 2024-04-26 ASSESSMENT — FIBROSIS 4 INDEX
FIB4 SCORE: 5.08
FIB4 SCORE: 5.08

## 2024-04-26 ASSESSMENT — ENCOUNTER SYMPTOMS
HEADACHES: 0
PALPITATIONS: 0
WEIGHT LOSS: 0
SHORTNESS OF BREATH: 0
CHILLS: 0
ABDOMINAL PAIN: 0
BLOOD IN STOOL: 0
NAUSEA: 1
TINGLING: 0
FEVER: 0
DIARRHEA: 0
DIZZINESS: 1
COUGH: 0
VOMITING: 1
CONSTIPATION: 0
MYALGIAS: 1
INSOMNIA: 0
WHEEZING: 0

## 2024-04-26 NOTE — PROGRESS NOTES
"Pharmacy Chemotherapy Calculations    Dx: Metastatic Prostate Cancer  Cycle 6  Previous treatment = C5 on 4/5/24 (C1 and C2 given at [facility])    Protocol: Cabazitaxel + CARBOplatin  CABAZItaxel 20 mg/m² IV over 60 minutes on Day 1   CARBOplatin AUC 4 IV over 30 minutes on Day 1   PredniSONE 5 mg PO twice daily on Days 1 - 21  21-day cycle until disease progression or unacceptable toxicity   NCCNGuidelines® for Prostate Cancer V.1.2023.   Andry PG , et al. Lancet Oncol. 2019;20(10):4011-5780.a    Allergies:  Patient has no known allergies.     /78   Pulse (!) 127   Temp 36.2 °C (97.1 °F) (Temporal)   Resp 16   Ht 1.67 m (5' 5.75\")   Wt 74 kg (163 lb 2.3 oz)   SpO2 94%   BMI 26.53 kg/m²  Body surface area is 1.85 meters squared.    Labs 4/23/24  ANC~ 7300 Plt = 142k   Hgb = 7.4     SCr = 0.44 mg/dL CrCl ~ 121 mL/min (minimum SCr of 0.7)   LFT's = WNLs  TBili = 0.5   L.Shai HAY aware, okay to proceed with treatment today. Give 1 unit of PRBC    CABAZitaxel 20 mg/m² x 1.85 m² = 37 mg   <10% difference, OK to treat with final dose = 36 mg IV    CARBOplatin  AUC 4 (121 mL/min + 25) = 584 mg   <10% difference, OK to treat with final dose = 580 mg IV    Roshan Jean, PharmD    "

## 2024-04-26 NOTE — PROGRESS NOTES
Chemotherapy Verification - PRIMARY RN      Height = 167 cm  Weight = 74 kg  BSA = 1.85 m^2       Medication: cabazitaxel (Jevtana)  Dose: 20 mg/m^2  Calculated Dose: 37 mg                             (In mg/m2, AUC, mg/kg)     Medication: CARBOplatin (Paraplatin)  Dose: AUC 4  Calculated Dose: 583.86 mg                             (In mg/m2, AUC, mg/kg)    Carboplatin calculation (if applicable):  (4*(120.965+25)) = 583.86 mg      I confirm this process was performed independently with the BSA and all final chemotherapy dosing calculations congruent.  Any discrepancies of 10% or greater have been addressed with the chemotherapy pharmacist. The resolution of the discrepancy has been documented in the EPIC progress notes.

## 2024-04-26 NOTE — PROGRESS NOTES
Casper is here for Day 1, Cycle 6 Jevtana/Carboplatin. Lab results previously drawn and reviewed today. Orders received to proceed with treatment today and transfuse 1 unit of PRBCs. PIV established; brisk blood return noted. Pre-medications given per MAR. Jevtana given per MAR; in-line filter in place. Carboplatin given per MAR. Blood consents signed. Pre-medication given per MAR. 1 unit of PRBCs transfused; no adverse reaction noted. PIV removed; gauze/coban applied to site. Next appointment scheduled. Discharged to self care; no apparent distress noted .

## 2024-04-26 NOTE — Clinical Note
Shouldn't he still be on Xgeva given the new lesions at L shoulder? He was getting per urology with lupron, can have our infusion try

## 2024-04-26 NOTE — PROGRESS NOTES
Subjective     Casper Rowley is a 58 y.o. male who presents with Prostate Cancer (Viry/Prechemo)            HPI  Mr. Rowley presents for evaluation prior to cycle 6 carboplatin, cabazitaxel for treatment of stage IVb, cTxNx pM1c, prostate cancer. He is accompanied by his wife for today's visit.     Patient diagnosed with metastatic disease to bones in 6/2021 and was treated with androgen deprivation therapy (Firmagon, denosumab); enzalutamide, initiated 9/27/21. He completed 3 treatment of radium-223 from March-May 2022 (stopped d/t decreased counts). Palliative radiation to L2 completed 5/6/22. PSA rising in 6/2022, patient discontinued enzalutamide and initiated docetaxel 8/29/2022. Lupron started 11/2022. Brain mets to cerebellum resected per Dr. Be 9/21/23; he completed cycle 11 docetaxel in approx. 9/2023; s/p XRT to adrenals and thoracic spine 12/2023. Patient has been evaluated at [facility] (Dr. Reardon) for Pluvicto therapy but his disease is not PSMA avid. He initiated cabazitaxel, carboplatin at [facility] on 12/21/23, completing 2 cycles with their office; transitioning care to our office and receiving cycle 3 on 2/23/24. Lupron continues per urology every 3 months.     Patient had a massage yesterday, he is a fatigued and achey. Transfusion pending.  He had some nausea this morning that was relieved with Zofran ODT.  Since last cycle he has experienced episode of emesis that he feels is diet related.      Review of Systems   Constitutional:  Negative for chills, fever, malaise/fatigue (post massage yesterday) and weight loss (stable).   Respiratory:  Negative for cough, shortness of breath and wheezing.    Cardiovascular:  Negative for chest pain, palpitations and leg swelling.        Tachy   Gastrointestinal:  Positive for nausea (today - zofran ODT) and vomiting (diet related). Negative for abdominal pain (less tight today), blood in stool, constipation (Last BM yesterday), diarrhea and melena.    Genitourinary:  Negative for dysuria and hematuria.   Musculoskeletal:  Positive for joint pain (left shoulder mets) and myalgias (post massage ache).   Neurological:  Positive for dizziness (yesterday - due for transfusion today). Negative for tingling and headaches.   Psychiatric/Behavioral:  The patient does not have insomnia.      Allergies   Allergen Reactions    Iodine Unspecified         Current Outpatient Medications on File Prior to Encounter   Medication Sig Dispense Refill    Naloxone (NARCAN) 4 MG/0.1ML Liquid Administer 4 mg into affected nostril(S) as needed (For severe sleepiness or difficulty breathing from possible overdose. Call 911 after administration.). 1 Each 0    clobetasol (TEMOVATE) 0.05 % Cream Apply to affected area BID for two weeks 30 g 2    diphenoxylate-atropine (LOMOTIL) 2.5-0.025 MG Tab Take 1 Tablet by mouth 4 times a day as needed for Diarrhea for up to 30 days. 120 Tablet 0    ondansetron (ZOFRAN ODT) 8 MG TABLET DISPERSIBLE Take 1 Tablet by mouth every four hours as needed for Nausea. 180 Tablet 1    zolpidem (AMBIEN) 10 MG Tab TAKE ONE TABLET BY MOUTH AT BEDTIME FOR 30 DAYS      clotrimazole-betamethasone (LOTRISONE) 1-0.05 % Cream Apply 1 Application topically as needed.      cyclobenzaprine (FLEXERIL) 10 mg Tab Take 10 mg by mouth at bedtime.      furosemide (LASIX) 20 MG Tab Take 1 Tablet by mouth as needed.      hydrocod shreya-chlorphe shreya ER (TUSSIONEX) 10-8 MG/5ML Suspension Extended Release Take 5 mL by mouth as needed.      prochlorperazine (COMPAZINE) 10 MG Tab Take 10 mg by mouth as needed.      silver sulfADIAZINE (SILVADENE) 1 % Cream Apply  topically every day.      tamsulosin (FLOMAX) 0.4 MG capsule Take 0.4 mg by mouth every day.      lisinopril (PRINIVIL) 20 MG Tab Take 1 tablet by mouth every day. 30 Tablet 2    metoprolol SR (TOPROL XL) 25 MG TABLET SR 24 HR Take 2 tablets by mouth every day. 60 Tablet 2    Canagliflozin (INVOKANA) 100 MG Tab Take 100 mg by  "mouth every day.      Dulaglutide (TRULICITY) 1.5 MG/0.5ML Solution Pen-injector Inject 1.5 mg as instructed every Saturday.      XGEVA 120 MG/1.7ML injection 120 mg Q30 DAYS.      Cholecalciferol (VITAMIN D) 2000 UNITS CAPS Take 6,000 Units by mouth every day. 2000 units x 3 tablets = 6000 mcg (Patient not taking: Reported on 3/15/2024)       No current facility-administered medications on file prior to encounter.              Objective     /68 (BP Location: Left arm, Patient Position: Sitting, BP Cuff Size: Adult)   Pulse (!) 115   Temp 36.6 °C (97.9 °F) (Temporal)   Resp 12   Ht 1.651 m (5' 5\")   Wt 72.8 kg (160 lb 9.6 oz)   SpO2 98%   BMI 26.73 kg/m²      Physical Exam  Vitals reviewed.   Constitutional:       General: He is not in acute distress.     Appearance: He is well-developed. He is not diaphoretic.   HENT:      Head: Normocephalic and atraumatic.   Eyes:      General: No scleral icterus.        Right eye: No discharge.         Left eye: No discharge.   Cardiovascular:      Rate and Rhythm: Normal rate and regular rhythm.      Heart sounds: Normal heart sounds. No murmur heard.     No friction rub. No gallop.   Pulmonary:      Effort: Pulmonary effort is normal. No respiratory distress.      Breath sounds: Normal breath sounds. No wheezing.   Abdominal:      General: There is no distension.      Palpations: Abdomen is soft.      Tenderness: There is no abdominal tenderness.   Musculoskeletal:         General: Normal range of motion.      Cervical back: Normal range of motion.   Skin:     General: Skin is warm and dry.      Coloration: Skin is not pale.      Findings: No erythema or rash.   Neurological:      Mental Status: He is alert and oriented to person, place, and time.   Psychiatric:         Behavior: Behavior normal.          Latest Reference Range & Units 04/23/24 09:30   WBC 4.8 - 10.8 K/uL 8.1   RBC 4.70 - 6.10 M/uL 2.15 (L)   Hemoglobin 14.0 - 18.0 g/dL 7.4 (L)   Hematocrit 42.0 - " 52.0 % 22.8 (L)   MCV 81.4 - 97.8 fL 106.0 (H)   MCH 27.0 - 33.0 pg 34.4 (H)   MCHC 32.3 - 36.5 g/dL 32.5   RDW 35.9 - 50.0 fL 76.1 (H)   Platelet Count 164 - 446 K/uL 142 (L)   MPV 9.0 - 12.9 fL 11.2   Neutrophils-Polys 44.00 - 72.00 % 89.70 (H)   Neutrophils (Absolute) 1.82 - 7.42 K/uL 7.30   Lymphocytes 22.00 - 41.00 % 2.10 (L)   Lymphs (Absolute) 1.00 - 4.80 K/uL 0.17 (L)   Monocytes 0.00 - 13.40 % 7.00   Monos (Absolute) 0.00 - 0.85 K/uL 0.57   Eosinophils 0.00 - 6.90 % 0.00   Eos (Absolute) 0.00 - 0.51 K/uL 0.00   Basophils 0.00 - 1.80 % 0.10   Baso (Absolute) 0.00 - 0.12 K/uL 0.01   Immature Granulocytes 0.00 - 0.90 % 1.10 (H)   Immature Granulocytes (abs) 0.00 - 0.11 K/uL 0.09   Nucleated RBC 0.00 - 0.20 /100 WBC 0.00   NRBC (Absolute) K/uL 0.00   Plt Estimation  Decreased   RBC Morphology  Present   Anisocytosis  2+ !   Macrocytosis  1+   Microcytosis  1+   Poikilocytosis  1+   Tear Drop Cells  1+   Comments-Diff  see below   Peripheral Smear Review  see below   Sodium 135 - 145 mmol/L 137   Potassium 3.6 - 5.5 mmol/L 4.4   Chloride 96 - 112 mmol/L 104   Co2 20 - 33 mmol/L 20   Anion Gap 7.0 - 16.0  13.0   Glucose 65 - 99 mg/dL 133 (H)   Bun 8 - 22 mg/dL 13   Creatinine 0.50 - 1.40 mg/dL 0.44 (L)   GFR (CKD-EPI) >60 mL/min/1.73 m 2 123   Calcium 8.5 - 10.5 mg/dL 8.6   Correct Calcium 8.5 - 10.5 mg/dL 8.5   AST(SGOT) 12 - 45 U/L 31   ALT(SGPT) 2 - 50 U/L 6   Alkaline Phosphatase 30 - 99 U/L 95   Total Bilirubin 0.1 - 1.5 mg/dL 0.5   Albumin 3.2 - 4.9 g/dL 4.1   Total Protein 6.0 - 8.2 g/dL 6.2   Globulin 1.9 - 3.5 g/dL 2.1   A-G Ratio g/dL 2.0   Glycohemoglobin 4.0 - 5.6 % 6.2 (H)   Estim. Avg Glu mg/dL 131   (L): Data is abnormally low  (H): Data is abnormally high  !: Data is abnormal       MR Shoulder  4/3/2024 5:50 PM  HISTORY/REASON FOR EXAM:  Other; Image Left Shoulder  Metastatic disease, shoulder pain, frozen shoulder, history of prior surgery.     TECHNIQUE/EXAM DESCRIPTION:  MRI of the LEFT  shoulder without and with contrast.     The study was performed on a Early[employer] Signa 1.5 Suha MRI scanner. Sagittal, axial, coronal T1 and T2 as well as postcontrast coronal and axial T1-weighted images were obtained.     15 mL ProHance contrast was administered intravenously.     COMPARISON:  None.     FINDINGS:  There is diffuse marrow heterogeneity and signal abnormality consistent with the history of diffuse osseous metastatic disease. No fracture is evident. There is no os acromiale or acromioclavicular separation. There is mild acromioclavicular osteoarthrosis. Acromion is mildly laterally downward sloping.     There is a suture anchor in the anterior aspect of the humeral head with susceptibility artifact. There is a full-thickness tear of the distal subscapularis tendon. There is associated fatty atrophy of the subscapularis muscle. The supraspinatus, infraspinatus, and teres minor tendons are intact. The long head of biceps tendon is not visualized proximally. This may be secondary to rupture, tenotomy, or tenodesis.     There is intermediate T2-weighted signal within the superior labrum. This is consistent with degeneration and possible degenerative tearing.     There is no significant joint effusion or bursal fluid collection. There is lobulated signal abnormality surrounding the proximal humeral diametaphysis extending superiorly into the subacromial-subdeltoid bursa laterally. This demonstrates similar signal intensity to skeletal muscle on the T1-weighted sequence with mild T2 hyperintensity and mild enhancement. This likely represents tumor extension.     IMPRESSION:  1. There is extensive osseous heterogeneity consistent with metastatic disease. Soft tissue signal abnormality surrounding the proximal humerus likely represents soft tissue tumor extension.  2. Full-thickness tear of the subscapularis tendon and either rupture or postsurgical change of the long head of biceps tendon.  3. Degeneration and  possible degenerative tearing of the glenoid labrum.  4. Acromioclavicular osteoarthrosis.         Assessment & Plan     1. Encounter for long-term current use of high risk medication        2. Prostate cancer (HCC)        3. Metastasis to brain (HCC)        4. Antineoplastic chemotherapy induced anemia        5. Cancer, metastatic to bone (HCC)            1.  Brain mets: s/p resection 9/21/23, surveillance continues. Per discussion with Dr. Baires, MRI only as indicated.     2.  Bone support: Monthly Xgeva initiated 6/2021, continues per urology. Per med rec today, no further dosing d/t medication not approved per Medicaid. Will query Dr. Baires  about transitioning dosing to [facility] given bone mets.    Left shoulder mets noted per MRI 4/6/24: Rad Onc scheduled 5/6/24 x 5 fractions to L2, anticipated to also address shoulder.  -Dr. Baires advises waiting to repeat bone scan until 2-3 months after completion of XRT.     3.  Anemia: Treatment related (Hgb 7.4), transfusion pending, continue to monitor.    4.  Prostate cancer: Diagnosed 6/2021; s/p firmagon, enzalutamide 9/2021-8/2022; s/p radium-223 x 3 2-5/2022; s/p docetaxel 8/29/22- 9/2023; initiated q 90 d Lupron 11/2022 (per urology); s/p XRT to adrenals 12/2023; initiated cabazitaxel, carboplatin 12/21/23 [transitioned care to [facility] 2/2024, C3].     Lupron next due 5/2024       Patient continues tolerating treatment fairly well. CBC, CMP, PSA have been evaluated and found to be within acceptable limits, except where addressed above. Patient will proceed with cycle 6 of treatment and return in 3 weeks for evaluation prior to cycle 7, sooner as needed.    - confirmed with Dr. Baires, treatment to continue, additional dates added to plan        The patient verbalized agreement and understanding of current plan. All questions and concerns were addressed at time of visit.    Please note that this dictation was created using voice recognition software. I have  made every reasonable attempt to correct obvious errors, but I expect that there are errors of grammar and possibly content that I did not discover before finalizing the note.

## 2024-04-26 NOTE — LETTER
[facility]   [address] [place] [address],   [address]  [place] [address]  Phone: [phone]  Fax: [phone]              Casper Rowley  1965    Encounter Date: 4/26/2024    ROMEO Mora          PROGRESS NOTE:  Subjective    Casper Rowley is a 58 y.o. male who presents with Prostate Cancer (Viry/Prechemo)            HPI  Mr. Rowley presents for evaluation prior to cycle 6 carboplatin, cabazitaxel for treatment of stage IVb, cTxNx pM1c, prostate cancer. He is accompanied by his wife for today's visit.     Patient diagnosed with metastatic disease to bones in 6/2021 and was treated with androgen deprivation therapy (Firmagon, denosumab); enzalutamide, initiated 9/27/21. He completed 3 treatment of radium-223 from March-May 2022 (stopped d/t decreased counts). Palliative radiation to L2 completed 5/6/22. PSA rising in 6/2022, patient discontinued enzalutamide and initiated docetaxel 8/29/2022. Lupron started 11/2022. Brain mets to cerebellum resected per Dr. Be 9/21/23; he completed cycle 11 docetaxel in approx. 9/2023; s/p XRT to adrenals and thoracic spine 12/2023. Patient has been evaluated at [facility] (Dr. Reardon) for Pluvicto therapy but his disease is not PSMA avid. He initiated cabazitaxel, carboplatin at [facility] on 12/21/23, completing 2 cycles with their office; transitioning care to our office and receiving cycle 3 on 2/23/24. Lupron continues per urology every 3 months.     Patient had a massage yesterday, he is a fatigued and achey. Transfusion pending.  He had some nausea this morning that was relieved with Zofran ODT.  Since last cycle he has experienced episode of emesis that he feels is diet related.      Review of Systems   Constitutional:  Negative for chills, fever, malaise/fatigue (post massage yesterday) and weight loss (stable).   Respiratory:  Negative for cough, shortness of breath and wheezing.    Cardiovascular:  Negative for chest pain, palpitations and leg  swelling.        Tachy   Gastrointestinal:  Positive for nausea (today - zofran ODT) and vomiting (diet related). Negative for abdominal pain (less tight today), blood in stool, constipation (Last BM yesterday), diarrhea and melena.   Genitourinary:  Negative for dysuria and hematuria.   Musculoskeletal:  Positive for joint pain (left shoulder mets) and myalgias (post massage ache).   Neurological:  Positive for dizziness (yesterday - due for transfusion today). Negative for tingling and headaches.   Psychiatric/Behavioral:  The patient does not have insomnia.      Allergies   Allergen Reactions    Iodine Unspecified         Current Outpatient Medications on File Prior to Encounter   Medication Sig Dispense Refill    Naloxone (NARCAN) 4 MG/0.1ML Liquid Administer 4 mg into affected nostril(S) as needed (For severe sleepiness or difficulty breathing from possible overdose. Call 911 after administration.). 1 Each 0    clobetasol (TEMOVATE) 0.05 % Cream Apply to affected area BID for two weeks 30 g 2    diphenoxylate-atropine (LOMOTIL) 2.5-0.025 MG Tab Take 1 Tablet by mouth 4 times a day as needed for Diarrhea for up to 30 days. 120 Tablet 0    ondansetron (ZOFRAN ODT) 8 MG TABLET DISPERSIBLE Take 1 Tablet by mouth every four hours as needed for Nausea. 180 Tablet 1    zolpidem (AMBIEN) 10 MG Tab TAKE ONE TABLET BY MOUTH AT BEDTIME FOR 30 DAYS      clotrimazole-betamethasone (LOTRISONE) 1-0.05 % Cream Apply 1 Application topically as needed.      cyclobenzaprine (FLEXERIL) 10 mg Tab Take 10 mg by mouth at bedtime.      furosemide (LASIX) 20 MG Tab Take 1 Tablet by mouth as needed.      hydrocod shreya-chlorphe shreya ER (TUSSIONEX) 10-8 MG/5ML Suspension Extended Release Take 5 mL by mouth as needed.      prochlorperazine (COMPAZINE) 10 MG Tab Take 10 mg by mouth as needed.      silver sulfADIAZINE (SILVADENE) 1 % Cream Apply  topically every day.      tamsulosin (FLOMAX) 0.4 MG capsule Take 0.4 mg by mouth every day.    "   lisinopril (PRINIVIL) 20 MG Tab Take 1 tablet by mouth every day. 30 Tablet 2    metoprolol SR (TOPROL XL) 25 MG TABLET SR 24 HR Take 2 tablets by mouth every day. 60 Tablet 2    Canagliflozin (INVOKANA) 100 MG Tab Take 100 mg by mouth every day.      Dulaglutide (TRULICITY) 1.5 MG/0.5ML Solution Pen-injector Inject 1.5 mg as instructed every Saturday.      XGEVA 120 MG/1.7ML injection 120 mg Q30 DAYS.      Cholecalciferol (VITAMIN D) 2000 UNITS CAPS Take 6,000 Units by mouth every day. 2000 units x 3 tablets = 6000 mcg (Patient not taking: Reported on 3/15/2024)       No current facility-administered medications on file prior to encounter.              Objective    /68 (BP Location: Left arm, Patient Position: Sitting, BP Cuff Size: Adult)   Pulse (!) 115   Temp 36.6 °C (97.9 °F) (Temporal)   Resp 12   Ht 1.651 m (5' 5\")   Wt 72.8 kg (160 lb 9.6 oz)   SpO2 98%   BMI 26.73 kg/m²      Physical Exam  Vitals reviewed.   Constitutional:       General: He is not in acute distress.     Appearance: He is well-developed. He is not diaphoretic.   HENT:      Head: Normocephalic and atraumatic.   Eyes:      General: No scleral icterus.        Right eye: No discharge.         Left eye: No discharge.   Cardiovascular:      Rate and Rhythm: Normal rate and regular rhythm.      Heart sounds: Normal heart sounds. No murmur heard.     No friction rub. No gallop.   Pulmonary:      Effort: Pulmonary effort is normal. No respiratory distress.      Breath sounds: Normal breath sounds. No wheezing.   Abdominal:      General: There is no distension.      Palpations: Abdomen is soft.      Tenderness: There is no abdominal tenderness.   Musculoskeletal:         General: Normal range of motion.      Cervical back: Normal range of motion.   Skin:     General: Skin is warm and dry.      Coloration: Skin is not pale.      Findings: No erythema or rash.   Neurological:      Mental Status: He is alert and oriented to person, " place, and time.   Psychiatric:         Behavior: Behavior normal.          Latest Reference Range & Units 04/23/24 09:30   WBC 4.8 - 10.8 K/uL 8.1   RBC 4.70 - 6.10 M/uL 2.15 (L)   Hemoglobin 14.0 - 18.0 g/dL 7.4 (L)   Hematocrit 42.0 - 52.0 % 22.8 (L)   MCV 81.4 - 97.8 fL 106.0 (H)   MCH 27.0 - 33.0 pg 34.4 (H)   MCHC 32.3 - 36.5 g/dL 32.5   RDW 35.9 - 50.0 fL 76.1 (H)   Platelet Count 164 - 446 K/uL 142 (L)   MPV 9.0 - 12.9 fL 11.2   Neutrophils-Polys 44.00 - 72.00 % 89.70 (H)   Neutrophils (Absolute) 1.82 - 7.42 K/uL 7.30   Lymphocytes 22.00 - 41.00 % 2.10 (L)   Lymphs (Absolute) 1.00 - 4.80 K/uL 0.17 (L)   Monocytes 0.00 - 13.40 % 7.00   Monos (Absolute) 0.00 - 0.85 K/uL 0.57   Eosinophils 0.00 - 6.90 % 0.00   Eos (Absolute) 0.00 - 0.51 K/uL 0.00   Basophils 0.00 - 1.80 % 0.10   Baso (Absolute) 0.00 - 0.12 K/uL 0.01   Immature Granulocytes 0.00 - 0.90 % 1.10 (H)   Immature Granulocytes (abs) 0.00 - 0.11 K/uL 0.09   Nucleated RBC 0.00 - 0.20 /100 WBC 0.00   NRBC (Absolute) K/uL 0.00   Plt Estimation  Decreased   RBC Morphology  Present   Anisocytosis  2+ !   Macrocytosis  1+   Microcytosis  1+   Poikilocytosis  1+   Tear Drop Cells  1+   Comments-Diff  see below   Peripheral Smear Review  see below   Sodium 135 - 145 mmol/L 137   Potassium 3.6 - 5.5 mmol/L 4.4   Chloride 96 - 112 mmol/L 104   Co2 20 - 33 mmol/L 20   Anion Gap 7.0 - 16.0  13.0   Glucose 65 - 99 mg/dL 133 (H)   Bun 8 - 22 mg/dL 13   Creatinine 0.50 - 1.40 mg/dL 0.44 (L)   GFR (CKD-EPI) >60 mL/min/1.73 m 2 123   Calcium 8.5 - 10.5 mg/dL 8.6   Correct Calcium 8.5 - 10.5 mg/dL 8.5   AST(SGOT) 12 - 45 U/L 31   ALT(SGPT) 2 - 50 U/L 6   Alkaline Phosphatase 30 - 99 U/L 95   Total Bilirubin 0.1 - 1.5 mg/dL 0.5   Albumin 3.2 - 4.9 g/dL 4.1   Total Protein 6.0 - 8.2 g/dL 6.2   Globulin 1.9 - 3.5 g/dL 2.1   A-G Ratio g/dL 2.0   Glycohemoglobin 4.0 - 5.6 % 6.2 (H)   Estim. Avg Glu mg/dL 131   (L): Data is abnormally low  (H): Data is abnormally high  !: Data  is abnormal       MR Shoulder  4/3/2024 5:50 PM  HISTORY/REASON FOR EXAM:  Other; Image Left Shoulder  Metastatic disease, shoulder pain, frozen shoulder, history of prior surgery.     TECHNIQUE/EXAM DESCRIPTION:  MRI of the LEFT shoulder without and with contrast.     The study was performed on a [employer] Signa 1.5 Suha MRI scanner. Sagittal, axial, coronal T1 and T2 as well as postcontrast coronal and axial T1-weighted images were obtained.     15 mL ProHance contrast was administered intravenously.     COMPARISON:  None.     FINDINGS:  There is diffuse marrow heterogeneity and signal abnormality consistent with the history of diffuse osseous metastatic disease. No fracture is evident. There is no os acromiale or acromioclavicular separation. There is mild acromioclavicular osteoarthrosis. Acromion is mildly laterally downward sloping.     There is a suture anchor in the anterior aspect of the humeral head with susceptibility artifact. There is a full-thickness tear of the distal subscapularis tendon. There is associated fatty atrophy of the subscapularis muscle. The supraspinatus, infraspinatus, and teres minor tendons are intact. The long head of biceps tendon is not visualized proximally. This may be secondary to rupture, tenotomy, or tenodesis.     There is intermediate T2-weighted signal within the superior labrum. This is consistent with degeneration and possible degenerative tearing.     There is no significant joint effusion or bursal fluid collection. There is lobulated signal abnormality surrounding the proximal humeral diametaphysis extending superiorly into the subacromial-subdeltoid bursa laterally. This demonstrates similar signal intensity to skeletal muscle on the T1-weighted sequence with mild T2 hyperintensity and mild enhancement. This likely represents tumor extension.     IMPRESSION:  1. There is extensive osseous heterogeneity consistent with metastatic disease. Soft tissue signal abnormality  surrounding the proximal humerus likely represents soft tissue tumor extension.  2. Full-thickness tear of the subscapularis tendon and either rupture or postsurgical change of the long head of biceps tendon.  3. Degeneration and possible degenerative tearing of the glenoid labrum.  4. Acromioclavicular osteoarthrosis.         Assessment & Plan    1. Encounter for long-term current use of high risk medication        2. Prostate cancer (HCC)        3. Metastasis to brain (HCC)        4. Antineoplastic chemotherapy induced anemia        5. Cancer, metastatic to bone (HCC)            1.  Brain mets: s/p resection 9/21/23, surveillance continues. Per discussion with Dr. Baiers, MRI only as indicated.     2.  Bone support: Monthly Xgeva initiated 6/2021, continues per urology. Per med rec today, no further dosing d/t medication not approved per Medicaid. Will query Dr. Baires  about transitioning dosing to [facility] given bone mets.    Left shoulder mets noted per MRI 4/6/24: Rad Onc scheduled 5/6/24 x 5 fractions to L2, anticipated to also address shoulder.  -Dr. Baires advises waiting to repeat bone scan until 2-3 months after completion of XRT.     3.  Anemia: Treatment related (Hgb 7.4), transfusion pending, continue to monitor.    4.  Prostate cancer: Diagnosed 6/2021; s/p firmagon, enzalutamide 9/2021-8/2022; s/p radium-223 x 3 2-5/2022; s/p docetaxel 8/29/22- 9/2023; initiated q 90 d Lupron 11/2022 (per urology); s/p XRT to adrenals 12/2023; initiated cabazitaxel, carboplatin 12/21/23 [transitioned care to [facility] 2/2024, C3].     Lupron next due 5/2024       Patient continues tolerating treatment fairly well. CBC, CMP, PSA have been evaluated and found to be within acceptable limits, except where addressed above. Patient will proceed with cycle 6 of treatment and return in 3 weeks for evaluation prior to cycle 7, sooner as needed.    - confirmed with Dr. Baires, treatment to continue, additional dates added to  plan        The patient verbalized agreement and understanding of current plan. All questions and concerns were addressed at time of visit.    Please note that this dictation was created using voice recognition software. I have made every reasonable attempt to correct obvious errors, but I expect that there are errors of grammar and possibly content that I did not discover before finalizing the note.                     Juventino Valadez M.D.  [address] [place] [address]  Via Fax: [phone]

## 2024-04-26 NOTE — PROGRESS NOTES
Chemotherapy Verification - SECONDARY RN       Height = 167cm  Weight = 74kg  BSA = 1.85       Medication: Carboplatin  Dose: AUC 4  Calculated Dose: 583.6mg                             (In mg/m2, AUC, mg/kg)     Medication: Cabazitaxel  Dose: 20mg/m2  Calculated Dose: 37mg                             (In mg/m2, AUC, mg/kg)    Carboplatin calculation (if applicable):  (120.9 + 25) x 4 = 583.6mg    I confirm that this process was performed independently.

## 2024-04-28 ENCOUNTER — OUTPATIENT INFUSION SERVICES (OUTPATIENT)
Dept: ONCOLOGY | Facility: MEDICAL CENTER | Age: 59
End: 2024-04-28
Attending: STUDENT IN AN ORGANIZED HEALTH CARE EDUCATION/TRAINING PROGRAM
Payer: MEDICAID

## 2024-04-28 VITALS
DIASTOLIC BLOOD PRESSURE: 70 MMHG | OXYGEN SATURATION: 96 % | HEART RATE: 93 BPM | SYSTOLIC BLOOD PRESSURE: 118 MMHG | RESPIRATION RATE: 16 BRPM | TEMPERATURE: 97.5 F

## 2024-04-28 DIAGNOSIS — C61 PROSTATE CANCER (HCC): ICD-10-CM

## 2024-04-28 PROCEDURE — 96372 THER/PROPH/DIAG INJ SC/IM: CPT

## 2024-04-28 PROCEDURE — 700111 HCHG RX REV CODE 636 W/ 250 OVERRIDE (IP): Mod: JZ,UD | Performed by: STUDENT IN AN ORGANIZED HEALTH CARE EDUCATION/TRAINING PROGRAM

## 2024-04-28 RX ADMIN — PEGFILGRASTIM-CBQV 6 MG: 6 INJECTION, SOLUTION SUBCUTANEOUS at 11:02

## 2024-04-28 NOTE — PROGRESS NOTES
Patient to [facility] for Udenyca injection. Patient states he is feeling well. Udenyca injected into right back of arm. Patient to home. Emailed Dr. Baires and Lida HAY regarding the chemo treatment plan needs additional days.

## 2024-04-29 ENCOUNTER — TELEPHONE (OUTPATIENT)
Dept: HEMATOLOGY ONCOLOGY | Facility: MEDICAL CENTER | Age: 59
End: 2024-04-29
Payer: MEDICAID

## 2024-04-30 ENCOUNTER — TELEPHONE (OUTPATIENT)
Dept: HEMATOLOGY ONCOLOGY | Facility: MEDICAL CENTER | Age: 59
End: 2024-04-30
Payer: MEDICAID

## 2024-04-30 PROBLEM — M75.30 CALCIFIC TENDINITIS OF SHOULDER: Status: ACTIVE | Noted: 2020-04-27

## 2024-04-30 PROBLEM — E29.1 HYPOGONADISM MALE: Status: ACTIVE | Noted: 2021-09-10

## 2024-04-30 PROBLEM — M16.11 OSTEOARTHRITIS OF RIGHT HIP: Status: ACTIVE | Noted: 2024-03-10

## 2024-04-30 PROBLEM — C61: Status: ACTIVE | Noted: 2024-01-28

## 2024-04-30 PROBLEM — C79.51 MALIGNANT NEOPLASM METASTATIC TO BONE (HCC): Status: ACTIVE | Noted: 2021-09-07

## 2024-04-30 PROBLEM — S46.019A TRAUMATIC ROTATOR CUFF TEAR: Status: ACTIVE | Noted: 2021-09-02

## 2024-04-30 PROBLEM — M75.00 ADHESIVE CAPSULITIS OF SHOULDER: Status: ACTIVE | Noted: 2024-01-24

## 2024-04-30 PROBLEM — M70.42 PREPATELLAR BURSITIS OF LEFT KNEE: Status: ACTIVE | Noted: 2019-09-15

## 2024-04-30 PROBLEM — S89.80XA ACUTE INJURY OF ANTERIOR CRUCIATE LIGAMENT: Status: ACTIVE | Noted: 2024-04-30

## 2024-04-30 RX ORDER — SEMAGLUTIDE 0.68 MG/ML
INJECTION, SOLUTION SUBCUTANEOUS
COMMUNITY

## 2024-04-30 NOTE — TELEPHONE ENCOUNTER
Followed up with patient regarding MyChart message and symptoms of dizziness.  Patient amendable to come in to office for cbc w/diff and IV fluids.  Pt scheduled for 5/1/2024 at 11am.

## 2024-05-01 ENCOUNTER — HOSPITAL ENCOUNTER (OUTPATIENT)
Dept: HEMATOLOGY ONCOLOGY | Facility: MEDICAL CENTER | Age: 59
End: 2024-05-01
Attending: STUDENT IN AN ORGANIZED HEALTH CARE EDUCATION/TRAINING PROGRAM
Payer: MEDICAID

## 2024-05-01 ENCOUNTER — HOSPITAL ENCOUNTER (OUTPATIENT)
Facility: MEDICAL CENTER | Age: 59
End: 2024-05-01
Attending: STUDENT IN AN ORGANIZED HEALTH CARE EDUCATION/TRAINING PROGRAM
Payer: MEDICAID

## 2024-05-01 VITALS
HEART RATE: 104 BPM | OXYGEN SATURATION: 97 % | SYSTOLIC BLOOD PRESSURE: 110 MMHG | RESPIRATION RATE: 18 BRPM | DIASTOLIC BLOOD PRESSURE: 70 MMHG | BODY MASS INDEX: 26.09 KG/M2 | TEMPERATURE: 97.9 F | WEIGHT: 160.4 LBS

## 2024-05-01 DIAGNOSIS — T45.1X5A ANTINEOPLASTIC CHEMOTHERAPY INDUCED ANEMIA: ICD-10-CM

## 2024-05-01 DIAGNOSIS — Z79.899 ENCOUNTER FOR LONG-TERM CURRENT USE OF HIGH RISK MEDICATION: ICD-10-CM

## 2024-05-01 DIAGNOSIS — D64.81 ANEMIA ASSOCIATED WITH CHEMOTHERAPY: ICD-10-CM

## 2024-05-01 DIAGNOSIS — C79.31 METASTASIS TO BRAIN (HCC): ICD-10-CM

## 2024-05-01 DIAGNOSIS — D64.81 ANTINEOPLASTIC CHEMOTHERAPY INDUCED ANEMIA: ICD-10-CM

## 2024-05-01 DIAGNOSIS — C61 PROSTATE CANCER (HCC): ICD-10-CM

## 2024-05-01 DIAGNOSIS — C79.51 MALIGNANT NEOPLASM METASTATIC TO BONE (HCC): ICD-10-CM

## 2024-05-01 DIAGNOSIS — T45.1X5A ANEMIA ASSOCIATED WITH CHEMOTHERAPY: ICD-10-CM

## 2024-05-01 LAB
ANISOCYTOSIS BLD QL SMEAR: ABNORMAL
BASOPHILS # BLD AUTO: 0 % (ref 0–1.8)
BASOPHILS # BLD: 0 K/UL (ref 0–0.12)
COMMENT NL1176: NORMAL
EOSINOPHIL # BLD AUTO: 0 K/UL (ref 0–0.51)
EOSINOPHIL NFR BLD: 0 % (ref 0–6.9)
ERYTHROCYTE [DISTWIDTH] IN BLOOD BY AUTOMATED COUNT: 76 FL (ref 35.9–50)
HCT VFR BLD AUTO: 26 % (ref 42–52)
HGB BLD-MCNC: 8.7 G/DL (ref 14–18)
LYMPHOCYTES # BLD AUTO: 0 K/UL (ref 1–4.8)
LYMPHOCYTES NFR BLD: 0 % (ref 22–41)
MANUAL DIFF BLD: NORMAL
MCH RBC QN AUTO: 32.7 PG (ref 27–33)
MCHC RBC AUTO-ENTMCNC: 33.5 G/DL (ref 32.3–36.5)
MCV RBC AUTO: 97.7 FL (ref 81.4–97.8)
MONOCYTES # BLD AUTO: 0.16 K/UL (ref 0–0.85)
MONOCYTES NFR BLD AUTO: 1.7 % (ref 0–13.4)
MORPHOLOGY BLD-IMP: NORMAL
NEUTROPHILS # BLD AUTO: 9.04 K/UL (ref 1.82–7.42)
NEUTROPHILS NFR BLD: 96.6 % (ref 44–72)
NEUTS BAND NFR BLD MANUAL: 1.7 % (ref 0–10)
NRBC # BLD AUTO: 0 K/UL
NRBC BLD-RTO: 0 /100 WBC (ref 0–0.2)
PLATELET # BLD AUTO: 68 K/UL (ref 164–446)
PLATELET BLD QL SMEAR: NORMAL
PLATELETS.RETICULATED NFR BLD AUTO: 5.6 % (ref 0.6–13.1)
PMV BLD AUTO: 11.5 FL (ref 9–12.9)
RBC # BLD AUTO: 2.66 M/UL (ref 4.7–6.1)
RBC BLD AUTO: PRESENT
TOXIC GRANULES BLD QL SMEAR: NORMAL
WBC # BLD AUTO: 9.2 K/UL (ref 4.8–10.8)

## 2024-05-01 RX ORDER — SODIUM CHLORIDE 9 MG/ML
1000 INJECTION, SOLUTION INTRAVENOUS ONCE
Status: CANCELLED | OUTPATIENT
Start: 2024-05-01 | End: 2024-05-01

## 2024-05-01 RX ORDER — 0.9 % SODIUM CHLORIDE 0.9 %
10 VIAL (ML) INJECTION PRN
Status: CANCELLED | OUTPATIENT
Start: 2024-05-01

## 2024-05-01 RX ORDER — 0.9 % SODIUM CHLORIDE 0.9 %
3 VIAL (ML) INJECTION PRN
Status: CANCELLED | OUTPATIENT
Start: 2024-05-01

## 2024-05-01 RX ORDER — SODIUM CHLORIDE 9 MG/ML
1000 INJECTION, SOLUTION INTRAVENOUS ONCE
Status: COMPLETED | OUTPATIENT
Start: 2024-05-01 | End: 2024-05-01

## 2024-05-01 RX ORDER — 0.9 % SODIUM CHLORIDE 0.9 %
VIAL (ML) INJECTION PRN
Status: CANCELLED | OUTPATIENT
Start: 2024-05-01

## 2024-05-01 RX ADMIN — SODIUM CHLORIDE 1000 ML: 9 INJECTION, SOLUTION INTRAVENOUS at 11:20

## 2024-05-01 ASSESSMENT — FIBROSIS 4 INDEX: FIB4 SCORE: 5.08

## 2024-05-01 ASSESSMENT — PAIN SCALES - GENERAL: PAINLEVEL: 4=SLIGHT-MODERATE PAIN

## 2024-05-01 NOTE — PROGRESS NOTES
Patient presents to Medical Oncology for labs, hydration and EKG. Pt tachycardic with  upon arrival, /82.  Established PIV in right AC. Brisk blood return obtained CBC w/diff collected. Performed EKG in clinic. Pt in sinus rhythm. Administered 1 LNS bolus over 60 minutes (start time 1120 stop time 1220). Upon completion VSS. Gauze and tape applied to site as dressing.     Reviewed lab results with patient and provided chemotherapy education folder with Med Onc contact information and when to call our office. Pt released ambulatory accompanied by himself in no apparent distress.

## 2024-05-01 NOTE — ADDENDUM NOTE
Encounter addended by: Flaco Loyola R.N. on: 5/1/2024 2:11 PM   Actions taken: Order list changed, Diagnosis association updated, Clinical Note Signed

## 2024-05-08 ENCOUNTER — HOSPITAL ENCOUNTER (OUTPATIENT)
Dept: LAB | Facility: MEDICAL CENTER | Age: 59
End: 2024-05-08
Attending: UROLOGY
Payer: MEDICAID

## 2024-05-08 LAB
ALBUMIN SERPL BCP-MCNC: 3.8 G/DL (ref 3.2–4.9)
ALBUMIN/GLOB SERPL: 2.1 G/DL
ALP SERPL-CCNC: 81 U/L (ref 30–99)
ALT SERPL-CCNC: 7 U/L (ref 2–50)
ANION GAP SERPL CALC-SCNC: 11 MMOL/L (ref 7–16)
AST SERPL-CCNC: 23 U/L (ref 12–45)
BILIRUB SERPL-MCNC: 0.4 MG/DL (ref 0.1–1.5)
BUN SERPL-MCNC: 10 MG/DL (ref 8–22)
CALCIUM ALBUM COR SERPL-MCNC: 7.3 MG/DL (ref 8.5–10.5)
CALCIUM SERPL-MCNC: 7.1 MG/DL (ref 8.5–10.5)
CHLORIDE SERPL-SCNC: 104 MMOL/L (ref 96–112)
CO2 SERPL-SCNC: 21 MMOL/L (ref 20–33)
CREAT SERPL-MCNC: 0.36 MG/DL (ref 0.5–1.4)
GFR SERPLBLD CREATININE-BSD FMLA CKD-EPI: 131 ML/MIN/1.73 M 2
GLOBULIN SER CALC-MCNC: 1.8 G/DL (ref 1.9–3.5)
GLUCOSE SERPL-MCNC: 117 MG/DL (ref 65–99)
POTASSIUM SERPL-SCNC: 4 MMOL/L (ref 3.6–5.5)
PROT SERPL-MCNC: 5.6 G/DL (ref 6–8.2)
PSA SERPL-MCNC: 1.82 NG/ML (ref 0–4)
SODIUM SERPL-SCNC: 136 MMOL/L (ref 135–145)
TESTOST SERPL-MCNC: <3 NG/DL (ref 175–781)

## 2024-05-13 DIAGNOSIS — C61 PROSTATE CANCER METASTATIC TO BONE (HCC): ICD-10-CM

## 2024-05-13 DIAGNOSIS — G89.3 CANCER RELATED PAIN: ICD-10-CM

## 2024-05-13 DIAGNOSIS — C79.51 PROSTATE CANCER METASTATIC TO BONE (HCC): ICD-10-CM

## 2024-05-13 RX ORDER — HYDROCODONE BITARTRATE AND ACETAMINOPHEN 10; 325 MG/1; MG/1
1 TABLET ORAL EVERY 6 HOURS PRN
Qty: 120 TABLET | Refills: 0 | Status: SHIPPED | OUTPATIENT
Start: 2024-05-13 | End: 2024-06-12

## 2024-05-13 RX ORDER — MORPHINE SULFATE 15 MG/1
15 TABLET, FILM COATED, EXTENDED RELEASE ORAL EVERY 12 HOURS
Qty: 60 TABLET | Refills: 0 | Status: SHIPPED | OUTPATIENT
Start: 2024-05-13 | End: 2024-06-12

## 2024-05-14 ENCOUNTER — HOSPITAL ENCOUNTER (OUTPATIENT)
Dept: RADIOLOGY | Facility: MEDICAL CENTER | Age: 59
End: 2024-05-14
Attending: STUDENT IN AN ORGANIZED HEALTH CARE EDUCATION/TRAINING PROGRAM
Payer: MEDICAID

## 2024-05-14 ENCOUNTER — HOSPITAL ENCOUNTER (OUTPATIENT)
Dept: LAB | Facility: MEDICAL CENTER | Age: 59
End: 2024-05-14
Attending: STUDENT IN AN ORGANIZED HEALTH CARE EDUCATION/TRAINING PROGRAM
Payer: MEDICAID

## 2024-05-14 DIAGNOSIS — C61 PROSTATE CANCER (HCC): ICD-10-CM

## 2024-05-14 DIAGNOSIS — C79.31 METASTASIS TO BRAIN (HCC): ICD-10-CM

## 2024-05-14 LAB
ALBUMIN SERPL BCP-MCNC: 3.8 G/DL (ref 3.2–4.9)
ALBUMIN/GLOB SERPL: 1.9 G/DL
ALP SERPL-CCNC: 70 U/L (ref 30–99)
ALT SERPL-CCNC: 10 U/L (ref 2–50)
ANION GAP SERPL CALC-SCNC: 12 MMOL/L (ref 7–16)
ANISOCYTOSIS BLD QL SMEAR: ABNORMAL
AST SERPL-CCNC: 25 U/L (ref 12–45)
BASOPHILS # BLD AUTO: 0.3 % (ref 0–1.8)
BASOPHILS # BLD: 0.02 K/UL (ref 0–0.12)
BILIRUB SERPL-MCNC: 0.5 MG/DL (ref 0.1–1.5)
BUN SERPL-MCNC: 7 MG/DL (ref 8–22)
BURR CELLS BLD QL SMEAR: NORMAL
CALCIUM ALBUM COR SERPL-MCNC: 8.2 MG/DL (ref 8.5–10.5)
CALCIUM SERPL-MCNC: 8 MG/DL (ref 8.5–10.5)
CHLORIDE SERPL-SCNC: 102 MMOL/L (ref 96–112)
CO2 SERPL-SCNC: 20 MMOL/L (ref 20–33)
COMMENT 1642: NORMAL
CREAT SERPL-MCNC: 0.39 MG/DL (ref 0.5–1.4)
DACRYOCYTES BLD QL SMEAR: NORMAL
EOSINOPHIL # BLD AUTO: 0.02 K/UL (ref 0–0.51)
EOSINOPHIL NFR BLD: 0.3 % (ref 0–6.9)
ERYTHROCYTE [DISTWIDTH] IN BLOOD BY AUTOMATED COUNT: 84.4 FL (ref 35.9–50)
GFR SERPLBLD CREATININE-BSD FMLA CKD-EPI: 128 ML/MIN/1.73 M 2
GLOBULIN SER CALC-MCNC: 2 G/DL (ref 1.9–3.5)
GLUCOSE SERPL-MCNC: 105 MG/DL (ref 65–99)
HCT VFR BLD AUTO: 19.3 % (ref 42–52)
HGB BLD-MCNC: 6.2 G/DL (ref 14–18)
IMM GRANULOCYTES # BLD AUTO: 0.03 K/UL (ref 0–0.11)
IMM GRANULOCYTES NFR BLD AUTO: 0.5 % (ref 0–0.9)
LYMPHOCYTES # BLD AUTO: 0.21 K/UL (ref 1–4.8)
LYMPHOCYTES NFR BLD: 3.6 % (ref 22–41)
MCH RBC QN AUTO: 33.2 PG (ref 27–33)
MCHC RBC AUTO-ENTMCNC: 32.1 G/DL (ref 32.3–36.5)
MCV RBC AUTO: 103.2 FL (ref 81.4–97.8)
MICROCYTES BLD QL SMEAR: ABNORMAL
MONOCYTES # BLD AUTO: 0.41 K/UL (ref 0–0.85)
MONOCYTES NFR BLD AUTO: 7.1 % (ref 0–13.4)
MORPHOLOGY BLD-IMP: NORMAL
NEUTROPHILS # BLD AUTO: 5.07 K/UL (ref 1.82–7.42)
NEUTROPHILS NFR BLD: 88.2 % (ref 44–72)
NRBC # BLD AUTO: 0 K/UL
NRBC BLD-RTO: 0 /100 WBC (ref 0–0.2)
OVALOCYTES BLD QL SMEAR: NORMAL
PLATELET # BLD AUTO: 70 K/UL (ref 164–446)
PLATELET BLD QL SMEAR: NORMAL
PLATELETS.RETICULATED NFR BLD AUTO: 8.9 % (ref 0.6–13.1)
PMV BLD AUTO: 11.5 FL (ref 9–12.9)
POIKILOCYTOSIS BLD QL SMEAR: NORMAL
POTASSIUM SERPL-SCNC: 5 MMOL/L (ref 3.6–5.5)
PROT SERPL-MCNC: 5.8 G/DL (ref 6–8.2)
PSA SERPL-MCNC: 2.05 NG/ML (ref 0–4)
RBC # BLD AUTO: 1.87 M/UL (ref 4.7–6.1)
RBC BLD AUTO: PRESENT
SODIUM SERPL-SCNC: 134 MMOL/L (ref 135–145)
TOXIC GRANULES BLD QL SMEAR: NORMAL
WBC # BLD AUTO: 5.8 K/UL (ref 4.8–10.8)
WBC TOXIC VACUOLES BLD QL SMEAR: NORMAL

## 2024-05-14 RX ADMIN — GADOTERIDOL 15 ML: 279.3 INJECTION, SOLUTION INTRAVENOUS at 11:22

## 2024-05-14 NOTE — PROGRESS NOTES
"Pharmacy Chemotherapy Calculation:    Dx: Castrate-resistant prostate adenocarcinoma metastatic    Protocol: CABAZItaxel/CARBOplatin/PredniSONE  *Dosing Reference*  CABAZItaxel 20 mg/m² IV over 60 minutes on Day 1   CARBOplatin AUC 4 IV over 30 minutes on Day 1   PredniSONE 5 mg PO twice daily on Days 1 - 21  21-day cycle until disease progression or unacceptable toxicity     NCCN Guidelines® for Prostate Cancer V.1.2023.   Andry PG, et al. Lancet Oncol. 2019;20(10):6558-8407.a    Allergies:  Patient has no known allergies.       /84   Pulse (!) 125   Temp 36.7 °C (98 °F) (Temporal)   Resp 18   Ht 1.67 m (5' 5.75\")   Wt 74 kg (163 lb 2.3 oz)   SpO2 98%   BMI 26.53 kg/m²   Body surface area is 1.85 meters squared.    Labs 5/14/24:  ANC~ 5070 Plt = 70 k   Hgb = 6.2     SCr = 0.39 mg/dL CrCl ~ 121 mL/min   AST/ALT/AP = WNL TBili = 0.5    *Okay to proceed with treatment today per Dr Baires    Drug Order   (Drug name, dose, route, IV Fluid & volume, frequency, number of doses) Cycle 7  Previous treatment: C6 on 4/26/24; s/p docetaxel; enzalutamide; Radium-223     Medication = CABAZItaxel   Base Dose = 20 mg/m2  Calc Dose: Base Dose x 1.85 m2 = 37 mg  Final Dose = 36 mg  Route = IV  Fluid & Volume =  mL  Admin Duration = Over 60 minutes       <10% difference, OK to treat with final dose   Medication = CARBOplatin   Base Dose = AUC 4  Calc Dose: Base Dose x (121 mL/min + 25) = 584 mg  Final Dose = 580 mg  Route = IV  Fluid & Volume =  mL  Admin Duration = Over 30 minutes          <10% difference, OK to treat with final dose     By my signature below, I confirm this process was performed independently with the BSA and all final chemotherapy dosing calculations congruent. I have reviewed the above chemotherapy order and that my calculation of the final dose and BSA (when applicable) corroborate those calculations of the  pharmacist. Discrepancies of 10% or greater in the written dose have " been addressed and documented within the EPIC Progress notes.    Gabriela Shaikh, PharmD,

## 2024-05-15 ENCOUNTER — APPOINTMENT (OUTPATIENT)
Dept: HEMATOLOGY ONCOLOGY | Facility: MEDICAL CENTER | Age: 59
End: 2024-05-15
Payer: MEDICAID

## 2024-05-16 ENCOUNTER — APPOINTMENT (OUTPATIENT)
Dept: HEMATOLOGY ONCOLOGY | Facility: MEDICAL CENTER | Age: 59
End: 2024-05-16
Payer: MEDICAID

## 2024-05-16 ENCOUNTER — TELEPHONE (OUTPATIENT)
Dept: HEMATOLOGY ONCOLOGY | Facility: MEDICAL CENTER | Age: 59
End: 2024-05-16
Payer: MEDICAID

## 2024-05-16 RX ORDER — ONDANSETRON 8 MG/1
8 TABLET, ORALLY DISINTEGRATING ORAL PRN
Status: CANCELLED | OUTPATIENT
Start: 2024-05-17

## 2024-05-16 RX ORDER — 0.9 % SODIUM CHLORIDE 0.9 %
10 VIAL (ML) INJECTION PRN
OUTPATIENT
Start: 2024-05-16

## 2024-05-16 RX ORDER — SODIUM CHLORIDE 9 MG/ML
1000 INJECTION, SOLUTION INTRAVENOUS ONCE
OUTPATIENT
Start: 2024-05-16 | End: 2024-05-16

## 2024-05-16 RX ORDER — METHYLPREDNISOLONE SODIUM SUCCINATE 125 MG/2ML
125 INJECTION, POWDER, LYOPHILIZED, FOR SOLUTION INTRAMUSCULAR; INTRAVENOUS PRN
Status: CANCELLED | OUTPATIENT
Start: 2024-05-17

## 2024-05-16 RX ORDER — 0.9 % SODIUM CHLORIDE 0.9 %
VIAL (ML) INJECTION PRN
Status: CANCELLED | OUTPATIENT
Start: 2024-05-16

## 2024-05-16 RX ORDER — 0.9 % SODIUM CHLORIDE 0.9 %
3 VIAL (ML) INJECTION PRN
OUTPATIENT
Start: 2024-05-16

## 2024-05-16 RX ORDER — PROCHLORPERAZINE MALEATE 10 MG
10 TABLET ORAL EVERY 6 HOURS PRN
Status: CANCELLED | OUTPATIENT
Start: 2024-05-17

## 2024-05-16 RX ORDER — 0.9 % SODIUM CHLORIDE 0.9 %
10 VIAL (ML) INJECTION PRN
Status: CANCELLED | OUTPATIENT
Start: 2024-05-16

## 2024-05-16 RX ORDER — DEXAMETHASONE SODIUM PHOSPHATE 4 MG/ML
8 INJECTION, SOLUTION INTRA-ARTICULAR; INTRALESIONAL; INTRAMUSCULAR; INTRAVENOUS; SOFT TISSUE ONCE
Status: CANCELLED | OUTPATIENT
Start: 2024-05-17 | End: 2024-05-17

## 2024-05-16 RX ORDER — 0.9 % SODIUM CHLORIDE 0.9 %
VIAL (ML) INJECTION PRN
OUTPATIENT
Start: 2024-05-16

## 2024-05-16 RX ORDER — EPINEPHRINE 1 MG/ML(1)
0.5 AMPUL (ML) INJECTION PRN
Status: CANCELLED | OUTPATIENT
Start: 2024-05-17

## 2024-05-16 RX ORDER — ONDANSETRON 2 MG/ML
4 INJECTION INTRAMUSCULAR; INTRAVENOUS PRN
Status: CANCELLED | OUTPATIENT
Start: 2024-05-17

## 2024-05-16 RX ORDER — DIPHENHYDRAMINE HYDROCHLORIDE 50 MG/ML
50 INJECTION INTRAMUSCULAR; INTRAVENOUS PRN
Status: CANCELLED | OUTPATIENT
Start: 2024-05-17

## 2024-05-16 RX ORDER — SODIUM CHLORIDE 9 MG/ML
INJECTION, SOLUTION INTRAVENOUS CONTINUOUS
Status: CANCELLED | OUTPATIENT
Start: 2024-05-17

## 2024-05-16 RX ORDER — 0.9 % SODIUM CHLORIDE 0.9 %
10 VIAL (ML) INJECTION PRN
Status: CANCELLED | OUTPATIENT
Start: 2024-05-17

## 2024-05-16 RX ORDER — 0.9 % SODIUM CHLORIDE 0.9 %
3 VIAL (ML) INJECTION PRN
Status: CANCELLED | OUTPATIENT
Start: 2024-05-16

## 2024-05-16 RX ORDER — 0.9 % SODIUM CHLORIDE 0.9 %
3 VIAL (ML) INJECTION PRN
Status: CANCELLED | OUTPATIENT
Start: 2024-05-17

## 2024-05-16 RX ORDER — 0.9 % SODIUM CHLORIDE 0.9 %
VIAL (ML) INJECTION PRN
Status: CANCELLED | OUTPATIENT
Start: 2024-05-17

## 2024-05-16 NOTE — TELEPHONE ENCOUNTER
Called patient and notified him that Dr. Baires has ordered 2 units of blood and infusion services is able to accommodate giving it to him tomorrow during his chemotherapy apt.  Educated patient to call 911 or go to the nearest ER if he is having worsening symptoms including chest pain, shortness of breath, weakness, lightheadedness/dizziness.  Pt verbalized understanding and agreed.

## 2024-05-17 ENCOUNTER — PATIENT MESSAGE (OUTPATIENT)
Dept: ONCOLOGY | Facility: MEDICAL CENTER | Age: 59
End: 2024-05-17

## 2024-05-17 ENCOUNTER — PATIENT OUTREACH (OUTPATIENT)
Dept: ONCOLOGY | Facility: MEDICAL CENTER | Age: 59
End: 2024-05-17

## 2024-05-17 ENCOUNTER — HOSPITAL ENCOUNTER (OUTPATIENT)
Dept: HEMATOLOGY ONCOLOGY | Facility: MEDICAL CENTER | Age: 59
End: 2024-05-17
Attending: STUDENT IN AN ORGANIZED HEALTH CARE EDUCATION/TRAINING PROGRAM
Payer: MEDICAID

## 2024-05-17 ENCOUNTER — OUTPATIENT INFUSION SERVICES (OUTPATIENT)
Dept: ONCOLOGY | Facility: MEDICAL CENTER | Age: 59
End: 2024-05-17
Attending: STUDENT IN AN ORGANIZED HEALTH CARE EDUCATION/TRAINING PROGRAM
Payer: MEDICAID

## 2024-05-17 VITALS
HEART RATE: 80 BPM | RESPIRATION RATE: 18 BRPM | WEIGHT: 163.14 LBS | HEIGHT: 66 IN | DIASTOLIC BLOOD PRESSURE: 73 MMHG | OXYGEN SATURATION: 98 % | SYSTOLIC BLOOD PRESSURE: 103 MMHG | BODY MASS INDEX: 26.22 KG/M2 | TEMPERATURE: 98.3 F

## 2024-05-17 VITALS
RESPIRATION RATE: 16 BRPM | WEIGHT: 166 LBS | TEMPERATURE: 97.7 F | SYSTOLIC BLOOD PRESSURE: 130 MMHG | DIASTOLIC BLOOD PRESSURE: 62 MMHG | BODY MASS INDEX: 26.68 KG/M2 | OXYGEN SATURATION: 99 % | HEIGHT: 66 IN | HEART RATE: 107 BPM

## 2024-05-17 DIAGNOSIS — D64.81 ANEMIA ASSOCIATED WITH CHEMOTHERAPY: ICD-10-CM

## 2024-05-17 DIAGNOSIS — C61 PROSTATE CANCER (HCC): ICD-10-CM

## 2024-05-17 DIAGNOSIS — T45.1X5A ANEMIA ASSOCIATED WITH CHEMOTHERAPY: ICD-10-CM

## 2024-05-17 LAB
ABO GROUP BLD: NORMAL
BARCODED ABORH UBTYP: 5100
BARCODED ABORH UBTYP: 5100
BARCODED PRD CODE UBPRD: NORMAL
BARCODED PRD CODE UBPRD: NORMAL
BARCODED UNIT NUM UBUNT: NORMAL
BARCODED UNIT NUM UBUNT: NORMAL
BASOPHILS # BLD AUTO: 0 % (ref 0–1.8)
BASOPHILS # BLD: 0 K/UL (ref 0–0.12)
BLD GP AB SCN SERPL QL: NORMAL
COMPONENT R 8504R: NORMAL
COMPONENT R 8504R: NORMAL
EOSINOPHIL # BLD AUTO: 0 K/UL (ref 0–0.51)
EOSINOPHIL NFR BLD: 0 % (ref 0–6.9)
ERYTHROCYTE [DISTWIDTH] IN BLOOD BY AUTOMATED COUNT: 87.3 FL (ref 35.9–50)
HCT VFR BLD AUTO: 19.4 % (ref 42–52)
HGB BLD-MCNC: 6.2 G/DL (ref 14–18)
IMM GRANULOCYTES # BLD AUTO: 0.03 K/UL (ref 0–0.11)
IMM GRANULOCYTES NFR BLD AUTO: 0.8 % (ref 0–0.9)
LYMPHOCYTES # BLD AUTO: 0.12 K/UL (ref 1–4.8)
LYMPHOCYTES NFR BLD: 3.3 % (ref 22–41)
MCH RBC QN AUTO: 33.3 PG (ref 27–33)
MCHC RBC AUTO-ENTMCNC: 32 G/DL (ref 32.3–36.5)
MCV RBC AUTO: 104.3 FL (ref 81.4–97.8)
MONOCYTES # BLD AUTO: 0.27 K/UL (ref 0–0.85)
MONOCYTES NFR BLD AUTO: 7.3 % (ref 0–13.4)
NEUTROPHILS # BLD AUTO: 3.27 K/UL (ref 1.82–7.42)
NEUTROPHILS NFR BLD: 88.6 % (ref 44–72)
NRBC # BLD AUTO: 0 K/UL
NRBC BLD-RTO: 0 /100 WBC (ref 0–0.2)
OUTPT INFUS CBC COMMENT OICOM: ABNORMAL
PLATELET # BLD AUTO: 79 K/UL (ref 164–446)
PLATELETS.RETICULATED NFR BLD AUTO: 6.8 % (ref 0.6–13.1)
PMV BLD AUTO: 11.5 FL (ref 9–12.9)
PRODUCT TYPE UPROD: NORMAL
PRODUCT TYPE UPROD: NORMAL
RBC # BLD AUTO: 1.86 M/UL (ref 4.7–6.1)
RH BLD: NORMAL
UNIT STATUS USTAT: NORMAL
UNIT STATUS USTAT: NORMAL
WBC # BLD AUTO: 3.7 K/UL (ref 4.8–10.8)

## 2024-05-17 PROCEDURE — 99214 OFFICE O/P EST MOD 30 MIN: CPT | Performed by: STUDENT IN AN ORGANIZED HEALTH CARE EDUCATION/TRAINING PROGRAM

## 2024-05-17 RX ORDER — ACETAMINOPHEN 325 MG/1
650 TABLET ORAL ONCE
Status: COMPLETED | OUTPATIENT
Start: 2024-05-17 | End: 2024-05-17

## 2024-05-17 RX ORDER — DIPHENHYDRAMINE HCL 25 MG
25 TABLET ORAL ONCE
Status: COMPLETED | OUTPATIENT
Start: 2024-05-17 | End: 2024-05-17

## 2024-05-17 RX ORDER — ACETAMINOPHEN 325 MG/1
650 TABLET ORAL ONCE
Status: CANCELLED | OUTPATIENT
Start: 2024-05-17

## 2024-05-17 RX ORDER — DIPHENHYDRAMINE HYDROCHLORIDE 50 MG/ML
25 INJECTION INTRAMUSCULAR; INTRAVENOUS PRN
Status: CANCELLED | OUTPATIENT
Start: 2024-05-17

## 2024-05-17 RX ORDER — 0.9 % SODIUM CHLORIDE 0.9 %
10 VIAL (ML) INJECTION PRN
Status: CANCELLED | OUTPATIENT
Start: 2024-05-17

## 2024-05-17 RX ORDER — 0.9 % SODIUM CHLORIDE 0.9 %
3 VIAL (ML) INJECTION PRN
Status: CANCELLED | OUTPATIENT
Start: 2024-05-17

## 2024-05-17 RX ORDER — SODIUM CHLORIDE 9 MG/ML
INJECTION, SOLUTION INTRAVENOUS CONTINUOUS
Status: DISCONTINUED | OUTPATIENT
Start: 2024-05-17 | End: 2024-05-17 | Stop reason: HOSPADM

## 2024-05-17 RX ORDER — DIPHENHYDRAMINE HCL 25 MG
25 TABLET ORAL ONCE
Status: CANCELLED | OUTPATIENT
Start: 2024-05-17 | End: 2024-05-17

## 2024-05-17 RX ORDER — SODIUM CHLORIDE 9 MG/ML
INJECTION, SOLUTION INTRAVENOUS CONTINUOUS
Status: CANCELLED | OUTPATIENT
Start: 2024-05-17

## 2024-05-17 RX ORDER — 0.9 % SODIUM CHLORIDE 0.9 %
VIAL (ML) INJECTION PRN
Status: CANCELLED | OUTPATIENT
Start: 2024-05-17

## 2024-05-17 RX ORDER — DEXAMETHASONE SODIUM PHOSPHATE 4 MG/ML
8 INJECTION, SOLUTION INTRA-ARTICULAR; INTRALESIONAL; INTRAMUSCULAR; INTRAVENOUS; SOFT TISSUE ONCE
Status: DISCONTINUED | OUTPATIENT
Start: 2024-05-17 | End: 2024-05-17 | Stop reason: HOSPADM

## 2024-05-17 RX ORDER — ACETAMINOPHEN 325 MG/1
650 TABLET ORAL PRN
Status: CANCELLED | OUTPATIENT
Start: 2024-05-17

## 2024-05-17 RX ADMIN — SODIUM CHLORIDE 36 MG: 0.9 INJECTION, SOLUTION INTRAVENOUS at 17:06

## 2024-05-17 RX ADMIN — CARBOPLATIN 580 MG: 10 INJECTION, SOLUTION INTRAVENOUS at 16:25

## 2024-05-17 RX ADMIN — HYDROCORTISONE SODIUM SUCCINATE 100 MG: 100 INJECTION, POWDER, FOR SOLUTION INTRAMUSCULAR; INTRAVENOUS at 11:49

## 2024-05-17 RX ADMIN — DIPHENHYDRAMINE HYDROCHLORIDE 25 MG: 25 TABLET ORAL at 11:48

## 2024-05-17 RX ADMIN — ACETAMINOPHEN 650 MG: 325 TABLET ORAL at 11:48

## 2024-05-17 RX ADMIN — FAMOTIDINE 20 MG: 10 INJECTION, SOLUTION INTRAVENOUS at 16:16

## 2024-05-17 ASSESSMENT — ENCOUNTER SYMPTOMS
BLURRED VISION: 0
HEARTBURN: 0
WHEEZING: 0
CHILLS: 0
NAUSEA: 0
BLOOD IN STOOL: 0
INSOMNIA: 0
DIZZINESS: 0
SORE THROAT: 0
TINGLING: 0
ORTHOPNEA: 0
WEIGHT LOSS: 0
SHORTNESS OF BREATH: 0
FEVER: 0
DIAPHORESIS: 0
WEAKNESS: 0
VOMITING: 0
PALPITATIONS: 0
BRUISES/BLEEDS EASILY: 0
SINUS PAIN: 0
DIARRHEA: 0
CONSTIPATION: 0
COUGH: 0
HEADACHES: 0
ABDOMINAL PAIN: 0
NERVOUS/ANXIOUS: 0
MYALGIAS: 1
DOUBLE VISION: 0
BACK PAIN: 1
SPUTUM PRODUCTION: 0

## 2024-05-17 ASSESSMENT — PAIN SCALES - GENERAL: PAINLEVEL: 4=SLIGHT-MODERATE PAIN

## 2024-05-17 ASSESSMENT — FIBROSIS 4 INDEX
FIB4 SCORE: 6.44
FIB4 SCORE: 5.7

## 2024-05-17 NOTE — PROGRESS NOTES
"Pharmacy Chemotherapy Calculations    Dx: Metastatic Prostate Cancer  Cycle 7  Previous treatment = C6 on 4/26/24 (C1 and C2 given at [facility] [place])    Protocol: Cabazitaxel + CARBOplatin  CABAZItaxel 20 mg/m² IV over 60 minutes on Day 1   CARBOplatin AUC 4 IV over 30 minutes on Day 1   PredniSONE 5 mg PO twice daily on Days 1 - 21  21-day cycle until disease progression or unacceptable toxicity   NCCNGuidelines® for Prostate Cancer V.1.2023.   Andry PG , et al. Lancet Oncol. 2019;20(10):9300-8252.a    Allergies:  Patient has no known allergies.     /84   Pulse (!) 125   Temp 36.7 °C (98 °F) (Temporal)   Resp 18   Ht 1.67 m (5' 5.75\")   Wt 74 kg (163 lb 2.3 oz)   SpO2 98%   BMI 26.53 kg/m²  Body surface area is 1.85 meters squared.    Labs 5/14/24  ANC~ 5070 Plt = 70k   Hgb = 6.2     SCr = 0.39 mg/dL CrCl ~ 120.9 mL/min (minimum SCr of 0.7)   LFT's = WNLs  TBili = 0.5   Dr. Baires aware, okay to proceed with treatment today    CABAZitaxel 20 mg/m² x 1.85 m² = 37 mg   <10% difference, OK to treat with final dose = 36 mg IV    CARBOplatin  AUC 4 (120.9 mL/min + 25) = 583.6 mg   <10% difference, OK to treat with final dose = 580 mg IV    Roshan Jean, PharmD    "

## 2024-05-17 NOTE — PROGRESS NOTES
Chemotherapy Verification - SECONDARY RN       Height = 167 cm  Weight = 74 kg  BSA = 1.85 m2       Medication: Carboplatin  Dose:  AUC = 4  Calculated Dose: 587.5 mg                             (In mg/m2, AUC, mg/kg)     Medication: Jevtana  Dose: 20 mg/m2  Calculated Dose: 37 mg                             (In mg/m2, AUC, mg/kg)      Carboplatin calculation (if applicable):  (((140-57)*[id number])*(0.7+(2*0.15))/(0.7*72)+25)*4*((2=1)+(2=2)) = 587.461 mg    I confirm that this process was performed independently.

## 2024-05-17 NOTE — ADDENDUM NOTE
Encounter addended by: Yanelis Polk, Med Ass't on: 5/17/2024 11:53 AM   Actions taken: Vitals modified, Charge Capture section accepted

## 2024-05-17 NOTE — PROGRESS NOTES
[facility] for Day 1/ Cycle 7 of Cabazitaxel + Carboplatin for Prostate ca. Pt denied having any new or acute complaints today, reports tolerating past treatments well. Labs were drawn on 5/14, Hgb 6.2 and PLT 70. Patient was contacted by Provider and was notified that he will have a blood transfusion this visit. Clarified with Dr. Baires, patient is okay to proceed with treatment due to abnormal labs. PIV started obtained on the RAC, 22G, 1 attempt, had + blood return flushed briskly and labs collected. Blood consent was obtained. Premedications were given, refer to MAR. 2 Units of PRBCs were transfused and patient tolerated well. Additional premedication, Pepcid, given refer to MAR. Pt given Cabazitaxel + Carboplatin as prescribed, tolerated well, denied having any complaints during or after infusion. PIV discontinued, bleeding controlled with gauze and coban. Next appointment scheduled. Patient left [facility] and no signs of distress.

## 2024-05-17 NOTE — PROGRESS NOTES
Follow Up Note:  Hematology/Oncology      Primary Care:  KOKI Fagan    Diagnosis: Metastatic castrate-resistant prostate adenocarcinoma    Chief Complaint: On-treatment visit    Current Treatment: Carboplatin with cabazitaxel, leuprolide    Prior Treatment: Firmagon, enzalutamide, radium 223, docetaxel, leuprolide, resection of brain mets, palliative XRT    Oncology History of Presenting Illness:  Casper Rowley is a 58 y.o.  man who presents to the clinic for transfer of care for ongoing management of metastatic castrate resistant prostate adenocarcinoma.  He was originally diagnosed in 2021 and was metastatic at that time with bone metastases, and was treated with engine deprivation therapy as well as enzalutamide.  He got radium 223 and 2022 and was then treated with docetaxel for 11 cycles, as well as getting radiation therapy to thoracic spinal metastases as well as his adrenals.  His oncology history is summarized below:     Oncologic History:  6/21 Obstructive sxs with abnl DAYRON PSA 7  6/21 Presented with LBP worsening leg pain prompting spine MR, myeloma STRICKLAND negative, PSA 20   7/13/21 L2 Bone biopsy: prostate cancer  7/26/21 TRUSBP: [place] 5+5, 1/12 core 5%, 4+4=5/12 cores 80-95%  7/28/21 Firmagon 240mg  9/04/21 PSA: 1.4 Alk Phos 681  9/8/21 Denosumab started  9/27/21 Enzalutamide started  11/11/21 PSA 1.05 chapito  3/15/22 PSA 1.8  3-5/25/22 Victoria Vera-223 x 3, paused due to decreased counts  5/6/22 RT to L2  6/10/22 PSA 4.07  8/19/22 PSA 1.73  8/29/22 Started Docetaxel with Dr. Butts and discontinued enzalutamide.   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 Be.  10/20/23 PSA 0.76  12/2023 Receiving RT to  his adrenals and lower thoracic spine.      He was started on carboplatin and cabazitaxel, and was evaluated for PSMA Pluvicto therapy, but his disease is not PSMA avid and therefore he has been on chemotherapy, which he has been doing okay with. However, he decided he wanted to switch physicians due to his oncologist frankly telling him about the aggressive nature of his disease and the poor prognosis associated with it. He was subsequently referred to me.     Treatment History: See HPI  02/23/24: C3 carbo/cabazitaxel (first two cycles given at [facility])  03/15/24: C4 carbo/cabazitaxel  04/05/24: C5 carbo/cabazitaxel   04/26/24: C6 carbo/cabazitaxel  05/17/24: C7 carbo/cabazitaxel    Interval History:  Patient is here for follow up visit. He is feeling tired but otherwise tolerating treatment fairly well. He will get a blood transfusion today along with treatment.     Allergies as of 05/17/2024 - Reviewed 05/17/2024   Allergen Reaction Noted    Iodine Unspecified 04/30/2024         Current Outpatient Medications:     morphine ER (MS CONTIN) 15 MG Tab CR tablet, Take 1 Tablet by mouth every 12 hours for 30 days., Disp: 60 Tablet, Rfl: 0    HYDROcodone/acetaminophen (NORCO)  MG Tab, Take 1 Tablet by mouth every 6 hours as needed for Moderate Pain (cancer related pain) for up to 30 days., Disp: 120 Tablet, Rfl: 0    Semaglutide,0.25 or 0.5MG/DOS, (OZEMPIC, 0.25 OR 0.5 MG/DOSE,) 2 MG/3ML Solution Pen-injector, , Disp: , Rfl:     Naloxone (NARCAN) 4 MG/0.1ML Liquid, Administer 4 mg into affected nostril(S) as needed (For severe sleepiness or difficulty breathing from possible overdose. Call 911 after administration.)., Disp: 1 Each, Rfl: 0    clobetasol (TEMOVATE) 0.05 % Cream, Apply to affected area BID for two weeks, Disp: 30 g, Rfl: 2    ondansetron (ZOFRAN ODT) 8 MG TABLET DISPERSIBLE, Take 1 Tablet by mouth every four hours as needed for Nausea., Disp: 180 Tablet, Rfl: 1    zolpidem (AMBIEN) 10 MG Tab, TAKE ONE  TABLET BY MOUTH AT BEDTIME FOR 30 DAYS, Disp: , Rfl:     clotrimazole-betamethasone (LOTRISONE) 1-0.05 % Cream, Apply 1 Application topically as needed., Disp: , Rfl:     cyclobenzaprine (FLEXERIL) 10 mg Tab, Take 10 mg by mouth at bedtime., Disp: , Rfl:     furosemide (LASIX) 20 MG Tab, Take 1 Tablet by mouth as needed., Disp: , Rfl:     hydrocod shreya-chlorphe shreya ER (TUSSIONEX) 10-8 MG/5ML Suspension Extended Release, Take 5 mL by mouth as needed., Disp: , Rfl:     prochlorperazine (COMPAZINE) 10 MG Tab, Take 10 mg by mouth as needed., Disp: , Rfl:     silver sulfADIAZINE (SILVADENE) 1 % Cream, Apply  topically every day., Disp: , Rfl:     tamsulosin (FLOMAX) 0.4 MG capsule, Take 0.4 mg by mouth every day., Disp: , Rfl:     lisinopril (PRINIVIL) 20 MG Tab, Take 1 tablet by mouth every day., Disp: 30 Tablet, Rfl: 2    metoprolol SR (TOPROL XL) 25 MG TABLET SR 24 HR, Take 2 tablets by mouth every day., Disp: 60 Tablet, Rfl: 2    Canagliflozin (INVOKANA) 100 MG Tab, Take 100 mg by mouth every day., Disp: , Rfl:     Dulaglutide (TRULICITY) 1.5 MG/0.5ML Solution Pen-injector, Inject 1.5 mg as instructed every Saturday., Disp: , Rfl:     Cholecalciferol (VITAMIN D) 2000 UNITS CAPS, Take 6,000 Units by mouth every day. 2000 units x 3 tablets = 6000 mcg (Patient not taking: Reported on 3/15/2024), Disp: , Rfl:       Review of Systems:  Review of Systems   Constitutional:  Positive for malaise/fatigue. Negative for chills, diaphoresis, fever and weight loss.   HENT:  Negative for hearing loss, nosebleeds, sinus pain and sore throat.    Eyes:  Negative for blurred vision and double vision.   Respiratory:  Negative for cough, sputum production, shortness of breath and wheezing.    Cardiovascular:  Negative for chest pain, palpitations, orthopnea and leg swelling.   Gastrointestinal:  Negative for abdominal pain, blood in stool, constipation, diarrhea, heartburn, melena, nausea and vomiting.   Genitourinary:  Negative for  dysuria, frequency, hematuria and urgency.   Musculoskeletal:  Positive for back pain and myalgias. Negative for joint pain.   Skin:  Negative for rash.   Neurological:  Negative for dizziness, tingling, weakness and headaches.   Endo/Heme/Allergies:  Does not bruise/bleed easily.   Psychiatric/Behavioral:  The patient is not nervous/anxious and does not have insomnia.          Physical Exam:  There were no vitals filed for this visit.      DESC; KARNOFSKY SCALE WITH ECOG EQUIVALENT: 100, Fully active, able to carry on all pre-disease performed without restriction (ECOG equivalent 0)    DISTRESS LEVEL: no apparent distress    Physical Exam  Vitals and nursing note reviewed.   Constitutional:       General: He is awake. He is not in acute distress.     Appearance: Normal appearance. He is normal weight. He is not ill-appearing, toxic-appearing or diaphoretic.   HENT:      Head: Normocephalic and atraumatic.      Nose: Nose normal. No congestion.      Mouth/Throat:      Pharynx: Oropharynx is clear. No oropharyngeal exudate or posterior oropharyngeal erythema.   Eyes:      General: No scleral icterus.     Extraocular Movements: Extraocular movements intact.      Conjunctiva/sclera: Conjunctivae normal.      Pupils: Pupils are equal, round, and reactive to light.   Cardiovascular:      Rate and Rhythm: Regular rhythm. Tachycardia present.      Pulses: Normal pulses.      Heart sounds: Normal heart sounds. No murmur heard.     No friction rub. No gallop.   Pulmonary:      Effort: Pulmonary effort is normal.      Breath sounds: Normal breath sounds. No decreased air movement. No wheezing, rhonchi or rales.   Abdominal:      General: Bowel sounds are normal. There is no distension.      Tenderness: There is no abdominal tenderness.   Musculoskeletal:         General: No deformity. Normal range of motion.      Cervical back: Normal range of motion and neck supple. No tenderness.      Right lower leg: No edema.      Left  lower leg: No edema.   Lymphadenopathy:      Cervical: No cervical adenopathy.      Upper Body:      Right upper body: No axillary adenopathy.      Left upper body: No axillary adenopathy.      Lower Body: No right inguinal adenopathy. No left inguinal adenopathy.   Skin:     General: Skin is warm and dry.      Coloration: Skin is pale. Skin is not jaundiced.      Findings: No erythema or rash.   Neurological:      General: No focal deficit present.      Mental Status: He is alert and oriented to person, place, and time.      Sensory: Sensation is intact.      Motor: Motor function is intact. No weakness.      Gait: Gait is intact.   Psychiatric:         Attention and Perception: Attention normal.         Mood and Affect: Mood normal.         Behavior: Behavior normal. Behavior is cooperative.         Thought Content: Thought content normal.         Judgment: Judgment normal.           Labs:  Hospital Outpatient Visit on 05/14/2024   Component Date Value Ref Range Status    Prostatic Specific Antigen Tot 05/14/2024 2.05  0.00 - 4.00 ng/mL Final    Comment: Performed using Roche donnie e immunoassay analyzer. tPSA values determined  on patient samples by different testing procedures cannot be directly  compared with one another and could be the cause of erroneous medical  interpretations. If there is a change in the tPSA assay procedure used while  monitoring therapy, then new baselines may need to be established when  comparing previous results.      Sodium 05/14/2024 134 (L)  135 - 145 mmol/L Final    Potassium 05/14/2024 5.0  3.6 - 5.5 mmol/L Final    Chloride 05/14/2024 102  96 - 112 mmol/L Final    Co2 05/14/2024 20  20 - 33 mmol/L Final    Anion Gap 05/14/2024 12.0  7.0 - 16.0 Final    Glucose 05/14/2024 105 (H)  65 - 99 mg/dL Final    Bun 05/14/2024 7 (L)  8 - 22 mg/dL Final    Creatinine 05/14/2024 0.39 (L)  0.50 - 1.40 mg/dL Final    Calcium 05/14/2024 8.0 (L)  8.5 - 10.5 mg/dL Final    Correct Calcium  05/14/2024 8.2 (L)  8.5 - 10.5 mg/dL Final    AST(SGOT) 05/14/2024 25  12 - 45 U/L Final    ALT(SGPT) 05/14/2024 10  2 - 50 U/L Final    Alkaline Phosphatase 05/14/2024 70  30 - 99 U/L Final    Total Bilirubin 05/14/2024 0.5  0.1 - 1.5 mg/dL Final    Albumin 05/14/2024 3.8  3.2 - 4.9 g/dL Final    Total Protein 05/14/2024 5.8 (L)  6.0 - 8.2 g/dL Final    Globulin 05/14/2024 2.0  1.9 - 3.5 g/dL Final    A-G Ratio 05/14/2024 1.9  g/dL Final    WBC 05/14/2024 5.8  4.8 - 10.8 K/uL Final    RBC 05/14/2024 1.87 (L)  4.70 - 6.10 M/uL Final    Hemoglobin 05/14/2024 6.2 (L)  14.0 - 18.0 g/dL Final    Hematocrit 05/14/2024 19.3 (L)  42.0 - 52.0 % Final    MCV 05/14/2024 103.2 (H)  81.4 - 97.8 fL Final    MCH 05/14/2024 33.2 (H)  27.0 - 33.0 pg Final    MCHC 05/14/2024 32.1 (L)  32.3 - 36.5 g/dL Final    Please note new reference range effective 05/22/2023.    RDW 05/14/2024 84.4 (H)  35.9 - 50.0 fL Final    Platelet Count 05/14/2024 70 (L)  164 - 446 K/uL Final    Reviewed by Clinical .    MPV 05/14/2024 11.5  9.0 - 12.9 fL Final    Neutrophils-Polys 05/14/2024 88.20 (H)  44.00 - 72.00 % Final    Lymphocytes 05/14/2024 3.60 (L)  22.00 - 41.00 % Final    Monocytes 05/14/2024 7.10  0.00 - 13.40 % Final    Eosinophils 05/14/2024 0.30  0.00 - 6.90 % Final    Basophils 05/14/2024 0.30  0.00 - 1.80 % Final    Immature Granulocytes 05/14/2024 0.50  0.00 - 0.90 % Final    Nucleated RBC 05/14/2024 0.00  0.00 - 0.20 /100 WBC Final    Please note new reference range effective 05/22/2023.    Neutrophils (Absolute) 05/14/2024 5.07  1.82 - 7.42 K/uL Final    Comment: Includes immature neutrophils, if present.  Please note new reference range effective 05/22/2023.      Lymphs (Absolute) 05/14/2024 0.21 (L)  1.00 - 4.80 K/uL Final    Monos (Absolute) 05/14/2024 0.41  0.00 - 0.85 K/uL Final    Eos (Absolute) 05/14/2024 0.02  0.00 - 0.51 K/uL Final    Baso (Absolute) 05/14/2024 0.02  0.00 - 0.12 K/uL Final    Immature  Granulocytes (abs) 05/14/2024 0.03  0.00 - 0.11 K/uL Final    NRBC (Absolute) 05/14/2024 0.00  K/uL Final    Anisocytosis 05/14/2024 1+   Final    Microcytosis 05/14/2024 1+   Final    Plt Estimation 05/14/2024 Decreased   Final    RBC Morphology 05/14/2024 Present   Final    Poikilocytosis 05/14/2024 1+   Final    Ovalocytes 05/14/2024 1+   Final    Tear Drop Cells 05/14/2024 1+   Final    Echinocytes 05/14/2024 1+   Final    Toxic Gran 05/14/2024 Few   Final    Toxic Vacuoles 05/14/2024 Few   Final    Peripheral Smear Review 05/14/2024 see below   Final    Comment: Due to instrument suspect flags, further review of peripheral smear is  indicated on this patient sample. This review may or may not result in  abnormal findings.      Imm. Plt Fraction 05/14/2024 8.9  0.6 - 13.1 % Final    Comments-Diff 05/14/2024 see below   Final    Results have been verified by peripheral blood smear review.    GFR (CKD-EPI) 05/14/2024 128  >60 mL/min/1.73 m 2 Final    Comment: Estimated Glomerular Filtration Rate is calculated using  race neutral CKD-EPI 2021 equation per NKF-ASN recommendations.         Imaging:     All listed images below have been independently reviewed by me. I agree with the findings as summarized below:    MRI brain 05/14/24:    IMPRESSION:     1.  There are postsurgical changes as evidenced by RIGHT suboccipital craniotomy and RIGHT inferior cerebellar encephalomalacia. Post gadolinium sequences demonstrates abnormal linear contrast enhancement along the RIGHT lateral cerebellar dura adjacent   to the surgical cavity. The maximum transverse dimension measures an approximately 5 mm. This finding is increased since the previous study and is unusual for postsurgical inflammation. This is concerning for recurrent dural metastasis.  2.  There are a few nonspecific supratentorial T2 hyperintensities likely representing nonspecific foci of gliosis.  3.  Diffuse sclerotic osseous metastasis.    MRI pelvis  05/14/24:    IMPRESSION:        1. There is diffuse marrow replacement consistent with the patient's known osseous metastatic disease.  2. There is bone marrow edema within the right acetabulum and femoral head which may be secondary to osteoarthrosis.  3. There is no definite fracture or dislocation.  4. There is no evidence for a muscle or tendon injury.    MR-SHOULDER-WITH & W/O LEFT    Result Date: 4/4/2024  4/3/2024 5:50 PM HISTORY/REASON FOR EXAM:  Other; Image Left Shoulder Metastatic disease, shoulder pain, frozen shoulder, history of prior surgery. TECHNIQUE/EXAM DESCRIPTION: MRI of the LEFT shoulder without and with contrast. The study was performed on a TASCETa 1.5 Suha MRI scanner. Sagittal, axial, coronal T1 and T2 as well as postcontrast coronal and axial T1-weighted images were obtained. 15 mL ProHance contrast was administered intravenously. COMPARISON:  None. FINDINGS: There is diffuse marrow heterogeneity and signal abnormality consistent with the history of diffuse osseous metastatic disease. No fracture is evident. There is no os acromiale or acromioclavicular separation. There is mild acromioclavicular osteoarthrosis. Acromion is mildly laterally downward sloping. There is a suture anchor in the anterior aspect of the humeral head with susceptibility artifact. There is a full-thickness tear of the distal subscapularis tendon. There is associated fatty atrophy of the subscapularis muscle. The supraspinatus, infraspinatus, and teres minor tendons are intact. The long head of biceps tendon is not visualized proximally. This may be secondary to rupture, tenotomy, or tenodesis. There is intermediate T2-weighted signal within the superior labrum. This is consistent with degeneration and possible degenerative tearing. There is no significant joint effusion or bursal fluid collection. There is lobulated signal abnormality surrounding the proximal humeral diametaphysis extending superiorly into the  subacromial-subdeltoid bursa laterally. This demonstrates similar signal  intensity to skeletal muscle on the T1-weighted sequence with mild T2 hyperintensity and mild enhancement. This likely represents tumor extension.     1. There is extensive osseous heterogeneity consistent with metastatic disease. Soft tissue signal abnormality surrounding the proximal humerus likely represents soft tissue tumor extension. 2. Full-thickness tear of the subscapularis tendon and either rupture or postsurgical change of the long head of biceps tendon. 3. Degeneration and possible degenerative tearing of the glenoid labrum. 4. Acromioclavicular osteoarthrosis.    DX-LUMBAR SPINE-2 OR 3 VIEWS    Result Date: 4/3/2024  4/3/2024 11:19 AM HISTORY/REASON FOR EXAM:  Chronic atraumatic low back pain radiating into both hips. History of metastatic prostate cancer. TECHNIQUE/ EXAM DESCRIPTION AND NUMBER OF VIEWS:  3 views of the lumbar spine. COMPARISON: MRI lumbar spine 3/25/2024 FINDINGS: The alignment demonstrates a dextroconvex curvature. There is a trace of degenerative L4-5 anterolisthesis and L2-3 and L5-S1 retrolisthesis. There is posterior pedicle screw fixation from the L1-L3 levels with hardware grossly intact. Again there is superior endplate loss of height at the L3 vertebral body level. The L1 and L2 levels are somewhat obscured. There is diffuse sclerotic abnormalities throughout the spine and visualized pelvis consistent with multifocal prostate metastasis. There is degenerative disc space narrowing at all levels with bilateral arthropathy.     1.  There is a dextroconvex curvature of the spine with multilevel minimal degenerative anterolisthesis and retrolisthesis as noted above. 2.  There is multifocal sclerotic bone metastases. 3.  There is posterior pedicle screw fixation L1-L3 levels. 4.  Superior endplate loss of height at the L3 vertebral body level is similar to the recent MR with the upper levels not well  visualized on the lateral view due to the scoliosis. 5.  Multilevel degenerative arthropathy and disc disease.    MR-THORACIC SPINE-WITH & W/O    Result Date: 3/25/2024  3/25/2024 12:55 PM HISTORY/REASON FOR EXAM:  Metastatic prostate cancer. Multiple osseous and epidural metastases. TECHNIQUE/EXAM DESCRIPTION: MRI of the thoracic spine with contrast. The study was performed on a Rj 1.5 Suha MRI scanner. T1 sagittal, T2 sagittal, and T2 axial images were obtained of the thoracic spine pre-contrast followed by T1 fat-suppressed sagittal and T1 axial images post intravenous administration of 15 mL  ProHance. COMPARISON:  9/20/2023 FINDINGS: There is again seen diffuse abnormal marrow signal involving the entire thoracic spine with diffuse patchy abnormal enhancement following contrast administration. There is scoliotic deformity. There is mild chronic superior endplate compression fracture at T12. There is again seen multilevel decreased disc height and multilevel endplate spurring. There is again seen abnormal epidural enhancement which extends from T7 through T10 with effacement of the thecal sac at those levels. This is more prominent on the left compared to the right. This extends into the T7, T8 and T9 neural foramina on the left greater than right. There is again seen attenuation of the spinal canal in the area. Again seen bilateral renal masses. There are multilevel disc bulges. No evidence of canal narrowing secondary to disc disease.     1.  Again seen diffuse osseous metastases. 2.  Epidural tumor extending from T7 through T9 asymmetric to the left of midline. This results in moderate central canal narrowing with extension into the left T7, T8 and T9 neural foramen. 3.  No new compression fractures. 4.  Again seen bilateral adrenal masses.    MR-LUMBAR SPINE-WITH & W/O    Result Date: 3/25/2024  3/25/2024 12:55 PM HISTORY/REASON FOR EXAM: Metastatic prostate cancer. Back pain. Prior back surgery.  TECHNIQUE/EXAM DESCRIPTION: MRI of the lumbar spine with intravenous contrast. The study was performed on a Rj 1.5 Suha MRI scanner. T1 sagittal, T2 sagittal, and T2 axial images were obtained of the lumbar spine precontrast followed by T1 fat suppressed sagittal and T1 axial images post intravenous administration of 15 mL ProHance. COMPARISON: 9/20/2023 FINDINGS: There is again seen postsurgical change consistent with posterior pedicular screw and luis alberto fixation extending from L1 through L3. There is laminectomy change present. There is scoliotic deformity. There is degenerative retrolisthesis at T12-L1, L1-L2, L2-L3 and L5-S1. There is again seen postsurgical fluid collection at the surgical site, unchanged. There is again diffuse abnormal marrow signal involving all vertebral levels. There is posterior bony expansion of the L2 vertebral body, unchanged. There is again seen mild height loss at T12, L1, L2 and L3. There is multilevel loss of the normal disc height and bright T2 disc signal. There is again seen epidural enhancement posterior to the L2 vertebral body, unchanged. The conus is located normally at T12-L1. There is a simple appearing right renal cyst, unchanged no follow-up recommended. Findings at specific levels: T12-L1: There is annular disk bulge and bilateral facet degeneration without significant central canal or neural foraminal narrowing. L1-2: There is annular disk bulge and bilateral facet degeneration without significant central canal or neural foraminal narrowing. L2-3: There is annular disc bulge and bilateral facet degeneration. No central canal narrowing. Again seen epidural enhancement and bony expansion of the L2 vertebra posteriorly resulting in attenuation of the ventral surface of the thecal sac. This also  extends asymmetrically to the right of midline into the paraspinous soft tissues and into the right pedicle. There is no central canal narrowing due to decompression of the  central spinal canal. There is moderate right and mild left neural foraminal narrowing. L3-4: There is annular disc bulge and posterior osseous spurring with bilateral facet degeneration. No central canal narrowing. There is moderate left and mild right neural foraminal narrowing. L4-5: There is annular disc bulge and posterior osseous spurring as well as bilateral facet degeneration. No central canal narrowing. There is moderate right and mild left neural foraminal narrowing. L5-S1: There is annular disc bulge with a left paracentral disc protrusion and bilateral facet degeneration with posterior osseous spurring. No central canal narrowing. There is moderate bilateral, right greater than left neural foraminal narrowing.     1.  Again seen diffuse osseous metastases. There is again seen mild vertebral height loss at T12, L1, L2 and L3. 2.  Again seen posterior bony expansion of the L2 vertebral body with some epidural enhancement. There is asymmetric bony expansion into the right paraspinous soft tissues with extension into the right pedicle. There is no central canal narrowing. There is moderate right and mild left neural foraminal narrowing. 3.  Multilevel degenerative disc disease and facet degeneration which results in varying degrees of neural foraminal narrowing as specifically described above. 4.  Surgical change consistent with posterior decompression and pedicular screw and luis alberto fixation extending from L1 through L3.      Pathology:         PD-L1 IHC Analysis (Dako 22C3 pharmDx):   Tumor Proportion Score (TPS)(%) 0     Addendum Signed By:  Jay Saldana D.O.   Addendum Date:  10/20/2023   Original Report Date:  09/25/2023     FINAL DIAGNOSIS:     A. Right cerebellar mass:          Metastatic prostatic adenocarcinoma.          Abundant tumor necrosis, including comedonecrosis noted.                                           Diagnosis performed by:                                       JAY SALDANA DO      Assessment & Plan:  1. Prostate cancer (HCC)  CT-CHEST,ABDOMEN,PELVIS WITH        This is a 58 year old  man with metastatic castrate-resistant prostate adenocarcinoma. He is s/p multiple lines of therapy including enzalutamide, Radium-223, docetaxel, and is now on cabazitaxel with carboplatin. He is also s/p palliative XRT as well as metastasectomy for a cerebellar metastasis. His disease is not PSMA avid.      Current Diagnosis and Staging: Metastatic prostate adenocarcinoma, castrate resistant, [place] 5+5=10    Update: Patient has marrow replacement and infiltration causing severe cytopenias. He also has potential progressive disease in his brain, but case discussed with radiation oncology and will monitor for now. We will get staging scans and continue treatment today, though we discussed at length that we are running out of therapeutic options and hospice care may be the best course. The patient wants to continue to proceed at this time and is in otherwise good shape to do so, so we will proceed with C7 today, as well as transfusions.      Treatment Plan: Carboplatin with cabazitaxel     Treatment Citation: Andry P et al. Lancet Oncol 2019     Plan of Care:     Primary Therapy: Contiue cabazitaxel and carboplatin C7 today  Supportive Therapy: Antiemetics per protocol. Continue leuprolide, denosumab. Palliative XRT with Dr. Valadez. Transfusion support.   Toxicity: Patient is getting antineoplastic therapy and needs monitoring of blood counts, hepatic function, and renal function due to potential for organ dysfunction.   Labs: CBC with diff, CMP, PSA monitoring  Imaging: MRI brain q 3 months, continue monitoring scans at q 3 month intervals given aggressive nature of disease (next CT scan due 05/2024)  Treatment Planning: The patient has highly-aggressive prostate adenocarcinoma and I agree with the assessment that his prognosis is poor. We discussed working on addressing quality of life and goals of  care. The patient wishes to continue with chemotherapy and therefore we will continue with cabazitaxel and carboplatin. An option upon progression might be cabozantinib and atezolizumab, per the recently-released COMPACT-02 trial data, which showed PFS improvement (OS data is still pending) and was presented at [facility]  Consultations: Radiation oncology (Dr. Valadez); Medical oncology (Dr. Butts); Neurosurgery (Dr. Be); Interventional Radiology (Dr. Reardon, [facility]); Palliative care (Dr. Fair)  Code Status: Full  Miscellaneous: NA  Return for Follow Up: 3 weeks    Any questions and concerns raised by the patient were answered to the best of my ability. Thank you for allowing me to participate in the care for this patient. Please feel free to contact me for any questions or concerns.       Total time spent on chart review, clinic encounter, and documentation: 26 minutes.

## 2024-05-17 NOTE — PROGRESS NOTES
Chemotherapy Verification - PRIMARY RN      Height = 167cm  Weight = 74kg  BSA = 1.85m^2       Medication: Carboplatine  Dose: AUC 4  Calculated Dose: 583.6mg                             (In mg/m2, AUC, mg/kg)     Medication: Cabazitaxel  Dose: 20mg/m^2  Calculated Dose: 37mg                            (In mg/m2, AUC, mg/kg)      Carboplatin calculation (if applicable):  (4*(120.9+25)) = 583.6       I confirm this process was performed independently with the BSA and all final chemotherapy dosing calculations congruent.  Any discrepancies of 10% or greater have been addressed with the chemotherapy pharmacist. The resolution of the discrepancy has been documented in the EPIC progress notes.

## 2024-05-19 ENCOUNTER — OUTPATIENT INFUSION SERVICES (OUTPATIENT)
Dept: ONCOLOGY | Facility: MEDICAL CENTER | Age: 59
End: 2024-05-19
Attending: STUDENT IN AN ORGANIZED HEALTH CARE EDUCATION/TRAINING PROGRAM
Payer: MEDICAID

## 2024-05-19 DIAGNOSIS — C61 PROSTATE CANCER (HCC): ICD-10-CM

## 2024-05-19 RX ADMIN — PEGFILGRASTIM-CBQV 6 MG: 6 INJECTION, SOLUTION SUBCUTANEOUS at 13:06

## 2024-05-19 NOTE — PROGRESS NOTES
Pt presents to [facility] for pegfilgrastim-cbqv. Pegfilgrastim-cbqv injected into R back arm with no s/s of adverse reactions. Next appointment confirmed and education provided. Pt discharged to self care with all personal belongings and in NAD.

## 2024-05-20 ENCOUNTER — OUTPATIENT INFUSION SERVICES (OUTPATIENT)
Dept: ONCOLOGY | Facility: MEDICAL CENTER | Age: 59
End: 2024-05-20
Attending: STUDENT IN AN ORGANIZED HEALTH CARE EDUCATION/TRAINING PROGRAM
Payer: MEDICAID

## 2024-05-20 VITALS
WEIGHT: 160.94 LBS | TEMPERATURE: 98 F | HEART RATE: 119 BPM | OXYGEN SATURATION: 98 % | DIASTOLIC BLOOD PRESSURE: 82 MMHG | BODY MASS INDEX: 25.86 KG/M2 | RESPIRATION RATE: 18 BRPM | SYSTOLIC BLOOD PRESSURE: 141 MMHG | HEIGHT: 66 IN

## 2024-05-20 DIAGNOSIS — T45.1X5A ANEMIA ASSOCIATED WITH CHEMOTHERAPY: ICD-10-CM

## 2024-05-20 DIAGNOSIS — C61 PROSTATE CANCER (HCC): ICD-10-CM

## 2024-05-20 DIAGNOSIS — D64.81 ANEMIA ASSOCIATED WITH CHEMOTHERAPY: ICD-10-CM

## 2024-05-20 LAB
ABO GROUP BLD: NORMAL
BASOPHILS # BLD AUTO: 0.1 % (ref 0–1.8)
BASOPHILS # BLD: 0.01 K/UL (ref 0–0.12)
BLD GP AB SCN SERPL QL: NORMAL
EOSINOPHIL # BLD AUTO: 0.01 K/UL (ref 0–0.51)
EOSINOPHIL NFR BLD: 0.1 % (ref 0–6.9)
ERYTHROCYTE [DISTWIDTH] IN BLOOD BY AUTOMATED COUNT: 76.9 FL (ref 35.9–50)
HCT VFR BLD AUTO: 25.8 % (ref 42–52)
HGB BLD-MCNC: 8.9 G/DL (ref 14–18)
IMM GRANULOCYTES # BLD AUTO: 0.22 K/UL (ref 0–0.11)
IMM GRANULOCYTES NFR BLD AUTO: 2.9 % (ref 0–0.9)
LYMPHOCYTES # BLD AUTO: 0.09 K/UL (ref 1–4.8)
LYMPHOCYTES NFR BLD: 1.2 % (ref 22–41)
MCH RBC QN AUTO: 32.1 PG (ref 27–33)
MCHC RBC AUTO-ENTMCNC: 34.5 G/DL (ref 32.3–36.5)
MCV RBC AUTO: 93.1 FL (ref 81.4–97.8)
MONOCYTES # BLD AUTO: 0.19 K/UL (ref 0–0.85)
MONOCYTES NFR BLD AUTO: 2.5 % (ref 0–13.4)
NEUTROPHILS # BLD AUTO: 7.03 K/UL (ref 1.82–7.42)
NEUTROPHILS NFR BLD: 93.2 % (ref 44–72)
NRBC # BLD AUTO: 0 K/UL
NRBC BLD-RTO: 0 /100 WBC (ref 0–0.2)
OUTPT INFUS CBC COMMENT OICOM: ABNORMAL
PLATELET # BLD AUTO: 63 K/UL (ref 164–446)
PLATELETS.RETICULATED NFR BLD AUTO: 4.8 % (ref 0.6–13.1)
PMV BLD AUTO: 11.4 FL (ref 9–12.9)
RBC # BLD AUTO: 2.77 M/UL (ref 4.7–6.1)
RH BLD: NORMAL
WBC # BLD AUTO: 7.6 K/UL (ref 4.8–10.8)

## 2024-05-20 RX ORDER — 0.9 % SODIUM CHLORIDE 0.9 %
3 VIAL (ML) INJECTION PRN
OUTPATIENT
Start: 2024-05-20

## 2024-05-20 RX ORDER — DIPHENHYDRAMINE HYDROCHLORIDE 50 MG/ML
25 INJECTION INTRAMUSCULAR; INTRAVENOUS PRN
OUTPATIENT
Start: 2024-05-20

## 2024-05-20 RX ORDER — ACETAMINOPHEN 325 MG/1
650 TABLET ORAL ONCE
OUTPATIENT
Start: 2024-05-20

## 2024-05-20 RX ORDER — DIPHENHYDRAMINE HCL 25 MG
25 TABLET ORAL ONCE
OUTPATIENT
Start: 2024-05-20 | End: 2024-05-20

## 2024-05-20 RX ORDER — 0.9 % SODIUM CHLORIDE 0.9 %
10 VIAL (ML) INJECTION PRN
OUTPATIENT
Start: 2024-05-20

## 2024-05-20 RX ORDER — 0.9 % SODIUM CHLORIDE 0.9 %
VIAL (ML) INJECTION PRN
OUTPATIENT
Start: 2024-05-20

## 2024-05-20 RX ORDER — ACETAMINOPHEN 325 MG/1
650 TABLET ORAL PRN
OUTPATIENT
Start: 2024-05-20

## 2024-05-20 RX ORDER — SODIUM CHLORIDE 9 MG/ML
INJECTION, SOLUTION INTRAVENOUS CONTINUOUS
OUTPATIENT
Start: 2024-05-20

## 2024-05-20 ASSESSMENT — FIBROSIS 4 INDEX: FIB4 SCORE: 5.7

## 2024-05-20 NOTE — PROGRESS NOTES
Casper arrives to [facility] for CBC/poss blood products. Patient denies acute health concerns. C/o fatigue due to chemotherapy this past week. 22g PIV placed to RAC, which flushes easily and has brisk blood return. CBC collected: Hgb 8.9; platelet count 63k. Patient does NOT meet established parameters to receive blood products today. PIV flushed and removed with tip intact. Patient has his next appt. Discharged home to self care in no apparent distress.

## 2024-05-23 ENCOUNTER — APPOINTMENT (OUTPATIENT)
Dept: RADIOLOGY | Facility: IMAGING CENTER | Age: 59
End: 2024-05-23
Attending: ORTHOPAEDIC SURGERY
Payer: MEDICAID

## 2024-05-23 ENCOUNTER — OFFICE VISIT (OUTPATIENT)
Dept: SURGICAL ONCOLOGY | Facility: MEDICAL CENTER | Age: 59
End: 2024-05-23
Payer: MEDICAID

## 2024-05-23 VITALS
BODY MASS INDEX: 26.99 KG/M2 | OXYGEN SATURATION: 98 % | WEIGHT: 162 LBS | HEART RATE: 97 BPM | HEIGHT: 65 IN | TEMPERATURE: 97.3 F | SYSTOLIC BLOOD PRESSURE: 102 MMHG | DIASTOLIC BLOOD PRESSURE: 68 MMHG

## 2024-05-23 DIAGNOSIS — M16.11 UNILATERAL PRIMARY OSTEOARTHRITIS, RIGHT HIP: ICD-10-CM

## 2024-05-23 DIAGNOSIS — M16.11 PRIMARY OSTEOARTHRITIS OF RIGHT HIP: ICD-10-CM

## 2024-05-23 DIAGNOSIS — C79.51 METASTASIS TO BONE (HCC): ICD-10-CM

## 2024-05-23 DIAGNOSIS — C61 PROSTATE CANCER (HCC): ICD-10-CM

## 2024-05-23 PROCEDURE — 99205 OFFICE O/P NEW HI 60 MIN: CPT | Performed by: ORTHOPAEDIC SURGERY

## 2024-05-23 PROCEDURE — 3074F SYST BP LT 130 MM HG: CPT | Performed by: ORTHOPAEDIC SURGERY

## 2024-05-23 PROCEDURE — 73502 X-RAY EXAM HIP UNI 2-3 VIEWS: CPT | Mod: TC,RT | Performed by: ORTHOPAEDIC SURGERY

## 2024-05-23 PROCEDURE — 72190 X-RAY EXAM OF PELVIS: CPT | Mod: TC | Performed by: ORTHOPAEDIC SURGERY

## 2024-05-23 PROCEDURE — 3078F DIAST BP <80 MM HG: CPT | Performed by: ORTHOPAEDIC SURGERY

## 2024-05-23 ASSESSMENT — FIBROSIS 4 INDEX: FIB4 SCORE: 7.15

## 2024-05-24 ASSESSMENT — ENCOUNTER SYMPTOMS
WEAKNESS: 0
SENSORY CHANGE: 0
FEVER: 0
CHILLS: 0
MYALGIAS: 0
SHORTNESS OF BREATH: 0

## 2024-05-24 NOTE — PROGRESS NOTES
Subjective:   5/23/2024  8:31 AM  Primary care physician:KOKI Fagan    Chief Complaint: Right hip/pelvis pain    History of presenting illness:  Casper Rowley  is a pleasant 58 y.o. male who was referred for an evaluation of a right painful hip and pelvis.  The patient has significant past medical history of prostate cancer widely metastatic to bone.  He is currently receiving systemic chemotherapy.  He reports that his right hip is the most painful site of metastasis.  He is currently using a cane and is taking narcotics for pain.  He has pain in the hip both at rest and with ambulation.  He states his left arm and shoulder are also painful, but not near as bad as the right hip.  He denies any numbness or paresthesias down the leg.  He is established with radiation oncology as well and has previously received radiation treatment to several sites of bony metastasis.  He has also had spine surgery related to prostate cancer metastases.        Past Medical History:   Diagnosis Date    Arthritis 02/2019    osteo-hollie knees    Bronchitis 2019    Cancer (HCC)     Basal cell - top of head    Cancer (HCC)     Prostate    Cold 02/08/2019    Cold two weeks ago, denies productive cough, SOB    Diabetes     type 2    High cholesterol     HTN (hypertension), benign 08/14/2009    Hypertension     Infectious disease     Mumps age 6 yrs and Chickenpox age 41 yrs    Obstructive sleep apnea 02/2019    Uses cpap    Prostate cancer (HCC)      Past Surgical History:   Procedure Laterality Date    CRANIOTOMY STEALTH Right 9/21/2023    Procedure: CRANIOTOMY, USING FRAMELESS STEREOTAXY - RIGHT RETROSIGMOID FOR RESECTION OF CEREBELLAR MASS, USE OF INTRAOPERATIVE NEURONAVIGATION, EXTERNAL VENTRICULAR DRAIN;  Surgeon: Archana Be M.D.;  Location: SURGERY [facility];  Service: Neurosurgery    IA SHLDR ARTHROSCOP EXTEN DEBRIDE 3+ Left 11/1/2021    Procedure: ARTHROSCOPY,SHOULDER,WITH EXTENSIVE DEBRIDEMENT;  Surgeon: Piter PIEDRA  JAZLYN Otero;  Location: SURGERY [facility];  Service: Orthopedics    PB ARTHROSCOPY SHOULDER SURGICAL BICEPS TENODES* Left 11/1/2021    Procedure: ARTHROSCOPY, SHOULDER, WITH BICEPS TENOTOMY - WITH SUB SCAPULARIS REPAIR;  Surgeon: Piter Otero M.D.;  Location: SURGERY [facility];  Service: Orthopedics    CARPAL TUNNEL RELEASE Left 2/11/2019    Procedure: CARPAL TUNNEL RELEASE;  Surgeon: Piter Otero M.D.;  Location: [facility];  Service: Orthopedics    KNEE ARTHROSCOPY Right 05/2014    ACL     Allergies   Allergen Reactions    Iodine Unspecified     Outpatient Encounter Medications as of 5/23/2024   Medication Sig Dispense Refill    morphine ER (MS CONTIN) 15 MG Tab CR tablet Take 1 Tablet by mouth every 12 hours for 30 days. 60 Tablet 0    HYDROcodone/acetaminophen (NORCO)  MG Tab Take 1 Tablet by mouth every 6 hours as needed for Moderate Pain (cancer related pain) for up to 30 days. 120 Tablet 0    Semaglutide,0.25 or 0.5MG/DOS, (OZEMPIC, 0.25 OR 0.5 MG/DOSE,) 2 MG/3ML Solution Pen-injector       Naloxone (NARCAN) 4 MG/0.1ML Liquid Administer 4 mg into affected nostril(S) as needed (For severe sleepiness or difficulty breathing from possible overdose. Call 911 after administration.). 1 Each 0    clobetasol (TEMOVATE) 0.05 % Cream Apply to affected area BID for two weeks 30 g 2    ondansetron (ZOFRAN ODT) 8 MG TABLET DISPERSIBLE Take 1 Tablet by mouth every four hours as needed for Nausea. 180 Tablet 1    zolpidem (AMBIEN) 10 MG Tab TAKE ONE TABLET BY MOUTH AT BEDTIME FOR 30 DAYS      clotrimazole-betamethasone (LOTRISONE) 1-0.05 % Cream Apply 1 Application topically as needed.      cyclobenzaprine (FLEXERIL) 10 mg Tab Take 10 mg by mouth at bedtime.      furosemide (LASIX) 20 MG Tab Take 1 Tablet by mouth as needed.      hydrocod shreya-chlorphe shreya ER (TUSSIONEX) 10-8 MG/5ML Suspension Extended Release Take 5 mL by mouth as needed.      prochlorperazine (COMPAZINE) 10 MG Tab Take 10  mg by mouth as needed.      silver sulfADIAZINE (SILVADENE) 1 % Cream Apply  topically every day.      tamsulosin (FLOMAX) 0.4 MG capsule Take 0.4 mg by mouth every day.      lisinopril (PRINIVIL) 20 MG Tab Take 1 tablet by mouth every day. 30 Tablet 2    metoprolol SR (TOPROL XL) 25 MG TABLET SR 24 HR Take 2 tablets by mouth every day. 60 Tablet 2    Canagliflozin (INVOKANA) 100 MG Tab Take 100 mg by mouth every day.      Dulaglutide (TRULICITY) 1.5 MG/0.5ML Solution Pen-injector Inject 1.5 mg as instructed every Saturday.      Cholecalciferol (VITAMIN D) 2000 UNITS CAPS Take 6,000 Units by mouth every day. 2000 units x 3 tablets = 6000 mcg (Patient not taking: Reported on 3/15/2024)       No facility-administered encounter medications on file as of 5/23/2024.     Social History     Socioeconomic History    Marital status: Single     Spouse name: Not on file    Number of children: Not on file    Years of education: Not on file    Highest education level: Not on file   Occupational History    Not on file   Tobacco Use    Smoking status: Never    Smokeless tobacco: Never   Vaping Use    Vaping status: Never Used   Substance and Sexual Activity    Alcohol use: Yes     Comment: 2 per week    Drug use: No    Sexual activity: Not on file   Other Topics Concern    Not on file   Social History Narrative    Lives with his long-term partner, Bas[place], in [facility]. He has no kids.      Social Determinants of Health     Financial Resource Strain: Not on file   Food Insecurity: Not on file   Transportation Needs: No Transportation Needs (10/2/2023)    PRAPARE - Transportation     Lack of Transportation (Medical): No     Lack of Transportation (Non-Medical): No   Physical Activity: Not on file   Stress: Not on file   Social Connections: Not on file   Intimate Partner Violence: Not on file   Housing Stability: Not on file      Social History     Tobacco Use   Smoking Status Never   Smokeless Tobacco Never     Social History  "    Substance and Sexual Activity   Alcohol Use Yes    Comment: 2 per week     Social History     Substance and Sexual Activity   Drug Use No        Family History   Problem Relation Age of Onset    Cancer Father         Bladder cancer    Genetic Disorder Neg Hx        Review of Systems   Constitutional:  Positive for malaise/fatigue. Negative for chills and fever.   Respiratory:  Negative for shortness of breath.    Cardiovascular:  Negative for chest pain.   Musculoskeletal:  Positive for joint pain. Negative for myalgias.   Neurological:  Negative for sensory change and weakness.        Objective:   /68 (BP Location: Right arm, Patient Position: Sitting)   Pulse 97   Temp 36.3 °C (97.3 °F) (Temporal)   Ht 1.651 m (5' 5\")   Wt 73.5 kg (162 lb)   SpO2 98%   BMI 26.96 kg/m²     Physical Exam  Constitutional:       Appearance: Normal appearance.      Comments: Somewhat frail-appearing   HENT:      Head: Normocephalic and atraumatic.      Right Ear: External ear normal.      Left Ear: External ear normal.      Nose: Nose normal.      Mouth/Throat:      Mouth: Mucous membranes are moist.   Eyes:      Extraocular Movements: Extraocular movements intact.      Conjunctiva/sclera: Conjunctivae normal.   Cardiovascular:      Rate and Rhythm: Normal rate.      Pulses: Normal pulses.   Pulmonary:      Effort: Pulmonary effort is normal. No respiratory distress.      Breath sounds: No wheezing.   Abdominal:      General: Abdomen is flat. There is no distension.   Musculoskeletal:      Cervical back: Normal range of motion and neck supple.      Comments: right hip: Overlying skin demonstrates no erythema, ecchymoses or wounds. Hip ROM is Intact but painful with range of motion..  SILT spn, dpn, plantar and sural nerves. Motor intact to knee extension, ankle dorsiflexion, ankle plantar flexion, and eversion. DP/PT pulse 2+. There is no tenderness at the greater trochanter. There is not tenderness elsewhere. negative " JEANETTEIR. positive Stinchfield.        Skin:     General: Skin is warm and dry.      Capillary Refill: Capillary refill takes less than 2 seconds.   Neurological:      Mental Status: He is alert and oriented to person, place, and time.   Psychiatric:         Mood and Affect: Mood normal.         Behavior: Behavior normal.           Imaging: Multiple views of the pelvis and right hip demonstrate diffuse osseous blastic metastatic disease in the pelvis, spine and bilateral proximal femurs.  There are no acute fractures appreciated.  He does have moderate joint space narrowing of the right hip greater than the left.  There are small periarticular osteophytes.    MRI pelvis was reviewed and demonstrates diffuse metastatic disease in the pelvis and bilateral proximal femurs.  There is a fairly large sided metastasis in the periacetabular region with edema on fluid sequences.    Diagnosis:     1. Prostate cancer (HCC)  Referral to Oncology Psychosocial Screening for Distress      2. Metastasis to bone (HCC)        3. Primary osteoarthritis of right hip                Assessment/Plan:     I counseled the patient on the above clinical and imaging findings.  We discussed that his pain may be related to some arthritis in the right hip, but I think his pain is more consistent with cancer related pain in the pelvis.  We discussed treatment options including continued conservative care with pain management and supportive devices for ambulation.  We also discussed surgical treatment of total hip arthroplasty versus ablation of the metastatic disease in the periacetabular region with cement augmentation. We discussed the differences between the 2 surgeries and the fact that the ablation can be done through percutaneous incisions and is relatively easier to recover from compared to a total hip arthroplasty.  We also discussed that this would not preclude him from getting a total hip arthroplasty if his symptoms did not adequately  improve.  He wished to proceed with percutaneous ablation of the periacetabular metastatic disease with cement augmentation.    A full PARQ was held with the patient. Both surgical and non-operative options were discussed. They decided on surgery through shared decision making. We discussed risks include infection, blood loss, DVT/PE, damage to neurovascular structures, chronic pain, recurrence, need for further surgery and unforeseeable events inherent to medical care. They also understand anesthesia will do a separate consent discussing risks inherent to anesthesia.They verbalized understanding and were in agreement to proceed with percutaneous radiofrequency ablation with ostial cool right periacetabular metastatic lesion with cement augmentation.    I will coordinate with his medical oncology team regarding surgical timing around systemic chemotherapy.  All of his questions were answered, he verbalized understanding and was in agreement with the plan.      Referrals: none  Restrictions: continue protected weightbearing  New medications/refills: none  Imaging studies: none  Follow-up: post op      Jayshree Perkins DO  Orthopedic Oncologist

## 2024-06-03 ENCOUNTER — APPOINTMENT (OUTPATIENT)
Dept: ADMISSIONS | Facility: MEDICAL CENTER | Age: 59
End: 2024-06-03
Attending: ORTHOPAEDIC SURGERY
Payer: MEDICAID

## 2024-06-05 ENCOUNTER — HOSPITAL ENCOUNTER (OUTPATIENT)
Dept: LAB | Facility: MEDICAL CENTER | Age: 59
End: 2024-06-05
Attending: STUDENT IN AN ORGANIZED HEALTH CARE EDUCATION/TRAINING PROGRAM
Payer: MEDICAID

## 2024-06-05 DIAGNOSIS — C61 PROSTATE CANCER (HCC): ICD-10-CM

## 2024-06-05 DIAGNOSIS — C79.31 METASTASIS TO BRAIN (HCC): ICD-10-CM

## 2024-06-05 LAB
ANISOCYTOSIS BLD QL SMEAR: ABNORMAL
BASOPHILS # BLD AUTO: 0.1 % (ref 0–1.8)
BASOPHILS # BLD: 0.01 K/UL (ref 0–0.12)
COMMENT 1642: NORMAL
EOSINOPHIL # BLD AUTO: 0 K/UL (ref 0–0.51)
EOSINOPHIL NFR BLD: 0 % (ref 0–6.9)
ERYTHROCYTE [DISTWIDTH] IN BLOOD BY AUTOMATED COUNT: 87.2 FL (ref 35.9–50)
HCT VFR BLD AUTO: 18.7 % (ref 42–52)
HGB BLD-MCNC: 6.2 G/DL (ref 14–18)
IMM GRANULOCYTES # BLD AUTO: 0.15 K/UL (ref 0–0.11)
IMM GRANULOCYTES NFR BLD AUTO: 1.9 % (ref 0–0.9)
LYMPHOCYTES # BLD AUTO: 0.34 K/UL (ref 1–4.8)
LYMPHOCYTES NFR BLD: 4.3 % (ref 22–41)
MACROCYTES BLD QL SMEAR: ABNORMAL
MCH RBC QN AUTO: 33.2 PG (ref 27–33)
MCHC RBC AUTO-ENTMCNC: 33.2 G/DL (ref 32.3–36.5)
MCV RBC AUTO: 100 FL (ref 81.4–97.8)
MICROCYTES BLD QL SMEAR: ABNORMAL
MONOCYTES # BLD AUTO: 0.85 K/UL (ref 0–0.85)
MONOCYTES NFR BLD AUTO: 10.6 % (ref 0–13.4)
MORPHOLOGY BLD-IMP: NORMAL
NEUTROPHILS # BLD AUTO: 6.64 K/UL (ref 1.82–7.42)
NEUTROPHILS NFR BLD: 83.1 % (ref 44–72)
NRBC # BLD AUTO: 0.03 K/UL
NRBC BLD-RTO: 0.4 /100 WBC (ref 0–0.2)
PLATELET # BLD AUTO: 115 K/UL (ref 164–446)
PLATELET BLD QL SMEAR: NORMAL
PMV BLD AUTO: 11.5 FL (ref 9–12.9)
RBC # BLD AUTO: 1.87 M/UL (ref 4.7–6.1)
RBC BLD AUTO: PRESENT
WBC # BLD AUTO: 8 K/UL (ref 4.8–10.8)

## 2024-06-05 PROCEDURE — 80053 COMPREHEN METABOLIC PANEL: CPT

## 2024-06-05 PROCEDURE — 84153 ASSAY OF PSA TOTAL: CPT

## 2024-06-05 PROCEDURE — 36415 COLL VENOUS BLD VENIPUNCTURE: CPT

## 2024-06-05 PROCEDURE — 85025 COMPLETE CBC W/AUTO DIFF WBC: CPT

## 2024-06-06 ENCOUNTER — PRE-ADMISSION TESTING (OUTPATIENT)
Dept: ADMISSIONS | Facility: MEDICAL CENTER | Age: 59
End: 2024-06-06
Attending: ORTHOPAEDIC SURGERY
Payer: MEDICAID

## 2024-06-06 LAB
ALBUMIN SERPL BCP-MCNC: 3.7 G/DL (ref 3.2–4.9)
ALBUMIN/GLOB SERPL: 1.8 G/DL
ALP SERPL-CCNC: 85 U/L (ref 30–99)
ALT SERPL-CCNC: 8 U/L (ref 2–50)
ANION GAP SERPL CALC-SCNC: 12 MMOL/L (ref 7–16)
AST SERPL-CCNC: 34 U/L (ref 12–45)
BILIRUB SERPL-MCNC: 0.5 MG/DL (ref 0.1–1.5)
BUN SERPL-MCNC: 8 MG/DL (ref 8–22)
CALCIUM ALBUM COR SERPL-MCNC: 8.4 MG/DL (ref 8.5–10.5)
CALCIUM SERPL-MCNC: 8.2 MG/DL (ref 8.5–10.5)
CHLORIDE SERPL-SCNC: 104 MMOL/L (ref 96–112)
CO2 SERPL-SCNC: 20 MMOL/L (ref 20–33)
CREAT SERPL-MCNC: 0.35 MG/DL (ref 0.5–1.4)
GFR SERPLBLD CREATININE-BSD FMLA CKD-EPI: 132 ML/MIN/1.73 M 2
GLOBULIN SER CALC-MCNC: 2.1 G/DL (ref 1.9–3.5)
GLUCOSE SERPL-MCNC: 108 MG/DL (ref 65–99)
POTASSIUM SERPL-SCNC: 4.5 MMOL/L (ref 3.6–5.5)
PROT SERPL-MCNC: 5.8 G/DL (ref 6–8.2)
PSA SERPL-MCNC: 2.07 NG/ML (ref 0–4)
SODIUM SERPL-SCNC: 136 MMOL/L (ref 135–145)

## 2024-06-06 RX ORDER — IBUPROFEN 600 MG/1
600 TABLET ORAL EVERY 6 HOURS PRN
Status: ON HOLD | COMMUNITY
End: 2024-06-12

## 2024-06-06 NOTE — OR NURSING
"Pre-admit phone appt completed with patient. Patient had CBC/CMP done yesterday (per Dr. Baires order). Hgb=6.2. Pt aware of result and will contact Dr. Baires for instructions. This RN faxed Dr. MELISSA Perkins these results and also called his surg.  \"Shilpa\" to inform MD. Asked her for instructions re: remaining testing being done day of surgery or before. She will ask Dr. Perkins and respond to this RN.   "

## 2024-06-07 ENCOUNTER — OUTPATIENT INFUSION SERVICES (OUTPATIENT)
Dept: ONCOLOGY | Facility: MEDICAL CENTER | Age: 59
End: 2024-06-07
Attending: STUDENT IN AN ORGANIZED HEALTH CARE EDUCATION/TRAINING PROGRAM
Payer: MEDICAID

## 2024-06-07 ENCOUNTER — PATIENT OUTREACH (OUTPATIENT)
Dept: ONCOLOGY | Facility: MEDICAL CENTER | Age: 59
End: 2024-06-07

## 2024-06-07 ENCOUNTER — APPOINTMENT (OUTPATIENT)
Dept: HEMATOLOGY ONCOLOGY | Facility: MEDICAL CENTER | Age: 59
End: 2024-06-07
Payer: MEDICAID

## 2024-06-07 VITALS
HEART RATE: 91 BPM | WEIGHT: 160.94 LBS | RESPIRATION RATE: 18 BRPM | BODY MASS INDEX: 25.86 KG/M2 | TEMPERATURE: 97.9 F | DIASTOLIC BLOOD PRESSURE: 78 MMHG | SYSTOLIC BLOOD PRESSURE: 123 MMHG | HEIGHT: 66 IN | OXYGEN SATURATION: 100 %

## 2024-06-07 DIAGNOSIS — T45.1X5A ANEMIA ASSOCIATED WITH CHEMOTHERAPY: ICD-10-CM

## 2024-06-07 DIAGNOSIS — C61 PROSTATE CANCER (HCC): ICD-10-CM

## 2024-06-07 DIAGNOSIS — D64.81 ANEMIA ASSOCIATED WITH CHEMOTHERAPY: ICD-10-CM

## 2024-06-07 LAB
ABO GROUP BLD: NORMAL
BARCODED ABORH UBTYP: 5100
BARCODED ABORH UBTYP: 5100
BARCODED PRD CODE UBPRD: NORMAL
BARCODED PRD CODE UBPRD: NORMAL
BARCODED UNIT NUM UBUNT: NORMAL
BARCODED UNIT NUM UBUNT: NORMAL
BLD GP AB SCN SERPL QL: NORMAL
COMPONENT R 8504R: NORMAL
COMPONENT R 8504R: NORMAL
PRODUCT TYPE UPROD: NORMAL
PRODUCT TYPE UPROD: NORMAL
RH BLD: NORMAL
UNIT STATUS USTAT: NORMAL
UNIT STATUS USTAT: NORMAL

## 2024-06-07 PROCEDURE — 86900 BLOOD TYPING SEROLOGIC ABO: CPT

## 2024-06-07 PROCEDURE — 86923 COMPATIBILITY TEST ELECTRIC: CPT | Mod: 91

## 2024-06-07 PROCEDURE — 700111 HCHG RX REV CODE 636 W/ 250 OVERRIDE (IP): Mod: JZ,UD | Performed by: STUDENT IN AN ORGANIZED HEALTH CARE EDUCATION/TRAINING PROGRAM

## 2024-06-07 PROCEDURE — 36430 TRANSFUSION BLD/BLD COMPNT: CPT

## 2024-06-07 PROCEDURE — P9016 RBC LEUKOCYTES REDUCED: HCPCS | Mod: 91

## 2024-06-07 PROCEDURE — 306780 HCHG STAT FOR TRANSFUSION PER CASE

## 2024-06-07 PROCEDURE — A9270 NON-COVERED ITEM OR SERVICE: HCPCS | Mod: UD | Performed by: STUDENT IN AN ORGANIZED HEALTH CARE EDUCATION/TRAINING PROGRAM

## 2024-06-07 PROCEDURE — 700102 HCHG RX REV CODE 250 W/ 637 OVERRIDE(OP): Mod: UD | Performed by: STUDENT IN AN ORGANIZED HEALTH CARE EDUCATION/TRAINING PROGRAM

## 2024-06-07 PROCEDURE — 86850 RBC ANTIBODY SCREEN: CPT

## 2024-06-07 PROCEDURE — 96374 THER/PROPH/DIAG INJ IV PUSH: CPT

## 2024-06-07 PROCEDURE — 86901 BLOOD TYPING SEROLOGIC RH(D): CPT

## 2024-06-07 RX ORDER — 0.9 % SODIUM CHLORIDE 0.9 %
3 VIAL (ML) INJECTION PRN
Status: CANCELLED | OUTPATIENT
Start: 2024-06-07

## 2024-06-07 RX ORDER — 0.9 % SODIUM CHLORIDE 0.9 %
VIAL (ML) INJECTION PRN
Status: CANCELLED | OUTPATIENT
Start: 2024-06-07

## 2024-06-07 RX ORDER — ACETAMINOPHEN 325 MG/1
650 TABLET ORAL ONCE
Status: COMPLETED | OUTPATIENT
Start: 2024-06-07 | End: 2024-06-07

## 2024-06-07 RX ORDER — ACETAMINOPHEN 325 MG/1
650 TABLET ORAL PRN
Status: CANCELLED | OUTPATIENT
Start: 2024-06-07

## 2024-06-07 RX ORDER — DIPHENHYDRAMINE HYDROCHLORIDE 50 MG/ML
25 INJECTION INTRAMUSCULAR; INTRAVENOUS PRN
Status: CANCELLED | OUTPATIENT
Start: 2024-06-07

## 2024-06-07 RX ORDER — DIPHENHYDRAMINE HCL 25 MG
25 TABLET ORAL ONCE
Status: COMPLETED | OUTPATIENT
Start: 2024-06-07 | End: 2024-06-07

## 2024-06-07 RX ORDER — DIPHENHYDRAMINE HCL 25 MG
25 TABLET ORAL ONCE
Status: CANCELLED | OUTPATIENT
Start: 2024-06-07 | End: 2024-06-07

## 2024-06-07 RX ORDER — 0.9 % SODIUM CHLORIDE 0.9 %
10 VIAL (ML) INJECTION PRN
Status: CANCELLED | OUTPATIENT
Start: 2024-06-07

## 2024-06-07 RX ORDER — ACETAMINOPHEN 325 MG/1
650 TABLET ORAL ONCE
Status: CANCELLED | OUTPATIENT
Start: 2024-06-07

## 2024-06-07 RX ORDER — SODIUM CHLORIDE 9 MG/ML
INJECTION, SOLUTION INTRAVENOUS CONTINUOUS
Status: CANCELLED | OUTPATIENT
Start: 2024-06-07

## 2024-06-07 RX ADMIN — HYDROCORTISONE SODIUM SUCCINATE 100 MG: 100 INJECTION, POWDER, FOR SOLUTION INTRAMUSCULAR; INTRAVENOUS at 08:05

## 2024-06-07 RX ADMIN — DIPHENHYDRAMINE HYDROCHLORIDE 25 MG: 25 TABLET ORAL at 08:05

## 2024-06-07 RX ADMIN — ACETAMINOPHEN 650 MG: 325 TABLET ORAL at 08:05

## 2024-06-07 ASSESSMENT — FIBROSIS 4 INDEX: FIB4 SCORE: 5.96

## 2024-06-07 NOTE — PROGRESS NOTES
Patient arrived to unit for 2 units of PRBCs for Hgb of 6.2. He complains of pain, fatigue, and dyspnea with exertion. Otherwise, no other complaints. VSS.    22G IV established in the left AC. Labs drawn from IV. Flushed with 10 mL NS. No erythema, edema, or pain noted.    Premeds administered.    2 units of PRBCs administered.    Blood return noted from IV. Flushed with 10 mL NS. No erythema, edema, or pain noted. IV removed. Site covered with gauze and coban.    Patient tolerated treatment without any adverse effects. Future appointments confirmed. Patient discharged home in stable condition.

## 2024-06-07 NOTE — PROGRESS NOTES
"Met with Casper in [facility] today at the end of his blood transfusion. Able to listen and talk with Casper for 1 hour. Casper asking about [facility] research. States he reached out to them via [employer] and has had no response. ONLAURA will reach out to [facility] team and see if they have a better avenue to contact [facility] Research team. Casper continues on chemotherapy and radiation for metastatic ds. Chemotherapy is on hold as Casper is having a \"nerve ablation\" to his hip soon. After Casper was cleared to leave [facility], SATISH walked patient to his car and he left in NAD.   "

## 2024-06-08 ENCOUNTER — APPOINTMENT (OUTPATIENT)
Dept: ONCOLOGY | Facility: MEDICAL CENTER | Age: 59
End: 2024-06-08
Attending: STUDENT IN AN ORGANIZED HEALTH CARE EDUCATION/TRAINING PROGRAM
Payer: MEDICAID

## 2024-06-10 ENCOUNTER — HOSPITAL ENCOUNTER (OUTPATIENT)
Dept: RADIOLOGY | Facility: MEDICAL CENTER | Age: 59
End: 2024-06-10
Attending: STUDENT IN AN ORGANIZED HEALTH CARE EDUCATION/TRAINING PROGRAM
Payer: MEDICAID

## 2024-06-10 ENCOUNTER — APPOINTMENT (OUTPATIENT)
Dept: ONCOLOGY | Facility: MEDICAL CENTER | Age: 59
End: 2024-06-10
Attending: STUDENT IN AN ORGANIZED HEALTH CARE EDUCATION/TRAINING PROGRAM
Payer: MEDICAID

## 2024-06-10 DIAGNOSIS — C61 PROSTATE CANCER (HCC): ICD-10-CM

## 2024-06-10 PROCEDURE — 71260 CT THORAX DX C+: CPT

## 2024-06-10 PROCEDURE — 700117 HCHG RX CONTRAST REV CODE 255: Mod: UD | Performed by: STUDENT IN AN ORGANIZED HEALTH CARE EDUCATION/TRAINING PROGRAM

## 2024-06-10 RX ADMIN — IOHEXOL 100 ML: 350 INJECTION, SOLUTION INTRAVENOUS at 10:17

## 2024-06-12 ENCOUNTER — APPOINTMENT (OUTPATIENT)
Dept: RADIOLOGY | Facility: MEDICAL CENTER | Age: 59
End: 2024-06-12
Attending: ORTHOPAEDIC SURGERY
Payer: MEDICAID

## 2024-06-12 ENCOUNTER — ANESTHESIA EVENT (OUTPATIENT)
Dept: SURGERY | Facility: MEDICAL CENTER | Age: 59
End: 2024-06-12
Payer: MEDICAID

## 2024-06-12 ENCOUNTER — HOSPITAL ENCOUNTER (OUTPATIENT)
Facility: MEDICAL CENTER | Age: 59
End: 2024-06-12
Attending: ORTHOPAEDIC SURGERY | Admitting: ORTHOPAEDIC SURGERY
Payer: MEDICAID

## 2024-06-12 ENCOUNTER — ANESTHESIA (OUTPATIENT)
Dept: SURGERY | Facility: MEDICAL CENTER | Age: 59
End: 2024-06-12
Payer: MEDICAID

## 2024-06-12 VITALS
BODY MASS INDEX: 25.05 KG/M2 | OXYGEN SATURATION: 93 % | SYSTOLIC BLOOD PRESSURE: 119 MMHG | DIASTOLIC BLOOD PRESSURE: 69 MMHG | RESPIRATION RATE: 16 BRPM | HEART RATE: 99 BPM | WEIGHT: 159.61 LBS | HEIGHT: 67 IN | TEMPERATURE: 96.6 F

## 2024-06-12 DIAGNOSIS — C79.51 METASTASIS TO BONE (HCC): ICD-10-CM

## 2024-06-12 LAB
ABO GROUP BLD: NORMAL
BLD GP AB SCN SERPL QL: NORMAL
EKG IMPRESSION: NORMAL
ERYTHROCYTE [DISTWIDTH] IN BLOOD BY AUTOMATED COUNT: 73.5 FL (ref 35.9–50)
GLUCOSE BLD STRIP.AUTO-MCNC: 146 MG/DL (ref 65–99)
HCT VFR BLD AUTO: 24.4 % (ref 42–52)
HGB BLD-MCNC: 7.9 G/DL (ref 14–18)
INR PPP: 1.09 (ref 0.87–1.13)
MCH RBC QN AUTO: 31 PG (ref 27–33)
MCHC RBC AUTO-ENTMCNC: 32.4 G/DL (ref 32.3–36.5)
MCV RBC AUTO: 95.7 FL (ref 81.4–97.8)
PLATELET # BLD AUTO: 92 K/UL (ref 164–446)
PLATELETS.RETICULATED NFR BLD AUTO: 4.2 % (ref 0.6–13.1)
PMV BLD AUTO: 10.6 FL (ref 9–12.9)
PROTHROMBIN TIME: 14.3 SEC (ref 12–14.6)
RBC # BLD AUTO: 2.55 M/UL (ref 4.7–6.1)
RH BLD: NORMAL
WBC # BLD AUTO: 4.6 K/UL (ref 4.8–10.8)

## 2024-06-12 PROCEDURE — 85610 PROTHROMBIN TIME: CPT

## 2024-06-12 PROCEDURE — 700102 HCHG RX REV CODE 250 W/ 637 OVERRIDE(OP): Mod: UD | Performed by: ANESTHESIOLOGY

## 2024-06-12 PROCEDURE — 160046 HCHG PACU - 1ST 60 MINS PHASE II: Performed by: ORTHOPAEDIC SURGERY

## 2024-06-12 PROCEDURE — A9270 NON-COVERED ITEM OR SERVICE: HCPCS | Mod: UD | Performed by: ANESTHESIOLOGY

## 2024-06-12 PROCEDURE — 20982 ABLATE BONE TUMOR(S) PERQ: CPT | Performed by: ORTHOPAEDIC SURGERY

## 2024-06-12 PROCEDURE — 700111 HCHG RX REV CODE 636 W/ 250 OVERRIDE (IP): Mod: UD | Performed by: ORTHOPAEDIC SURGERY

## 2024-06-12 PROCEDURE — 72170 X-RAY EXAM OF PELVIS: CPT

## 2024-06-12 PROCEDURE — 86900 BLOOD TYPING SEROLOGIC ABO: CPT

## 2024-06-12 PROCEDURE — A9270 NON-COVERED ITEM OR SERVICE: HCPCS | Mod: UD | Performed by: ORTHOPAEDIC SURGERY

## 2024-06-12 PROCEDURE — 700101 HCHG RX REV CODE 250: Mod: UD | Performed by: ANESTHESIOLOGY

## 2024-06-12 PROCEDURE — 700105 HCHG RX REV CODE 258: Mod: UD | Performed by: ORTHOPAEDIC SURGERY

## 2024-06-12 PROCEDURE — 700111 HCHG RX REV CODE 636 W/ 250 OVERRIDE (IP): Mod: UD | Performed by: ANESTHESIOLOGY

## 2024-06-12 PROCEDURE — 86850 RBC ANTIBODY SCREEN: CPT

## 2024-06-12 PROCEDURE — 160048 HCHG OR STATISTICAL LEVEL 1-5: Performed by: ORTHOPAEDIC SURGERY

## 2024-06-12 PROCEDURE — 160002 HCHG RECOVERY MINUTES (STAT): Performed by: ORTHOPAEDIC SURGERY

## 2024-06-12 PROCEDURE — 93010 ELECTROCARDIOGRAM REPORT: CPT | Performed by: INTERNAL MEDICINE

## 2024-06-12 PROCEDURE — 85027 COMPLETE CBC AUTOMATED: CPT

## 2024-06-12 PROCEDURE — 85055 RETICULATED PLATELET ASSAY: CPT

## 2024-06-12 PROCEDURE — 160029 HCHG SURGERY MINUTES - 1ST 30 MINS LEVEL 4: Performed by: ORTHOPAEDIC SURGERY

## 2024-06-12 PROCEDURE — 93005 ELECTROCARDIOGRAM TRACING: CPT | Performed by: ORTHOPAEDIC SURGERY

## 2024-06-12 PROCEDURE — 86901 BLOOD TYPING SEROLOGIC RH(D): CPT

## 2024-06-12 PROCEDURE — 700102 HCHG RX REV CODE 250 W/ 637 OVERRIDE(OP): Mod: UD | Performed by: ORTHOPAEDIC SURGERY

## 2024-06-12 PROCEDURE — C1713 ANCHOR/SCREW BN/BN,TIS/BN: HCPCS | Performed by: ORTHOPAEDIC SURGERY

## 2024-06-12 PROCEDURE — 160041 HCHG SURGERY MINUTES - EA ADDL 1 MIN LEVEL 4: Performed by: ORTHOPAEDIC SURGERY

## 2024-06-12 PROCEDURE — 36415 COLL VENOUS BLD VENIPUNCTURE: CPT

## 2024-06-12 PROCEDURE — 160035 HCHG PACU - 1ST 60 MINS PHASE I: Performed by: ORTHOPAEDIC SURGERY

## 2024-06-12 PROCEDURE — 82962 GLUCOSE BLOOD TEST: CPT

## 2024-06-12 PROCEDURE — 110371 HCHG SHELL REV 272: Performed by: ORTHOPAEDIC SURGERY

## 2024-06-12 PROCEDURE — 160036 HCHG PACU - EA ADDL 30 MINS PHASE I: Performed by: ORTHOPAEDIC SURGERY

## 2024-06-12 PROCEDURE — 160025 RECOVERY II MINUTES (STATS): Performed by: ORTHOPAEDIC SURGERY

## 2024-06-12 PROCEDURE — 160009 HCHG ANES TIME/MIN: Performed by: ORTHOPAEDIC SURGERY

## 2024-06-12 DEVICE — BONE CEMENT & MIXER FOR KYPHO: Type: IMPLANTABLE DEVICE | Status: FUNCTIONAL

## 2024-06-12 RX ORDER — DIPHENOXYLATE HYDROCHLORIDE AND ATROPINE SULFATE 2.5; .025 MG/1; MG/1
1 TABLET ORAL 4 TIMES DAILY PRN
COMMUNITY

## 2024-06-12 RX ORDER — HALOPERIDOL 5 MG/ML
1 INJECTION INTRAMUSCULAR
Status: DISCONTINUED | OUTPATIENT
Start: 2024-06-12 | End: 2024-06-12 | Stop reason: HOSPADM

## 2024-06-12 RX ORDER — CEFAZOLIN SODIUM 1 G/3ML
INJECTION, POWDER, FOR SOLUTION INTRAMUSCULAR; INTRAVENOUS PRN
Status: DISCONTINUED | OUTPATIENT
Start: 2024-06-12 | End: 2024-06-12 | Stop reason: SURG

## 2024-06-12 RX ORDER — DEXMEDETOMIDINE HYDROCHLORIDE 100 UG/ML
INJECTION, SOLUTION INTRAVENOUS PRN
Status: DISCONTINUED | OUTPATIENT
Start: 2024-06-12 | End: 2024-06-12 | Stop reason: SURG

## 2024-06-12 RX ORDER — OXYCODONE HCL 5 MG/5 ML
5 SOLUTION, ORAL ORAL
Status: COMPLETED | OUTPATIENT
Start: 2024-06-12 | End: 2024-06-12

## 2024-06-12 RX ORDER — ALENDRONATE SODIUM 70 MG/1
70 TABLET ORAL
COMMUNITY

## 2024-06-12 RX ORDER — BUPIVACAINE HYDROCHLORIDE 2.5 MG/ML
INJECTION, SOLUTION EPIDURAL; INFILTRATION; INTRACAUDAL
Status: DISCONTINUED | OUTPATIENT
Start: 2024-06-12 | End: 2024-06-12 | Stop reason: HOSPADM

## 2024-06-12 RX ORDER — HYDROMORPHONE HYDROCHLORIDE 2 MG/ML
INJECTION, SOLUTION INTRAMUSCULAR; INTRAVENOUS; SUBCUTANEOUS PRN
Status: DISCONTINUED | OUTPATIENT
Start: 2024-06-12 | End: 2024-06-12 | Stop reason: SURG

## 2024-06-12 RX ORDER — IBUPROFEN 200 MG
600 TABLET ORAL EVERY 8 HOURS PRN
COMMUNITY

## 2024-06-12 RX ORDER — DEXAMETHASONE SODIUM PHOSPHATE 4 MG/ML
INJECTION, SOLUTION INTRA-ARTICULAR; INTRALESIONAL; INTRAMUSCULAR; INTRAVENOUS; SOFT TISSUE PRN
Status: DISCONTINUED | OUTPATIENT
Start: 2024-06-12 | End: 2024-06-12 | Stop reason: SURG

## 2024-06-12 RX ORDER — SODIUM CHLORIDE, SODIUM LACTATE, POTASSIUM CHLORIDE, CALCIUM CHLORIDE 600; 310; 30; 20 MG/100ML; MG/100ML; MG/100ML; MG/100ML
INJECTION, SOLUTION INTRAVENOUS CONTINUOUS
Status: ACTIVE | OUTPATIENT
Start: 2024-06-12 | End: 2024-06-12

## 2024-06-12 RX ORDER — MIDAZOLAM HYDROCHLORIDE 1 MG/ML
INJECTION INTRAMUSCULAR; INTRAVENOUS PRN
Status: DISCONTINUED | OUTPATIENT
Start: 2024-06-12 | End: 2024-06-12 | Stop reason: SURG

## 2024-06-12 RX ORDER — SODIUM CHLORIDE, SODIUM LACTATE, POTASSIUM CHLORIDE, CALCIUM CHLORIDE 600; 310; 30; 20 MG/100ML; MG/100ML; MG/100ML; MG/100ML
INJECTION, SOLUTION INTRAVENOUS CONTINUOUS
Status: DISCONTINUED | OUTPATIENT
Start: 2024-06-12 | End: 2024-06-12 | Stop reason: HOSPADM

## 2024-06-12 RX ORDER — ONDANSETRON 2 MG/ML
4 INJECTION INTRAMUSCULAR; INTRAVENOUS
Status: DISCONTINUED | OUTPATIENT
Start: 2024-06-12 | End: 2024-06-12 | Stop reason: HOSPADM

## 2024-06-12 RX ORDER — OXYCODONE HCL 5 MG/5 ML
10 SOLUTION, ORAL ORAL
Status: COMPLETED | OUTPATIENT
Start: 2024-06-12 | End: 2024-06-12

## 2024-06-12 RX ORDER — BACITRACIN ZINC 500 [USP'U]/G
OINTMENT TOPICAL
Status: DISCONTINUED | OUTPATIENT
Start: 2024-06-12 | End: 2024-06-12 | Stop reason: HOSPADM

## 2024-06-12 RX ORDER — DIPHENHYDRAMINE HYDROCHLORIDE 50 MG/ML
12.5 INJECTION INTRAMUSCULAR; INTRAVENOUS
Status: DISCONTINUED | OUTPATIENT
Start: 2024-06-12 | End: 2024-06-12 | Stop reason: HOSPADM

## 2024-06-12 RX ORDER — ONDANSETRON 2 MG/ML
INJECTION INTRAMUSCULAR; INTRAVENOUS PRN
Status: DISCONTINUED | OUTPATIENT
Start: 2024-06-12 | End: 2024-06-12 | Stop reason: SURG

## 2024-06-12 RX ADMIN — ONDANSETRON 4 MG: 2 INJECTION INTRAMUSCULAR; INTRAVENOUS at 13:53

## 2024-06-12 RX ADMIN — HYDROMORPHONE HYDROCHLORIDE 1 MG: 2 INJECTION INTRAMUSCULAR; INTRAVENOUS; SUBCUTANEOUS at 12:19

## 2024-06-12 RX ADMIN — SODIUM CHLORIDE, POTASSIUM CHLORIDE, SODIUM LACTATE AND CALCIUM CHLORIDE: 600; 310; 30; 20 INJECTION, SOLUTION INTRAVENOUS at 10:11

## 2024-06-12 RX ADMIN — MIDAZOLAM HYDROCHLORIDE 2 MG: 2 INJECTION, SOLUTION INTRAMUSCULAR; INTRAVENOUS at 12:06

## 2024-06-12 RX ADMIN — PROPOFOL 150 MG: 10 INJECTION, EMULSION INTRAVENOUS at 12:06

## 2024-06-12 RX ADMIN — OXYCODONE HYDROCHLORIDE 10 MG: 5 SOLUTION ORAL at 14:20

## 2024-06-12 RX ADMIN — HYDROMORPHONE HYDROCHLORIDE 1 MG: 2 INJECTION INTRAMUSCULAR; INTRAVENOUS; SUBCUTANEOUS at 12:06

## 2024-06-12 RX ADMIN — DEXMEDETOMIDINE HYDROCHLORIDE 20 MCG: 100 INJECTION, SOLUTION INTRAVENOUS at 12:40

## 2024-06-12 RX ADMIN — SODIUM CHLORIDE, POTASSIUM CHLORIDE, SODIUM LACTATE AND CALCIUM CHLORIDE: 600; 310; 30; 20 INJECTION, SOLUTION INTRAVENOUS at 13:35

## 2024-06-12 RX ADMIN — DEXAMETHASONE SODIUM PHOSPHATE 4 MG: 4 INJECTION INTRA-ARTICULAR; INTRALESIONAL; INTRAMUSCULAR; INTRAVENOUS; SOFT TISSUE at 13:53

## 2024-06-12 RX ADMIN — CEFAZOLIN 2 G: 1 INJECTION, POWDER, FOR SOLUTION INTRAMUSCULAR; INTRAVENOUS at 12:06

## 2024-06-12 ASSESSMENT — PAIN DESCRIPTION - PAIN TYPE
TYPE: SURGICAL PAIN
TYPE: CHRONIC PAIN
TYPE: SURGICAL PAIN

## 2024-06-12 ASSESSMENT — PAIN SCALES - GENERAL: PAIN_LEVEL: 2

## 2024-06-12 ASSESSMENT — FIBROSIS 4 INDEX: FIB4 SCORE: 5.96

## 2024-06-12 NOTE — ANESTHESIA TIME REPORT
Anesthesia Start and Stop Event Times       Date Time Event    6/12/2024 1152 Ready for Procedure     1202 Anesthesia Start     1412 Anesthesia Stop          Responsible Staff  06/12/24      Name Role Begin End    Luis Alberto Jansen M.D. Anesth 1202 1412          Overtime Reason:  no overtime (within assigned shift)    Comments:

## 2024-06-12 NOTE — ANESTHESIA PREPROCEDURE EVALUATION
Case: [id number] Date/Time: 06/12/24 1115    Procedure: RADIOFREQUENCY ABLATION OF RIGHT HEMIPELVIS METASTATIC LESION WITH CEMENT AUGMENTATION (Pelvis)    Anesthesia type: General    Pre-op diagnosis: RIGHT HEMIPELVIS METASTATIC / PROSTATE CANCER    Location: [facility] OR 10 / SURGERY [facility]    Surgeons: Jayshree Perkins D.O.            Relevant Problems   ANESTHESIA   (positive) NED (obstructive sleep apnea)      CARDIAC   (positive) HTN (hypertension)   (positive) HTN (hypertension), benign      Other   (positive) Prepatellar bursitis of left knee       Physical Exam    Airway   Mallampati: II  TM distance: >3 FB  Neck ROM: full       Cardiovascular - normal exam  Rhythm: regular  Rate: normal  (-) murmur     Dental - normal exam           Pulmonary - normal exam  Breath sounds clear to auscultation     Abdominal    Neurological - normal exam                   Anesthesia Plan    ASA 2       Plan - general       Airway plan will be LMA          Induction: intravenous    Postoperative Plan: Postoperative administration of opioids is intended.    Pertinent diagnostic labs and testing reviewed    Informed Consent:    Anesthetic plan and risks discussed with patient.    Use of blood products discussed with: patient whom consented to blood products.

## 2024-06-12 NOTE — ANESTHESIA POSTPROCEDURE EVALUATION
Patient: Casper Rowley    Procedure Summary       Date: 06/12/24 Room / Location: [place] [address] / SURGERY [facility]    Anesthesia Start: 1202 Anesthesia Stop: 1412    Procedure: RADIOFREQUENCY ABLATION OF RIGHT HEMIPELVIS METASTATIC LESION WITH CEMENT AUGMENTATION (Pelvis) Diagnosis: (RIGHT HEMIPELVIS METASTATIC / PROSTATE CANCER)    Surgeons: Jayshree Perkins D.O. Responsible Provider: Luis Alberto Jansen M.D.    Anesthesia Type: general ASA Status: 2            Final Anesthesia Type: general  Last vitals  BP   Blood Pressure: 117/59    Temp   36.6 °C (97.9 °F)    Pulse   (!) 106   Resp   18    SpO2   100 %      Anesthesia Post Evaluation    Patient location during evaluation: PACU  Patient participation: complete - patient participated  Level of consciousness: awake and alert  Pain score: 2    Airway patency: patent  Anesthetic complications: no  Cardiovascular status: hemodynamically stable  Respiratory status: acceptable  Hydration status: euvolemic    PONV: none          No notable events documented.     Nurse Pain Score: 0 (NPRS)

## 2024-06-12 NOTE — OP REPORT
Date of Operation: 6/12/2024    Preoperative Diagnosis: Bone metastases to pelvis and right femur, prostate cancer     Postoperative Diagnosis: Same    Indications: This is a 58-year-old male with history of prostate cancer metastatic to bone.  He has had ongoing and progressive right hip pain.  He has known metastasis to the darnell-acetabulum of the right hip and right proximal femur.  After discussion of treatment options he is elected to proceed with radiofrequency ablation of the painful bony metastasis with cement augmentation of the right ilium/periacetabulum and right femoral head.    Operative Procedure: Radiofrequency ablation with ostial cool right ilium/periacetabulum and right femoral head with cement augmentation    Surgeon: Jayshree Perkins D.O.    Assistant:  SATNAM Calles  A surgical assistant in this surgery was indicated due to the complexity of the procedure.  Their role included aiding in the positioning, approach, retraction, holding of devices, manipulation of the limb and closure of wounds.    Anesthesia: General      Estimated Blood Loss:  100 ml    Specimens: none    Drains: none     Implants: Bone Cement    DESCRIPTION OF PROCEDURE:     The patient was transferred to the operating table and placed in the supine position. Pressure points were padded and the patient was secured to the table. Sequential compression garments were applied to the lower extremities. General anesthesia was induced and the patient was intubated by the anesthesia team. Ancef was given as prophylaxis. The right lower extremity and pelvis were prepped and draped in standard fashion. A time out was performed.     Under fluoroscopic guidance a percutaneous incision was made over the AIIS.  The bone trocar was advanced through the AIIS into the ilium over the superior dome of the acetabulum.  A second percutaneous incision was made just proximal to this and a second bone trocar was advanced  proximally 5 mm superior to the first in parallel fashion.  This was done under fluoroscopic guidance to make sure it was out of the joint.  The position was checked on multiple views.  Next the hand drill was inserted through the cannula and advanced through both trocars.  We selected the appropriate length radiofrequency ablation probe.  The drills were exchanged for the ablation process.  Next the ostial cool ablation device was turned on and allowed to ablate the area to temperature of 70 °C.  After the ablation was complete the cannulas were left in place and attention was turned to the femoral head.  In similar fashion under fluoroscopic guidance percutaneous incision was made over the greater trochanter.  Two bone trocars were then inserted and advanced into the femoral head.  The hand drills were used to advance in the femoral head and the appropriate sized radiofrequency ablation probes were selected.  The radiofrequency ablation probes were inserted in the femoral head metastasis was ablated to a temperature of 70 °C.  Next the cement was mixed on the back table and we sequentially injected cement through the cannulas in both the ilium and the femoral head.  It was noted that there was some cement leak laterally out of the ilium and anteriorly, but not into the joint.  We stopped the cement injection through the ileum at this point.  The femoral head was then injected with cement through the cannulas.  Final fluoroscopic images were obtained.  Hip range of motion was tested and found to be symmetric to the contralateral side.  The wounds were closed with 3-0 Monocryl.Sterile dressings were applied.     The patient was then awakened, extubated and transferred to the post-anesthesia care unit in stable condition.     Disposition: After recovery in the post anesthesia care unit the patient will discharge home after ambulating with nursing. Weight bearing restrictions: Weightbearing as tolerated right lower  extremity. DVT prophylaxis will be initiated post operative day 1 with aspirin 81 mg twice daily for 4 weeks. They will advance to a regular diet.  He will follow-up at 2 weeks postop.  Radiographs including iliac oblique, obturator oblique, AP and lateral of the right hip.    Jayshree Perkins DO  Orthopedic Oncologist

## 2024-06-12 NOTE — OR NURSING
1542  Pt arrived from PACU, report received from RN. VSS, Dressing Clean, dry intact to   Right hip in 2 sites with gujanes and tegaderm.    Pt is weight bearing as toleratedto right leg.                 Pt ambulated to chair with minimal SBA. Tolerating Solids, liquids. Denies pain, nausea at present. Will monitor. Dicussed Plan of Care with patient and family, Understanding verbalized.    1557  Discharge Instructions reviewed with patient and family. Understanding verbalized .   Adequate pain control no active nausea in post op period.     1606  IV DC with cath intact    1615  Left via WC with belongings.

## 2024-06-12 NOTE — PROGRESS NOTES
Medication history reviewed with PT at [facility] is complete per PT reporting    Allergies reviewed.     Patient denies any outpatient antibiotics in the last 30 days.     Patient is not taking anticoagulants.    Preferred pharmacy for this visit - [facility] ([phone])

## 2024-06-12 NOTE — ANESTHESIA PROCEDURE NOTES
Airway    Date/Time: 6/12/2024 12:07 PM    Performed by: Luis Alberto Jansen M.D.  Authorized by: Luis Alberto Jansen M.D.    Location:  OR  Urgency:  Elective  Indications for Airway Management:  Anesthesia      Spontaneous Ventilation: absent    Sedation Level:  Deep  Preoxygenated: Yes    Final Airway Type:  Supraglottic airway  Final Supraglottic Airway:  Standard LMA    SGA Size:  4  Number of Attempts at Approach:  1

## 2024-06-12 NOTE — DISCHARGE INSTRUCTIONS
HOME CARE INSTRUCTIONS    ACTIVITY: Rest and take it easy for the first 24 hours.  A responsible adult is recommended to remain with you during that time.  It is normal to feel sleepy.  We encourage you to not do anything that requires balance, judgment or coordination.    FOR 24 HOURS DO NOT:  Drive, operate machinery or run household appliances.  Drink beer or alcoholic beverages.  Make important decisions or sign legal documents.      DIET: To avoid nausea, slowly advance diet as tolerated, avoiding spicy or greasy foods for the first day.  Add more substantial food to your diet according to your physician's instructions.  Babies can be fed formula or breast milk as soon as they are hungry.  INCREASE FLUIDS AND FIBER TO AVOID CONSTIPATION.    SURGICAL DRESSING/BATHING: keep dressing clean dry and intact.  May shower after 48 hours avoid hot tub , pool , lake and beach swimming.     MEDICATIONS: Resume taking daily medication.  Take prescribed pain medication with food.  If no medication is prescribed, you may take non-aspirin pain medication if needed.  PAIN MEDICATION CAN BE VERY CONSTIPATING.  Take a stool softener or laxative such as senokot, pericolace, or milk of magnesia if needed.    Prescription given for aspirin .  Last pain medication given at oxycodone at 2:20PM.    A follow-up appointment should be arranged with your doctor Maddie Martell  in [phone] ; call to schedule.    You should CALL YOUR PHYSICIAN if you develop:  Fever greater than 101 degrees F.  Pain not relieved by medication, or persistent nausea or vomiting.  Excessive bleeding (blood soaking through dressing) or unexpected drainage from the wound.  Extreme redness or swelling around the incision site, drainage of pus or foul smelling drainage.  Inability to urinate or empty your bladder within 8 hours.  Problems with breathing or chest pain.    You should call 911 if you develop problems with breathing or chest pain.  If you are  unable to contact your doctor or surgical center, you should go to the nearest emergency room or urgent care center.  Physician's telephone #:[phone]     MILD FLU-LIKE SYMPTOMS ARE NORMAL.  YOU MAY EXPERIENCE GENERALIZED MUSCLE ACHES, THROAT IRRITATION, HEADACHE AND/OR SOME NAUSEA.    If any questions arise, call your doctor.  If your doctor is not available, please feel free to call the Surgical Center at [phone].  The Center is open Monday through Friday from 7AM to 7PM.      A registered nurse may call you a few days after your surgery to see how you are doing after your procedure.    You may also receive a survey in the mail within the next two weeks and we ask that you take a few moments to complete the survey and return it to us.  Our goal is to provide you with very good care and we value your comments.     Depression / Suicide Risk    As you are discharged from this [facility] facility, it is important to learn how to keep safe from harming yourself.    Recognize the warning signs:  Abrupt changes in personality, positive or negative- including increase in energy   Giving away possessions  Change in eating patterns- significant weight changes-  positive or negative  Change in sleeping patterns- unable to sleep or sleeping all the time   Unwillingness or inability to communicate  Depression  Unusual sadness, discouragement and loneliness  Talk of wanting to die  Neglect of personal appearance   Rebelliousness- reckless behavior  Withdrawal from people/activities they love  Confusion- inability to concentrate     If you or a loved one observes any of these behaviors or has concerns about self-harm, here's what you can do:  Talk about it- your feelings and reasons for harming yourself  Remove any means that you might use to hurt yourself (examples: pills, rope, extension cords, firearm)  Get professional help from the community (Mental Health, Substance Abuse, psychological counseling)  Do not be  alone:Call your Safe Contact- someone whom you trust who will be there for you.  Call your local CRISIS HOTLINE 496-8367 or 248-762-1257  Call your local Children's Mobile Crisis Response Team [place] (656) 064-8370 or www.AskNshare  Call the toll free National Suicide Prevention Hotlines   National Suicide Prevention Lifeline 712-592-CVBX (7593)  [facility] 800-COKFKSK (811-8021)    I acknowledge receipt and understanding of these Home Care instructions.

## 2024-06-12 NOTE — OR NURSING
Patient arrived to PACU in stable condition.  Surgical sites to R thigh, dressings CDI  Medicated for pain per LUIS DANIEL Cui updated on patient condition   Patient tolerated clears without nausea or vomiting  Report given to Philomena DIAMOND. Patient transferred to phase 2

## 2024-06-12 NOTE — DISCHARGE INSTR - OTHER INFO
Discharge Instructions:    Diet: Resume your regular diet. Try to eat protein with every meal, this is important for healing and recovery. Drink plenty of water to stay hydrated and avoid constipation after surgery.    Restrictions: None, you may walk as much as tolerated. Progress distance as you are able.    Pain control: Resume your current pain regimen with Norco, ibuprofen and morphine. Call if your pain is uncontrolled and we can make adjustments.     Blood Clot Prevention: Take aspirin 81 mg twice daily for 4 weeks after surgery to prevent blood clots.     Wound care: Keep wound clean and dry. You may shower over the wound and pat dry starting post operative day four. Replace dressings to keep wound covered unless you are in a clean environment then the wound can remain open to air. Do not let pets sit on or lick your wound. Call if you develop redness spreading from the wound, puss draining from the wound or new drainage from the wound after it has been dry.     Call if you develop fevers, chills, wound concerns or have any questions.     Future Appointments         Provider Department Center    6/25/2024 1:30 PM Jayshree Perkins D.O. [facility] - Surgical Oncology

## 2024-06-17 ENCOUNTER — TELEPHONE (OUTPATIENT)
Dept: HEMATOLOGY ONCOLOGY | Facility: MEDICAL CENTER | Age: 59
End: 2024-06-17
Payer: MEDICAID

## 2024-06-17 DIAGNOSIS — C79.31 METASTASIS TO BRAIN (HCC): ICD-10-CM

## 2024-06-17 DIAGNOSIS — C79.51 PROSTATE CANCER METASTATIC TO BONE (HCC): ICD-10-CM

## 2024-06-17 DIAGNOSIS — C61 PROSTATE CANCER METASTATIC TO BONE (HCC): ICD-10-CM

## 2024-06-17 RX ORDER — DEXAMETHASONE 1 MG
TABLET ORAL
Qty: 140 TABLET | Refills: 0 | Status: SHIPPED | OUTPATIENT
Start: 2024-06-17 | End: 2024-07-15

## 2024-06-17 NOTE — TELEPHONE ENCOUNTER
Patient called Medical Oncology with c/o double vision since last Wed. Pt denies headaches, denies dizziness and denies changes in balance.  He states he noticed he was seeing double when looking at a car in the morning.  He states these changes have remained consistent.  Discussed with Dr. Baires and received verbal orders for STAT brain MRI.  Orders entered and RN spoke with Marianela with image scheduling.  Soonest available is currently July 5, however she is working on getting the patient in sooner.     Informed Dr. Baires of schedule for MRI and physician decision to start dexamethasone 4mg BID x 7 days and then start tapering.  Rx entered. Pt notified and educated on medication.  Pt verbalized understanding and agreed with POC.  Pt states he has an appt with [employer] at 0800 tomorrow.

## 2024-06-18 ENCOUNTER — TELEPHONE (OUTPATIENT)
Dept: HEMATOLOGY ONCOLOGY | Facility: MEDICAL CENTER | Age: 59
End: 2024-06-18
Payer: MEDICAID

## 2024-06-18 NOTE — TELEPHONE ENCOUNTER
Contacted patient in regard to eye doctor appointment. Patient states eye doctor is suggesting a CT scan along with the ordered MRI. Patient has scheduled appointment with Dr. Baires tomorrow, will clarify plan of care tomorrow at appointment. No other concerns at this time.

## 2024-06-19 ENCOUNTER — HOSPITAL ENCOUNTER (OUTPATIENT)
Dept: HEMATOLOGY ONCOLOGY | Facility: MEDICAL CENTER | Age: 59
End: 2024-06-19
Attending: STUDENT IN AN ORGANIZED HEALTH CARE EDUCATION/TRAINING PROGRAM
Payer: MEDICAID

## 2024-06-19 VITALS
HEIGHT: 67 IN | DIASTOLIC BLOOD PRESSURE: 52 MMHG | SYSTOLIC BLOOD PRESSURE: 108 MMHG | TEMPERATURE: 96.7 F | WEIGHT: 155.6 LBS | RESPIRATION RATE: 16 BRPM | OXYGEN SATURATION: 97 % | BODY MASS INDEX: 24.42 KG/M2 | HEART RATE: 86 BPM

## 2024-06-19 DIAGNOSIS — C61 PROSTATE CANCER (HCC): ICD-10-CM

## 2024-06-19 PROCEDURE — 99214 OFFICE O/P EST MOD 30 MIN: CPT | Performed by: STUDENT IN AN ORGANIZED HEALTH CARE EDUCATION/TRAINING PROGRAM

## 2024-06-19 PROCEDURE — 99212 OFFICE O/P EST SF 10 MIN: CPT | Performed by: STUDENT IN AN ORGANIZED HEALTH CARE EDUCATION/TRAINING PROGRAM

## 2024-06-19 RX ORDER — 0.9 % SODIUM CHLORIDE 0.9 %
10 VIAL (ML) INJECTION PRN
OUTPATIENT
Start: 2024-07-02

## 2024-06-19 RX ORDER — PROCHLORPERAZINE MALEATE 10 MG
10 TABLET ORAL EVERY 6 HOURS PRN
OUTPATIENT
Start: 2024-07-03

## 2024-06-19 RX ORDER — METHYLPREDNISOLONE SODIUM SUCCINATE 125 MG/2ML
125 INJECTION, POWDER, LYOPHILIZED, FOR SOLUTION INTRAMUSCULAR; INTRAVENOUS PRN
OUTPATIENT
Start: 2024-07-03

## 2024-06-19 RX ORDER — 0.9 % SODIUM CHLORIDE 0.9 %
3 VIAL (ML) INJECTION PRN
OUTPATIENT
Start: 2024-07-02

## 2024-06-19 RX ORDER — DIPHENHYDRAMINE HYDROCHLORIDE 50 MG/ML
50 INJECTION INTRAMUSCULAR; INTRAVENOUS PRN
OUTPATIENT
Start: 2024-07-03

## 2024-06-19 RX ORDER — SODIUM CHLORIDE 9 MG/ML
INJECTION, SOLUTION INTRAVENOUS CONTINUOUS
OUTPATIENT
Start: 2024-07-03

## 2024-06-19 RX ORDER — EPINEPHRINE 1 MG/ML(1)
0.5 AMPUL (ML) INJECTION PRN
OUTPATIENT
Start: 2024-07-03

## 2024-06-19 RX ORDER — ONDANSETRON 2 MG/ML
4 INJECTION INTRAMUSCULAR; INTRAVENOUS PRN
OUTPATIENT
Start: 2024-07-03

## 2024-06-19 RX ORDER — 0.9 % SODIUM CHLORIDE 0.9 %
VIAL (ML) INJECTION PRN
OUTPATIENT
Start: 2024-07-03

## 2024-06-19 RX ORDER — 0.9 % SODIUM CHLORIDE 0.9 %
10 VIAL (ML) INJECTION PRN
OUTPATIENT
Start: 2024-07-03

## 2024-06-19 RX ORDER — 0.9 % SODIUM CHLORIDE 0.9 %
VIAL (ML) INJECTION PRN
OUTPATIENT
Start: 2024-07-02

## 2024-06-19 RX ORDER — 0.9 % SODIUM CHLORIDE 0.9 %
3 VIAL (ML) INJECTION PRN
OUTPATIENT
Start: 2024-07-03

## 2024-06-19 RX ORDER — ONDANSETRON 8 MG/1
8 TABLET, ORALLY DISINTEGRATING ORAL PRN
OUTPATIENT
Start: 2024-07-03

## 2024-06-19 ASSESSMENT — ENCOUNTER SYMPTOMS
BLOOD IN STOOL: 0
NERVOUS/ANXIOUS: 0
FEVER: 0
DOUBLE VISION: 1
COUGH: 0
SORE THROAT: 0
SPUTUM PRODUCTION: 0
WEAKNESS: 1
BRUISES/BLEEDS EASILY: 0
CONSTIPATION: 0
SHORTNESS OF BREATH: 0
SINUS PAIN: 0
ABDOMINAL PAIN: 0
TINGLING: 0
HEARTBURN: 0
NAUSEA: 0
INSOMNIA: 0
BLURRED VISION: 0
ORTHOPNEA: 0
PALPITATIONS: 0
HEADACHES: 0
VOMITING: 0
BACK PAIN: 1
WHEEZING: 0
DIZZINESS: 0
DIARRHEA: 0
WEIGHT LOSS: 0
CHILLS: 0
DIAPHORESIS: 0
MYALGIAS: 1

## 2024-06-19 ASSESSMENT — PAIN SCALES - GENERAL: PAINLEVEL: 5=MODERATE PAIN

## 2024-06-19 ASSESSMENT — FIBROSIS 4 INDEX: FIB4 SCORE: 7.45

## 2024-06-19 NOTE — Clinical Note
We need cabozantinib 20 mg PO daily (with plans for escalation to 40 mg PO daily) ASAP. He will start this along with atezolizumab, so we'll need to get that scheduled once cabo is approved. Reference is CONTACT-02 trial presented at [facility]  2024.

## 2024-06-20 ENCOUNTER — TELEPHONE (OUTPATIENT)
Dept: HEMATOLOGY ONCOLOGY | Facility: MEDICAL CENTER | Age: 59
End: 2024-06-20
Payer: MEDICAID

## 2024-06-20 DIAGNOSIS — C79.31 METASTASIS TO BRAIN (HCC): ICD-10-CM

## 2024-06-20 DIAGNOSIS — C61 PROSTATE CANCER (HCC): ICD-10-CM

## 2024-06-20 DIAGNOSIS — C79.51 PROSTATE CANCER METASTATIC TO BONE (HCC): ICD-10-CM

## 2024-06-20 DIAGNOSIS — C61 PROSTATE CANCER METASTATIC TO BONE (HCC): ICD-10-CM

## 2024-06-20 NOTE — PROGRESS NOTES
Written orders received from Dr. Baires for cabozantinib 20 mg PO daily (with plans for escalation to 40 mg PO daily) ASAP. He will start this along with atezolizumab, so we'll need to get that scheduled once cabo is approved. Reference is CONTACT-02 trial presented at [facility]  2024.     Rx entered and pharmacy coordinator Barb PEREZ notified.

## 2024-06-20 NOTE — PROGRESS NOTES
Follow Up Note:  Hematology/Oncology      Primary Care:  KOKI Fagan    Diagnosis: Metastatic castrate-resistant prostate adenocarcinoma    Chief Complaint: On-treatment visit    Current Treatment: Plan for cabozantinib and atezolizumab    Prior Treatment: Firmagon, enzalutamide, radium 223, docetaxel, leuprolide, resection of brain mets, palliative XRT; carboplatin and cabazitaxel    Oncology History of Presenting Illness:  Casper Rowley is a 58 y.o.  man who presents to the clinic for transfer of care for ongoing management of metastatic castrate resistant prostate adenocarcinoma.  He was originally diagnosed in 2021 and was metastatic at that time with bone metastases, and was treated with engine deprivation therapy as well as enzalutamide.  He got radium 223 and 2022 and was then treated with docetaxel for 11 cycles, as well as getting radiation therapy to thoracic spinal metastases as well as his adrenals.  His oncology history is summarized below:     Oncologic History:  6/21 Obstructive sxs with abnl DAYRON PSA 7  6/21 Presented with LBP worsening leg pain prompting spine MR, myeloma STRICKLAND negative, PSA 20   7/13/21 L2 Bone biopsy: prostate cancer  7/26/21 TRUSBP: Felix 5+5, 1/12 core 5%, 4+4=5/12 cores 80-95%  7/28/21 Firmagon 240mg  9/04/21 PSA: 1.4 Alk Phos 681  9/8/21 Denosumab started  9/27/21 Enzalutamide started  11/11/21 PSA 1.05 chapito  3/15/22 PSA 1.8  3-5/25/22 Quemado-223 x 3, paused due to decreased counts  5/6/22 RT to L2  6/10/22 PSA 4.07  8/19/22 PSA 1.73  8/29/22 Started Docetaxel with Dr. Butts and discontinued enzalutamide.   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 Be.  10/20/23 PSA  0.76  12/2023 Receiving RT to his adrenals and lower thoracic spine.      He was started on carboplatin and cabazitaxel, and was evaluated for PSMA Pluvicto therapy, but his disease is not PSMA avid and therefore he has been on chemotherapy, which he has been doing okay with. However, he decided he wanted to switch physicians due to his oncologist frankly telling him about the aggressive nature of his disease and the poor prognosis associated with it. He was subsequently referred to me.     Treatment History: See HPI  02/23/24: C3 carbo/cabazitaxel (first two cycles given at [facility])  03/15/24: C4 carbo/cabazitaxel  04/05/24: C5 carbo/cabazitaxel   04/26/24: C6 carbo/cabazitaxel  05/17/24: C7 carbo/cabazitaxel    Interval History:  Patient is here for follow up visit. His disease is worsening with scans revealing progressive disease. The patient has been having vision changes in his right eye which started last week. He is on dexamethasone now though it hasn't helped very much thus far (he started it yesterday). He still feels weak and fatigued, but wants to continue with therapy.     Allergies as of 06/19/2024 - Reviewed 06/19/2024   Allergen Reaction Noted    Iodine Unspecified 04/30/2024         Current Outpatient Medications:     dexamethasone (DECADRON) 1 MG Tab, Take 4 Tablets by mouth 2 times a day for 7 days, THEN 3 Tablets 2 times a day for 7 days, THEN 2 Tablets 2 times a day for 7 days, THEN 1 Tablet 2 times a day for 7 days. Take 1 tab twice a day on day 0, 2, 3 of each chemotherapy cycle, Disp: 140 Tablet, Rfl: 0    ibuprofen (MOTRIN) 200 MG Tab, Take 600 mg by mouth every 8 hours as needed for Mild Pain., Disp: , Rfl:     alendronate (FOSAMAX) 70 MG Tab, Take 70 mg by mouth every 7 days., Disp: , Rfl:     diphenoxylate-atropine (LOMOTIL) 2.5-0.025 MG Tab, Take 1 Tablet by mouth 4 times a day as needed for Diarrhea., Disp: , Rfl:     Aspirin 81 MG Cap, Take 81 mg by mouth 2 times a day for 28 days., Disp:  56 Capsule, Rfl: 0    Semaglutide,0.25 or 0.5MG/DOS, (OZEMPIC, 0.25 OR 0.5 MG/DOSE,) 2 MG/3ML Solution Pen-injector, Inject 0.5 mg under the skin every 7 days., Disp: , Rfl:     Naloxone (NARCAN) 4 MG/0.1ML Liquid, Administer 4 mg into affected nostril(S) as needed (For severe sleepiness or difficulty breathing from possible overdose. Call 911 after administration.)., Disp: 1 Each, Rfl: 0    clobetasol (TEMOVATE) 0.05 % Cream, Apply to affected area BID for two weeks, Disp: 30 g, Rfl: 2    ondansetron (ZOFRAN ODT) 8 MG TABLET DISPERSIBLE, Take 1 Tablet by mouth every four hours as needed for Nausea., Disp: 180 Tablet, Rfl: 1    zolpidem (AMBIEN) 10 MG Tab, Take 10 mg by mouth at bedtime as needed for Sleep., Disp: , Rfl:     cyclobenzaprine (FLEXERIL) 10 mg Tab, Take 10 mg by mouth at bedtime., Disp: , Rfl:     lisinopril (PRINIVIL) 20 MG Tab, Take 1 tablet by mouth every day., Disp: 30 Tablet, Rfl: 2    metoprolol SR (TOPROL XL) 25 MG TABLET SR 24 HR, Take 2 tablets by mouth every day. (Patient taking differently: Take 25 mg by mouth every day.), Disp: 60 Tablet, Rfl: 2    Canagliflozin (INVOKANA) 100 MG Tab, Take 100 mg by mouth every day., Disp: , Rfl:       Review of Systems:  Review of Systems   Constitutional:  Positive for malaise/fatigue. Negative for chills, diaphoresis, fever and weight loss.   HENT:  Negative for hearing loss, nosebleeds, sinus pain and sore throat.    Eyes:  Positive for double vision. Negative for blurred vision.   Respiratory:  Negative for cough, sputum production, shortness of breath and wheezing.    Cardiovascular:  Negative for chest pain, palpitations, orthopnea and leg swelling.   Gastrointestinal:  Negative for abdominal pain, blood in stool, constipation, diarrhea, heartburn, melena, nausea and vomiting.   Genitourinary:  Negative for dysuria, frequency, hematuria and urgency.   Musculoskeletal:  Positive for back pain and myalgias. Negative for joint pain.   Skin:  Negative  "for rash.   Neurological:  Positive for weakness. Negative for dizziness, tingling and headaches.   Endo/Heme/Allergies:  Does not bruise/bleed easily.   Psychiatric/Behavioral:  The patient is not nervous/anxious and does not have insomnia.          Physical Exam:  Vitals:    06/19/24 1430   BP: 108/52   Pulse: 86   Resp: 16   Temp: 35.9 °C (96.7 °F)   TempSrc: Temporal   SpO2: 97%   Weight: 70.6 kg (155 lb 9.6 oz)   Height: 1.702 m (5' 7.01\")         DESC; KARNOFSKY SCALE WITH ECOG EQUIVALENT: 70, Cares for self; unable to carry on normal activity or to do active work (ECOG equivalent 1)    DISTRESS LEVEL: no apparent distress    Physical Exam  Vitals and nursing note reviewed.   Constitutional:       General: He is awake. He is not in acute distress.     Appearance: Normal appearance. He is normal weight. He is not ill-appearing, toxic-appearing or diaphoretic.   HENT:      Head: Normocephalic and atraumatic.      Nose: Nose normal. No congestion.      Mouth/Throat:      Pharynx: Oropharynx is clear. No oropharyngeal exudate or posterior oropharyngeal erythema.   Eyes:      General: No scleral icterus.     Extraocular Movements: Extraocular movements intact.      Conjunctiva/sclera: Conjunctivae normal.      Pupils: Pupils are equal, round, and reactive to light.      Comments: Patient wearing an eye patch on the right eye to help his vision   Cardiovascular:      Rate and Rhythm: Regular rhythm. Tachycardia present.      Pulses: Normal pulses.      Heart sounds: Normal heart sounds. No murmur heard.     No friction rub. No gallop.   Pulmonary:      Effort: Pulmonary effort is normal.      Breath sounds: Normal breath sounds. No decreased air movement. No wheezing, rhonchi or rales.   Abdominal:      General: Bowel sounds are normal. There is no distension.      Tenderness: There is no abdominal tenderness.   Musculoskeletal:         General: No deformity. Normal range of motion.      Cervical back: Normal range " of motion and neck supple. No tenderness.      Right lower leg: No edema.      Left lower leg: No edema.   Lymphadenopathy:      Cervical: No cervical adenopathy.      Upper Body:      Right upper body: No axillary adenopathy.      Left upper body: No axillary adenopathy.      Lower Body: No right inguinal adenopathy. No left inguinal adenopathy.   Skin:     General: Skin is warm and dry.      Coloration: Skin is pale. Skin is not jaundiced.      Findings: No erythema or rash.   Neurological:      General: No focal deficit present.      Mental Status: He is alert and oriented to person, place, and time.      Sensory: Sensation is intact.      Motor: Weakness present.      Gait: Gait abnormal (Using a walker).   Psychiatric:         Attention and Perception: Attention normal.         Mood and Affect: Mood normal.         Behavior: Behavior normal. Behavior is cooperative.         Thought Content: Thought content normal.         Judgment: Judgment normal.           Labs:  No visits with results within 7 Day(s) from this visit.   Latest known visit with results is:   Admission on 06/12/2024, Discharged on 06/12/2024   Component Date Value Ref Range Status    PT 06/12/2024 14.3  12.0 - 14.6 sec Final    INR 06/12/2024 1.09  0.87 - 1.13 Final    Comment: INR - Non-therapeutic Reference Range: 0.87-1.13  INR - Therapeutic Reference Range: 2.0-4.0      WBC 06/12/2024 4.6 (L)  4.8 - 10.8 K/uL Final    RBC 06/12/2024 2.55 (L)  4.70 - 6.10 M/uL Final    Hemoglobin 06/12/2024 7.9 (L)  14.0 - 18.0 g/dL Final    Results confirmed by repeat testing.    Hematocrit 06/12/2024 24.4 (L)  42.0 - 52.0 % Final    MCV 06/12/2024 95.7  81.4 - 97.8 fL Final    MCH 06/12/2024 31.0  27.0 - 33.0 pg Final    MCHC 06/12/2024 32.4  32.3 - 36.5 g/dL Final    RDW 06/12/2024 73.5 (H)  35.9 - 50.0 fL Final    Platelet Count 06/12/2024 92 (L)  164 - 446 K/uL Final    Comment: Results confirmed by repeat testing.Reviewed by Clinical  Laboratory  .      MPV 2024 10.6  9.0 - 12.9 fL Final    ABO Grouping Only 2024 O   Final    Rh Grouping Only 2024 POS   Final    Antibody Screen-Cod 2024 NEG   Final    POC Glucose, Blood 2024 146 (H)  65 - 99 mg/dL Final    Report 2024    Final                    Value:[facility]    Test Date:  2024  Pt Name:    BRYAN BARRIENTOS               Department: [facility]  MRN:        [id number]                      Room:       [facility]  Gender:     Male                         Technician: DAVID  :        1965                   Requested By:KOKI MONTALVO  Order #:    [id number]                    Reading MD: Bhavik Bailey MD    Measurements  Intervals                                Axis  Rate:       113                          P:          23  NM:         130                          QRS:        -12  QRSD:       87                           T:          23  QT:         339  QTc:        465    Interpretive Statements  Sinus tachycardia  Compared to ECG 2023 13:55:45  Sinus rhythm no longer present  Myocardial infarct finding no longer present  Electronically Signed On 2024 17:25:37 PDT by Bhavik Bailey MD      Imm. Plt Fraction 2024 4.2  0.6 - 13.1 % Final       Imaging:     All listed images below have been independently reviewed by me. I agree with the findings as summarized below:    CT c/a/p 24:    IMPRESSION:        1. Suspicious mediastinal adenopathy.  2. Stable diffuse osseous metastatic disease.  3. Distended gallbladder.  4. Bilateral fat-containing inguinal hernias.  5. Benign right renal cyst.    MRI brain 24:    IMPRESSION:     1.  There are postsurgical changes as evidenced by RIGHT suboccipital craniotomy and RIGHT inferior cerebellar encephalomalacia. Post gadolinium sequences demonstrates abnormal linear contrast enhancement along the RIGHT lateral cerebellar dura adjacent   to the surgical cavity. The maximum  transverse dimension measures an approximately 5 mm. This finding is increased since the previous study and is unusual for postsurgical inflammation. This is concerning for recurrent dural metastasis.  2.  There are a few nonspecific supratentorial T2 hyperintensities likely representing nonspecific foci of gliosis.  3.  Diffuse sclerotic osseous metastasis.    MRI pelvis 05/14/24:    IMPRESSION:        1. There is diffuse marrow replacement consistent with the patient's known osseous metastatic disease.  2. There is bone marrow edema within the right acetabulum and femoral head which may be secondary to osteoarthrosis.  3. There is no definite fracture or dislocation.  4. There is no evidence for a muscle or tendon injury.    MR-SHOULDER-WITH & W/O LEFT    Result Date: 4/4/2024  4/3/2024 5:50 PM HISTORY/REASON FOR EXAM:  Other; Image Left Shoulder Metastatic disease, shoulder pain, frozen shoulder, history of prior surgery. TECHNIQUE/EXAM DESCRIPTION: MRI of the LEFT shoulder without and with contrast. The study was performed on a [employer] Signa 1.5 Suha MRI scanner. Sagittal, axial, coronal T1 and T2 as well as postcontrast coronal and axial T1-weighted images were obtained. 15 mL ProHance contrast was administered intravenously. COMPARISON:  None. FINDINGS: There is diffuse marrow heterogeneity and signal abnormality consistent with the history of diffuse osseous metastatic disease. No fracture is evident. There is no os acromiale or acromioclavicular separation. There is mild acromioclavicular osteoarthrosis. Acromion is mildly laterally downward sloping. There is a suture anchor in the anterior aspect of the humeral head with susceptibility artifact. There is a full-thickness tear of the distal subscapularis tendon. There is associated fatty atrophy of the subscapularis muscle. The supraspinatus, infraspinatus, and teres minor tendons are intact. The long head of biceps tendon is not visualized proximally. This  may be secondary to rupture, tenotomy, or tenodesis. There is intermediate T2-weighted signal within the superior labrum. This is consistent with degeneration and possible degenerative tearing. There is no significant joint effusion or bursal fluid collection. There is lobulated signal abnormality surrounding the proximal humeral diametaphysis extending superiorly into the subacromial-subdeltoid bursa laterally. This demonstrates similar signal  intensity to skeletal muscle on the T1-weighted sequence with mild T2 hyperintensity and mild enhancement. This likely represents tumor extension.     1. There is extensive osseous heterogeneity consistent with metastatic disease. Soft tissue signal abnormality surrounding the proximal humerus likely represents soft tissue tumor extension. 2. Full-thickness tear of the subscapularis tendon and either rupture or postsurgical change of the long head of biceps tendon. 3. Degeneration and possible degenerative tearing of the glenoid labrum. 4. Acromioclavicular osteoarthrosis.    DX-LUMBAR SPINE-2 OR 3 VIEWS    Result Date: 4/3/2024  4/3/2024 11:19 AM HISTORY/REASON FOR EXAM:  Chronic atraumatic low back pain radiating into both hips. History of metastatic prostate cancer. TECHNIQUE/ EXAM DESCRIPTION AND NUMBER OF VIEWS:  3 views of the lumbar spine. COMPARISON: MRI lumbar spine 3/25/2024 FINDINGS: The alignment demonstrates a dextroconvex curvature. There is a trace of degenerative L4-5 anterolisthesis and L2-3 and L5-S1 retrolisthesis. There is posterior pedicle screw fixation from the L1-L3 levels with hardware grossly intact. Again there is superior endplate loss of height at the L3 vertebral body level. The L1 and L2 levels are somewhat obscured. There is diffuse sclerotic abnormalities throughout the spine and visualized pelvis consistent with multifocal prostate metastasis. There is degenerative disc space narrowing at all levels with bilateral arthropathy.     1.  There  is a dextroconvex curvature of the spine with multilevel minimal degenerative anterolisthesis and retrolisthesis as noted above. 2.  There is multifocal sclerotic bone metastases. 3.  There is posterior pedicle screw fixation L1-L3 levels. 4.  Superior endplate loss of height at the L3 vertebral body level is similar to the recent MR with the upper levels not well visualized on the lateral view due to the scoliosis. 5.  Multilevel degenerative arthropathy and disc disease.    MR-THORACIC SPINE-WITH & W/O    Result Date: 3/25/2024  3/25/2024 12:55 PM HISTORY/REASON FOR EXAM:  Metastatic prostate cancer. Multiple osseous and epidural metastases. TECHNIQUE/EXAM DESCRIPTION: MRI of the thoracic spine with contrast. The study was performed on a Rj 1.5 Suha MRI scanner. T1 sagittal, T2 sagittal, and T2 axial images were obtained of the thoracic spine pre-contrast followed by T1 fat-suppressed sagittal and T1 axial images post intravenous administration of 15 mL  ProHance. COMPARISON:  9/20/2023 FINDINGS: There is again seen diffuse abnormal marrow signal involving the entire thoracic spine with diffuse patchy abnormal enhancement following contrast administration. There is scoliotic deformity. There is mild chronic superior endplate compression fracture at T12. There is again seen multilevel decreased disc height and multilevel endplate spurring. There is again seen abnormal epidural enhancement which extends from T7 through T10 with effacement of the thecal sac at those levels. This is more prominent on the left compared to the right. This extends into the T7, T8 and T9 neural foramina on the left greater than right. There is again seen attenuation of the spinal canal in the area. Again seen bilateral renal masses. There are multilevel disc bulges. No evidence of canal narrowing secondary to disc disease.     1.  Again seen diffuse osseous metastases. 2.  Epidural tumor extending from T7 through T9 asymmetric to  the left of midline. This results in moderate central canal narrowing with extension into the left T7, T8 and T9 neural foramen. 3.  No new compression fractures. 4.  Again seen bilateral adrenal masses.    MR-LUMBAR SPINE-WITH & W/O    Result Date: 3/25/2024  3/25/2024 12:55 PM HISTORY/REASON FOR EXAM: Metastatic prostate cancer. Back pain. Prior back surgery. TECHNIQUE/EXAM DESCRIPTION: MRI of the lumbar spine with intravenous contrast. The study was performed on a Rj 1.5 Suha MRI scanner. T1 sagittal, T2 sagittal, and T2 axial images were obtained of the lumbar spine precontrast followed by T1 fat suppressed sagittal and T1 axial images post intravenous administration of 15 mL ProHance. COMPARISON: 9/20/2023 FINDINGS: There is again seen postsurgical change consistent with posterior pedicular screw and luis alberto fixation extending from L1 through L3. There is laminectomy change present. There is scoliotic deformity. There is degenerative retrolisthesis at T12-L1, L1-L2, L2-L3 and L5-S1. There is again seen postsurgical fluid collection at the surgical site, unchanged. There is again diffuse abnormal marrow signal involving all vertebral levels. There is posterior bony expansion of the L2 vertebral body, unchanged. There is again seen mild height loss at T12, L1, L2 and L3. There is multilevel loss of the normal disc height and bright T2 disc signal. There is again seen epidural enhancement posterior to the L2 vertebral body, unchanged. The conus is located normally at T12-L1. There is a simple appearing right renal cyst, unchanged no follow-up recommended. Findings at specific levels: T12-L1: There is annular disk bulge and bilateral facet degeneration without significant central canal or neural foraminal narrowing. L1-2: There is annular disk bulge and bilateral facet degeneration without significant central canal or neural foraminal narrowing. L2-3: There is annular disc bulge and bilateral facet degeneration.  No central canal narrowing. Again seen epidural enhancement and bony expansion of the L2 vertebra posteriorly resulting in attenuation of the ventral surface of the thecal sac. This also  extends asymmetrically to the right of midline into the paraspinous soft tissues and into the right pedicle. There is no central canal narrowing due to decompression of the central spinal canal. There is moderate right and mild left neural foraminal narrowing. L3-4: There is annular disc bulge and posterior osseous spurring with bilateral facet degeneration. No central canal narrowing. There is moderate left and mild right neural foraminal narrowing. L4-5: There is annular disc bulge and posterior osseous spurring as well as bilateral facet degeneration. No central canal narrowing. There is moderate right and mild left neural foraminal narrowing. L5-S1: There is annular disc bulge with a left paracentral disc protrusion and bilateral facet degeneration with posterior osseous spurring. No central canal narrowing. There is moderate bilateral, right greater than left neural foraminal narrowing.     1.  Again seen diffuse osseous metastases. There is again seen mild vertebral height loss at T12, L1, L2 and L3. 2.  Again seen posterior bony expansion of the L2 vertebral body with some epidural enhancement. There is asymmetric bony expansion into the right paraspinous soft tissues with extension into the right pedicle. There is no central canal narrowing. There is moderate right and mild left neural foraminal narrowing. 3.  Multilevel degenerative disc disease and facet degeneration which results in varying degrees of neural foraminal narrowing as specifically described above. 4.  Surgical change consistent with posterior decompression and pedicular screw and luis alberto fixation extending from L1 through L3.      Pathology:         PD-L1 IHC Analysis (Dako 22C3 pharmDx):   Tumor Proportion Score (TPS)(%) 0     Addendum Signed By:  Jay Ojeda  D.O.   Addendum Date:  10/20/2023   Original Report Date:  09/25/2023     FINAL DIAGNOSIS:     A. Right cerebellar mass:          Metastatic prostatic adenocarcinoma.          Abundant tumor necrosis, including comedonecrosis noted.                                           Diagnosis performed by:                                       DAJA SALDANA DO     Assessment & Plan:  1. Prostate cancer (HCC)          This is a 58 year old  man with metastatic castrate-resistant prostate adenocarcinoma. He is s/p multiple lines of therapy including enzalutamide, Radium-223, docetaxel, and cabazitaxel with carboplatin. He is also s/p palliative XRT as well as metastasectomy for a cerebellar metastasis. His disease is not PSMA avid. He has now progressed on cabazitaxel and carboplatin.      Current Diagnosis and Staging: Metastatic prostate adenocarcinoma, castrate resistant, Felix 5+5=10    Update: Patient wants to continue with therapy. Will refer back to radiation oncology for evaluation for brain mets. He wants to continue treatment. We will try cabozantinib and atezolizumab as last line therapy, and if that doesn't work we will look at clinical trial options or hospice care.      Treatment Plan: Cabozantinib with atezolizumab     Treatment Citation: CONTACT-02 trial, [facility]  2024     Plan of Care:     Primary Therapy: Cabo/atezo to start once obtained.  Supportive Therapy: Antiemetics per protocol. Continue leuprolide, denosumab. Palliative XRT with Dr. Valadez. Transfusion support.   Toxicity: Patient is getting antineoplastic therapy and needs monitoring of blood counts, hepatic function, and renal function due to potential for organ dysfunction.   Labs: CBC with diff, CMP, TSH, Free T4, PSA monitoring  Imaging: MRI brain ordered for now. Repeat CT scans 2 months after starting therapy  Treatment Planning: The patient has highly-aggressive prostate adenocarcinoma and I agree with the assessment that his prognosis  is poor. We discussed working on addressing quality of life and goals of care. The patient wishes to continue with chemotherapy and therefore we will continue with cabozantinib and atezolizumab, per the recently-released COMPACT-02 trial data, which showed PFS improvement (OS data is still pending) and was presented at [facility]  Consultations: Radiation oncology (Dr. Valadez); Medical oncology (Dr. Butts); Neurosurgery (Dr. Be); Interventional Radiology (Dr. Reardon, [facility]); Palliative care (Dr. Fair)  Code Status: Full  Miscellaneous: NA  Return for Follow Up: For start of therapy    Any questions and concerns raised by the patient were answered to the best of my ability. Thank you for allowing me to participate in the care for this patient. Please feel free to contact me for any questions or concerns.       Total time spent on chart review, clinic encounter, and documentation: 28 minutes.

## 2024-06-20 NOTE — ADDENDUM NOTE
Encounter addended by: Joan Martinez, Med Ass't on: 6/20/2024 7:22 AM   Actions taken: Charge Capture section accepted

## 2024-06-20 NOTE — TELEPHONE ENCOUNTER
Prior Authorization for Cabometyx 20MG tablets (Quantity: 30, Days: 30) has been submitted via Cover My Meds: Key ( [id number])    Insurance: Medicaid    Will follow up in 24-48 business hours.

## 2024-06-21 NOTE — TELEPHONE ENCOUNTER
Prior Authorization for Cabometyx 20MG tablets  has been denied for a quantity of 30 , day supply 30    Prior authorization was denied per the following:       Insurance: [employer]    How would you like to proceed?      Thank you,  Barb Rabago  RX Coordinator I ONC/RA  [phone]

## 2024-06-26 NOTE — TELEPHONE ENCOUNTER
I called [facility] and spoke with Dee Dee OSUNA (A Lead Appeals Rep.) and she informed me an appeal was sent in via fax with doctors signature around 10 am this morning. In order to process the appeal, the patient will need to sign a consent form and fax it to them at [phone]. She said she spoke with the patient and is aware of this and will fax in consent form once he receives it in the mail.    Thank you,  Barb Rabago  RX Coordinator I ONC/RA  [phone]

## 2024-06-28 ENCOUNTER — APPOINTMENT (OUTPATIENT)
Dept: HEMATOLOGY ONCOLOGY | Facility: MEDICAL CENTER | Age: 59
End: 2024-06-28
Payer: MEDICAID

## 2024-06-28 ENCOUNTER — TELEPHONE (OUTPATIENT)
Dept: HEMATOLOGY ONCOLOGY | Facility: MEDICAL CENTER | Age: 59
End: 2024-06-28

## 2024-06-28 ENCOUNTER — OUTPATIENT INFUSION SERVICES (OUTPATIENT)
Dept: ONCOLOGY | Facility: MEDICAL CENTER | Age: 59
End: 2024-06-28
Attending: STUDENT IN AN ORGANIZED HEALTH CARE EDUCATION/TRAINING PROGRAM
Payer: MEDICAID

## 2024-06-28 VITALS
HEIGHT: 66 IN | RESPIRATION RATE: 16 BRPM | DIASTOLIC BLOOD PRESSURE: 73 MMHG | HEART RATE: 86 BPM | WEIGHT: 155.87 LBS | BODY MASS INDEX: 25.05 KG/M2 | OXYGEN SATURATION: 99 % | TEMPERATURE: 97.7 F | SYSTOLIC BLOOD PRESSURE: 119 MMHG

## 2024-06-28 DIAGNOSIS — C61 PROSTATE CANCER (HCC): ICD-10-CM

## 2024-06-28 DIAGNOSIS — D64.81 ANEMIA ASSOCIATED WITH CHEMOTHERAPY: ICD-10-CM

## 2024-06-28 DIAGNOSIS — T45.1X5A ANEMIA ASSOCIATED WITH CHEMOTHERAPY: ICD-10-CM

## 2024-06-28 LAB
ABO GROUP BLD: NORMAL
BARCODED ABORH UBTYP: 5100
BARCODED ABORH UBTYP: 5100
BARCODED PRD CODE UBPRD: NORMAL
BARCODED PRD CODE UBPRD: NORMAL
BARCODED UNIT NUM UBUNT: NORMAL
BARCODED UNIT NUM UBUNT: NORMAL
BASOPHILS # BLD AUTO: 0.2 % (ref 0–1.8)
BASOPHILS # BLD: 0.01 K/UL (ref 0–0.12)
BLD GP AB SCN SERPL QL: NORMAL
COMPONENT R 8504R: NORMAL
COMPONENT R 8504R: NORMAL
EOSINOPHIL # BLD AUTO: 0.05 K/UL (ref 0–0.51)
EOSINOPHIL NFR BLD: 0.9 % (ref 0–6.9)
ERYTHROCYTE [DISTWIDTH] IN BLOOD BY AUTOMATED COUNT: 78.4 FL (ref 35.9–50)
HCT VFR BLD AUTO: 19.6 % (ref 42–52)
HGB BLD-MCNC: 6.3 G/DL (ref 14–18)
IMM GRANULOCYTES # BLD AUTO: 0.04 K/UL (ref 0–0.11)
IMM GRANULOCYTES NFR BLD AUTO: 0.7 % (ref 0–0.9)
LYMPHOCYTES # BLD AUTO: 0.16 K/UL (ref 1–4.8)
LYMPHOCYTES NFR BLD: 2.8 % (ref 22–41)
MCH RBC QN AUTO: 31.8 PG (ref 27–33)
MCHC RBC AUTO-ENTMCNC: 32.1 G/DL (ref 32.3–36.5)
MCV RBC AUTO: 99 FL (ref 81.4–97.8)
MONOCYTES # BLD AUTO: 0.44 K/UL (ref 0–0.85)
MONOCYTES NFR BLD AUTO: 7.7 % (ref 0–13.4)
NEUTROPHILS # BLD AUTO: 4.99 K/UL (ref 1.82–7.42)
NEUTROPHILS NFR BLD: 87.7 % (ref 44–72)
NRBC # BLD AUTO: 0 K/UL
NRBC BLD-RTO: 0 /100 WBC (ref 0–0.2)
OUTPT INFUS CBC COMMENT OICOM: ABNORMAL
PLATELET # BLD AUTO: 135 K/UL (ref 164–446)
PMV BLD AUTO: 9.3 FL (ref 9–12.9)
PRODUCT TYPE UPROD: NORMAL
PRODUCT TYPE UPROD: NORMAL
RBC # BLD AUTO: 1.98 M/UL (ref 4.7–6.1)
RH BLD: NORMAL
UNIT STATUS USTAT: NORMAL
UNIT STATUS USTAT: NORMAL
WBC # BLD AUTO: 5.7 K/UL (ref 4.8–10.8)

## 2024-06-28 PROCEDURE — 86850 RBC ANTIBODY SCREEN: CPT

## 2024-06-28 PROCEDURE — 700111 HCHG RX REV CODE 636 W/ 250 OVERRIDE (IP): Mod: JZ,UD | Performed by: STUDENT IN AN ORGANIZED HEALTH CARE EDUCATION/TRAINING PROGRAM

## 2024-06-28 PROCEDURE — 700102 HCHG RX REV CODE 250 W/ 637 OVERRIDE(OP): Mod: UD | Performed by: STUDENT IN AN ORGANIZED HEALTH CARE EDUCATION/TRAINING PROGRAM

## 2024-06-28 PROCEDURE — 36430 TRANSFUSION BLD/BLD COMPNT: CPT

## 2024-06-28 PROCEDURE — 86923 COMPATIBILITY TEST ELECTRIC: CPT

## 2024-06-28 PROCEDURE — A9270 NON-COVERED ITEM OR SERVICE: HCPCS | Mod: UD | Performed by: STUDENT IN AN ORGANIZED HEALTH CARE EDUCATION/TRAINING PROGRAM

## 2024-06-28 PROCEDURE — P9016 RBC LEUKOCYTES REDUCED: HCPCS | Mod: 91

## 2024-06-28 PROCEDURE — 306780 HCHG STAT FOR TRANSFUSION PER CASE

## 2024-06-28 PROCEDURE — 85025 COMPLETE CBC W/AUTO DIFF WBC: CPT

## 2024-06-28 PROCEDURE — 96374 THER/PROPH/DIAG INJ IV PUSH: CPT

## 2024-06-28 PROCEDURE — 86900 BLOOD TYPING SEROLOGIC ABO: CPT

## 2024-06-28 PROCEDURE — 86901 BLOOD TYPING SEROLOGIC RH(D): CPT

## 2024-06-28 RX ORDER — DIPHENHYDRAMINE HCL 25 MG
25 TABLET ORAL ONCE
OUTPATIENT
Start: 2024-06-28 | End: 2024-06-28

## 2024-06-28 RX ORDER — DIPHENHYDRAMINE HCL 25 MG
25 TABLET ORAL ONCE
Status: COMPLETED | OUTPATIENT
Start: 2024-06-28 | End: 2024-06-28

## 2024-06-28 RX ORDER — ACETAMINOPHEN 325 MG/1
650 TABLET ORAL PRN
OUTPATIENT
Start: 2024-06-28

## 2024-06-28 RX ORDER — ACETAMINOPHEN 325 MG/1
650 TABLET ORAL ONCE
Status: COMPLETED | OUTPATIENT
Start: 2024-06-28 | End: 2024-06-28

## 2024-06-28 RX ORDER — SODIUM CHLORIDE 9 MG/ML
INJECTION, SOLUTION INTRAVENOUS CONTINUOUS
OUTPATIENT
Start: 2024-06-28

## 2024-06-28 RX ORDER — 0.9 % SODIUM CHLORIDE 0.9 %
10 VIAL (ML) INJECTION PRN
OUTPATIENT
Start: 2024-06-28

## 2024-06-28 RX ORDER — ACETAMINOPHEN 325 MG/1
650 TABLET ORAL ONCE
OUTPATIENT
Start: 2024-06-28

## 2024-06-28 RX ORDER — DIPHENHYDRAMINE HYDROCHLORIDE 50 MG/ML
25 INJECTION INTRAMUSCULAR; INTRAVENOUS PRN
OUTPATIENT
Start: 2024-06-28

## 2024-06-28 RX ORDER — 0.9 % SODIUM CHLORIDE 0.9 %
3 VIAL (ML) INJECTION PRN
OUTPATIENT
Start: 2024-06-28

## 2024-06-28 RX ORDER — 0.9 % SODIUM CHLORIDE 0.9 %
VIAL (ML) INJECTION PRN
OUTPATIENT
Start: 2024-06-28

## 2024-06-28 RX ADMIN — DIPHENHYDRAMINE HYDROCHLORIDE 25 MG: 25 TABLET ORAL at 14:21

## 2024-06-28 RX ADMIN — HYDROCORTISONE SODIUM SUCCINATE 100 MG: 100 INJECTION, POWDER, FOR SOLUTION INTRAMUSCULAR; INTRAVENOUS at 14:21

## 2024-06-28 RX ADMIN — ACETAMINOPHEN 650 MG: 325 TABLET ORAL at 14:21

## 2024-06-28 ASSESSMENT — FIBROSIS 4 INDEX: FIB4 SCORE: 7.45

## 2024-06-29 ENCOUNTER — APPOINTMENT (OUTPATIENT)
Dept: ONCOLOGY | Facility: MEDICAL CENTER | Age: 59
End: 2024-06-29
Attending: STUDENT IN AN ORGANIZED HEALTH CARE EDUCATION/TRAINING PROGRAM
Payer: MEDICAID

## 2024-06-29 SDOH — ECONOMIC STABILITY: HOUSING INSECURITY
IN THE LAST 12 MONTHS, WAS THERE A TIME WHEN YOU DID NOT HAVE A STEADY PLACE TO SLEEP OR SLEPT IN A SHELTER (INCLUDING NOW)?: NO

## 2024-06-29 SDOH — HEALTH STABILITY: PHYSICAL HEALTH: ON AVERAGE, HOW MANY DAYS PER WEEK DO YOU ENGAGE IN MODERATE TO STRENUOUS EXERCISE (LIKE A BRISK WALK)?: 1 DAY

## 2024-06-29 SDOH — ECONOMIC STABILITY: INCOME INSECURITY: HOW HARD IS IT FOR YOU TO PAY FOR THE VERY BASICS LIKE FOOD, HOUSING, MEDICAL CARE, AND HEATING?: NOT VERY HARD

## 2024-06-29 SDOH — ECONOMIC STABILITY: FOOD INSECURITY: WITHIN THE PAST 12 MONTHS, YOU WORRIED THAT YOUR FOOD WOULD RUN OUT BEFORE YOU GOT MONEY TO BUY MORE.: NEVER TRUE

## 2024-06-29 SDOH — HEALTH STABILITY: MENTAL HEALTH
STRESS IS WHEN SOMEONE FEELS TENSE, NERVOUS, ANXIOUS, OR CAN'T SLEEP AT NIGHT BECAUSE THEIR MIND IS TROUBLED. HOW STRESSED ARE YOU?: TO SOME EXTENT

## 2024-06-29 SDOH — ECONOMIC STABILITY: FOOD INSECURITY: WITHIN THE PAST 12 MONTHS, THE FOOD YOU BOUGHT JUST DIDN'T LAST AND YOU DIDN'T HAVE MONEY TO GET MORE.: NEVER TRUE

## 2024-06-29 SDOH — HEALTH STABILITY: PHYSICAL HEALTH: ON AVERAGE, HOW MANY MINUTES DO YOU ENGAGE IN EXERCISE AT THIS LEVEL?: 60 MIN

## 2024-06-29 SDOH — ECONOMIC STABILITY: HOUSING INSECURITY: IN THE LAST 12 MONTHS, HOW MANY PLACES HAVE YOU LIVED?: 1

## 2024-06-29 SDOH — ECONOMIC STABILITY: TRANSPORTATION INSECURITY
IN THE PAST 12 MONTHS, HAS LACK OF TRANSPORTATION KEPT YOU FROM MEETINGS, WORK, OR FROM GETTING THINGS NEEDED FOR DAILY LIVING?: NO

## 2024-06-29 SDOH — ECONOMIC STABILITY: INCOME INSECURITY: IN THE LAST 12 MONTHS, WAS THERE A TIME WHEN YOU WERE NOT ABLE TO PAY THE MORTGAGE OR RENT ON TIME?: NO

## 2024-06-29 ASSESSMENT — SOCIAL DETERMINANTS OF HEALTH (SDOH)
IN A TYPICAL WEEK, HOW MANY TIMES DO YOU TALK ON THE PHONE WITH FAMILY, FRIENDS, OR NEIGHBORS?: TWICE A WEEK
DO YOU BELONG TO ANY CLUBS OR ORGANIZATIONS SUCH AS CHURCH GROUPS UNIONS, FRATERNAL OR ATHLETIC GROUPS, OR SCHOOL GROUPS?: NO
ARE YOU MARRIED, WIDOWED, DIVORCED, SEPARATED, NEVER MARRIED, OR LIVING WITH A PARTNER?: LIVING WITH PARTNER
HOW OFTEN DO YOU ATTENT MEETINGS OF THE CLUB OR ORGANIZATION YOU BELONG TO?: NEVER
WITHIN THE PAST 12 MONTHS, YOU WORRIED THAT YOUR FOOD WOULD RUN OUT BEFORE YOU GOT THE MONEY TO BUY MORE: NEVER TRUE
HOW HARD IS IT FOR YOU TO PAY FOR THE VERY BASICS LIKE FOOD, HOUSING, MEDICAL CARE, AND HEATING?: NOT VERY HARD
HOW OFTEN DO YOU GET TOGETHER WITH FRIENDS OR RELATIVES?: ONCE A WEEK
DO YOU BELONG TO ANY CLUBS OR ORGANIZATIONS SUCH AS CHURCH GROUPS UNIONS, FRATERNAL OR ATHLETIC GROUPS, OR SCHOOL GROUPS?: NO
HOW OFTEN DO YOU ATTENT MEETINGS OF THE CLUB OR ORGANIZATION YOU BELONG TO?: NEVER
HOW OFTEN DO YOU ATTEND CHURCH OR RELIGIOUS SERVICES?: MORE THAN 4 TIMES PER YEAR
IN THE PAST 12 MONTHS, HAS THE ELECTRIC, GAS, OIL, OR WATER COMPANY THREATENED TO SHUT OFF SERVICE IN YOUR HOME?: NO
HOW OFTEN DO YOU ATTEND CHURCH OR RELIGIOUS SERVICES?: MORE THAN 4 TIMES PER YEAR
IN A TYPICAL WEEK, HOW MANY TIMES DO YOU TALK ON THE PHONE WITH FAMILY, FRIENDS, OR NEIGHBORS?: TWICE A WEEK
HOW OFTEN DO YOU HAVE SIX OR MORE DRINKS ON ONE OCCASION: NEVER
ARE YOU MARRIED, WIDOWED, DIVORCED, SEPARATED, NEVER MARRIED, OR LIVING WITH A PARTNER?: LIVING WITH PARTNER
HOW OFTEN DO YOU GET TOGETHER WITH FRIENDS OR RELATIVES?: ONCE A WEEK
HOW OFTEN DO YOU HAVE A DRINK CONTAINING ALCOHOL: MONTHLY OR LESS
HOW MANY DRINKS CONTAINING ALCOHOL DO YOU HAVE ON A TYPICAL DAY WHEN YOU ARE DRINKING: 1 OR 2

## 2024-06-29 ASSESSMENT — LIFESTYLE VARIABLES
HOW MANY STANDARD DRINKS CONTAINING ALCOHOL DO YOU HAVE ON A TYPICAL DAY: 1 OR 2
HOW OFTEN DO YOU HAVE SIX OR MORE DRINKS ON ONE OCCASION: NEVER
SKIP TO QUESTIONS 9-10: 1
AUDIT-C TOTAL SCORE: 1
HOW OFTEN DO YOU HAVE A DRINK CONTAINING ALCOHOL: MONTHLY OR LESS

## 2024-06-29 NOTE — PROGRESS NOTES
Casper presents to infusion for 2x/week CBC/possible blood transfusion. Patient reports feeling fatigued.     PIV started with positive blood return and labs drawn off of IV start.    Labs reviewed by RN, Hgb: 6.3, plt: 135, meets MD parameters for 2U PRBC today.    Pre-treatment medications given as ordered.     2U PRBC transfused as ordered, patient tolerated well with no adverse effects.     PIV flushed post infusion with positive blood return, d/tiana with tip intact.    Patient left in stable condition, knows when to return for next appt.

## 2024-06-30 ENCOUNTER — APPOINTMENT (OUTPATIENT)
Dept: ONCOLOGY | Facility: MEDICAL CENTER | Age: 59
End: 2024-06-30
Attending: STUDENT IN AN ORGANIZED HEALTH CARE EDUCATION/TRAINING PROGRAM
Payer: MEDICAID

## 2024-07-01 ENCOUNTER — APPOINTMENT (OUTPATIENT)
Dept: INTERNAL MEDICINE | Facility: OTHER | Age: 59
End: 2024-07-01
Payer: COMMERCIAL

## 2024-07-01 VITALS
OXYGEN SATURATION: 99 % | HEIGHT: 67 IN | DIASTOLIC BLOOD PRESSURE: 78 MMHG | BODY MASS INDEX: 24.3 KG/M2 | WEIGHT: 154.8 LBS | TEMPERATURE: 97.5 F | SYSTOLIC BLOOD PRESSURE: 120 MMHG | HEART RATE: 104 BPM

## 2024-07-01 DIAGNOSIS — Z76.89 ENCOUNTER TO ESTABLISH CARE: ICD-10-CM

## 2024-07-01 DIAGNOSIS — D61.82 ANEMIA ASSOCIATED WITH BONE MARROW INFILTRATION (HCC): ICD-10-CM

## 2024-07-01 DIAGNOSIS — H53.2 DIPLOPIA: ICD-10-CM

## 2024-07-01 DIAGNOSIS — R79.89 LOW VITAMIN D LEVEL: ICD-10-CM

## 2024-07-01 DIAGNOSIS — E78.1 HYPERTRIGLYCERIDEMIA: ICD-10-CM

## 2024-07-01 DIAGNOSIS — E11.9 TYPE 2 DIABETES MELLITUS WITHOUT COMPLICATION, WITHOUT LONG-TERM CURRENT USE OF INSULIN (HCC): ICD-10-CM

## 2024-07-01 PROCEDURE — 99204 OFFICE O/P NEW MOD 45 MIN: CPT | Mod: GC

## 2024-07-01 PROCEDURE — 3078F DIAST BP <80 MM HG: CPT | Mod: GC

## 2024-07-01 PROCEDURE — 3074F SYST BP LT 130 MM HG: CPT | Mod: GC

## 2024-07-01 SDOH — ECONOMIC STABILITY: INCOME INSECURITY: IN THE LAST 12 MONTHS, WAS THERE A TIME WHEN YOU WERE NOT ABLE TO PAY THE MORTGAGE OR RENT ON TIME?: NO

## 2024-07-01 SDOH — HEALTH STABILITY: PHYSICAL HEALTH: ON AVERAGE, HOW MANY MINUTES DO YOU ENGAGE IN EXERCISE AT THIS LEVEL?: 90 MIN

## 2024-07-01 SDOH — HEALTH STABILITY: PHYSICAL HEALTH: ON AVERAGE, HOW MANY DAYS PER WEEK DO YOU ENGAGE IN MODERATE TO STRENUOUS EXERCISE (LIKE A BRISK WALK)?: 3 DAYS

## 2024-07-01 ASSESSMENT — LIFESTYLE VARIABLES
HOW MANY STANDARD DRINKS CONTAINING ALCOHOL DO YOU HAVE ON A TYPICAL DAY: PATIENT DOES NOT DRINK
HOW OFTEN DO YOU HAVE SIX OR MORE DRINKS ON ONE OCCASION: NEVER
AUDIT-C TOTAL SCORE: 0
SKIP TO QUESTIONS 9-10: 1
HOW OFTEN DO YOU HAVE A DRINK CONTAINING ALCOHOL: NEVER
SUBSTANCE_ABUSE: 0

## 2024-07-01 ASSESSMENT — ENCOUNTER SYMPTOMS
NECK PAIN: 0
WEIGHT LOSS: 0
HEARTBURN: 0
MYALGIAS: 0
ABDOMINAL PAIN: 0
HEADACHES: 0
NERVOUS/ANXIOUS: 0
NAUSEA: 0
DIZZINESS: 0
DEPRESSION: 0
SENSORY CHANGE: 0
CONSTIPATION: 0
SINUS PAIN: 0
BACK PAIN: 0
WHEEZING: 0
DOUBLE VISION: 1
CHILLS: 0
COUGH: 0
FOCAL WEAKNESS: 0
SHORTNESS OF BREATH: 0
VOMITING: 0
ORTHOPNEA: 0
WEAKNESS: 0

## 2024-07-01 ASSESSMENT — FIBROSIS 4 INDEX: FIB4 SCORE: 5.08

## 2024-07-01 ASSESSMENT — SOCIAL DETERMINANTS OF HEALTH (SDOH): HOW HARD IS IT FOR YOU TO PAY FOR THE VERY BASICS LIKE FOOD, HOUSING, MEDICAL CARE, AND HEATING?: NOT HARD AT ALL

## 2024-07-02 ENCOUNTER — APPOINTMENT (OUTPATIENT)
Dept: ONCOLOGY | Facility: MEDICAL CENTER | Age: 59
End: 2024-07-02
Attending: STUDENT IN AN ORGANIZED HEALTH CARE EDUCATION/TRAINING PROGRAM
Payer: COMMERCIAL

## 2024-07-02 ENCOUNTER — TELEPHONE (OUTPATIENT)
Dept: INTERNAL MEDICINE | Facility: OTHER | Age: 59
End: 2024-07-02

## 2024-07-02 ENCOUNTER — TELEPHONE (OUTPATIENT)
Dept: OPHTHALMOLOGY | Facility: MEDICAL CENTER | Age: 59
End: 2024-07-02

## 2024-07-02 VITALS
HEIGHT: 66 IN | HEART RATE: 118 BPM | BODY MASS INDEX: 24.59 KG/M2 | OXYGEN SATURATION: 98 % | TEMPERATURE: 97.7 F | SYSTOLIC BLOOD PRESSURE: 146 MMHG | DIASTOLIC BLOOD PRESSURE: 80 MMHG | WEIGHT: 153 LBS | RESPIRATION RATE: 18 BRPM

## 2024-07-02 DIAGNOSIS — T45.1X5A ANEMIA ASSOCIATED WITH CHEMOTHERAPY: ICD-10-CM

## 2024-07-02 DIAGNOSIS — C61 PROSTATE CANCER (HCC): ICD-10-CM

## 2024-07-02 DIAGNOSIS — D64.81 ANEMIA ASSOCIATED WITH CHEMOTHERAPY: ICD-10-CM

## 2024-07-02 LAB
BASOPHILS # BLD AUTO: 0.2 % (ref 0–1.8)
BASOPHILS # BLD: 0.01 K/UL (ref 0–0.12)
EOSINOPHIL # BLD AUTO: 0.04 K/UL (ref 0–0.51)
EOSINOPHIL NFR BLD: 0.8 % (ref 0–6.9)
ERYTHROCYTE [DISTWIDTH] IN BLOOD BY AUTOMATED COUNT: 70.7 FL (ref 35.9–50)
HCT VFR BLD AUTO: 31.7 % (ref 42–52)
HGB BLD-MCNC: 10.2 G/DL (ref 14–18)
IMM GRANULOCYTES # BLD AUTO: 0.02 K/UL (ref 0–0.11)
IMM GRANULOCYTES NFR BLD AUTO: 0.4 % (ref 0–0.9)
LYMPHOCYTES # BLD AUTO: 0.14 K/UL (ref 1–4.8)
LYMPHOCYTES NFR BLD: 2.8 % (ref 22–41)
MCH RBC QN AUTO: 30.6 PG (ref 27–33)
MCHC RBC AUTO-ENTMCNC: 32.2 G/DL (ref 32.3–36.5)
MCV RBC AUTO: 95.2 FL (ref 81.4–97.8)
MONOCYTES # BLD AUTO: 0.31 K/UL (ref 0–0.85)
MONOCYTES NFR BLD AUTO: 6.3 % (ref 0–13.4)
NEUTROPHILS # BLD AUTO: 4.44 K/UL (ref 1.82–7.42)
NEUTROPHILS NFR BLD: 89.5 % (ref 44–72)
NRBC # BLD AUTO: 0 K/UL
NRBC BLD-RTO: 0 /100 WBC (ref 0–0.2)
OUTPT INFUS CBC COMMENT OICOM: ABNORMAL
PLATELET # BLD AUTO: 121 K/UL (ref 164–446)
PMV BLD AUTO: 10.2 FL (ref 9–12.9)
RBC # BLD AUTO: 3.33 M/UL (ref 4.7–6.1)
WBC # BLD AUTO: 5 K/UL (ref 4.8–10.8)

## 2024-07-02 PROCEDURE — 36415 COLL VENOUS BLD VENIPUNCTURE: CPT

## 2024-07-02 PROCEDURE — 85025 COMPLETE CBC W/AUTO DIFF WBC: CPT

## 2024-07-02 RX ORDER — ACETAMINOPHEN 325 MG/1
650 TABLET ORAL ONCE
Status: CANCELLED | OUTPATIENT
Start: 2024-07-02

## 2024-07-02 RX ORDER — 0.9 % SODIUM CHLORIDE 0.9 %
3 VIAL (ML) INJECTION PRN
Status: CANCELLED | OUTPATIENT
Start: 2024-07-02

## 2024-07-02 RX ORDER — 0.9 % SODIUM CHLORIDE 0.9 %
10 VIAL (ML) INJECTION PRN
Status: CANCELLED | OUTPATIENT
Start: 2024-07-02

## 2024-07-02 RX ORDER — DIPHENHYDRAMINE HCL 25 MG
25 TABLET ORAL ONCE
Status: CANCELLED | OUTPATIENT
Start: 2024-07-02 | End: 2024-07-02

## 2024-07-02 RX ORDER — ACETAMINOPHEN 325 MG/1
650 TABLET ORAL PRN
Status: CANCELLED | OUTPATIENT
Start: 2024-07-02

## 2024-07-02 RX ORDER — DIPHENHYDRAMINE HYDROCHLORIDE 50 MG/ML
25 INJECTION INTRAMUSCULAR; INTRAVENOUS PRN
Status: CANCELLED | OUTPATIENT
Start: 2024-07-02

## 2024-07-02 RX ORDER — SODIUM CHLORIDE 9 MG/ML
INJECTION, SOLUTION INTRAVENOUS CONTINUOUS
Status: CANCELLED | OUTPATIENT
Start: 2024-07-02

## 2024-07-02 RX ORDER — 0.9 % SODIUM CHLORIDE 0.9 %
VIAL (ML) INJECTION PRN
Status: CANCELLED | OUTPATIENT
Start: 2024-07-02

## 2024-07-02 ASSESSMENT — FIBROSIS 4 INDEX: FIB4 SCORE: 5.08

## 2024-07-03 ENCOUNTER — TELEPHONE (OUTPATIENT)
Dept: HEMATOLOGY ONCOLOGY | Facility: MEDICAL CENTER | Age: 59
End: 2024-07-03
Payer: COMMERCIAL

## 2024-07-03 ENCOUNTER — TELEPHONE (OUTPATIENT)
Dept: SURGICAL ONCOLOGY | Facility: MEDICAL CENTER | Age: 59
End: 2024-07-03
Payer: COMMERCIAL

## 2024-07-06 ENCOUNTER — OUTPATIENT INFUSION SERVICES (OUTPATIENT)
Dept: ONCOLOGY | Facility: MEDICAL CENTER | Age: 59
End: 2024-07-06
Attending: STUDENT IN AN ORGANIZED HEALTH CARE EDUCATION/TRAINING PROGRAM
Payer: COMMERCIAL

## 2024-07-06 VITALS
HEIGHT: 66 IN | HEART RATE: 89 BPM | OXYGEN SATURATION: 96 % | WEIGHT: 147.93 LBS | BODY MASS INDEX: 23.77 KG/M2 | RESPIRATION RATE: 18 BRPM | TEMPERATURE: 96.6 F | DIASTOLIC BLOOD PRESSURE: 76 MMHG | SYSTOLIC BLOOD PRESSURE: 122 MMHG

## 2024-07-06 DIAGNOSIS — C61 PROSTATE CANCER (HCC): ICD-10-CM

## 2024-07-06 DIAGNOSIS — T45.1X5A ANEMIA ASSOCIATED WITH CHEMOTHERAPY: ICD-10-CM

## 2024-07-06 DIAGNOSIS — D64.81 ANEMIA ASSOCIATED WITH CHEMOTHERAPY: ICD-10-CM

## 2024-07-06 LAB
ABO GROUP BLD: NORMAL
BASOPHILS # BLD AUTO: 0.2 % (ref 0–1.8)
BASOPHILS # BLD: 0.01 K/UL (ref 0–0.12)
BLD GP AB SCN SERPL QL: NORMAL
EOSINOPHIL # BLD AUTO: 0 K/UL (ref 0–0.51)
EOSINOPHIL NFR BLD: 0 % (ref 0–6.9)
ERYTHROCYTE [DISTWIDTH] IN BLOOD BY AUTOMATED COUNT: 66.4 FL (ref 35.9–50)
HCT VFR BLD AUTO: 29.4 % (ref 42–52)
HGB BLD-MCNC: 9.9 G/DL (ref 14–18)
IMM GRANULOCYTES # BLD AUTO: 0.05 K/UL (ref 0–0.11)
IMM GRANULOCYTES NFR BLD AUTO: 0.9 % (ref 0–0.9)
LYMPHOCYTES # BLD AUTO: 0.13 K/UL (ref 1–4.8)
LYMPHOCYTES NFR BLD: 2.4 % (ref 22–41)
MCH RBC QN AUTO: 31.4 PG (ref 27–33)
MCHC RBC AUTO-ENTMCNC: 33.7 G/DL (ref 32.3–36.5)
MCV RBC AUTO: 93.3 FL (ref 81.4–97.8)
MONOCYTES # BLD AUTO: 0.34 K/UL (ref 0–0.85)
MONOCYTES NFR BLD AUTO: 6.4 % (ref 0–13.4)
NEUTROPHILS # BLD AUTO: 4.81 K/UL (ref 1.82–7.42)
NEUTROPHILS NFR BLD: 90.1 % (ref 44–72)
NRBC # BLD AUTO: 0 K/UL
NRBC BLD-RTO: 0 /100 WBC (ref 0–0.2)
OUTPT INFUS CBC COMMENT OICOM: ABNORMAL
PLATELET # BLD AUTO: 112 K/UL (ref 164–446)
PLATELETS.RETICULATED NFR BLD AUTO: 4.2 % (ref 0.6–13.1)
PMV BLD AUTO: 10.2 FL (ref 9–12.9)
RBC # BLD AUTO: 3.15 M/UL (ref 4.7–6.1)
RH BLD: NORMAL
WBC # BLD AUTO: 5.3 K/UL (ref 4.8–10.8)

## 2024-07-06 PROCEDURE — 86901 BLOOD TYPING SEROLOGIC RH(D): CPT

## 2024-07-06 PROCEDURE — 85025 COMPLETE CBC W/AUTO DIFF WBC: CPT

## 2024-07-06 PROCEDURE — 36415 COLL VENOUS BLD VENIPUNCTURE: CPT

## 2024-07-06 PROCEDURE — 86850 RBC ANTIBODY SCREEN: CPT

## 2024-07-06 PROCEDURE — 85055 RETICULATED PLATELET ASSAY: CPT

## 2024-07-06 PROCEDURE — 86900 BLOOD TYPING SEROLOGIC ABO: CPT

## 2024-07-06 RX ORDER — SODIUM CHLORIDE 9 MG/ML
INJECTION, SOLUTION INTRAVENOUS CONTINUOUS
Status: CANCELLED | OUTPATIENT
Start: 2024-07-06

## 2024-07-06 RX ORDER — 0.9 % SODIUM CHLORIDE 0.9 %
10 VIAL (ML) INJECTION PRN
Status: CANCELLED | OUTPATIENT
Start: 2024-07-06

## 2024-07-06 RX ORDER — DIPHENHYDRAMINE HCL 25 MG
25 TABLET ORAL ONCE
Status: CANCELLED | OUTPATIENT
Start: 2024-07-06 | End: 2024-07-06

## 2024-07-06 RX ORDER — DIPHENHYDRAMINE HYDROCHLORIDE 50 MG/ML
25 INJECTION INTRAMUSCULAR; INTRAVENOUS PRN
Status: CANCELLED | OUTPATIENT
Start: 2024-07-06

## 2024-07-06 RX ORDER — 0.9 % SODIUM CHLORIDE 0.9 %
VIAL (ML) INJECTION PRN
Status: CANCELLED | OUTPATIENT
Start: 2024-07-06

## 2024-07-06 RX ORDER — ACETAMINOPHEN 325 MG/1
650 TABLET ORAL PRN
Status: CANCELLED | OUTPATIENT
Start: 2024-07-06

## 2024-07-06 RX ORDER — 0.9 % SODIUM CHLORIDE 0.9 %
3 VIAL (ML) INJECTION PRN
Status: CANCELLED | OUTPATIENT
Start: 2024-07-06

## 2024-07-06 RX ORDER — ACETAMINOPHEN 325 MG/1
650 TABLET ORAL ONCE
Status: CANCELLED | OUTPATIENT
Start: 2024-07-06

## 2024-07-06 ASSESSMENT — FIBROSIS 4 INDEX: FIB4 SCORE: 5.66

## 2024-07-09 ENCOUNTER — TELEPHONE (OUTPATIENT)
Dept: HEMATOLOGY ONCOLOGY | Facility: MEDICAL CENTER | Age: 59
End: 2024-07-09
Payer: COMMERCIAL

## 2024-07-09 ENCOUNTER — OUTPATIENT INFUSION SERVICES (OUTPATIENT)
Dept: ONCOLOGY | Facility: MEDICAL CENTER | Age: 59
End: 2024-07-09
Attending: STUDENT IN AN ORGANIZED HEALTH CARE EDUCATION/TRAINING PROGRAM
Payer: COMMERCIAL

## 2024-07-09 ENCOUNTER — TELEPHONE (OUTPATIENT)
Dept: INTERNAL MEDICINE | Facility: OTHER | Age: 59
End: 2024-07-09
Payer: COMMERCIAL

## 2024-07-09 VITALS
HEIGHT: 66 IN | OXYGEN SATURATION: 97 % | WEIGHT: 147.27 LBS | DIASTOLIC BLOOD PRESSURE: 73 MMHG | RESPIRATION RATE: 18 BRPM | SYSTOLIC BLOOD PRESSURE: 121 MMHG | HEART RATE: 86 BPM | TEMPERATURE: 97 F | BODY MASS INDEX: 23.67 KG/M2

## 2024-07-09 DIAGNOSIS — C61 PROSTATE CANCER (HCC): ICD-10-CM

## 2024-07-09 DIAGNOSIS — D64.81 ANEMIA ASSOCIATED WITH CHEMOTHERAPY: ICD-10-CM

## 2024-07-09 DIAGNOSIS — G89.3 CANCER RELATED PAIN: ICD-10-CM

## 2024-07-09 DIAGNOSIS — T45.1X5A ANEMIA ASSOCIATED WITH CHEMOTHERAPY: ICD-10-CM

## 2024-07-09 DIAGNOSIS — C79.82 METASTATIC ADENOCARCINOMA TO PROSTATE (HCC): ICD-10-CM

## 2024-07-09 LAB
BASOPHILS # BLD AUTO: 0.1 % (ref 0–1.8)
BASOPHILS # BLD: 0.01 K/UL (ref 0–0.12)
EOSINOPHIL # BLD AUTO: 0.01 K/UL (ref 0–0.51)
EOSINOPHIL NFR BLD: 0.1 % (ref 0–6.9)
ERYTHROCYTE [DISTWIDTH] IN BLOOD BY AUTOMATED COUNT: 65 FL (ref 35.9–50)
HCT VFR BLD AUTO: 31.7 % (ref 42–52)
HGB BLD-MCNC: 10.7 G/DL (ref 14–18)
IMM GRANULOCYTES # BLD AUTO: 0.07 K/UL (ref 0–0.11)
IMM GRANULOCYTES NFR BLD AUTO: 0.8 % (ref 0–0.9)
LYMPHOCYTES # BLD AUTO: 0.19 K/UL (ref 1–4.8)
LYMPHOCYTES NFR BLD: 2.3 % (ref 22–41)
MCH RBC QN AUTO: 30.9 PG (ref 27–33)
MCHC RBC AUTO-ENTMCNC: 33.8 G/DL (ref 32.3–36.5)
MCV RBC AUTO: 91.6 FL (ref 81.4–97.8)
MONOCYTES # BLD AUTO: 0.65 K/UL (ref 0–0.85)
MONOCYTES NFR BLD AUTO: 7.8 % (ref 0–13.4)
NEUTROPHILS # BLD AUTO: 7.45 K/UL (ref 1.82–7.42)
NEUTROPHILS NFR BLD: 88.9 % (ref 44–72)
NRBC # BLD AUTO: 0 K/UL
NRBC BLD-RTO: 0 /100 WBC (ref 0–0.2)
OUTPT INFUS CBC COMMENT OICOM: ABNORMAL
PLATELET # BLD AUTO: 135 K/UL (ref 164–446)
PMV BLD AUTO: 10 FL (ref 9–12.9)
RBC # BLD AUTO: 3.46 M/UL (ref 4.7–6.1)
WBC # BLD AUTO: 8.4 K/UL (ref 4.8–10.8)

## 2024-07-09 PROCEDURE — 36415 COLL VENOUS BLD VENIPUNCTURE: CPT

## 2024-07-09 PROCEDURE — 85025 COMPLETE CBC W/AUTO DIFF WBC: CPT

## 2024-07-09 RX ORDER — 0.9 % SODIUM CHLORIDE 0.9 %
10 VIAL (ML) INJECTION PRN
Status: CANCELLED | OUTPATIENT
Start: 2024-07-09

## 2024-07-09 RX ORDER — 0.9 % SODIUM CHLORIDE 0.9 %
VIAL (ML) INJECTION PRN
Status: CANCELLED | OUTPATIENT
Start: 2024-07-09

## 2024-07-09 RX ORDER — ACETAMINOPHEN 325 MG/1
650 TABLET ORAL ONCE
Status: CANCELLED | OUTPATIENT
Start: 2024-07-09

## 2024-07-09 RX ORDER — ACETAMINOPHEN 325 MG/1
650 TABLET ORAL PRN
Status: CANCELLED | OUTPATIENT
Start: 2024-07-09

## 2024-07-09 RX ORDER — MORPHINE SULFATE 15 MG/1
15 TABLET, FILM COATED, EXTENDED RELEASE ORAL EVERY 12 HOURS
Qty: 60 TABLET | Refills: 0 | Status: SHIPPED | OUTPATIENT
Start: 2024-07-09 | End: 2024-08-08

## 2024-07-09 RX ORDER — SODIUM CHLORIDE 9 MG/ML
INJECTION, SOLUTION INTRAVENOUS CONTINUOUS
Status: CANCELLED | OUTPATIENT
Start: 2024-07-09

## 2024-07-09 RX ORDER — HYDROCODONE BITARTRATE AND ACETAMINOPHEN 5; 325 MG/1; MG/1
1 TABLET ORAL EVERY 4 HOURS PRN
Qty: 120 TABLET | Refills: 0 | Status: SHIPPED | OUTPATIENT
Start: 2024-07-09 | End: 2024-08-08

## 2024-07-09 RX ORDER — DIPHENHYDRAMINE HCL 25 MG
25 TABLET ORAL ONCE
Status: CANCELLED | OUTPATIENT
Start: 2024-07-09 | End: 2024-07-09

## 2024-07-09 RX ORDER — 0.9 % SODIUM CHLORIDE 0.9 %
3 VIAL (ML) INJECTION PRN
Status: CANCELLED | OUTPATIENT
Start: 2024-07-09

## 2024-07-09 RX ORDER — DIPHENHYDRAMINE HYDROCHLORIDE 50 MG/ML
25 INJECTION INTRAMUSCULAR; INTRAVENOUS PRN
Status: CANCELLED | OUTPATIENT
Start: 2024-07-09

## 2024-07-09 ASSESSMENT — FIBROSIS 4 INDEX: FIB4 SCORE: 6.12

## 2024-07-10 PROCEDURE — RXMED WILLOW AMBULATORY MEDICATION CHARGE: Performed by: STUDENT IN AN ORGANIZED HEALTH CARE EDUCATION/TRAINING PROGRAM

## 2024-07-10 NOTE — TELEPHONE ENCOUNTER
Caller Name: Casper Rowley    Call Back Number: [phone] (home)       How would the patient prefer to be contacted with a response: Phone call do NOT leave a detailed message    2. SPECIFIC Action To Be Taken: Referral needed    3. Diagnosis/Clinical Reason for Request: Nuerosurgery    4. Specialty & Provider Name/Lab/Imaging Location: [facility]    5. Is appointment scheduled for requested order/referral: yes - future visits. Paid out of pocket last visit    Patient was informed they will receive a return phone call from the office ONLY if there are any questions before processing their request. Advised to call back if they haven't received a call from the referral department in 5 days.

## 2024-07-11 ENCOUNTER — DOCUMENTATION (OUTPATIENT)
Dept: PHARMACY | Facility: MEDICAL CENTER | Age: 59
End: 2024-07-11
Payer: COMMERCIAL

## 2024-07-12 ENCOUNTER — PHARMACY VISIT (OUTPATIENT)
Dept: PHARMACY | Facility: MEDICAL CENTER | Age: 59
End: 2024-07-12
Payer: COMMERCIAL

## 2024-07-13 ENCOUNTER — OUTPATIENT INFUSION SERVICES (OUTPATIENT)
Dept: ONCOLOGY | Facility: MEDICAL CENTER | Age: 59
End: 2024-07-13
Attending: STUDENT IN AN ORGANIZED HEALTH CARE EDUCATION/TRAINING PROGRAM
Payer: COMMERCIAL

## 2024-07-13 VITALS
DIASTOLIC BLOOD PRESSURE: 65 MMHG | SYSTOLIC BLOOD PRESSURE: 94 MMHG | HEART RATE: 112 BPM | HEIGHT: 66 IN | RESPIRATION RATE: 18 BRPM | TEMPERATURE: 98.6 F | WEIGHT: 150.13 LBS | BODY MASS INDEX: 24.13 KG/M2 | OXYGEN SATURATION: 97 %

## 2024-07-13 DIAGNOSIS — C61 PROSTATE CANCER (HCC): ICD-10-CM

## 2024-07-13 LAB
ALBUMIN SERPL BCP-MCNC: 3.4 G/DL (ref 3.2–4.9)
ALBUMIN/GLOB SERPL: 1.4 G/DL
ALP SERPL-CCNC: 279 U/L (ref 30–99)
ALT SERPL-CCNC: 82 U/L (ref 2–50)
ANION GAP SERPL CALC-SCNC: 16 MMOL/L (ref 7–16)
AST SERPL-CCNC: 80 U/L (ref 12–45)
BASOPHILS # BLD AUTO: 0.1 % (ref 0–1.8)
BASOPHILS # BLD: 0.01 K/UL (ref 0–0.12)
BILIRUB SERPL-MCNC: 0.6 MG/DL (ref 0.1–1.5)
BUN SERPL-MCNC: 18 MG/DL (ref 8–22)
CALCIUM ALBUM COR SERPL-MCNC: 8.5 MG/DL (ref 8.5–10.5)
CALCIUM SERPL-MCNC: 8 MG/DL (ref 8.5–10.5)
CHLORIDE SERPL-SCNC: 91 MMOL/L (ref 96–112)
CO2 SERPL-SCNC: 19 MMOL/L (ref 20–33)
CREAT SERPL-MCNC: 0.6 MG/DL (ref 0.5–1.4)
EOSINOPHIL # BLD AUTO: 0.1 K/UL (ref 0–0.51)
EOSINOPHIL NFR BLD: 1.4 % (ref 0–6.9)
ERYTHROCYTE [DISTWIDTH] IN BLOOD BY AUTOMATED COUNT: 69.2 FL (ref 35.9–50)
GFR SERPLBLD CREATININE-BSD FMLA CKD-EPI: 112 ML/MIN/1.73 M 2
GLOBULIN SER CALC-MCNC: 2.4 G/DL (ref 1.9–3.5)
GLUCOSE SERPL-MCNC: 243 MG/DL (ref 65–99)
HCT VFR BLD AUTO: 28.3 % (ref 42–52)
HGB BLD-MCNC: 9.5 G/DL (ref 14–18)
IMM GRANULOCYTES # BLD AUTO: 0.04 K/UL (ref 0–0.11)
IMM GRANULOCYTES NFR BLD AUTO: 0.6 % (ref 0–0.9)
LYMPHOCYTES # BLD AUTO: 0.17 K/UL (ref 1–4.8)
LYMPHOCYTES NFR BLD: 2.4 % (ref 22–41)
MCH RBC QN AUTO: 31.4 PG (ref 27–33)
MCHC RBC AUTO-ENTMCNC: 33.6 G/DL (ref 32.3–36.5)
MCV RBC AUTO: 93.4 FL (ref 81.4–97.8)
MONOCYTES # BLD AUTO: 0.43 K/UL (ref 0–0.85)
MONOCYTES NFR BLD AUTO: 6 % (ref 0–13.4)
NEUTROPHILS # BLD AUTO: 6.37 K/UL (ref 1.82–7.42)
NEUTROPHILS NFR BLD: 89.5 % (ref 44–72)
NRBC # BLD AUTO: 0 K/UL
NRBC BLD-RTO: 0 /100 WBC (ref 0–0.2)
OUTPT INFUS CBC COMMENT OICOM: ABNORMAL
PLATELET # BLD AUTO: 106 K/UL (ref 164–446)
PLATELETS.RETICULATED NFR BLD AUTO: 3.1 % (ref 0.6–13.1)
PMV BLD AUTO: 10.1 FL (ref 9–12.9)
POTASSIUM SERPL-SCNC: 4 MMOL/L (ref 3.6–5.5)
PROT SERPL-MCNC: 5.8 G/DL (ref 6–8.2)
RBC # BLD AUTO: 3.03 M/UL (ref 4.7–6.1)
SODIUM SERPL-SCNC: 126 MMOL/L (ref 135–145)
T4 FREE SERPL-MCNC: 1.76 NG/DL (ref 0.93–1.7)
TSH SERPL DL<=0.005 MIU/L-ACNC: 0.6 UIU/ML (ref 0.38–5.33)
WBC # BLD AUTO: 7.1 K/UL (ref 4.8–10.8)

## 2024-07-13 PROCEDURE — 700105 HCHG RX REV CODE 258: Performed by: STUDENT IN AN ORGANIZED HEALTH CARE EDUCATION/TRAINING PROGRAM

## 2024-07-13 PROCEDURE — 84443 ASSAY THYROID STIM HORMONE: CPT

## 2024-07-13 PROCEDURE — 85055 RETICULATED PLATELET ASSAY: CPT

## 2024-07-13 PROCEDURE — 700111 HCHG RX REV CODE 636 W/ 250 OVERRIDE (IP): Mod: JZ | Performed by: STUDENT IN AN ORGANIZED HEALTH CARE EDUCATION/TRAINING PROGRAM

## 2024-07-13 PROCEDURE — 85025 COMPLETE CBC W/AUTO DIFF WBC: CPT

## 2024-07-13 PROCEDURE — 84439 ASSAY OF FREE THYROXINE: CPT

## 2024-07-13 PROCEDURE — 80053 COMPREHEN METABOLIC PANEL: CPT

## 2024-07-13 PROCEDURE — 96413 CHEMO IV INFUSION 1 HR: CPT

## 2024-07-13 RX ADMIN — ATEZOLIZUMAB 1200 MG: 1200 INJECTION, SOLUTION INTRAVENOUS at 15:34

## 2024-07-13 ASSESSMENT — FIBROSIS 4 INDEX: FIB4 SCORE: 5.08

## 2024-07-15 DIAGNOSIS — Z79.899 ENCOUNTER FOR LONG-TERM (CURRENT) USE OF HIGH-RISK MEDICATION: ICD-10-CM

## 2024-07-15 DIAGNOSIS — D64.81 ANEMIA ASSOCIATED WITH CHEMOTHERAPY: ICD-10-CM

## 2024-07-15 DIAGNOSIS — T45.1X5A ANEMIA ASSOCIATED WITH CHEMOTHERAPY: ICD-10-CM

## 2024-07-16 ENCOUNTER — HOSPITAL ENCOUNTER (OUTPATIENT)
Dept: LAB | Facility: MEDICAL CENTER | Age: 59
End: 2024-07-16
Payer: COMMERCIAL

## 2024-07-16 ENCOUNTER — HOSPITAL ENCOUNTER (OUTPATIENT)
Dept: LAB | Facility: MEDICAL CENTER | Age: 59
End: 2024-07-16
Attending: STUDENT IN AN ORGANIZED HEALTH CARE EDUCATION/TRAINING PROGRAM
Payer: COMMERCIAL

## 2024-07-16 ENCOUNTER — HOSPITAL ENCOUNTER (OUTPATIENT)
Dept: LAB | Facility: MEDICAL CENTER | Age: 59
End: 2024-07-16
Attending: INTERNAL MEDICINE
Payer: COMMERCIAL

## 2024-07-16 DIAGNOSIS — R79.89 LOW VITAMIN D LEVEL: ICD-10-CM

## 2024-07-16 DIAGNOSIS — Z79.899 ENCOUNTER FOR LONG-TERM (CURRENT) USE OF HIGH-RISK MEDICATION: ICD-10-CM

## 2024-07-16 DIAGNOSIS — T45.1X5A ANEMIA ASSOCIATED WITH CHEMOTHERAPY: ICD-10-CM

## 2024-07-16 DIAGNOSIS — D64.81 ANEMIA ASSOCIATED WITH CHEMOTHERAPY: ICD-10-CM

## 2024-07-16 DIAGNOSIS — E11.9 TYPE 2 DIABETES MELLITUS WITHOUT COMPLICATION, WITHOUT LONG-TERM CURRENT USE OF INSULIN (HCC): ICD-10-CM

## 2024-07-16 DIAGNOSIS — E78.1 HYPERTRIGLYCERIDEMIA: ICD-10-CM

## 2024-07-16 LAB
25(OH)D3 SERPL-MCNC: 11 NG/ML (ref 30–100)
ALBUMIN SERPL BCP-MCNC: 3.2 G/DL (ref 3.2–4.9)
ALBUMIN/GLOB SERPL: 1.2 G/DL
ALP SERPL-CCNC: 311 U/L (ref 30–99)
ALT SERPL-CCNC: 61 U/L (ref 2–50)
ANION GAP SERPL CALC-SCNC: 12 MMOL/L (ref 7–16)
ANISOCYTOSIS BLD QL SMEAR: ABNORMAL
AST SERPL-CCNC: 36 U/L (ref 12–45)
BASOPHILS # BLD AUTO: 0 % (ref 0–1.8)
BASOPHILS # BLD: 0 K/UL (ref 0–0.12)
BILIRUB SERPL-MCNC: 0.5 MG/DL (ref 0.1–1.5)
BUN SERPL-MCNC: 6 MG/DL (ref 8–22)
CALCIUM ALBUM COR SERPL-MCNC: 9.1 MG/DL (ref 8.5–10.5)
CALCIUM SERPL-MCNC: 8.5 MG/DL (ref 8.5–10.5)
CHLORIDE SERPL-SCNC: 95 MMOL/L (ref 96–112)
CHOLEST SERPL-MCNC: 118 MG/DL (ref 100–199)
CO2 SERPL-SCNC: 23 MMOL/L (ref 20–33)
CREAT SERPL-MCNC: 0.32 MG/DL (ref 0.5–1.4)
EOSINOPHIL # BLD AUTO: 0.04 K/UL (ref 0–0.51)
EOSINOPHIL NFR BLD: 0.8 % (ref 0–6.9)
ERYTHROCYTE [DISTWIDTH] IN BLOOD BY AUTOMATED COUNT: 72 FL (ref 35.9–50)
GFR SERPLBLD CREATININE-BSD FMLA CKD-EPI: 135 ML/MIN/1.73 M 2
GLOBULIN SER CALC-MCNC: 2.6 G/DL (ref 1.9–3.5)
GLUCOSE SERPL-MCNC: 162 MG/DL (ref 65–99)
HCT VFR BLD AUTO: 25.6 % (ref 42–52)
HDLC SERPL-MCNC: 37 MG/DL
HGB BLD-MCNC: 8.5 G/DL (ref 14–18)
LDLC SERPL CALC-MCNC: 57 MG/DL
LYMPHOCYTES # BLD AUTO: 0.25 K/UL (ref 1–4.8)
LYMPHOCYTES NFR BLD: 5.2 % (ref 22–41)
MACROCYTES BLD QL SMEAR: ABNORMAL
MANUAL DIFF BLD: NORMAL
MCH RBC QN AUTO: 31.7 PG (ref 27–33)
MCHC RBC AUTO-ENTMCNC: 33.2 G/DL (ref 32.3–36.5)
MCV RBC AUTO: 95.5 FL (ref 81.4–97.8)
MICROCYTES BLD QL SMEAR: ABNORMAL
MONOCYTES # BLD AUTO: 0.25 K/UL (ref 0–0.85)
MONOCYTES NFR BLD AUTO: 5.2 % (ref 0–13.4)
MORPHOLOGY BLD-IMP: NORMAL
NEUTROPHILS # BLD AUTO: 4.35 K/UL (ref 1.82–7.42)
NEUTROPHILS NFR BLD: 88.8 % (ref 44–72)
NRBC # BLD AUTO: 0 K/UL
NRBC BLD-RTO: 0 /100 WBC (ref 0–0.2)
PLATELET # BLD AUTO: 96 K/UL (ref 164–446)
PLATELET BLD QL SMEAR: NORMAL
PLATELETS.RETICULATED NFR BLD AUTO: 3.8 % (ref 0.6–13.1)
PMV BLD AUTO: 11 FL (ref 9–12.9)
POTASSIUM SERPL-SCNC: 4.7 MMOL/L (ref 3.6–5.5)
PROT SERPL-MCNC: 5.8 G/DL (ref 6–8.2)
RBC # BLD AUTO: 2.68 M/UL (ref 4.7–6.1)
RBC BLD AUTO: PRESENT
SODIUM SERPL-SCNC: 130 MMOL/L (ref 135–145)
TRIGL SERPL-MCNC: 121 MG/DL (ref 0–149)
WBC # BLD AUTO: 4.9 K/UL (ref 4.8–10.8)

## 2024-07-16 PROCEDURE — 85055 RETICULATED PLATELET ASSAY: CPT

## 2024-07-16 PROCEDURE — 85027 COMPLETE CBC AUTOMATED: CPT

## 2024-07-16 PROCEDURE — 80053 COMPREHEN METABOLIC PANEL: CPT

## 2024-07-16 PROCEDURE — 85007 BL SMEAR W/DIFF WBC COUNT: CPT

## 2024-07-16 PROCEDURE — 82306 VITAMIN D 25 HYDROXY: CPT

## 2024-07-16 PROCEDURE — 80061 LIPID PANEL: CPT

## 2024-07-16 PROCEDURE — 36415 COLL VENOUS BLD VENIPUNCTURE: CPT

## 2024-07-19 ENCOUNTER — DOCUMENTATION (OUTPATIENT)
Dept: PHARMACY | Facility: MEDICAL CENTER | Age: 59
End: 2024-07-19
Payer: COMMERCIAL

## 2024-07-19 NOTE — TELEPHONE ENCOUNTER
Phone Number Called: [phone] (home)       Call outcome:  Called pt and wasn't able to leave a VM due to their inbox being too full     Message: first attempt to contact patient

## 2024-07-20 ENCOUNTER — OUTPATIENT INFUSION SERVICES (OUTPATIENT)
Dept: ONCOLOGY | Facility: MEDICAL CENTER | Age: 59
End: 2024-07-20
Attending: STUDENT IN AN ORGANIZED HEALTH CARE EDUCATION/TRAINING PROGRAM
Payer: COMMERCIAL

## 2024-07-20 VITALS
DIASTOLIC BLOOD PRESSURE: 76 MMHG | HEART RATE: 146 BPM | TEMPERATURE: 97.3 F | OXYGEN SATURATION: 95 % | HEIGHT: 66 IN | SYSTOLIC BLOOD PRESSURE: 114 MMHG | BODY MASS INDEX: 23.77 KG/M2 | WEIGHT: 147.93 LBS | RESPIRATION RATE: 18 BRPM

## 2024-07-20 DIAGNOSIS — C61 PROSTATE CANCER (HCC): ICD-10-CM

## 2024-07-20 DIAGNOSIS — D64.81 ANEMIA ASSOCIATED WITH CHEMOTHERAPY: ICD-10-CM

## 2024-07-20 DIAGNOSIS — T45.1X5A ANEMIA ASSOCIATED WITH CHEMOTHERAPY: ICD-10-CM

## 2024-07-20 LAB
ABO GROUP BLD: NORMAL
BASOPHILS # BLD AUTO: 0.2 % (ref 0–1.8)
BASOPHILS # BLD: 0.01 K/UL (ref 0–0.12)
BLD GP AB SCN SERPL QL: NORMAL
EOSINOPHIL # BLD AUTO: 0.03 K/UL (ref 0–0.51)
EOSINOPHIL NFR BLD: 0.6 % (ref 0–6.9)
ERYTHROCYTE [DISTWIDTH] IN BLOOD BY AUTOMATED COUNT: 68.4 FL (ref 35.9–50)
HCT VFR BLD AUTO: 27.2 % (ref 42–52)
HGB BLD-MCNC: 9.1 G/DL (ref 14–18)
IMM GRANULOCYTES # BLD AUTO: 0.03 K/UL (ref 0–0.11)
IMM GRANULOCYTES NFR BLD AUTO: 0.6 % (ref 0–0.9)
LYMPHOCYTES # BLD AUTO: 0.25 K/UL (ref 1–4.8)
LYMPHOCYTES NFR BLD: 5.4 % (ref 22–41)
MCH RBC QN AUTO: 31.5 PG (ref 27–33)
MCHC RBC AUTO-ENTMCNC: 33.5 G/DL (ref 32.3–36.5)
MCV RBC AUTO: 94.1 FL (ref 81.4–97.8)
MONOCYTES # BLD AUTO: 0.28 K/UL (ref 0–0.85)
MONOCYTES NFR BLD AUTO: 6 % (ref 0–13.4)
NEUTROPHILS # BLD AUTO: 4.03 K/UL (ref 1.82–7.42)
NEUTROPHILS NFR BLD: 87.2 % (ref 44–72)
NRBC # BLD AUTO: 0 K/UL
NRBC BLD-RTO: 0 /100 WBC (ref 0–0.2)
OUTPT INFUS CBC COMMENT OICOM: ABNORMAL
PLATELET # BLD AUTO: 132 K/UL (ref 164–446)
PMV BLD AUTO: 9.4 FL (ref 9–12.9)
RBC # BLD AUTO: 2.89 M/UL (ref 4.7–6.1)
RH BLD: NORMAL
WBC # BLD AUTO: 4.6 K/UL (ref 4.8–10.8)

## 2024-07-20 PROCEDURE — 86850 RBC ANTIBODY SCREEN: CPT

## 2024-07-20 PROCEDURE — 36415 COLL VENOUS BLD VENIPUNCTURE: CPT

## 2024-07-20 PROCEDURE — 85025 COMPLETE CBC W/AUTO DIFF WBC: CPT

## 2024-07-20 PROCEDURE — 86901 BLOOD TYPING SEROLOGIC RH(D): CPT

## 2024-07-20 PROCEDURE — 86900 BLOOD TYPING SEROLOGIC ABO: CPT

## 2024-07-20 RX ORDER — ACETAMINOPHEN 325 MG/1
650 TABLET ORAL ONCE
Status: CANCELLED | OUTPATIENT
Start: 2024-07-20

## 2024-07-20 RX ORDER — 0.9 % SODIUM CHLORIDE 0.9 %
10 VIAL (ML) INJECTION PRN
Status: CANCELLED | OUTPATIENT
Start: 2024-07-20

## 2024-07-20 RX ORDER — SODIUM CHLORIDE 9 MG/ML
INJECTION, SOLUTION INTRAVENOUS CONTINUOUS
Status: CANCELLED | OUTPATIENT
Start: 2024-07-20

## 2024-07-20 RX ORDER — DIPHENHYDRAMINE HCL 25 MG
25 TABLET ORAL ONCE
Status: CANCELLED | OUTPATIENT
Start: 2024-07-20 | End: 2024-07-20

## 2024-07-20 RX ORDER — 0.9 % SODIUM CHLORIDE 0.9 %
VIAL (ML) INJECTION PRN
Status: CANCELLED | OUTPATIENT
Start: 2024-07-20

## 2024-07-20 RX ORDER — DIPHENHYDRAMINE HYDROCHLORIDE 50 MG/ML
25 INJECTION INTRAMUSCULAR; INTRAVENOUS PRN
Status: CANCELLED | OUTPATIENT
Start: 2024-07-20

## 2024-07-20 RX ORDER — 0.9 % SODIUM CHLORIDE 0.9 %
3 VIAL (ML) INJECTION PRN
Status: CANCELLED | OUTPATIENT
Start: 2024-07-20

## 2024-07-20 RX ORDER — ACETAMINOPHEN 325 MG/1
650 TABLET ORAL PRN
Status: CANCELLED | OUTPATIENT
Start: 2024-07-20

## 2024-07-20 ASSESSMENT — FIBROSIS 4 INDEX: FIB4 SCORE: 2.74

## 2024-07-22 RX ORDER — 0.9 % SODIUM CHLORIDE 0.9 %
VIAL (ML) INJECTION PRN
OUTPATIENT
Start: 2024-07-23

## 2024-07-22 RX ORDER — DIPHENHYDRAMINE HYDROCHLORIDE 50 MG/ML
50 INJECTION INTRAMUSCULAR; INTRAVENOUS PRN
OUTPATIENT
Start: 2024-07-24

## 2024-07-22 RX ORDER — ONDANSETRON 2 MG/ML
4 INJECTION INTRAMUSCULAR; INTRAVENOUS PRN
OUTPATIENT
Start: 2024-07-24

## 2024-07-22 RX ORDER — 0.9 % SODIUM CHLORIDE 0.9 %
10 VIAL (ML) INJECTION PRN
OUTPATIENT
Start: 2024-07-24

## 2024-07-22 RX ORDER — 0.9 % SODIUM CHLORIDE 0.9 %
10 VIAL (ML) INJECTION PRN
OUTPATIENT
Start: 2024-07-23

## 2024-07-22 RX ORDER — ONDANSETRON 8 MG/1
8 TABLET, ORALLY DISINTEGRATING ORAL PRN
OUTPATIENT
Start: 2024-07-24

## 2024-07-22 RX ORDER — EPINEPHRINE 1 MG/ML(1)
0.5 AMPUL (ML) INJECTION PRN
OUTPATIENT
Start: 2024-07-24

## 2024-07-22 RX ORDER — 0.9 % SODIUM CHLORIDE 0.9 %
3 VIAL (ML) INJECTION PRN
OUTPATIENT
Start: 2024-07-24

## 2024-07-22 RX ORDER — 0.9 % SODIUM CHLORIDE 0.9 %
3 VIAL (ML) INJECTION PRN
OUTPATIENT
Start: 2024-07-23

## 2024-07-22 RX ORDER — SODIUM CHLORIDE 9 MG/ML
INJECTION, SOLUTION INTRAVENOUS CONTINUOUS
OUTPATIENT
Start: 2024-07-24

## 2024-07-22 RX ORDER — 0.9 % SODIUM CHLORIDE 0.9 %
VIAL (ML) INJECTION PRN
OUTPATIENT
Start: 2024-07-24

## 2024-07-22 RX ORDER — METHYLPREDNISOLONE SODIUM SUCCINATE 125 MG/2ML
125 INJECTION, POWDER, LYOPHILIZED, FOR SOLUTION INTRAMUSCULAR; INTRAVENOUS PRN
OUTPATIENT
Start: 2024-07-24

## 2024-07-22 RX ORDER — PROCHLORPERAZINE MALEATE 10 MG
10 TABLET ORAL EVERY 6 HOURS PRN
OUTPATIENT
Start: 2024-07-24

## 2024-07-23 ENCOUNTER — OUTPATIENT INFUSION SERVICES (OUTPATIENT)
Dept: ONCOLOGY | Facility: MEDICAL CENTER | Age: 59
End: 2024-07-23
Attending: STUDENT IN AN ORGANIZED HEALTH CARE EDUCATION/TRAINING PROGRAM
Payer: COMMERCIAL

## 2024-07-23 VITALS
OXYGEN SATURATION: 98 % | HEIGHT: 66 IN | TEMPERATURE: 97 F | SYSTOLIC BLOOD PRESSURE: 110 MMHG | DIASTOLIC BLOOD PRESSURE: 74 MMHG | RESPIRATION RATE: 18 BRPM | BODY MASS INDEX: 23.38 KG/M2 | WEIGHT: 145.5 LBS | HEART RATE: 105 BPM

## 2024-07-23 DIAGNOSIS — D64.81 ANEMIA ASSOCIATED WITH CHEMOTHERAPY: ICD-10-CM

## 2024-07-23 DIAGNOSIS — C61 PROSTATE CANCER (HCC): ICD-10-CM

## 2024-07-23 DIAGNOSIS — T45.1X5A ANEMIA ASSOCIATED WITH CHEMOTHERAPY: ICD-10-CM

## 2024-07-23 LAB
BASOPHILS # BLD AUTO: 0.2 % (ref 0–1.8)
BASOPHILS # BLD: 0.01 K/UL (ref 0–0.12)
EOSINOPHIL # BLD AUTO: 0.06 K/UL (ref 0–0.51)
EOSINOPHIL NFR BLD: 1.2 % (ref 0–6.9)
ERYTHROCYTE [DISTWIDTH] IN BLOOD BY AUTOMATED COUNT: 68.1 FL (ref 35.9–50)
HCT VFR BLD AUTO: 27.3 % (ref 42–52)
HGB BLD-MCNC: 8.8 G/DL (ref 14–18)
IMM GRANULOCYTES # BLD AUTO: 0.03 K/UL (ref 0–0.11)
IMM GRANULOCYTES NFR BLD AUTO: 0.6 % (ref 0–0.9)
LYMPHOCYTES # BLD AUTO: 0.26 K/UL (ref 1–4.8)
LYMPHOCYTES NFR BLD: 5.1 % (ref 22–41)
MCH RBC QN AUTO: 30.4 PG (ref 27–33)
MCHC RBC AUTO-ENTMCNC: 32.2 G/DL (ref 32.3–36.5)
MCV RBC AUTO: 94.5 FL (ref 81.4–97.8)
MONOCYTES # BLD AUTO: 0.32 K/UL (ref 0–0.85)
MONOCYTES NFR BLD AUTO: 6.3 % (ref 0–13.4)
NEUTROPHILS # BLD AUTO: 4.4 K/UL (ref 1.82–7.42)
NEUTROPHILS NFR BLD: 86.6 % (ref 44–72)
NRBC # BLD AUTO: 0 K/UL
NRBC BLD-RTO: 0 /100 WBC (ref 0–0.2)
OUTPT INFUS CBC COMMENT OICOM: ABNORMAL
PLATELET # BLD AUTO: 137 K/UL (ref 164–446)
PMV BLD AUTO: 9.7 FL (ref 9–12.9)
RBC # BLD AUTO: 2.89 M/UL (ref 4.7–6.1)
WBC # BLD AUTO: 5.1 K/UL (ref 4.8–10.8)

## 2024-07-23 PROCEDURE — 85025 COMPLETE CBC W/AUTO DIFF WBC: CPT

## 2024-07-23 PROCEDURE — 36415 COLL VENOUS BLD VENIPUNCTURE: CPT

## 2024-07-23 PROCEDURE — RXMED WILLOW AMBULATORY MEDICATION CHARGE: Performed by: STUDENT IN AN ORGANIZED HEALTH CARE EDUCATION/TRAINING PROGRAM

## 2024-07-23 RX ORDER — ACETAMINOPHEN 325 MG/1
650 TABLET ORAL PRN
Status: CANCELLED | OUTPATIENT
Start: 2024-07-23

## 2024-07-23 RX ORDER — ACETAMINOPHEN 325 MG/1
650 TABLET ORAL ONCE
Status: CANCELLED | OUTPATIENT
Start: 2024-07-23

## 2024-07-23 RX ORDER — 0.9 % SODIUM CHLORIDE 0.9 %
10 VIAL (ML) INJECTION PRN
Status: CANCELLED | OUTPATIENT
Start: 2024-07-23

## 2024-07-23 RX ORDER — 0.9 % SODIUM CHLORIDE 0.9 %
VIAL (ML) INJECTION PRN
Status: CANCELLED | OUTPATIENT
Start: 2024-07-23

## 2024-07-23 RX ORDER — SODIUM CHLORIDE 9 MG/ML
INJECTION, SOLUTION INTRAVENOUS CONTINUOUS
Status: CANCELLED | OUTPATIENT
Start: 2024-07-23

## 2024-07-23 RX ORDER — DIPHENHYDRAMINE HCL 25 MG
25 TABLET ORAL ONCE
Status: CANCELLED | OUTPATIENT
Start: 2024-07-23 | End: 2024-07-23

## 2024-07-23 RX ORDER — DIPHENHYDRAMINE HYDROCHLORIDE 50 MG/ML
25 INJECTION INTRAMUSCULAR; INTRAVENOUS PRN
Status: CANCELLED | OUTPATIENT
Start: 2024-07-23

## 2024-07-23 RX ORDER — 0.9 % SODIUM CHLORIDE 0.9 %
3 VIAL (ML) INJECTION PRN
Status: CANCELLED | OUTPATIENT
Start: 2024-07-23

## 2024-07-23 ASSESSMENT — FIBROSIS 4 INDEX: FIB4 SCORE: 1.99

## 2024-07-23 NOTE — TELEPHONE ENCOUNTER
Phone Number Called: [phone] (home)       Call outcome: Spoke to patient regarding message below.    Message: the reason for the referral is to get his eyes examined because his eyeballs point at different directions.     Asked for my to refax his [facility] referral. This referral has been re-faxed.

## 2024-07-26 ENCOUNTER — PHARMACY VISIT (OUTPATIENT)
Dept: PHARMACY | Facility: MEDICAL CENTER | Age: 59
End: 2024-07-26
Payer: COMMERCIAL

## 2024-07-27 ENCOUNTER — OUTPATIENT INFUSION SERVICES (OUTPATIENT)
Dept: ONCOLOGY | Facility: MEDICAL CENTER | Age: 59
End: 2024-07-27
Attending: STUDENT IN AN ORGANIZED HEALTH CARE EDUCATION/TRAINING PROGRAM
Payer: COMMERCIAL

## 2024-07-27 VITALS
OXYGEN SATURATION: 97 % | HEIGHT: 66 IN | WEIGHT: 149.03 LBS | RESPIRATION RATE: 18 BRPM | HEART RATE: 86 BPM | DIASTOLIC BLOOD PRESSURE: 63 MMHG | SYSTOLIC BLOOD PRESSURE: 92 MMHG | BODY MASS INDEX: 23.95 KG/M2 | TEMPERATURE: 97 F

## 2024-07-27 DIAGNOSIS — T45.1X5A ANEMIA ASSOCIATED WITH CHEMOTHERAPY: ICD-10-CM

## 2024-07-27 DIAGNOSIS — C61 PROSTATE CANCER (HCC): ICD-10-CM

## 2024-07-27 DIAGNOSIS — D64.81 ANEMIA ASSOCIATED WITH CHEMOTHERAPY: ICD-10-CM

## 2024-07-27 LAB
ABO GROUP BLD: NORMAL
BARCODED ABORH UBTYP: 5100
BARCODED PRD CODE UBPRD: NORMAL
BARCODED UNIT NUM UBUNT: NORMAL
BASOPHILS # BLD AUTO: 0.6 % (ref 0–1.8)
BASOPHILS # BLD: 0.02 K/UL (ref 0–0.12)
BLD GP AB SCN SERPL QL: NORMAL
COMPONENT R 8504R: NORMAL
EOSINOPHIL # BLD AUTO: 0.06 K/UL (ref 0–0.51)
EOSINOPHIL NFR BLD: 1.7 % (ref 0–6.9)
ERYTHROCYTE [DISTWIDTH] IN BLOOD BY AUTOMATED COUNT: 68.8 FL (ref 35.9–50)
HCT VFR BLD AUTO: 23.4 % (ref 42–52)
HGB BLD-MCNC: 7.7 G/DL (ref 14–18)
IMM GRANULOCYTES # BLD AUTO: 0.02 K/UL (ref 0–0.11)
IMM GRANULOCYTES NFR BLD AUTO: 0.6 % (ref 0–0.9)
LYMPHOCYTES # BLD AUTO: 0.27 K/UL (ref 1–4.8)
LYMPHOCYTES NFR BLD: 7.5 % (ref 22–41)
MCH RBC QN AUTO: 31.3 PG (ref 27–33)
MCHC RBC AUTO-ENTMCNC: 32.9 G/DL (ref 32.3–36.5)
MCV RBC AUTO: 95.1 FL (ref 81.4–97.8)
MONOCYTES # BLD AUTO: 0.24 K/UL (ref 0–0.85)
MONOCYTES NFR BLD AUTO: 6.6 % (ref 0–13.4)
NEUTROPHILS # BLD AUTO: 3 K/UL (ref 1.82–7.42)
NEUTROPHILS NFR BLD: 83 % (ref 44–72)
NRBC # BLD AUTO: 0 K/UL
NRBC BLD-RTO: 0 /100 WBC (ref 0–0.2)
OUTPT INFUS CBC COMMENT OICOM: ABNORMAL
PLATELET # BLD AUTO: 129 K/UL (ref 164–446)
PMV BLD AUTO: 9.2 FL (ref 9–12.9)
PRODUCT TYPE UPROD: NORMAL
RBC # BLD AUTO: 2.46 M/UL (ref 4.7–6.1)
RH BLD: NORMAL
UNIT STATUS USTAT: NORMAL
WBC # BLD AUTO: 3.6 K/UL (ref 4.8–10.8)

## 2024-07-27 PROCEDURE — 86901 BLOOD TYPING SEROLOGIC RH(D): CPT

## 2024-07-27 PROCEDURE — 86923 COMPATIBILITY TEST ELECTRIC: CPT

## 2024-07-27 PROCEDURE — 96374 THER/PROPH/DIAG INJ IV PUSH: CPT

## 2024-07-27 PROCEDURE — P9016 RBC LEUKOCYTES REDUCED: HCPCS

## 2024-07-27 PROCEDURE — A9270 NON-COVERED ITEM OR SERVICE: HCPCS | Performed by: STUDENT IN AN ORGANIZED HEALTH CARE EDUCATION/TRAINING PROGRAM

## 2024-07-27 PROCEDURE — 306780 HCHG STAT FOR TRANSFUSION PER CASE

## 2024-07-27 PROCEDURE — 700102 HCHG RX REV CODE 250 W/ 637 OVERRIDE(OP): Performed by: STUDENT IN AN ORGANIZED HEALTH CARE EDUCATION/TRAINING PROGRAM

## 2024-07-27 PROCEDURE — 36430 TRANSFUSION BLD/BLD COMPNT: CPT

## 2024-07-27 PROCEDURE — 86850 RBC ANTIBODY SCREEN: CPT

## 2024-07-27 PROCEDURE — 86900 BLOOD TYPING SEROLOGIC ABO: CPT

## 2024-07-27 PROCEDURE — 85025 COMPLETE CBC W/AUTO DIFF WBC: CPT

## 2024-07-27 PROCEDURE — 700111 HCHG RX REV CODE 636 W/ 250 OVERRIDE (IP): Mod: JZ | Performed by: STUDENT IN AN ORGANIZED HEALTH CARE EDUCATION/TRAINING PROGRAM

## 2024-07-27 RX ORDER — 0.9 % SODIUM CHLORIDE 0.9 %
10 VIAL (ML) INJECTION PRN
Status: CANCELLED | OUTPATIENT
Start: 2024-07-27

## 2024-07-27 RX ORDER — ACETAMINOPHEN 325 MG/1
650 TABLET ORAL PRN
Status: CANCELLED | OUTPATIENT
Start: 2024-07-27

## 2024-07-27 RX ORDER — DIPHENHYDRAMINE HYDROCHLORIDE 50 MG/ML
25 INJECTION INTRAMUSCULAR; INTRAVENOUS PRN
Status: CANCELLED | OUTPATIENT
Start: 2024-07-27

## 2024-07-27 RX ORDER — DIPHENHYDRAMINE HCL 25 MG
25 TABLET ORAL ONCE
Status: COMPLETED | OUTPATIENT
Start: 2024-07-27 | End: 2024-07-27

## 2024-07-27 RX ORDER — 0.9 % SODIUM CHLORIDE 0.9 %
3 VIAL (ML) INJECTION PRN
Status: CANCELLED | OUTPATIENT
Start: 2024-07-27

## 2024-07-27 RX ORDER — SODIUM CHLORIDE 9 MG/ML
INJECTION, SOLUTION INTRAVENOUS CONTINUOUS
Status: CANCELLED | OUTPATIENT
Start: 2024-07-27

## 2024-07-27 RX ORDER — DIPHENHYDRAMINE HCL 25 MG
25 TABLET ORAL ONCE
Status: CANCELLED | OUTPATIENT
Start: 2024-07-27 | End: 2024-07-27

## 2024-07-27 RX ORDER — ACETAMINOPHEN 325 MG/1
650 TABLET ORAL ONCE
Status: CANCELLED | OUTPATIENT
Start: 2024-07-27

## 2024-07-27 RX ORDER — ACETAMINOPHEN 325 MG/1
650 TABLET ORAL ONCE
Status: COMPLETED | OUTPATIENT
Start: 2024-07-27 | End: 2024-07-27

## 2024-07-27 RX ORDER — 0.9 % SODIUM CHLORIDE 0.9 %
VIAL (ML) INJECTION PRN
Status: CANCELLED | OUTPATIENT
Start: 2024-07-27

## 2024-07-27 RX ADMIN — DIPHENHYDRAMINE HYDROCHLORIDE 25 MG: 25 TABLET ORAL at 12:50

## 2024-07-27 RX ADMIN — ACETAMINOPHEN 650 MG: 325 TABLET ORAL at 12:50

## 2024-07-27 RX ADMIN — HYDROCORTISONE SODIUM SUCCINATE 100 MG: 100 INJECTION, POWDER, FOR SOLUTION INTRAMUSCULAR; INTRAVENOUS at 12:52

## 2024-07-27 ASSESSMENT — PAIN DESCRIPTION - PAIN TYPE: TYPE: CHRONIC PAIN

## 2024-07-27 ASSESSMENT — FIBROSIS 4 INDEX: FIB4 SCORE: 1.92

## 2024-07-29 ENCOUNTER — APPOINTMENT (OUTPATIENT)
Dept: RADIOLOGY | Facility: IMAGING CENTER | Age: 59
End: 2024-07-29
Attending: ORTHOPAEDIC SURGERY
Payer: COMMERCIAL

## 2024-07-29 ENCOUNTER — OFFICE VISIT (OUTPATIENT)
Dept: SURGICAL ONCOLOGY | Facility: MEDICAL CENTER | Age: 59
End: 2024-07-29
Payer: COMMERCIAL

## 2024-07-29 VITALS
SYSTOLIC BLOOD PRESSURE: 110 MMHG | HEART RATE: 94 BPM | WEIGHT: 149 LBS | HEIGHT: 66 IN | OXYGEN SATURATION: 94 % | DIASTOLIC BLOOD PRESSURE: 62 MMHG | TEMPERATURE: 96.8 F | BODY MASS INDEX: 23.95 KG/M2

## 2024-07-29 DIAGNOSIS — C79.51 METASTASIS TO BONE (HCC): ICD-10-CM

## 2024-07-29 DIAGNOSIS — M16.11 PRIMARY OSTEOARTHRITIS OF RIGHT HIP: ICD-10-CM

## 2024-07-29 DIAGNOSIS — C61 PROSTATE CANCER (HCC): ICD-10-CM

## 2024-07-29 PROCEDURE — 73502 X-RAY EXAM HIP UNI 2-3 VIEWS: CPT | Mod: TC,RT,59 | Performed by: ORTHOPAEDIC SURGERY

## 2024-07-29 PROCEDURE — 72170 X-RAY EXAM OF PELVIS: CPT | Mod: TC | Performed by: ORTHOPAEDIC SURGERY

## 2024-07-29 ASSESSMENT — FIBROSIS 4 INDEX: FIB4 SCORE: 2.04

## 2024-07-30 ENCOUNTER — OUTPATIENT INFUSION SERVICES (OUTPATIENT)
Dept: ONCOLOGY | Facility: MEDICAL CENTER | Age: 59
End: 2024-07-30
Attending: STUDENT IN AN ORGANIZED HEALTH CARE EDUCATION/TRAINING PROGRAM
Payer: COMMERCIAL

## 2024-07-30 VITALS
DIASTOLIC BLOOD PRESSURE: 83 MMHG | HEIGHT: 66 IN | BODY MASS INDEX: 23.99 KG/M2 | SYSTOLIC BLOOD PRESSURE: 134 MMHG | RESPIRATION RATE: 18 BRPM | WEIGHT: 149.25 LBS | OXYGEN SATURATION: 96 % | HEART RATE: 102 BPM | TEMPERATURE: 96.9 F

## 2024-07-30 DIAGNOSIS — D64.81 ANEMIA ASSOCIATED WITH CHEMOTHERAPY: ICD-10-CM

## 2024-07-30 DIAGNOSIS — C61 PROSTATE CANCER (HCC): ICD-10-CM

## 2024-07-30 DIAGNOSIS — T45.1X5A ANEMIA ASSOCIATED WITH CHEMOTHERAPY: ICD-10-CM

## 2024-07-30 LAB
BASOPHILS # BLD AUTO: 0.5 % (ref 0–1.8)
BASOPHILS # BLD: 0.02 K/UL (ref 0–0.12)
EOSINOPHIL # BLD AUTO: 0.06 K/UL (ref 0–0.51)
EOSINOPHIL NFR BLD: 1.6 % (ref 0–6.9)
ERYTHROCYTE [DISTWIDTH] IN BLOOD BY AUTOMATED COUNT: 65 FL (ref 35.9–50)
HCT VFR BLD AUTO: 31.4 % (ref 42–52)
HGB BLD-MCNC: 10.3 G/DL (ref 14–18)
IMM GRANULOCYTES # BLD AUTO: 0.03 K/UL (ref 0–0.11)
IMM GRANULOCYTES NFR BLD AUTO: 0.8 % (ref 0–0.9)
LYMPHOCYTES # BLD AUTO: 0.27 K/UL (ref 1–4.8)
LYMPHOCYTES NFR BLD: 7.2 % (ref 22–41)
MCH RBC QN AUTO: 29.7 PG (ref 27–33)
MCHC RBC AUTO-ENTMCNC: 32.8 G/DL (ref 32.3–36.5)
MCV RBC AUTO: 90.5 FL (ref 81.4–97.8)
MONOCYTES # BLD AUTO: 0.33 K/UL (ref 0–0.85)
MONOCYTES NFR BLD AUTO: 8.8 % (ref 0–13.4)
NEUTROPHILS # BLD AUTO: 3.03 K/UL (ref 1.82–7.42)
NEUTROPHILS NFR BLD: 81.1 % (ref 44–72)
NRBC # BLD AUTO: 0 K/UL
NRBC BLD-RTO: 0 /100 WBC (ref 0–0.2)
OUTPT INFUS CBC COMMENT OICOM: ABNORMAL
PLATELET # BLD AUTO: 132 K/UL (ref 164–446)
PMV BLD AUTO: 9.3 FL (ref 9–12.9)
RBC # BLD AUTO: 3.47 M/UL (ref 4.7–6.1)
WBC # BLD AUTO: 3.7 K/UL (ref 4.8–10.8)

## 2024-07-30 PROCEDURE — 36415 COLL VENOUS BLD VENIPUNCTURE: CPT

## 2024-07-30 PROCEDURE — 85025 COMPLETE CBC W/AUTO DIFF WBC: CPT

## 2024-07-30 RX ORDER — 0.9 % SODIUM CHLORIDE 0.9 %
10 VIAL (ML) INJECTION PRN
OUTPATIENT
Start: 2024-07-30

## 2024-07-30 RX ORDER — DIPHENHYDRAMINE HCL 25 MG
25 TABLET ORAL ONCE
OUTPATIENT
Start: 2024-07-30 | End: 2024-07-30

## 2024-07-30 RX ORDER — 0.9 % SODIUM CHLORIDE 0.9 %
VIAL (ML) INJECTION PRN
OUTPATIENT
Start: 2024-07-30

## 2024-07-30 RX ORDER — DIPHENHYDRAMINE HYDROCHLORIDE 50 MG/ML
25 INJECTION INTRAMUSCULAR; INTRAVENOUS PRN
OUTPATIENT
Start: 2024-07-30

## 2024-07-30 RX ORDER — 0.9 % SODIUM CHLORIDE 0.9 %
3 VIAL (ML) INJECTION PRN
OUTPATIENT
Start: 2024-07-30

## 2024-07-30 RX ORDER — ACETAMINOPHEN 325 MG/1
650 TABLET ORAL PRN
OUTPATIENT
Start: 2024-07-30

## 2024-07-30 RX ORDER — SODIUM CHLORIDE 9 MG/ML
INJECTION, SOLUTION INTRAVENOUS CONTINUOUS
OUTPATIENT
Start: 2024-07-30

## 2024-07-30 RX ORDER — ACETAMINOPHEN 325 MG/1
650 TABLET ORAL ONCE
OUTPATIENT
Start: 2024-07-30

## 2024-07-30 ASSESSMENT — FIBROSIS 4 INDEX: FIB4 SCORE: 2.04

## 2024-07-30 ASSESSMENT — ENCOUNTER SYMPTOMS
MYALGIAS: 0
SHORTNESS OF BREATH: 0
TINGLING: 0
SENSORY CHANGE: 0
CHILLS: 0
FEVER: 0
BACK PAIN: 1

## 2024-07-30 ASSESSMENT — PAIN DESCRIPTION - PAIN TYPE: TYPE: CHRONIC PAIN

## 2024-08-02 ENCOUNTER — APPOINTMENT (OUTPATIENT)
Dept: HEMATOLOGY ONCOLOGY | Facility: MEDICAL CENTER | Age: 59
End: 2024-08-02
Payer: COMMERCIAL

## 2024-08-02 VITALS
HEART RATE: 92 BPM | WEIGHT: 147.6 LBS | BODY MASS INDEX: 23.72 KG/M2 | OXYGEN SATURATION: 96 % | DIASTOLIC BLOOD PRESSURE: 54 MMHG | TEMPERATURE: 96.8 F | SYSTOLIC BLOOD PRESSURE: 106 MMHG | HEIGHT: 66 IN

## 2024-08-02 DIAGNOSIS — C61 PROSTATE CANCER (HCC): ICD-10-CM

## 2024-08-02 DIAGNOSIS — C79.51 MALIGNANT NEOPLASM METASTATIC TO BONE (HCC): ICD-10-CM

## 2024-08-02 PROCEDURE — 99214 OFFICE O/P EST MOD 30 MIN: CPT | Performed by: STUDENT IN AN ORGANIZED HEALTH CARE EDUCATION/TRAINING PROGRAM

## 2024-08-02 PROCEDURE — 99212 OFFICE O/P EST SF 10 MIN: CPT | Performed by: STUDENT IN AN ORGANIZED HEALTH CARE EDUCATION/TRAINING PROGRAM

## 2024-08-02 ASSESSMENT — PAIN SCALES - GENERAL: PAINLEVEL: 6=MODERATE PAIN

## 2024-08-02 ASSESSMENT — FIBROSIS 4 INDEX: FIB4 SCORE: 1.99

## 2024-08-03 RX ORDER — ERGOCALCIFEROL 1.25 MG/1
50000 CAPSULE, LIQUID FILLED ORAL
Status: DISCONTINUED | OUTPATIENT
Start: 2024-08-03 | End: 2024-08-15

## 2024-08-03 ASSESSMENT — ENCOUNTER SYMPTOMS
MYALGIAS: 1
DIZZINESS: 0
FEVER: 0
ORTHOPNEA: 0
BRUISES/BLEEDS EASILY: 0
WEAKNESS: 1
COUGH: 0
BLURRED VISION: 0
NAUSEA: 0
TINGLING: 0
CONSTIPATION: 0
NERVOUS/ANXIOUS: 0
ABDOMINAL PAIN: 0
HEARTBURN: 0
DIAPHORESIS: 0
DOUBLE VISION: 1
VOMITING: 0
SINUS PAIN: 0
WEIGHT LOSS: 0
DIARRHEA: 0
PALPITATIONS: 0
SHORTNESS OF BREATH: 0
BLOOD IN STOOL: 0
INSOMNIA: 0
SORE THROAT: 0
HEADACHES: 0
BACK PAIN: 1
CHILLS: 0
WHEEZING: 0
SPUTUM PRODUCTION: 0

## 2024-08-03 NOTE — PROGRESS NOTES
Chemotherapy Verification - PRIMARY RN      Height =  167 cm  Weight =  66.7 kg   BSA =  1.76 m^2      Medication: Tecentriq  Dose: 1,200 mg (set dose)     Calculated Dose: 1,200 mg (set dose)                            (In mg/m2, AUC, mg/kg)           I confirm this process was performed independently with the BSA and all final chemotherapy dosing calculations congruent.  Any discrepancies of 10% or greater have been addressed with the chemotherapy pharmacist. The resolution of the discrepancy has been documented in the EPIC progress notes.

## 2024-08-03 NOTE — PROGRESS NOTES
"Pharmacy Chemotherapy Calculation:    Dx: Prostate cancer         Protocol: Cabozantinib + Atezolizumab     *Dosing Reference*  Cabozantinib 40 mg PO daily  Atezolizumab 1200 mg IV on Day 1  21-day cycle  Travis SANCHEZ et al. CONTACT-02: Phase 3 study of cabozantinib (C) plus atezolizumab (A) vs second novel hormonal therapy (NHT) in patients (pts) with metastatic castration-resistant prostate cancer (mCRPC). J Clin Oncol. Volume 42, Number 4 suppl.  February 2024.  (https://ascopubs.org/doi/pdf/10.1200/[email or web address]_[email or web address])    Allergies:  Iodine     /79   Pulse 97   Temp 36.3 °C (97.4 °F) (Temporal)   Resp 18   Ht 1.67 m (5' 5.75\")   Wt 66.7 kg (147 lb 0.8 oz)   SpO2 95%   BMI 23.92 kg/m²  Body surface area is 1.76 meters squared.    Labs 8/3/24:  ANC~ 2980 Plt = 126k   Hgb = 9.8     SCr = 0.43 mg/dL CrCl ~ 108.6 mL/min (min SCr 0.7 used)  AST/ALT/AP = 86/44/394 (ok to proceed per SATNAM Allred) TBili = 0.5   TSH = 4.590   Free T4 = 1.10    Drug Order   (Drug name, dose, route, IV Fluid & volume, frequency, number of doses) Cycle 2  Previous treatment: C1 7/13/24; s/p Carbo + Jevtana (2/2024-5/2024)   Medication = Atezolizumab (Tecentriq)  Base Dose = 1200 mg fixed dose  Fixed dose: no calc required  Final Dose = 1200 mg  Route = IV  Fluid & Volume =  mL  Admin Duration = Over 60 minutes          No calculation required, okay to treat with final dose     By my signature below, I confirm this process was performed independently with the BSA and all final chemotherapy dosing calculations congruent. I have reviewed the above chemotherapy order and that my calculation of the final dose and BSA (when applicable) corroborate those calculations of the  pharmacist. Discrepancies of 10% or greater in the written dose have been addressed and documented within the [facility] Progress notes.    Kira Milli, AngelitoD  "

## 2024-08-03 NOTE — PROGRESS NOTES
Casper into [facility] for Day 1/ Cycle 2 of Tecentriq for Prostate cancer and possible blood products. Pt denied having any new or acute complaints today, reports tolerating past treatments well. POC reviewed.     PIV started in R FA x 1 attempt, brisk blood return noted, labs drawn off line, Casper tolerated well. IVF Tko. Lab results reviewed, Hgb not within parameters for blood products, but within parameters for treatment. Up updated on POC.     Alkaline phosphatase elevated, Lida HAY notified, okay to treat and to give 1L NS.     NS infused per MD order, Pt tolerated well.   Pt given Tecentriq as prescribed, tolerated well, denied having any complaints during or after infusion.     PIV discontinued, bleeding controlled with gauze and coban.Confirmed next appointment and Casper was discharged home with Basilia in no acute distress.

## 2024-08-03 NOTE — PROGRESS NOTES
Chemotherapy Verification - SECONDARY RN   C2 D1     Height = 167 cm  Weight = 66.7 kg  BSA = 1.76 m^2       Medication: atezolizumab (TECENTRIQ)  Dose: 1200 mg set dose  Calculated Dose: 1200 mg set dose                             (In mg/m2, AUC, mg/kg)       I confirm that this process was performed independently.

## 2024-08-03 NOTE — PROGRESS NOTES
Follow Up Note:  Hematology/Oncology      Primary Care:  KOKI Fagan    Diagnosis: Metastatic castrate-resistant prostate adenocarcinoma    Chief Complaint: On-treatment visit    Current Treatment: Cabozantinib and atezolizumab    Prior Treatment: Firmagon, enzalutamide, radium 223, docetaxel, leuprolide, resection of brain mets, palliative XRT; carboplatin and cabazitaxel    Oncology History of Presenting Illness:  Casper Rowley is a 58 y.o.  man who presents to the clinic for transfer of care for ongoing management of metastatic castrate resistant prostate adenocarcinoma.  He was originally diagnosed in 2021 and was metastatic at that time with bone metastases, and was treated with engine deprivation therapy as well as enzalutamide.  He got radium 223 and 2022 and was then treated with docetaxel for 11 cycles, as well as getting radiation therapy to thoracic spinal metastases as well as his adrenals.  His oncology history is summarized below:     Oncologic History:  6/21 Obstructive sxs with abnl DAYRON PSA 7  6/21 Presented with LBP worsening leg pain prompting spine MR, myeloma STRICKLAND negative, PSA 20   7/13/21 L2 Bone biopsy: prostate cancer  7/26/21 TRUSBP: [place] 5+5, 1/12 core 5%, 4+4=5/12 cores 80-95%  7/28/21 Firmagon 240mg  9/04/21 PSA: 1.4 Alk Phos 681  9/8/21 Denosumab started  9/27/21 Enzalutamide started  11/11/21 PSA 1.05 chapito  3/15/22 PSA 1.8  3-5/25/22 [place]-223 x 3, paused due to decreased counts  5/6/22 RT to L2  6/10/22 PSA 4.07  8/19/22 PSA 1.73  8/29/22 Started Docetaxel with Dr. Butts and discontinued enzalutamide.   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 Be.  10/20/23 PSA 0.76  12/2023  Receiving RT to his adrenals and lower thoracic spine.      He was started on carboplatin and cabazitaxel, and was evaluated for PSMA Pluvicto therapy, but his disease is not PSMA avid and therefore he has been on chemotherapy, which he has been doing okay with. However, he decided he wanted to switch physicians due to his oncologist frankly telling him about the aggressive nature of his disease and the poor prognosis associated with it. He was subsequently referred to me.     Treatment History: See HPI  02/23/24: C3 carbo/cabazitaxel (first two cycles given at [facility])  03/15/24: C4 carbo/cabazitaxel  04/05/24: C5 carbo/cabazitaxel   04/26/24: C6 carbo/cabazitaxel  05/17/24: C7 carbo/cabazitaxel  06/12/24: Scans show progression  07/13/24: C1 cabozantinib and atezolizumab (delayed due to getting insurance auth) (cabo 20 mg for tolerance)  08/03/24: C2 cabo/atezo    Interval History:  Patient is here for follow up visit. He is doing okay with therapy and tolerating it well. He has problems with his vision in the R eye and has been referred to neuro-ophthalmology. He struggles with his walking due to the vision issues and needs a walker for that reason, but otherwise is able to ambulate fine. His friend is with him today.     Allergies as of 08/02/2024 - Reviewed 08/02/2024   Allergen Reaction Noted    Iodine Unspecified 04/30/2024         Current Outpatient Medications:     morphine ER (MS CONTIN) 15 MG Tab CR tablet, Take 1 Tablet by mouth every 12 hours for 30 days., Disp: 60 Tablet, Rfl: 0    HYDROcodone-acetaminophen (NORCO) 5-325 MG Tab per tablet, Take 1 Tablet by mouth every four hours as needed (cancer related pain) for up to 30 days., Disp: 120 Tablet, Rfl: 0    Cabozantinib S-Malate 20 MG Tab, Take 20 mg by mouth every day. Start on same day as IV atezolizumab, Disp: 30 Tablet, Rfl: 0    ibuprofen (MOTRIN) 200 MG Tab, Take 600 mg by mouth every 8 hours as needed for Mild Pain., Disp: , Rfl:     alendronate  (FOSAMAX) 70 MG Tab, Take 70 mg by mouth every 7 days., Disp: , Rfl:     diphenoxylate-atropine (LOMOTIL) 2.5-0.025 MG Tab, Take 1 Tablet by mouth 4 times a day as needed for Diarrhea., Disp: , Rfl:     Semaglutide,0.25 or 0.5MG/DOS, (OZEMPIC, 0.25 OR 0.5 MG/DOSE,) 2 MG/3ML Solution Pen-injector, Inject 0.5 mg under the skin every 7 days., Disp: , Rfl:     Naloxone (NARCAN) 4 MG/0.1ML Liquid, Administer 4 mg into affected nostril(S) as needed (For severe sleepiness or difficulty breathing from possible overdose. Call 911 after administration.)., Disp: 1 Each, Rfl: 0    clobetasol (TEMOVATE) 0.05 % Cream, Apply to affected area BID for two weeks, Disp: 30 g, Rfl: 2    ondansetron (ZOFRAN ODT) 8 MG TABLET DISPERSIBLE, Take 1 Tablet by mouth every four hours as needed for Nausea., Disp: 180 Tablet, Rfl: 1    zolpidem (AMBIEN) 10 MG Tab, Take 10 mg by mouth at bedtime as needed for Sleep., Disp: , Rfl:     cyclobenzaprine (FLEXERIL) 10 mg Tab, Take 10 mg by mouth at bedtime., Disp: , Rfl:     lisinopril (PRINIVIL) 20 MG Tab, Take 1 tablet by mouth every day., Disp: 30 Tablet, Rfl: 2    metoprolol SR (TOPROL XL) 25 MG TABLET SR 24 HR, Take 2 tablets by mouth every day. (Patient taking differently: Take 25 mg by mouth every day.), Disp: 60 Tablet, Rfl: 2    Canagliflozin (INVOKANA) 100 MG Tab, Take 100 mg by mouth every day., Disp: , Rfl:       Review of Systems:  Review of Systems   Constitutional:  Positive for malaise/fatigue. Negative for chills, diaphoresis, fever and weight loss.   HENT:  Negative for hearing loss, nosebleeds, sinus pain and sore throat.    Eyes:  Positive for double vision. Negative for blurred vision.   Respiratory:  Negative for cough, sputum production, shortness of breath and wheezing.    Cardiovascular:  Negative for chest pain, palpitations, orthopnea and leg swelling.   Gastrointestinal:  Negative for abdominal pain, blood in stool, constipation, diarrhea, heartburn, melena, nausea and  "vomiting.   Genitourinary:  Negative for dysuria, frequency, hematuria and urgency.   Musculoskeletal:  Positive for back pain and myalgias. Negative for joint pain.   Skin:  Negative for rash.   Neurological:  Positive for weakness. Negative for dizziness, tingling and headaches.   Endo/Heme/Allergies:  Does not bruise/bleed easily.   Psychiatric/Behavioral:  The patient is not nervous/anxious and does not have insomnia.          Physical Exam:  Vitals:    08/02/24 1302   BP: 106/54   Pulse: 92   Temp: 36 °C (96.8 °F)   TempSrc: Temporal   SpO2: 96%   Weight: 67 kg (147 lb 9.6 oz)   Height: 1.67 m (5' 5.75\")         DESC; KARNOFSKY SCALE WITH ECOG EQUIVALENT: 70, Cares for self; unable to carry on normal activity or to do active work (ECOG equivalent 1)    DISTRESS LEVEL: no apparent distress    Physical Exam  Vitals and nursing note reviewed.   Constitutional:       General: He is awake. He is not in acute distress.     Appearance: Normal appearance. He is normal weight. He is not ill-appearing, toxic-appearing or diaphoretic.   HENT:      Head: Normocephalic and atraumatic.      Nose: Nose normal. No congestion.      Mouth/Throat:      Pharynx: Oropharynx is clear. No oropharyngeal exudate or posterior oropharyngeal erythema.   Eyes:      General: No scleral icterus.     Extraocular Movements: Extraocular movements intact.      Conjunctiva/sclera: Conjunctivae normal.      Pupils: Pupils are equal, round, and reactive to light.      Comments: Patient wearing an eye patch on the right eye to help his vision   Cardiovascular:      Rate and Rhythm: Regular rhythm. Tachycardia present.      Pulses: Normal pulses.      Heart sounds: Normal heart sounds. No murmur heard.     No friction rub. No gallop.   Pulmonary:      Effort: Pulmonary effort is normal.      Breath sounds: Normal breath sounds. No decreased air movement. No wheezing, rhonchi or rales.   Abdominal:      General: Bowel sounds are normal. There is no " distension.      Tenderness: There is no abdominal tenderness.   Musculoskeletal:         General: No deformity. Normal range of motion.      Cervical back: Normal range of motion and neck supple. No tenderness.      Right lower leg: No edema.      Left lower leg: No edema.   Lymphadenopathy:      Cervical: No cervical adenopathy.      Upper Body:      Right upper body: No axillary adenopathy.      Left upper body: No axillary adenopathy.      Lower Body: No right inguinal adenopathy. No left inguinal adenopathy.   Skin:     General: Skin is warm and dry.      Coloration: Skin is pale. Skin is not jaundiced.      Findings: No erythema or rash.   Neurological:      General: No focal deficit present.      Mental Status: He is alert and oriented to person, place, and time.      Sensory: Sensation is intact.      Motor: Weakness present.      Gait: Gait abnormal (Using a walker).   Psychiatric:         Attention and Perception: Attention normal.         Mood and Affect: Mood normal.         Behavior: Behavior normal. Behavior is cooperative.         Thought Content: Thought content normal.         Judgment: Judgment normal.           Labs:  [facility] on 07/30/2024   Component Date Value Ref Range Status    WBC 07/30/2024 3.7 (L)  4.8 - 10.8 K/uL Final    RBC 07/30/2024 3.47 (L)  4.70 - 6.10 M/uL Final    Hemoglobin 07/30/2024 10.3 (L)  14.0 - 18.0 g/dL Final    Results confirmed by repeat testing.    Hematocrit 07/30/2024 31.4 (L)  42.0 - 52.0 % Final    MCV 07/30/2024 90.5  81.4 - 97.8 fL Final    MCH 07/30/2024 29.7  27.0 - 33.0 pg Final    MCHC 07/30/2024 32.8  32.3 - 36.5 g/dL Final    RDW 07/30/2024 65.0 (H)  35.9 - 50.0 fL Final    Platelet Count 07/30/2024 132 (L)  164 - 446 K/uL Final    MPV 07/30/2024 9.3  9.0 - 12.9 fL Final    Neutrophils-Polys 07/30/2024 81.10 (H)  44.00 - 72.00 % Final    Lymphocytes 07/30/2024 7.20 (L)  22.00 - 41.00 % Final    Monocytes 07/30/2024 8.80  0.00 - 13.40 %  Final    Eosinophils 07/30/2024 1.60  0.00 - 6.90 % Final    Basophils 07/30/2024 0.50  0.00 - 1.80 % Final    Immature Granulocytes 07/30/2024 0.80  0.00 - 0.90 % Final    Nucleated RBC 07/30/2024 0.00  0.00 - 0.20 /100 WBC Final    Neutrophils (Absolute) 07/30/2024 3.03  1.82 - 7.42 K/uL Final    Includes immature neutrophils, if present.    Lymphs (Absolute) 07/30/2024 0.27 (L)  1.00 - 4.80 K/uL Final    Monos (Absolute) 07/30/2024 0.33  0.00 - 0.85 K/uL Final    Eos (Absolute) 07/30/2024 0.06  0.00 - 0.51 K/uL Final    Baso (Absolute) 07/30/2024 0.02  0.00 - 0.12 K/uL Final    Immature Granulocytes (abs) 07/30/2024 0.03  0.00 - 0.11 K/uL Final    NRBC (Absolute) 07/30/2024 0.00  K/uL Final    Outpt Infus CBC Comment 07/30/2024 see below   Final    Comment: Per physician request, the automated differential results reported on this  patient have not been verified by a manual method.     [facility] on 07/27/2024   Component Date Value Ref Range Status    WBC 07/27/2024 3.6 (L)  4.8 - 10.8 K/uL Final    RBC 07/27/2024 2.46 (L)  4.70 - 6.10 M/uL Final    Hemoglobin 07/27/2024 7.7 (L)  14.0 - 18.0 g/dL Final    Hematocrit 07/27/2024 23.4 (L)  42.0 - 52.0 % Final    MCV 07/27/2024 95.1  81.4 - 97.8 fL Final    MCH 07/27/2024 31.3  27.0 - 33.0 pg Final    MCHC 07/27/2024 32.9  32.3 - 36.5 g/dL Final    RDW 07/27/2024 68.8 (H)  35.9 - 50.0 fL Final    Platelet Count 07/27/2024 129 (L)  164 - 446 K/uL Final    MPV 07/27/2024 9.2  9.0 - 12.9 fL Final    Neutrophils-Polys 07/27/2024 83.00 (H)  44.00 - 72.00 % Final    Lymphocytes 07/27/2024 7.50 (L)  22.00 - 41.00 % Final    Monocytes 07/27/2024 6.60  0.00 - 13.40 % Final    Eosinophils 07/27/2024 1.70  0.00 - 6.90 % Final    Basophils 07/27/2024 0.60  0.00 - 1.80 % Final    Immature Granulocytes 07/27/2024 0.60  0.00 - 0.90 % Final    Nucleated RBC 07/27/2024 0.00  0.00 - 0.20 /100 WBC Final    Neutrophils (Absolute) 07/27/2024 3.00  1.82 - 7.42 K/uL  Final    Includes immature neutrophils, if present.    Lymphs (Absolute) 07/27/2024 0.27 (L)  1.00 - 4.80 K/uL Final    Monos (Absolute) 07/27/2024 0.24  0.00 - 0.85 K/uL Final    Eos (Absolute) 07/27/2024 0.06  0.00 - 0.51 K/uL Final    Baso (Absolute) 07/27/2024 0.02  0.00 - 0.12 K/uL Final    Immature Granulocytes (abs) 07/27/2024 0.02  0.00 - 0.11 K/uL Final    NRBC (Absolute) 07/27/2024 0.00  K/uL Final    Outpt Infus CBC Comment 07/27/2024 see below   Final    Comment: Per physician request, the automated differential results reported on this  patient have not been verified by a manual method.      ABO Grouping Only 07/27/2024 O   Final    Rh Grouping Only 07/27/2024 POS   Final    Antibody Screen-Cod 07/27/2024 NEG   Final    Component R 07/27/2024 Red Blood Cells     [id number]   transfused   07/27/24 13:15   Final    Product Type 07/27/2024 Red Blood Cells LR Pheresis   Final    Dispense Status 07/27/2024 transfused   Final    Unit Number (Barcoded) 07/27/2024 [id number]   Final    Product Code (Barcoded) 07/27/2024 [id number]   Final    Blood Type (Barcoded) 07/27/2024 5100   Final         Imaging:     All listed images below have been independently reviewed by me. I agree with the findings as summarized below:    CT c/a/p 06/11/24:    IMPRESSION:        1. Suspicious mediastinal adenopathy.  2. Stable diffuse osseous metastatic disease.  3. Distended gallbladder.  4. Bilateral fat-containing inguinal hernias.  5. Benign right renal cyst.    MRI brain 05/14/24:    IMPRESSION:     1.  There are postsurgical changes as evidenced by RIGHT suboccipital craniotomy and RIGHT inferior cerebellar encephalomalacia. Post gadolinium sequences demonstrates abnormal linear contrast enhancement along the RIGHT lateral cerebellar dura adjacent   to the surgical cavity. The maximum transverse dimension measures an approximately 5 mm. This finding is increased since the previous study and is unusual for postsurgical  inflammation. This is concerning for recurrent dural metastasis.  2.  There are a few nonspecific supratentorial T2 hyperintensities likely representing nonspecific foci of gliosis.  3.  Diffuse sclerotic osseous metastasis.    MRI pelvis 05/14/24:    IMPRESSION:        1. There is diffuse marrow replacement consistent with the patient's known osseous metastatic disease.  2. There is bone marrow edema within the right acetabulum and femoral head which may be secondary to osteoarthrosis.  3. There is no definite fracture or dislocation.  4. There is no evidence for a muscle or tendon injury.    MR-SHOULDER-WITH & W/O LEFT    Result Date: 4/4/2024  4/3/2024 5:50 PM HISTORY/REASON FOR EXAM:  Other; Image Left Shoulder Metastatic disease, shoulder pain, frozen shoulder, history of prior surgery. TECHNIQUE/EXAM DESCRIPTION: MRI of the LEFT shoulder without and with contrast. The study was performed on a [employer]a 1.5 Suha MRI scanner. Sagittal, axial, coronal T1 and T2 as well as postcontrast coronal and axial T1-weighted images were obtained. 15 mL ProHance contrast was administered intravenously. COMPARISON:  None. FINDINGS: There is diffuse marrow heterogeneity and signal abnormality consistent with the history of diffuse osseous metastatic disease. No fracture is evident. There is no os acromiale or acromioclavicular separation. There is mild acromioclavicular osteoarthrosis. Acromion is mildly laterally downward sloping. There is a suture anchor in the anterior aspect of the humeral head with susceptibility artifact. There is a full-thickness tear of the distal subscapularis tendon. There is associated fatty atrophy of the subscapularis muscle. The supraspinatus, infraspinatus, and teres minor tendons are intact. The long head of biceps tendon is not visualized proximally. This may be secondary to rupture, tenotomy, or tenodesis. There is intermediate T2-weighted signal within the superior labrum. This is  consistent with degeneration and possible degenerative tearing. There is no significant joint effusion or bursal fluid collection. There is lobulated signal abnormality surrounding the proximal humeral diametaphysis extending superiorly into the subacromial-subdeltoid bursa laterally. This demonstrates similar signal  intensity to skeletal muscle on the T1-weighted sequence with mild T2 hyperintensity and mild enhancement. This likely represents tumor extension.     1. There is extensive osseous heterogeneity consistent with metastatic disease. Soft tissue signal abnormality surrounding the proximal humerus likely represents soft tissue tumor extension. 2. Full-thickness tear of the subscapularis tendon and either rupture or postsurgical change of the long head of biceps tendon. 3. Degeneration and possible degenerative tearing of the glenoid labrum. 4. Acromioclavicular osteoarthrosis.    DX-LUMBAR SPINE-2 OR 3 VIEWS    Result Date: 4/3/2024  4/3/2024 11:19 AM HISTORY/REASON FOR EXAM:  Chronic atraumatic low back pain radiating into both hips. History of metastatic prostate cancer. TECHNIQUE/ EXAM DESCRIPTION AND NUMBER OF VIEWS:  3 views of the lumbar spine. COMPARISON: MRI lumbar spine 3/25/2024 FINDINGS: The alignment demonstrates a dextroconvex curvature. There is a trace of degenerative L4-5 anterolisthesis and L2-3 and L5-S1 retrolisthesis. There is posterior pedicle screw fixation from the L1-L3 levels with hardware grossly intact. Again there is superior endplate loss of height at the L3 vertebral body level. The L1 and L2 levels are somewhat obscured. There is diffuse sclerotic abnormalities throughout the spine and visualized pelvis consistent with multifocal prostate metastasis. There is degenerative disc space narrowing at all levels with bilateral arthropathy.     1.  There is a dextroconvex curvature of the spine with multilevel minimal degenerative anterolisthesis and retrolisthesis as noted above.  2.  There is multifocal sclerotic bone metastases. 3.  There is posterior pedicle screw fixation L1-L3 levels. 4.  Superior endplate loss of height at the L3 vertebral body level is similar to the recent MR with the upper levels not well visualized on the lateral view due to the scoliosis. 5.  Multilevel degenerative arthropathy and disc disease.    MR-THORACIC SPINE-WITH & W/O    Result Date: 3/25/2024  3/25/2024 12:55 PM HISTORY/REASON FOR EXAM:  Metastatic prostate cancer. Multiple osseous and epidural metastases. TECHNIQUE/EXAM DESCRIPTION: MRI of the thoracic spine with contrast. The study was performed on a [employer]j 1.5 Suha MRI scanner. T1 sagittal, T2 sagittal, and T2 axial images were obtained of the thoracic spine pre-contrast followed by T1 fat-suppressed sagittal and T1 axial images post intravenous administration of 15 mL  ProHance. COMPARISON:  9/20/2023 FINDINGS: There is again seen diffuse abnormal marrow signal involving the entire thoracic spine with diffuse patchy abnormal enhancement following contrast administration. There is scoliotic deformity. There is mild chronic superior endplate compression fracture at T12. There is again seen multilevel decreased disc height and multilevel endplate spurring. There is again seen abnormal epidural enhancement which extends from T7 through T10 with effacement of the thecal sac at those levels. This is more prominent on the left compared to the right. This extends into the T7, T8 and T9 neural foramina on the left greater than right. There is again seen attenuation of the spinal canal in the area. Again seen bilateral renal masses. There are multilevel disc bulges. No evidence of canal narrowing secondary to disc disease.     1.  Again seen diffuse osseous metastases. 2.  Epidural tumor extending from T7 through T9 asymmetric to the left of midline. This results in moderate central canal narrowing with extension into the left T7, T8 and T9 neural foramen. 3.   No new compression fractures. 4.  Again seen bilateral adrenal masses.    MR-LUMBAR SPINE-WITH & W/O    Result Date: 3/25/2024  3/25/2024 12:55 PM HISTORY/REASON FOR EXAM: Metastatic prostate cancer. Back pain. Prior back surgery. TECHNIQUE/EXAM DESCRIPTION: MRI of the lumbar spine with intravenous contrast. The study was performed on a Rj 1.5 Suha MRI scanner. T1 sagittal, T2 sagittal, and T2 axial images were obtained of the lumbar spine precontrast followed by T1 fat suppressed sagittal and T1 axial images post intravenous administration of 15 mL ProHance. COMPARISON: 9/20/2023 FINDINGS: There is again seen postsurgical change consistent with posterior pedicular screw and luis alberto fixation extending from L1 through L3. There is laminectomy change present. There is scoliotic deformity. There is degenerative retrolisthesis at T12-L1, L1-L2, L2-L3 and L5-S1. There is again seen postsurgical fluid collection at the surgical site, unchanged. There is again diffuse abnormal marrow signal involving all vertebral levels. There is posterior bony expansion of the L2 vertebral body, unchanged. There is again seen mild height loss at T12, L1, L2 and L3. There is multilevel loss of the normal disc height and bright T2 disc signal. There is again seen epidural enhancement posterior to the L2 vertebral body, unchanged. The conus is located normally at T12-L1. There is a simple appearing right renal cyst, unchanged no follow-up recommended. Findings at specific levels: T12-L1: There is annular disk bulge and bilateral facet degeneration without significant central canal or neural foraminal narrowing. L1-2: There is annular disk bulge and bilateral facet degeneration without significant central canal or neural foraminal narrowing. L2-3: There is annular disc bulge and bilateral facet degeneration. No central canal narrowing. Again seen epidural enhancement and bony expansion of the L2 vertebra posteriorly resulting in  attenuation of the ventral surface of the thecal sac. This also  extends asymmetrically to the right of midline into the paraspinous soft tissues and into the right pedicle. There is no central canal narrowing due to decompression of the central spinal canal. There is moderate right and mild left neural foraminal narrowing. L3-4: There is annular disc bulge and posterior osseous spurring with bilateral facet degeneration. No central canal narrowing. There is moderate left and mild right neural foraminal narrowing. L4-5: There is annular disc bulge and posterior osseous spurring as well as bilateral facet degeneration. No central canal narrowing. There is moderate right and mild left neural foraminal narrowing. L5-S1: There is annular disc bulge with a left paracentral disc protrusion and bilateral facet degeneration with posterior osseous spurring. No central canal narrowing. There is moderate bilateral, right greater than left neural foraminal narrowing.     1.  Again seen diffuse osseous metastases. There is again seen mild vertebral height loss at T12, L1, L2 and L3. 2.  Again seen posterior bony expansion of the L2 vertebral body with some epidural enhancement. There is asymmetric bony expansion into the right paraspinous soft tissues with extension into the right pedicle. There is no central canal narrowing. There is moderate right and mild left neural foraminal narrowing. 3.  Multilevel degenerative disc disease and facet degeneration which results in varying degrees of neural foraminal narrowing as specifically described above. 4.  Surgical change consistent with posterior decompression and pedicular screw and luis alberto fixation extending from L1 through L3.      Pathology:         PD-L1 IHC Analysis (Dako 22C3 pharmDx):   Tumor Proportion Score (TPS)(%) 0     Addendum Signed By:  Jay Ojeda D.O.   Addendum Date:  10/20/2023   Original Report Date:  09/25/2023     FINAL DIAGNOSIS:     A. Right cerebellar mass:           Metastatic prostatic adenocarcinoma.          Abundant tumor necrosis, including comedonecrosis noted.                                           Diagnosis performed by:                                       DAJA SALDANA DO     Assessment & Plan:  1. Malignant neoplasm metastatic to bone (HCC)        2. Prostate cancer (HCC)          This is a 58 year old  man with metastatic castrate-resistant prostate adenocarcinoma. He is s/p multiple lines of therapy including enzalutamide, Radium-223, docetaxel, and cabazitaxel with carboplatin. He is also s/p palliative XRT as well as metastasectomy for a cerebellar metastasis. His disease is not PSMA avid. He has now progressed on cabazitaxel and carboplatin. He has begun therapy with cabozantinib and atezolizumab.      Current Diagnosis and Staging: Metastatic prostate adenocarcinoma, castrate resistant, [place] 5+5=10    Update: Patient is doing well at this time on therapy. Continue C2 tomorrow and monitor accordingly. Will plan dose-escalation to 40 mg PO daily of cabozantinib with C3.      Treatment Plan: Cabozantinib with atezolizumab     Treatment Citation: CONTACT-02 trial, [facility]  2024     Plan of Care:     Primary Therapy: Cabo/atezo C2 tomorrow  Supportive Therapy: Antiemetics per protocol. Continue leuprolide, denosumab. Palliative XRT with Dr. Valadez. Transfusion support.   Toxicity: Patient is getting antineoplastic therapy and needs monitoring of blood counts, hepatic function, and renal function due to potential for organ dysfunction.   Labs: CBC with diff, CMP, TSH, Free T4, PSA monitoring  Imaging: Repeat CT scans 2 months after starting therapy (due 09/2024)  Treatment Planning: The patient has highly-aggressive prostate adenocarcinoma and I agree with the assessment that his prognosis is poor. We discussed working on addressing quality of life and goals of care. The patient wishes to continue with chemotherapy and therefore we will continue  with cabozantinib and atezolizumab, per the recently-released COMPACT-02 trial data, which showed PFS improvement (OS data is still pending) and was presented at [facility]  Consultations: Radiation oncology (Dr. Valadez); Medical oncology (Dr. Butts); Neurosurgery (Dr. Be); Interventional Radiology (Dr. Reardon, [facility]); Palliative care (Dr. Fair)  Code Status: Full  Miscellaneous: NA  Return for Follow Up: 3 weeks for C3    Any questions and concerns raised by the patient were answered to the best of my ability. Thank you for allowing me to participate in the care for this patient. Please feel free to contact me for any questions or concerns.       Total time spent on chart review, clinic encounter, and documentation: 26 minutes.

## 2024-08-03 NOTE — PROGRESS NOTES
"Pharmacy Chemotherapy Verification    Dx: Metastatic Prostate Cancer  Cycle 2  Previous treatment = C1 7/13/24; s/p 7 cycles of Cabazitaxel + carboplatin last 5/17/24  Protocol: Cabozantinib + Atezolizumab  Cabozantinib 40 mg PO daily  on Days 1-21  Atezolizumab 1200 mg IV on Day 1  21-day cycle until disease progression or unacceptable toxicity  Cindy SANCHEZ et al. CONTACT-2: Phase 3 study of cabozantinib plus atezolizumab vs second novel hormonal therapy in patients with metastatic castration-resistant prostate cancer,” (J Clin Oncol 42 no. 4, suppl 18), was published online January 29, 2024, with errors.    Allergies:  Patient has no known allergies.     /79   Pulse 97   Temp 36.3 °C (97.4 °F) (Temporal)   Resp 18   Ht 1.67 m (5' 5.75\")   Wt 66.7 kg (147 lb 0.8 oz)   SpO2 95%   BMI 23.92 kg/m²  Body surface area is 1.76 meters squared.    Labs 8/3/24  ANC 2980 Hgb 9.8 Plt 126k  SCr 0.43 CrCl 108.7 mL/min   AST/ALT/AP = 86/44/394 Tbili 0.5  TSH/Free T4 in process    **Provider aware of labs, OK to proceed with treatment as outlined below.**    Atezolizumab 1200 mg fixed dose    <10% difference, okay to treat with final dose = 1200 mg IV    Josiane Silvestre, PharmD, BCOP    "

## 2024-08-06 NOTE — PROGRESS NOTES
Pt arrived ambulatory to [facility] for cbc/possible blood products. Pt denied fever, signs or symptoms of bleeding or acute illness today. POC discussed and pt verbalized understanding.     PIV established labs drawn at this time; pt tolerated well. Hgb 9.4 and platelet 117. Per provider treatment plan, pt does not meet parameters for blood products today. PIV flushed, removed with tip intact and site covered with sterile gauze/coban.    Follow-up care discussed and next appointment confirmed with pt. Pt discharged home to self care in no apparent distress at this time.

## 2024-08-10 NOTE — PROGRESS NOTES
Casper arrives ambulatory with a 4ww and s/o to [facility] for possible blood products. PIV established in left AC and labs drawn. Hgb 9.0 so he does not meet parameters for a transfusion today. PIV removed with tip intact and site covered with gauze and coban. Next appointment discussed. Pt left on foot in NAD.

## 2024-08-14 NOTE — PROGRESS NOTES
Mr Rowley is here today for CBC possible blood products.     IV placed to his left AC, labs drawn.     HGB today is 9.9. Mr Rowley doesn't meet parameters for transfusion today.    IV removed, gauze and coban applied to the site.     Mr Rowley was discharged in stable condition.   Confirmed future appointments.

## 2024-08-15 NOTE — PROGRESS NOTES
"    Follow Up      Chief Complaint:  Follow Up     Last Seen: 7/1/24    History of Present Illness:   Casper Rowley is a 58 y.o. 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  At the prior visit patient was concerned about his double vision that have developed in the last couple weeks.  Patient was referred to ophthalmology who has encouraged use of the eye patch and his ordered \"prism glasses\" for the patient he was also referred to neuro-ophthalmology to further manage this condition to correct this.  Does not note any marked improvement in this but is hopeful about the glasses and neurophthalmology.     Patient notes no new acute complaints at this time. Patient states he is doing well on chemotherapeutic treatment at this time is tolerating biweekly infusions well.  States he has not needed any blood transfusions in the last few weeks.  He did not receive any particular treatment for that fluid collection that was found behind his mastoid on the MRI.  Previously he was being treated for a possible sinus infection leading to these symptoms but given that it has not improved patient thinks its likely not related to said infection.    In regards to his chronic tachycardia, I discussed if patient was feeling lightheadedness palpitations or chest pain like symptoms.  He denies shortness of breath or weakness. He notes his heart rate has always been a little bit fast.  He    Discussed with patient if he checks his blood pressures at home states he there  only occasionally checks his blood pressures and when he does fairly \"low\".  He does state however that he is taking his metoprolol only once a day as opposed to the twice a day that is prescribed.  Has not had " any falls due to low blood pressure or any lightheadedness as previously noted.      Patient has not been consistently taking his blood sugars at home.  However his last A1c about 2 months ago was 6.3.  Patient has also lost a significant amount of weight as of late and is consuming is currently only on Invokana since Ozempic was not approved by his insurance in the past. Patient does not note any urinary changes, excessive thirst, vision changes, xerostomia, or any other new symptoms.     Review of Systems:  Review of Systems   Constitutional:  Negative for chills, fever, malaise/fatigue and weight loss.   HENT:  Negative for congestion, ear discharge, ear pain, hearing loss and tinnitus.    Respiratory:  Negative for cough, hemoptysis, sputum production, shortness of breath and wheezing.    Cardiovascular:  Negative for chest pain, palpitations, orthopnea, claudication and leg swelling.   Gastrointestinal:  Positive for nausea. Negative for constipation, diarrhea, heartburn and vomiting.   Genitourinary:  Negative for dysuria, flank pain, frequency, hematuria and urgency.   Musculoskeletal:  Negative for back pain, myalgias and neck pain.   Neurological:  Negative for dizziness, weakness and headaches.        Past Medical History:   Past Medical History:   Diagnosis Date    Arthritis 02/2019    osteo-hollie knees    Bronchitis 2019    Cancer (HCC)     Basal cell - top of head    Cancer (HCC)     Prostate    Cold 02/08/2019    Cold two weeks ago, denies productive cough, SOB    Diabetes     type 2    High cholesterol     HTN (hypertension), benign 08/14/2009    Hypertension     Infectious disease     Mumps age 6 yrs and Chickenpox age 41 yrs    Obstructive sleep apnea 02/2019    Uses cpap    Prostate cancer (HCC)        Patient Active Problem List    Diagnosis Date Noted    Anemia associated with bone marrow infiltration (HCC) 07/01/2024    Acute injury of anterior cruciate ligament 04/30/2024    Eczema 04/15/2024     Osteoarthritis of right hip 03/10/2024    Cancer related pain 02/19/2024    Small cell carcinoma of prostate (HCC) 01/28/2024    Adhesive capsulitis of shoulder 01/24/2024    Anemia associated with chemotherapy 01/02/2024    Cerebellar mass 09/29/2023    Metastasis to brain (HCC) 09/20/2023    Prostate cancer (HCC) 09/20/2023    Hypogonadism male 09/10/2021    Malignant neoplasm metastatic to bone (HCC) 09/07/2021    Traumatic rotator cuff tear 09/02/2021    Calcific tendinitis of shoulder 04/27/2020    Prepatellar bursitis of left knee 09/15/2019    HTN (hypertension) 01/14/2015    Obesity 01/14/2015    BMI 35.0-35.9,adult 10/28/2014    NED (obstructive sleep apnea) 10/23/2012    Dyslipidemia 01/23/2012    Allergic rhinitis 09/29/2009    HTN (hypertension), benign 08/14/2009    DM (diabetes mellitus) (HCC) 05/27/2009       Past Surgical History:   Past Surgical History:   Procedure Laterality Date    OSTECTOMY  06/12/2024    Procedure: RADIOFREQUENCY ABLATION OF RIGHT HEMIPELVIS METASTATIC LESION WITH CEMENT AUGMENTATION;  Surgeon: Jayshree Perkins D.O.;  Location: SURGERY [facility];  Service: Orthopedics    CRANIOTOMY [facility] Right 09/21/2023    Procedure: CRANIOTOMY, USING FRAMELESS STEREOTAXY - RIGHT RETROSIGMOID FOR RESECTION OF CEREBELLAR MASS, USE OF INTRAOPERATIVE NEURONAVIGATION, EXTERNAL VENTRICULAR DRAIN;  Surgeon: Archana Be M.D.;  Location: [facility];  Service: Neurosurgery    TN SHLDR ARTHROSCOP EXTEN DEBRIDE 3+ Left 11/01/2021    Procedure: ARTHROSCOPY,SHOULDER,WITH EXTENSIVE DEBRIDEMENT;  Surgeon: Piter Otero M.D.;  Location: SURGERY [facility];  Service: Orthopedics    PB ARTHROSCOPY SHOULDER SURGICAL BICEPS TENODES* Left 11/01/2021    Procedure: ARTHROSCOPY, SHOULDER, WITH BICEPS TENOTOMY - WITH SUB SCAPULARIS REPAIR;  Surgeon: Piter Otero M.D.;  Location: SURGERY [facility];  Service: Orthopedics    CARPAL TUNNEL RELEASE Left 02/11/2019    Procedure: CARPAL TUNNEL  RELEASE;  Surgeon: Piter Otero M.D.;  Location: SURGERY [facility];  Service: Orthopedics    KNEE ARTHROSCOPY Right 05/2014    ACL    LUMBAR LAMINECTOMY DISKECTOMY Bilateral     L1, L2, L3    OTHER  rt knee, left shoulder, left wrist, lumbar    OTHER NEUROLOGICAL SURG  Sept 21 2023        Allergies:  Iodine    Medications:     Current Outpatient Medications:     Cabozantinib S-Malate, 40 mg, Oral, DAILY, Taking    morphine ER, 15 mg, Oral, Q12HRS, PRN    HYDROcodone-acetaminophen, 1 Tablet, Oral, Q4HRS PRN, PRN    Cabometyx, 20 mg, Oral, DAILY, PRN    ibuprofen, 600 mg, Oral, Q8HRS PRN, PRN    alendronate, 70 mg, Oral, Q7 DAYS, Taking    diphenoxylate-atropine, 1 Tablet, Oral, 4X/DAY PRN, PRN    Naloxone, 1 Spray, Nasal, PRN, PRN    clobetasol, Apply to affected area BID for two weeks, PRN    ondansetron, 8 mg, Oral, Q4HRS PRN, PRN    zolpidem, 10 mg, Oral, HS PRN, PRN    cyclobenzaprine, 10 mg, Oral, QHS, Taking    lisinopril, 20 mg, Oral, DAILY, Taking    metoprolol SR, 50 mg, Oral, DAILY (Patient taking differently: 25 mg, Oral, DAILY), Taking    Invokana, 100 mg, Oral, DAILY, Taking    Ozempic (0.25 or 0.5 MG/DOSE), 0.5 mg, Subcutaneous, Q7 DAYS (Patient not taking: Reported on 8/15/2024), Not Taking  No current facility-administered medications for this visit.    Facility-Administered Medications Ordered in Other Visits:     vitamin D2 (Ergocalciferol)     Social History:   Social History     Tobacco Use    Smoking status: Never    Smokeless tobacco: Never   Vaping Use    Vaping status: Never Used   Substance Use Topics    Alcohol use: Not Currently     Comment: 2 per week    Drug use: Yes     Types: Oral, Marijuana     Comment: Marijuana-very occasional edible usage       Family History:   Family History   Problem Relation Age of Onset    Cancer Father         Bladder cancer    Cancer Maternal Uncle     Genetic Disorder Neg Hx        Objective:  Vitals:   BP 98/63 (BP Location: Left arm, Patient  "Position: Sitting, BP Cuff Size: Small adult)   Pulse (!) 133   Temp 36.3 °C (97.4 °F) (Temporal)   Ht 1.702 m (5' 7\")   Wt 70.5 kg (155 lb 6.4 oz)   SpO2 96%  Body mass index is 24.34 kg/m².    Physical Exam:   Physical Exam  Constitutional:       General: He is not in acute distress.     Appearance: Normal appearance. He is underweight. He is not ill-appearing.   HENT:      Head: Normocephalic and atraumatic.   Eyes:      Extraocular Movements: Extraocular movements intact.      Conjunctiva/sclera: Conjunctivae normal.      Pupils: Pupils are equal, round, and reactive to light.   Cardiovascular:      Rate and Rhythm: Normal rate and regular rhythm.      Pulses: Normal pulses.      Heart sounds: Normal heart sounds. No murmur heard.  Pulmonary:      Effort: Pulmonary effort is normal. No respiratory distress.      Breath sounds: Normal breath sounds. No wheezing.   Abdominal:      General: Abdomen is flat. Bowel sounds are normal.      Palpations: Abdomen is soft.   Skin:     General: Skin is warm and dry.      Coloration: Skin is not jaundiced.      Findings: No bruising.   Neurological:      General: No focal deficit present.      Mental Status: He is alert and oriented to person, place, and time. Mental status is at baseline.          Results:  Labs and imaging relevant to this visit were reviewed.     Assessment and Plan:    58 y.o. male with:     #Hypertension   Blood pressures have been low, systolics between 110-90s at last few visits, patients states he does not check BP at home often, but that they are  \"low\"  does not check blood pressure at home often however, but previous office visits have been within target.   -Decrease Lisinopril to 10 mg qd   -Continue Metoprolol 25 mg bid with instructions to hold if blood pressures low     #Type 2 Diabetes without complication  Last A1c was 6.3. Patient has been continuing to lose weight since then, the possibility the patient will not need diabetic " medications in the future.   -f/u Hgb A1c   -Discuss at future visits necessity of continuing diabetes medications     #Proteinuria   Unclear etiology of proteinuria seen on the lab work approximately a year ago.  Will follow-up on these labs to make sure patient does not have any continued proteinuria and/or abnormalities on UA   -UA   -microalbumin creatinine ratio   -protein creatinine ratio     #Vitamin D Deficiency   Patient had a low vitamin D.   -Vitamin D 51253 units q7d for 6 weeks   -Vit D labs post supplementation     #Small Cell Prostate Carcinoma   Patient currently following with Dr. Baires and is on cabozantinib currently.   -continue following with medical and radiation oncology for continued cancer treatment.    No follow-ups on file.    Joel Godfrey MD  Internal Medicine PGY-1  [facility] [employer] [facility]

## 2024-08-15 NOTE — PATIENT INSTRUCTIONS
-Start Vitamin D 49247 units once weekly for 6 weeks, and then follow up labs   -Get labs for A1c and Lipid Panel   -Change Lisinopril dose to 10 mg  -We can discuss stopping Invokana next visit

## 2024-08-17 NOTE — PROGRESS NOTES
Casper came into infusion services today for possible blood products. No complaints. Labs drawn from right wrist, covered with gauze and coban.     Hgb 8.4, Hct 24.9, Plt 119.     Pt does not meet parameters for blood products. Pt has future appointments. Discharged to self care.

## 2024-08-20 NOTE — PROGRESS NOTES
Casper arrives to [facility] for CBC/possible blood products. Patient c/o fatigue, dyspnea upon exertion, and a congested (without mucus) cough (x2 days). Denies CP, palpitations. Denies s/s fever, chills, active infections. Reported tachycardia to Dr. Baires, who has no new changes to plan of care at this time.   24g PIV placed to RAC, which flushes easily and has brisk blood return. Labs drawn as ordered. Hgb 8.7; platelet count 129k. Patient does NOT meet established parameters to receive blood products today. Pre-chemo labs also drawn from PIV. PIV flushed and removed with tip intact. Patient returns Saturday for treatment. Discharged home to self care in no apparent distress.

## 2024-08-23 NOTE — TELEPHONE ENCOUNTER
Pt called transferred to me for symptom management.  Pt reports SOB not being able to travel very far.  Pt also c/o fatigue.  Pt called to apologize for missing his pre-chemo appt, but does feel he can make it to his treatment in Infusion.  When asked about cough the Pt stated that the cough is much better now.     Spoke with Lida HAY and we reported to the Pt that he needs to be seen at Urgent Care to be sure he does not have any of the respiratory viruses that are going around.  Pt agreed to the POC.

## 2024-08-25 PROBLEM — J90 PLEURAL EFFUSION: Status: ACTIVE | Noted: 2024-01-01

## 2024-08-25 PROBLEM — R06.02 SHORTNESS OF BREATH: Status: ACTIVE | Noted: 2024-01-01

## 2024-08-25 PROBLEM — R00.0 TACHYCARDIA: Status: ACTIVE | Noted: 2024-01-01

## 2024-08-25 PROBLEM — E87.1 HYPONATREMIA: Status: ACTIVE | Noted: 2024-01-01

## 2024-08-25 PROBLEM — R74.01 TRANSAMINITIS: Status: ACTIVE | Noted: 2024-01-01

## 2024-08-25 PROBLEM — Z71.89 ADVANCED CARE PLANNING/COUNSELING DISCUSSION: Status: ACTIVE | Noted: 2024-01-01

## 2024-08-25 PROBLEM — E87.29 HIGH ANION GAP METABOLIC ACIDOSIS: Status: ACTIVE | Noted: 2024-01-01

## 2024-08-25 PROBLEM — R79.89 ELEVATED TROPONIN: Status: ACTIVE | Noted: 2024-01-01

## 2024-08-25 PROBLEM — E87.20 LACTIC ACIDOSIS: Status: ACTIVE | Noted: 2024-01-01

## 2024-08-25 PROBLEM — E87.5 HYPERKALEMIA: Status: ACTIVE | Noted: 2024-01-01

## 2024-08-25 PROBLEM — R11.2 NAUSEA & VOMITING: Status: ACTIVE | Noted: 2024-01-01

## 2024-08-25 NOTE — CARE PLAN
The patient is Watcher - Medium risk of patient condition declining or worsening         Progress made toward(s) clinical / shift goals:    Problem: Knowledge Deficit - Standard  Goal: Patient and family/care givers will demonstrate understanding of plan of care, disease process/condition, diagnostic tests and medications  Outcome: Progressing     Problem: Pain - Standard  Goal: Alleviation of pain or a reduction in pain to the patient’s comfort goal  Outcome: Progressing     Problem: Skin Integrity  Goal: Skin integrity is maintained or improved  Outcome: Progressing

## 2024-08-25 NOTE — H&P
Hospital Medicine History & Physical Note    Date of Service  8/25/2024    Primary Care Physician  Joel Godfrey M.D.    Consultants  critical care    Specialist Names: Carlo    Code Status  DNAR/DNI    Chief Complaint  Chief Complaint   Patient presents with    Shortness of Breath    Weakness     BIBA from home for above complaints started 3 days ago.       History of Presenting Illness  Casper Rowley is a 58 y.o. male who presented 8/24/2024 with worsening shortness of breath.  Patient has a known past medical history of metastatic prostate cancer, currently on immunotherapy.  He comes the emergency department today complaining of worsening extreme fatigue and generalized weakness.  Patient dates that he is having worsening shortness of breath as well.  He otherwise denies any fevers, chills.  He has been experiencing nausea and vomiting over the last 5 days, and has had poor p.o. intake.  In the emergency department, patient was found to be tachycardic to the 120s.  Sodium 122, potassium 5.8, chloride of 80, bicarb of 30, AST  and 212 respectively with an alk phos of 444.  A lactic acid level of 12.8.  CT imaging was obtained and was negative for pulmonary embolism.  There is worsening metastatic disease with worsening mediastinal and hilar adenopathy and hepatic metastases.  There is moderate bilateral pleural effusions.  Multiple tiny and small pulmonary nodules that could be infectious versus inflammatory or metastatic.  Patient will be admitted to [facility] for higher level of care.    I discussed the plan of care with patient and bedside RN.    Review of Systems  Review of Systems   Constitutional:  Positive for malaise/fatigue and weight loss. Negative for chills and fever.   HENT:  Negative for congestion, ear discharge, ear pain, nosebleeds, sinus pain and tinnitus.    Respiratory:  Positive for shortness of breath. Negative for cough, hemoptysis, sputum production and stridor.    Cardiovascular:   Negative for chest pain, palpitations and orthopnea.   Gastrointestinal:  Positive for nausea and vomiting. Negative for abdominal pain, blood in stool, constipation and diarrhea.   Genitourinary:  Negative for frequency, hematuria and urgency.   Musculoskeletal:  Negative for back pain and neck pain.   Neurological:  Negative for dizziness, tingling and headaches.       Past Medical History   has a past medical history of Arthritis (02/2019), Bronchitis (2019), Cancer (HCC), Cancer ([facility]), Cold (02/08/2019), Diabetes, High cholesterol, HTN (hypertension), benign (08/14/2009), Hypertension, Infectious disease, Obstructive sleep apnea (02/2019), and Prostate cancer ([facility]).    Surgical History   has a past surgical history that includes knee arthroscopy (Right, 05/2014); carpal tunnel release (Left, 02/11/2019); pr shldr arthroscop exten debride 3+ (Left, 11/01/2021); pr arthroscopy shoulder surgical biceps tenodes* (Left, 11/01/2021); craniotomy stealth (Right, 09/21/2023); other neurological surg (Sept 21 2023); other (rt knee, left shoulder, left wrist, lumbar); ostectomy (06/12/2024); and lumbar laminectomy diskectomy (Bilateral).     Family History  family history includes Cancer in his father and maternal uncle.   Family history reviewed with patient. There is no family history that is pertinent to the chief complaint.     Social History   reports that he has never smoked. He has never used smokeless tobacco. He reports that he does not currently use alcohol. He reports current drug use. Drugs: Oral and Marijuana.    Allergies  Allergies   Allergen Reactions    Iodine Unspecified     Topical caused rash       Medications  Prior to Admission Medications   Prescriptions Last Dose Informant Patient Reported? Taking?   Cabozantinib S-Malate (CABOMETYX) 20 MG Tab   No No   Sig: Take 20 mg by mouth every day. Start on same day as IV atezolizumab   Cabozantinib S-Malate 40 MG Tab   No No   Sig: Take 1 tablet (40 mg) by  mouth every day.   Canagliflozin (INVOKANA) 100 MG Tab  Patient Yes No   Sig: Take 100 mg by mouth every day.   HYDROcodone-acetaminophen (NORCO) 5-325 MG Tab per tablet   No No   Sig: Take 1 Tablet by mouth every four hours as needed (cancer related pain) for up to 30 days.   Naloxone (NARCAN) 4 MG/0.1ML Liquid  Patient No No   Sig: Administer 4 mg into affected nostril(S) as needed (For severe sleepiness or difficulty breathing from possible overdose. Call 911 after administration.).   Semaglutide,0.25 or 0.5MG/DOS, (OZEMPIC, 0.25 OR 0.5 MG/DOSE,) 2 MG/3ML Solution Pen-injector  Patient Yes No   Sig: Inject 0.5 mg under the skin every 7 days.   Patient not taking: Reported on 8/15/2024   alendronate (FOSAMAX) 70 MG Tab  Patient Yes No   Sig: Take 70 mg by mouth every 7 days.   clobetasol (TEMOVATE) 0.05 % Cream  Patient No No   Sig: Apply to affected area BID for two weeks   cyclobenzaprine (FLEXERIL) 10 mg Tab  Patient Yes No   Sig: Take 10 mg by mouth at bedtime.   diphenoxylate-atropine (LOMOTIL) 2.5-0.025 MG Tab  Patient Yes No   Sig: Take 1 Tablet by mouth 4 times a day as needed for Diarrhea.   ergocalciferol (DRISDOL) 71418 UNIT capsule   No No   Sig: Take 1 Capsule by mouth every 7 days for 6 doses.   ibuprofen (MOTRIN) 200 MG Tab  Patient Yes No   Sig: Take 600 mg by mouth every 8 hours as needed for Mild Pain.   lisinopril (PRINIVIL) 10 MG Tab   No No   Sig: Take 1 Tablet by mouth every day.   metoprolol SR (TOPROL XL) 25 MG TABLET SR 24 HR  Patient No No   Sig: Take 2 tablets by mouth every day.   Patient taking differently: Take 25 mg by mouth every day.   morphine ER (MS CONTIN) 15 MG Tab CR tablet   No No   Sig: Take 1 Tablet by mouth every 12 hours for 30 days.   ondansetron (ZOFRAN ODT) 8 MG TABLET DISPERSIBLE  Patient No No   Sig: Take 1 Tablet by mouth every four hours as needed for Nausea.   zolpidem (AMBIEN) 10 MG Tab  Patient Yes No   Sig: Take 10 mg by mouth at bedtime as needed for Sleep.       Facility-Administered Medications: None       Physical Exam  Temp:  [36.3 °C (97.3 °F)] 36.3 °C (97.3 °F)  Pulse:  [119-134] 123  Resp:  [20-21] 21  BP: (115-136)/(76-91) 130/78  SpO2:  [96 %-99 %] 96 %  Blood Pressure: 130/78   Temperature: 36.3 °C (97.3 °F)   Pulse: (!) 123   Respiration: (!) 21   Pulse Oximetry: 96 %       Physical Exam  Constitutional:       General: He is not in acute distress.     Appearance: Normal appearance. He is normal weight. He is not ill-appearing, toxic-appearing or diaphoretic.   HENT:      Head: Normocephalic and atraumatic.      Mouth/Throat:      Mouth: Mucous membranes are moist.   Eyes:      Pupils: Pupils are equal, round, and reactive to light.   Cardiovascular:      Rate and Rhythm: Normal rate and regular rhythm.      Pulses: Normal pulses.      Heart sounds: Normal heart sounds. No murmur heard.     No friction rub. No gallop.   Pulmonary:      Effort: Pulmonary effort is normal. No respiratory distress.      Breath sounds: Normal breath sounds. No stridor. No wheezing, rhonchi or rales.   Chest:      Chest wall: No tenderness.   Abdominal:      General: There is no distension.      Palpations: There is no mass.      Tenderness: There is no abdominal tenderness. There is no guarding or rebound.      Hernia: No hernia is present.   Musculoskeletal:         General: No swelling, tenderness, deformity or signs of injury.      Right lower leg: No edema.      Left lower leg: No edema.   Skin:     General: Skin is warm and dry.      Capillary Refill: Capillary refill takes less than 2 seconds.      Coloration: Skin is pale. Skin is not jaundiced.      Findings: No bruising, erythema, lesion or rash.   Neurological:      General: No focal deficit present.      Mental Status: He is alert and oriented to person, place, and time. Mental status is at baseline.      Cranial Nerves: No cranial nerve deficit.      Sensory: No sensory deficit.      Motor: No weakness.      Coordination:  Coordination normal.   Psychiatric:         Mood and Affect: Mood normal.         Laboratory:  Recent Labs     08/24/24  2320   WBC 8.2   RBC 2.97*   HEMOGLOBIN 9.2*   HEMATOCRIT 28.3*   MCV 95.3   MCH 31.0   MCHC 32.5   RDW 82.6*   PLATELETCT 105*   MPV 9.6     Recent Labs     08/24/24 2320   SODIUM 122*   POTASSIUM 5.8*   CHLORIDE 80*   CO2 12*   GLUCOSE 148*   BUN 25*   CREATININE 0.79   CALCIUM 8.9     Recent Labs     08/24/24  2320   ALTSGPT 213*   ASTSGOT 486*   ALKPHOSPHAT 444*   TBILIRUBIN 3.0*   GLUCOSE 148*         Recent Labs     08/24/24  2320   NTPROBNP 1822*         Recent Labs     08/24/24  2320   TROPONINT 34*       Imaging:  CT-CTA CHEST PULMONARY ARTERY W/ RECONS   Final Result         1.  No evidence of pulmonary embolism.   2.  Worsening metastatic disease with worsening mediastinal and hilar adenopathy and hepatic metastases.   3.  Diffuse sclerotic osseous metastatic disease.   4.  Moderate bilateral pleural effusions.   5.  Multiple tiny/small pulmonary nodules could be infectious/inflammatory or metastatic. Attention on follow-up.      Fleischner Society pulmonary nodule recommendations:   Not applicable in patient with known history of malignancy      DX-CHEST-PORTABLE (1 VIEW)   Final Result      Right basilar opacity, atelectasis and/or pneumonitis.      Sclerotic osseous metastases again noted.          X-Ray:  I have personally reviewed the images and compared with prior images.  EKG:  I have personally reviewed the images and compared with prior images.    Assessment/Plan:  Justification for Admission Status  I anticipate this patient will require at least two midnights for appropriate medical management, necessitating inpatient admission because shortness of breath, possible pneumonia, severe lactic acidosis with electrolyte derangements    Patient will need a Intermediate Care (Adult and Pediatrics) bed on MEDICAL service .  The need is secondary to electrolyte derangements,  tachycardia, shortness of breath.    * Shortness of breath- (present on admission)  Assessment & Plan  Patient presents to the emergency department complaining of several year history of worsening shortness of breath.  Currently saturating appropriately on room air.  Physical exam unremarkable.  CT imaging was obtained, there is no evidence of a pulmonary embolism.  There is worsening metastatic disease and worsening mediastinal and hilar adenopathy and hepatic metastases.  There are some moderate bilateral pleural effusions.  There is multiple tiny/small pulmonary nodules that could be infectious versus inflammatory or metastatic.    Given the above findings, will empirically start patient on IV antibiotics.  Start patient on IV Unasyn and azithromycin  Follow-up sputum cultures  De-escalate antibiotics as appropriate    Advanced care planning/counseling discussion  Assessment & Plan  I spent 19 minutes at bedside with patient discussing work-up, results, diagnosis, prognosis.  Patient is a known history of metastatic brain cancer with metastatic disease to the brain, liver, lungs.  He is currently on immunotherapy.  He now presents to the emergency department with major electrolyte derangements and severe lactic acidosis.  At this time, patient would like to be DNR/DNI    Nausea & vomiting  Assessment & Plan  IV and p.o. antiemetics  Continue with IV fluids    Elevated troponin  Assessment & Plan  Likely secondary to demand ischemia  Follow repeat troponin level    High anion gap metabolic acidosis  Assessment & Plan  Secondary to lactic acidosis  Continue IV fluids  Follow-up with daily labs      Lactic acidosis  Assessment & Plan  Patient presents with lactic acid level of 12.8  Continue with IV fluids  Follow-up repeat lactic acid levels    Hyponatremia  Assessment & Plan  Patient presents with sodium level 122  Likely secondary to 5-day history of poor p.o. intake and ongoing nausea and vomiting.  Continue  with normal saline  Q 6-hour sodium checks  Avoid overcorrection more than 6 to 8 mEq over 24 hours      Hyperkalemia  Assessment & Plan  Patient presents with potassium level 5.8  IV Insulin and dextrose ordered  Monitor with repeat BMP    Tachycardia  Assessment & Plan  Likely secondary to dehydration.  Continue with IV fluids  Facial be admitted to the IMCU for telemetry monitoring    Transaminitis  Assessment & Plan  Likely 2/2 liver lesions as described below   CT imaging on 6/2024: Liver: Stable 4 mm liver cyst in the right hepatic lobe on image 30 series 3. There are 2 new low density liver lesions within the right hepatic measuring 1.8 cm and 3.2 cm     Prostate cancer (HCC)- (present on admission)  Assessment & Plan  Patient with known history of metastatic prostate cancer.  Currently on immunotherapy.  Followed by oncologist Dr. Yung    HTN (hypertension), benign- (present on admission)  Assessment & Plan  Holding lisinopril in setting of hyperkalemia      DM (diabetes mellitus) (HCC)- (present on admission)  Assessment & Plan  ISS, hypoglycemia protocol, diabetic diet           VTE prophylaxis: enoxaparin ppx

## 2024-08-25 NOTE — CONSULTS
Brief intensivist note.     57yo male presented with 3 day of SOB and cough   Pt has hx of prostate CA mets to bone s/p chemo 2 weeks ago  Hemodynamically stable  HR in 120s, SBP in 110-130s.   CTA with no PE, but worsening metastatic disease with worsening mediastinal/hilar adenopathy. Moderate pleural effusions bilat  COVID negative  Pt also has RACQUEL with hyperkalemia, metabolic acidosis. Lactate 13    Pt is stable enough to be admitted to IMCU   D/w ERP    Chuck Matos D.O.

## 2024-08-25 NOTE — ASSESSMENT & PLAN NOTE
8/28/2024  Likely secondary to demand ischemia  Down trending  EKG which I personally reviewed shows sinus tach, low voltage.  No evidence of acute ischemia.  Compared to previous EKG voltage is slightly diminished but no other changes

## 2024-08-25 NOTE — ED PROVIDER NOTES
ED Provider Note    CHIEF COMPLAINT  Chief Complaint   Patient presents with    Shortness of Breath    Weakness     BIBA from home for above complaints started 3 days ago.       EXTERNAL RECORDS REVIEWED  Operative note orthopedic surgery 6/12/2024, underwent radiofrequency abrasion right hemipelvis for metastatic lesion to bone    HPI/ROS  LIMITATION TO HISTORY   Select: : None  OUTSIDE HISTORIAN(S):  none    Casper Rowley is a 58 y.o. male with past medical history of metastatic prostate cancer with mets to bone and brain, hypertension, sleep apnea presenting to the emergency department for evaluation of shortness of breath present for the last 3 days.  Endorses associated cough, low-grade fever.  Denies associated chest pain.  Shortness of breath worse with exertion.  Feels generally weak.  No increased bleeding production.  No abdominal pain.  Denies prior history of DVT PE.    PAST MEDICAL HISTORY   has a past medical history of Arthritis (02/2019), Bronchitis (2019), Cancer (HCC), Cancer (HCC), Cold (02/08/2019), Diabetes, High cholesterol, HTN (hypertension), benign (08/14/2009), Hypertension, Infectious disease, Obstructive sleep apnea (02/2019), and Prostate cancer (HCC).    SURGICAL HISTORY   has a past surgical history that includes knee arthroscopy (Right, 05/2014); carpal tunnel release (Left, 02/11/2019); shldr arthroscop exten debride 3+ (Left, 11/01/2021); arthroscopy shoulder surgical biceps tenodes* (Left, 11/01/2021); craniotomy stealth (Right, 09/21/2023); other neurological surg (Sept 21 2023); other (rt knee, left shoulder, left wrist, lumbar); ostectomy (06/12/2024); and lumbar laminectomy diskectomy (Bilateral).    FAMILY HISTORY  Family History   Problem Relation Age of Onset    Cancer Father         Bladder cancer    Cancer Maternal Uncle     Genetic Disorder Neg Hx        SOCIAL HISTORY  Social History     Tobacco Use    Smoking status: Never    Smokeless tobacco: Never   Vaping  "Use    Vaping status: Never Used   Substance and Sexual Activity    Alcohol use: Not Currently     Comment: 2 per week    Drug use: Yes     Types: Oral, Marijuana     Comment: Marijuana-very occasional edible usage    Sexual activity: Yes       CURRENT MEDICATIONS  Home Medications       Reviewed by Anjana Lin R.N. (Registered Nurse) on 08/25/24 at 0252  Med List Status: Complete     Medication Last Dose Status   alendronate (FOSAMAX) 70 MG Tab 8/24/2024 Active   Cabozantinib S-Malate (CABOMETYX) 20 MG Tab 8/23/2024 Active   Cabozantinib S-Malate 40 MG Tab 8/23/2024 Active   Canagliflozin (INVOKANA) 100 MG Tab 8/24/2024 Active   clobetasol (TEMOVATE) 0.05 % Cream ukn Active   cyclobenzaprine (FLEXERIL) 10 mg Tab 8/24/2024 Active   diphenoxylate-atropine (LOMOTIL) 2.5-0.025 MG Tab ukn Active   ergocalciferol (DRISDOL) 46594 UNIT capsule 8/24/2024 Active   HYDROcodone-acetaminophen (NORCO) 5-325 MG Tab per tablet 8/24/2024 Active   ibuprofen (MOTRIN) 200 MG Tab 8/24/2024 Active   lisinopril (PRINIVIL) 10 MG Tab 8/24/2024 Active   metoprolol SR (TOPROL XL) 25 MG TABLET SR 24 HR 8/24/2024 Active   morphine ER (MS CONTIN) 15 MG Tab CR tablet 8/24/2024 Active   Naloxone (NARCAN) 4 MG/0.1ML Liquid ukn Active   ondansetron (ZOFRAN ODT) 8 MG TABLET DISPERSIBLE 8/25/2024 Active   zolpidem (AMBIEN) 10 MG Tab 8/24/2024 Active                  Audit from Redirected Encounters    **Home medications have not yet been reviewed for this encounter**         ALLERGIES  Allergies   Allergen Reactions    Iodine Unspecified     Topical caused rash       PHYSICAL EXAM  VITAL SIGNS: /83   Pulse (!) 114   Temp 36.6 °C (97.9 °F) (Temporal)   Resp 20   Ht 1.702 m (5' 7\")   Wt 74.2 kg (163 lb 9.3 oz)   SpO2 96%   BMI 25.62 kg/m²    General: Chronically ill-appearing, cachectic, pale appearing  Neuro: oriented x 3, moving all extremities.   HEENT:   - Head: Normocephalic, atraumatic  - Eyes: PERRL, severe conjunctival " pallor  - Ears/Nose: normal external nose and ears  - Mouth: moist mucosal membranes  Neck: No JVD  Resp: Mild bibasilar Rales  CV: Tachycardic, regular rhythm, no murmur.  Abd: Soft, non-tender, non-distended  Extremities: 1+ nonpitting symmetric lower extremity edema  Psych: lucid and conversational               DIAGNOSTIC STUDIES / PROCEDURES    EKG  My independent EKG interpretation:  Results for orders placed or performed during the hospital encounter of 24   EKG   Result Value Ref Range    Report       [facility] Emergency Dept.    Test Date:  2024  Pt Name:    BRYAN BARRIENTOS               Department: ER  MRN:        [id number]                      Room:       [facility]  Gender:     Male                         Technician: [id number]  :        1965                   Requested By:ER TRIAGE PROTOCOL  Order #:    [id number]                    Reading MD: Gio Schuster    Measurements  Intervals                                Axis  Rate:       131                          P:          38  NH:         127                          QRS:        0  QRSD:       80                           T:          9  QT:         299  QTc:        442    Interpretive Statements  Sinus tachycardia  Low voltage, extremity leads  No acute st or t changes  Electronically Signed On 2024 23:38:32 PDT by Gio Schuster         LABS  Results for orders placed or performed during the hospital encounter of 24   CBC WITH DIFFERENTIAL   Result Value Ref Range    WBC 8.2 4.8 - 10.8 K/uL    RBC 2.97 (L) 4.70 - 6.10 M/uL    Hemoglobin 9.2 (L) 14.0 - 18.0 g/dL    Hematocrit 28.3 (L) 42.0 - 52.0 %    MCV 95.3 81.4 - 97.8 fL    MCH 31.0 27.0 - 33.0 pg    MCHC 32.5 32.3 - 36.5 g/dL    RDW 82.6 (H) 35.9 - 50.0 fL    Platelet Count 105 (L) 164 - 446 K/uL    MPV 9.6 9.0 - 12.9 fL    Neutrophils-Polys 82.30 (H) 44.00 - 72.00 %    Lymphocytes 3.40 (L) 22.00 - 41.00 %    Monocytes 6.70 0.00 - 13.40 %    Eosinophils 0.00  0.00 - 6.90 %    Basophils 0.80 0.00 - 1.80 %    Nucleated RBC 4.90 (H) 0.00 - 0.20 /100 WBC    Neutrophils (Absolute) 6.75 1.82 - 7.42 K/uL    Lymphs (Absolute) 0.28 (L) 1.00 - 4.80 K/uL    Monos (Absolute) 0.55 0.00 - 0.85 K/uL    Eos (Absolute) 0.00 0.00 - 0.51 K/uL    Baso (Absolute) 0.07 0.00 - 0.12 K/uL    NRBC (Absolute) 0.40 K/uL    Anisocytosis 2+ (A)     Macrocytosis 2+ (A)     Microcytosis 1+    COMP METABOLIC PANEL   Result Value Ref Range    Sodium 122 (L) 135 - 145 mmol/L    Potassium 5.8 (H) 3.6 - 5.5 mmol/L    Chloride 80 (L) 96 - 112 mmol/L    Co2 12 (L) 20 - 33 mmol/L    Anion Gap 30.0 (H) 7.0 - 16.0    Glucose 148 (H) 65 - 99 mg/dL    Bun 25 (H) 8 - 22 mg/dL    Creatinine 0.79 0.50 - 1.40 mg/dL    Calcium 8.9 8.5 - 10.5 mg/dL    Correct Calcium 9.8 8.5 - 10.5 mg/dL    AST(SGOT) 486 (H) 12 - 45 U/L    ALT(SGPT) 213 (H) 2 - 50 U/L    Alkaline Phosphatase 444 (H) 30 - 99 U/L    Total Bilirubin 3.0 (H) 0.1 - 1.5 mg/dL    Albumin 2.9 (L) 3.2 - 4.9 g/dL    Total Protein 6.2 6.0 - 8.2 g/dL    Globulin 3.3 1.9 - 3.5 g/dL    A-G Ratio 0.9 g/dL   TROPONIN   Result Value Ref Range    Troponin T 34 (H) 6 - 19 ng/L   proBrain Natriuretic Peptide, NT   Result Value Ref Range    NT-proBNP 1822 (H) 0 - 125 pg/mL   PHOSPHORUS   Result Value Ref Range    Phosphorus 2.7 2.5 - 4.5 mg/dL   MAGNESIUM   Result Value Ref Range    Magnesium 1.9 1.5 - 2.5 mg/dL   PROCALCITONIN   Result Value Ref Range    Procalcitonin 2.32 (H) <0.25 ng/mL   Lactic Acid   Result Value Ref Range    Lactic Acid 12.8 (HH) 0.5 - 2.0 mmol/L   Lactic Acid   Result Value Ref Range    Lactic Acid 10.4 (HH) 0.5 - 2.0 mmol/L   Lactic Acid   Result Value Ref Range    Lactic Acid 9.3 (HH) 0.5 - 2.0 mmol/L   Urinalysis    Specimen: Urine   Result Value Ref Range    Color Yellow     Character Clear     Specific Gravity 1.013 <1.035    Ph 5.5 5.0 - 8.0    Glucose Negative Negative mg/dL    Ketones 15 (A) Negative mg/dL    Protein Negative Negative mg/dL     Bilirubin Negative Negative    Urobilinogen, Urine 1.0 Negative    Nitrite Negative Negative    Leukocyte Esterase Negative Negative    Occult Blood Negative Negative    Micro Urine Req see below    Blood Culture - Draw one from central line and one from peripheral site    Specimen: Peripheral; Blood   Result Value Ref Range    Significant Indicator NEG     Source BLD     Site PERIPHERAL     Culture Result       No Growth  Note: Blood cultures are incubated for 5 days and  are monitored continuously.Positive blood cultures  are called to the RN and reported as soon as  they are identified.     Blood Culture - Draw one from central line and one from peripheral site    Specimen: Line; Blood   Result Value Ref Range    Significant Indicator NEG     Source BLD     Site Peripheral     Culture Result       No Growth  Note: Blood cultures are incubated for 5 days and  are monitored continuously.Positive blood cultures  are called to the RN and reported as soon as  they are identified.     HOLD BLOOD BANK SPECIMEN (NOT TESTED)   Result Value Ref Range    Holding Tube - Bb DONE    MRSA By PCR (Amp)    Specimen: Nares; Respirate   Result Value Ref Range    MRSA by PCR Negative Negative   ESTIMATED GFR   Result Value Ref Range    GFR (CKD-EPI) 103 >60 mL/min/1.73 m 2   DIFFERENTIAL MANUAL   Result Value Ref Range    Metamyelocytes 3.40 %    Myelocytes 3.40 %    Manual Diff Status PERFORMED    PERIPHERAL SMEAR REVIEW   Result Value Ref Range    Peripheral Smear Review see below    PLATELET ESTIMATE   Result Value Ref Range    Plt Estimation Decreased    MORPHOLOGY   Result Value Ref Range    RBC Morphology Present     Polychromia 1+    IMMATURE PLT FRACTION   Result Value Ref Range    Imm. Plt Fraction 3.4 0.6 - 13.1 %   TROPONIN   Result Value Ref Range    Troponin T 28 (H) 6 - 19 ng/L   Basic Metabolic Panel   Result Value Ref Range    Sodium 126 (L) 135 - 145 mmol/L    Potassium 4.3 3.6 - 5.5 mmol/L    Chloride 91 (L) 96  - 112 mmol/L    Co2 14 (L) 20 - 33 mmol/L    Glucose 185 (H) 65 - 99 mg/dL    Bun 22 8 - 22 mg/dL    Creatinine 0.56 0.50 - 1.40 mg/dL    Calcium 9.0 8.5 - 10.5 mg/dL    Anion Gap 21.0 (H) 7.0 - 16.0   MAGNESIUM   Result Value Ref Range    Magnesium 1.7 1.5 - 2.5 mg/dL   LACTIC ACID   Result Value Ref Range    Lactic Acid 8.4 (HH) 0.5 - 2.0 mmol/L   ESTIMATED GFR   Result Value Ref Range    GFR (CKD-EPI) 114 >60 mL/min/1.73 m 2   Prothrombin Time   Result Value Ref Range    PT 27.2 (H) 12.0 - 14.6 sec    INR 2.49 (H) 0.87 - 1.13   Basic Metabolic Panel   Result Value Ref Range    Sodium 129 (L) 135 - 145 mmol/L    Potassium 4.3 3.6 - 5.5 mmol/L    Chloride 93 (L) 96 - 112 mmol/L    Co2 15 (L) 20 - 33 mmol/L    Glucose 169 (H) 65 - 99 mg/dL    Bun 20 8 - 22 mg/dL    Creatinine 0.64 0.50 - 1.40 mg/dL    Calcium 8.8 8.5 - 10.5 mg/dL    Anion Gap 21.0 (H) 7.0 - 16.0   LDH   Result Value Ref Range    LDH Total >2500 (H) 107 - 266 U/L   ESTIMATED GFR   Result Value Ref Range    GFR (CKD-EPI) 110 >60 mL/min/1.73 m 2   EKG   Result Value Ref Range    Report       [facility] Emergency Dept.    Test Date:  2024  Pt Name:    BRYAN BARRIENTOS               Department: ER  MRN:        [id number]                      Room:       RD 06  Gender:     Male                         Technician: [id number]  :        1965                   Requested By:ER TRIAGE PROTOCOL  Order #:    [id number]                    Reading MD: Gio Schuster    Measurements  Intervals                                Axis  Rate:       131                          P:          38  WA:         127                          QRS:        0  QRSD:       80                           T:          9  QT:         299  QTc:        442    Interpretive Statements  Sinus tachycardia  Low voltage, extremity leads  No acute st or t changes  Electronically Signed On 2024 23:38:32 PDT by Gio Schuster     POC CoV-2, FLU A/B, RSV by PCR   Result Value  Ref Range    POC Influenza A RNA, PCR Negative Negative    POC Influenza B RNA, PCR Negative Negative    POC RSV, by PCR Negative Negative    POC SARS-CoV-2, PCR NotDetected NotDetected   POCT glucose device results   Result Value Ref Range    POC Glucose, Blood 139 (H) 65 - 99 mg/dL   POCT glucose device results   Result Value Ref Range    POC Glucose, Blood 163 (H) 65 - 99 mg/dL   POCT glucose device results   Result Value Ref Range    POC Glucose, Blood 148 (H) 65 - 99 mg/dL   POCT glucose device results   Result Value Ref Range    POC Glucose, Blood 159 (H) 65 - 99 mg/dL   POCT glucose device results   Result Value Ref Range    POC Glucose, Blood 167 (H) 65 - 99 mg/dL       RADIOLOGY  I have independently interpreted the diagnostic imaging associated with this visit and am waiting the final reading from the radiologist.   My preliminary interpretation is as follows:   -   Radiologist interpretation:   US-CHEST   Final Result         1. Mild to moderate right pleural effusion. No safe area for thoracentesis due to overlying collapsed lung.      CT-CTA CHEST PULMONARY ARTERY W/ RECONS   Final Result         1.  No evidence of pulmonary embolism.   2.  Worsening metastatic disease with worsening mediastinal and hilar adenopathy and hepatic metastases.   3.  Diffuse sclerotic osseous metastatic disease.   4.  Moderate bilateral pleural effusions.   5.  Multiple tiny/small pulmonary nodules could be infectious/inflammatory or metastatic. Attention on follow-up.      Fleischner Society pulmonary nodule recommendations:   Not applicable in patient with known history of malignancy      DX-CHEST-PORTABLE (1 VIEW)   Final Result      Right basilar opacity, atelectasis and/or pneumonitis.      Sclerotic osseous metastases again noted.              MEDICAL DECISION MAKING      ED COURSE AND PLAN    Casper Rowley is a 58 y.o. male with past medical history of metastatic prostate cancer presenting to the emergency department  for evaluation shortness of breath ongoing for the last 3 days on arrival to the emergency department patient is tachycardic in the 130s but vital signs otherwise stable afebrile.  Ordered sepsis workup, chest x-ray, CT pulmonary angiogram, troponin, BNP  Bedside ECHOCARDIOGRAM as described below shows mild pulmonary edema, hyperdynamic changes LV and RV no obvious evidence of right heart strain the limited by windows, also shows bilateral pleural effusions.    ---Pertinent ED Course---:    11:40 PM I reviewed the patient's old records in Epic, medication list, allergies, past medical history and performed a physical examination.     11:50 PM had a long conversation with patient regarding CODE STATUS, he is currently full code, he will remain full code at this time.    1:00 AM reevaluated the patient, after initial fluid bolus of 500 cc of lactated Ringer heart rate is in the low 120s.  I had another conversation with him regarding CODE STATUS and he says clearly that he would not want to undergo CPR nor would he want to undergo intubation or placement on a breathing machine.     I reviewed his workup, CBC shows stable normocytic anemia with a left shift.  His chemistry shows multiple metabolic abnormalities with an anion gap acidosis hyponatremia hyper kalemia without EKG changes, fortunately his renal function is preserved.  Chest x-ray showed a right basilar infiltrate, procalcitonin elevated at 2.3 and lactic acid markedly elevated at 12.8  Given persistent tachycardia lactic acidosis  and no worsening respiratory status after initial gentle fluid bolus will give another liter of normal saline  He was treated with broad-spectrum antibiotics vancomycin Unasyn and azithromycin for pneumonia.  Changed his CODE STATUS to reflect his decision to be DNR/DNI.  Will admit patient to the IMCU given multiple metabolic abnormalities persistent sinus tachycardia in the 120s.      Hydration: Based on the patient's  presentation of Sepsis and Tachycardia the patient was given IV fluids. IV Hydration was used because oral hydration was not adequate alone. Upon recheck following hydration, the patient was crittical.  Sepsis: Infection was suspected 2340 (Time). Sepsis pathway was initiated. Less than 30cc/kg because of concern for volume overload 500 mL of crystalloid was ordered. Antibiotics were given per protocol.      Procedures:    Point of Care Ultrasound    ED POINT OF CARE ULTRASOUND: LIMITED CARDIAC    Indication for exam: dyspnea  LVEF: Hyperdynamic   Pericardial Effusion:not present  RV Strain:not present  Pulmonary B Lines: Present bilaterally  Large bilateral pleural effusions, no obvious septations or loculations      Image retained through [facility] as seen below:      Additional interpretation: Poor windows on parasternal long axis view    This study is a limited ultrasound examination performed and interpreted to evaluate for limited conditions as outlined above. There may be other clinically important information contained in the images that is outside this scope. When clinically warranted, a comprehensive ultrasound through the appropriate department is considered.      CRITICAL CARE  The very real possibilty of a deterioration of this patient's condition required the highest level of my preparedness for sudden, emergent intervention.  I provided critical care services, which included medication orders, frequent reevaluations of the patient's condition and response to treatment, ordering and reviewing test results, and discussing the case with various consultants.  The critical care time associated with the care of the patient was 40 minutes. Review chart for interventions. This time is exclusive of any other billable procedures.     ----------------------------------------------------------------------------------  DISCUSSIONS    I have discussed management of the patient with the following physicians and PRESTON's:  Intensivist, hospitalist    Discussion of management with other Q or appropriate source(s): ED pharmacist    Escalation of care considered, and ultimately not performed:    Barriers to care at this time, including but not limited to: Metastatic cancer    Decision tools and prescription drugs considered including, but not limited to: Broad-spectrum antibiotics for pneumonia    FINAL IMPRESSION    1. Pneumonia of right lower lobe due to infectious organism    2. Lactic acidosis    3. Dehydration    4. Hyponatremia    5. Severe sepsis (HCC)        Current Discharge Medication List            DISPOSITION        Admission: The patient will be admitted for further evaluation and treatment. Discussed case with consultants and relayed all necessary information.        This chart was dictated using an electronic voice recognition software. The chart has been reviewed and edited but there is still possibility for dictation errors due to limitation of software.    Gio Schuster DO 8/24/2024

## 2024-08-25 NOTE — ASSESSMENT & PLAN NOTE
DNR/I per dsicussion with my partner  Will discuss with patient and patient significant other in regards to overall goals of care/pursuing hospice

## 2024-08-25 NOTE — WOUND TEAM
[facility]  Inpatient Services  Wound and Skin Care Brief Evaluation    Admission Date: 8/24/2024     Last order of IP CONSULT TO WOUND CARE was found on 8/25/2024 from Hospital Encounter on 8/24/2024     HPI, PMH, SH: Reviewed    Chief Complaint   Patient presents with    Shortness of Breath    Weakness     BIBA from home for above complaints started 3 days ago.     Diagnosis: Shortness of breath [R06.02]    Unit where seen by Wound Team: [id number]     Wound consult placed regarding sacrum and heels. Chart and images reviewed. This discussed with bedside RN. This clinician in to assess patient. Patient pleasant and agreeable.  Patient has dry cracked fissures to heels, Aquaphor ordered to apply.  Very small moisture fissure to coccyx.  Protected with sacral offloading dressing as patient is dry.          No pressure injuries or advanced wound care needs identified. Wound consult completed. No further follow up unless indicated and consulted.     Moisture Associated Skin Damage 08/24/24 (Active)   First Observed Date: 08/24/24   Laterality: Bilateral      Assessments 8/25/2024  4:00 PM   Wound Image     NEXT Weekly Photo (Inpatient Only) 09/01/24   Drainage Amount None   Periwound Assessment Red   IAD Cleansing Foam Cleanser/Washcloth   IAD Containment Device None   WOUND NURSE ONLY - Time Spent with Patient (mins) 45     Area Assessed: Back  Area intact         Area Assessed:  Bilateral I.T.s  Area intact         Area Assessed:  Bilateral heels  Area intact                 PREVENTATIVE INTERVENTIONS:    Q shift Ravin - performed per nursing policy  Q shift pressure point assessments - performed per nursing policy    Surface/Positioning  ICU Low Airloss - Currently in Place  Reposition q 2 hours - Currently in Place    Offloading/Redistribution  Sacral offloading dressing (Silicone dressing) - Applied this Visit  Heel offloading dressing (Silicone dressing) - Applied this Visit  Float Heels off Bed  with Pillows - Applied this Visit           Respiratory  N/A    Containment/Moisture Prevention    Dri-clara pad - Currently in Place

## 2024-08-25 NOTE — ASSESSMENT & PLAN NOTE
8/28/2024  Likely 2/2 liver lesions as described below   CT imaging on 6/2024: Liver: Stable 4 mm liver cyst in the right hepatic lobe on image 30 series 3. There are 2 new low density liver lesions within the right hepatic measuring 1.8 cm and 3.2 cm

## 2024-08-25 NOTE — PROGRESS NOTES
4 Eyes Skin Assessment Completed by DARA Yeung and DARA Powell.    Head WDL  Ears WDL  Nose WDL  Mouth WDL  Neck WDL  Breast/Chest WDL  Shoulder Blades WDL  Spine WDL  (R) Arm/Elbow/Hand Bruising  (L) Arm/Elbow/Hand Bruising  Abdomen WDL  Groin WDL  Scrotum/Coccyx/Buttocks Redness, Blanching, Non-Blanching, and Moisture Fissure    (R) Leg WDL  (L) Leg Scab and Bruising  (R) Heel/Foot/Toe Redness, Blanching, Scar, and Scab    (L) Heel/Foot/Toe Redness, Blanching, Scar, and Scab          Devices In Places ECG, Blood Pressure Cuff, and Pulse Ox      Interventions In Place Pillows and Low Air Loss Mattress    Possible Skin Injury Yes    Pictures Uploaded Into Epic Yes  Wound Consult Placed Yes  RN Wound Prevention Protocol Ordered Yes

## 2024-08-25 NOTE — ASSESSMENT & PLAN NOTE
COVID influenza, RSV negative.    CTA negative for PE however noted worsening with metastatic disease -worsening mediastinal, hilar adenopathy, hepatic metastasis, moderate bilateral pleural effusions, pulmonary nodules concerning for infectious versus metastatic.    Attempt was made for thoracentesis, however unable to safely perform due to overlying collapsed lung.    Patient covered empirically with antibiotic therapy.  Cont O2/RT protocol

## 2024-08-25 NOTE — PROGRESS NOTES
"Hospital Medicine Daily Progress Note    Date of Service  8/25/2024    Chief Complaint  Casper Rowley is a 58 y.o. male admitted 8/24/2024 with fatigue and SOB    Hospital Course  57yo PMHx metastatic prostate CA on immunotherapy (cabozantinib), malignant pain and on chronic narcotics, HTN, HLD, DM.  Prsented with SOB and fatifue.  In the ED Na 122, elevated AST>>ALT, LA 12.  CTA chest neg for PE but showing worsened mediastinal/hilar adenopathy and hepatic mets, mod B pleural effusions.      Interval Problem Update  Feels \"OK\" this am, very tired and generally fatigued.  No SOB at rest but gets winded easily with exertion    DW pt's S.O. by phone    I have discussed this patient's plan of care and discharge plan at IDT rounds today with Case Management, Nursing, Nursing leadership, and other members of the IDT team.    Consultants/Specialty  palliative care    Code Status  DNAR/DNI    Disposition  The patient is not medically cleared for discharge to home or a post-acute facility.      I have placed the appropriate orders for post-discharge needs.    Review of Systems  Review of Systems   Constitutional:  Positive for malaise/fatigue. Negative for chills and fever.   Respiratory:  Positive for shortness of breath. Negative for cough.    Cardiovascular:  Positive for leg swelling. Negative for chest pain and palpitations.   Gastrointestinal:  Negative for abdominal pain, diarrhea, nausea and vomiting.   Genitourinary:  Negative for dysuria and urgency.   Musculoskeletal:  Negative for back pain.   Skin:  Negative for rash.   Neurological:  Negative for dizziness, loss of consciousness and headaches.        Physical Exam  Temp:  [36.3 °C (97.3 °F)-36.6 °C (97.8 °F)] 36.6 °C (97.8 °F)  Pulse:  [116-134] 116  Resp:  [20-29] 22  BP: (115-141)/(76-92) 119/85  SpO2:  [92 %-99 %] 92 %    Physical Exam  Constitutional:       General: He is not in acute distress.     Appearance: He is well-developed. He is not " diaphoretic.   HENT:      Head: Normocephalic and atraumatic.   Eyes:      Conjunctiva/sclera: Conjunctivae normal.   Neck:      Vascular: No JVD.   Cardiovascular:      Rate and Rhythm: Normal rate.      Heart sounds: No murmur heard.     No gallop.   Pulmonary:      Effort: Pulmonary effort is normal. No respiratory distress.      Breath sounds: No stridor. Rales present. No wheezing.   Abdominal:      Palpations: Abdomen is soft.      Tenderness: There is no abdominal tenderness. There is no guarding or rebound.   Musculoskeletal:      Right lower leg: Edema present.      Left lower leg: Edema present.      Comments: Trace edema   Skin:     General: Skin is warm and dry.      Coloration: Skin is pale.      Findings: No rash.   Neurological:      Mental Status: He is oriented to person, place, and time.   Psychiatric:         Mood and Affect: Mood normal.         Behavior: Behavior normal.         Thought Content: Thought content normal.         Fluids    Intake/Output Summary (Last 24 hours) at 8/25/2024 0624  Last data filed at 8/25/2024 0601  Gross per 24 hour   Intake 2707.23 ml   Output 920 ml   Net 1787.23 ml       Laboratory  Recent Labs     08/24/24  2320   WBC 8.2   RBC 2.97*   HEMOGLOBIN 9.2*   HEMATOCRIT 28.3*   MCV 95.3   MCH 31.0   MCHC 32.5   RDW 82.6*   PLATELETCT 105*   MPV 9.6     Recent Labs     08/24/24  2320 08/25/24  0500   SODIUM 122* 126*   POTASSIUM 5.8* 4.3   CHLORIDE 80* 91*   CO2 12* 14*   GLUCOSE 148* 185*   BUN 25* 22   CREATININE 0.79 0.56   CALCIUM 8.9 9.0                   Imaging  CT-CTA CHEST PULMONARY ARTERY W/ RECONS   Final Result         1.  No evidence of pulmonary embolism.   2.  Worsening metastatic disease with worsening mediastinal and hilar adenopathy and hepatic metastases.   3.  Diffuse sclerotic osseous metastatic disease.   4.  Moderate bilateral pleural effusions.   5.  Multiple tiny/small pulmonary nodules could be infectious/inflammatory or metastatic. Attention  on follow-up.      Fleischner Society pulmonary nodule recommendations:   Not applicable in patient with known history of malignancy      DX-CHEST-PORTABLE (1 VIEW)   Final Result      Right basilar opacity, atelectasis and/or pneumonitis.      Sclerotic osseous metastases again noted.      US-THORACENTESIS PUNCTURE RIGHT    (Results Pending)        Assessment/Plan  * Shortness of breath- (present on admission)  Assessment & Plan  CTA chest neg for PE does show mod B pleural effusions  Have ordered US thora today of one side and will get second done tomorrow if no issues  Treate empirically for infection  Check TTE  Cont O2/RT protocol    Pleural effusion  Assessment & Plan  B: Infectious vs malignant  Tap sequentially today and tomorrow  Send fluid for chemistries, cultures and cytology  Repeat CXR in am    Advanced care planning/counseling discussion  Assessment & Plan  DNR/I per dsicussion with my partner  Palliative consult    Nausea & vomiting  Assessment & Plan  IV and p.o. antiemetics  Continue with IV fluids  Daily lab  Improved today    Elevated troponin  Assessment & Plan  Likely secondary to demand ischemia  Down trending  EKG which I personally reviewed shows sinus tach, low voltage.  No evidence of acute ischemia.  Compared to previous EKG voltage is slightly diminished but no other changes    High anion gap metabolic acidosis  Assessment & Plan  Secondary to lactic acidosis  Continue IV fluids  Follow-up with daily labs      Lactic acidosis  Assessment & Plan  Patient presents with lactic acid level of 12.8  ?sepsis vs metastatic CA  Has improved with IVF however VSS now much improved and making good urine, no evidence of hypoperfusion on exam.  Suspect CA is contributing    Hyponatremia  Assessment & Plan  Patient presents with sodium level 122  On review of lab has mild chronic hypoNa upper 120s to low 130s  Hypovolemic vs SIADH from underlying cancer  Follow Q12  Trending up  Sz  precautions    Hyperkalemia  Assessment & Plan  Patient presents with potassium level 5.8  Treated medically and now WNL  Follow daily BMP    Tachycardia  Assessment & Plan  Likely secondary to dehydration.  Continue with IV fluids  Facial be admitted to the IMCU for telemetry monitoring    Transaminitis  Assessment & Plan  Likely 2/2 liver lesions as described below   CT imaging on 6/2024: Liver: Stable 4 mm liver cyst in the right hepatic lobe on image 30 series 3. There are 2 new low density liver lesions within the right hepatic measuring 1.8 cm and 3.2 cm     Prostate cancer (HCC)- (present on admission)  Assessment & Plan  Patient with known history of metastatic prostate cancer.  Currently on immunotherapy.  Followed by oncologist Dr. Yung  ?malignant pleural effusions: will send fluid for cytology    HTN (hypertension), benign- (present on admission)  Assessment & Plan  Holding lisinopril in setting of hyperkalemia      DM (diabetes mellitus) (HCC)- (present on admission)  Assessment & Plan  ISS, hypoglycemia protocol, diabetic diet   Given pt's terminal Dx tight BG is not indicated  A1c 6.6           VTE prophylaxis: VTE Selection    I have performed a physical exam and reviewed and updated ROS and Plan today (8/25/2024). In review of yesterday's note (8/24/2024), there are no changes except as documented above.

## 2024-08-25 NOTE — ASSESSMENT & PLAN NOTE
8/28/2024  Secondary to lactic acidosis  Continue IV fluids  Follow-up with daily labs  Started bicarb drip

## 2024-08-25 NOTE — ED NOTES
Break RN: Pt had small BM with large amount of mucus. Pericare performed. Able to turn side to side to assist in cleaning

## 2024-08-25 NOTE — ASSESSMENT & PLAN NOTE
8/28/2024  Patient presents with potassium level 5.8  S/p hyperkalemia protocol, resolved  Follow daily BMP  Started on Lokelma.  Given IV calcium gluconate

## 2024-08-25 NOTE — ASSESSMENT & PLAN NOTE
8/28/2024  ISS, hypoglycemia protocol, diabetic diet   Given pt's terminal Dx tight BG is not indicated  A1c 6.6

## 2024-08-25 NOTE — ASSESSMENT & PLAN NOTE
8/28/2024  Patient presents with sodium level 122  On review of lab has mild chronic hypoNa upper 120s to low 130s  Hypovolemic vs SIADH from underlying cancer  Resolving  Serial BMP  Sz precautions

## 2024-08-25 NOTE — ASSESSMENT & PLAN NOTE
8/28/2024   B: Infectious vs malignant  COVID influenza, RSV negative.    CTA negative for PE however noted worsening with metastatic disease -worsening mediastinal, hilar adenopathy, hepatic metastasis, moderate bilateral pleural effusions, pulmonary nodules concerning for infectious versus metastatic.    Attempt was made for thoracentesis, however unable to safely perform due to overlying collapsed lung.   Will attempt left side  Will discuss with patient and patient wife in regards to overall goals of care, if pursuing hospice, Pleurx catheter may be place for comfort  Patient covered empirically with antibiotic therapy.

## 2024-08-25 NOTE — ASSESSMENT & PLAN NOTE
8/28/2024  Patient presents with lactic acid level of 12.8  ?sepsis vs metastatic CA  Has improved with IVF however VSS now much improved and making good urine, no evidence of hypoperfusion on exam.  Suspect CA is contributing

## 2024-08-25 NOTE — ASSESSMENT & PLAN NOTE
8/28/2024  Patient with known history of metastatic prostate cancer.  Currently on immunotherapy.  Followed by oncologist Dr. Yung  ?malignant pleural effusions: will send fluid for cytology

## 2024-08-25 NOTE — ED TRIAGE NOTES
"Chief Complaint   Patient presents with    Shortness of Breath    Weakness     BIBA from home for above complaints started 3 days ago.     History of Prostate Cancer metastasize to bone. Pt had chemotherapy 2 weeks ago.    Aox4. GCS 15. Room air.     BP (!) 116/91   Pulse (!) 130   Temp 36.3 °C (97.3 °F) (Temporal)   Resp 20   Ht 1.702 m (5' 7\")   Wt 68 kg (150 lb)   SpO2 97%   BMI 23.49 kg/m²     "

## 2024-08-26 NOTE — CARE PLAN
Problem: Hyperinflation  Goal: Prevent or improve atelectasis  Description: Target End Date:  3 to 4 days    1. Instruct incentive spirometry usage  2.  Perform hyperinflation therapy as indicated  Outcome: Not Progressing  PEP QID

## 2024-08-26 NOTE — CARE PLAN
The patient is Watcher - Medium risk of patient condition declining or worsening    Shift Goals  Clinical Goals: Monitor labs, monitor   Patient Goals: Sleep  Family Goals: Not present    Progress made toward(s) clinical / shift goals:        Problem: Pain - Standard  Goal: Alleviation of pain or a reduction in pain to the patient’s comfort goal  Description: Target End Date:  Prior to discharge or change in level of care    Document on Vitals flowsheet    1.  Document pain using the appropriate pain scale per order or unit policy  2.  Educate and implement non-pharmacologic comfort measures (i.e. relaxation, distraction, massage, cold/heat therapy, etc.)  3.  Pain management medications as ordered  4.  Reassess pain after pain med administration per policy  5.  If opiods administered assess patient's response to pain medication is appropriate per POSS sedation scale  6.  Follow pain management plan developed in collaboration with patient and interdisciplinary team (including palliative care or pain specialists if applicable)  Outcome: Progressing      Problem: Safety  Goal: Will remain free from injury  Outcome: Progressing

## 2024-08-26 NOTE — PROGRESS NOTES
Patient has just been transferred off of the unit in stable condition via transport tech and [facility]. Patient transferred to R325, this RN gave IPASS report to DARA Bob via telephone. Patient transferred with all personal belongings and updated on plan of care.

## 2024-08-26 NOTE — CARE PLAN
The patient is Watcher - Medium risk of patient condition declining or worsening    Shift Goals  Clinical Goals: Hemodynamic stability  Patient Goals: rest  Family Goals: rosa    Progress made toward(s) clinical / shift goals:    Problem: Knowledge Deficit - Standard  Goal: Patient and family/care givers will demonstrate understanding of plan of care, disease process/condition, diagnostic tests and medications  Outcome: Progressing     Problem: Pain - Standard  Goal: Alleviation of pain or a reduction in pain to the patient’s comfort goal  Outcome: Progressing     Problem: Skin Integrity  Goal: Skin integrity is maintained or improved  Outcome: Progressing       Patient is not progressing towards the following goals:

## 2024-08-26 NOTE — CONSULTS
Palliative Care   ACP consult ordered by ERP Dr. Schuster.  Discussed with attending physician Dr. Richards.  Okay to defer consult for now.  Dr. Sanford planning to do GOC discussion and reach out if complex needs from palliative care team team.    CALVIN Vaughn.  Palliative Care Nurse Practitioner  [phone]

## 2024-08-26 NOTE — PROGRESS NOTES
4 Eyes Skin Assessment Completed by DARA Bob and DARA Mcclain.    Head WDL  Ears WDL  Nose WDL  Mouth WDL  Neck WDL  Breast/Chest WDL  Shoulder Blades WDL  Spine WDL  (R) Arm/Elbow/Hand Bruising  (L) Arm/Elbow/Hand Bruising  Abdomen WDL  Groin WDL  Scrotum/Coccyx/Buttocks Redness, Blanching, Non-Blanching, and Moisture Fissure  (R) Leg WDL  (L) Leg Scab and Bruising  (R) Heel/Foot/Toe Redness, Blanching, Scar, and Scab  (L) Heel/Foot/Toe Redness, Blanching, Scar, and Scab    Devices In Places Blood Pressure Cuff and Pulse Ox    Interventions In Place Heel Mepilex, Sacral Mepilex, Pillows, and Q2 Turns    Possible Skin Injury Yes    Pictures Uploaded Into Epic Yes  Wound Consult Placed Yes  RN Wound Prevention Protocol Ordered Yes

## 2024-08-26 NOTE — HOSPITAL COURSE
This is a 58-year-old male with past medical history of prostate cancer with metastasis to bone, hypertension and type 2 diabetes who was admitted on 8/24/2024 with shortness of breath.    COVID influenza, RSV negative.  CTA negative for PE however noted worsening with metastatic disease -worsening mediastinal, hilar adenopathy, hepatic metastasis, moderate bilateral pleural effusions, pulmonary nodules concerning for infectious versus metastatic.  Attempt was made for thoracentesis, however unable to safely perform due to overlying collapsed lung. Patient covered empirically with antibiotic therapy.

## 2024-08-26 NOTE — THERAPY
"Occupational Therapy   Initial Evaluation     Patient Name: Casper Rowley  Age:  58 y.o., Sex:  male  Medical Record #: [id number]  Today's Date: 8/26/2024     Precautions  Precautions: Fall Risk, Swallow Precautions  Comments: Seizure precautions    Assessment    Patient is 58 y.o. male admitted with SOB, found to have pleural effusion, lactic acidosis, transaminitis, nausea/vomiting, and tachycardia. Other pertinent medical history includes prostate cancer with bones mets and brain mets, DM, HTN, DLD, cerebellar mass, and anemia related to chemo. Pt seen for OT evaluation. Pt required min A for seated face/handwashing, max A for donning/doffing socks, min A for bed mobility, and min A to stand. Pt's history and baseline is unclear. Most history obtained via chart review due to pt being a questionable historian. Per chart, pt lives with his significant other. Pt current functional performance limited by impaired vision, impaired balance, impaired strength, generalized weakness, and impaired activity tolerance. Pt will benefit from skilled OT while admitted to acute care.     Plan    Occupational Therapy Initial Treatment Plan   Treatment Interventions: Self Care / Activities of Daily Living, Adaptive Equipment, Neuro Re-Education / Balance, Therapeutic Exercises, Therapeutic Activity  Treatment Frequency: 4 Times per Week  Duration: Until Therapy Goals Met    DC Equipment Recommendations: Unable to determine at this time  Discharge Recommendations: Recommend post-acute placement for additional occupational therapy services prior to discharge home     Subjective    \"25\" response to where are you, what year is it, and why are you in the hospital     Objective     08/26/24 1425   Prior Living Situation   Prior Services Intermittent Physical Support for ADL Per Family   Housing / Facility 1 Story House   Steps Into Home 0   Steps In Home 0   Equipment Owned Wheelchair;Hospital Bed;Single Point Cane  (3 wheel " "walker)   Lives with - Patient's Self Care Capacity Significant Other   Comments When asked most history questions, pt stated \"Yeah, she comes around\". History obtained via chart review.   Prior Level of ADL Function   Self Feeding Unable To Determine At This Time   Grooming / Hygiene Unable To Determine At This Time   Bathing Unable To Determine At This Time   Dressing Unable To Determine At This Time   Toileting Unable To Determine At This Time   Prior Level of IADL Function   Medication Management Unable To Determine At This Time   Laundry Unable To Determine At This Time   Kitchen Mobility Unable To Determine At This Time   Finances Unable To Determine At This Time   Home Management Unable To Determine At This Time   Shopping Unable To Determine At This Time   Prior Level Of Mobility Unable to Determine At This Time   History of Falls   History of Falls Yes   Date of Last Fall   (6 falls in the past year per chart due to tripping)   Precautions   Precautions Fall Risk;Swallow Precautions   Vitals   Pulse (!) 125  (seated EOB, RN aware)   Pulse Oximetry 92 %   O2 Delivery Device None - Room Air   Pain   Pain Scales 0 to 10 Scale    Pain 0 - 10 Group   Therapist Pain Assessment Post Activity Pain Same as Prior to Activity;Nurse Notified  (not rated, agreeable to session)   Cognition    Cognition / Consciousness X   Speech/ Communication Delayed Responses   Orientation Level Not Oriented to Time;Not Oriented to Reason;Other (Comment);Not Oriented to Day;Not Oriented to Year  (Oriented to self, when asked most orientation questions, pt would pause and then state \"25\" regardless what question was asked)   Level of Consciousness Alert   Ability To Follow Commands 1 Step   Safety Awareness Impaired   New Learning Impaired   Attention Impaired   Sequencing Impaired   Initiation Impaired   Comments flat affect, limited verbal responses, unclear baseline   Passive ROM Upper Body   Passive ROM Upper Body WDL   Active ROM " Upper Body   Comments WFL from observation, noted to use L hand slighlty more than L   Strength Upper Body   Comments grossly 3+/5 bilaterally   Sensation Upper Body   Upper Extremity Sensation  Not Tested   Comments due to cognition   Upper Body Muscle Tone   Upper Body Muscle Tone  WDL   Coordination Upper Body   Comments impaired FTN B UE, impaired OTTO B UE   Balance Assessment   Sitting Balance (Static) Fair   Sitting Balance (Dynamic) Fair -   Standing Balance (Static) Poor +   Standing Balance (Dynamic) Poor +   Weight Shift Sitting Fair   Weight Shift Standing Fair   Comments w/ FWW, while seated EOB pt generally had heavy reliance on bilateral UE support, pt also noted to use top of R hand instead of palm down to maintain balance prior to therapist correcting it   Bed Mobility    Supine to Sit Minimal Assist   Sit to Supine Minimal Assist   Scooting Contact Guard Assist   Rolling Minimal Assist to Rt.;Minimum Assist to Lt.   Comments poor motor planning, HOB slightly elevated, use of rails   ADL Assessment   Grooming Minimal Assist;Seated  (required assist for initiation)   Lower Body Dressing Maximal Assist  (don socks)   Functional Mobility   Sit to Stand Minimal Assist   Bed, Chair, Wheelchair Transfer Refused   Mobility EOB>stand x1>supine   Comments w/ FWW   Visual Perception   Comments Pt reported that his R eye has been bothering him for about 5 months. Did not follow commands for formal testing. Stated he has B photosensitivity and diplopia. R eye noted to have some possible esotropia and was watery at time of eval.  (Discussed use of eye patch for diplopia and photosensitivity lenses vs sunglasses for photosensitivity.)   Activity Tolerance   Sitting in Chair NT   Sitting Edge of Bed >5 min   Standing <1 min   Comments limited by weakness/fatigue   Patient / Family Goals   Patient / Family Goal #1 to go home   Short Term Goals   Short Term Goal # 1 Pt will perform ADL transfer w/ supv   Short Term  Goal # 2 Pt will perform LB dressing w/ supv   Short Term Goal # 3 Pt will perform g/h w/ supv   Education Group   Education Provided Role of Occupational Therapist;Activities of Daily Living  (Discussed use of eye patch for diplopia and use of sunglasses vs photosensitivity lenses for photosensitivity.)   Role of Occupational Therapist Patient Response Patient;Acceptance;Explanation;Verbal Demonstration   ADL Patient Response Patient;Acceptance;Explanation;Demonstration;Verbal Demonstration;Action Demonstration   Occupational Therapy Initial Treatment Plan    Treatment Interventions Self Care / Activities of Daily Living;Adaptive Equipment;Neuro Re-Education / Balance;Therapeutic Exercises;Therapeutic Activity   Treatment Frequency 4 Times per Week   Duration Until Therapy Goals Met   Problem List   Problem List Decreased Active Daily Living Skills;Decreased Homemaking Skills;Decreased Functional Mobility;Decreased Activity Tolerance;Safety Awareness Deficits / Cognition;Impaired Coordination Right Upper Extremity;Impaired Coordination Left Upper Extremity;Impaired Cognitive Function;Impaired Vision;Impaired Postural Control / Balance

## 2024-08-26 NOTE — CARE PLAN
The patient is Watcher - Medium risk of patient condition declining or worsening    Shift Goals  Clinical Goals: Patient will get thoracentesis completed today  Patient Goals: Rest  Family Goals: Basilia via phone - MD to call with updates    Progress made toward(s) clinical / shift goals:    Problem: Pain - Standard  Goal: Alleviation of pain or a reduction in pain to the patient’s comfort goal  Outcome: Progressing     Problem: Skin Integrity  Goal: Skin integrity is maintained or improved  Outcome: Progressing     Problem: Safety  Goal: Will remain free from injury  Outcome: Progressing  Goal: Will remain free from falls  Outcome: Progressing     Problem: Fall Risk  Goal: Patient will remain free from falls  Outcome: Progressing       Patient is not progressing towards the following goals:      Problem: Knowledge Deficit - Standard  Goal: Patient and family/care givers will demonstrate understanding of plan of care, disease process/condition, diagnostic tests and medications  Outcome: Not Progressing

## 2024-08-26 NOTE — THERAPY
Physical Therapy   Initial Evaluation     Patient Name: Casper Rowley  Age:  58 y.o., Sex:  male  Medical Record #: [id number]  Today's Date: 8/26/2024     Precautions  Precautions: Fall Risk;Swallow Precautions;Other (See Comments)  Comments: Seizure precautions    Assessment  Patient is 58 y.o. male who was admitted on 8/24/2024 with shortness of breath.    Found to have worsening mediastinal, hilar adenopathy, hepatic metastasis, moderate bilateral pleural effusions, pulmonary nodules concerning for infectious versus metastatic     Attempt was made for R thoracentesis on 8/25/2024, however unable to safely perform due to overlying collapsed lung.  Pending L thoracentesis     Currently been managed for lactic acidosis, transaminitis, hyponatremia, N/V, elevated troponin, metabolic acidosis     PMH: prostate cancer with metastasis to bone, hypertension, type 2 diabetes     Patient seen for PT evaluation. Patient in bed, agreeable for the session. Able to demonstrate functional mobility tasks as detailed below. Currently appears to be below baseline level of functional mobility. Will continue to benefit from PT services to help improve overall functional mobility. Recommend post-acute placement at this time.     Plan    Physical Therapy Initial Treatment Plan   Treatment Plan : Bed Mobility, Equipment, Family / Caregiver Training, Gait Training, Neuro Re-Education / Balance, Therapeutic Activities, Therapeutic Exercise  Treatment Frequency: 4 Times per Week  Duration: Until Therapy Goals Met    DC Equipment Recommendations: Unable to determine at this time  Discharge Recommendations: Recommend post-acute placement for additional physical therapy services prior to discharge home     Objective     08/26/24 1005   Time In/Time Out   Therapy Start Time 1005   Therapy End Time 1036   Total Therapy Time 31   Initial Contact Note    Initial Contact Note Order Received and Verified, Physical Therapy Evaluation in  Progress with Full Report to Follow.   Precautions   Precautions Fall Risk;Swallow Precautions;Other (See Comments)   Comments Seizure precautions   Vitals   Pulse (!) 118   Patient BP Position Sitting  (EOB)   Blood Pressure 111/79   Pulse Oximetry 93 %   O2 Delivery Device None - Room Air   Vitals Comments Prior to activity in semi-fowlers: , SpO2 93; Post activity in semi-fowlers: , SpO2 95. During standing: HR increased upto 120s   Pain   Pain Scales 0 to 10 Scale    Intervention Declines   Prior Living Situation   Prior Services Intermittent Physical Support for ADL Per Family  (His significant other assist him as needed)   Housing / Facility 1 Story House   Steps Into Home 0   Steps In Home 0   Equipment Owned Wheelchair;Hospital Bed;Single Point Cane;Other (Comments)  (3 wheel walker)   Lives with - Patient's Self Care Capacity Significant Other  (Basilia)   Prior Level of Functional Mobility   Bed Mobility Independent   Transfer Status Independent   Ambulation Independent   Ambulation Distance Household   Assistive Devices Used Single Point Cane   Comments Patient mentioned that he has been mostly staying home lately. Patient may be a questionable historian, may have to confirm the above information from family when able.   History of Falls   History of Falls Yes   Date of Last Fall   (H/O ~ 6 falls in the last 1 year, but no recent falls. Reports falls mainly due to tripping on objects)   Cognition    Cognition / Consciousness X   Speech/ Communication Delayed Responses   Orientation Level Not Oriented to Time;Not Oriented to Reason;Other (Comment);Not Oriented to Day;Not Oriented to Year  (Oriented to self, place, month)   Level of Consciousness Alert   Ability To Follow Commands 1 Step   Safety Awareness Impaired   New Learning Impaired   Attention Impaired   Sequencing Impaired   Comments Limited verbal response this session   Passive ROM Lower Body   Passive ROM Lower Body X   Comments  Slightly decreased B/L knee extension, B/L ankle DF-possibly due to stiffness from muscle shortening   Active ROM Lower Body    Active ROM Lower Body  X   Comments Able to perform partial B/L hip flexion, R worse than L, impaired due to weakness and muscle shortening   Strength Lower Body   Lower Body Strength  X   Comments Grossly B/L hip flexion 3-/5, B/L knee extension 3+/5, B/L knee flexion 3+/5, B/L ankle DF/PF 3/5   Sensation Lower Body   Lower Extremity Sensation   WDL   Comments Denies any numbness/tingling in LE; Reports equal light touch sensation in BLE   Lower Body Muscle Tone   Lower Body Muscle Tone  WDL   Vision   Vision Comments Denies any vision concerns, R eye-minimal opening; Will assess vision in subsequent session   Other Treatments   Other Treatments Provided Discussed about DC recommendations-rehab placement vs returning home with HH. Discussed about current mobility impairments-impaired strength, balance, activity tolerance/endurance resulting in increased risk of falls for DC home. Recommendations to be updated based on progression with functional mobility. Educated on importance of daily mobility, OOB to chair and ambulate with assistance from nursing staff as able. Educated on activity pacing and pursed lip breathing.   Balance Assessment   Sitting Balance (Static) Fair   Sitting Balance (Dynamic) Fair -   Standing Balance (Static) Poor +   Standing Balance (Dynamic) Poor +   Weight Shift Sitting Fair   Weight Shift Standing Fair   Comments Seated EOB; Standing with FWW   Bed Mobility    Supine to Sit Minimal Assist   Sit to Supine Minimal Assist   Scooting Contact Guard Assist   Rolling Minimum Assist to Lt.   Comments HOB raised, use of bed rail, towards L side; Patient reports that he sleeps in an adjustable bed with HOB raised and bed rails.   Gait Analysis   Gait Level Of Assist Minimal Assist   Assistive Device Front Wheel Walker   Distance (Feet) 5  (Side steps by the bed)    Deviation Decreased Base Of Support;Bradykinetic;Other (Comment)  (Small steps)   Comments Cues for pacing, directions, appropriate use of FWW   Functional Mobility   Sit to Stand Minimal Assist   Bed, Chair, Wheelchair Transfer Refused   Mobility Bed-EOB-sit-stand-EOB-sit-stand-side steps by the bed-EOB-bed-HOB raised   Comments W FWW; Cues for hand placement, LE placement, appropriate trunk posture   6 Clicks Assessment - How much HELP from from another person do you currently need... (If the patient hasn't done an activity recently, how much help from another person do you think he/she would need if he/she tried?)   Turning from your back to your side while in a flat bed without using bedrails? 3   Moving from lying on your back to sitting on the side of a flat bed without using bedrails? 3   Moving to and from a bed to a chair (including a wheelchair)? 3   Standing up from a chair using your arms (e.g., wheelchair, or bedside chair)? 3   Walking in hospital room? 3   Climbing 3-5 steps with a railing? 2   6 clicks Mobility Score 17   Patient / Family Goals    Patient / Family Goal #1 To return home   Short Term Goals    Short Term Goal # 1 Patient will perform supine-sit, sit-supine with HOB flat without rails with supervision in 6 visits to safely get in & out of bed   Short Term Goal # 2 Patient will perform sit-stand with LRAD with supervision in 6 visits to progress with functional mobility   Short Term Goal # 3 Patient will perform chair transfers with LRAD with supervision in 6 visits to safely get OOB to chair   Short Term Goal # 4 Patient will ambulate 100 feet with LRAD with supervision in 6 visits to safely ambulate household distance   Education Group   Education Provided Role of Physical Therapist   Role of Physical Therapist Patient Response Patient;Acceptance;Explanation;Verbal Demonstration   Physical Therapy Initial Treatment Plan    Treatment Plan  Bed Mobility;Equipment;Family / Caregiver  Training;Gait Training;Neuro Re-Education / Balance;Therapeutic Activities;Therapeutic Exercise   Treatment Frequency 4 Times per Week   Duration Until Therapy Goals Met   Problem List    Problems Impaired Bed Mobility;Impaired Transfers;Impaired Ambulation;Functional Strength Deficit;Impaired Balance;Decreased Activity Tolerance;Motor Planning / Sequencing;Safety Awareness Deficits / Cognition   Anticipated Discharge Equipment and Recommendations   DC Equipment Recommendations Unable to determine at this time   Discharge Recommendations Recommend post-acute placement for additional physical therapy services prior to discharge home   Interdisciplinary Plan of Care Collaboration   IDT Collaboration with  Nursing;Occupational Therapist   Patient Position at End of Therapy Seated;In Bed;Bed Alarm On;Call Light within Reach;Tray Table within Reach;Phone within Reach   Session Information   Date / Session Number  8/26-1(1/4, 9/1)

## 2024-08-26 NOTE — PROGRESS NOTES
Hospital Medicine Daily Progress Note    Date of Service  8/26/2024    Chief Complaint  Casper Rowley is a 58 y.o. male admitted 8/24/2024 with shortness of breath    Hospital Course  This is a 58-year-old male with past medical history of prostate cancer with metastasis to bone, hypertension and type 2 diabetes who was admitted on 8/24/2024 with shortness of breath.    COVID influenza, RSV negative.  CTA negative for PE however noted worsening with metastatic disease -worsening mediastinal, hilar adenopathy, hepatic metastasis, moderate bilateral pleural effusions, pulmonary nodules concerning for infectious versus metastatic.  Attempt was made for thoracentesis, however unable to safely perform due to overlying collapsed lung. Patient covered empirically with antibiotic therapy.    Interval Problem Update  Unclear what patient's baseline is in terms of mentation, he understands that he is experiencing shortness of breath and has fluid in the lungs.    Attempt was made yesterday to perform thoracentesis on the right, unable to safely remove fluid, order for thoracentesis on the left placed today.  If able to be safely performed, will follow fluid studies and cytology.     Patient's wife was called, updated on plan of care, all questions answered.    I have discussed this patient's plan of care and discharge plan at IDT rounds today with Case Management, Nursing, Nursing leadership, and other members of the IDT team.    Consultants/Specialty  None    Code Status  DNAR/DNI    Disposition  Not medically clear.  I have placed the appropriate orders for post-discharge needs.    Review of Systems  Review of Systems   Unable to perform ROS: Mental status change        Physical Exam  Temp:  [36.2 °C (97.2 °F)-36.8 °C (98.2 °F)] 36.6 °C (97.8 °F)  Pulse:  [] 103  Resp:  [13-27] 18  BP: ()/(58-90) 127/90  SpO2:  [91 %-97 %] 97 %    Physical Exam  Vitals and nursing note reviewed.   Constitutional:        General: He is not in acute distress.     Appearance: He is ill-appearing.   HENT:      Head: Normocephalic and atraumatic.   Eyes:      Extraocular Movements: Extraocular movements intact.      Conjunctiva/sclera: Conjunctivae normal.      Pupils: Pupils are equal, round, and reactive to light.   Cardiovascular:      Rate and Rhythm: Normal rate and regular rhythm.      Pulses: Normal pulses.      Heart sounds: No murmur heard.     No friction rub. No gallop.   Pulmonary:      Effort: Pulmonary effort is normal. No respiratory distress.      Breath sounds: Rales present. No wheezing or rhonchi.   Abdominal:      General: Bowel sounds are normal. There is no distension.      Palpations: Abdomen is soft.      Tenderness: There is no abdominal tenderness.   Musculoskeletal:         General: No swelling or tenderness. Normal range of motion.      Cervical back: Normal range of motion and neck supple. No muscular tenderness.      Right lower leg: No edema.      Left lower leg: No edema.   Skin:     General: Skin is warm and dry.      Capillary Refill: Capillary refill takes less than 2 seconds.      Findings: No bruising, erythema or rash.   Neurological:      General: No focal deficit present.      Mental Status: He is alert. He is disoriented.         Fluids    Intake/Output Summary (Last 24 hours) at 8/26/2024 1421  Last data filed at 8/26/2024 0616  Gross per 24 hour   Intake 118 ml   Output 400 ml   Net -282 ml       Laboratory  Recent Labs     08/24/24  2320 08/26/24  0101   WBC 8.2 7.3   RBC 2.97* 2.55*   HEMOGLOBIN 9.2* 7.6*   HEMATOCRIT 28.3* 24.4*   MCV 95.3 95.7   MCH 31.0 29.8   MCHC 32.5 31.1*   RDW 82.6* 84.4*   PLATELETCT 105* 60*   MPV 9.6 10.6     Recent Labs     08/25/24  0500 08/25/24  1628 08/26/24  0101   SODIUM 126* 129* 128*   POTASSIUM 4.3 4.3 4.3   CHLORIDE 91* 93* 93*   CO2 14* 15* 15*   GLUCOSE 185* 169* 179*   BUN 22 20 23*   CREATININE 0.56 0.64 0.70   CALCIUM 9.0 8.8 8.6     Recent Labs      08/25/24  0910   INR 2.49*               Imaging  DX-CHEST-PORTABLE (1 VIEW)   Final Result      1.  Moderate diffuse opacity throughout right lung which is increased since prior. Could be asymmetric edema and/or infiltrates.   2.  Bilateral pleural effusions.      US-CHEST   Final Result         1. Mild to moderate right pleural effusion. No safe area for thoracentesis due to overlying collapsed lung.      CT-CTA CHEST PULMONARY ARTERY W/ RECONS   Final Result         1.  No evidence of pulmonary embolism.   2.  Worsening metastatic disease with worsening mediastinal and hilar adenopathy and hepatic metastases.   3.  Diffuse sclerotic osseous metastatic disease.   4.  Moderate bilateral pleural effusions.   5.  Multiple tiny/small pulmonary nodules could be infectious/inflammatory or metastatic. Attention on follow-up.      Fleischner Society pulmonary nodule recommendations:   Not applicable in patient with known history of malignancy      DX-CHEST-PORTABLE (1 VIEW)   Final Result      Right basilar opacity, atelectasis and/or pneumonitis.      Sclerotic osseous metastases again noted.      US-THORACENTESIS PUNCTURE LEFT    (Results Pending)   EC-ECHOCARDIOGRAM COMPLETE W/O CONT    (Results Pending)        Assessment/Plan  * Pleural effusion  Assessment & Plan  B: Infectious vs malignant  COVID influenza, RSV negative.    CTA negative for PE however noted worsening with metastatic disease -worsening mediastinal, hilar adenopathy, hepatic metastasis, moderate bilateral pleural effusions, pulmonary nodules concerning for infectious versus metastatic.    Attempt was made for thoracentesis, however unable to safely perform due to overlying collapsed lung.   Will attempt left side  Will discuss with patient and patient wife in regards to overall goals of care, if pursuing hospice, Pleurx catheter may be place for comfort  Patient covered empirically with antibiotic therapy.    Advanced care planning/counseling  discussion  Assessment & Plan  DNR/I per dsicussion with my partner  Will discuss with patient and patient significant other in regards to overall goals of care/pursuing hospice    Lactic acidosis  Assessment & Plan  Patient presents with lactic acid level of 12.8  ?sepsis vs metastatic CA  Has improved with IVF however VSS now much improved and making good urine, no evidence of hypoperfusion on exam.  Suspect CA is contributing    Transaminitis  Assessment & Plan  Likely 2/2 liver lesions as described below   CT imaging on 6/2024: Liver: Stable 4 mm liver cyst in the right hepatic lobe on image 30 series 3. There are 2 new low density liver lesions within the right hepatic measuring 1.8 cm and 3.2 cm     Prostate cancer (HCC)- (present on admission)  Assessment & Plan  Patient with known history of metastatic prostate cancer.  Currently on immunotherapy.  Followed by oncologist Dr. Yung  ?malignant pleural effusions: will send fluid for cytology    Hyponatremia  Assessment & Plan  Patient presents with sodium level 122  On review of lab has mild chronic hypoNa upper 120s to low 130s  Hypovolemic vs SIADH from underlying cancer  Resolving  Serial BMP  Sz precautions    Nausea & vomiting  Assessment & Plan  IV and p.o. antiemetics  Continue with IV fluids  Daily lab  Improved today    Elevated troponin  Assessment & Plan  Likely secondary to demand ischemia  Down trending  EKG which I personally reviewed shows sinus tach, low voltage.  No evidence of acute ischemia.  Compared to previous EKG voltage is slightly diminished but no other changes    High anion gap metabolic acidosis  Assessment & Plan  Secondary to lactic acidosis  Continue IV fluids  Follow-up with daily labs      Hyperkalemia  Assessment & Plan  Patient presents with potassium level 5.8  S/p hyperkalemia protocol, resolved  Follow daily BMP    Tachycardia  Assessment & Plan  Likely secondary to dehydration.  Continue with IV fluids  Facial be  admitted to the [facility] [place] for telemetry monitoring    HTN (hypertension), benign- (present on admission)  Assessment & Plan  Holding lisinopril in setting of hyperkalemia      DM (diabetes mellitus) (HCC)- (present on admission)  Assessment & Plan  ISS, hypoglycemia protocol, diabetic diet   Given pt's terminal Dx tight BG is not indicated  A1c 6.6           VTE prophylaxis: SCDs    I have performed a physical exam and reviewed and updated ROS and Plan today (8/26/2024). In review of yesterday's note (8/25/2024), there are no changes except as documented above.    Greater than 51 minutes spent prepping to see patient (e.g. reviewing EMR) obtaining and/or reviewing separately obtained history. Performing a medically appropriate examination and evaluation. Independently interpreting results and communicating results to patient/family/caregiver. Counseling and educating the patient/family/caregiver. Ordering medications, tests, and/or procedures. Referring and communicating with other health care professionals.  Documenting clinical information in EPIC. Care coordination.

## 2024-08-27 NOTE — DISCHARGE PLANNING
"Case Management Discharge Planning    Admission Date: 8/24/2024  GMLOS: 4.4  ALOS: 2    6-Clicks ADL Score: 13  6-Clicks Mobility Score: 17  PT and/or OT Eval ordered: Yes  Post-acute Referrals Ordered: Yes  Post-acute Choice Obtained: No  Has referral(s) been sent to post-acute provider:  Yes      Anticipated Discharge Dispo: Discharge Disposition: D/T to SNF with Medicare cert in anticipation of skilled care (03)    DME Needed: No    Action(s) Taken: DC Assessment Complete (See below)    1045: RNCM provided update to IDT during IDT rounds; concerns and requests from patient's fiance for provider to call with clinical update    Escalations Completed: None    Medically Clear: No    Next Steps: CM to follow up with IDT regarding DCP needs/barriers    Barriers to Discharge: Medical clearance    Is the patient up for discharge tomorrow: No      Care Transition Team Assessment    RNCM met with patient at bedside to perform assessment; patient requesting RNCM speak with his fianceBasilia to obtain information and discuss discharge planning    Case management role discussed; understanding of case management role verbalized   Demographics on facesheet verified  Please see H&P for pertinent PMH and reason for admission  Patient's PCP is: Joel Godfrey  Patient's preferred pharmacy is: [facility] in [place], [place]  Housing: Per manuel, patient lives with her in a singe level apartment; no steps to enter the residence  IADLS/ADLS: independent at baseline  DME: Patient uses a FWW/cane to assist with ambulation \"at times\"  O2: no O2 at baseline, wears CPAP at noc (unable to recall name of company)  Prior services: none  Discharge support: fiance, friends, neighbors  Transportation: initially stated patient would need assistance with transport to/from OP appts, then stated she would be able to assist but is \"directionally impaired\", stated she has a friend who may be able to assist with transportation needs if patient is " "able to come home  Discharge planning: several options were discussed with Basilia during PC; at this time, she does not want the patient to go home with hospice but understands that may be needed in the future at \"some point\". Basilia would like for the patient to return to his baseline level of functioning and be strong enough to be able to receive treatment for his cancer.     Options discussed to support patient's return to baseline level of functionin. home with Critical access hospital, with Basilia or friend providing transport to/from OP infusion appts, 2. post-acute placement to include SNF or [facility], with the understanding that patient's treatments for his cancer would be placed on hold until he discharged home.     Basilia provided with information regarding public transportation; states she wants to discuss results of patient's thoracentesis with the MD before deciding next steps    Information Source  Orientation Level: Oriented to place, Oriented to situation, Oriented to person, Disoriented to time  Information Given By: Significant Other  Informant's Name: Basilia Meeks  Who is responsible for making decisions for patient? : Patient    Readmission Evaluation  Is this a readmission?: No    Elopement Risk  Legal Hold: No  Ambulatory or Self Mobile in Wheelchair: No-Not an Elopement Risk  Elopement Risk: Not at Risk for Elopement    Interdisciplinary Discharge Planning  Does Admitting Nurse Feel This Could be a Complex Discharge?: No  Primary Care Physician: Gladys Godfrey  Lives with - Patient's Self Care Capacity: Significant Other  Patient or legal guardian wants to designate a caregiver: No  Support Systems: Family Member(s), Friends / Neighbors, Spouse / Significant Other  Housing / Facility: 1 Story Apartment / Condo  Do You Take your Prescribed Medications Regularly: Yes  Able to Return to Previous ADL's: Future Time w/Therapy  Mobility Issues: No  Prior Services: None, Home-Independent  Assistance Needed: No  Durable " Medical Equipment: Not Applicable (cane)    Discharge Preparedness  What is your plan after discharge?: Skilled nursing facility  What are your discharge supports?: Other (comment)  Prior Functional Level: Ambulatory, Drives Self, Independent with Activities of Daily Living, Independent with Medication Management, Uses Cane, Uses Walker  Difficulity with ADLs: None  Difficulity with IADLs: None    Functional Assesment  Prior Functional Level: Ambulatory, Drives Self, Independent with Activities of Daily Living, Independent with Medication Management, Uses Cane, Uses Walker    Finances  Financial Barriers to Discharge: No  Prescription Coverage: Yes    Vision / Hearing Impairment  Vision Impairment : Yes  Right Eye Vision: Impaired, Wears Glasses  Left Eye Vision: Impaired, Wears Glasses  Hearing Impairment : No    Values / Beliefs / Concerns  Values / Beliefs Concerns : No    Advance Directive  Advance Directive?: None  Advance Directive offered?: AD Booklet refused    Domestic Abuse  Have you ever been the victim of abuse or violence?: No  Physical Abuse or Sexual Abuse: No  Verbal Abuse or Emotional Abuse: No  Possible Abuse/Neglect Reported to:: Not Applicable    Psychological Assessment  History of Substance Abuse: None  History of Psychiatric Problems: No  Non-compliant with Treatment: No  Newly Diagnosed Illness: No    Discharge Risks or Barriers  Discharge risks or barriers?: Complex medical needs  Patient risk factors: Cognitive / sensory / physical deficit, Complex medical needs, Multiple organizational systems involved, Vulnerable adult    Anticipated Discharge Information  Discharge Disposition: D/T to SNF with Medicare cert in anticipation of skilled care (03)

## 2024-08-27 NOTE — DISCHARGE PLANNING
746  DPA sent referral to [facility] SNF's. Per RN CM Debbie.     840  DPA sent to [facility], per RN LEATHA Lewis.

## 2024-08-27 NOTE — CARE PLAN
The patient is Watcher - Medium risk of patient condition declining or worsening    Shift Goals  Clinical Goals: pain control, IV abx, safety  Patient Goals: rest  Family Goals: none present    Progress made toward(s) clinical / shift goals:    Problem: Pain - Standard  Goal: Alleviation of pain or a reduction in pain to the patient’s comfort goal  Outcome: Progressing     Problem: Safety  Goal: Will remain free from injury  Outcome: Progressing     Problem: Fall Risk  Goal: Patient will remain free from falls  Outcome: Progressing       Patient is not progressing towards the following goals:

## 2024-08-27 NOTE — CARE PLAN
Problem: Hyperinflation  Goal: Prevent or improve atelectasis  Description: Target End Date:  3 to 4 days    1. Instruct incentive spirometry usage  2.  Perform hyperinflation therapy as indicated  Outcome: Progressing       Respiratory Update    QID PEP  500  on IS  Poor effort    Pt tolerating current treatments well with no adverse reactions.

## 2024-08-27 NOTE — PROGRESS NOTES
Ria from Lab called with critical result of lactic 7.3 at 0736 and AST 1042 at 0757. Critical lab result read back to Ria.   Dr. Weaver notified of critical lab result at 0806.  Critical lab result read back by Dr. Weaver.

## 2024-08-27 NOTE — THERAPY
"Occupational Therapy  Daily Treatment     Patient Name: Casper Rowley  Age:  58 y.o., Sex:  male  Medical Record #: [id number]  Today's Date: 8/27/2024     Precautions  Precautions: (P) Fall Risk, Swallow Precautions  Comments: (P) Seizure precautions    Assessment    Pt seen for OT tx session, pt more lethargic today, required mod A for supine>sit, max A w/ HHA for seated grooming. Pt able to initiate self care tasks but not follow through at this time. Pt required max A to return to supine. Pt demo'd impaired balance, functional mobility and activity tolerance impacting functional independence. Will continue to follow.     Plan    Treatment Plan Status: (P) Continue Current Treatment Plan  Type of Treatment: Self Care / Activities of Daily Living, Adaptive Equipment, Neuro Re-Education / Balance, Therapeutic Exercises, Therapeutic Activity  Treatment Frequency: 4 Times per Week  Treatment Duration: Until Therapy Goals Met    DC Equipment Recommendations: (P) Unable to determine at this time  Discharge Recommendations: (P) Recommend post-acute placement for additional occupational therapy services prior to discharge home    Subjective    \"I can try to sit up\"     Objective      Precautions   Precautions Fall Risk;Swallow Precautions   Comments Seizure precautions   Vitals   O2 (LPM) 0   O2 Delivery Device None - Room Air   Pain 0 - 10 Group   Therapist Pain Assessment Post Activity Pain Same as Prior to Activity;Nurse Notified  (no c/o pain during session)   Cognition    Cognition / Consciousness X   Speech/ Communication Delayed Responses   Level of Consciousness Alert   Ability To Follow Commands 1 Step   Safety Awareness Impaired   New Learning Impaired   Attention Impaired   Sequencing Impaired   Initiation Impaired   Comments very lethargic today, would initiate movements but unable to follow through. Decreased verbal output sitting up vs long sitting   Strength Upper Body   Upper Body Strength  X   Gross " Strength Generalized Weakness, Equal Bilaterally.    Upper Body Muscle Tone   Upper Body Muscle Tone  WDL   Balance   Sitting Balance (Static) Poor +   Sitting Balance (Dynamic) Poor   Weight Shift Sitting Poor   Comments w/ B UE support   Bed Mobility    Supine to Sit Moderate Assist   Sit to Supine Maximal Assist  (unable to initiate)   Scooting Maximal Assist   Skilled Intervention Verbal Cuing;Tactile Cuing;Facilitation   Activities of Daily Living   Grooming Maximal Assist;Seated  (HHA washed face)   Upper Body Dressing Maximal Assist  (gown)   Skilled Intervention Tactile Cuing;Verbal Cuing;Facilitation   How much help from another person does the patient currently need...   Putting on and taking off regular lower body clothing? 2   Bathing (including washing, rinsing, and drying)? 2   Toileting, which includes using a toilet, bedpan, or urinal? 2   Putting on and taking off regular upper body clothing? 2   Taking care of personal grooming such as brushing teeth? 2   Eating meals? 4   6 Clicks Daily Activity Score 14   Functional Mobility   Sit to Stand Unable to Participate   Mobility sat EOB, too lethargic to attempt to stand   Activity Tolerance   Sitting Edge of Bed 7 min total   Comments limited by level of arousal   Patient / Family Goals   Patient / Family Goal #1 to go home   Short Term Goals   Short Term Goal # 1 Pt will perform ADL transfer w/ supv   Goal Outcome # 1 Goal not met   Short Term Goal # 2 Pt will perform LB dressing w/ supv   Goal Outcome # 2 Goal not met   Short Term Goal # 3 Pt will perform g/h w/ supv   Goal Outcome # 3 Goal not met   Education Group   Role of Occupational Therapist Patient Response Patient;Acceptance;Explanation;Verbal Demonstration   Pathology of Bedrest Patient Response Patient;Acceptance;Explanation;Demonstration;Verbal Demonstration;Action Demonstration   Occupational Therapy Treatment Plan    O.T. Treatment Plan Continue Current Treatment Plan   Anticipated  Discharge Equipment and Recommendations   DC Equipment Recommendations Unable to determine at this time   Discharge Recommendations Recommend post-acute placement for additional occupational therapy services prior to discharge home   Interdisciplinary Plan of Care Collaboration   IDT Collaboration with  Nursing   Patient Position at End of Therapy In Bed;Bed Alarm On;Call Light within Reach;Tray Table within Reach;Phone within Reach   Collaboration Comments report given   Session Information   Date / Session Number  8/27, 2 (2/4, 9/1)

## 2024-08-27 NOTE — PROGRESS NOTES
Hospital Medicine Daily Progress Note    Date of Service  8/27/2024    Chief Complaint  Casper Rowley is a 58 y.o. male admitted 8/24/2024 with shortness of breath    Hospital Course  This is a 58-year-old male with past medical history of prostate cancer with metastasis to bone, hypertension and type 2 diabetes who was admitted on 8/24/2024 with shortness of breath.    COVID influenza, RSV negative.  CTA negative for PE however noted worsening with metastatic disease -worsening mediastinal, hilar adenopathy, hepatic metastasis, moderate bilateral pleural effusions, pulmonary nodules concerning for infectious versus metastatic.  Attempt was made for thoracentesis, however unable to safely perform due to overlying collapsed lung. Patient covered empirically with antibiotic therapy.    Interval Problem Update  Unclear what patient's baseline is in terms of mentation, he understands that he is experiencing shortness of breath and has fluid in the lungs.    Attempt was made yesterday to perform thoracentesis on the right, unable to safely remove fluid, order for thoracentesis on the left placed today.  If able to be safely performed, will follow fluid studies and cytology.     Patient's wife was called, updated on plan of care, all questions answered.    Patient was seen and examined at bedside.  I have personally reviewed and interpreted vitals, labs, and imaging.    8/27.  Afebrile.  Has been tachycardic.  On room air.  Worsening LFTs.  Patient is lethargic this morning.  Frequently responds yeah, I am okay, not so much.  Patient is increased lactic acid, LDH, anion gap metabolic acidosis.  I did start the patient on IV fluids.  Continue Unasyn and azithromycin.  With concern for sepsis.  Cultures so far remain negative.  With elevated bilirubin, uric acid, LDH there is also concern for TLS and I did consult oncology Dr. Baires.  Hgb 7.6 > 7.9  P 60 > 50  Na 128  Lactic 7.3  Bicarb 18/20    I have discussed  this patient's plan of care and discharge plan at IDT rounds today with Case Management, Nursing, Nursing leadership, and other members of the IDT team.    Consultants/Specialty  oncology    Code Status  DNAR/DNI    Disposition  Not medically clear.  I have placed the appropriate orders for post-discharge needs.    Review of Systems  Review of Systems   Unable to perform ROS: Mental status change        Physical Exam  Temp:  [36.2 °C (97.2 °F)-36.9 °C (98.5 °F)] 36.3 °C (97.4 °F)  Pulse:  [] 100  Resp:  [18-20] 18  BP: (111-133)/(78-90) 128/82  SpO2:  [92 %-97 %] 96 %    Physical Exam  Vitals and nursing note reviewed.   Constitutional:       General: He is not in acute distress.     Appearance: He is ill-appearing.   HENT:      Head: Normocephalic and atraumatic.   Eyes:      Extraocular Movements: Extraocular movements intact.      Conjunctiva/sclera: Conjunctivae normal.      Pupils: Pupils are equal, round, and reactive to light.   Cardiovascular:      Rate and Rhythm: Normal rate and regular rhythm.      Pulses: Normal pulses.      Heart sounds: No murmur heard.     No friction rub. No gallop.   Pulmonary:      Effort: Pulmonary effort is normal. No respiratory distress.      Breath sounds: Rales present. No wheezing or rhonchi.   Abdominal:      General: Bowel sounds are normal. There is no distension.      Palpations: Abdomen is soft.      Tenderness: There is no abdominal tenderness.   Musculoskeletal:         General: No swelling or tenderness. Normal range of motion.      Cervical back: Normal range of motion and neck supple. No muscular tenderness.      Right lower leg: No edema.      Left lower leg: No edema.   Skin:     General: Skin is warm and dry.      Capillary Refill: Capillary refill takes less than 2 seconds.      Findings: No bruising, erythema or rash.   Neurological:      General: No focal deficit present.      Mental Status: He is alert. He is disoriented.         Fluids  No intake or  output data in the 24 hours ending 08/27/24 0746      Laboratory  Recent Labs     08/24/24  2320 08/26/24  0101   WBC 8.2 7.3   RBC 2.97* 2.55*   HEMOGLOBIN 9.2* 7.6*   HEMATOCRIT 28.3* 24.4*   MCV 95.3 95.7   MCH 31.0 29.8   MCHC 32.5 31.1*   RDW 82.6* 84.4*   PLATELETCT 105* 60*   MPV 9.6 10.6     Recent Labs     08/25/24  0500 08/25/24  1628 08/26/24  0101   SODIUM 126* 129* 128*   POTASSIUM 4.3 4.3 4.3   CHLORIDE 91* 93* 93*   CO2 14* 15* 15*   GLUCOSE 185* 169* 179*   BUN 22 20 23*   CREATININE 0.56 0.64 0.70   CALCIUM 9.0 8.8 8.6     Recent Labs     08/25/24  0910   INR 2.49*               Imaging  US-THORACENTESIS PUNCTURE LEFT   Final Result      1. Ultrasound guided left sided diagnostic and therapeutic thoracentesis.      2. 610 mL of fluid withdrawn.      DX-CHEST-PORTABLE (1 VIEW)   Final Result      1.  Moderate diffuse opacity throughout right lung which is increased since prior. Could be asymmetric edema and/or infiltrates.   2.  Bilateral pleural effusions.      US-CHEST   Final Result         1. Mild to moderate right pleural effusion. No safe area for thoracentesis due to overlying collapsed lung.      CT-CTA CHEST PULMONARY ARTERY W/ RECONS   Final Result         1.  No evidence of pulmonary embolism.   2.  Worsening metastatic disease with worsening mediastinal and hilar adenopathy and hepatic metastases.   3.  Diffuse sclerotic osseous metastatic disease.   4.  Moderate bilateral pleural effusions.   5.  Multiple tiny/small pulmonary nodules could be infectious/inflammatory or metastatic. Attention on follow-up.      Fleischner Society pulmonary nodule recommendations:   Not applicable in patient with known history of malignancy      DX-CHEST-PORTABLE (1 VIEW)   Final Result      Right basilar opacity, atelectasis and/or pneumonitis.      Sclerotic osseous metastases again noted.      EC-ECHOCARDIOGRAM COMPLETE W/O CONT    (Results Pending)        Assessment/Plan  * Pleural effusion  Assessment  & Plan  8/27/2024   B: Infectious vs malignant  COVID influenza, RSV negative.    CTA negative for PE however noted worsening with metastatic disease -worsening mediastinal, hilar adenopathy, hepatic metastasis, moderate bilateral pleural effusions, pulmonary nodules concerning for infectious versus metastatic.    Attempt was made for thoracentesis, however unable to safely perform due to overlying collapsed lung.   Will attempt left side  Will discuss with patient and patient wife in regards to overall goals of care, if pursuing hospice, Pleurx catheter may be place for comfort  Patient covered empirically with antibiotic therapy.    Advanced care planning/counseling discussion  Assessment & Plan  DNR/I per dsicussion with my partner  Will discuss with patient and patient significant other in regards to overall goals of care/pursuing hospice    Nausea & vomiting  Assessment & Plan  8/27/2024  IV and p.o. antiemetics  Continue with IV fluids  Daily lab  Improved today     Elevated troponin  Assessment & Plan  8/27/2024  Likely secondary to demand ischemia  Down trending  EKG which I personally reviewed shows sinus tach, low voltage.  No evidence of acute ischemia.  Compared to previous EKG voltage is slightly diminished but no other changes    High anion gap metabolic acidosis  Assessment & Plan  8/27/2024  Secondary to lactic acidosis  Continue IV fluids  Follow-up with daily labs    Lactic acidosis  Assessment & Plan  8/27/2024  Patient presents with lactic acid level of 12.8  ?sepsis vs metastatic CA  Has improved with IVF however VSS now much improved and making good urine, no evidence of hypoperfusion on exam.  Suspect CA is contributing    Hyponatremia  Assessment & Plan  8/27/2024  Patient presents with sodium level 122  On review of lab has mild chronic hypoNa upper 120s to low 130s  Hypovolemic vs SIADH from underlying cancer  Resolving  Serial BMP  Sz precautions    Hyperkalemia  Assessment &  Plan  8/27/2024  Patient presents with potassium level 5.8  S/p hyperkalemia protocol, resolved  Follow daily BMP    Tachycardia  Assessment & Plan  8/27/2024  Likely secondary to dehydration.  Continue with IV fluids  admitted to the [facility] for telemetry monitoring     Transaminitis  Assessment & Plan  8/27/2024  Likely 2/2 liver lesions as described below   CT imaging on 6/2024: Liver: Stable 4 mm liver cyst in the right hepatic lobe on image 30 series 3. There are 2 new low density liver lesions within the right hepatic measuring 1.8 cm and 3.2 cm     Prostate cancer (HCC)- (present on admission)  Assessment & Plan  8/27/2024  Patient with known history of metastatic prostate cancer.  Currently on immunotherapy.  Followed by oncologist Dr. Yung  ?malignant pleural effusions: will send fluid for cytology    HTN (hypertension), benign- (present on admission)  Assessment & Plan  8/27/2024  Holding lisinopril in setting of hyperkalemia    DM (diabetes mellitus) (HCC)- (present on admission)  Assessment & Plan  8/27/2024  ISS, hypoglycemia protocol, diabetic diet   Given pt's terminal Dx tight BG is not indicated  A1c 6.6         VTE prophylaxis: SCDs    I have performed a physical exam and reviewed and updated ROS and Plan today (8/27/2024). In review of yesterday's note (8/26/2024), there are no changes except as documented above.    Greater than 50 minutes spent prepping to see patient (e.g. review of tests) obtaining and/or reviewing separately obtained history. Performing a medically appropriate examination and/ evaluation.  Counseling and educating the patient/family/caregiver.  Ordering medications, tests, or procedures.  Referring and communicating with other health care professionals.  Documenting clinical information in EPIC.  Independently interpreting results and communicating results to patient/family/caregiver.  Care coordination.

## 2024-08-27 NOTE — CARE PLAN
Problem: Hyperinflation  Goal: Prevent or improve atelectasis  Description: Target End Date:  3 to 4 days    1. Instruct incentive spirometry usage  2.  Perform hyperinflation therapy as indicated  Outcome: Progressing   PEP QID  1000ml IS

## 2024-08-28 PROBLEM — E88.3 TUMOR LYSIS SYNDROME: Status: ACTIVE | Noted: 2024-01-01

## 2024-08-28 PROBLEM — R57.9 SHOCK (HCC): Status: ACTIVE | Noted: 2024-01-01

## 2024-08-28 PROBLEM — D65 DIC (DISSEMINATED INTRAVASCULAR COAGULATION) (HCC): Status: ACTIVE | Noted: 2024-01-01

## 2024-08-28 PROBLEM — J96.01 ACUTE HYPOXIC RESPIRATORY FAILURE (HCC): Status: ACTIVE | Noted: 2024-01-01

## 2024-08-28 NOTE — ASSESSMENT & PLAN NOTE
Chronically elevated lactic acidosis; likely primarily type B from malignancy  On bicarbonate drip  Well compensated

## 2024-08-28 NOTE — CARE PLAN
The patient is Watcher - Medium risk of patient condition declining or worsening    Shift Goals  Clinical Goals: comfort, monitor vitals, manage blood glucose  Patient Goals: LY  Family Goals: none present    Progress made toward(s) clinical / shift goals:      Patient is not progressing towards the following goals:      Problem: Knowledge Deficit - Standard  Goal: Patient and family/care givers will demonstrate understanding of plan of care, disease process/condition, diagnostic tests and medications  Outcome: Not Progressing  Note: Pt is A/Ox3. Pt is difficult to arouse and communicate with. Pt has delayed responses and often doses off during conversation. Pt has not been able to follow commands to take oral medications.

## 2024-08-28 NOTE — PROGRESS NOTES
Hypoglycemia Intervention    Hypoglycemia protocol intervention:  Blood glucose 48 at 1150. Dr. Sterling notified immediately.  Intervention: 8 oz of fruit juice given through NG due to poor IV access. Currently pending placement of central line with Dr. Sterling bedside.    Central line placed, per Dr. Sterling give D50%. D50 given at 1221.  Repeat blood glucose 211 at 1238.  Intervention: 25 g IV dextrose per MAR via hyperkalemia protocol

## 2024-08-28 NOTE — CARE PLAN
The patient is Watcher - Medium risk of patient condition declining or worsening    Shift Goals  Clinical Goals: patient will remain hemodynamically stable and not have any new skin breakdown this shift.   Patient Goals: rest  Family Goals: none present    Progress made toward(s) clinical / shift goals:    Problem: Knowledge Deficit - Standard  Goal: Patient and family/care givers will demonstrate understanding of plan of care, disease process/condition, diagnostic tests and medications  Outcome: Progressing  Note: Discussed POC and shift goals with patient and primary team. All questions answered. Patient lethargic and forgetful, reinforcement provided.      Problem: Skin Integrity  Goal: Skin integrity is maintained or improved  Outcome: Progressing  Note: No new skin breakdown. Patient agreeable to Q2T.      Problem: Safety  Goal: Will remain free from injury  Outcome: Progressing  Note: Patient remains free from injury and falls this shift. Fall precautions in place.   Intervention: Collaborate with Interdisciplinary Team for safe transfer and mobilization techniques  Flowsheets (Taken 8/27/2024 0800 by Sarita Villa)  Assistive Devices: Walker - front wheel     Problem: Fall Risk  Goal: Patient will remain free from falls  Outcome: Progressing       Patient is not progressing towards the following goals:     Problem: Hemodynamics  Goal: Patient's hemodynamics, fluid balance and neurologic status will be stable or improve  Outcome: Not Progressing  Note: Patient with possible TLS or sepsis. IVF in place. Patient afebrile and VSS, mildly tachycardic. Patient educated and interventions in place. Poor urine output. MD updated. New orders obtained for bladder scans and straight cath

## 2024-08-28 NOTE — ASSESSMENT & PLAN NOTE
Clinically oozing with thrombocytopenia, elevated INR, in setting of malignancy and critical illness  Discussed with manuel Cui  He is DNR/DNI with limited measures. We are trying to prolong his life long enough for his father to arrive from [place] but family has been notified that Mr. Rowley may die before everyone can arrive.

## 2024-08-28 NOTE — PROGRESS NOTES
08/27/2024 2027:    MD notified of patients inability to follow commands and blood sugar, no new orders at this time.

## 2024-08-28 NOTE — CONSULTS
Palliative   Consult reviewed, note robust  GOC discussion per Dr. Sterling, will f/u with patient 8/29, d/w Dr. Sterling.  Thank you,     Xuan Ochoa, MSN, APRN, [facility]

## 2024-08-28 NOTE — PROCEDURES
Central Line Insertion    Date/Time: 8/28/2024 12:27 PM    Performed by: Tiago Sterling D.O.  Authorized by: Tiago Sterling D.O.    Consent:     Consent obtained:  Emergent situation  Universal protocol:     Procedure explained and questions answered to patient or proxy's satisfaction: yes      Relevant documents present and verified: yes      Test results available and properly labeled: yes      Imaging studies available: yes      Required blood products, implants, devices, and special equipment available: yes      Site/side marked: yes      Immediately prior to procedure, a time out was called: yes      Patient identity confirmed:  Arm band  Pre-procedure details:     Hand hygiene: Hand hygiene performed prior to insertion      Sterile barrier technique: All elements of maximal sterile technique followed      Skin preparation:  2% chlorhexidine    Skin preparation agent: Skin preparation agent completely dried prior to procedure    Sedation:     Sedation type:  Anxiolysis  Anesthesia:     Anesthesia method:  Local infiltration    Local anesthetic:  Lidocaine 1% w/o epi  Procedure details:     Location:  R internal jugular    Patient position:  Flat    Landmarks identified: yes      Ultrasound guidance: yes      Sterile ultrasound techniques: Sterile gel and sterile probe covers were used      Number of attempts:  1    Successful placement: yes    Post-procedure details:     Post-procedure:  Dressing applied and line sutured    Guidewire: guidewire removal confirmed      Assessment:  Blood return through all ports (CXR pending)    Patient tolerance of procedure:  Tolerated well, no immediate complications          
Diagnostic LT Thoracentesis was performed by SATNAM Diamond, removing 610 mL of cloudy, straw fluid. 610 mL of fluid was sent to the lab. Pt tolerated the procedure well. Dx Chest was performed in the ultrasound room.    Post procedure report was provided to RN via text.   BP = 127/90    Pt was picked up by transport to be delivered back to his room.   
Pt will meet >75% estimated nutrition needs at meals

## 2024-08-28 NOTE — CARE PLAN
The patient is Unstable - High likelihood or risk of patient condition declining or worsening    Shift Goals  Clinical Goals: comfort, monitor vitals, manage blood glucose  Patient Goals: LY  Family Goals: none present      Problem: Knowledge Deficit - Standard  Goal: Patient and family/care givers will demonstrate understanding of plan of care, disease process/condition, diagnostic tests and medications  Description: Target End Date:  1-3 days or as soon as patient condition allows    Document in Patient Education    1.  Patient and family/caregiver oriented to unit, equipment, visitation policy and means for communicating concern  2.  Complete/review Learning Assessment  3.  Assess knowledge level of disease process/condition, treatment plan, diagnostic tests and medications  4.  Explain disease process/condition, treatment plan, diagnostic tests and medications  Outcome: Progressing     Problem: Skin Integrity  Goal: Skin integrity is maintained or improved  Description: Target End Date:  Prior to discharge or change in level of care    Document interventions on Skin Risk/Ravin flowsheet groups and corresponding LDA    1.  Assess and monitor skin integrity, appearance and/or temperature  2.  Assess risk factors for impaired skin integrity and/or pressures ulcers  3.  Implement precautions to protect skin integrity in collaboration with interdisciplinary team  4.  Implement pressure ulcer prevention protocol if at risk for skin breakdown  5.  Confirm wound care consult if at risk for skin breakdown  6.  Ensure patient use of pressure relieving devices  (Low air loss bed, waffle overlay, heel protectors, ROHO cushion, etc)  Outcome: Progressing     Problem: Hemodynamics  Goal: Patient's hemodynamics, fluid balance and neurologic status will be stable or improve  Description: Target End Date:  Prior to discharge or change in level of care    Document on Assessment and I/O flowsheet templates    1.  Monitor vital  signs, pulse oximetry and cardiac monitor per provider order and/or policy  2.  Maintain blood pressure per provider order  3.  Hemodynamic monitoring per provider order  4.  Manage IV fluids and IV infusions  5.  Monitor intake and output  6.  Daily weights per unit policy or provider order  7.  Assess peripheral pulses and capillary refill  8.  Assess color and body temperature  9.  Position patient for maximum circulation/cardiac output  10. Monitor for signs/symptoms of excessive bleeding  11. Assess mental status, restlessness and changes in level of consciousness  12. Monitor temperature and report fever or hypothermia to provider immediately. Consideration of targeted temperature management.  Outcome: Progressing     Problem: Pain - Standard  Goal: Alleviation of pain or a reduction in pain to the patient’s comfort goal  Description: Target End Date:  Prior to discharge or change in level of care    Document on Vitals flowsheet    1.  Document pain using the appropriate pain scale per order or unit policy  2.  Educate and implement non-pharmacologic comfort measures (i.e. relaxation, distraction, massage, cold/heat therapy, etc.)  3.  Pain management medications as ordered  4.  Reassess pain after pain med administration per policy  5.  If opiods administered assess patient's response to pain medication is appropriate per POSS sedation scale  6.  Follow pain management plan developed in collaboration with patient and interdisciplinary team (including palliative care or pain specialists if applicable)  Outcome: Progressing

## 2024-08-28 NOTE — CONSULTS
"Critical Care Consultation    Date of Service  8/28/2024    Referring Physician  Jim Weaver D.O.    Consulting Physician  Tiago Sterling D.O.    Reason for Consultation  Tumor lysis syndrome    History of Presenting Illness  From Dr. Weaver's note: \"This is a 58-year-old male with past medical history of prostate cancer with metastasis to bone, hypertension and type 2 diabetes who was admitted on 8/24/2024 with shortness of breath.     COVID influenza, RSV negative.  CTA negative for PE however noted worsening with metastatic disease -worsening mediastinal, hilar adenopathy, hepatic metastasis, moderate bilateral pleural effusions, pulmonary nodules concerning for infectious versus metastatic.  Attempt was made for thoracentesis, however unable to safely perform due to overlying collapsed lung. Patient covered empirically with antibiotic therapy.     Interval Problem Update  Unclear what patient's baseline is in terms of mentation, he understands that he is experiencing shortness of breath and has fluid in the lungs.     Attempt was made yesterday to perform thoracentesis on the right, unable to safely remove fluid, order for thoracentesis on the left placed today.  If able to be safely performed, will follow fluid studies and cytology.      Patient's wife was called, updated on plan of care, all questions answered.     Patient was seen and examined at bedside.  I have personally reviewed and interpreted vitals, labs, and imaging.    8/27.  Afebrile.  Has been tachycardic.  On room air.  Worsening LFTs.  Patient is lethargic this morning.  Frequently responds yeah, I am okay, not so much.  Patient is increased lactic acid, LDH, anion gap metabolic acidosis.  I did start the patient on IV fluids.  Continue Unasyn and azithromycin.  With concern for sepsis.  Cultures so far remain negative.  With elevated bilirubin, uric acid, LDH there is also concern for TLS and I did consult oncology Dr. Baires.\"    ICU " upgrade was requested on 8/28.  The patient appeared to be tumor lysis syndrome.  He was hypotensive with systolic blood pressures as low as 60.  His mentation was altered.  Potassium was greater than 6.      Review of Systems  Review of Systems   Unable to perform ROS: Acuity of condition       Past Medical History   has a past medical history of Arthritis (02/2019), Bronchitis (2019), Cancer (HCC), Cancer (HCC), Cold (02/08/2019), Diabetes, High cholesterol, HTN (hypertension), benign (08/14/2009), Hypertension, Infectious disease, Obstructive sleep apnea (02/2019), and Prostate cancer (HCC).    Surgical History   has a past surgical history that includes knee arthroscopy (Right, 05/2014); carpal tunnel release (Left, 02/11/2019); pr shldr arthroscop exten debride 3+ (Left, 11/01/2021); pr arthroscopy shoulder surgical biceps tenodes* (Left, 11/01/2021); craniotomy stealth (Right, 09/21/2023); other neurological surg (Sept 21 2023); other (rt knee, left shoulder, left wrist, lumbar); ostectomy (06/12/2024); and lumbar laminectomy diskectomy (Bilateral).    Family History  family history includes Cancer in his father and maternal uncle.    Social History   reports that he has never smoked. He has never used smokeless tobacco. He reports that he does not currently use alcohol. He reports current drug use. Drugs: Oral and Marijuana.    Medications  Prior to Admission Medications   Prescriptions Last Dose Informant Patient Reported? Taking?   Cabozantinib S-Malate (CABOMETYX) 20 MG Tab 8/23/2024 at 1800  No No   Sig: Take 20 mg by mouth every day. Start on same day as IV atezolizumab   Cabozantinib S-Malate 40 MG Tab 8/23/2024 at 1800  No No   Sig: Take 1 tablet (40 mg) by mouth every day.   Canagliflozin (INVOKANA) 100 MG Tab 8/24/2024 at 0600 Patient Yes Yes   Sig: Take 100 mg by mouth every day.   HYDROcodone-acetaminophen (NORCO) 5-325 MG Tab per tablet 8/24/2024 at 0600  No Yes   Sig: Take 1 Tablet by mouth every  four hours as needed (cancer related pain) for up to 30 days.   Naloxone (NARCAN) 4 MG/0.1ML Liquid ukn at prn Patient No No   Sig: Administer 4 mg into affected nostril(S) as needed (For severe sleepiness or difficulty breathing from possible overdose. Call 911 after administration.).   alendronate (FOSAMAX) 70 MG Tab 8/24/2024 at 1800 Patient Yes Yes   Sig: Take 70 mg by mouth every 7 days.   clobetasol (TEMOVATE) 0.05 % Cream ukn at PRN Patient No No   Sig: Apply to affected area BID for two weeks   cyclobenzaprine (FLEXERIL) 10 mg Tab 8/24/2024 at 1800 Patient Yes Yes   Sig: Take 10 mg by mouth at bedtime.   diphenoxylate-atropine (LOMOTIL) 2.5-0.025 MG Tab ukn at prn Patient Yes No   Sig: Take 1 Tablet by mouth 4 times a day as needed for Diarrhea.   ergocalciferol (DRISDOL) 87144 UNIT capsule 8/24/2024 at 1800  No Yes   Sig: Take 1 Capsule by mouth every 7 days for 6 doses.   ibuprofen (MOTRIN) 200 MG Tab 8/24/2024 at 1000 Patient Yes Yes   Sig: Take 600 mg by mouth every 8 hours as needed for Mild Pain.   lisinopril (PRINIVIL) 10 MG Tab 8/24/2024 at 0600  No Yes   Sig: Take 1 Tablet by mouth every day.   metoprolol SR (TOPROL XL) 25 MG TABLET SR 24 HR 8/24/2024 at 0600 Patient No Yes   Sig: Take 2 tablets by mouth every day.   Patient taking differently: Take 25 mg by mouth every day.   morphine ER (MS CONTIN) 15 MG Tab CR tablet 8/24/2024 at 0600  No Yes   Sig: Take 1 Tablet by mouth every 12 hours for 30 days.   ondansetron (ZOFRAN ODT) 8 MG TABLET DISPERSIBLE 8/25/2024 at 0100 Patient No Yes   Sig: Take 1 Tablet by mouth every four hours as needed for Nausea.   zolpidem (AMBIEN) 10 MG Tab 8/24/2024 at 1800 Patient Yes Yes   Sig: Take 10 mg by mouth at bedtime as needed for Sleep.      Facility-Administered Medications: None       Allergies  Allergies   Allergen Reactions    Iodine Unspecified     Topical caused rash       Physical Exam  Temp:  [35.9 °C (96.7 °F)-37.1 °C (98.7 °F)] 35.9 °C (96.7 °F)  Pulse:   [] 85  Resp:  [11-18] 15  BP: ()/(39-76) 105/55  SpO2:  [92 %-100 %] 99 %    Physical Exam  Vitals reviewed. Exam conducted with a chaperone present.   Constitutional:       General: He is not in acute distress.     Appearance: He is ill-appearing. He is not toxic-appearing or diaphoretic.   HENT:      Mouth/Throat:      Mouth: Mucous membranes are dry.   Eyes:      General:         Right eye: No discharge.         Left eye: No discharge.      Pupils: Pupils are equal, round, and reactive to light.   Cardiovascular:      Rate and Rhythm: Normal rate.      Pulses: Normal pulses.   Pulmonary:      Effort: Pulmonary effort is normal. No respiratory distress.      Breath sounds: Rales present.   Musculoskeletal:      Right lower leg: No edema.      Left lower leg: No edema.   Skin:     General: Skin is warm.      Capillary Refill: Capillary refill takes less than 2 seconds.      Coloration: Skin is not jaundiced.   Neurological:      General: No focal deficit present.      Mental Status: He is easily aroused. He is lethargic.   Psychiatric:         Attention and Perception: He is inattentive.         Speech: Speech is delayed.         Behavior: Behavior is cooperative.         Cognition and Memory: Cognition is impaired.           Fluids  Date 08/28/24 0700 - 08/29/24 0659   Shift 7810-5409 9983-7669 5127-3091 24 Hour Total   INTAKE   I.V. 22.4   22.4   Shift Total 22.4   22.4   OUTPUT   Shift Total       Weight (kg) 74.2 74.2 74.2 74.2       Laboratory  Recent Labs     08/26/24  0101 08/27/24  0653 08/27/24  2341   WBC 7.3 7.1 8.3   RBC 2.55* 2.60* 2.68*   HEMOGLOBIN 7.6* 7.9* 8.3*   HEMATOCRIT 24.4* 24.7* 25.9*   MCV 95.7 95.0 96.6   MCH 29.8 30.4 31.0   MCHC 31.1* 32.0* 32.0*   RDW 84.4* 83.9* 86.9*   PLATELETCT 60* 50* 47*   MPV 10.6 12.2 10.1     Recent Labs     08/27/24  0653 08/27/24  2341 08/28/24  0945   SODIUM 128* 129* 129*   POTASSIUM 4.9 6.3* 6.9*   CHLORIDE 90* 93* 91*   CO2 18* 14* 11*    GLUCOSE 156* 138* 90   BUN 26* 37* 41*   CREATININE 0.59 1.09 1.41*   CALCIUM 8.3* 8.5 8.2*                     Imaging  EC-ECHOCARDIOGRAM COMPLETE W/O CONT   Final Result      US-THORACENTESIS PUNCTURE LEFT   Final Result      1. Ultrasound guided left sided diagnostic and therapeutic thoracentesis.      2. 610 mL of fluid withdrawn.      DX-CHEST-PORTABLE (1 VIEW)   Final Result      1.  Moderate diffuse opacity throughout right lung which is increased since prior. Could be asymmetric edema and/or infiltrates.   2.  Bilateral pleural effusions.      US-CHEST   Final Result         1. Mild to moderate right pleural effusion. No safe area for thoracentesis due to overlying collapsed lung.      CT-CTA CHEST PULMONARY ARTERY W/ RECONS   Final Result         1.  No evidence of pulmonary embolism.   2.  Worsening metastatic disease with worsening mediastinal and hilar adenopathy and hepatic metastases.   3.  Diffuse sclerotic osseous metastatic disease.   4.  Moderate bilateral pleural effusions.   5.  Multiple tiny/small pulmonary nodules could be infectious/inflammatory or metastatic. Attention on follow-up.      Fleischner Society pulmonary nodule recommendations:   Not applicable in patient with known history of malignancy      DX-CHEST-PORTABLE (1 VIEW)   Final Result      Right basilar opacity, atelectasis and/or pneumonitis.      Sclerotic osseous metastases again noted.      DX-CHEST-PORTABLE (1 VIEW)    (Results Pending)   IR-PICC LINE PLACEMENT W/ GUIDANCE > AGE 5    (Results Pending)   DX-ABDOMEN FOR TUBE PLACEMENT    (Results Pending)       Assessment/Plan  Tumor lysis syndrome  Assessment & Plan  Elevated potassium and uric acid  Stage IV prostate cancer  Early signs of RACQUEL  Plan: aggressively treat hyperkalemia, replace calcium and fluids, continue allopurinol via NGT    DIC (disseminated intravascular coagulation) (HCC)  Assessment & Plan  Clinically oozing with thrombocytopenia, elevated INR, in  setting of malignancy and critical illness  Discussed with manuel Cui  He is DNR/DNI with limited measures. We are trying to prolong his life long enough for his father to arrive from [place] but family has been notified that Mr. Rowley may die before everyone can arrive.     Acute hypoxic respiratory failure (HCC)  Assessment & Plan  Secondary to capillary leak and edema in addition to right-sided pleural effusion  -CXR reviewed  -IS when able  -Mobilize when able  -Target O2 sat 88-92%      Shock (HCC)  Assessment & Plan  Likely hypovolemic  Dry mucous membranes, collapsible vasculature, in presumed tumor lysis syndrome with capillary leak, noted to be in early RACQUEL  Replacing fluids  Concerns for 3rd spacing limiting resuscitation  Titrating vasopressors to maintain MAP >65    High anion gap metabolic acidosis  Assessment & Plan  Chronically elevated lactic acidosis; likely primarily type B from malignancy  On bicarbonate drip  Well compensated     Transaminitis  Assessment & Plan  Known mets to the liver    DM (diabetes mellitus) ([facility])- (present on admission)  Assessment & Plan  Monitor  SSI  Target glc 140-180          Prognosis is grim. Death is likely imminent. Family notified of this prognosis.     The patient remains critically ill.  Critical care time = 90 minutes in directly providing and coordinating critical care and extensive data review.  No time overlap and excludes procedures.    Tiago Sterling, DO  Staff Pulmonologist and Intensivist  [facility]     Please note that this dictation was created using voice recognition software. The accuracy of the dictation is limited to the abilities of the software. I have made every reasonable attempt to correct obvious errors, but I expect that there are errors of grammar and possibly content that I did not discover before finalizing the note.

## 2024-08-28 NOTE — PROGRESS NOTES
CNA notified RN of BP 72/48 at 0746. Upon assessment, patient very lethargic, unable to verbalize responses to orientation questions or follow commands. Charge RN and rapid RN notified. Rapid RN to bedside. MD notified and ordered 1L LR bolus, came to bedside. Patient briefly responded to fluids. MD to bedside multiple times until official rapid was called. Patient hooked up to zoll  and found to be in Afib and persistently hypotensive.  to bedside to assist with significant other. Patient eventually transferred to TICU.

## 2024-08-28 NOTE — ACP (ADVANCE CARE PLANNING)
I had discussions with both Casper's fiance, Basilia, and his father Jeremy. I explained to both loved ones that Casper is critically ill and dying from his cancer.  Jeremy is currently in [place] and making efforts to get to [place] as soon as possible.  She will entirely would like us to try to keep Casper alive with conservative but reasonable measures to hopefully allow Jeremy to arrive before Casper dies.  I did explain to them that Casper is in a precarious state and that he could rapidly deteriorate at any moment.  We will support him with vasopressors and also give pain meds to allow alleviation of suffering.  If he were to deteriorate further, we may have to fully transition to comfort care.  Both Basilia and Jeremy expressed support for this decision making.      Tiago Sterling,   Staff Pulmonologist and Intensivist  [facility]     Please note that this dictation was created using voice recognition software. The accuracy of the dictation is limited to the abilities of the software. I have made every reasonable attempt to correct obvious errors, but I expect that there are errors of grammar and possibly content that I did not discover before finalizing the note.

## 2024-08-28 NOTE — PROGRESS NOTES
4 Eyes Skin Assessment Completed by DARA Reyes and DARA Michael.    Generally pale, mottled, cool.  Head WDL  Ears WDL  Nose WDL  Mouth WDL  Neck WDL  Breast/Chest WDL  Shoulder Blades WDL  Spine WDL  (R) Arm/Elbow/Hand Bruising and Swelling  (L) Arm/Elbow/Hand Redness, Blanching, Bruising, and Swelling  Abdomen WDL  Groin WDL  Scrotum/Coccyx/Buttocks Redness, Blanching, and Moisture Fissure  (R) Leg Redness, Blanching, and Swelling  (L) Leg Redness, Blanching, and Swelling  (R) Heel/Foot/Toe Redness, Blanching, Discoloration, and Swelling mottling, cracked heels  (L) Heel/Foot/Toe Redness, Blanching, Discoloration, and Swelling mottling, cracked heels      Devices In Places ECG, Tele Box, Pulse Ox, Mistry, SCD's, and Nasal Cannula      Interventions In Place Gray Ear Foams, Heel Mepilex, Sacral Mepilex, TAP System, Pillows, Q2 Turns, and Low Air Loss Mattress    Possible Skin Injury No    Pictures Uploaded Into Epic N/A  Wound Consult Placed N/A  RN Wound Prevention Protocol Ordered Yes

## 2024-08-28 NOTE — THERAPY
Physical Therapy Contact Note    Patient Name: Casper Rowley  Age:  58 y.o., Sex:  male  Medical Record #: [id number]  Today's Date: 8/28/2024 08/28/24 0744   Treatment Variance   Reason For Missed Therapy Medical - Patient on Hold from Therapy   Interdisciplinary Plan of Care Collaboration   IDT Collaboration with  Nursing;Occupational Therapist   Collaboration Comments Patient was to be seen for PT treatment session this AM. However RN informed that patient is not appropriate to participate with mobility this date. Reported low BP, declined mentation and not following commands. Later this AM, Rapid assessment was called and patient was Tx to TICU. PT to follow up at a later date as appropriate.   Session Information   Date / Session Number  8/26-1(1/4, 9/1); Hold 8/28

## 2024-08-28 NOTE — PROGRESS NOTES
Bedside procedure- Central line placement:    0.5 mg of dilaudid given at 1209 per Dr. Sterling.   Procedure start time: 1211  Guidewire out at 1215  Procedure stop time: 1220

## 2024-08-28 NOTE — ASSESSMENT & PLAN NOTE
Secondary to capillary leak and edema in addition to right-sided pleural effusion  -CXR reviewed  -IS when able  -Mobilize when able  -Target O2 sat 88-92%

## 2024-08-28 NOTE — PROGRESS NOTES
Hospital Medicine Daily Progress Note    Date of Service  8/28/2024    Chief Complaint  Casper Rowley is a 58 y.o. male admitted 8/24/2024 with shortness of breath    Hospital Course  This is a 58-year-old male with past medical history of prostate cancer with metastasis to bone, hypertension and type 2 diabetes who was admitted on 8/24/2024 with shortness of breath.    COVID influenza, RSV negative.  CTA negative for PE however noted worsening with metastatic disease -worsening mediastinal, hilar adenopathy, hepatic metastasis, moderate bilateral pleural effusions, pulmonary nodules concerning for infectious versus metastatic.  Attempt was made for thoracentesis, however unable to safely perform due to overlying collapsed lung. Patient covered empirically with antibiotic therapy.    Interval Problem Update  Unclear what patient's baseline is in terms of mentation, he understands that he is experiencing shortness of breath and has fluid in the lungs.    Attempt was made yesterday to perform thoracentesis on the right, unable to safely remove fluid, order for thoracentesis on the left placed today.  If able to be safely performed, will follow fluid studies and cytology.     Patient's wife was called, updated on plan of care, all questions answered.    Patient was seen and examined at bedside.  I have personally reviewed and interpreted vitals, labs, and imaging.    8/27.  Afebrile.  Has been tachycardic.  On room air.  Worsening LFTs.  Patient is lethargic this morning.  Frequently responds yeah, I am okay, not so much.  Patient is increased lactic acid, LDH, anion gap metabolic acidosis.  I did start the patient on IV fluids.  Continue Unasyn and azithromycin.  With concern for sepsis.  Cultures so far remain negative.  With elevated bilirubin, uric acid, LDH there is also concern for TLS and I did consult oncology Dr. Baires.  8/28.  Afebrile.  Has been tachycardic.  On room air.  Patient more tachycardic  and hypotensive this morning.  Minimally responsive.  Bolused a liter of normal saline.  Noted to have worsening acidosis and hyperkalemia.  Started on a bicarb drip.  Patient is not awake enough to take Lokelma.  Ordered IV calcium gluconate, amp of D50 with insulin.  Patient has poor prognosis and is critically ill.  Rapid response was called.  Discussed with intensivist.  Patient will be transferred to the ICU requiring pressors for shock, bicarb drip for acidosis.  I did speak with his fiancéfrancisco Cui.  She does understand patient's poor prognosis.  She seems to be more concerned about how to get the title to his car signed over to her.  I did leave a message for patient's father as well.  Hgb 7.6 > 7.9 > 8.3  P 60 > 50 > 47  Na 128 > 129  Lactic 7.3  INC LDH  Bicarb 18 > 14  AG 20 > 22    I have discussed this patient's plan of care and discharge plan at IDT rounds today with Case Management, Nursing, Nursing leadership, and other members of the IDT team.    Consultants/Specialty  oncology    Code Status  DNAR/DNI    Disposition  Not medically clear.  I have placed the appropriate orders for post-discharge needs.    Review of Systems  Review of Systems   Unable to perform ROS: Mental status change        Physical Exam  Temp:  [36 °C (96.8 °F)-37.1 °C (98.7 °F)] 36 °C (96.8 °F)  Pulse:  [101-110] 102  Resp:  [12-18] 16  BP: ()/(60-84) 90/60  SpO2:  [92 %-97 %] 93 %    Physical Exam  Vitals and nursing note reviewed.   Constitutional:       General: He is not in acute distress.     Appearance: He is ill-appearing.   HENT:      Head: Normocephalic and atraumatic.   Eyes:      Extraocular Movements: Extraocular movements intact.      Conjunctiva/sclera: Conjunctivae normal.      Pupils: Pupils are equal, round, and reactive to light.   Cardiovascular:      Rate and Rhythm: Normal rate and regular rhythm.      Pulses: Normal pulses.      Heart sounds: No murmur heard.     No friction rub. No gallop.    Pulmonary:      Effort: Pulmonary effort is normal. No respiratory distress.      Breath sounds: Rales present. No wheezing or rhonchi.   Abdominal:      General: Bowel sounds are normal. There is no distension.      Palpations: Abdomen is soft.      Tenderness: There is no abdominal tenderness.   Musculoskeletal:         General: No swelling or tenderness. Normal range of motion.      Cervical back: Normal range of motion and neck supple. No muscular tenderness.      Right lower leg: No edema.      Left lower leg: No edema.   Skin:     General: Skin is warm and dry.      Capillary Refill: Capillary refill takes less than 2 seconds.      Findings: No bruising, erythema or rash.   Neurological:      General: No focal deficit present.      Mental Status: He is alert. He is disoriented.         Fluids    Intake/Output Summary (Last 24 hours) at 8/28/2024 0518  Last data filed at 8/27/2024 2103  Gross per 24 hour   Intake 120 ml   Output 1020 ml   Net -900 ml         Laboratory  Recent Labs     08/26/24  0101 08/27/24  0653 08/27/24  2341   WBC 7.3 7.1 8.3   RBC 2.55* 2.60* 2.68*   HEMOGLOBIN 7.6* 7.9* 8.3*   HEMATOCRIT 24.4* 24.7* 25.9*   MCV 95.7 95.0 96.6   MCH 29.8 30.4 31.0   MCHC 31.1* 32.0* 32.0*   RDW 84.4* 83.9* 86.9*   PLATELETCT 60* 50* 47*   MPV 10.6 12.2 10.1     Recent Labs     08/26/24  0101 08/27/24  0653 08/27/24  2341   SODIUM 128* 128* 129*   POTASSIUM 4.3 4.9 6.3*   CHLORIDE 93* 90* 93*   CO2 15* 18* 14*   GLUCOSE 179* 156* 138*   BUN 23* 26* 37*   CREATININE 0.70 0.59 1.09   CALCIUM 8.6 8.3* 8.5     Recent Labs     08/25/24  0910   INR 2.49*               Imaging  EC-ECHOCARDIOGRAM COMPLETE W/O CONT   Final Result      US-THORACENTESIS PUNCTURE LEFT   Final Result      1. Ultrasound guided left sided diagnostic and therapeutic thoracentesis.      2. 610 mL of fluid withdrawn.      DX-CHEST-PORTABLE (1 VIEW)   Final Result      1.  Moderate diffuse opacity throughout right lung which is increased  since prior. Could be asymmetric edema and/or infiltrates.   2.  Bilateral pleural effusions.      US-CHEST   Final Result         1. Mild to moderate right pleural effusion. No safe area for thoracentesis due to overlying collapsed lung.      CT-CTA CHEST PULMONARY ARTERY W/ RECONS   Final Result         1.  No evidence of pulmonary embolism.   2.  Worsening metastatic disease with worsening mediastinal and hilar adenopathy and hepatic metastases.   3.  Diffuse sclerotic osseous metastatic disease.   4.  Moderate bilateral pleural effusions.   5.  Multiple tiny/small pulmonary nodules could be infectious/inflammatory or metastatic. Attention on follow-up.      Fleischner Society pulmonary nodule recommendations:   Not applicable in patient with known history of malignancy      DX-CHEST-PORTABLE (1 VIEW)   Final Result      Right basilar opacity, atelectasis and/or pneumonitis.      Sclerotic osseous metastases again noted.           Assessment/Plan  * Pleural effusion  Assessment & Plan  8/28/2024   B: Infectious vs malignant  COVID influenza, RSV negative.    CTA negative for PE however noted worsening with metastatic disease -worsening mediastinal, hilar adenopathy, hepatic metastasis, moderate bilateral pleural effusions, pulmonary nodules concerning for infectious versus metastatic.    Attempt was made for thoracentesis, however unable to safely perform due to overlying collapsed lung.   Will attempt left side  Will discuss with patient and patient wife in regards to overall goals of care, if pursuing hospice, Pleurx catheter may be place for comfort  Patient covered empirically with antibiotic therapy.    Shock (HCC)  Assessment & Plan  8/28/2024  Secondary to sepsis versus tumor lysis syndrome.    Advanced care planning/counseling discussion  Assessment & Plan  DNR/I per dsicussion with my partner  Will discuss with patient and patient significant other in regards to overall goals of care/pursuing  hospice    Nausea & vomiting  Assessment & Plan  8/28/2024  IV and p.o. antiemetics  Continue with IV fluids  Daily lab  Improved today     Elevated troponin  Assessment & Plan  8/28/2024  Likely secondary to demand ischemia  Down trending  EKG which I personally reviewed shows sinus tach, low voltage.  No evidence of acute ischemia.  Compared to previous EKG voltage is slightly diminished but no other changes    High anion gap metabolic acidosis  Assessment & Plan  8/28/2024  Secondary to lactic acidosis  Continue IV fluids  Follow-up with daily labs  Started bicarb drip    Lactic acidosis  Assessment & Plan  8/28/2024  Patient presents with lactic acid level of 12.8  ?sepsis vs metastatic CA  Has improved with IVF however VSS now much improved and making good urine, no evidence of hypoperfusion on exam.  Suspect CA is contributing    Hyponatremia  Assessment & Plan  8/28/2024  Patient presents with sodium level 122  On review of lab has mild chronic hypoNa upper 120s to low 130s  Hypovolemic vs SIADH from underlying cancer  Resolving  Serial BMP  Sz precautions    Hyperkalemia  Assessment & Plan  8/28/2024  Patient presents with potassium level 5.8  S/p hyperkalemia protocol, resolved  Follow daily BMP  Started on Lokelma.  Given IV calcium gluconate    Tachycardia  Assessment & Plan  8/28/2024  Likely secondary to dehydration.  Continue with IV fluids  Transferred to the ICU.    Transaminitis  Assessment & Plan  8/28/2024  Likely 2/2 liver lesions as described below   CT imaging on 6/2024: Liver: Stable 4 mm liver cyst in the right hepatic lobe on image 30 series 3. There are 2 new low density liver lesions within the right hepatic measuring 1.8 cm and 3.2 cm     Prostate cancer (HCC)- (present on admission)  Assessment & Plan  8/28/2024  Patient with known history of metastatic prostate cancer.  Currently on immunotherapy.  Followed by oncologist Dr. Yung  ?malignant pleural effusions: will send fluid for  cytology    HTN (hypertension), benign- (present on admission)  Assessment & Plan  8/28/2024  Holding lisinopril in setting of hyperkalemia    DM (diabetes mellitus) (HCC)- (present on admission)  Assessment & Plan  8/28/2024  ISS, hypoglycemia protocol, diabetic diet   Given pt's terminal Dx tight BG is not indicated  A1c 6.6         VTE prophylaxis: SCDs    I have performed a physical exam and reviewed and updated ROS and Plan today (8/28/2024). In review of yesterday's note (8/27/2024), there are no changes except as documented above.    Patient is critically ill.   The patient continues to have: Shock requiring pressors  The vital organ system that is affected is the: Cardiovascular  If untreated there is a high chance of deterioration into: Cardiac arrest and eventually death.   The critical care that I am providing today is: Fluid bolus, transferred to the ICU for pressor support pressor support.  Lactic acidosis secondary to hypoperfusion requiring bicarb drip  The critical that has been undertaken is medically complex.   There has been no overlap in critical care time.   Critical Care Time not including procedures: 47

## 2024-08-28 NOTE — PROGRESS NOTES
Late entry due to EPIC downtime:    0835 RICU RRT at bedside for RN concern. RN coordinating with Dr. Weaver for orders.  0910 Remaining RRT at bedside.  0925 Attached pt to zoll - lead II reading HR of 190s.  0928 official rapid response called by bedside RN.  0930 STAT ECG at bedside. HR 90s.  0945 troponin, lactic and CMP ordered by MD. Lab at bedside.  0948 CXR at bedside  1000 Order for norepinephrine received from Dr. Sterling for MAP >65. Transfer orders received to ICU. RTOC notified.  1010 Dr. Weaver attempted to call pt's son - MD left voicemail.  1021 Bed assignment received for pt.   1043 Pt arrived to [id number] with rapid response team and CCT.

## 2024-08-28 NOTE — THERAPY
08/28/24 1101   Interdisciplinary Plan of Care Collaboration   Collaboration Comments OT treatment attempted. Pt transferring to higher level of care. Will hold and follow for appropriate timing of session.

## 2024-08-28 NOTE — ASSESSMENT & PLAN NOTE
Likely hypovolemic  Dry mucous membranes, collapsible vasculature, in presumed tumor lysis syndrome with capillary leak, noted to be in early RACQUEL  Replacing fluids  Concerns for 3rd spacing limiting resuscitation  Titrating vasopressors to maintain MAP >65

## 2024-08-29 NOTE — PROGRESS NOTES
I spoke with Mr Rowley's family at the bedside.  We discussed comfort care.  They are ready to transition him.  I have placed the orders.    Cody Hicks M.D.

## 2024-08-29 NOTE — CARE PLAN
The patient is Unstable - High likelihood or risk of patient condition declining or worsening    Shift Goals  Clinical Goals: goals of care, hemodynamic stability, comfort  Patient Goals: LY  Family Goals: comfort    Progress made toward(s) clinical / shift goals:    Problem: Knowledge Deficit - Standard  Goal: Patient and family/care givers will demonstrate understanding of plan of care, disease process/condition, diagnostic tests and medications  Outcome: Progressing     Problem: Pain - Standard  Goal: Alleviation of pain or a reduction in pain to the patient’s comfort goal  Outcome: Progressing       Patient is not progressing towards the following goals:      Problem: Hemodynamics  Goal: Patient's hemodynamics, fluid balance and neurologic status will be stable or improve  Outcome: Not Progressing

## 2024-08-29 NOTE — PROGRESS NOTES
Spoke with Goddaughter and made her aware of patient passing.  A voicemail was left for her by bedside RN Swetha when patient passed.

## 2024-08-29 NOTE — DISCHARGE SUMMARY
Death Summary    Cause of Death  Hypoxic respiratory failure and distributive shock due to widely metastatic prostatic adenocarcinoma causing malignant pleural effusions    Comorbid Conditions at the Time of Death  Principal Problem:    Pleural effusion (POA: Unknown)  Active Problems:    DM (diabetes mellitus) (HCC) (POA: Yes)    HTN (hypertension), benign (Chronic) (POA: Yes)    Prostate cancer (HCC) (POA: Yes)    Transaminitis (POA: Unknown)    Tachycardia (POA: Unknown)    Hyperkalemia (POA: Unknown)    Hyponatremia (POA: Unknown)    Lactic acidosis (POA: Unknown)    High anion gap metabolic acidosis (POA: Unknown)    Elevated troponin (POA: Unknown)    Nausea & vomiting (POA: Unknown)    Advanced care planning/counseling discussion (POA: Unknown)    Tumor lysis syndrome (POA: Unknown)    Shock (HCC) (POA: Unknown)    Acute hypoxic respiratory failure (HCC) (POA: Unknown)    DIC (disseminated intravascular coagulation) (HCC) (POA: Unknown)  Resolved Problems:    * No resolved hospital problems. *      History of Presenting Illness and Hospital Course  58-year-old male with a history of widely metastatic prostate cancer was admitted to the hospital on 8/24/2024 with shortness of breath.  He was found to have negative viral workup, CT PE was negative but he did note worsening metastatic disease as well as moderate bilateral pleural effusions, multiple pulmonary nodules, multiple hepatic metastases which are worse, and worse mediastinal, hilar adenopathy.    A diagnostic thoracentesis was performed over the left with a 610 mL of cloudy, straw-colored fluid removed.  A right sided thoracentesis was attempted but not achievable due to the proximity of the lung parenchyma.  The left sided thoracentesis was positive for metastatic prostatic adenocarcinoma.  Over the course of his hospitalization he had worsening liver function, worsening renal function and developed tumor lysis.  He had worsening lactic acidosis,  worsening encephalopathy and lethargy.  He was treated for culture-negative sepsis with antibiotics.    On  he developed worsening hypotension and tachycardia.  He did not respond to crystalloid infusion, acidosis was worsening, hyperkalemia was worsening and he was transferred to the ICU.  Ongoing family discussions were had and family was able to come into the hospital and see him.  Multiple goals of care conversations were had with his family throughout the course of the day and given his continued worsening the ultimate decision was to transition him to comfort care.    Overnight on - another family discussion was held given his rapidly worsening hypotension, he was approaching max dose Levophed with significant work of breathing and lethargy.  I met with his family at the bedside and they decided to transition him to comfort care.  He  shortly thereafter.    Date of death: 2024  Time of death: 41  Pronounced by: Cody Hicks M.D.

## 2024-08-30 LAB
BACTERIA BLD CULT: NORMAL
BACTERIA BLD CULT: NORMAL
SIGNIFICANT IND 70042: NORMAL
SIGNIFICANT IND 70042: NORMAL
SITE SITE: NORMAL
SITE SITE: NORMAL
SOURCE SOURCE: NORMAL
SOURCE SOURCE: NORMAL

## 2024-09-03 ENCOUNTER — APPOINTMENT (OUTPATIENT)
Dept: RADIOLOGY | Facility: MEDICAL CENTER | Age: 59
End: 2024-09-03
Attending: FAMILY MEDICINE
Payer: COMMERCIAL

## 2024-09-06 NOTE — TELEPHONE ENCOUNTER
Called patient and notified him that Dr. Baires has increased Cabozantinib S-Malate (cabometyx) from 20mg to 40mg daily for his next cycle of treatment starting 8/24/2024.  Patient verbalized understanding and agreed with POC.  Pt states he is doing well overall. He reports sleeping more and walking in the mornings, which has helped his energy levels.  Encouraged pt to call RN with any questions/concerns.    Non-Reassuring FHR

## 2024-09-23 LAB
FUNGUS SPEC CULT: NORMAL
FUNGUS SPEC FUNGUS STN: NORMAL
SIGNIFICANT IND 70042: NORMAL
SITE SITE: NORMAL
SOURCE SOURCE: NORMAL

## 2024-10-09 LAB
MYCOBACTERIUM SPEC CULT: NORMAL
RHODAMINE-AURAMINE STN SPEC: NORMAL
SIGNIFICANT IND 70042: NORMAL
SITE SITE: NORMAL
SOURCE SOURCE: NORMAL

## (undated) DEVICE — GLOVE SZ 6 BIOGEL PI MICRO - PF LF (50PR/BX 4BX/CA)

## (undated) DEVICE — SUTURE GENERAL

## (undated) DEVICE — SENSOR OXIMETER ADULT SPO2 RD SET (20EA/BX)

## (undated) DEVICE — GLOVE BIOGEL PI INDICATOR SZ 8.0 SURGICAL PF LF -(50/BX 4BX/CA)

## (undated) DEVICE — GLOVE BIOGEL SZ 6.5 SURGICAL PF LTX (50PR/BX 4BX/CA)

## (undated) DEVICE — CORETEMP DRAPE FORM-FITTED EASY DROPANDGO DRAPE FOR USE ON THE CORETEMP FLUID MANAGEMENT 56IN X 56IN

## (undated) DEVICE — GOWN SURGICAL XX-LARGE - (28EA/CA) SIRUS NON REINFORCED

## (undated) DEVICE — CONTAINER SPECIMEN BAG OR - STERILE 4 OZ W/LID (100EA/CA)

## (undated) DEVICE — SPONGE XRAY 8X4 STERL. 12PL - (10EA/TY 80TY/CA)

## (undated) DEVICE — BANDAGE ELASTIC 3 X 5 YDS - STERILE VELCRO (25/CA)LATEX

## (undated) DEVICE — BLADE BEAVER 6400 MINI EYE ROUND TIP SHARP ON ONE SIDE (20/CA)

## (undated) DEVICE — HUMID-VENT HEAT AND MOISTURE EXCHANGE- (50/BX)

## (undated) DEVICE — GLOVE BIOGEL ECLIPSE PF LATEX SIZE 8.0  (50PR/BX)

## (undated) DEVICE — SET LEADWIRE 5 LEAD BEDSIDE DISPOSABLE ECG (1SET OF 5/EA)

## (undated) DEVICE — GLOVE BIOGEL SZ 8 SURGICAL PF LTX - (50PR/BX 4BX/CA)

## (undated) DEVICE — SODIUM CHL IRRIGATION 0.9% 1000ML (12EA/CA)

## (undated) DEVICE — GLOVE BIOGEL PI INDICATOR SZ 6.5 SURGICAL PF LF - (50/BX 4BX/CA)

## (undated) DEVICE — SUTURE 4-0 NUROLON CR/8 TF - (12/BX) ETHICON

## (undated) DEVICE — PACK LOWER EXTREMITY - (2/CA)

## (undated) DEVICE — SPONGE GAUZE STER 4X4 8-PL - (2/PK 50PK/BX 12BX/CS)

## (undated) DEVICE — DERMABOND ADVANCED - (12EA/BX)

## (undated) DEVICE — SUTURE 3-0 ETHILON FS-1 - (36/BX) 30 INCH

## (undated) DEVICE — COVER LIGHT HANDLE ALC PLUS DISP (18EA/BX)

## (undated) DEVICE — GLOVE, LITE (PAIR)

## (undated) DEVICE — DRAPE LARGE 3 QUARTER - (20/CA)

## (undated) DEVICE — Device

## (undated) DEVICE — GOWN WARMING STANDARD FLEX - (30/CA)

## (undated) DEVICE — DRAPE U ORTHOPEDIC - (10/BX)

## (undated) DEVICE — TUBE CONNECTING SUCTION - CLEAR PLASTIC STERILE 72 IN (50EA/CA)

## (undated) DEVICE — ELECTRODE 850 FOAM ADHESIVE - HYDROGEL RADIOTRNSPRNT (50/PK)

## (undated) DEVICE — LACTATED RINGERS INJ 1000 ML - (14EA/CA 60CA/PF)

## (undated) DEVICE — PIN HEAD MAYFIELD DISP. (3EA/PK 12PK/BX)

## (undated) DEVICE — FELT CARDIOVASCULAR PTFE 1.65MM THICK L2 IN X W2 IN PATCH STERILE LATEX FREE (10EA/CA)

## (undated) DEVICE — SUTURE 0 VICRYL PLUS CT-1 - 8 X 18 INCH (12/BX)

## (undated) DEVICE — PACK MAJOR ORTHO - (2EA/CA)

## (undated) DEVICE — FORCEPS IRRIGATING 9 X 0.5MM (5EA/BX)

## (undated) DEVICE — SUTURE CV

## (undated) DEVICE — SPHERE NAVIGATION STEALTH (5EA/TY 12TY/PK)

## (undated) DEVICE — TUBING CLEARLINK DUO-VENT - C-FLO (48EA/CA)

## (undated) DEVICE — TOOL MR8 9CM ACORN 7.5MM DIAMETER (1/EA)

## (undated) DEVICE — ELECTRODE DUAL RETURN W/ CORD - (50/PK)

## (undated) DEVICE — SUCTION INSTRUMENT YANKAUER BULBOUS TIP W/O VENT (50EA/CA)

## (undated) DEVICE — DRAPE IOBAN II INCISE 23X17 - (10EA/BX 4BX/CA)

## (undated) DEVICE — DRAPE SURGICAL U 77X120 - (10/CA)

## (undated) DEVICE — HEAD HOLDER JUNIOR/ADULT

## (undated) DEVICE — SENSOR SPO2 NEO LNCS ADHESIVE (20/BX) SEE USER NOTES

## (undated) DEVICE — CANISTER SUCTION 3000ML MECHANICAL FILTER AUTO SHUTOFF MEDI-VAC NONSTERILE LF DISP  (40EA/CA)

## (undated) DEVICE — TOOL MR8 2.3CM F2/7CM TAPER (1/EA)

## (undated) DEVICE — NEEDLE 11GA FOR KYPHOPLASTY

## (undated) DEVICE — CANNULA FULLY THREADED 8 X 75 (5EA/BX)

## (undated) DEVICE — DRAIN CSF WITH ANTI REFLUX VALVE

## (undated) DEVICE — GLOVE BIOGEL PI ORTHO SZ 8 PF LF (40PR/BX)

## (undated) DEVICE — GLOVE SURGICAL PROTEXIS PI 8.0 LF - (50PR/BX)

## (undated) DEVICE — PROBE VULCAN 90 DEG W/SUCTION

## (undated) DEVICE — KIT ANESTHESIA W/CIRCUIT & 3/LT BAG W/FILTER (20EA/CA)

## (undated) DEVICE — WATER IRRIGATION STERILE 1000ML (12EA/CA)

## (undated) DEVICE — GLOVE BIOGEL SZ 7.5 SURGICAL PF LTX - (50PR/BX 4BX/CA)

## (undated) DEVICE — SLEEVE VASO CALF MED - (10PR/CA)

## (undated) DEVICE — BONE WAX (12PK/BX)

## (undated) DEVICE — PACK CRANI - (1EA/CA)

## (undated) DEVICE — DRESSING ANTIMICROBIAL BIOPATCH 4.0M (40EA/CA)

## (undated) DEVICE — DRAPE STRLE REG TOWEL 18X24 - (10/BX 4BX/CA)"

## (undated) DEVICE — GOWN SURGEONS LARGE - (32/CA)

## (undated) DEVICE — BAG, SPONGE COUNT 50600

## (undated) DEVICE — CHLORAPREP 26 ML APPLICATOR - ORANGE TINT(25/CA)

## (undated) DEVICE — BATTERY VARISPEED

## (undated) DEVICE — TUBING PUMP WITH CONNECTOR REDEUCE (1EA)

## (undated) DEVICE — SET EXTENSION WITH 2 PORTS (48EA/CA) ***PART #2C8610 IS A SUBSTITUTE*****

## (undated) DEVICE — STAY ELASTIC BLUNT RETRACTOR 12MM (8EA/PK)

## (undated) DEVICE — TUBING PATIENT W/CONNECTOR REDEUCE (1EA)

## (undated) DEVICE — TOURNIQUET, STERILE 18 (RED)

## (undated) DEVICE — DRESSING TRANSPARENT FILM TEGADERM 4 X 4.75" (50EA/BX)"

## (undated) DEVICE — MASK, LARYNGEAL AIRWAY #4

## (undated) DEVICE — BLADE SURGICAL #15 - (50/BX 3BX/CA)

## (undated) DEVICE — KIT SURGIFLO W/OUT THROMBIN - (6EA/CA)

## (undated) DEVICE — DRAPE U SPLIT IMP 54 X 76 - (24/CA)

## (undated) DEVICE — DRAPE C ARMOR (12EA/CA)

## (undated) DEVICE — MASK ANESTHESIA ADULT  - (100/CA)

## (undated) DEVICE — DRAPE C-ARM LARGE 41IN X 74 IN - (10/BX 2BX/CA)

## (undated) DEVICE — TAPE XBRAID TT 2.0MM (12EA/BX)

## (undated) DEVICE — TOWEL STOP TIMEOUT SAFETY FLAG (40EA/CA)

## (undated) DEVICE — NEPTUNE 4 PORT MANIFOLD - (20/PK)

## (undated) DEVICE — BOVIE NEEDLE TIP INSULATD NON-SAFETY 2CM (50/PK)

## (undated) DEVICE — PATTIES SURG X-RAYCOTTONOID - 1/2 X 3 IN (200/CA)

## (undated) DEVICE — SUTURE 2-0 VICRYL CT-2  8 X 18 INCH (12EA/BX)

## (undated) DEVICE — NEEDLE MULTIFIRE (5EA/BX)

## (undated) DEVICE — GLOVE BIOGEL SZ 7 SURGICAL PF LTX - (50PR/BX 4BX/CA)

## (undated) DEVICE — SHAVER4.0 RESECTOR FORMULA - (5EA/BX)

## (undated) DEVICE — BAG SPONGE COUNT 10.25 X 32 - BLUE (250/CA)

## (undated) DEVICE — PADDING CAST 3 IN STERILE - 3 X 4 YDS (24EA/CA)

## (undated) DEVICE — SUTURE 3-0 MONOCRYL SH (36PK/BX)

## (undated) DEVICE — TUBE CONNECT SUCTION CLEAR 120 X 1/4" (50EA/CA)"

## (undated) DEVICE — SLEEVE SHOULDER DISP(ARTHREX) - (6/BX)

## (undated) DEVICE — CORDS BIPOLAR COAGULATION - 12FT STERILE DISP. (10EA/BX)

## (undated) DEVICE — DRAPE SHOULDER FLUID CONTROL - 77 X 85 (10/CA)

## (undated) DEVICE — PAD PREP 24 X 48 CUFFED - (100/CA)

## (undated) DEVICE — SHAVER4.0 AGGRESSIVE + FORMLA (5EA/BX)

## (undated) DEVICE — DRAPE MICROSCOPE ARMATEC 120IN X 46IN (10EA/CA)

## (undated) DEVICE — STOCKINET TUBULAR 6IN STERILE - 6 X 48YDS (25/CA)

## (undated) DEVICE — PATTIES SURG NEURO X-RAY 1/2X1/2 (10EA/PK 20PK/CS)

## (undated) DEVICE — DRESSING ABDOMINAL PAD STERILE 8 X 10" (360EA/CA)"

## (undated) DEVICE — CANNULA THREADED 5X75 (5EA/BX)

## (undated) DEVICE — SPONGE PEANUT - (5/PK 50PK/CA)

## (undated) DEVICE — SURGIFOAM (SIZE 100) - (6EA/CA)

## (undated) DEVICE — PROTECTOR ULNA NERVE - (36PR/CA)

## (undated) DEVICE — KIT ROOM DECONTAMINATION

## (undated) DEVICE — GLOVE SZ 7.5 BIOGEL PI MICRO - PF LF (50PR/BX)

## (undated) DEVICE — CANISTER SUCTION RIGID RED 1500CC (40EA/CA)

## (undated) DEVICE — GLOVE BIOGEL PI ORTHO SZ 6 1/2 SURGICAL PF LF (40PR/BX)

## (undated) DEVICE — BLADE SURGICAL CLIPPER - (50EA/CA)

## (undated) DEVICE — BOVIE BLADE COATED &INSULATED - 25/PK

## (undated) DEVICE — GLOVE SZ 6.5 BIOGEL PI MICRO - PF LF (50PR/BX)

## (undated) DEVICE — PACK SHOULDER ARTHROSCOPY SM - (2EA/CA)